# Patient Record
Sex: MALE | Race: WHITE | NOT HISPANIC OR LATINO | Employment: OTHER | URBAN - METROPOLITAN AREA
[De-identification: names, ages, dates, MRNs, and addresses within clinical notes are randomized per-mention and may not be internally consistent; named-entity substitution may affect disease eponyms.]

---

## 2017-06-13 ENCOUNTER — ALLSCRIPTS OFFICE VISIT (OUTPATIENT)
Dept: OTHER | Facility: OTHER | Age: 67
End: 2017-06-13

## 2017-06-13 LAB — HBA1C MFR BLD HPLC: 6.7 %

## 2017-08-29 ENCOUNTER — HOSPITAL ENCOUNTER (EMERGENCY)
Facility: HOSPITAL | Age: 67
Discharge: HOME | End: 2017-08-29
Attending: EMERGENCY MEDICINE | Admitting: EMERGENCY MEDICINE
Payer: COMMERCIAL

## 2017-08-29 ENCOUNTER — OFFICE VISIT (OUTPATIENT)
Dept: URGENT CARE | Facility: MEDICAL CENTER | Age: 67
End: 2017-08-29
Payer: MEDICARE

## 2017-08-29 VITALS
BODY MASS INDEX: 25.9 KG/M2 | SYSTOLIC BLOOD PRESSURE: 184 MMHG | DIASTOLIC BLOOD PRESSURE: 94 MMHG | RESPIRATION RATE: 19 BRPM | OXYGEN SATURATION: 97 % | HEART RATE: 80 BPM | WEIGHT: 191 LBS | TEMPERATURE: 98 F

## 2017-08-29 DIAGNOSIS — S05.01XA CORNEAL ABRASION, RIGHT, INITIAL ENCOUNTER: Primary | ICD-10-CM

## 2017-08-29 PROCEDURE — 90715 TDAP VACCINE 7 YRS/> IM: CPT

## 2017-08-29 PROCEDURE — G0463 HOSPITAL OUTPT CLINIC VISIT: HCPCS

## 2017-08-29 PROCEDURE — 99283 EMERGENCY DEPT VISIT LOW MDM: CPT

## 2017-08-29 PROCEDURE — 99203 OFFICE O/P NEW LOW 30 MIN: CPT

## 2017-08-29 RX ORDER — ERYTHROMYCIN 5 MG/G
0.5 OINTMENT OPHTHALMIC EVERY 6 HOURS
Qty: 1 G | Refills: 0 | Status: SHIPPED | OUTPATIENT
Start: 2017-08-29 | End: 2017-11-02 | Stop reason: ALTCHOICE

## 2017-08-29 RX ORDER — ERYTHROMYCIN 5 MG/G
0.5 OINTMENT OPHTHALMIC ONCE
Status: COMPLETED | OUTPATIENT
Start: 2017-08-29 | End: 2017-08-29

## 2017-08-29 RX ORDER — MORPHINE SULFATE 15 MG/1
15 TABLET ORAL EVERY 4 HOURS PRN
Qty: 15 TABLET | Refills: 0 | Status: ON HOLD | OUTPATIENT
Start: 2017-08-29 | End: 2017-11-03

## 2017-08-29 RX ORDER — PROPARACAINE HYDROCHLORIDE 5 MG/ML
1 SOLUTION/ DROPS OPHTHALMIC ONCE
Status: COMPLETED | OUTPATIENT
Start: 2017-08-29 | End: 2017-08-29

## 2017-08-29 RX ADMIN — ERYTHROMYCIN 0.5 INCH: 5 OINTMENT OPHTHALMIC at 17:36

## 2017-08-29 RX ADMIN — FLUORESCEIN SODIUM 1 STRIP: 1 STRIP OPHTHALMIC at 17:02

## 2017-08-29 RX ADMIN — PROPARACAINE HYDROCHLORIDE 1 DROP: 5 SOLUTION/ DROPS OPHTHALMIC at 17:02

## 2017-09-13 DIAGNOSIS — E78.5 HYPERLIPIDEMIA: ICD-10-CM

## 2017-09-13 DIAGNOSIS — E11.9 TYPE 2 DIABETES MELLITUS WITHOUT COMPLICATIONS (HCC): ICD-10-CM

## 2017-09-13 DIAGNOSIS — F52.21 MALE ERECTILE DISORDER (CODE): ICD-10-CM

## 2017-09-13 DIAGNOSIS — I10 ESSENTIAL (PRIMARY) HYPERTENSION: ICD-10-CM

## 2017-10-05 ENCOUNTER — ANESTHESIA EVENT (OUTPATIENT)
Dept: PERIOP | Facility: AMBULARY SURGERY CENTER | Age: 67
End: 2017-10-05
Payer: COMMERCIAL

## 2017-11-02 RX ORDER — ATORVASTATIN CALCIUM 10 MG/1
10 TABLET, FILM COATED ORAL DAILY
COMMUNITY
End: 2018-03-12 | Stop reason: SDUPTHER

## 2017-11-02 RX ORDER — VALSARTAN 160 MG/1
160 TABLET ORAL DAILY
COMMUNITY
End: 2018-05-07 | Stop reason: SDUPTHER

## 2017-11-02 RX ORDER — COVID-19 ANTIGEN TEST
220 KIT MISCELLANEOUS 2 TIMES DAILY
COMMUNITY
End: 2019-10-15

## 2017-11-03 ENCOUNTER — HOSPITAL ENCOUNTER (OUTPATIENT)
Facility: AMBULARY SURGERY CENTER | Age: 67
Setting detail: OUTPATIENT SURGERY
Discharge: HOME | End: 2017-11-03
Attending: COLON & RECTAL SURGERY | Admitting: COLON & RECTAL SURGERY
Payer: COMMERCIAL

## 2017-11-03 ENCOUNTER — GENERIC CONVERSION - ENCOUNTER (OUTPATIENT)
Dept: OTHER | Facility: OTHER | Age: 67
End: 2017-11-03

## 2017-11-03 ENCOUNTER — ANESTHESIA (OUTPATIENT)
Dept: PERIOP | Facility: AMBULARY SURGERY CENTER | Age: 67
End: 2017-11-03
Payer: COMMERCIAL

## 2017-11-03 VITALS
HEART RATE: 67 BPM | RESPIRATION RATE: 16 BRPM | DIASTOLIC BLOOD PRESSURE: 83 MMHG | TEMPERATURE: 97.4 F | OXYGEN SATURATION: 93 % | BODY MASS INDEX: 26.41 KG/M2 | SYSTOLIC BLOOD PRESSURE: 156 MMHG | HEIGHT: 72 IN | WEIGHT: 195 LBS

## 2017-11-03 RX ORDER — PROPOFOL 10 MG/ML
INJECTION, EMULSION INTRAVENOUS CONTINUOUS PRN
Status: DISCONTINUED | OUTPATIENT
Start: 2017-11-03 | End: 2017-11-03 | Stop reason: SURG

## 2017-11-03 RX ORDER — PROPOFOL 10 MG/ML
INJECTION, EMULSION INTRAVENOUS AS NEEDED
Status: DISCONTINUED | OUTPATIENT
Start: 2017-11-03 | End: 2017-11-03 | Stop reason: SURG

## 2017-11-03 RX ORDER — SODIUM CHLORIDE, SODIUM LACTATE, POTASSIUM CHLORIDE, CALCIUM CHLORIDE 600; 310; 30; 20 MG/100ML; MG/100ML; MG/100ML; MG/100ML
INJECTION, SOLUTION INTRAVENOUS CONTINUOUS PRN
Status: DISCONTINUED | OUTPATIENT
Start: 2017-11-03 | End: 2017-11-03 | Stop reason: SURG

## 2017-11-03 RX ADMIN — SODIUM CHLORIDE, SODIUM LACTATE, POTASSIUM CHLORIDE, AND CALCIUM CHLORIDE: .6; .31; .03; .02 INJECTION, SOLUTION INTRAVENOUS at 08:30

## 2017-11-03 RX ADMIN — PROPOFOL 100 MG: 10 INJECTION, EMULSION INTRAVENOUS at 08:47

## 2017-11-03 RX ADMIN — PROPOFOL 100 MCG/KG/MIN: 10 INJECTION, EMULSION INTRAVENOUS at 08:47

## 2017-11-03 NOTE — H&P
History and Physical   Colon and Rectal Surgery   Aniket Wheeler 74 y o  male MRN: [id number]  Unit/Bed#: [facility] Encounter: [id number]  11/03/17   8:40 AM      CC: History of polyps  History of Present Illness   HPI:  Aniket Wheeler is a 74 y o  male for surveillance colonoscopy  Historical Information   Past Medical History:   Diagnosis Date    Diabetes mellitus ([address] )     Diverticulitis     Hyperlipidemia     Hypertension     Kidney stone      Past Surgical History:   Procedure Laterality Date    ABDOMINAL SURGERY      COLONOSCOPY      HERNIA REPAIR      LAPAROSCOPIC COLON RESECTION      TONSILLECTOMY         Meds/Allergies     Prescriptions Prior to Admission   Medication    atorvastatin (LIPITOR) 10 mg tablet    metFORMIN (GLUCOPHAGE) 1000 MG tablet    Naproxen Sodium 220 MG CAPS    valsartan (DIOVAN) 160 mg tablet       No current facility-administered medications for this encounter  Facility-Administered Medications Ordered in Other Encounters:     lactated ringers infusion, , , Continuous PRN, Stephy Jakmendez, CRNA    No Known Allergies      Social History   History   Alcohol Use    Yes     Comment: socially      History   Drug Use No     History   Smoking Status    Current Some Day Smoker   Smokeless Tobacco    Never Used     Comment: cigar every 2 days         Family History:   Family History   Problem Relation Age of Onset    Colon cancer Father     Colon cancer Paternal Grandfather          Objective     Current Vitals:   Blood Pressure: 157/94 (11/03/17 0808)  Pulse: 72 (sr) (11/03/17 0808)  Temperature: 97 8 °F (36 6 °C) (11/03/17 0808)  Temp Source: Temporal (11/03/17 0808)  Respirations: 18 (11/03/17 0808)  Height: 6' (182 9 cm) (11/03/17 2090)  Weight - Scale: 88 5 kg (195 lb) (11/03/17 0808)  SpO2: 98 % (11/03/17 0808)  No intake or output data in the 24 hours ending 11/03/17 0840    Physical Exam:  General:  Well nourished, no distress    Neuro: Alert and oriented  Eyes:Sclera anicteric, conjunctiva pink  Pulm: Clear to auscultation bilaterally  No respiratory Distress  CV:  Regular rate and rhythm  No murmurs  Abdomen:  Soft, flat, non-tender, without masses or hepatosplenomegaly  Lab Results:       ASSESSMENT:  Karo Cox is a 74 y o  male for surveillance colonoscopy  PLAN:  Colonoscopy  Risks , including, but not limited to, bleeding, perforation, missed lesions, and potential need for surgery, were reviewed  Alternatives to colonoscopy were discussed    Kayla Bolaños MD

## 2017-11-03 NOTE — OP NOTE
COLONOSCOPY    PROCEDURE: Colonoscopy/     INDICATIONS: History of Colon Polyps    POST-OP DIAGNOSIS: See the impression below    SEDATION: Monitored anesthesia care, check anesthesia records    PHYSICAL EXAM:    /94   Pulse 72 Comment: sr  Temp 97 8 °F (36 6 °C) (Temporal)   Resp 18   Ht 6' (1 829 m)   Wt 88 5 kg (195 lb)   SpO2 98%   BMI 26 45 kg/m²   Body mass index is 26 45 kg/m²  General: NAD  Heart: S1 & S2 normal, RRR  Lungs: CTA, No rales or rhonchi  Abdomen: Soft, nontender, nondistended, good bowel sounds    CONSENT:  Informed consent was obtained for the procedure, including sedation after explaining the risks and benefits of the procedure  Risks including but not limited to bleeding, perforation, infection, aspiration were discussed in detail  Also explained about less than 100%$ sensitivity with the exam and other alternatives  PREPARATION:   EKG tracing, pulse oximetry, blood pressure were monitored throughout the procedure  Patient was identified by myself both verbally and by visual inspection of ID band  DESCRIPTION:   Patient was placed in the left lateral decubitus position and was sedated with the above medication  Digital rectal examination was performed  The colonoscope was introduced in to the anal canal and advanced up to cecum, which was identified by the appendiceal orifice and IC valve  A careful inspection was made as the colonoscope was withdrawn, including a retroflexed view of the rectum; findings and interventions are described below  Appropriate photodocumentation was obtained  The quality of the colonic preparation was excellent  FINDINGS:    The rectal exam was normal   There were no prostate nodules  The colon was unremarkable except for a colorectal anastomosis  The anastomosis was widely patent and otherwise unremarkable  IMPRESSIONS:      Normal colorectal anastomosis  Otherwise normal colonoscopy      RECOMMENDATIONS:    Colonoscopy in 5 years  COMPLICATIONS:  None; the patient tolerated the procedure well  DISPOSITION: PACU           CONDITION: Stable    Photos are scanned separately, as there is no endoscopy program available        Janet Noriega MD

## 2017-11-03 NOTE — ANESTHESIA POSTPROCEDURE EVALUATION
Post-Op Assessment Note      CV Status:  Stable    Mental Status:  Alert and awake    Hydration Status:  Euvolemic    PONV Controlled:  None    Airway Patency:  Patent    Post Op Vitals Reviewed: Yes          Staff: CRNA           BP      Temp      Pulse     Resp      SpO2

## 2017-11-03 NOTE — ANESTHESIA PREPROCEDURE EVALUATION
Review of Systems/Medical History  Patient summary reviewed  Chart reviewed  No history of anesthetic complications     Cardiovascular  Exercise tolerance: good,  Hyperlipidemia, Hypertension ,    Pulmonary  Smoker (quit july 2017) more than 10 packs per year , ,        GI/Hepatic  Negative GI/hepatic ROS   Bowel prep       Negative  ROS        Endo/Other  Negative endo/other ROS Diabetes , Arthritis     GYN       Hematology  Negative hematology ROS      Musculoskeletal  Negative musculoskeletal ROS        Neurology  Negative neurology ROS      Psychology   Negative psychology ROS            Physical Exam    Airway    Mallampati score: III  TM Distance: >3 FB  Neck ROM: full     Dental   upper dentures and lower dentures,     Cardiovascular  Cardiovascular exam normal    Pulmonary  Pulmonary exam normal     Other Findings        Anesthesia Plan  ASA Score- 2       Anesthesia Type- IV sedation with anesthesia with ASA Monitors  Additional Monitors:   Airway Plan:     Comment: Discussed with pt the possibility under sedation to have recall or mild discomfort          Induction- intravenous  Informed Consent- Anesthetic plan and risks discussed with patient  I personally reviewed this patient with the CRNA  Discussed and agreed on the Anesthesia Plan with the CRNA  Alisa Garland

## 2018-01-11 NOTE — RESULT NOTES
Verified Results  (1) CBC/PLT/DIFF [id number] 07:47AM [facility]     Test Name Result Flag Reference   WBC 6 5 x10E3/uL  3 4-10 8   RBC 4 75 x10E6/uL  4 14-5 80   Hemoglobin 16 2 g/dL  12 6-17 7   Hematocrit 47 8 %  37 5-51 0    fL H 79-97   MCH 34 1 pg H 26 6-33 0   MCHC 33 9 g/dL  31 5-35 7   RDW 13 3 %  12 3-15 4   Platelets 327 N71R4/AB  150-379   Neutrophils 52 %     Lymphs 37 %     Monocytes 7 %     Eos 3 %     Basos 1 %     Neutrophils (Absolute) 3 3 x10E3/uL  1 4-7 0   Lymphs (Absolute) 2 4 x10E3/uL  0 7-3 1   Monocytes(Absolute) 0 5 x10E3/uL  0 1-0 9   Eos (Absolute) 0 2 x10E3/uL  0 0-0 4   Baso (Absolute) 0 0 x10E3/uL  0 0-0 2   Immature Granulocytes 0 %     Immature Grans (Abs) 0 0 x10E3/uL  0 0-0 1     (1) COMPREHENSIVE METABOLIC PANEL [id number] 06:96RQ [facility]     Test Name Result Flag Reference   Glucose, Serum 140 mg/dL H 65-99   BUN 20 mg/dL  8-27   Creatinine, Serum 0 77 mg/dL  0 76-1 27   eGFR If NonAfricn Am 96 mL/min/1 73  >59   eGFR If Africn Am 111 mL/min/1 73  >59   BUN/Creatinine Ratio 26 H 10-22   Sodium, Serum 141 mmol/L  136-144   Potassium, Serum 4 4 mmol/L  3 5-5 2   Chloride, Serum 99 mmol/L     Carbon Dioxide, Total 24 mmol/L  18-29   Calcium, Serum 9 8 mg/dL  8 6-10 2   Protein, Total, Serum 7 8 g/dL  6 0-8 5   Albumin, Serum 4 6 g/dL  3 6-4 8   Globulin, Total 3 2 g/dL  1 5-4 5   A/G Ratio 1 4  1 1-2 5   Bilirubin, Total 0 3 mg/dL  0 0-1 2   Alkaline Phosphatase, S 49 IU/L     AST (SGOT) 21 IU/L  0-40   ALT (SGPT) 29 IU/L  0-44     (LC) Lipid Panel [id number] 07:47AM [facility]     Test Name Result Flag Reference   Cholesterol, Total 163 mg/dL  100-199   Triglycerides 81 mg/dL  0-149   HDL Cholesterol 49 mg/dL  >39   VLDL Cholesterol Hugo 16 mg/dL  5-40   LDL Cholesterol Calc 98 mg/dL  0-99     (1) LDL,DIRECT [id number] 07:47AM [facility]     Test Name Result Flag Reference   LDL Chol   (Direct) 107 mg/dL H 0-99     (1) HEMOGLOBIN A1C [id number] 07: 47AM [facility]     Test Name Result Flag Reference   Hemoglobin A1c 7 0 % H 4 8-5 6   Pre-diabetes: 5 7 - 6 4           Diabetes: >6 4           Glycemic control for adults with diabetes: <7 0     Darlene Gunter CMP14 Default [id number] 07:47AM [facility]     Test Name Result Flag Reference   Filiberto Bland CMP14 Default Comment     A hand-written panel/profile was received from your office  In  accordance with the [facility] Ambiguous Test Code Policy dated July 9740, we have completed your order by using the closest currently  or formerly recognized AMA panel  We have assigned Comprehensive  Metabolic Panel (14), Test Code #638853 to this request   If this  is not the testing you wished to receive on this specimen, please  contact the [address] [place] [address] Client Inquiry/Technical Services Department  to clarify the test order  We appreciate your business  [facility]) Filiberto Bland LP Default [id number] 07:47AM [facility]     Test Name Result Flag Reference   Zaina Goranv LP Default Comment     A hand-written panel/profile was received from your office  In  accordance with the [facility] Ambiguous Test Code Policy dated July 6991, we have completed your order by using the closest currently  or formerly recognized AMA panel  We have assigned Lipid Panel,  Test Code #773888 to this request  If this is not the testing you  wished to receive on this specimen, please contact the [address] [place] [address]  Client Inquiry/Technical Services Department to clarify the test  order  We appreciate your business         Discussion/Summary   labs are showing slightly increased HA1C otherwise labs are stable continue with current meds and recheck in three months

## 2018-01-12 VITALS
WEIGHT: 191.13 LBS | TEMPERATURE: 97.9 F | SYSTOLIC BLOOD PRESSURE: 138 MMHG | OXYGEN SATURATION: 94 % | HEART RATE: 88 BPM | HEIGHT: 72 IN | BODY MASS INDEX: 25.89 KG/M2 | DIASTOLIC BLOOD PRESSURE: 80 MMHG

## 2018-03-12 DIAGNOSIS — E78.01 FAMILIAL HYPERCHOLESTEROLEMIA: Primary | ICD-10-CM

## 2018-03-12 RX ORDER — ATORVASTATIN CALCIUM 10 MG/1
TABLET, FILM COATED ORAL
Qty: 90 TABLET | Refills: 0 | Status: SHIPPED | OUTPATIENT
Start: 2018-03-12 | End: 2018-05-07 | Stop reason: SDUPTHER

## 2018-05-02 LAB — HBA1C MFR BLD HPLC: 7.5 %

## 2018-05-03 LAB
ALBUMIN SERPL-MCNC: 4.2 G/DL (ref 3.6–4.8)
ALBUMIN/CREAT UR: 22.8 MG/G CREAT (ref 0–30)
ALBUMIN/GLOB SERPL: 1.4 {RATIO} (ref 1.2–2.2)
ALP SERPL-CCNC: 50 IU/L (ref 39–117)
ALT SERPL-CCNC: 31 IU/L (ref 0–44)
AMBIG ABBREV DEFAULT: NORMAL
AMBIG ABBREV DEFAULT: NORMAL
AST SERPL-CCNC: 24 IU/L (ref 0–40)
BASOPHILS # BLD AUTO: 0.1 X10E3/UL (ref 0–0.2)
BASOPHILS NFR BLD AUTO: 1 %
BILIRUB SERPL-MCNC: 0.4 MG/DL (ref 0–1.2)
BUN SERPL-MCNC: 19 MG/DL (ref 8–27)
BUN/CREAT SERPL: 25 (ref 10–24)
CALCIUM SERPL-MCNC: 9.3 MG/DL (ref 8.6–10.2)
CHLORIDE SERPL-SCNC: 102 MMOL/L (ref 96–106)
CHOLEST SERPL-MCNC: 214 MG/DL (ref 100–199)
CO2 SERPL-SCNC: 22 MMOL/L (ref 18–29)
CREAT SERPL-MCNC: 0.76 MG/DL (ref 0.76–1.27)
CREAT UR-MCNC: 94.7 MG/DL
EOSINOPHIL # BLD AUTO: 0.3 X10E3/UL (ref 0–0.4)
EOSINOPHIL NFR BLD AUTO: 6 %
ERYTHROCYTE [DISTWIDTH] IN BLOOD BY AUTOMATED COUNT: 14 % (ref 12.3–15.4)
GLOBULIN SER-MCNC: 3.1 G/DL (ref 1.5–4.5)
GLUCOSE SERPL-MCNC: 144 MG/DL (ref 65–99)
HBA1C MFR BLD: 7.5 % (ref 4.8–5.6)
HCT VFR BLD AUTO: 48 % (ref 37.5–51)
HDLC SERPL-MCNC: 45 MG/DL
HGB BLD-MCNC: 16.1 G/DL (ref 13–17.7)
IMM GRANULOCYTES # BLD: 0 X10E3/UL (ref 0–0.1)
IMM GRANULOCYTES NFR BLD: 0 %
LDLC SERPL CALC-MCNC: 110 MG/DL (ref 0–99)
LYMPHOCYTES # BLD AUTO: 2.1 X10E3/UL (ref 0.7–3.1)
LYMPHOCYTES NFR BLD AUTO: 38 %
MCH RBC QN AUTO: 33.7 PG (ref 26.6–33)
MCHC RBC AUTO-ENTMCNC: 33.5 G/DL (ref 31.5–35.7)
MCV RBC AUTO: 100 FL (ref 79–97)
MICROALBUMIN UR-MCNC: 21.6 UG/ML
MONOCYTES # BLD AUTO: 0.4 X10E3/UL (ref 0.1–0.9)
MONOCYTES NFR BLD AUTO: 6 %
NEUTROPHILS # BLD AUTO: 2.8 X10E3/UL (ref 1.4–7)
NEUTROPHILS NFR BLD AUTO: 49 %
PLATELET # BLD AUTO: 189 X10E3/UL (ref 150–379)
POTASSIUM SERPL-SCNC: 4.4 MMOL/L (ref 3.5–5.2)
PROT SERPL-MCNC: 7.3 G/DL (ref 6–8.5)
RBC # BLD AUTO: 4.78 X10E6/UL (ref 4.14–5.8)
SL AMB EGFR AFRICAN AMERICAN: 111 ML/MIN/1.73
SL AMB EGFR NON AFRICAN AMERICAN: 96 ML/MIN/1.73
SODIUM SERPL-SCNC: 141 MMOL/L (ref 134–144)
TRIGL SERPL-MCNC: 296 MG/DL (ref 0–149)
WBC # BLD AUTO: 5.7 X10E3/UL (ref 3.4–10.8)

## 2018-05-07 ENCOUNTER — OFFICE VISIT (OUTPATIENT)
Dept: FAMILY MEDICINE CLINIC | Facility: CLINIC | Age: 68
End: 2018-05-07
Payer: COMMERCIAL

## 2018-05-07 VITALS
HEIGHT: 72 IN | HEART RATE: 77 BPM | BODY MASS INDEX: 27.39 KG/M2 | OXYGEN SATURATION: 94 % | RESPIRATION RATE: 18 BRPM | TEMPERATURE: 98.4 F | SYSTOLIC BLOOD PRESSURE: 174 MMHG | DIASTOLIC BLOOD PRESSURE: 96 MMHG | WEIGHT: 202.19 LBS

## 2018-05-07 DIAGNOSIS — E11.9 TYPE 2 DIABETES MELLITUS WITHOUT COMPLICATION, WITHOUT LONG-TERM CURRENT USE OF INSULIN (HCC): ICD-10-CM

## 2018-05-07 DIAGNOSIS — K43.9 VENTRAL HERNIA WITHOUT OBSTRUCTION OR GANGRENE: ICD-10-CM

## 2018-05-07 DIAGNOSIS — E78.2 HYPERLIPIDEMIA, MIXED: ICD-10-CM

## 2018-05-07 DIAGNOSIS — E78.01 FAMILIAL HYPERCHOLESTEROLEMIA: ICD-10-CM

## 2018-05-07 DIAGNOSIS — I10 ESSENTIAL HYPERTENSION: Primary | ICD-10-CM

## 2018-05-07 PROCEDURE — 99214 OFFICE O/P EST MOD 30 MIN: CPT | Performed by: NURSE PRACTITIONER

## 2018-05-07 PROCEDURE — 4010F ACE/ARB THERAPY RXD/TAKEN: CPT | Performed by: NURSE PRACTITIONER

## 2018-05-07 RX ORDER — VALSARTAN 160 MG/1
160 TABLET ORAL DAILY
Qty: 30 TABLET | Refills: 0 | Status: SHIPPED | OUTPATIENT
Start: 2018-05-07 | End: 2018-08-22

## 2018-05-07 RX ORDER — SILDENAFIL 50 MG/1
1 TABLET, FILM COATED ORAL AS NEEDED
COMMUNITY
Start: 2013-07-16 | End: 2020-01-14 | Stop reason: HOSPADM

## 2018-05-07 RX ORDER — ATORVASTATIN CALCIUM 10 MG/1
10 TABLET, FILM COATED ORAL DAILY
Qty: 90 TABLET | Refills: 1 | Status: SHIPPED | OUTPATIENT
Start: 2018-05-07 | End: 2018-10-28 | Stop reason: SDUPTHER

## 2018-05-07 RX ORDER — VALSARTAN 160 MG/1
160 TABLET ORAL DAILY
Qty: 90 TABLET | Refills: 1 | Status: SHIPPED | OUTPATIENT
Start: 2018-05-07 | End: 2018-08-22

## 2018-05-07 NOTE — PROGRESS NOTES
Assessment/Plan:    No problem-specific Assessment & Plan notes found for this encounter  Diagnoses and all orders for this visit:    Essential hypertension  -     Comprehensive metabolic panel; Future  -     HEMOGLOBIN A1C W/ EAG ESTIMATION; Future  -     Lipid panel; Future    Hyperlipidemia, mixed  -     Comprehensive metabolic panel; Future  -     HEMOGLOBIN A1C W/ EAG ESTIMATION; Future  -     Lipid panel; Future    Type 2 diabetes mellitus without complication, without long-term current use of insulin (HCC)  -     Comprehensive metabolic panel; Future  -     HEMOGLOBIN A1C W/ EAG ESTIMATION; Future  -     Lipid panel; Future    Ventral hernia without obstruction or gangrene    Other orders  -     sildenafil (VIAGRA) 50 MG tablet; Take 1 tablet by mouth          Subjective:      Patient ID: Bubba Arriaza is a 74 y o  male  Pt BP is extremely high  He ran out of meds 10 days ago  He denies headaches but states " I have been feeling weird in the mornings"   htn- uncontrolled  DM- A1C 7 5, from 6 7  Noncompliant with diet  Not watching calories and trying to watch carbohydrates  Limited fluid intake  HLD- TG > 280 from 81  The following portions of the patient's history were reviewed and updated as appropriate:   He  has a past medical history of Diabetes mellitus ([address] ); Diverticulitis; Hyperlipidemia; Hypertension; and Kidney stone  He   Patient Active Problem List    Diagnosis Date Noted    Essential hypertension 05/07/2018    Hyperlipidemia, mixed 05/07/2018    Type 2 diabetes mellitus without complication, without long-term current use of insulin ([address] ) 05/07/2018    Ventral hernia without obstruction or gangrene 05/07/2018     He  has a past surgical history that includes Abdominal surgery; Laparoscopic colon resection; Tonsillectomy; Colonoscopy; pr colonoscopy flx dx w/collj spec when pfrmd (N/A, 11/3/2017); and Inguinal hernia repair (Left)    His family history includes Colon cancer in his family, father, and paternal grandfather; No Known Problems in his mother  He  reports that he has been smoking  He has never used smokeless tobacco  He reports that he drinks alcohol  He reports that he does not use drugs  Current Outpatient Prescriptions   Medication Sig Dispense Refill    sildenafil (VIAGRA) 50 MG tablet Take 1 tablet by mouth      atorvastatin (LIPITOR) 10 mg tablet TAKE 1 TABLET DAILY 90 tablet 0    metFORMIN (GLUCOPHAGE) 1000 MG tablet Take 1,000 mg by mouth 2 (two) times a day with meals      Naproxen Sodium 220 MG CAPS Take 220 mg by mouth 2 (two) times a day      valsartan (DIOVAN) 160 mg tablet Take 160 mg by mouth daily       No current facility-administered medications for this visit  Current Outpatient Prescriptions on File Prior to Visit   Medication Sig    atorvastatin (LIPITOR) 10 mg tablet TAKE 1 TABLET DAILY    metFORMIN (GLUCOPHAGE) 1000 MG tablet Take 1,000 mg by mouth 2 (two) times a day with meals    Naproxen Sodium 220 MG CAPS Take 220 mg by mouth 2 (two) times a day    valsartan (DIOVAN) 160 mg tablet Take 160 mg by mouth daily     No current facility-administered medications on file prior to visit  He has No Known Allergies       Review of Systems   Constitutional: Negative for fatigue and fever  HENT: Negative for congestion  Eyes: Negative for visual disturbance  Respiratory: Negative for cough and shortness of breath  Cardiovascular: Negative for chest pain, palpitations and leg swelling  Gastrointestinal: Negative for abdominal distention and abdominal pain  Endocrine: Negative for cold intolerance, polydipsia and polyuria  Genitourinary: Negative for difficulty urinating  Musculoskeletal: Negative for back pain and joint swelling  Skin: Negative for color change and rash  Allergic/Immunologic: Negative for immunocompromised state  Neurological: Positive for numbness  Negative for dizziness and headaches  Intermittent numbness in toes   Hematological: Negative for adenopathy  Psychiatric/Behavioral: Negative for behavioral problems and sleep disturbance  All other systems reviewed and are negative  Objective:      BP (!) 174/96 (BP Location: Left arm, Patient Position: Sitting)   Pulse 77   Temp 98 4 °F (36 9 °C)   Resp 18   Ht 6' (1 829 m)   Wt 91 7 kg (202 lb 3 oz)   SpO2 94%   BMI 27 42 kg/m²          Physical Exam   Constitutional: He is oriented to person, place, and time  He appears well-developed and well-nourished  HENT:   Head: Normocephalic and atraumatic  Mouth/Throat: Oropharynx is clear and moist    Eyes: Conjunctivae and EOM are normal  Pupils are equal, round, and reactive to light  Neck: Normal range of motion  Neck supple  No JVD present  Cardiovascular: Normal rate, regular rhythm, normal heart sounds and intact distal pulses  Exam reveals no gallop and no friction rub  No murmur heard  Pulmonary/Chest: Effort normal and breath sounds normal  No respiratory distress  Abdominal: Soft  Bowel sounds are normal  There is no tenderness  Abdominal ventral hernia present  Nontender  Musculoskeletal: Normal range of motion  Lymphadenopathy:     He has no cervical adenopathy  Neurological: He is alert and oriented to person, place, and time  Skin: Skin is warm and dry  Psychiatric: He has a normal mood and affect  His behavior is normal  Judgment and thought content normal    Nursing note and vitals reviewed

## 2018-05-07 NOTE — PATIENT INSTRUCTIONS
htn- limit salt in diet  Refilled BP meds  Hyperlipidemia- strive for 5 servings of fruits and veggies daily  DM- A1C is rising  limit to 40-60 grams of carbs per meal  Pt is willing to meet with our office dietician/diabetes educator

## 2018-08-22 ENCOUNTER — OFFICE VISIT (OUTPATIENT)
Dept: FAMILY MEDICINE CLINIC | Facility: CLINIC | Age: 68
End: 2018-08-22
Payer: COMMERCIAL

## 2018-08-22 VITALS
DIASTOLIC BLOOD PRESSURE: 86 MMHG | SYSTOLIC BLOOD PRESSURE: 144 MMHG | OXYGEN SATURATION: 97 % | BODY MASS INDEX: 26.28 KG/M2 | HEART RATE: 80 BPM | WEIGHT: 194 LBS | HEIGHT: 72 IN | RESPIRATION RATE: 18 BRPM

## 2018-08-22 DIAGNOSIS — I10 ESSENTIAL HYPERTENSION: Primary | ICD-10-CM

## 2018-08-22 DIAGNOSIS — M79.89 SOFT TISSUE MASS: ICD-10-CM

## 2018-08-22 PROCEDURE — 99214 OFFICE O/P EST MOD 30 MIN: CPT | Performed by: NURSE PRACTITIONER

## 2018-08-22 PROCEDURE — 3008F BODY MASS INDEX DOCD: CPT | Performed by: NURSE PRACTITIONER

## 2018-08-22 RX ORDER — LOSARTAN POTASSIUM 100 MG/1
100 TABLET ORAL DAILY
Qty: 90 TABLET | Refills: 3 | Status: SHIPPED | OUTPATIENT
Start: 2018-08-22 | End: 2018-09-27 | Stop reason: SDUPTHER

## 2018-08-22 NOTE — PATIENT INSTRUCTIONS
htn- stop valsartan  Start Losartan  Limit salt in diet  Soft tissue mass of abdomen- poss cystic lesion  Check US   If fluid filled, refer to general surgery for aspiration or removal

## 2018-08-22 NOTE — PROGRESS NOTES
Assessment/Plan:    No problem-specific Assessment & Plan notes found for this encounter  Diagnoses and all orders for this visit:    Essential hypertension  -     losartan (COZAAR) 100 MG tablet; Take 1 tablet (100 mg total) by mouth daily for 90 days    Soft tissue mass  -     US abdomen limited; Future          Subjective:      Patient ID: Kash Grissom is a 79 y o  male     htn- borderline BP  He is taking Valsartan and will need to change the medicine  One month ago, pt felt a lump on the surface skin of his abdomen on the left side  It has gronw in size and is getting red  It is sore when he touches it  No fever  Call pt cell with results: [phone]        The following portions of the patient's history were reviewed and updated as appropriate:   He  has a past medical history of Diabetes mellitus [address] [address] ); Diverticulitis; Hyperlipidemia; Hypertension; and Kidney stone  He   Patient Active Problem List    Diagnosis Date Noted    Soft tissue mass 08/22/2018    Essential hypertension 05/07/2018    Hyperlipidemia, mixed 05/07/2018    Type 2 diabetes mellitus without complication, without long-term current use of insulin ([facility] [address] ) 05/07/2018    Ventral hernia without obstruction or gangrene 05/07/2018     He  has a past surgical history that includes Abdominal surgery; Laparoscopic colon resection; Tonsillectomy; Colonoscopy; pr colonoscopy flx dx w/collj spec when pfrmd (N/A, 11/3/2017); and Inguinal hernia repair (Left)  His family history includes Colon cancer in his family, father, and paternal grandfather; No Known Problems in his mother  He  reports that he has been smoking  He has never used smokeless tobacco  He reports that he drinks alcohol  He reports that he does not use drugs    Current Outpatient Prescriptions   Medication Sig Dispense Refill    atorvastatin (LIPITOR) 10 mg tablet Take 1 tablet (10 mg total) by mouth daily for 90 days 90 tablet 1    glucose blood test strip Use as instructed 100 each 2    losartan (COZAAR) 100 MG tablet Take 1 tablet (100 mg total) by mouth daily for 90 days 90 tablet 3    metFORMIN (GLUCOPHAGE) 1000 MG tablet Take 1 tablet (1,000 mg total) by mouth 2 (two) times a day with meals for 90 days 180 tablet 1    Naproxen Sodium 220 MG CAPS Take 220 mg by mouth 2 (two) times a day      sildenafil (VIAGRA) 50 MG tablet Take 1 tablet by mouth      sitaGLIPtin (JANUVIA) 25 mg tablet Take 1 tablet (25 mg total) by mouth daily for 90 days 90 tablet 1     No current facility-administered medications for this visit  Current Outpatient Prescriptions on File Prior to Visit   Medication Sig    atorvastatin (LIPITOR) 10 mg tablet Take 1 tablet (10 mg total) by mouth daily for 90 days    glucose blood test strip Use as instructed    metFORMIN (GLUCOPHAGE) 1000 MG tablet Take 1 tablet (1,000 mg total) by mouth 2 (two) times a day with meals for 90 days    Naproxen Sodium 220 MG CAPS Take 220 mg by mouth 2 (two) times a day    sildenafil (VIAGRA) 50 MG tablet Take 1 tablet by mouth    sitaGLIPtin (JANUVIA) 25 mg tablet Take 1 tablet (25 mg total) by mouth daily for 90 days    [DISCONTINUED] valsartan (DIOVAN) 160 mg tablet Take 1 tablet (160 mg total) by mouth daily for 90 days    [DISCONTINUED] valsartan (DIOVAN) 160 mg tablet Take 1 tablet (160 mg total) by mouth daily for 30 days     No current facility-administered medications on file prior to visit  He has No Known Allergies       Review of Systems   Constitutional: Negative for fatigue and fever  HENT: Negative for congestion  Eyes: Negative for visual disturbance  Respiratory: Negative for cough and shortness of breath  Cardiovascular: Negative for chest pain, palpitations and leg swelling  Gastrointestinal: Negative for abdominal distention and abdominal pain  Endocrine: Negative for cold intolerance, polydipsia and polyuria  Genitourinary: Negative for difficulty urinating  Musculoskeletal: Negative for back pain and joint swelling  Skin: Negative for color change and rash  Lump under skin, left side of abdomen   Allergic/Immunologic: Negative for immunocompromised state  Neurological: Negative for dizziness and headaches  Hematological: Negative for adenopathy  Psychiatric/Behavioral: Negative for behavioral problems and sleep disturbance  All other systems reviewed and are negative  Objective:      /86 (BP Location: Left arm, Patient Position: Sitting)   Pulse 80   Resp 18   Ht 6' (1 829 m)   Wt 88 kg (194 lb)   SpO2 97%   BMI 26 31 kg/m²          Physical Exam   Constitutional: He is oriented to person, place, and time  He appears well-developed and well-nourished  HENT:   Head: Normocephalic and atraumatic  Mouth/Throat: Oropharynx is clear and moist    Eyes: Conjunctivae and EOM are normal  Pupils are equal, round, and reactive to light  Neck: Normal range of motion  Cardiovascular: Normal rate, regular rhythm and normal heart sounds  Exam reveals no gallop and no friction rub  No murmur heard  Pulmonary/Chest: Effort normal  No respiratory distress  Abdominal: Soft  Bowel sounds are normal  There is no tenderness  Musculoskeletal: Normal range of motion  Lymphadenopathy:     He has no cervical adenopathy  Neurological: He is alert and oriented to person, place, and time  Skin: Skin is warm and dry  Left side of abdomen, superficially mild erythema approx dime size, underlying palpable fluid filled mass   Psychiatric: He has a normal mood and affect  His behavior is normal  Judgment and thought content normal    Nursing note and vitals reviewed

## 2018-08-24 ENCOUNTER — HOSPITAL ENCOUNTER (OUTPATIENT)
Dept: RADIOLOGY | Facility: MEDICAL CENTER | Age: 68
Discharge: HOME | End: 2018-08-24
Payer: COMMERCIAL

## 2018-08-24 DIAGNOSIS — M79.89 SOFT TISSUE MASS: ICD-10-CM

## 2018-08-24 PROCEDURE — 76705 ECHO EXAM OF ABDOMEN: CPT

## 2018-08-28 DIAGNOSIS — R19.04 LEFT LOWER QUADRANT ABDOMINAL MASS: Primary | ICD-10-CM

## 2018-08-30 ENCOUNTER — TELEPHONE (OUTPATIENT)
Dept: FAMILY MEDICINE CLINIC | Facility: CLINIC | Age: 68
End: 2018-08-30

## 2018-08-30 NOTE — TELEPHONE ENCOUNTER
[email or web address]    ----- Message from Brandie Collins, [address] sent at 8/28/2018  9:24 AM EDT -----  Regarding: abnormal US  US report concludes that there is a mass present in abdomen, however, unable to differentiate  Needs MRI to determine if this is a cyst or something more serious  Order was placed  Does pt have any metallic implants of any kind?

## 2018-09-04 ENCOUNTER — DOCUMENTATION (OUTPATIENT)
Dept: FAMILY MEDICINE CLINIC | Facility: CLINIC | Age: 68
End: 2018-09-04

## 2018-09-05 ENCOUNTER — TELEPHONE (OUTPATIENT)
Dept: FAMILY MEDICINE CLINIC | Facility: CLINIC | Age: 68
End: 2018-09-05

## 2018-09-05 DIAGNOSIS — E11.9 TYPE 2 DIABETES MELLITUS WITHOUT COMPLICATION, WITHOUT LONG-TERM CURRENT USE OF INSULIN (HCC): Primary | ICD-10-CM

## 2018-09-05 DIAGNOSIS — I10 ESSENTIAL HYPERTENSION: ICD-10-CM

## 2018-09-05 NOTE — TELEPHONE ENCOUNTER
Pt set up his mri for next Wednesday but the central scheduling said he needed blood work done due to his diabetes and high blood pressure   Can you please order whatever would be due and notifiy pt when ready for  - cell [phone]

## 2018-09-07 LAB — HBA1C MFR BLD HPLC: 6.7 %

## 2018-09-10 LAB
ALBUMIN SERPL-MCNC: 4.7 G/DL (ref 3.6–4.8)
ALBUMIN/GLOB SERPL: 1.7 {RATIO} (ref 1.2–2.2)
ALP SERPL-CCNC: 50 IU/L (ref 39–117)
ALT SERPL-CCNC: 27 IU/L (ref 0–44)
AMBIG ABBREV DEFAULT: NORMAL
AMBIG ABBREV DEFAULT: NORMAL
AST SERPL-CCNC: 22 IU/L (ref 0–40)
BILIRUB SERPL-MCNC: 0.4 MG/DL (ref 0–1.2)
BUN SERPL-MCNC: 20 MG/DL (ref 8–27)
BUN/CREAT SERPL: 26 (ref 10–24)
CALCIUM SERPL-MCNC: 9.7 MG/DL (ref 8.6–10.2)
CHLORIDE SERPL-SCNC: 104 MMOL/L (ref 96–106)
CHOLEST SERPL-MCNC: 181 MG/DL (ref 100–199)
CO2 SERPL-SCNC: 23 MMOL/L (ref 20–29)
CREAT SERPL-MCNC: 0.77 MG/DL (ref 0.76–1.27)
EST. AVERAGE GLUCOSE BLD GHB EST-MCNC: 146 MG/DL
GLOBULIN SER-MCNC: 2.7 G/DL (ref 1.5–4.5)
GLUCOSE SERPL-MCNC: 135 MG/DL (ref 65–99)
HBA1C MFR BLD: 6.7 % (ref 4.8–5.6)
HDLC SERPL-MCNC: 50 MG/DL
LDLC SERPL CALC-MCNC: 102 MG/DL (ref 0–99)
POTASSIUM SERPL-SCNC: 5 MMOL/L (ref 3.5–5.2)
PROT SERPL-MCNC: 7.4 G/DL (ref 6–8.5)
SL AMB EGFR AFRICAN AMERICAN: 109 ML/MIN/1.73
SL AMB EGFR NON AFRICAN AMERICAN: 95 ML/MIN/1.73
SL AMB VLDL CHOLESTEROL CALC: 29 MG/DL (ref 5–40)
SODIUM SERPL-SCNC: 142 MMOL/L (ref 134–144)
TRIGL SERPL-MCNC: 147 MG/DL (ref 0–149)

## 2018-09-12 ENCOUNTER — HOSPITAL ENCOUNTER (OUTPATIENT)
Dept: MRI IMAGING | Facility: HOSPITAL | Age: 68
Discharge: HOME | End: 2018-09-12
Payer: COMMERCIAL

## 2018-09-12 DIAGNOSIS — R19.04 LEFT LOWER QUADRANT ABDOMINAL MASS: ICD-10-CM

## 2018-09-12 PROCEDURE — 74183 MRI ABD W/O CNTR FLWD CNTR: CPT

## 2018-09-12 PROCEDURE — A9585 GADOBUTROL INJECTION: HCPCS | Performed by: RADIOLOGY

## 2018-09-12 RX ADMIN — GADOBUTROL 8 ML: 604.72 INJECTION INTRAVENOUS at 18:06

## 2018-09-13 DIAGNOSIS — R19.07 GENERALIZED SWELLING, MASS, OR LUMP OF ABDOMEN OR PELVIS: Primary | ICD-10-CM

## 2018-09-27 DIAGNOSIS — I10 ESSENTIAL HYPERTENSION: ICD-10-CM

## 2018-09-27 PROCEDURE — 4010F ACE/ARB THERAPY RXD/TAKEN: CPT | Performed by: NURSE PRACTITIONER

## 2018-09-27 RX ORDER — LOSARTAN POTASSIUM 100 MG/1
100 TABLET ORAL DAILY
Qty: 7 TABLET | Refills: 0 | Status: SHIPPED | OUTPATIENT
Start: 2018-09-27 | End: 2019-06-04 | Stop reason: SDUPTHER

## 2018-10-27 DIAGNOSIS — E11.9 TYPE 2 DIABETES MELLITUS WITHOUT COMPLICATION, WITHOUT LONG-TERM CURRENT USE OF INSULIN (HCC): ICD-10-CM

## 2018-10-27 DIAGNOSIS — E78.01 FAMILIAL HYPERCHOLESTEROLEMIA: ICD-10-CM

## 2018-10-27 DIAGNOSIS — I10 ESSENTIAL HYPERTENSION: ICD-10-CM

## 2018-10-27 DIAGNOSIS — E78.2 HYPERLIPIDEMIA, MIXED: ICD-10-CM

## 2018-10-27 DIAGNOSIS — K43.9 VENTRAL HERNIA WITHOUT OBSTRUCTION OR GANGRENE: ICD-10-CM

## 2018-10-28 DIAGNOSIS — E11.9 TYPE 2 DIABETES MELLITUS WITHOUT COMPLICATION, WITHOUT LONG-TERM CURRENT USE OF INSULIN (HCC): ICD-10-CM

## 2018-10-28 DIAGNOSIS — E78.2 HYPERLIPIDEMIA, MIXED: ICD-10-CM

## 2018-10-28 DIAGNOSIS — K43.9 VENTRAL HERNIA WITHOUT OBSTRUCTION OR GANGRENE: ICD-10-CM

## 2018-10-28 DIAGNOSIS — E78.01 FAMILIAL HYPERCHOLESTEROLEMIA: ICD-10-CM

## 2018-10-28 DIAGNOSIS — I10 ESSENTIAL HYPERTENSION: ICD-10-CM

## 2018-10-28 RX ORDER — ATORVASTATIN CALCIUM 10 MG/1
10 TABLET, FILM COATED ORAL DAILY
Qty: 90 TABLET | Refills: 0 | Status: SHIPPED | OUTPATIENT
Start: 2018-10-28 | End: 2018-12-12 | Stop reason: SDUPTHER

## 2018-10-28 RX ORDER — SITAGLIPTIN 25 MG/1
TABLET, FILM COATED ORAL
Qty: 90 TABLET | Status: CANCELLED | OUTPATIENT
Start: 2018-10-28

## 2018-10-28 RX ORDER — ATORVASTATIN CALCIUM 10 MG/1
TABLET, FILM COATED ORAL
Qty: 90 TABLET | Status: CANCELLED | OUTPATIENT
Start: 2018-10-28

## 2018-11-05 ENCOUNTER — TELEPHONE (OUTPATIENT)
Dept: FAMILY MEDICINE CLINIC | Facility: CLINIC | Age: 68
End: 2018-11-05

## 2018-11-05 NOTE — TELEPHONE ENCOUNTER
Patient left a message to schedule an appointment to potentially aspirate the cyst that he has been having tests on  Is this Okay to schedule?

## 2018-11-07 ENCOUNTER — OFFICE VISIT (OUTPATIENT)
Dept: FAMILY MEDICINE CLINIC | Facility: CLINIC | Age: 68
End: 2018-11-07
Payer: COMMERCIAL

## 2018-11-07 VITALS
TEMPERATURE: 98.8 F | OXYGEN SATURATION: 97 % | SYSTOLIC BLOOD PRESSURE: 150 MMHG | WEIGHT: 194 LBS | HEIGHT: 72 IN | DIASTOLIC BLOOD PRESSURE: 88 MMHG | HEART RATE: 80 BPM | BODY MASS INDEX: 26.28 KG/M2 | RESPIRATION RATE: 18 BRPM

## 2018-11-07 DIAGNOSIS — L02.91 ABSCESS: Primary | ICD-10-CM

## 2018-11-07 DIAGNOSIS — K65.4 FAT NECROSIS OF ABDOMINAL WALL (HCC): ICD-10-CM

## 2018-11-07 PROCEDURE — 10140 I&D HMTMA SEROMA/FLUID COLLJ: CPT | Performed by: NURSE PRACTITIONER

## 2018-11-07 PROCEDURE — 10060 I&D ABSCESS SIMPLE/SINGLE: CPT | Performed by: NURSE PRACTITIONER

## 2018-11-07 PROCEDURE — 87070 CULTURE OTHR SPECIMN AEROBIC: CPT | Performed by: NURSE PRACTITIONER

## 2018-11-07 PROCEDURE — 3008F BODY MASS INDEX DOCD: CPT | Performed by: NURSE PRACTITIONER

## 2018-11-07 PROCEDURE — 87205 SMEAR GRAM STAIN: CPT | Performed by: NURSE PRACTITIONER

## 2018-11-07 PROCEDURE — 1160F RVW MEDS BY RX/DR IN RCRD: CPT | Performed by: NURSE PRACTITIONER

## 2018-11-07 PROCEDURE — 99214 OFFICE O/P EST MOD 30 MIN: CPT | Performed by: NURSE PRACTITIONER

## 2018-11-07 RX ORDER — SULFAMETHOXAZOLE AND TRIMETHOPRIM 800; 160 MG/1; MG/1
1 TABLET ORAL EVERY 12 HOURS SCHEDULED
Qty: 20 TABLET | Refills: 0 | Status: SHIPPED | OUTPATIENT
Start: 2018-11-07 | End: 2018-11-17

## 2018-11-07 NOTE — PATIENT INSTRUCTIONS
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

## 2018-11-07 NOTE — PROGRESS NOTES
Assessment/Plan:    No problem-specific Assessment & Plan notes found for this encounter  Diagnoses and all orders for this visit:    Abscess  -     sulfamethoxazole-trimethoprim (BACTRIM DS) 800-160 mg per tablet; Take 1 tablet by mouth every 12 (twelve) hours for 10 days  -     Incision and Drainage  -     Wound culture and Gram stain; Future    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HCC)          Subjective:      Patient ID: Brenna Proctor is a 79 y o  male  Pt has had a soft tissue mass on the left side of his abdomen since August  US was done and this suggested possible sebaceous cyst  Mri was done in September and this shows:     1  Patient's palpable lump corresponds to a left anterior abdominal wall 1 6 cm lesion just beneath the skin  The lesion has internal fat signal and an enhancing soft tissue compartment  There is associated edema and enhancement of the adjacent   subcutaneous fat and overlying skin  This is favored to represent a focal area of fat necrosis with adjacent inflammatory changes  Follow-up evaluation with Limited soft tissue ultrasound is recommended in 3-6 months      2   Mild to moderate diffuse hepatic steatosis      3   Bilateral simple and proteinaceous renal cysts         Pt is here today because he thinks it has "come to ahead" and he would like to have it drained  He states that it has come to the surface and "changed"         The following portions of the patient's history were reviewed and updated as appropriate:   He  has a past medical history of Diabetes mellitus ([address] ); Diverticulitis; Hyperlipidemia; Hypertension; and Kidney stone    He   Patient Active Problem List    Diagnosis Date Noted    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[address] ) 11/07/2018    Soft tissue mass 08/22/2018    Essential hypertension 05/07/2018    Hyperlipidemia, mixed 05/07/2018    Type 2 diabetes mellitus without complication, without long-term current use of insulin ([address] ) 05/07/2018    Ventral hernia without obstruction or gangrene 05/07/2018     He  has a past surgical history that includes Abdominal surgery; Laparoscopic colon resection; Tonsillectomy; Colonoscopy; pr colonoscopy flx dx w/joaquín spec when pfrmd (N/A, 11/3/2017); and Inguinal hernia repair (Left)  His family history includes Colon cancer in his family, father, and paternal grandfather; No Known Problems in his mother  He  reports that he has been smoking  He has never used smokeless tobacco  He reports that he drinks alcohol  He reports that he does not use drugs  Current Outpatient Prescriptions   Medication Sig Dispense Refill    atorvastatin (LIPITOR) 10 mg tablet Take 1 tablet (10 mg total) by mouth daily for 90 days 90 tablet 0    glucose blood test strip Use as instructed 100 each 2    losartan (COZAAR) 100 MG tablet Take 1 tablet (100 mg total) by mouth daily for 90 days 7 tablet 0    metFORMIN (GLUCOPHAGE) 1000 MG tablet Take 1 tablet (1,000 mg total) by mouth 2 (two) times a day with meals for 90 days 180 tablet 0    Naproxen Sodium 220 MG CAPS Take 220 mg by mouth 2 (two) times a day      sildenafil (VIAGRA) 50 MG tablet Take 1 tablet by mouth      sitaGLIPtin (JANUVIA) 25 mg tablet Take 1 tablet (25 mg total) by mouth daily for 90 days 90 tablet 0    sulfamethoxazole-trimethoprim (BACTRIM DS) 800-160 mg per tablet Take 1 tablet by mouth every 12 (twelve) hours for 10 days 20 tablet 0     No current facility-administered medications for this visit        Current Outpatient Prescriptions on File Prior to Visit   Medication Sig    atorvastatin (LIPITOR) 10 mg tablet Take 1 tablet (10 mg total) by mouth daily for 90 days    glucose blood test strip Use as instructed    losartan (COZAAR) 100 MG tablet Take 1 tablet (100 mg total) by mouth daily for 90 days    metFORMIN (GLUCOPHAGE) 1000 MG tablet Take 1 tablet (1,000 mg total) by mouth 2 (two) times a day with meals for 90 days    Naproxen Sodium 220 MG CAPS Take 220 mg by mouth 2 (two) times a day    sildenafil (VIAGRA) 50 MG tablet Take 1 tablet by mouth    sitaGLIPtin (JANUVIA) 25 mg tablet Take 1 tablet (25 mg total) by mouth daily for 90 days     No current facility-administered medications on file prior to visit  He has No Known Allergies       Review of Systems   Constitutional: Negative for fever  HENT: Negative  Eyes: Negative for visual disturbance  Respiratory: Negative  Cardiovascular: Negative  Gastrointestinal: Negative  Endocrine: Negative  Musculoskeletal: Negative  Skin: Positive for wound  Abscess of skin   Allergic/Immunologic: Negative for immunocompromised state  Neurological: Negative  Hematological: Negative for adenopathy  All other systems reviewed and are negative  Objective:      /88 (BP Location: Left arm, Patient Position: Sitting)   Pulse 80   Temp 98 8 °F (37 1 °C)   Resp 18   Ht 6' (1 829 m)   Wt 88 kg (194 lb)   SpO2 97%   BMI 26 31 kg/m²          Physical Exam   Constitutional: He is oriented to person, place, and time  He appears well-developed and well-nourished  No distress  HENT:   Head: Normocephalic and atraumatic  Mouth/Throat: Oropharynx is clear and moist  No oropharyngeal exudate  Eyes: Pupils are equal, round, and reactive to light  Conjunctivae are normal    Neck: Normal range of motion  Neck supple  Cardiovascular: Normal rate and regular rhythm  Exam reveals no gallop and no friction rub  No murmur heard  Pulmonary/Chest: Effort normal  No respiratory distress  He has no wheezes  Abdominal: Soft  Musculoskeletal: Normal range of motion  He exhibits no edema  Lymphadenopathy:     He has no cervical adenopathy  Neurological: He is alert and oriented to person, place, and time  Skin: Skin is warm and dry  No rash noted  He is not diaphoretic  No erythema  Left lower abdomen, 5cm 3cm area of erythema and induration   Tender to touch  + fluid fluctuation on the surface  I and D performed and moderate amount of thick yellow fluid was drained  Psychiatric: He has a normal mood and affect  His behavior is normal  Judgment and thought content normal    Nursing note and vitals reviewed  Incision and Drainage  Date/Time: 11/7/2018 1:11 PM  Performed by: Alex Pierce by: Lupe Alston     Patient location:  Bedside  Consent:     Consent obtained:  Verbal    Consent given by:  Patient    Risks discussed:  Bleeding, incomplete drainage, infection and pain    Alternatives discussed:  No treatment  Universal protocol:     Procedure explained and questions answered to patient or proxy's satisfaction: yes      Patient identity confirmed:  Verbally with patient  Location:     Type:  Abscess and fluid collection    Size:  5cm x 3cm on left side abdomen  Pre-procedure details:     Skin preparation:  Betadine  Anesthesia (see MAR for exact dosages): Anesthesia method:  None  Procedure details:     Complexity:  Simple    Needle aspiration: no      Incision types:  Stab incision    Scalpel blade:  11    Approach:  Open    Incision depth:  Skin    Wound management:  Probed and deloculated    Drainage:  Purulent    Drainage amount: Moderate    Wound treatment:  Wound left open    Packing materials:  None  Post-procedure details:     Patient tolerance of procedure:   Tolerated well, no immediate complications

## 2018-11-08 NOTE — PROGRESS NOTES
The culture did not show any bacteria in the wound  I recommend he follow up with US in March as planned  This was not a bacterial abscess   Have him report to me if this returns with fluid accumulation

## 2018-11-10 LAB
BACTERIA WND AEROBE CULT: NO GROWTH
GRAM STN SPEC: NORMAL

## 2018-12-12 DIAGNOSIS — E11.9 TYPE 2 DIABETES MELLITUS WITHOUT COMPLICATION, WITHOUT LONG-TERM CURRENT USE OF INSULIN (HCC): ICD-10-CM

## 2018-12-12 DIAGNOSIS — K43.9 VENTRAL HERNIA WITHOUT OBSTRUCTION OR GANGRENE: ICD-10-CM

## 2018-12-12 DIAGNOSIS — E78.01 FAMILIAL HYPERCHOLESTEROLEMIA: ICD-10-CM

## 2018-12-12 DIAGNOSIS — I10 ESSENTIAL HYPERTENSION: ICD-10-CM

## 2018-12-12 DIAGNOSIS — E78.2 HYPERLIPIDEMIA, MIXED: ICD-10-CM

## 2018-12-12 RX ORDER — ATORVASTATIN CALCIUM 10 MG/1
10 TABLET, FILM COATED ORAL DAILY
Qty: 90 TABLET | Refills: 0 | Status: SHIPPED | OUTPATIENT
Start: 2018-12-12 | End: 2019-02-23 | Stop reason: SDUPTHER

## 2018-12-14 DIAGNOSIS — E11.9 TYPE 2 DIABETES MELLITUS WITHOUT COMPLICATION, WITHOUT LONG-TERM CURRENT USE OF INSULIN (HCC): ICD-10-CM

## 2018-12-17 DIAGNOSIS — E11.9 TYPE 2 DIABETES MELLITUS WITHOUT COMPLICATION, WITHOUT LONG-TERM CURRENT USE OF INSULIN (HCC): ICD-10-CM

## 2019-01-30 ENCOUNTER — OFFICE VISIT (OUTPATIENT)
Dept: FAMILY MEDICINE CLINIC | Facility: CLINIC | Age: 69
End: 2019-01-30
Payer: COMMERCIAL

## 2019-01-30 VITALS
TEMPERATURE: 97.7 F | WEIGHT: 196 LBS | SYSTOLIC BLOOD PRESSURE: 138 MMHG | DIASTOLIC BLOOD PRESSURE: 88 MMHG | HEART RATE: 78 BPM | BODY MASS INDEX: 26.55 KG/M2 | OXYGEN SATURATION: 95 % | HEIGHT: 72 IN | RESPIRATION RATE: 18 BRPM

## 2019-01-30 DIAGNOSIS — H10.32 ACUTE BACTERIAL CONJUNCTIVITIS OF LEFT EYE: Primary | ICD-10-CM

## 2019-01-30 DIAGNOSIS — J01.00 ACUTE NON-RECURRENT MAXILLARY SINUSITIS: ICD-10-CM

## 2019-01-30 PROCEDURE — 1160F RVW MEDS BY RX/DR IN RCRD: CPT | Performed by: NURSE PRACTITIONER

## 2019-01-30 PROCEDURE — 3008F BODY MASS INDEX DOCD: CPT | Performed by: NURSE PRACTITIONER

## 2019-01-30 PROCEDURE — 99214 OFFICE O/P EST MOD 30 MIN: CPT | Performed by: NURSE PRACTITIONER

## 2019-01-30 RX ORDER — AMOXICILLIN 875 MG/1
875 TABLET, COATED ORAL 2 TIMES DAILY
Qty: 14 TABLET | Refills: 0 | Status: SHIPPED | OUTPATIENT
Start: 2019-01-30 | End: 2019-02-06

## 2019-01-30 RX ORDER — TOBRAMYCIN AND DEXAMETHASONE 3; 1 MG/ML; MG/ML
1 SUSPENSION/ DROPS OPHTHALMIC
Qty: 5 ML | Refills: 0 | Status: SHIPPED | OUTPATIENT
Start: 2019-01-30 | End: 2019-06-04

## 2019-01-30 NOTE — PROGRESS NOTES
Assessment/Plan:    No problem-specific Assessment & Plan notes found for this encounter  Diagnoses and all orders for this visit:    Acute bacterial conjunctivitis of left eye  -     tobramycin-dexamethasone (TOBRADEX) ophthalmic suspension; Administer 1 drop to both eyes every 4 (four) hours while awake for 7 days    Acute non-recurrent maxillary sinusitis  -     amoxicillin (AMOXIL) 875 mg tablet; Take 1 tablet (875 mg total) by mouth 2 (two) times a day for 7 days          Subjective:      Patient ID: Tyson Loyd is a 79 y o  male  Patient states he has been battling congestion and cough for about 2 5-3 weeks  He states that his left eye is sore with drainage and his eyelid was closed shut this morning  Eye symptoms have been going on for about 4 to 5 days  Denies fevers  Denies nausea/vomiting/diarrhea  Patient has been taking some OTC decongestants  Patient is denying any vision changes  The following portions of the patient's history were reviewed and updated as appropriate:   He  has a past medical history of Diabetes mellitus ([address] ); Diverticulitis; Hyperlipidemia; Hypertension; and Kidney stone  He   Patient Active Problem List    Diagnosis Date Noted    Acute bacterial conjunctivitis of left eye 01/30/2019    Acute non-recurrent maxillary sinusitis 01/30/2019    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[address] ) 11/07/2018    Soft tissue mass 08/22/2018    Essential hypertension 05/07/2018    Hyperlipidemia, mixed 05/07/2018    Type 2 diabetes mellitus without complication, without long-term current use of insulin ([address] ) 05/07/2018    Ventral hernia without obstruction or gangrene 05/07/2018     He  has a past surgical history that includes Abdominal surgery; Laparoscopic colon resection; Tonsillectomy; Colonoscopy; pr colonoscopy flx dx w/collj spec when pfrmd (N/A, 11/3/2017); and Inguinal hernia repair (Left)    His family history includes Colon cancer in his family, father, and paternal grandfather; No Known Problems in his mother  He  reports that he has been smoking  He has never used smokeless tobacco  He reports that he drinks alcohol  He reports that he does not use drugs  Current Outpatient Prescriptions   Medication Sig Dispense Refill    amoxicillin (AMOXIL) 875 mg tablet Take 1 tablet (875 mg total) by mouth 2 (two) times a day for 7 days 14 tablet 0    atorvastatin (LIPITOR) 10 mg tablet Take 1 tablet (10 mg total) by mouth daily for 90 days 90 tablet 0    glucose blood test strip Use as instructed 100 each 2    losartan (COZAAR) 100 MG tablet Take 1 tablet (100 mg total) by mouth daily for 90 days 7 tablet 0    metFORMIN (GLUCOPHAGE) 1000 MG tablet Take 1 tablet (1,000 mg total) by mouth 2 (two) times a day with meals for 90 days 180 tablet 3    Naproxen Sodium 220 MG CAPS Take 220 mg by mouth 2 (two) times a day      sildenafil (VIAGRA) 50 MG tablet Take 1 tablet by mouth      sitaGLIPtin (JANUVIA) 25 mg tablet Take 1 tablet (25 mg total) by mouth daily for 90 days 90 tablet 0    tobramycin-dexamethasone (TOBRADEX) ophthalmic suspension Administer 1 drop to both eyes every 4 (four) hours while awake for 7 days 5 mL 0     No current facility-administered medications for this visit        Current Outpatient Prescriptions on File Prior to Visit   Medication Sig    atorvastatin (LIPITOR) 10 mg tablet Take 1 tablet (10 mg total) by mouth daily for 90 days    glucose blood test strip Use as instructed    losartan (COZAAR) 100 MG tablet Take 1 tablet (100 mg total) by mouth daily for 90 days    metFORMIN (GLUCOPHAGE) 1000 MG tablet Take 1 tablet (1,000 mg total) by mouth 2 (two) times a day with meals for 90 days    Naproxen Sodium 220 MG CAPS Take 220 mg by mouth 2 (two) times a day    sildenafil (VIAGRA) 50 MG tablet Take 1 tablet by mouth    sitaGLIPtin (JANUVIA) 25 mg tablet Take 1 tablet (25 mg total) by mouth daily for 90 days     No current facility-administered medications on file prior to visit  He has No Known Allergies       Review of Systems   Constitutional: Positive for fatigue  Negative for chills and fever  HENT: Positive for congestion  Negative for postnasal drip, rhinorrhea, sinus pain, sinus pressure and sore throat  Eyes: Positive for discharge and redness  Negative for itching  Respiratory: Positive for cough and wheezing  Negative for shortness of breath  Cardiovascular: Negative for chest pain, palpitations and leg swelling  Gastrointestinal: Negative for abdominal pain, diarrhea, nausea and vomiting  Endocrine: Negative  Genitourinary: Negative  Musculoskeletal: Positive for myalgias  Skin: Negative for color change and rash  Allergic/Immunologic: Negative for immunocompromised state  Neurological: Positive for headaches  Negative for dizziness and light-headedness  Hematological: Negative for adenopathy  Psychiatric/Behavioral: Negative  Objective:      /88 (BP Location: Left arm, Patient Position: Sitting)   Pulse 78   Temp 97 7 °F (36 5 °C)   Resp 18   Ht 6' (1 829 m)   Wt 88 9 kg (196 lb)   SpO2 95%   BMI 26 58 kg/m²          Physical Exam   Constitutional: He is oriented to person, place, and time  He appears well-developed and well-nourished  No distress  HENT:   Head: Normocephalic and atraumatic  Right Ear: External ear normal    Left Ear: External ear normal    Nose: Nose normal    Mouth/Throat: Oropharynx is clear and moist  No oropharyngeal exudate  Eyes: Pupils are equal, round, and reactive to light  EOM are normal  Right eye exhibits no discharge  Left eye exhibits discharge  Left eye + redness sclera and conjunctival injection   Neck: Normal range of motion  Neck supple  Cardiovascular: Normal rate, regular rhythm and normal heart sounds  Exam reveals no gallop and no friction rub  No murmur heard    Pulmonary/Chest: Effort normal and breath sounds normal  No respiratory distress  He has no wheezes  Abdominal: Soft  Musculoskeletal: Normal range of motion  He exhibits no edema  Lymphadenopathy:     He has cervical adenopathy  Neurological: He is alert and oriented to person, place, and time  Skin: Skin is warm and dry  No rash noted  He is not diaphoretic  No erythema  Psychiatric: He has a normal mood and affect  His behavior is normal  Judgment and thought content normal    Nursing note and vitals reviewed

## 2019-02-23 DIAGNOSIS — E11.9 TYPE 2 DIABETES MELLITUS WITHOUT COMPLICATION, WITHOUT LONG-TERM CURRENT USE OF INSULIN (HCC): ICD-10-CM

## 2019-02-23 DIAGNOSIS — E78.01 FAMILIAL HYPERCHOLESTEROLEMIA: ICD-10-CM

## 2019-02-23 DIAGNOSIS — E78.2 HYPERLIPIDEMIA, MIXED: ICD-10-CM

## 2019-02-23 DIAGNOSIS — I10 ESSENTIAL HYPERTENSION: ICD-10-CM

## 2019-02-23 DIAGNOSIS — K43.9 VENTRAL HERNIA WITHOUT OBSTRUCTION OR GANGRENE: ICD-10-CM

## 2019-02-23 RX ORDER — SITAGLIPTIN 25 MG/1
TABLET, FILM COATED ORAL
Qty: 90 TABLET | Refills: 0 | Status: SHIPPED | OUTPATIENT
Start: 2019-02-23 | End: 2019-05-05 | Stop reason: SDUPTHER

## 2019-02-23 RX ORDER — ATORVASTATIN CALCIUM 10 MG/1
TABLET, FILM COATED ORAL
Qty: 90 TABLET | Refills: 0 | Status: SHIPPED | OUTPATIENT
Start: 2019-02-23 | End: 2019-05-05 | Stop reason: SDUPTHER

## 2019-05-05 DIAGNOSIS — E78.2 HYPERLIPIDEMIA, MIXED: ICD-10-CM

## 2019-05-05 DIAGNOSIS — E11.9 TYPE 2 DIABETES MELLITUS WITHOUT COMPLICATION, WITHOUT LONG-TERM CURRENT USE OF INSULIN (HCC): ICD-10-CM

## 2019-05-05 DIAGNOSIS — K43.9 VENTRAL HERNIA WITHOUT OBSTRUCTION OR GANGRENE: ICD-10-CM

## 2019-05-05 DIAGNOSIS — E78.01 FAMILIAL HYPERCHOLESTEROLEMIA: ICD-10-CM

## 2019-05-05 DIAGNOSIS — I10 ESSENTIAL HYPERTENSION: ICD-10-CM

## 2019-05-06 RX ORDER — SITAGLIPTIN 25 MG/1
TABLET, FILM COATED ORAL
Qty: 90 TABLET | Refills: 0 | Status: SHIPPED | OUTPATIENT
Start: 2019-05-06 | End: 2019-07-16 | Stop reason: SDUPTHER

## 2019-05-06 RX ORDER — ATORVASTATIN CALCIUM 10 MG/1
TABLET, FILM COATED ORAL
Qty: 90 TABLET | Refills: 0 | Status: SHIPPED | OUTPATIENT
Start: 2019-05-06 | End: 2019-07-16 | Stop reason: SDUPTHER

## 2019-06-04 ENCOUNTER — OFFICE VISIT (OUTPATIENT)
Dept: FAMILY MEDICINE CLINIC | Facility: CLINIC | Age: 69
End: 2019-06-04
Payer: COMMERCIAL

## 2019-06-04 VITALS
BODY MASS INDEX: 26.41 KG/M2 | HEIGHT: 72 IN | DIASTOLIC BLOOD PRESSURE: 82 MMHG | OXYGEN SATURATION: 97 % | WEIGHT: 195 LBS | SYSTOLIC BLOOD PRESSURE: 136 MMHG | HEART RATE: 87 BPM | RESPIRATION RATE: 18 BRPM | TEMPERATURE: 98.3 F

## 2019-06-04 DIAGNOSIS — R59.1 LYMPHADENOPATHY: Primary | ICD-10-CM

## 2019-06-04 DIAGNOSIS — Z11.59 NEED FOR HEPATITIS C SCREENING TEST: ICD-10-CM

## 2019-06-04 DIAGNOSIS — E11.9 TYPE 2 DIABETES MELLITUS WITHOUT COMPLICATION, WITHOUT LONG-TERM CURRENT USE OF INSULIN (HCC): ICD-10-CM

## 2019-06-04 DIAGNOSIS — I10 ESSENTIAL HYPERTENSION: ICD-10-CM

## 2019-06-04 DIAGNOSIS — F17.210 CIGARETTE NICOTINE DEPENDENCE WITHOUT COMPLICATION: ICD-10-CM

## 2019-06-04 PROCEDURE — 4010F ACE/ARB THERAPY RXD/TAKEN: CPT | Performed by: NURSE PRACTITIONER

## 2019-06-04 PROCEDURE — 3725F SCREEN DEPRESSION PERFORMED: CPT | Performed by: NURSE PRACTITIONER

## 2019-06-04 PROCEDURE — 99214 OFFICE O/P EST MOD 30 MIN: CPT | Performed by: NURSE PRACTITIONER

## 2019-06-04 PROCEDURE — 3079F DIAST BP 80-89 MM HG: CPT | Performed by: NURSE PRACTITIONER

## 2019-06-04 PROCEDURE — 1101F PT FALLS ASSESS-DOCD LE1/YR: CPT | Performed by: NURSE PRACTITIONER

## 2019-06-04 PROCEDURE — 3075F SYST BP GE 130 - 139MM HG: CPT | Performed by: NURSE PRACTITIONER

## 2019-06-04 RX ORDER — LOSARTAN POTASSIUM 100 MG/1
100 TABLET ORAL DAILY
Qty: 90 TABLET | Refills: 3 | Status: SHIPPED | OUTPATIENT
Start: 2019-06-04 | End: 2019-10-15 | Stop reason: SDUPTHER

## 2019-06-07 LAB
ALBUMIN SERPL-MCNC: 4.5 G/DL (ref 3.6–4.8)
ALBUMIN/GLOB SERPL: 1.6 {RATIO} (ref 1.2–2.2)
ALP SERPL-CCNC: 56 IU/L (ref 39–117)
ALT SERPL-CCNC: 20 IU/L (ref 0–44)
AST SERPL-CCNC: 14 IU/L (ref 0–40)
BASOPHILS # BLD AUTO: 0 X10E3/UL (ref 0–0.2)
BASOPHILS NFR BLD AUTO: 0 %
BILIRUB SERPL-MCNC: 0.4 MG/DL (ref 0–1.2)
BUN SERPL-MCNC: 14 MG/DL (ref 8–27)
BUN/CREAT SERPL: 16 (ref 10–24)
CALCIUM SERPL-MCNC: 9.6 MG/DL (ref 8.6–10.2)
CHLORIDE SERPL-SCNC: 102 MMOL/L (ref 96–106)
CHOLEST SERPL-MCNC: 146 MG/DL (ref 100–199)
CO2 SERPL-SCNC: 22 MMOL/L (ref 20–29)
CREAT SERPL-MCNC: 0.86 MG/DL (ref 0.76–1.27)
EOSINOPHIL # BLD AUTO: 0.2 X10E3/UL (ref 0–0.4)
EOSINOPHIL NFR BLD AUTO: 3 %
ERYTHROCYTE [DISTWIDTH] IN BLOOD BY AUTOMATED COUNT: 13.5 % (ref 12.3–15.4)
GLOBULIN SER-MCNC: 2.9 G/DL (ref 1.5–4.5)
GLUCOSE SERPL-MCNC: 137 MG/DL (ref 65–99)
HBA1C MFR BLD: 6.9 % (ref 4.8–5.6)
HCT VFR BLD AUTO: 47.8 % (ref 37.5–51)
HCV AB S/CO SERPL IA: 0.1 S/CO RATIO (ref 0–0.9)
HDLC SERPL-MCNC: 46 MG/DL
HGB BLD-MCNC: 16.1 G/DL (ref 13–17.7)
IMM GRANULOCYTES # BLD: 0 X10E3/UL (ref 0–0.1)
IMM GRANULOCYTES NFR BLD: 0 %
LABCORP COMMENT: NORMAL
LDLC SERPL CALC-MCNC: 81 MG/DL (ref 0–99)
LYMPHOCYTES # BLD AUTO: 1.7 X10E3/UL (ref 0.7–3.1)
LYMPHOCYTES NFR BLD AUTO: 26 %
MCH RBC QN AUTO: 32.9 PG (ref 26.6–33)
MCHC RBC AUTO-ENTMCNC: 33.7 G/DL (ref 31.5–35.7)
MCV RBC AUTO: 98 FL (ref 79–97)
MONOCYTES # BLD AUTO: 0.6 X10E3/UL (ref 0.1–0.9)
MONOCYTES NFR BLD AUTO: 9 %
NEUTROPHILS # BLD AUTO: 4.2 X10E3/UL (ref 1.4–7)
NEUTROPHILS NFR BLD AUTO: 62 %
PLATELET # BLD AUTO: 210 X10E3/UL (ref 150–450)
POTASSIUM SERPL-SCNC: 4.4 MMOL/L (ref 3.5–5.2)
PROT SERPL-MCNC: 7.4 G/DL (ref 6–8.5)
RBC # BLD AUTO: 4.89 X10E6/UL (ref 4.14–5.8)
SL AMB EGFR AFRICAN AMERICAN: 104 ML/MIN/1.73
SL AMB EGFR NON AFRICAN AMERICAN: 90 ML/MIN/1.73
SL AMB VLDL CHOLESTEROL CALC: 19 MG/DL (ref 5–40)
SODIUM SERPL-SCNC: 139 MMOL/L (ref 134–144)
TRIGL SERPL-MCNC: 94 MG/DL (ref 0–149)
WBC # BLD AUTO: 6.7 X10E3/UL (ref 3.4–10.8)

## 2019-06-11 ENCOUNTER — OFFICE VISIT (OUTPATIENT)
Dept: FAMILY MEDICINE CLINIC | Facility: CLINIC | Age: 69
End: 2019-06-11
Payer: COMMERCIAL

## 2019-06-11 VITALS
HEIGHT: 72 IN | HEART RATE: 77 BPM | DIASTOLIC BLOOD PRESSURE: 88 MMHG | RESPIRATION RATE: 18 BRPM | BODY MASS INDEX: 26.41 KG/M2 | SYSTOLIC BLOOD PRESSURE: 158 MMHG | OXYGEN SATURATION: 98 % | WEIGHT: 195 LBS

## 2019-06-11 DIAGNOSIS — R59.0 LYMPHADENOPATHY, ANTERIOR CERVICAL: ICD-10-CM

## 2019-06-11 DIAGNOSIS — Z13.6 ENCOUNTER FOR ABDOMINAL AORTIC ANEURYSM (AAA) SCREENING: ICD-10-CM

## 2019-06-11 DIAGNOSIS — F17.210 CIGARETTE NICOTINE DEPENDENCE WITHOUT COMPLICATION: ICD-10-CM

## 2019-06-11 DIAGNOSIS — Z00.00 MEDICARE ANNUAL WELLNESS VISIT, INITIAL: Primary | ICD-10-CM

## 2019-06-11 PROBLEM — H10.32 ACUTE BACTERIAL CONJUNCTIVITIS OF LEFT EYE: Status: RESOLVED | Noted: 2019-01-30 | Resolved: 2019-06-11

## 2019-06-11 PROCEDURE — 1160F RVW MEDS BY RX/DR IN RCRD: CPT | Performed by: NURSE PRACTITIONER

## 2019-06-11 PROCEDURE — 1125F AMNT PAIN NOTED PAIN PRSNT: CPT | Performed by: NURSE PRACTITIONER

## 2019-06-11 PROCEDURE — G0438 PPPS, INITIAL VISIT: HCPCS | Performed by: NURSE PRACTITIONER

## 2019-06-11 PROCEDURE — 3008F BODY MASS INDEX DOCD: CPT | Performed by: NURSE PRACTITIONER

## 2019-06-11 PROCEDURE — 1170F FXNL STATUS ASSESSED: CPT | Performed by: NURSE PRACTITIONER

## 2019-06-11 RX ORDER — AZITHROMYCIN 250 MG/1
500 TABLET, FILM COATED ORAL EVERY 24 HOURS
Qty: 10 TABLET | Refills: 0 | Status: SHIPPED | OUTPATIENT
Start: 2019-06-11 | End: 2019-06-16

## 2019-07-09 ENCOUNTER — HOSPITAL ENCOUNTER (OUTPATIENT)
Dept: NON INVASIVE DIAGNOSTICS | Facility: CLINIC | Age: 69
Discharge: HOME | End: 2019-07-09
Payer: COMMERCIAL

## 2019-07-09 DIAGNOSIS — Z13.6 ENCOUNTER FOR ABDOMINAL AORTIC ANEURYSM (AAA) SCREENING: ICD-10-CM

## 2019-07-09 DIAGNOSIS — Z00.00 MEDICARE ANNUAL WELLNESS VISIT, INITIAL: ICD-10-CM

## 2019-07-09 LAB
ATRIAL RATE: 73 BPM
P AXIS: 35 DEGREES
PR INTERVAL: 160 MS
QRS AXIS: 0 DEGREES
QRSD INTERVAL: 104 MS
QT INTERVAL: 424 MS
QTC INTERVAL: 467 MS
T WAVE AXIS: -47 DEGREES
VENTRICULAR RATE: 73 BPM

## 2019-07-09 PROCEDURE — 93010 ELECTROCARDIOGRAM REPORT: CPT | Performed by: INTERNAL MEDICINE

## 2019-07-09 PROCEDURE — 93005 ELECTROCARDIOGRAM TRACING: CPT

## 2019-07-09 PROCEDURE — 93306 TTE W/DOPPLER COMPLETE: CPT | Performed by: INTERNAL MEDICINE

## 2019-07-09 PROCEDURE — C8929 TTE W OR WO FOL WCON,DOPPLER: HCPCS

## 2019-07-09 RX ADMIN — PERFLUTREN 0.8 ML/MIN: 6.52 INJECTION, SUSPENSION INTRAVENOUS at 09:44

## 2019-07-10 DIAGNOSIS — I51.89 DIASTOLIC DYSFUNCTION: Primary | ICD-10-CM

## 2019-07-11 ENCOUNTER — OFFICE VISIT (OUTPATIENT)
Dept: FAMILY MEDICINE CLINIC | Facility: CLINIC | Age: 69
End: 2019-07-11
Payer: COMMERCIAL

## 2019-07-11 VITALS
OXYGEN SATURATION: 97 % | RESPIRATION RATE: 18 BRPM | WEIGHT: 196 LBS | TEMPERATURE: 98.2 F | DIASTOLIC BLOOD PRESSURE: 84 MMHG | HEIGHT: 72 IN | BODY MASS INDEX: 26.55 KG/M2 | SYSTOLIC BLOOD PRESSURE: 148 MMHG | HEART RATE: 71 BPM

## 2019-07-11 DIAGNOSIS — I50.21 CONGESTIVE HEART FAILURE, NYHA CLASS 1, ACUTE, SYSTOLIC (HCC): ICD-10-CM

## 2019-07-11 DIAGNOSIS — R59.0 LYMPHADENOPATHY, ANTERIOR CERVICAL: Primary | ICD-10-CM

## 2019-07-11 DIAGNOSIS — F17.200 CURRENT EVERY DAY SMOKER: ICD-10-CM

## 2019-07-11 PROCEDURE — 1160F RVW MEDS BY RX/DR IN RCRD: CPT | Performed by: FAMILY MEDICINE

## 2019-07-11 PROCEDURE — 99214 OFFICE O/P EST MOD 30 MIN: CPT | Performed by: FAMILY MEDICINE

## 2019-07-11 PROCEDURE — 3008F BODY MASS INDEX DOCD: CPT | Performed by: FAMILY MEDICINE

## 2019-07-11 RX ORDER — CARVEDILOL 3.12 MG/1
3.12 TABLET ORAL 2 TIMES DAILY WITH MEALS
Qty: 180 TABLET | Refills: 3 | Status: ON HOLD | OUTPATIENT
Start: 2019-07-11 | End: 2020-01-14 | Stop reason: SDUPTHER

## 2019-07-11 NOTE — PROGRESS NOTES
Assessment/Plan:    No problem-specific Assessment & Plan notes found for this encounter  Diagnoses and all orders for this visit:    Lymphadenopathy, anterior cervical  -     CT soft tissue neck wo contrast; Future    Current every day smoker  -     CT lung screening program; Future    Congestive heart failure, NYHA class 1, acute, systolic (HCC)  After discussing risks and benefits of medication along with side effects will start beta-blocker at this time and also referred to cardiologist   He is already on losartan 100 mg p o  Once daily  -     Ambulatory referral to Cardiology; Future  -     carvedilol (COREG) 3 125 mg tablet; Take 1 tablet (3 125 mg total) by mouth 2 (two) times a day with meals      follow-up in 1 month or as needed  Subjective:      Patient ID: Bettie Smith is a 81 y o  male  Patient is here to follow-up for lymphadenopathy cervical lymph nodes and submandibular  He said that the lymph nodes have been present for least a month he was seen last month by Renay taylor nurse-practitioner who ordered a CBC and antibiotics he finished the antibiotic course and his CBC was normal he still having the lymph nodes at this time he denies any pain or tenderness associated with them however  He said that they are not growing that he can tell  He also had a echocardiogram done which showed evidence of systolic dysfunction decreased mildly decreased ejection fraction also with grade 1 diastolic dysfunction  He is a current every day smoker has been smoking 1 pack for over 30 years he is interested in quitting at this time  The following portions of the patient's history were reviewed and updated as appropriate:   He  has a past medical history of Diabetes mellitus ([address] ), Diverticulitis, Hyperlipidemia, Hypertension, and Kidney stone    He   Patient Active Problem List    Diagnosis Date Noted    Current every day smoker 07/11/2019    Congestive heart failure, NYHA class 1, acute, systolic ([place] [address] ) 51/62/8048    Encounter for abdominal aortic aneurysm (AAA) screening 06/11/2019    Lymphadenopathy, anterior cervical 06/11/2019    Lymphadenopathy 06/04/2019    Cigarette nicotine dependence without complication 31/58/4193    Need for hepatitis C screening test 06/04/2019    Acute non-recurrent maxillary sinusitis 01/30/2019    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[facility] ) 11/07/2018    Soft tissue mass 08/22/2018    Essential hypertension 05/07/2018    Hyperlipidemia, mixed 05/07/2018    Type 2 diabetes mellitus without complication, without long-term current use of insulin ([place] [address] ) 05/07/2018    Ventral hernia without obstruction or gangrene 05/07/2018     He  has a past surgical history that includes Abdominal surgery; Laparoscopic colon resection; Tonsillectomy; Colonoscopy; pr colonoscopy flx dx w/collj spec when pfrmd (N/A, 11/3/2017); and Inguinal hernia repair (Left)  His family history includes Colon cancer in his family, father, and paternal grandfather; No Known Problems in his mother  He  reports that he has been smoking  He has never used smokeless tobacco  He reports that he drinks alcohol  He reports that he does not use drugs    Current Outpatient Medications   Medication Sig Dispense Refill    atorvastatin (LIPITOR) 10 mg tablet TAKE 1 TABLET BY MOUTH  DAILY 90 tablet 0    carvedilol (COREG) 3 125 mg tablet Take 1 tablet (3 125 mg total) by mouth 2 (two) times a day with meals 180 tablet 3    glucose blood test strip Use as instructed 100 each 2    JANUVIA 25 MG tablet TAKE 1 TABLET BY MOUTH  DAILY 90 tablet 0    losartan (COZAAR) 100 MG tablet Take 1 tablet (100 mg total) by mouth daily for 90 days 90 tablet 3    metFORMIN (GLUCOPHAGE) 1000 MG tablet Take 1 tablet (1,000 mg total) by mouth 2 (two) times a day with meals for 90 days 180 tablet 3    Naproxen Sodium 220 MG CAPS Take 220 mg by mouth 2 (two) times a day      sildenafil (VIAGRA) 50 MG tablet Take 1 tablet by mouth       No current facility-administered medications for this visit  Current Outpatient Medications on File Prior to Visit   Medication Sig    atorvastatin (LIPITOR) 10 mg tablet TAKE 1 TABLET BY MOUTH  DAILY    glucose blood test strip Use as instructed    JANUVIA 25 MG tablet TAKE 1 TABLET BY MOUTH  DAILY    losartan (COZAAR) 100 MG tablet Take 1 tablet (100 mg total) by mouth daily for 90 days    metFORMIN (GLUCOPHAGE) 1000 MG tablet Take 1 tablet (1,000 mg total) by mouth 2 (two) times a day with meals for 90 days    Naproxen Sodium 220 MG CAPS Take 220 mg by mouth 2 (two) times a day    sildenafil (VIAGRA) 50 MG tablet Take 1 tablet by mouth     No current facility-administered medications on file prior to visit  He has No Known Allergies       Review of Systems   Constitutional: Negative for activity change, appetite change, fatigue and fever  HENT: Negative for congestion and ear discharge  Respiratory: Positive for shortness of breath  Negative for cough  Cardiovascular: Negative for chest pain and palpitations  Gastrointestinal: Negative for diarrhea and nausea  Musculoskeletal: Negative for arthralgias and back pain  Skin: Negative for color change and rash  Neurological: Negative for dizziness and headaches  Hematological: Positive for adenopathy  Psychiatric/Behavioral: Negative for agitation and behavioral problems  Objective:      /84 (BP Location: Left arm, Patient Position: Sitting)   Pulse 71   Temp 98 2 °F (36 8 °C)   Resp 18   Ht 6' (1 829 m)   Wt 88 9 kg (196 lb)   SpO2 97%   BMI 26 58 kg/m²          Physical Exam   Constitutional: He is oriented to person, place, and time  He appears well-developed and well-nourished  No distress  Eyes: Pupils are equal, round, and reactive to light  No scleral icterus  Cardiovascular: Normal rate, regular rhythm and normal heart sounds  No murmur heard    Pulmonary/Chest: Effort normal and breath sounds normal  No respiratory distress  He has no wheezes  Abdominal: Soft  Bowel sounds are normal  He exhibits no distension  There is no tenderness  Neurological: He is alert and oriented to person, place, and time  Skin: Skin is warm and dry  No rash noted  He is not diaphoretic  Psychiatric: He has a normal mood and affect

## 2019-07-15 DIAGNOSIS — R59.0 LYMPHADENOPATHY, ANTERIOR CERVICAL: Primary | ICD-10-CM

## 2019-07-16 DIAGNOSIS — E78.2 HYPERLIPIDEMIA, MIXED: ICD-10-CM

## 2019-07-16 DIAGNOSIS — E78.01 FAMILIAL HYPERCHOLESTEROLEMIA: ICD-10-CM

## 2019-07-16 DIAGNOSIS — K43.9 VENTRAL HERNIA WITHOUT OBSTRUCTION OR GANGRENE: ICD-10-CM

## 2019-07-16 DIAGNOSIS — I10 ESSENTIAL HYPERTENSION: ICD-10-CM

## 2019-07-16 DIAGNOSIS — E11.9 TYPE 2 DIABETES MELLITUS WITHOUT COMPLICATION, WITHOUT LONG-TERM CURRENT USE OF INSULIN (HCC): ICD-10-CM

## 2019-07-17 RX ORDER — SITAGLIPTIN 25 MG/1
TABLET, FILM COATED ORAL
Qty: 90 TABLET | Refills: 0 | Status: SHIPPED | OUTPATIENT
Start: 2019-07-17 | End: 2019-09-10 | Stop reason: SDUPTHER

## 2019-07-17 RX ORDER — ATORVASTATIN CALCIUM 10 MG/1
TABLET, FILM COATED ORAL
Qty: 90 TABLET | Refills: 0 | Status: SHIPPED | OUTPATIENT
Start: 2019-07-17 | End: 2019-08-20 | Stop reason: SDUPTHER

## 2019-07-18 ENCOUNTER — TELEPHONE (OUTPATIENT)
Dept: FAMILY MEDICINE CLINIC | Facility: CLINIC | Age: 69
End: 2019-07-18

## 2019-07-18 ENCOUNTER — HOSPITAL ENCOUNTER (OUTPATIENT)
Dept: RADIOLOGY | Facility: MEDICAL CENTER | Age: 69
Discharge: HOME | End: 2019-07-18
Payer: COMMERCIAL

## 2019-07-18 DIAGNOSIS — F17.200 CURRENT EVERY DAY SMOKER: ICD-10-CM

## 2019-07-18 NOTE — TELEPHONE ENCOUNTER
A message was left @1:44pm in regards to patient        The request is at pending denial    They can be followed up for further information [phone]  reference case # [id number]

## 2019-07-19 ENCOUNTER — TELEPHONE (OUTPATIENT)
Dept: FAMILY MEDICINE CLINIC | Facility: CLINIC | Age: 69
End: 2019-07-19

## 2019-07-19 DIAGNOSIS — R59.0 LYMPHADENOPATHY, ANTERIOR CERVICAL: Primary | ICD-10-CM

## 2019-07-19 NOTE — TELEPHONE ENCOUNTER
Please contact in reference to  pre auth CT [facility]  Reference # [id number]

## 2019-07-22 ENCOUNTER — HOSPITAL ENCOUNTER (OUTPATIENT)
Dept: RADIOLOGY | Facility: MEDICAL CENTER | Age: 69
Discharge: HOME | End: 2019-07-22
Payer: COMMERCIAL

## 2019-07-22 DIAGNOSIS — R59.0 LYMPHADENOPATHY, ANTERIOR CERVICAL: ICD-10-CM

## 2019-07-22 PROCEDURE — 76536 US EXAM OF HEAD AND NECK: CPT

## 2019-07-23 ENCOUNTER — TELEPHONE (OUTPATIENT)
Dept: FAMILY MEDICINE CLINIC | Facility: CLINIC | Age: 69
End: 2019-07-23

## 2019-07-24 ENCOUNTER — TELEPHONE (OUTPATIENT)
Dept: FAMILY MEDICINE CLINIC | Facility: CLINIC | Age: 69
End: 2019-07-24

## 2019-07-24 DIAGNOSIS — N28.9 RENAL LESION: Primary | ICD-10-CM

## 2019-07-24 NOTE — TELEPHONE ENCOUNTER
Please advise him that the CT scan of his chest showed a partially visualized kidney lesion recommend a follow up renal Ultrasound to follow up and better evaluate the kidneys    His neck ultrasound is pending can we call radiology about results thanks

## 2019-08-10 ENCOUNTER — HOSPITAL ENCOUNTER (OUTPATIENT)
Dept: RADIOLOGY | Facility: MEDICAL CENTER | Age: 69
Discharge: HOME | End: 2019-08-10
Payer: COMMERCIAL

## 2019-08-10 DIAGNOSIS — N28.9 RENAL LESION: ICD-10-CM

## 2019-08-10 PROCEDURE — 76770 US EXAM ABDO BACK WALL COMP: CPT

## 2019-08-20 ENCOUNTER — CONSULT (OUTPATIENT)
Dept: CARDIOLOGY CLINIC | Facility: CLINIC | Age: 69
End: 2019-08-20
Payer: COMMERCIAL

## 2019-08-20 VITALS
BODY MASS INDEX: 25.06 KG/M2 | HEART RATE: 78 BPM | OXYGEN SATURATION: 94 % | DIASTOLIC BLOOD PRESSURE: 72 MMHG | RESPIRATION RATE: 18 BRPM | SYSTOLIC BLOOD PRESSURE: 128 MMHG | WEIGHT: 185 LBS | HEIGHT: 72 IN

## 2019-08-20 DIAGNOSIS — I50.21 CONGESTIVE HEART FAILURE, NYHA CLASS 1, ACUTE, SYSTOLIC (HCC): ICD-10-CM

## 2019-08-20 DIAGNOSIS — I25.10 CAD (CORONARY ARTERY DISEASE): ICD-10-CM

## 2019-08-20 DIAGNOSIS — I50.9 CONGESTIVE HEART FAILURE, UNSPECIFIED HF CHRONICITY, UNSPECIFIED HEART FAILURE TYPE (HCC): Primary | ICD-10-CM

## 2019-08-20 DIAGNOSIS — Z76.89 ENCOUNTER TO ESTABLISH CARE: Primary | ICD-10-CM

## 2019-08-20 DIAGNOSIS — I10 ESSENTIAL HYPERTENSION: ICD-10-CM

## 2019-08-20 DIAGNOSIS — E78.2 MIXED HYPERLIPIDEMIA: ICD-10-CM

## 2019-08-20 DIAGNOSIS — I50.21 CHF NYHA CLASS I, ACUTE, SYSTOLIC (HCC): ICD-10-CM

## 2019-08-20 PROCEDURE — 93000 ELECTROCARDIOGRAM COMPLETE: CPT | Performed by: INTERNAL MEDICINE

## 2019-08-20 PROCEDURE — 99205 OFFICE O/P NEW HI 60 MIN: CPT | Performed by: INTERNAL MEDICINE

## 2019-08-20 RX ORDER — ATORVASTATIN CALCIUM 20 MG/1
10 TABLET, FILM COATED ORAL DAILY
Qty: 60 TABLET | Refills: 3 | Status: SHIPPED | OUTPATIENT
Start: 2019-08-20 | End: 2020-06-23 | Stop reason: SDUPTHER

## 2019-08-20 RX ORDER — ASPIRIN 81 MG/1
81 TABLET, CHEWABLE ORAL DAILY
Qty: 60 TABLET | Refills: 3 | Status: SHIPPED | OUTPATIENT
Start: 2019-08-20 | End: 2020-04-04 | Stop reason: SDUPTHER

## 2019-08-20 NOTE — PROGRESS NOTES
Cardiology Outpatient Follow up Note    Bola Mckeon 81 y o  male MRN: [id number]    08/20/19          Assessment/Plan:  1  Cardiomyopathy with EF: 45%- undifferentiated  · He has significant atherosclerotic risk factors as noted below  CT with noted atherosclerotic changes in the coronary arteries  Additionally he notes dyspnea on exertion  ECG with inferolateral TWI  Will evaluate further with a cardiac catheterization  · Cont carvedilol and losartan  Start aspirin     2  DM2- on metformin and januvia    3  Tobacco abuse- cessation strongly advised  4  Hypertension- continue losartan and carvedilol     5  HLD- increase atorvastatin to 20mg/day        1  Encounter to establish care  POCT ECG   2  Congestive heart failure, NYHA class 1, acute, systolic ([facility] [address] )  Ambulatory referral to Cardiology    Cardiac catheterization   3  Mixed hyperlipidemia  atorvastatin (LIPITOR) 20 mg tablet   4  CAD (coronary artery disease)  aspirin 81 mg chewable tablet   5  Essential hypertension         HPI: Bola Mckeon is a 81y o  year old male with history of DM2 and extensive tobacco abuse was referred by his PCP Dr Kenyon Whittington for evaluation of new cardiomyopathy  TTE revealed mildly reduced LV systolic function with EF: 45%, G1DD, and mild MR, TR  He notes progressive dyspnea on exertion especially ambulating up a flight of stairs  He denies associated chest pain, orthopnea, PND or any other concerns  He has a family history of CAD on his maternal side with his maternal grandfather and uncle having MIs  He also notes 1ppd tobacco abuse over the past 50 years  He denies any other concerns at this time        Patient Active Problem List   Diagnosis    Essential hypertension    Hyperlipidemia, mixed    Type 2 diabetes mellitus without complication, without long-term current use of insulin (HCC)    Ventral hernia without obstruction or gangrene    Soft tissue mass    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[address] )    Acute non-recurrent maxillary sinusitis    Lymphadenopathy    Cigarette nicotine dependence without complication    Need for hepatitis C screening test    Encounter for abdominal aortic aneurysm (AAA) screening    Lymphadenopathy, anterior cervical    Current every day smoker    Congestive heart failure, NYHA class 1, acute, systolic (HCC)       No Known Allergies      Current Outpatient Medications:     atorvastatin (LIPITOR) 20 mg tablet, Take 0 5 tablets (10 mg total) by mouth daily, Disp: 60 tablet, Rfl: 3    carvedilol (COREG) 3 125 mg tablet, Take 1 tablet (3 125 mg total) by mouth 2 (two) times a day with meals, Disp: 180 tablet, Rfl: 3    glucose blood test strip, Use as instructed, Disp: 100 each, Rfl: 2    JANUVIA 25 MG tablet, TAKE 1 TABLET BY MOUTH  DAILY, Disp: 90 tablet, Rfl: 0    losartan (COZAAR) 100 MG tablet, Take 1 tablet (100 mg total) by mouth daily for 90 days, Disp: 90 tablet, Rfl: 3    metFORMIN (GLUCOPHAGE) 1000 MG tablet, Take 1 tablet (1,000 mg total) by mouth 2 (two) times a day with meals for 90 days, Disp: 180 tablet, Rfl: 3    sildenafil (VIAGRA) 50 MG tablet, Take 1 tablet by mouth, Disp: , Rfl:     aspirin 81 mg chewable tablet, Chew 1 tablet (81 mg total) daily, Disp: 60 tablet, Rfl: 3    Naproxen Sodium 220 MG CAPS, Take 220 mg by mouth 2 (two) times a day, Disp: , Rfl:     Past Medical History:   Diagnosis Date    Diabetes mellitus ([facility] [address] )     Diverticulitis     Hyperlipidemia     Hypertension     Kidney stone        Family History   Problem Relation Age of Onset    Colon cancer Father     Colon cancer Paternal Grandfather     No Known Problems Mother     Colon cancer Family        Past Surgical History:   Procedure Laterality Date    ABDOMINAL SURGERY      COLONOSCOPY      INGUINAL HERNIA REPAIR Left     LAPAROSCOPIC COLON RESECTION      CT COLONOSCOPY FLX DX W/COLLJ SPEC WHEN PFRMD N/A 11/3/2017    Procedure: COLONOSCOPY;  Surgeon: Den Davey MD; [facility]; Service: Colorectal    TONSILLECTOMY         Social History     Socioeconomic History    Marital status: /Civil Union     Spouse name: Not on file    Number of children: Not on file    Years of education: Not on file    Highest education level: Not on file   Occupational History    Not on file   Social Needs    Financial resource strain: Not on file    Food insecurity:     Worry: Not on file     Inability: Not on file    Transportation needs:     Medical: Not on file     Non-medical: Not on file   Tobacco Use    Smoking status: Current Some Day Smoker    Smokeless tobacco: Never Used    Tobacco comment: cigar every 2 days  Per [employer]: Current every day smoker   Substance and Sexual Activity    Alcohol use: Yes     Comment: socially     Drug use: No    Sexual activity: Not on file   Lifestyle    Physical activity:     Days per week: Not on file     Minutes per session: Not on file    Stress: Not on file   Relationships    Social connections:     Talks on phone: Not on file     Gets together: Not on file     Attends Mosque service: Not on file     Active member of club or organization: Not on file     Attends meetings of clubs or organizations: Not on file     Relationship status: Not on file    Intimate partner violence:     Fear of current or ex partner: Not on file     Emotionally abused: Not on file     Physically abused: Not on file     Forced sexual activity: Not on file   Other Topics Concern    Not on file   Social History Narrative    Caffeine use    Uses safety equipment: seatbelts       Review of Systems   Constitution: Negative for diaphoresis, weight gain and weight loss  HENT: Negative for congestion  Cardiovascular: Positive for dyspnea on exertion  Negative for chest pain, irregular heartbeat, leg swelling, near-syncope, orthopnea, palpitations, paroxysmal nocturnal dyspnea and syncope     Respiratory: Negative for shortness of breath, sleep disturbances due to breathing and snoring  Hematologic/Lymphatic: Does not bruise/bleed easily  Skin: Negative for rash  Musculoskeletal: Negative for myalgias  Gastrointestinal: Negative for nausea and vomiting  Neurological: Negative for excessive daytime sleepiness and light-headedness  Psychiatric/Behavioral: The patient is not nervous/anxious  Vitals: /72   Pulse 78   Resp 18   Ht 6' (1 829 m)   Wt 83 9 kg (185 lb)   SpO2 94%   BMI 25 09 kg/m²       Physical Exam:     GEN: Alert and oriented x 3, in no acute distress  Well appearing and well nourished  HEENT: Sclera anicteric, conjunctivae pink, mucous membranes moist  Oropharynx clear  NECK: Supple, no carotid bruits, no significant JVD  Trachea midline, no thyromegaly  HEART: Regular rhythm, normal S1 and S2, no murmurs, clicks, gallops or rubs  PMI nondisplaced, no thrills  LUNGS: Clear to auscultation bilaterally; no wheezes, rales, or rhonchi  No increased work of breathing or signs of respiratory distress  ABDOMEN: Soft, nontender, nondistended, normoactive bowel sounds  EXTREMITIES: Skin warm and well perfused, no clubbing, cyanosis, or edema  NEURO: No focal findings  Normal speech  Mood and affect normal    SKIN: Normal without suspicious lesions on exposed skin        Lab Results:       Lab Results   Component Value Date    HGBA1C 6 9 (H) 06/06/2019    HGBA1C 6 7 (H) 09/07/2018    HGBA1C 6 7 09/07/2018     Lab Results   Component Value Date    CHOL 163 11/22/2016    CHOL 172 09/18/2015     Lab Results   Component Value Date    HDL 46 06/06/2019    HDL 50 09/07/2018    HDL 45 05/01/2018     Lab Results   Component Value Date    LDLCALC 98 11/22/2016    LDLCALC 94 09/18/2015     Lab Results   Component Value Date    TRIG 94 06/06/2019    TRIG 147 09/07/2018    TRIG 296 (H) 05/01/2018     No results found for: CHOLHDL

## 2019-08-20 NOTE — H&P (VIEW-ONLY)
Cardiology Outpatient Follow up Note    Jus De Paz 81 y o  male MRN: [id number]    08/20/19          Assessment/Plan:  1  Cardiomyopathy with EF: 45%- undifferentiated  · He has significant atherosclerotic risk factors as noted below  CT with noted atherosclerotic changes in the coronary arteries  Additionally he notes dyspnea on exertion  ECG with inferolateral TWI  Will evaluate further with a cardiac catheterization  · Cont carvedilol and losartan  Start aspirin     2  DM2- on metformin and januvia    3  Tobacco abuse- cessation strongly advised  4  Hypertension- continue losartan and carvedilol     5  HLD- increase atorvastatin to 20mg/day        1  Encounter to establish care  POCT ECG   2  Congestive heart failure, NYHA class 1, acute, systolic ([facility] [address] )  Ambulatory referral to Cardiology    Cardiac catheterization   3  Mixed hyperlipidemia  atorvastatin (LIPITOR) 20 mg tablet   4  CAD (coronary artery disease)  aspirin 81 mg chewable tablet   5  Essential hypertension         HPI: Jus De Paz is a 81y o  year old male with history of DM2 and extensive tobacco abuse was referred by his PCP Dr Alvarez Kelly for evaluation of new cardiomyopathy  TTE revealed mildly reduced LV systolic function with EF: 45%, G1DD, and mild MR, TR  He notes progressive dyspnea on exertion especially ambulating up a flight of stairs  He denies associated chest pain, orthopnea, PND or any other concerns  He has a family history of CAD on his maternal side with his maternal grandfather and uncle having MIs  He also notes 1ppd tobacco abuse over the past 50 years  He denies any other concerns at this time        Patient Active Problem List   Diagnosis    Essential hypertension    Hyperlipidemia, mixed    Type 2 diabetes mellitus without complication, without long-term current use of insulin (HCC)    Ventral hernia without obstruction or gangrene    Soft tissue mass    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[address] )    Acute non-recurrent maxillary sinusitis    Lymphadenopathy    Cigarette nicotine dependence without complication    Need for hepatitis C screening test    Encounter for abdominal aortic aneurysm (AAA) screening    Lymphadenopathy, anterior cervical    Current every day smoker    Congestive heart failure, NYHA class 1, acute, systolic (HCC)       No Known Allergies      Current Outpatient Medications:     atorvastatin (LIPITOR) 20 mg tablet, Take 0 5 tablets (10 mg total) by mouth daily, Disp: 60 tablet, Rfl: 3    carvedilol (COREG) 3 125 mg tablet, Take 1 tablet (3 125 mg total) by mouth 2 (two) times a day with meals, Disp: 180 tablet, Rfl: 3    glucose blood test strip, Use as instructed, Disp: 100 each, Rfl: 2    JANUVIA 25 MG tablet, TAKE 1 TABLET BY MOUTH  DAILY, Disp: 90 tablet, Rfl: 0    losartan (COZAAR) 100 MG tablet, Take 1 tablet (100 mg total) by mouth daily for 90 days, Disp: 90 tablet, Rfl: 3    metFORMIN (GLUCOPHAGE) 1000 MG tablet, Take 1 tablet (1,000 mg total) by mouth 2 (two) times a day with meals for 90 days, Disp: 180 tablet, Rfl: 3    sildenafil (VIAGRA) 50 MG tablet, Take 1 tablet by mouth, Disp: , Rfl:     aspirin 81 mg chewable tablet, Chew 1 tablet (81 mg total) daily, Disp: 60 tablet, Rfl: 3    Naproxen Sodium 220 MG CAPS, Take 220 mg by mouth 2 (two) times a day, Disp: , Rfl:     Past Medical History:   Diagnosis Date    Diabetes mellitus ([facility] [address] )     Diverticulitis     Hyperlipidemia     Hypertension     Kidney stone        Family History   Problem Relation Age of Onset    Colon cancer Father     Colon cancer Paternal Grandfather     No Known Problems Mother     Colon cancer Family        Past Surgical History:   Procedure Laterality Date    ABDOMINAL SURGERY      COLONOSCOPY      INGUINAL HERNIA REPAIR Left     LAPAROSCOPIC COLON RESECTION      DC COLONOSCOPY FLX DX W/COLLJ SPEC WHEN PFRMD N/A 11/3/2017    Procedure: COLONOSCOPY;  Surgeon: Kayla Bolaños MD; [facility]; Service: Colorectal    TONSILLECTOMY         Social History     Socioeconomic History    Marital status: /Civil Union     Spouse name: Not on file    Number of children: Not on file    Years of education: Not on file    Highest education level: Not on file   Occupational History    Not on file   Social Needs    Financial resource strain: Not on file    Food insecurity:     Worry: Not on file     Inability: Not on file    Transportation needs:     Medical: Not on file     Non-medical: Not on file   Tobacco Use    Smoking status: Current Some Day Smoker    Smokeless tobacco: Never Used    Tobacco comment: cigar every 2 days  Per [employer]: Current every day smoker   Substance and Sexual Activity    Alcohol use: Yes     Comment: socially     Drug use: No    Sexual activity: Not on file   Lifestyle    Physical activity:     Days per week: Not on file     Minutes per session: Not on file    Stress: Not on file   Relationships    Social connections:     Talks on phone: Not on file     Gets together: Not on file     Attends Sabianism service: Not on file     Active member of club or organization: Not on file     Attends meetings of clubs or organizations: Not on file     Relationship status: Not on file    Intimate partner violence:     Fear of current or ex partner: Not on file     Emotionally abused: Not on file     Physically abused: Not on file     Forced sexual activity: Not on file   Other Topics Concern    Not on file   Social History Narrative    Caffeine use    Uses safety equipment: seatbelts       Review of Systems   Constitution: Negative for diaphoresis, weight gain and weight loss  HENT: Negative for congestion  Cardiovascular: Positive for dyspnea on exertion  Negative for chest pain, irregular heartbeat, leg swelling, near-syncope, orthopnea, palpitations, paroxysmal nocturnal dyspnea and syncope     Respiratory: Negative for shortness of breath, sleep disturbances due to breathing and snoring  Hematologic/Lymphatic: Does not bruise/bleed easily  Skin: Negative for rash  Musculoskeletal: Negative for myalgias  Gastrointestinal: Negative for nausea and vomiting  Neurological: Negative for excessive daytime sleepiness and light-headedness  Psychiatric/Behavioral: The patient is not nervous/anxious  Vitals: /72   Pulse 78   Resp 18   Ht 6' (1 829 m)   Wt 83 9 kg (185 lb)   SpO2 94%   BMI 25 09 kg/m²       Physical Exam:     GEN: Alert and oriented x 3, in no acute distress  Well appearing and well nourished  HEENT: Sclera anicteric, conjunctivae pink, mucous membranes moist  Oropharynx clear  NECK: Supple, no carotid bruits, no significant JVD  Trachea midline, no thyromegaly  HEART: Regular rhythm, normal S1 and S2, no murmurs, clicks, gallops or rubs  PMI nondisplaced, no thrills  LUNGS: Clear to auscultation bilaterally; no wheezes, rales, or rhonchi  No increased work of breathing or signs of respiratory distress  ABDOMEN: Soft, nontender, nondistended, normoactive bowel sounds  EXTREMITIES: Skin warm and well perfused, no clubbing, cyanosis, or edema  NEURO: No focal findings  Normal speech  Mood and affect normal    SKIN: Normal without suspicious lesions on exposed skin        Lab Results:       Lab Results   Component Value Date    HGBA1C 6 9 (H) 06/06/2019    HGBA1C 6 7 (H) 09/07/2018    HGBA1C 6 7 09/07/2018     Lab Results   Component Value Date    CHOL 163 11/22/2016    CHOL 172 09/18/2015     Lab Results   Component Value Date    HDL 46 06/06/2019    HDL 50 09/07/2018    HDL 45 05/01/2018     Lab Results   Component Value Date    LDLCALC 98 11/22/2016    LDLCALC 94 09/18/2015     Lab Results   Component Value Date    TRIG 94 06/06/2019    TRIG 147 09/07/2018    TRIG 296 (H) 05/01/2018     No results found for: CHOLHDL

## 2019-08-21 ENCOUNTER — TELEPHONE (OUTPATIENT)
Dept: CARDIOLOGY CLINIC | Facility: CLINIC | Age: 69
End: 2019-08-21

## 2019-08-21 NOTE — TELEPHONE ENCOUNTER
S/w pt, he is getting his lab work drawn tomorrow at [facility] in [place] [address]   His paperwork will then be sent to the hospital

## 2019-08-25 LAB
BASOPHILS # BLD AUTO: 0.1 X10E3/UL (ref 0–0.2)
BASOPHILS NFR BLD AUTO: 2 %
BUN SERPL-MCNC: 20 MG/DL (ref 8–27)
BUN/CREAT SERPL: 28 (ref 10–24)
CHLORIDE SERPL-SCNC: 99 MMOL/L (ref 96–106)
CO2 SERPL-SCNC: 19 MMOL/L (ref 20–29)
CREAT SERPL-MCNC: 0.71 MG/DL (ref 0.76–1.27)
EOSINOPHIL # BLD AUTO: 0.3 X10E3/UL (ref 0–0.4)
EOSINOPHIL NFR BLD AUTO: 5 %
ERYTHROCYTE [DISTWIDTH] IN BLOOD BY AUTOMATED COUNT: 13.5 % (ref 12.3–15.4)
GLUCOSE SERPL-MCNC: 150 MG/DL (ref 65–99)
HCT VFR BLD AUTO: 47.9 % (ref 37.5–51)
HGB BLD-MCNC: 16.2 G/DL (ref 13–17.7)
IMM GRANULOCYTES # BLD: 0 X10E3/UL (ref 0–0.1)
IMM GRANULOCYTES NFR BLD: 0 %
INR PPP: 1 (ref 0.8–1.2)
LYMPHOCYTES # BLD AUTO: 2.3 X10E3/UL (ref 0.7–3.1)
LYMPHOCYTES NFR BLD AUTO: 38 %
MCH RBC QN AUTO: 34.1 PG (ref 26.6–33)
MCHC RBC AUTO-ENTMCNC: 33.8 G/DL (ref 31.5–35.7)
MCV RBC AUTO: 101 FL (ref 79–97)
MONOCYTES # BLD AUTO: 0.5 X10E3/UL (ref 0.1–0.9)
MONOCYTES NFR BLD AUTO: 8 %
NEUTROPHILS # BLD AUTO: 2.9 X10E3/UL (ref 1.4–7)
NEUTROPHILS NFR BLD AUTO: 47 %
PLATELET # BLD AUTO: 179 X10E3/UL (ref 150–450)
POTASSIUM SERPL-SCNC: 4.3 MMOL/L (ref 3.5–5.2)
PROTHROMBIN TIME: 10.5 SEC (ref 9.1–12)
RBC # BLD AUTO: 4.75 X10E6/UL (ref 4.14–5.8)
SL AMB EGFR AFRICAN AMERICAN: 112 ML/MIN/1.73
SL AMB EGFR NON AFRICAN AMERICAN: 97 ML/MIN/1.73
SODIUM SERPL-SCNC: 138 MMOL/L (ref 134–144)
WBC # BLD AUTO: 6.1 X10E3/UL (ref 3.4–10.8)

## 2019-08-27 NOTE — TELEPHONE ENCOUNTER
Pt is scheduled for [facility] on 9/4/19 @ [place]  Can we check to see if he needs auth please?  Thank you

## 2019-08-28 ENCOUNTER — TELEPHONE (OUTPATIENT)
Dept: NON INVASIVE DIAGNOSTICS | Facility: HOSPITAL | Age: 69
End: 2019-08-28

## 2019-08-28 RX ORDER — SODIUM CHLORIDE 9 MG/ML
50 INJECTION, SOLUTION INTRAVENOUS CONTINUOUS
Status: CANCELLED | OUTPATIENT
Start: 2019-09-04

## 2019-08-30 ENCOUNTER — TELEPHONE (OUTPATIENT)
Dept: FAMILY MEDICINE CLINIC | Facility: CLINIC | Age: 69
End: 2019-08-30

## 2019-09-02 NOTE — TELEPHONE ENCOUNTER
Gabriela Rios does not have acute heart failure  He was also seen by Cardiology on 8-20 who noted all of his medications and did not recommend stopping metformin    Please continue

## 2019-09-03 ENCOUNTER — TELEPHONE (OUTPATIENT)
Dept: SURGERY | Facility: HOSPITAL | Age: 69
End: 2019-09-03

## 2019-09-04 ENCOUNTER — HOSPITAL ENCOUNTER (OUTPATIENT)
Dept: INTERVENTIONAL RADIOLOGY/VASCULAR | Facility: HOSPITAL | Age: 69
Discharge: HOME | End: 2019-09-04
Attending: INTERNAL MEDICINE | Admitting: INTERNAL MEDICINE
Payer: COMMERCIAL

## 2019-09-04 VITALS
DIASTOLIC BLOOD PRESSURE: 76 MMHG | SYSTOLIC BLOOD PRESSURE: 150 MMHG | WEIGHT: 186.95 LBS | RESPIRATION RATE: 18 BRPM | HEART RATE: 65 BPM | TEMPERATURE: 98 F | BODY MASS INDEX: 25.32 KG/M2 | HEIGHT: 72 IN | OXYGEN SATURATION: 100 %

## 2019-09-04 DIAGNOSIS — I50.21 CONGESTIVE HEART FAILURE, NYHA CLASS 1, ACUTE, SYSTOLIC (HCC): ICD-10-CM

## 2019-09-04 LAB
GLUCOSE SERPL-MCNC: 151 MG/DL (ref 65–140)
KCT BLD-ACNC: 260 SEC (ref 89–137)
SPECIMEN SOURCE: ABNORMAL

## 2019-09-04 PROCEDURE — C1769 GUIDE WIRE: HCPCS | Performed by: INTERNAL MEDICINE

## 2019-09-04 PROCEDURE — 93458 L HRT ARTERY/VENTRICLE ANGIO: CPT | Performed by: INTERNAL MEDICINE

## 2019-09-04 PROCEDURE — 85347 COAGULATION TIME ACTIVATED: CPT

## 2019-09-04 PROCEDURE — 99152 MOD SED SAME PHYS/QHP 5/>YRS: CPT | Performed by: INTERNAL MEDICINE

## 2019-09-04 PROCEDURE — C1887 CATHETER, GUIDING: HCPCS | Performed by: INTERNAL MEDICINE

## 2019-09-04 PROCEDURE — 99153 MOD SED SAME PHYS/QHP EA: CPT | Performed by: INTERNAL MEDICINE

## 2019-09-04 PROCEDURE — 93572 IV DOP VEL&/PRESS C FLO EA: CPT | Performed by: INTERNAL MEDICINE

## 2019-09-04 PROCEDURE — 93571 IV DOP VEL&/PRESS C FLO 1ST: CPT | Performed by: INTERNAL MEDICINE

## 2019-09-04 PROCEDURE — C1894 INTRO/SHEATH, NON-LASER: HCPCS | Performed by: INTERNAL MEDICINE

## 2019-09-04 PROCEDURE — 82948 REAGENT STRIP/BLOOD GLUCOSE: CPT

## 2019-09-04 RX ORDER — MIDAZOLAM HYDROCHLORIDE 1 MG/ML
INJECTION INTRAMUSCULAR; INTRAVENOUS CODE/TRAUMA/SEDATION MEDICATION
Status: COMPLETED | OUTPATIENT
Start: 2019-09-04 | End: 2019-09-04

## 2019-09-04 RX ORDER — HEPARIN SODIUM 1000 [USP'U]/ML
INJECTION, SOLUTION INTRAVENOUS; SUBCUTANEOUS CODE/TRAUMA/SEDATION MEDICATION
Status: COMPLETED | OUTPATIENT
Start: 2019-09-04 | End: 2019-09-04

## 2019-09-04 RX ORDER — LIDOCAINE WITH 8.4% SOD BICARB 0.9%(10ML)
SYRINGE (ML) INJECTION CODE/TRAUMA/SEDATION MEDICATION
Status: COMPLETED | OUTPATIENT
Start: 2019-09-04 | End: 2019-09-04

## 2019-09-04 RX ORDER — FENTANYL CITRATE 50 UG/ML
INJECTION, SOLUTION INTRAMUSCULAR; INTRAVENOUS CODE/TRAUMA/SEDATION MEDICATION
Status: COMPLETED | OUTPATIENT
Start: 2019-09-04 | End: 2019-09-04

## 2019-09-04 RX ORDER — SODIUM CHLORIDE 9 MG/ML
125 INJECTION, SOLUTION INTRAVENOUS CONTINUOUS
Status: DISPENSED | OUTPATIENT
Start: 2019-09-04 | End: 2019-09-04

## 2019-09-04 RX ORDER — NITROGLYCERIN 20 MG/100ML
INJECTION INTRAVENOUS CODE/TRAUMA/SEDATION MEDICATION
Status: COMPLETED | OUTPATIENT
Start: 2019-09-04 | End: 2019-09-04

## 2019-09-04 RX ORDER — SODIUM CHLORIDE 9 MG/ML
50 INJECTION, SOLUTION INTRAVENOUS CONTINUOUS
Status: DISCONTINUED | OUTPATIENT
Start: 2019-09-04 | End: 2019-09-08 | Stop reason: HOSPADM

## 2019-09-04 RX ORDER — VERAPAMIL HCL 2.5 MG/ML
AMPUL (ML) INTRAVENOUS CODE/TRAUMA/SEDATION MEDICATION
Status: COMPLETED | OUTPATIENT
Start: 2019-09-04 | End: 2019-09-04

## 2019-09-04 RX ADMIN — MIDAZOLAM HYDROCHLORIDE 1 MG: 1 INJECTION, SOLUTION INTRAMUSCULAR; INTRAVENOUS at 09:08

## 2019-09-04 RX ADMIN — MIDAZOLAM HYDROCHLORIDE 1 MG: 1 INJECTION, SOLUTION INTRAMUSCULAR; INTRAVENOUS at 09:10

## 2019-09-04 RX ADMIN — IOHEXOL 65 ML: 350 INJECTION, SOLUTION INTRAVENOUS at 09:59

## 2019-09-04 RX ADMIN — SODIUM CHLORIDE 50 ML/HR: 0.9 INJECTION, SOLUTION INTRAVENOUS at 08:30

## 2019-09-04 RX ADMIN — VERAPAMIL HYDROCHLORIDE 2.5 MG: 2.5 INJECTION, SOLUTION INTRAVENOUS at 09:18

## 2019-09-04 RX ADMIN — FENTANYL CITRATE 50 MCG: 50 INJECTION, SOLUTION INTRAMUSCULAR; INTRAVENOUS at 09:08

## 2019-09-04 RX ADMIN — NITROGLYCERIN 200 MCG: 20 INJECTION INTRAVENOUS at 09:18

## 2019-09-04 RX ADMIN — Medication 3 ML: at 09:17

## 2019-09-04 RX ADMIN — HEPARIN SODIUM 5000 UNITS: 1000 INJECTION INTRAVENOUS; SUBCUTANEOUS at 09:21

## 2019-09-04 RX ADMIN — HEPARIN SODIUM 2000 UNITS: 1000 INJECTION INTRAVENOUS; SUBCUTANEOUS at 09:48

## 2019-09-04 RX ADMIN — HEPARIN SODIUM 5000 UNITS: 1000 INJECTION INTRAVENOUS; SUBCUTANEOUS at 09:30

## 2019-09-04 NOTE — INTERVAL H&P NOTE
Update: (This section must be completed if the H&P was completed greater than 24 hrs to procedure or admission)    H&P reviewed  After examining the patient, I find no changed to the H&P since it had been written  Patient re-evaluated   Accept as history and physical     Fawad Caraballo, DO/September 4, 2019/9:08 AM

## 2019-09-04 NOTE — PROGRESS NOTES
Patient transported to [place] [address] in stretcher on monitor, report given to [employer], care assumed  Assessed R radial puncture site with RN, site is clean, dry and intact with no signs or symptoms of bleeding or hematoma  Cap refill is brisk  Patient denies any chest pain at this time  VSS

## 2019-09-04 NOTE — DISCHARGE INSTRUCTIONS
After Radial Heart Catheterization   WHAT YOU NEED TO KNOW:   What will happen after a radial heart catheterization? · You will be attached to a heart monitor until you are fully awake  A heart monitor is an EKG that stays on continuously to record your heart's electrical activity  Healthcare providers will monitor your vital signs and pulses in your arm  They will frequently check your pressure bandage for bleeding or swelling  · You may have a band wrapped tightly around your wrist  The band puts pressure on your wound and helps prevent bleeding  A healthcare provider can put air into the band or remove air from the band  A healthcare provider will gradually remove air from the band and decrease pressure on your wrist  The band may be removed in 2 hours or when your wound stops bleeding  · You will need to keep your wrist straight for 2 to 4 hours  Do not  push or pull with your arm  Arm movements can cause serious bleeding  After you are monitored for several hours, you may go home or may need to stay in the hospital overnight  What do I need to know before I go home? · Care for your wound as directed  Remove the bandage in 24 hours or as directed  Mild bruising is normal and expected  The provided Waterblocker Bandaid can be placed on your wound after you remove the bandage  Do not put powders, lotions, or creams on your wound  They may cause your wound to get infected  Monitor your wound every day for signs of infection, such as redness, swelling, or pus  · Shower the day after your procedure  Remove your bandage before you shower  Do not take baths or go in hot tubs or pools  Carefully wash the wound with soap and water  Pat the area dry  The provided Waterblocker Bandaid can be placed on your wound after you shower for the next 4 days  · Apply firm, steady pressure to your wound if it bleeds  Apply pressure with a clean gauze or towel for 5 to 10 minutes   Call 911 if bleeding becomes heavy or does not stop  · Drink liquids as directed  Liquids will help flush the contrast liquid from your body  Ask how much liquid to drink each day and which liquids are best for you  · Do not lift anything heavier than 5 pounds until directed by your healthcare provider  Heavy lifting can put stress on your wound and cause bleeding  Do not push or pull with the arm that was used for the procedure  Do not do vigorous activity for at least 48 hours  Vigorous activity may cause bleeding from your wound  Rest and do quiet activities  Take short walks around the house to prevent a blood clot  Ask your healthcare provider when you can return to your normal activities  · Do not drive or return to work until your healthcare provider says it is okay  Your healthcare provider may tell you to wait 48 hours before you drive to decrease your risk for bleeding  You may not be able to return to work for at least 2 days after your procedure if your job involves heavy lifting  What medicines may I need? You may need any of the following:    · Acetaminophen  helps decrease pain and fever  This medicine is available without a doctor's order  Ask how much medicine is safe to take, and how often to take it  Acetaminophen can cause liver damage if not taken correctly  · Take your medicine as directed  Contact your healthcare provider if you think your medicine is not helping or if you have side effects  Tell him or her if you are allergic to any medicine  Keep a list of the medicines, vitamins, and herbs you take  Include the amounts, and when and why you take them  Bring the list or the pill bottles to follow-up visits  Carry your medicine list with you in case of an emergency    Call 911 for any of the following:   · You have any of the following signs of a heart attack:      ¨ Squeezing, pressure, or pain in your chest that lasts longer than 5 minutes or returns    ¨ Discomfort or pain in your back, neck, jaw, stomach, or arm     ¨ Trouble breathing    ¨ Nausea or vomiting    ¨ Lightheadedness or a sudden cold sweat, especially with chest pain or trouble breathing    · You have any of the following signs of a stroke:      ¨ Numbness or drooping on one side of your face     ¨ Weakness in an arm or leg    ¨ Confusion or difficulty speaking    ¨ Dizziness, a severe headache, or vision loss    · You feel lightheaded, short of breath, and have chest pain  · You cough up blood  · You have trouble breathing  · You cannot stop the bleeding from your wound even after you hold firm pressure for 10 minutes  When should I seek immediate care? · Blood soaks through your bandage  · Your stitches come apart  · Your hand or arm feels numb, cool, or looks pale  · Your wound gets swollen quickly  When should I contact my healthcare provider? · You have a fever or chills  · Your wound is red, swollen, or draining pus  · Your wound looks more bruised or you have new bruising on the side of your wrist      · You have nausea or are vomiting  · Your skin is itchy, swollen, or you have a rash  · You have questions or concerns about your condition or care  CARE AGREEMENT:   You have the right to help plan your care  Learn about your health condition and how it may be treated  Discuss treatment options with your caregivers to decide what care you want to receive  You always have the right to refuse treatment  The above information is an  only  It is not intended as medical advice for individual conditions or treatments  Talk to your doctor, nurse or pharmacist before following any medical regimen to see if it is safe and effective for you  © 2017 [address] [employer] [employer] Information is for End User's use only and may not be sold, redistributed or otherwise used for commercial purposes   All illustrations and images included in CareNotes® are the copyrighted property of Antelmo MALONEY  or [employer]co Villafuerte

## 2019-09-10 DIAGNOSIS — I10 ESSENTIAL HYPERTENSION: ICD-10-CM

## 2019-09-10 DIAGNOSIS — E11.9 TYPE 2 DIABETES MELLITUS WITHOUT COMPLICATION, WITHOUT LONG-TERM CURRENT USE OF INSULIN (HCC): ICD-10-CM

## 2019-09-10 DIAGNOSIS — E78.2 HYPERLIPIDEMIA, MIXED: ICD-10-CM

## 2019-09-10 DIAGNOSIS — E78.01 FAMILIAL HYPERCHOLESTEROLEMIA: ICD-10-CM

## 2019-09-10 DIAGNOSIS — K43.9 VENTRAL HERNIA WITHOUT OBSTRUCTION OR GANGRENE: ICD-10-CM

## 2019-09-11 RX ORDER — SITAGLIPTIN 25 MG/1
TABLET, FILM COATED ORAL
Qty: 90 TABLET | Refills: 0 | Status: SHIPPED | OUTPATIENT
Start: 2019-09-11 | End: 2019-11-01 | Stop reason: SDUPTHER

## 2019-10-01 ENCOUNTER — TELEPHONE (OUTPATIENT)
Dept: CARDIOLOGY CLINIC | Facility: CLINIC | Age: 69
End: 2019-10-01

## 2019-10-01 NOTE — TELEPHONE ENCOUNTER
I need to call and reschedule this patient  He is a Dr Kedar Betancourt pt who is on Dr Ayala Finley schedule  Alexandra Hernandez had a cath on 9/4/19  I am not sure if this can wait til Dr Zev Lei next available (sometime in November) or if he should be added on to another providers schedule like he already is/was   I'm assuming that cath results are given in hospital after procedure? Im not sure if there were complications and that's why he is on Dr Ayala Finley schedule   Please advise me   I must call pt by end of day    Thank You Kindly

## 2019-10-07 LAB
LEFT EYE DIABETIC RETINOPATHY: NORMAL
RIGHT EYE DIABETIC RETINOPATHY: NORMAL

## 2019-10-15 ENCOUNTER — OFFICE VISIT (OUTPATIENT)
Dept: FAMILY MEDICINE CLINIC | Facility: CLINIC | Age: 69
End: 2019-10-15
Payer: COMMERCIAL

## 2019-10-15 VITALS
SYSTOLIC BLOOD PRESSURE: 138 MMHG | DIASTOLIC BLOOD PRESSURE: 88 MMHG | RESPIRATION RATE: 18 BRPM | WEIGHT: 195 LBS | OXYGEN SATURATION: 97 % | HEIGHT: 72 IN | HEART RATE: 73 BPM | BODY MASS INDEX: 26.41 KG/M2

## 2019-10-15 DIAGNOSIS — I25.10 CORONARY ARTERY DISEASE DUE TO LIPID RICH PLAQUE: ICD-10-CM

## 2019-10-15 DIAGNOSIS — I50.21 CONGESTIVE HEART FAILURE, NYHA CLASS 1, ACUTE, SYSTOLIC (HCC): ICD-10-CM

## 2019-10-15 DIAGNOSIS — F17.210 CIGARETTE NICOTINE DEPENDENCE WITHOUT COMPLICATION: ICD-10-CM

## 2019-10-15 DIAGNOSIS — K43.9 VENTRAL HERNIA WITHOUT OBSTRUCTION OR GANGRENE: ICD-10-CM

## 2019-10-15 DIAGNOSIS — E11.9 TYPE 2 DIABETES MELLITUS WITHOUT COMPLICATION, WITHOUT LONG-TERM CURRENT USE OF INSULIN (HCC): Primary | ICD-10-CM

## 2019-10-15 DIAGNOSIS — E78.2 HYPERLIPIDEMIA, MIXED: ICD-10-CM

## 2019-10-15 DIAGNOSIS — I10 ESSENTIAL HYPERTENSION: ICD-10-CM

## 2019-10-15 DIAGNOSIS — I25.83 CORONARY ARTERY DISEASE DUE TO LIPID RICH PLAQUE: ICD-10-CM

## 2019-10-15 LAB — SL AMB POCT HEMOGLOBIN AIC: 6.7 (ref ?–6.5)

## 2019-10-15 PROCEDURE — 83036 HEMOGLOBIN GLYCOSYLATED A1C: CPT | Performed by: FAMILY MEDICINE

## 2019-10-15 PROCEDURE — 3075F SYST BP GE 130 - 139MM HG: CPT | Performed by: FAMILY MEDICINE

## 2019-10-15 PROCEDURE — 3079F DIAST BP 80-89 MM HG: CPT | Performed by: FAMILY MEDICINE

## 2019-10-15 PROCEDURE — 3008F BODY MASS INDEX DOCD: CPT | Performed by: FAMILY MEDICINE

## 2019-10-15 PROCEDURE — 99214 OFFICE O/P EST MOD 30 MIN: CPT | Performed by: FAMILY MEDICINE

## 2019-10-15 RX ORDER — LOSARTAN POTASSIUM 100 MG/1
100 TABLET ORAL DAILY
Qty: 90 TABLET | Refills: 3 | Status: ON HOLD | OUTPATIENT
Start: 2019-10-15 | End: 2020-01-14

## 2019-10-15 NOTE — PATIENT INSTRUCTIONS
Hypertension- stable  HLD- poor diet  DM- home glucose running   Morning is highest  A1C is 6 7  Strive for less than 6 5  NO change in meds at this time  Patient will continue to work on his diet   CHF, systolic- denies shortness of breath or leg swelling  Nicotine addiction- quite smoking last month  Abdomen MR showed 9 mm left side renal calculi   Obtain carotid duplex  Recent cardiac catheterization: Angiographic findings  Native coronary lesions:  ï¾·Mid LAD: Lesion 1: discrete, 60 % stenosis, moderate distal territory  ï¾·D1: Lesion 1: discrete, 60 % stenosis  ï¾·Proximal circumflex: Lesion 1: discrete, 50 % stenosis  ï¾·OM1: Lesion 1: discrete, 40 % stenosis  ï¾·RPDA: Lesion 1: discrete, 30 % stenosis  Intervention results  Native coronary lesions:  ï¾·Successful of the 60 % stenosis in mid LAD  ï¾·of the 50 % stenosis in proximal circumflex  Follow up in 4 month   Obtain labs prior to visit  Our office will call with your ultrasound results

## 2019-10-15 NOTE — PROGRESS NOTES
Assessment/Plan:       Diagnoses and all orders for this visit:    Type 2 diabetes mellitus without complication, without long-term current use of insulin (HCC)  -     Microalbumin / creatinine urine ratio  -     POCT hemoglobin A1c  -     Comprehensive metabolic panel; Future  -     Lipid panel; Future  -     metFORMIN (GLUCOPHAGE) 1000 MG tablet; Take 1 tablet (1,000 mg total) by mouth 2 (two) times a day with meals  -     VAS carotid complete study; Future    Essential hypertension  -     Microalbumin / creatinine urine ratio  -     POCT hemoglobin A1c  -     Comprehensive metabolic panel; Future  -     Lipid panel; Future  -     losartan (COZAAR) 100 MG tablet; Take 1 tablet (100 mg total) by mouth daily    Congestive heart failure, NYHA class 1, acute, systolic (HCC)  -     Microalbumin / creatinine urine ratio  -     POCT hemoglobin A1c  -     Comprehensive metabolic panel; Future  -     Lipid panel; Future    Hyperlipidemia, mixed  -     Microalbumin / creatinine urine ratio  -     POCT hemoglobin A1c  -     Comprehensive metabolic panel; Future  -     Lipid panel; Future    Ventral hernia without obstruction or gangrene  -     Microalbumin / creatinine urine ratio  -     POCT hemoglobin A1c  -     Comprehensive metabolic panel; Future  -     Lipid panel; Future    Cigarette nicotine dependence without complication  -     Microalbumin / creatinine urine ratio  -     POCT hemoglobin A1c  -     Comprehensive metabolic panel; Future  -     Lipid panel; Future  -     VAS carotid complete study; Future    Coronary artery disease due to lipid rich plaque  -     VAS carotid complete study; Future        No problem-specific Assessment & Plan notes found for this encounter  Subjective:      Patient ID: Stiven Rosas is a 81 y o  male  Patient is here for follow up and review of labs  Hypertension- stable  HLD- poor diet  DM- home glucose running    Morning is highest   CHF, systolic- denies shortness of breath or leg swelling  Started on coreg by Cardiology  Nicotine addiction- quite smoking last month  Abdomen MR showed 9 mm left side renal calculi     Recent cardiac catheterization: Angiographic findings  Native coronary lesions:  ï¾·Mid LAD: Lesion 1: discrete, 60 % stenosis, moderate distal territory  ï¾·D1: Lesion 1: discrete, 60 % stenosis  ï¾·Proximal circumflex: Lesion 1: discrete, 50 % stenosis  ï¾·OM1: Lesion 1: discrete, 40 % stenosis  ï¾·RPDA: Lesion 1: discrete, 30 % stenosis  Intervention results  Native coronary lesions:  ï¾·Successful of the 60 % stenosis in mid LAD  ï¾·of the 50 % stenosis in proximal circumflex  Results for Carol Guzman (MRN [id number]) as of 10/15/2019 07:47    8/23/2019 06:50  Sodium: 138  Potassium: 4 3  Chloride: 99  CO2: 19 (L)  BUN: 20  Creatinine: 0 71 (L)  SL AMB BUN/CREATININE RATIO: 28 (H)  Glucose, Random: 150 (H)  eGFR : 112  eGFR Non : 97        The following portions of the patient's history were reviewed and updated as appropriate:   He has a past medical history of Diabetes mellitus ([facility] [address] ), Diverticulitis, Hyperlipidemia, Hypertension, and Kidney stone  ,  does not have any pertinent problems on file  ,   has a past surgical history that includes Abdominal surgery; Laparoscopic colon resection; Tonsillectomy; Colonoscopy; pr colonoscopy flx dx w/collj spec when pfrmd (N/A, 11/3/2017); and Inguinal hernia repair (Left)  ,  family history includes Colon cancer in his family, father, and paternal grandfather; No Known Problems in his mother  ,   reports that he has been smoking  He has never used smokeless tobacco  He reports that he drinks alcohol  He reports that he does not use drugs  ,  has No Known Allergies     Current Outpatient Medications   Medication Sig Dispense Refill    aspirin 81 mg chewable tablet Chew 1 tablet (81 mg total) daily 60 tablet 3    atorvastatin (LIPITOR) 20 mg tablet Take 0 5 tablets (10 mg total) by mouth daily 60 tablet 3    carvedilol (COREG) 3 125 mg tablet Take 1 tablet (3 125 mg total) by mouth 2 (two) times a day with meals 180 tablet 3    glucose blood test strip Use as instructed 100 each 2    JANUVIA 25 MG tablet TAKE 1 TABLET BY MOUTH  DAILY 90 tablet 0    losartan (COZAAR) 100 MG tablet Take 1 tablet (100 mg total) by mouth daily 90 tablet 3    metFORMIN (GLUCOPHAGE) 1000 MG tablet Take 1 tablet (1,000 mg total) by mouth 2 (two) times a day with meals 180 tablet 3    sildenafil (VIAGRA) 50 MG tablet Take 1 tablet by mouth       No current facility-administered medications for this visit  Review of Systems   Constitutional: Negative for fatigue and fever  HENT: Negative for congestion  Eyes: Negative for visual disturbance  Respiratory: Negative for cough and shortness of breath  Cardiovascular: Negative for chest pain, palpitations and leg swelling  Gastrointestinal: Negative for abdominal distention and abdominal pain  Endocrine: Negative for cold intolerance, polydipsia and polyuria  Genitourinary: Negative for difficulty urinating  Musculoskeletal: Negative for back pain and joint swelling  Right shoulder pain and restricted mvmnt   Skin: Negative for color change and rash  Allergic/Immunologic: Negative for immunocompromised state  Neurological: Positive for numbness  Negative for dizziness and headaches  Intermittent toe   Hematological: Negative for adenopathy  Psychiatric/Behavioral: Negative for behavioral problems and sleep disturbance  All other systems reviewed and are negative  Objective:  Vitals:    10/15/19 0826   BP: 138/88   BP Location: Left arm   Patient Position: Sitting   Pulse: 73   Resp: 18   SpO2: 97%   Weight: 88 5 kg (195 lb)   Height: 6' (1 829 m)     Body mass index is 26 45 kg/m²  Physical Exam   Constitutional: He is oriented to person, place, and time   He appears well-developed and well-nourished  No distress  HENT:   Head: Normocephalic and atraumatic  Right Ear: External ear normal    Left Ear: External ear normal    Nose: Nose normal    Mouth/Throat: Oropharynx is clear and moist  No oropharyngeal exudate  Eyes: Pupils are equal, round, and reactive to light  Conjunctivae and EOM are normal  No scleral icterus  Neck: Normal range of motion  Neck supple  No thyromegaly present  Cardiovascular: Normal rate, regular rhythm, normal heart sounds and intact distal pulses  Exam reveals no gallop and no friction rub  Pulses are no weak pulses  No murmur heard  Pulses:       Dorsalis pedis pulses are 2+ on the right side, and 2+ on the left side  Posterior tibial pulses are 2+ on the right side, and 2+ on the left side  Pulmonary/Chest: Effort normal and breath sounds normal  No respiratory distress  He exhibits no tenderness  Abdominal: Soft  Bowel sounds are normal  He exhibits no distension  There is no tenderness  Musculoskeletal: Normal range of motion  Feet:   Right Foot:   Skin Integrity: Negative for ulcer, skin breakdown, erythema, warmth, callus or dry skin  Left Foot:   Skin Integrity: Negative for ulcer, skin breakdown, erythema, warmth, callus or dry skin  Lymphadenopathy:     He has no cervical adenopathy  Neurological: He is alert and oriented to person, place, and time  No sensory deficit  Skin: Skin is warm and dry  Capillary refill takes less than 2 seconds  He is not diaphoretic  Psychiatric: He has a normal mood and affect  His behavior is normal  Judgment and thought content normal    Nursing note and vitals reviewed  Diabetic Foot Exam    Patient's shoes and socks removed  Right Foot/Ankle   Right Foot Inspection  Skin Exam: skin normal and skin intact no dry skin, no warmth, no callus, no erythema, no maceration, no abnormal color, no pre-ulcer, no ulcer and no callus                          Toe Exam: ROM and strength within normal limitsno swelling, no tenderness, erythema and  no right toe deformity  Sensory   Vibration: intact  Proprioception: intact   Monofilament testing: intact  Vascular  Capillary refills: < 3 seconds  The right DP pulse is 2+  The right PT pulse is 2+  Right Toe  - Comprehensive Exam  Ecchymosis: none  Arch: normal  Hammertoes: absent  Claw Toes: absent  Swelling: none   Tenderness: none         Left Foot/Ankle  Left Foot Inspection  Skin Exam: skin normal and skin intactno dry skin, no warmth, no erythema, no maceration, normal color, no pre-ulcer, no ulcer and no callus                         Toe Exam: ROM and strength within normal limitsno swelling, no tenderness, no erythema and no left toe deformity                   Sensory   Vibration: intact  Proprioception: intact  Monofilament: intact  Vascular  Capillary refills: < 3 seconds  The left DP pulse is 2+  The left PT pulse is 2+  Left Toe  - Comprehensive Exam  Ecchymosis: none  Arch: normal  Hammertoes: absent  Claw toes: absent  Swelling: none   Tenderness: none       Assign Risk Category:  No deformity present; No loss of protective sensation;  No weak pulses       Risk: 0

## 2019-10-21 ENCOUNTER — HOSPITAL ENCOUNTER (OUTPATIENT)
Dept: ULTRASOUND IMAGING | Facility: HOSPITAL | Age: 69
Discharge: HOME | End: 2019-10-21
Payer: COMMERCIAL

## 2019-10-21 DIAGNOSIS — I25.10 CORONARY ARTERY DISEASE DUE TO LIPID RICH PLAQUE: ICD-10-CM

## 2019-10-21 DIAGNOSIS — E11.9 TYPE 2 DIABETES MELLITUS WITHOUT COMPLICATION, WITHOUT LONG-TERM CURRENT USE OF INSULIN (HCC): ICD-10-CM

## 2019-10-21 DIAGNOSIS — F17.210 CIGARETTE NICOTINE DEPENDENCE WITHOUT COMPLICATION: ICD-10-CM

## 2019-10-21 DIAGNOSIS — I25.83 CORONARY ARTERY DISEASE DUE TO LIPID RICH PLAQUE: ICD-10-CM

## 2019-10-21 PROCEDURE — 93880 EXTRACRANIAL BILAT STUDY: CPT | Performed by: SURGERY

## 2019-10-21 PROCEDURE — 93880 EXTRACRANIAL BILAT STUDY: CPT

## 2019-10-22 DIAGNOSIS — I65.23 INTERNAL CAROTID ARTERY STENOSIS, BILATERAL: Primary | ICD-10-CM

## 2019-10-29 ENCOUNTER — TELEPHONE (OUTPATIENT)
Dept: ADMINISTRATIVE | Facility: HOSPITAL | Age: 69
End: 2019-10-29

## 2019-11-01 DIAGNOSIS — I10 ESSENTIAL HYPERTENSION: ICD-10-CM

## 2019-11-01 DIAGNOSIS — E78.2 HYPERLIPIDEMIA, MIXED: ICD-10-CM

## 2019-11-01 DIAGNOSIS — K43.9 VENTRAL HERNIA WITHOUT OBSTRUCTION OR GANGRENE: ICD-10-CM

## 2019-11-01 DIAGNOSIS — E78.01 FAMILIAL HYPERCHOLESTEROLEMIA: ICD-10-CM

## 2019-11-01 DIAGNOSIS — E11.9 TYPE 2 DIABETES MELLITUS WITHOUT COMPLICATION, WITHOUT LONG-TERM CURRENT USE OF INSULIN (HCC): ICD-10-CM

## 2019-11-02 RX ORDER — SITAGLIPTIN 25 MG/1
TABLET, FILM COATED ORAL
Qty: 90 TABLET | Refills: 0 | Status: SHIPPED | OUTPATIENT
Start: 2019-11-02 | End: 2020-03-25

## 2019-11-05 ENCOUNTER — CONSULT (OUTPATIENT)
Dept: VASCULAR SURGERY | Facility: CLINIC | Age: 69
End: 2019-11-05
Payer: COMMERCIAL

## 2019-11-05 VITALS
BODY MASS INDEX: 26.41 KG/M2 | HEIGHT: 72 IN | SYSTOLIC BLOOD PRESSURE: 172 MMHG | HEART RATE: 79 BPM | WEIGHT: 195 LBS | RESPIRATION RATE: 16 BRPM | DIASTOLIC BLOOD PRESSURE: 98 MMHG

## 2019-11-05 DIAGNOSIS — I25.10 CORONARY ARTERY DISEASE DUE TO LIPID RICH PLAQUE: ICD-10-CM

## 2019-11-05 DIAGNOSIS — I65.23 CAROTID ARTERY STENOSIS WITHOUT CEREBRAL INFARCTION, BILATERAL: ICD-10-CM

## 2019-11-05 DIAGNOSIS — E11.9 TYPE 2 DIABETES MELLITUS WITHOUT COMPLICATION, WITHOUT LONG-TERM CURRENT USE OF INSULIN (HCC): Primary | ICD-10-CM

## 2019-11-05 DIAGNOSIS — F17.210 CIGARETTE NICOTINE DEPENDENCE WITHOUT COMPLICATION: ICD-10-CM

## 2019-11-05 DIAGNOSIS — I10 ESSENTIAL HYPERTENSION: ICD-10-CM

## 2019-11-05 DIAGNOSIS — Z13.6 ENCOUNTER FOR ABDOMINAL AORTIC ANEURYSM (AAA) SCREENING: ICD-10-CM

## 2019-11-05 DIAGNOSIS — E78.2 HYPERLIPIDEMIA, MIXED: ICD-10-CM

## 2019-11-05 DIAGNOSIS — I65.23 INTERNAL CAROTID ARTERY STENOSIS, BILATERAL: ICD-10-CM

## 2019-11-05 DIAGNOSIS — I25.83 CORONARY ARTERY DISEASE DUE TO LIPID RICH PLAQUE: ICD-10-CM

## 2019-11-05 DIAGNOSIS — I50.21 CONGESTIVE HEART FAILURE, NYHA CLASS 1, ACUTE, SYSTOLIC (HCC): ICD-10-CM

## 2019-11-05 PROCEDURE — 99204 OFFICE O/P NEW MOD 45 MIN: CPT | Performed by: PHYSICIAN ASSISTANT

## 2019-11-05 NOTE — PROGRESS NOTES
Assessment/Plan:    Carotid artery stenosis without cerebral infarction, bilateral  Asymptomatic, bilateral carotid artery disease LEFT > RIGHT  CAD/CMP  Hypertension  Hypercholesterolemia  Diabetes II  Tobacco -recent quit 6 weeks ago    -Recently found to have significant RICKY  -Asymptomatic   -Carotid du 10/21/19:   R 50-69%, 157/68;    L 70-99% 339/136 ratio 5 50  -BUN/creatinine 20/ 0 71 (08/23/19)    Plan: We reviewed his hx, labs, imaging studies and discussed plan of care    -check CTA head and neck to evaluate carotid stenosis  -check blood pressures at home and bring them in to primary care for blood pressure medication adjustment  -I congratulated him on quitting smoking; continue with smoking cessation  -continue on medical therapy aspirin and statin  -add Plavix on next visit  -we discussed healthy lifestyle changes  -stroke education provided; patient understands to call 911 for sx of stroke  -follow-up office visit after CTA    Type 2 diabetes mellitus without complication, without long-term current use of insulin ([facility] [address] )  -DM, controlled, follow-up with PCP    Essential hypertension  -BP elevated in the office  -currently on low dose of Coreg, along with losaran  -check at home and bring readings to PCP  -TLC    Hyperlipidemia, mixed  - Tg 94 H 46 L 81 (VLDL 19)  -consider moderate intensity statin therapy for cardiovascular disease  -TLC were discussed    Cigarette nicotine dependence without complication  -smoking cessation discussed           Subjective:      Patient ID: Juan Low is a 81 y o  male  Patient is new to our practice and was referred by Loyde Hodgkin  Pt had a CV on 10/21/19  Pt denies any TIA or stroke symptoms  Pt does not have any facial asymmetry  Pt is on ASA 81 mg and Atorvastatin  Pt stopped smoking 6 weeks ago       HPI   Juan Low 82 y/o M cardiomyopathy, mild CAD, DM (6 years), hypertension, hypercholesterolemia ("many years") tobacco addiction who is referred for carotid artery stenosis  Patient underwent carotid duplex study suggesting significant B RICKY with left carotid possibly > 80%  He has no hx of TIA/stroke  He denies unilateral facial/limb weakness or numbness, no vision changes, or history of expressive difficulties  [facility] he was found to have moderate CAD  He has multiple RF which are all fairly well controlled  He recently quit smoking  He is taking Nicotine lozengers      -Hemoglobin A1c 6 7  -BUN/creatinine 20/ 0 71 (08/23/19)  -Lipid panel profile:  Tg 94 H 46 L 81 (VLDL 19)    The following portions of the patient's history were reviewed and updated as appropriate: allergies, current medications, past family history, past medical history, past social history, past surgical history and problem list     Review of Systems   Constitutional: Positive for fatigue  HENT: Negative  Eyes: Negative  Respiratory: Negative  Cardiovascular: Negative  Gastrointestinal: Negative  Endocrine: Negative  Genitourinary: Negative  Musculoskeletal: Positive for arthralgias  Skin: Negative  Allergic/Immunologic: Negative  Neurological: Negative for dizziness, facial asymmetry, speech difficulty, weakness, light-headedness and numbness  Hematological: Positive for adenopathy  Objective:    BP (!) 172/98 (BP Location: Right arm, Patient Position: Sitting)   Pulse 79   Resp 16   Ht 6' (1 829 m)   Wt 88 5 kg (195 lb)   BMI 26 45 kg/m²        Physical Exam   Constitutional: He is oriented to person, place, and time  He appears well-developed and well-nourished  He is cooperative  HENT:   Head: Normocephalic and atraumatic  Eyes: Pupils are equal, round, and reactive to light  EOM are normal    Neck: Trachea normal  Neck supple  No JVD present  No thyromegaly present  Cardiovascular: Normal rate, regular rhythm, S1 normal, S2 normal and normal heart sounds   Exam reveals no gallop and no friction rub  No murmur heard  Pulses:       Carotid pulses are 2+ on the right side, and 2+ on the left side  Radial pulses are 2+ on the right side, and 2+ on the left side  Dorsalis pedis pulses are 2+ on the right side, and 2+ on the left side  Pulmonary/Chest: Effort normal and breath sounds normal  No accessory muscle usage  No respiratory distress  He has no wheezes  He has no rales  Abdominal: Soft  Bowel sounds are normal  He exhibits no distension  There is no hepatosplenomegaly  There is no tenderness  Musculoskeletal: Normal range of motion  He exhibits no edema or deformity  Neurological: He is alert and oriented to person, place, and time  Grossly normal    Skin: Skin is warm and dry  No lesion and no rash noted  No cyanosis  Nails show no clubbing  Psychiatric: He has a normal mood and affect  Nursing note and vitals reviewed  VAS carotid du 10/21/19  FINDINGS:     Right        Impression  PSV  EDV (cm/s)  Direction of Flow  Ratio    Dist  ICA                 74          38                      0 81    Mid  ICA                 128          66                      1 41    Prox  ICA    50 - 69%    157          68                      1 73    Dist CCA                  81          27                              Mid CCA                   91          29                      1 43    Prox CCA                  64          21                              Ext Carotid              195          30                      2 14    Prox Vert                 47          19  Antegrade                   Subclavian               110           0                                 Left         Impression  PSV  EDV (cm/s)  Direction of Flow  Ratio    Dist  ICA                 91          15                      1 47    Mid  ICA                  58          25                      0 95    Prox   ICA    70 - 99%    339         136                      5 50    Dist CCA                  59          15                              Mid CCA                   62          12                      0 69    Prox CCA                  89          20                              Ext Carotid              116          24                      1 88    Prox Vert                 64          19  Antegrade                   Subclavian               190           0                                       CONCLUSION:  Impression  RIGHT:  There is 50-69% stenosis noted in the internal carotid artery  Plaque is  heterogenous and irregular  Vertebral artery flow is antegrade  There is no significant subclavian artery  disease  LEFT:  There is 70-99% stenosis noted in the internal carotid artery  Plaque is  heterogenous and irregular  Vertebral artery flow is antegrade  There is no significant subclavian artery  disease  I have reviewed and made appropriate changes to the review of systems input by the medical assistant      Vitals:    11/05/19 1122 11/05/19 1123   BP: 168/90 (!) 172/98   BP Location: Left arm Right arm   Patient Position: Sitting Sitting   Pulse: 79    Resp: 16    Weight: 88 5 kg (195 lb)    Height: 6' (1 829 m)        Patient Active Problem List   Diagnosis    Essential hypertension    Hyperlipidemia, mixed    Type 2 diabetes mellitus without complication, without long-term current use of insulin (HCC)    Ventral hernia without obstruction or gangrene    Soft tissue mass    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HCC)    Acute non-recurrent maxillary sinusitis    Lymphadenopathy    Cigarette nicotine dependence without complication    Need for hepatitis C screening test    Encounter for abdominal aortic aneurysm (AAA) screening    Lymphadenopathy, anterior cervical    Current every day smoker    Congestive heart failure, NYHA class 1, acute, systolic (HCC)    Coronary artery disease due to lipid rich plaque    Carotid artery stenosis without cerebral infarction, bilateral       Past Surgical History:   Procedure Laterality Date    ABDOMINAL SURGERY      COLONOSCOPY      INGUINAL HERNIA REPAIR Left     LAPAROSCOPIC COLON RESECTION      RI COLONOSCOPY FLX DX W/COLLJ SPEC WHEN PFRMD N/A 11/3/2017    Procedure: COLONOSCOPY;  Surgeon: Paola Cho MD;  [facility]: [facility]; Service: Colorectal    TONSILLECTOMY         Family History   Problem Relation Age of Onset    Colon cancer Father     Colon cancer Paternal Grandfather     No Known Problems Mother     Colon cancer Family        Social History     Socioeconomic History    Marital status: /Civil Union     Spouse name: Not on file    Number of children: Not on file    Years of education: Not on file    Highest education level: Not on file   Occupational History    Not on file   Social Needs    Financial resource strain: Not on file    Food insecurity:     Worry: Not on file     Inability: Not on file    Transportation needs:     Medical: Not on file     Non-medical: Not on file   Tobacco Use    Smoking status: Current Some Day Smoker    Smokeless tobacco: Never Used    Tobacco comment: cigar every 2 days   Per [employer]: Current every day smoker   Substance and Sexual Activity    Alcohol use: Yes     Comment: socially     Drug use: No    Sexual activity: Not on file   Lifestyle    Physical activity:     Days per week: Not on file     Minutes per session: Not on file    Stress: Not on file   Relationships    Social connections:     Talks on phone: Not on file     Gets together: Not on file     Attends Orthodoxy service: Not on file     Active member of club or organization: Not on file     Attends meetings of clubs or organizations: Not on file     Relationship status: Not on file    Intimate partner violence:     Fear of current or ex partner: Not on file     Emotionally abused: Not on file     Physically abused: Not on file     Forced sexual activity: Not on file   Other Topics Concern    Not on file   Social History Narrative    Caffeine use    Uses safety equipment: seatbelts       No Known Allergies      Current Outpatient Medications:     aspirin 81 mg chewable tablet, Chew 1 tablet (81 mg total) daily, Disp: 60 tablet, Rfl: 3    atorvastatin (LIPITOR) 20 mg tablet, Take 0 5 tablets (10 mg total) by mouth daily, Disp: 60 tablet, Rfl: 3    carvedilol (COREG) 3 125 mg tablet, Take 1 tablet (3 125 mg total) by mouth 2 (two) times a day with meals, Disp: 180 tablet, Rfl: 3    glucose blood test strip, Use as instructed, Disp: 100 each, Rfl: 2    JANUVIA 25 MG tablet, TAKE 1 TABLET BY MOUTH  DAILY, Disp: 90 tablet, Rfl: 0    losartan (COZAAR) 100 MG tablet, Take 1 tablet (100 mg total) by mouth daily, Disp: 90 tablet, Rfl: 3    metFORMIN (GLUCOPHAGE) 1000 MG tablet, Take 1 tablet (1,000 mg total) by mouth 2 (two) times a day with meals, Disp: 180 tablet, Rfl: 3    sildenafil (VIAGRA) 50 MG tablet, Take 1 tablet by mouth, Disp: , Rfl:

## 2019-11-05 NOTE — LETTER
November 7, 2019     Chrystal Patino, [address] [address] [place]    Patient: Sherron Tran   YOB: 1950   Date of Visit: 11/5/2019     Dear Dr Stiven Oilvares      Thank you for referring Sherron Tran to me for evaluation  Below are the relevant portions of my assessment and plan of care  If you have questions, please do not hesitate to call me  I look forward to following Seng Carter along with you  Sincerely,        Mary Carmen Bullard PA-C        CC: No Recipients    Progress Notes:      Carotid artery stenosis without cerebral infarction, bilateral  Asymptomatic, bilateral carotid artery disease LEFT > RIGHT  CAD/CMP  Hypertension  Hypercholesterolemia  Diabetes II  Tobacco -recent quit 6 weeks ago    -Recently found to have significant RICKY  -Asymptomatic   -Carotid du 10/21/19:   R 50-69%, 157/68;    L 70-99% 339/136 ratio 5 50  -BUN/creatinine 20/ 0 71 (08/23/19)    Plan:   We reviewed his hx, labs, imaging studies and discussed plan of care    -check CTA head and neck to evaluate carotid stenosis  -check blood pressures at home and bring them in to primary care for blood pressure medication adjustment  -I congratulated him on quitting smoking; continue with smoking cessation  -continue on medical therapy aspirin and statin  -add Plavix on next visit  -we discussed healthy lifestyle changes  -stroke education provided; patient understands to call 911 for sx of stroke  -follow-up office visit after CTA    Type 2 diabetes mellitus without complication, without long-term current use of insulin ([facility] [address] )  -DM, controlled, follow-up with PCP    Essential hypertension  -BP elevated in the office  -currently on low dose of Coreg, along with losaran  -check at home and bring readings to PCP  -TLC    Hyperlipidemia, mixed  - Tg 94 H 46 L 81 (VLDL 19)  -consider moderate intensity statin therapy for cardiovascular disease  -TLC were discussed    Cigarette nicotine dependence without complication  -smoking cessation discussed

## 2019-11-05 NOTE — PATIENT INSTRUCTIONS
Carotid artery disease  Tobacco - congratulations on not smoking    Symptoms of stroke:  - Unable to speak or understand speech  - Unable to move one side of the body (arm or leg)  - Visual changes  - Call 911 for any symptoms of stroke    Healthy Lifestyle changes  - Stay active with regular activity  - Maintain a healthy weight  - Eat low fat, low cholesterol foods, diabetic diet    - Maintain good blood pressure : check blood pressures at home and discuss with PCP  - continue on medical therapy aspirin and statin    -BUN/creatinine 20/ 0 71 (08/23/19)      Plan:  -check CTA head and neck to evaluate carotid stenosis  -check blood pressures at home and bring them in to primary care for blood pressure medication adjustment  -continue with smoking cessation  -follow-up office visit after CTA

## 2019-11-07 NOTE — ASSESSMENT & PLAN NOTE
- Tg 94 H 46 L 81 (VLDL 19)  -consider moderate intensity statin therapy for cardiovascular disease  -TLC were discussed

## 2019-11-07 NOTE — ASSESSMENT & PLAN NOTE
-BP elevated in the office  -currently on low dose of Coreg, along with losaran  -check at home and bring readings to PCP  -TLC

## 2019-11-07 NOTE — ASSESSMENT & PLAN NOTE
Asymptomatic, bilateral carotid artery disease LEFT > RIGHT  CAD/CMP  Hypertension  Hypercholesterolemia  Diabetes II  Tobacco -recent quit 6 weeks ago    -Recently found to have significant RICKY  -Asymptomatic   -Carotid du 10/21/19:   R 50-69%, 157/68;    L 70-99% 339/136 ratio 5 50  -BUN/creatinine 20/ 0 71 (08/23/19)    Plan:   We reviewed his hx, labs, imaging studies and discussed plan of care    -check CTA head and neck to evaluate carotid stenosis  -check blood pressures at home and bring them in to primary care for blood pressure medication adjustment  -I congratulated him on quitting smoking; continue with smoking cessation  -continue on medical therapy aspirin and statin  -add Plavix on next visit  -we discussed healthy lifestyle changes  -stroke education provided; patient understands to call [phone] for sx of stroke  -follow-up office visit after CTA

## 2019-11-11 ENCOUNTER — OFFICE VISIT (OUTPATIENT)
Dept: CARDIOLOGY CLINIC | Facility: CLINIC | Age: 69
End: 2019-11-11
Payer: COMMERCIAL

## 2019-11-11 VITALS
HEIGHT: 72 IN | DIASTOLIC BLOOD PRESSURE: 80 MMHG | RESPIRATION RATE: 18 BRPM | BODY MASS INDEX: 26.52 KG/M2 | SYSTOLIC BLOOD PRESSURE: 130 MMHG | OXYGEN SATURATION: 97 % | WEIGHT: 195.8 LBS | HEART RATE: 73 BPM

## 2019-11-11 DIAGNOSIS — I42.9 CARDIOMYOPATHY (HCC): Primary | ICD-10-CM

## 2019-11-11 DIAGNOSIS — E78.2 MIXED HYPERLIPIDEMIA: ICD-10-CM

## 2019-11-11 DIAGNOSIS — I10 ESSENTIAL HYPERTENSION: ICD-10-CM

## 2019-11-11 DIAGNOSIS — F17.200 CURRENT EVERY DAY SMOKER: ICD-10-CM

## 2019-11-11 DIAGNOSIS — I25.10 CORONARY ARTERY DISEASE INVOLVING NATIVE CORONARY ARTERY OF NATIVE HEART WITHOUT ANGINA PECTORIS: ICD-10-CM

## 2019-11-11 DIAGNOSIS — E11.9 TYPE 2 DIABETES MELLITUS WITHOUT COMPLICATION, WITHOUT LONG-TERM CURRENT USE OF INSULIN (HCC): ICD-10-CM

## 2019-11-11 PROCEDURE — 99215 OFFICE O/P EST HI 40 MIN: CPT | Performed by: INTERNAL MEDICINE

## 2019-11-11 NOTE — PROGRESS NOTES
Cardiology Outpatient Follow up Note    Samuel Magaña 81 y o  male MRN: [id number]    11/11/19          Assessment/Plan:      ADDENDUM 11/22/19:   Pre-operative evaluation  · He is being evaluated for a left carotid endarterectomy  · He is high risk for the scheduled procedure with a RCRI: >11%  · He was recently evaluated with a cardiac catheterization that revealed moderate CAD however the lesions were not flow limiting  IFR evaluation was negative  No further cardiac testing is indicated prior to the anticipated procedure  1  Nonobstructive CAD  · Cardiac catheterization 9/2019- 60% LAD and 50% LCx stenosis  · Cont aspirin, atrovastatin, and carvedilol     2  Cardiomyopathy with EF: 45%  · Nonobstructive CAD noted on cardiac catheterization  · Cont carvedilol and losartan  · Repeat limited TTE with contrast to re-evaluate LV function    3  Asymptomatic bilateral carotid stenosis  · 50-69% stenosis of the Right ICA; 70-99% stenosis of the Left ICA  · Cont aspirin and atorvastatin  He follows with vascular surgery     4  DM2- on metformin and januvia    5  Tobacco abuse- quit smoking about 7 weeks ago    6  Hypertension- continue losartan and carvedilol     7  HLD- well controlled on atorvastatin 20mg/day        1  Cardiomyopathy ([address] )  Echo follow up/limited   2  Mixed hyperlipidemia     3  Essential hypertension     4  Type 2 diabetes mellitus without complication, without long-term current use of insulin ([address] )     5  Current every day smoker     6  Coronary artery disease involving native coronary artery of native heart without angina pectoris         HPI: Samuel Magaña is a 81y o  year old male with history of mild cardiomyopathy with EF 45%, nonobstructive CAD and by asymptomatic bilateral carotid stenosis who presents for routine follow-up        Past medical history:   · TTE 7/2019:mildly reduced LV systolic function with EF: 45%, G1DD, and mild MR, TR   · Cardiac catheterization 9/2019: 60% LAD and 50% LCx stenosis; no intervention performed  · Carotid duplex 10/2019: 50-69% stenosis of the Right ICA; 70-99% stenosis of the Left ICA    He is doing reasonably well from cardiac standpoint today  He was recently diagnosed with asymptomatic bilateral carotid stenosis and has established care with vascular surgery  He has been complaint with his medications and tolerating them without side effect  He denies chest pain, palpitations, dyspnea on exertion, lower extremity edema or any other concerns at this time  Family history of CAD on his maternal side with his maternal grandfather and uncle having MIs  Social history: Smoked 1ppd for 50 years; quit 7 weeks ago  time        Patient Active Problem List   Diagnosis    Essential hypertension    Hyperlipidemia, mixed    Type 2 diabetes mellitus without complication, without long-term current use of insulin (HCC)    Ventral hernia without obstruction or gangrene    Soft tissue mass    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HCC)    Acute non-recurrent maxillary sinusitis    Lymphadenopathy    Cigarette nicotine dependence without complication    Need for hepatitis C screening test    Encounter for abdominal aortic aneurysm (AAA) screening    Lymphadenopathy, anterior cervical    Current every day smoker    Congestive heart failure, NYHA class 1, acute, systolic (HCC)    Coronary artery disease due to lipid rich plaque    Carotid artery stenosis without cerebral infarction, bilateral       No Known Allergies      Current Outpatient Medications:     aspirin 81 mg chewable tablet, Chew 1 tablet (81 mg total) daily, Disp: 60 tablet, Rfl: 3    atorvastatin (LIPITOR) 20 mg tablet, Take 0 5 tablets (10 mg total) by mouth daily, Disp: 60 tablet, Rfl: 3    carvedilol (COREG) 3 125 mg tablet, Take 1 tablet (3 125 mg total) by mouth 2 (two) times a day with meals, Disp: 180 tablet, Rfl: 3    glucose blood test strip, Use as instructed, Disp: 100 each, Rfl: 2    JANUVIA 25 MG tablet, TAKE 1 TABLET BY MOUTH  DAILY, Disp: 90 tablet, Rfl: 0    losartan (COZAAR) 100 MG tablet, Take 1 tablet (100 mg total) by mouth daily, Disp: 90 tablet, Rfl: 3    metFORMIN (GLUCOPHAGE) 1000 MG tablet, Take 1 tablet (1,000 mg total) by mouth 2 (two) times a day with meals, Disp: 180 tablet, Rfl: 3    sildenafil (VIAGRA) 50 MG tablet, Take 1 tablet by mouth, Disp: , Rfl:     Past Medical History:   Diagnosis Date    Diabetes mellitus ([address] )     Diverticulitis     Hyperlipidemia     Hypertension     Kidney stone        Family History   Problem Relation Age of Onset    Colon cancer Father     Colon cancer Paternal Grandfather     No Known Problems Mother     Colon cancer Family        Past Surgical History:   Procedure Laterality Date    ABDOMINAL SURGERY      COLONOSCOPY      INGUINAL HERNIA REPAIR Left     LAPAROSCOPIC COLON RESECTION      ID COLONOSCOPY FLX DX W/COLLJ SPEC WHEN PFRMD N/A 11/3/2017    Procedure: COLONOSCOPY;  Surgeon: Pelon Palomo MD;  [facility]; Service: Colorectal    TONSILLECTOMY         Social History     Socioeconomic History    Marital status: /Civil Union     Spouse name: Not on file    Number of children: Not on file    Years of education: Not on file    Highest education level: Not on file   Occupational History    Not on file   Social Needs    Financial resource strain: Not on file    Food insecurity:     Worry: Not on file     Inability: Not on file    Transportation needs:     Medical: Not on file     Non-medical: Not on file   Tobacco Use    Smoking status: Current Some Day Smoker    Smokeless tobacco: Never Used    Tobacco comment: cigar every 2 days   Per [employer]: Current every day smoker   Substance and Sexual Activity    Alcohol use: Yes     Comment: socially     Drug use: No    Sexual activity: Not on file   Lifestyle    Physical activity:     Days per week: Not on file     Minutes per session: Not on file    Stress: Not on file   Relationships    Social connections:     Talks on phone: Not on file     Gets together: Not on file     Attends Rastafari service: Not on file     Active member of club or organization: Not on file     Attends meetings of clubs or organizations: Not on file     Relationship status: Not on file    Intimate partner violence:     Fear of current or ex partner: Not on file     Emotionally abused: Not on file     Physically abused: Not on file     Forced sexual activity: Not on file   Other Topics Concern    Not on file   Social History Narrative    Caffeine use    Uses safety equipment: seatbelts       Review of Systems   Constitution: Negative for diaphoresis, weight gain and weight loss  HENT: Negative for congestion  Cardiovascular: Negative for chest pain, dyspnea on exertion, irregular heartbeat, leg swelling, near-syncope, orthopnea, palpitations, paroxysmal nocturnal dyspnea and syncope  Respiratory: Negative for shortness of breath, sleep disturbances due to breathing and snoring  Hematologic/Lymphatic: Does not bruise/bleed easily  Skin: Negative for rash  Musculoskeletal: Negative for myalgias  Gastrointestinal: Negative for nausea and vomiting  Neurological: Negative for excessive daytime sleepiness and light-headedness  Psychiatric/Behavioral: The patient is not nervous/anxious  Vitals: /80   Pulse 73   Resp 18   Ht 6' (1 829 m)   Wt 88 8 kg (195 lb 12 8 oz)   SpO2 97%   BMI 26 56 kg/m²       Physical Exam:     GEN: Alert and oriented x 3, in no acute distress  Well appearing and well nourished  HEENT: Sclera anicteric, conjunctivae pink, mucous membranes moist  Oropharynx clear  NECK: Supple, left carotid bruits, no significant JVD  Trachea midline, no thyromegaly  HEART: Regular rhythm, normal S1 and S2, no murmurs, clicks, gallops or rubs  PMI nondisplaced, no thrills     LUNGS: Clear to auscultation bilaterally; no wheezes, rales, or rhonchi  No increased work of breathing or signs of respiratory distress  ABDOMEN: Soft, nontender, nondistended, normoactive bowel sounds  EXTREMITIES: Skin warm and well perfused, no clubbing, cyanosis, or edema  NEURO: No focal findings  Normal speech  Mood and affect normal    SKIN: Normal without suspicious lesions on exposed skin            Lab Results:       Lab Results   Component Value Date    HGBA1C 6 7 (A) 10/15/2019    HGBA1C 6 9 (H) 06/06/2019    HGBA1C 6 7 (H) 09/07/2018     Lab Results   Component Value Date    CHOL 163 11/22/2016    CHOL 172 09/18/2015     Lab Results   Component Value Date    HDL 46 06/06/2019    HDL 50 09/07/2018    HDL 45 05/01/2018     Lab Results   Component Value Date    LDLCALC 98 11/22/2016    LDLCALC 94 09/18/2015     Lab Results   Component Value Date    TRIG 94 06/06/2019    TRIG 147 09/07/2018    TRIG 296 (H) 05/01/2018     No results found for: CHOLHDL

## 2019-11-16 ENCOUNTER — APPOINTMENT (OUTPATIENT)
Dept: LAB | Facility: CLINIC | Age: 69
End: 2019-11-16
Payer: COMMERCIAL

## 2019-11-16 DIAGNOSIS — E78.2 HYPERLIPIDEMIA, MIXED: ICD-10-CM

## 2019-11-16 DIAGNOSIS — E11.9 TYPE 2 DIABETES MELLITUS WITHOUT COMPLICATION, WITHOUT LONG-TERM CURRENT USE OF INSULIN (HCC): ICD-10-CM

## 2019-11-16 DIAGNOSIS — K43.9 VENTRAL HERNIA WITHOUT OBSTRUCTION OR GANGRENE: ICD-10-CM

## 2019-11-16 DIAGNOSIS — I50.21 CONGESTIVE HEART FAILURE, NYHA CLASS 1, ACUTE, SYSTOLIC (HCC): ICD-10-CM

## 2019-11-16 DIAGNOSIS — F17.210 CIGARETTE NICOTINE DEPENDENCE WITHOUT COMPLICATION: ICD-10-CM

## 2019-11-16 DIAGNOSIS — I10 ESSENTIAL HYPERTENSION: ICD-10-CM

## 2019-11-16 LAB
ALBUMIN SERPL BCP-MCNC: 4.4 G/DL (ref 3.5–5)
ALP SERPL-CCNC: 43 U/L (ref 46–116)
ALT SERPL W P-5'-P-CCNC: 38 U/L (ref 12–78)
ANION GAP SERPL CALCULATED.3IONS-SCNC: 8 MMOL/L (ref 4–13)
AST SERPL W P-5'-P-CCNC: 18 U/L (ref 5–45)
BILIRUB SERPL-MCNC: 0.76 MG/DL (ref 0.2–1)
BUN SERPL-MCNC: 23 MG/DL (ref 5–25)
CALCIUM SERPL-MCNC: 9.4 MG/DL (ref 8.3–10.1)
CHLORIDE SERPL-SCNC: 107 MMOL/L (ref 100–108)
CHOLEST SERPL-MCNC: 167 MG/DL (ref 50–200)
CO2 SERPL-SCNC: 27 MMOL/L (ref 21–32)
CREAT SERPL-MCNC: 0.9 MG/DL (ref 0.6–1.3)
GFR SERPL CREATININE-BSD FRML MDRD: 88 ML/MIN/1.73SQ M
GLUCOSE P FAST SERPL-MCNC: 136 MG/DL (ref 65–99)
HDLC SERPL-MCNC: 45 MG/DL
LDLC SERPL CALC-MCNC: 97 MG/DL (ref 0–100)
NONHDLC SERPL-MCNC: 122 MG/DL
POTASSIUM SERPL-SCNC: 5 MMOL/L (ref 3.5–5.3)
PROT SERPL-MCNC: 8.1 G/DL (ref 6.4–8.2)
SODIUM SERPL-SCNC: 142 MMOL/L (ref 136–145)
TRIGL SERPL-MCNC: 126 MG/DL

## 2019-11-16 PROCEDURE — 36415 COLL VENOUS BLD VENIPUNCTURE: CPT

## 2019-11-16 PROCEDURE — 80053 COMPREHEN METABOLIC PANEL: CPT

## 2019-11-16 PROCEDURE — 80061 LIPID PANEL: CPT

## 2019-11-20 ENCOUNTER — HOSPITAL ENCOUNTER (OUTPATIENT)
Dept: CT IMAGING | Facility: HOSPITAL | Age: 69
Discharge: HOME | End: 2019-11-20
Payer: COMMERCIAL

## 2019-11-20 DIAGNOSIS — I65.23 CAROTID ARTERY STENOSIS WITHOUT CEREBRAL INFARCTION, BILATERAL: ICD-10-CM

## 2019-11-20 DIAGNOSIS — I25.83 CORONARY ARTERY DISEASE DUE TO LIPID RICH PLAQUE: ICD-10-CM

## 2019-11-20 DIAGNOSIS — I25.10 CORONARY ARTERY DISEASE DUE TO LIPID RICH PLAQUE: ICD-10-CM

## 2019-11-20 DIAGNOSIS — E11.9 TYPE 2 DIABETES MELLITUS WITHOUT COMPLICATION, WITHOUT LONG-TERM CURRENT USE OF INSULIN (HCC): ICD-10-CM

## 2019-11-20 DIAGNOSIS — I50.21 CONGESTIVE HEART FAILURE, NYHA CLASS 1, ACUTE, SYSTOLIC (HCC): ICD-10-CM

## 2019-11-20 DIAGNOSIS — I65.23 INTERNAL CAROTID ARTERY STENOSIS, BILATERAL: ICD-10-CM

## 2019-11-20 DIAGNOSIS — I10 ESSENTIAL HYPERTENSION: ICD-10-CM

## 2019-11-20 DIAGNOSIS — F17.210 CIGARETTE NICOTINE DEPENDENCE WITHOUT COMPLICATION: ICD-10-CM

## 2019-11-20 DIAGNOSIS — Z13.6 ENCOUNTER FOR ABDOMINAL AORTIC ANEURYSM (AAA) SCREENING: ICD-10-CM

## 2019-11-20 PROCEDURE — 70496 CT ANGIOGRAPHY HEAD: CPT

## 2019-11-20 PROCEDURE — 70498 CT ANGIOGRAPHY NECK: CPT

## 2019-11-20 RX ADMIN — IOHEXOL 85 ML: 350 INJECTION, SOLUTION INTRAVENOUS at 07:35

## 2019-11-21 ENCOUNTER — TELEPHONE (OUTPATIENT)
Dept: VASCULAR SURGERY | Facility: CLINIC | Age: 69
End: 2019-11-21

## 2019-11-21 NOTE — TELEPHONE ENCOUNTER
Contacted again by Dr Ju Gonzalez, radiologist   +incidental finding of 5 mm R MCA cerebral aneurysm  Will address neurosurgical f/u at scheduled office appointment

## 2019-11-21 NOTE — TELEPHONE ENCOUNTER
Contacted by Radiologist regarding results of CTA head/neck performed today to evaluate bilateral asymptomatic carotid stenosis  Carotid duplex w/50-69% R ICA disease which is equivocal on CTA  Duplex w/70-99% L ICA stenosis, velocity 339/136, ratio 5 50 with at least 90% stenosis on CTA  Carotid patent without string sign or trickle flow per radiologist   Patient scheduled for f/u with Deja Russo PA-C on 12/17/19  Please move appointment up and with surgeon ASAP  Thanks

## 2019-11-22 ENCOUNTER — OFFICE VISIT (OUTPATIENT)
Dept: VASCULAR SURGERY | Facility: CLINIC | Age: 69
End: 2019-11-22
Payer: COMMERCIAL

## 2019-11-22 VITALS
DIASTOLIC BLOOD PRESSURE: 94 MMHG | SYSTOLIC BLOOD PRESSURE: 160 MMHG | HEART RATE: 72 BPM | BODY MASS INDEX: 26.28 KG/M2 | WEIGHT: 194 LBS | HEIGHT: 72 IN

## 2019-11-22 DIAGNOSIS — I67.1 CEREBRAL ANEURYSM: ICD-10-CM

## 2019-11-22 DIAGNOSIS — I65.23 CAROTID ARTERY STENOSIS WITHOUT CEREBRAL INFARCTION, BILATERAL: Primary | ICD-10-CM

## 2019-11-22 PROCEDURE — 99214 OFFICE O/P EST MOD 30 MIN: CPT | Performed by: SURGERY

## 2019-11-22 RX ORDER — CLOPIDOGREL BISULFATE 75 MG/1
75 TABLET ORAL DAILY
Qty: 90 TABLET | Refills: 3 | Status: SHIPPED | OUTPATIENT
Start: 2019-11-22 | End: 2020-09-11

## 2019-11-22 NOTE — PROGRESS NOTES
Assessment/Plan:    Cerebral aneurysm  Right MCA aneurysm noted on CT-scan, will refer to neurosurgery  Carotid artery stenosis without cerebral infarction, bilateral  72yo M presenting to discuss asymptomatic bilateral carotid artery stenosis  He recently underwent CT_angio head/neck and presents to discuss findings  Patient denies any history of stroke or TIA, including unilateral numbness, tingling, weakness, paralysis, dysarthria, or amaurosis fugax  His CT-scan demonstrates ~50% stenosis of the right ICA, on the left however he has a critical >95% stenosis of the ICA, his ICA remains diminutive in size all the way to the left MCA  There appears to be a tandem stenosis in the petrous portion of the left ICA also  Will initiate plavix 75mg daily in addition to asa 81mg and statin medication   Will refer to [address] [place] [address] given tandem ICA stenosis and finding of right MCA cerebral aneurysm  Likely he will require Carotid revascularization (CEA) on the left, will discuss this further after eval by [facility]  Will also reach out to his cardiologist for risk stratification  He recently underwent extensive cardiac work-up including diagnostic left heart cath and echo  F/u in upcoming weeks  Problem List Items Addressed This Visit        Cardiovascular and Mediastinum    Carotid artery stenosis without cerebral infarction, bilateral - Primary     72yo M presenting to discuss asymptomatic bilateral carotid artery stenosis  He recently underwent CT_angio head/neck and presents to discuss findings  Patient denies any history of stroke or TIA, including unilateral numbness, tingling, weakness, paralysis, dysarthria, or amaurosis fugax  His CT-scan demonstrates ~50% stenosis of the right ICA, on the left however he has a critical >95% stenosis of the ICA, his ICA remains diminutive in size all the way to the left MCA  There appears to be a tandem stenosis in the petrous portion of the left ICA also  Will initiate plavix 75mg daily in addition to asa 81mg and statin medication   Will refer to Emery Lindsay given tandem ICA stenosis and finding of right MCA cerebral aneurysm  Likely he will require Carotid revascularization (CEA) on the left, will discuss this further after eval by [facility]Y  Will also reach out to his cardiologist for risk stratification  He recently underwent extensive cardiac work-up including diagnostic left heart cath and echo  F/u in upcoming weeks  Relevant Medications    clopidogrel (PLAVIX) 75 mg tablet    Cerebral aneurysm     Right MCA aneurysm noted on CT-scan, will refer to neurosurgery  Relevant Orders    Ambulatory referral to Neurosurgery            Subjective:      Patient ID: Stiven Rosas is a 81 y o  male  Patient presents today for CTA head and neck w/ finding of critical ICA stenosis at the L  Patient denies any associated symptoms and has no h/o CVA  Patient recently quit smoking x 2 months  The following portions of the patient's history were reviewed and updated as appropriate: allergies, current medications, past family history, past medical history, past social history, past surgical history and problem list     Review of Systems   Constitutional: Negative  HENT: Negative  Eyes: Negative  Respiratory: Negative  Cardiovascular: Negative  Gastrointestinal: Negative  Endocrine: Negative  Genitourinary: Negative  Musculoskeletal: Negative  Skin: Negative  Allergic/Immunologic: Negative  Neurological: Negative  Hematological: Negative  Psychiatric/Behavioral: Negative  I have reviewed and updated the ROS as appropriate  Objective:      /94 (BP Location: Right arm, Patient Position: Sitting)   Pulse 72   Ht 6' (1 829 m)   Wt 88 kg (194 lb)   BMI 26 31 kg/m²          Physical Exam   Constitutional: He is oriented to person, place, and time  He appears well-developed and well-nourished     HENT: Head: Normocephalic and atraumatic  Eyes: Pupils are equal, round, and reactive to light  EOM are normal    Neck: Normal range of motion  Neck supple    -carotid bruit   Cardiovascular: Normal rate, regular rhythm, normal heart sounds and intact distal pulses  Pulmonary/Chest: Effort normal and breath sounds normal    Abdominal: Soft  Bowel sounds are normal    Musculoskeletal: Normal range of motion  He exhibits no edema  Neurological: He is alert and oriented to person, place, and time  He displays normal reflexes  No cranial nerve deficit or sensory deficit  Coordination normal    Skin: Skin is warm and dry  Capillary refill takes less than 2 seconds  Psychiatric: He has a normal mood and affect   His behavior is normal  Judgment and thought content normal

## 2019-11-22 NOTE — ASSESSMENT & PLAN NOTE
70yo M presenting to discuss asymptomatic bilateral carotid artery stenosis  He recently underwent CT_angio head/neck and presents to discuss findings  Patient denies any history of stroke or TIA, including unilateral numbness, tingling, weakness, paralysis, dysarthria, or amaurosis fugax  His CT-scan demonstrates ~50% stenosis of the right ICA, on the left however he has a critical >95% stenosis of the ICA, his ICA remains diminutive in size all the way to the left MCA  There appears to be a tandem stenosis in the petrous portion of the left ICA also  Will initiate plavix 75mg daily in addition to asa 81mg and statin medication   Will refer to [address] [place] [address] given tandem ICA stenosis and finding of right MCA cerebral aneurysm  Likely he will require Carotid revascularization (CEA) on the left, will discuss this further after eval by GER[facility]  Will also reach out to his cardiologist for risk stratification  He recently underwent extensive cardiac work-up including diagnostic left heart cath and echo  F/u in upcoming weeks

## 2019-12-02 ENCOUNTER — CONSULT (OUTPATIENT)
Dept: NEUROSURGERY | Facility: CLINIC | Age: 69
End: 2019-12-02
Payer: COMMERCIAL

## 2019-12-02 ENCOUNTER — TRANSCRIBE ORDERS (OUTPATIENT)
Dept: NEUROSURGERY | Facility: CLINIC | Age: 69
End: 2019-12-02

## 2019-12-02 VITALS
TEMPERATURE: 97 F | HEIGHT: 72 IN | WEIGHT: 194 LBS | RESPIRATION RATE: 18 BRPM | DIASTOLIC BLOOD PRESSURE: 102 MMHG | HEART RATE: 82 BPM | SYSTOLIC BLOOD PRESSURE: 146 MMHG | BODY MASS INDEX: 26.28 KG/M2

## 2019-12-02 DIAGNOSIS — Z01.818 PRE-PROCEDURAL EXAMINATION: Primary | ICD-10-CM

## 2019-12-02 DIAGNOSIS — I67.1 CEREBRAL ANEURYSM: Primary | ICD-10-CM

## 2019-12-02 PROCEDURE — 99204 OFFICE O/P NEW MOD 45 MIN: CPT | Performed by: NEUROLOGICAL SURGERY

## 2019-12-02 NOTE — PROGRESS NOTES
Patient Id: Juan Low is a 81 y o  male        Handedness: Right      Assessment/Plan:    Diagnoses and all orders for this visit:    Cerebral aneurysm  -     Ambulatory referral to Neurosurgery  -     CTA head and neck w wo contrast; Future        Discussion Summary:   1  Unruptured/incidental right MCA aneurysm approximately 5 x 3 mm  We discussed the natural history of intracranial aneurysms and subarachnoid hemorrhage  We specifically discussed the risks associated with aneurysms based on the size and location  In regards to ISIUA this is approximately 0% over 5 years and according to [employer] approximately 0 3% per year  We discussed the inherent bias of both the studies  In addition we discussed the risks associated with treatment of this aneurysm both through open vascular endovascular methods  Given his lifetime/annual risk of hemorrhage he has elected to proceed with conservative management  As such we will repeat a CTA in 1 year  We discussed the warning signs of subarachnoid hemorrhage in reasons to go to the emergency room  In addition we discussed modifiable risk factors for aneurysmal growth and subarachnoid hemorrhage  2  Left asymptomatic carotid stenosis, greater than 90%  Patient already follows with vascular surgery  Currently on dual anti-platelet therapy  I do not see any contraindication for carotid endarterectomy given his right aneurysm  Would recommend strict blood pressure control in the perioperative period prevent hypertension    Chief Complaint: Consult (aneurysm)      HPI:   This is a very pleasant 82-year-old gentleman with a significant past medical history of diabetes, hypertension, hyperlipidemia, stage I heart failure who was found to have incidental left carotid artery stenosis as well as an incidental right MCA aneurysm  He presents for evaluation of this  He denies any other neurologic symptoms    Plan for carotid endarterectomy after clearance per vascular surgery  His past medical history significant for diabetes, hypertension, hyperlipidemia, heart failure  His past surgical history significant colon resection and hernia  He has no medical allergies  He is   He has 2 children ages 36 and 40  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  He quit smoking 2 months ago  Denies any alcohol or illicit drug use  No family history of subarachnoid hemorrhage, aneurysm, or unexpected sudden death  Review of systems obtained by the MA reviewed and updated below  Review of Systems   Constitutional: Negative  HENT: Negative  Eyes: Negative  Respiratory: Negative  Cardiovascular: Negative  Gastrointestinal: Negative  Endocrine: Negative  Genitourinary: Negative  Musculoskeletal: Negative  Skin: Negative  Allergic/Immunologic: Negative  Neurological: Negative  Hematological: Bruises/bleeds easily (medication )  Psychiatric/Behavioral: Negative  Physical Exam  Vitals:    12/02/19 1253   BP: (!) 146/102   Pulse: 82   Resp: 18   Temp: (!) 97 °F (36 1 °C)   He is well appearing  Affect is appropriate  His BMI is Body mass index is 26 31 kg/m²  April Aspen He is awake alert and oriented  Hearing and vision are grossly intact  His pupils are equal round reactive to light  His extraocular movements are intact  His face is symmetric  Tongue is midline  Facial sensation is intact and symmetric throughout  Shoulder shrug is 5/5  There is no drift or dysmetria  He has full strength in his bilateral upper and lower extremities  He has normal muscle tone muscle bulk  His biceps reflexes and patellar reflexes are 2+ and symmetric  Yazmin sign negative bilaterally  Sensation intact to light touch and pinprick throughout  His gait is normal     His heart rate is regular  His breath sounds are clear  2+ radial pulses  Left carotid bruit       The following portions of the patient's history were reviewed and updated as appropriate: allergies, current medications, past family history, past medical history, past social history, past surgical history and problem list     Active Ambulatory Problems     Diagnosis Date Noted    Essential hypertension 05/07/2018    Hyperlipidemia, mixed 05/07/2018    Type 2 diabetes mellitus without complication, without long-term current use of insulin ([facility] ) 05/07/2018    Ventral hernia without obstruction or gangrene 05/07/2018    Soft tissue mass 08/22/2018    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[facility] ) 11/07/2018    Acute non-recurrent maxillary sinusitis 01/30/2019    Lymphadenopathy 06/04/2019    Cigarette nicotine dependence without complication 14/28/0220    Need for hepatitis C screening test 06/04/2019    Encounter for abdominal aortic aneurysm (AAA) screening 06/11/2019    Lymphadenopathy, anterior cervical 06/11/2019    Current every day smoker 07/11/2019    Congestive heart failure, NYHA class 1, acute, systolic ([facility] ) 27/62/7278    Coronary artery disease due to lipid rich plaque 10/15/2019    Carotid artery stenosis without cerebral infarction, bilateral 11/05/2019    Cerebral aneurysm 11/22/2019     Resolved Ambulatory Problems     Diagnosis Date Noted    Acute bacterial conjunctivitis of left eye 01/30/2019     Past Medical History:   Diagnosis Date    Diabetes mellitus ([facility] )     Diverticulitis     Hyperlipidemia     Hypertension     Kidney stone        Past Surgical History:   Procedure Laterality Date    ABDOMINAL SURGERY      COLONOSCOPY      INGUINAL HERNIA REPAIR Left     LAPAROSCOPIC COLON RESECTION      AK COLONOSCOPY FLX DX W/COLLJ SPEC WHEN PFRMD N/A 11/3/2017    Procedure: COLONOSCOPY;  Surgeon: Aruna Goldberg MD;  [facility];   Service: Colorectal    TONSILLECTOMY           Current Outpatient Medications:     aspirin 81 mg chewable tablet, Chew 1 tablet (81 mg total) daily, Disp: 60 tablet, Rfl: 3    atorvastatin (LIPITOR) 20 mg tablet, Take 0 5 tablets (10 mg total) by mouth daily, Disp: 60 tablet, Rfl: 3    carvedilol (COREG) 3 125 mg tablet, Take 1 tablet (3 125 mg total) by mouth 2 (two) times a day with meals, Disp: 180 tablet, Rfl: 3    clopidogrel (PLAVIX) 75 mg tablet, Take 1 tablet (75 mg total) by mouth daily, Disp: 90 tablet, Rfl: 3    glucose blood test strip, Use as instructed, Disp: 100 each, Rfl: 2    JANUVIA 25 MG tablet, TAKE 1 TABLET BY MOUTH  DAILY, Disp: 90 tablet, Rfl: 0    losartan (COZAAR) 100 MG tablet, Take 1 tablet (100 mg total) by mouth daily, Disp: 90 tablet, Rfl: 3    metFORMIN (GLUCOPHAGE) 1000 MG tablet, Take 1 tablet (1,000 mg total) by mouth 2 (two) times a day with meals, Disp: 180 tablet, Rfl: 3    sildenafil (VIAGRA) 50 MG tablet, Take 1 tablet by mouth, Disp: , Rfl:     Results/Data: We reviewed his CTA in detail both in regards to his left carotid stenosis as well as his 5 x 3 mm MCA aneurysm

## 2019-12-16 ENCOUNTER — OFFICE VISIT (OUTPATIENT)
Dept: VASCULAR SURGERY | Facility: CLINIC | Age: 69
End: 2019-12-16
Payer: COMMERCIAL

## 2019-12-16 VITALS
WEIGHT: 194 LBS | DIASTOLIC BLOOD PRESSURE: 74 MMHG | SYSTOLIC BLOOD PRESSURE: 158 MMHG | HEIGHT: 72 IN | HEART RATE: 82 BPM | BODY MASS INDEX: 26.28 KG/M2

## 2019-12-16 DIAGNOSIS — I65.23 CAROTID ARTERY STENOSIS WITHOUT CEREBRAL INFARCTION, BILATERAL: Primary | ICD-10-CM

## 2019-12-16 PROCEDURE — 99215 OFFICE O/P EST HI 40 MIN: CPT | Performed by: SURGERY

## 2019-12-16 RX ORDER — CHLORHEXIDINE GLUCONATE 0.12 MG/ML
15 RINSE ORAL EVERY 12 HOURS SCHEDULED
Status: CANCELLED | OUTPATIENT
Start: 2019-12-16

## 2019-12-16 RX ORDER — CHLORHEXIDINE GLUCONATE 0.12 MG/ML
15 RINSE ORAL ONCE
Status: CANCELLED | OUTPATIENT
Start: 2019-12-16 | End: 2019-12-16

## 2019-12-16 NOTE — ASSESSMENT & PLAN NOTE
70yo M presenting to discuss asymptomatic bilateral carotid artery stenosis  He presents for follow-up after undergoing NSY eval and to discuss surgical intervention       Patient denies any history of stroke or TIA, including unilateral numbness, tingling, weakness, paralysis, dysarthria, or amaurosis fugax       His CT-scan demonstrates ~50% stenosis of the right ICA, on the left however he has a critical >95% stenosis of the ICA, his ICA remains diminutive in size all the way to the left MCA  There appears to be a tandem stenosis in the petrous portion of the left ICA also       Will initiate plavix 75mg daily in addition to asa 81mg and statin medication       Had an extensive risks/benefits alternatives discussion  Including risk of stroke, cranial nerve injury, infection and the patient consented to proceed with left carotid endarterectomy     Plan for surgery in upcoming weeks, continue DAPT

## 2019-12-16 NOTE — PROGRESS NOTES
Assessment/Plan:    Carotid artery stenosis without cerebral infarction, bilateral  72yo M presenting to discuss asymptomatic bilateral carotid artery stenosis  He presents for follow-up after undergoing NSY eval and to discuss surgical intervention       Patient denies any history of stroke or TIA, including unilateral numbness, tingling, weakness, paralysis, dysarthria, or amaurosis fugax       His CT-scan demonstrates ~50% stenosis of the right ICA, on the left however he has a critical >95% stenosis of the ICA, his ICA remains diminutive in size all the way to the left MCA  There appears to be a tandem stenosis in the petrous portion of the left ICA also       Will initiate plavix 75mg daily in addition to asa 81mg and statin medication  Had an extensive risks/benefits alternatives discussion  Including risk of stroke, cranial nerve injury, infection and the patient consented to proceed with left carotid endarterectomy     Plan for surgery in upcoming weeks, continue DAPT           Problem List Items Addressed This Visit        Cardiovascular and Mediastinum    Carotid artery stenosis without cerebral infarction, bilateral - Primary     72yo M presenting to discuss asymptomatic bilateral carotid artery stenosis  He presents for follow-up after undergoing NSY eval and to discuss surgical intervention       Patient denies any history of stroke or TIA, including unilateral numbness, tingling, weakness, paralysis, dysarthria, or amaurosis fugax       His CT-scan demonstrates ~50% stenosis of the right ICA, on the left however he has a critical >95% stenosis of the ICA, his ICA remains diminutive in size all the way to the left MCA  There appears to be a tandem stenosis in the petrous portion of the left ICA also       Will initiate plavix 75mg daily in addition to asa 81mg and statin medication       Had an extensive risks/benefits alternatives discussion  Including risk of stroke, cranial nerve injury, infection and the patient consented to proceed with left carotid endarterectomy     Plan for surgery in upcoming weeks, continue DAPT           Relevant Orders    Case request operating room: ENDARTERECTOMY ARTERY CAROTID with Neuromonitoring (Completed)    Type and screen    Basic metabolic panel    CBC and differential    EKG 12 lead            Subjective:      Patient ID: Dina Zepeda is a 81 y o  male  Patient reruns today for f/u visit to discuss possible CEA  He saw neurosurgery 12/12  Patient is asymptomatic at this time  The following portions of the patient's history were reviewed and updated as appropriate: allergies, current medications, past family history, past medical history, past social history, past surgical history and problem list     Review of Systems   Constitutional: Negative  HENT: Negative  Eyes: Negative  Respiratory: Negative  Cardiovascular: Negative  Gastrointestinal: Negative  Endocrine: Negative  Genitourinary: Negative  Musculoskeletal: Negative  Skin: Negative  Allergic/Immunologic: Negative  Neurological: Negative  Hematological: Negative  Psychiatric/Behavioral: Negative  Objective:      /74 (BP Location: Left arm, Patient Position: Sitting)   Pulse 82   Ht 6' (1 829 m)   Wt 88 kg (194 lb)   BMI 26 31 kg/m²          Physical Exam   Constitutional: He is oriented to person, place, and time  He appears well-developed and well-nourished  HENT:   Head: Normocephalic and atraumatic  Eyes: Pupils are equal, round, and reactive to light  EOM are normal    Neck: Normal range of motion  Neck supple    + bruit left   Cardiovascular: Normal rate, regular rhythm, normal heart sounds and intact distal pulses  Pulmonary/Chest: Effort normal and breath sounds normal    Abdominal: Soft  Bowel sounds are normal    Musculoskeletal: Normal range of motion  He exhibits no edema     Neurological: He is alert and oriented to person, place, and time    Strength and sensation intact and symmetric   Skin: Skin is warm and dry  Capillary refill takes less than 2 seconds  Psychiatric: He has a normal mood and affect   His behavior is normal  Judgment and thought content normal          Operative Scheduling Information:    Hospital:  [facility]    Physician:  Me    Surgery: Left Carotid Endarterectomy with Neuromonitoring     Urgency:  Standard    Case Length:  Normal    Post-op Bed:  ICU    OR Table:  Standard    Equipment Needs:  Rep: Nuvasive    Medication Instructions:  Aspirin:   Continue (do not hold)  Plavix:  Continue (do not hold)    Hydration:  No

## 2019-12-16 NOTE — H&P (VIEW-ONLY)
Assessment/Plan:    Carotid artery stenosis without cerebral infarction, bilateral  70yo M presenting to discuss asymptomatic bilateral carotid artery stenosis  He presents for follow-up after undergoing NSY eval and to discuss surgical intervention       Patient denies any history of stroke or TIA, including unilateral numbness, tingling, weakness, paralysis, dysarthria, or amaurosis fugax       His CT-scan demonstrates ~50% stenosis of the right ICA, on the left however he has a critical >95% stenosis of the ICA, his ICA remains diminutive in size all the way to the left MCA  There appears to be a tandem stenosis in the petrous portion of the left ICA also       Will initiate plavix 75mg daily in addition to asa 81mg and statin medication  Had an extensive risks/benefits alternatives discussion  Including risk of stroke, cranial nerve injury, infection and the patient consented to proceed with left carotid endarterectomy     Plan for surgery in upcoming weeks, continue DAPT           Problem List Items Addressed This Visit        Cardiovascular and Mediastinum    Carotid artery stenosis without cerebral infarction, bilateral - Primary     70yo M presenting to discuss asymptomatic bilateral carotid artery stenosis  He presents for follow-up after undergoing NSY eval and to discuss surgical intervention       Patient denies any history of stroke or TIA, including unilateral numbness, tingling, weakness, paralysis, dysarthria, or amaurosis fugax       His CT-scan demonstrates ~50% stenosis of the right ICA, on the left however he has a critical >95% stenosis of the ICA, his ICA remains diminutive in size all the way to the left MCA  There appears to be a tandem stenosis in the petrous portion of the left ICA also       Will initiate plavix 75mg daily in addition to asa 81mg and statin medication       Had an extensive risks/benefits alternatives discussion  Including risk of stroke, cranial nerve injury, infection and the patient consented to proceed with left carotid endarterectomy     Plan for surgery in upcoming weeks, continue DAPT           Relevant Orders    Case request operating room: ENDARTERECTOMY ARTERY CAROTID with Neuromonitoring (Completed)    Type and screen    Basic metabolic panel    CBC and differential    EKG 12 lead            Subjective:      Patient ID: Chaim Olmos is a 81 y o  male  Patient reruns today for f/u visit to discuss possible CEA  He saw neurosurgery 12/12  Patient is asymptomatic at this time  The following portions of the patient's history were reviewed and updated as appropriate: allergies, current medications, past family history, past medical history, past social history, past surgical history and problem list     Review of Systems   Constitutional: Negative  HENT: Negative  Eyes: Negative  Respiratory: Negative  Cardiovascular: Negative  Gastrointestinal: Negative  Endocrine: Negative  Genitourinary: Negative  Musculoskeletal: Negative  Skin: Negative  Allergic/Immunologic: Negative  Neurological: Negative  Hematological: Negative  Psychiatric/Behavioral: Negative  Objective:      /74 (BP Location: Left arm, Patient Position: Sitting)   Pulse 82   Ht 6' (1 829 m)   Wt 88 kg (194 lb)   BMI 26 31 kg/m²          Physical Exam   Constitutional: He is oriented to person, place, and time  He appears well-developed and well-nourished  HENT:   Head: Normocephalic and atraumatic  Eyes: Pupils are equal, round, and reactive to light  EOM are normal    Neck: Normal range of motion  Neck supple    + bruit left   Cardiovascular: Normal rate, regular rhythm, normal heart sounds and intact distal pulses  Pulmonary/Chest: Effort normal and breath sounds normal    Abdominal: Soft  Bowel sounds are normal    Musculoskeletal: Normal range of motion  He exhibits no edema     Neurological: He is alert and oriented to person, place, and time    Strength and sensation intact and symmetric   Skin: Skin is warm and dry  Capillary refill takes less than 2 seconds  Psychiatric: He has a normal mood and affect   His behavior is normal  Judgment and thought content normal          Operative Scheduling Information:    Hospital:  [facility]    Physician:  Me    Surgery: Left Carotid Endarterectomy with Neuromonitoring     Urgency:  Standard    Case Length:  Normal    Post-op Bed:  ICU    OR Table:  Standard    Equipment Needs:  Rep: Nuvasive    Medication Instructions:  Aspirin:   Continue (do not hold)  Plavix:  Continue (do not hold)    Hydration:  No

## 2019-12-17 ENCOUNTER — PREP FOR PROCEDURE (OUTPATIENT)
Dept: VASCULAR SURGERY | Facility: CLINIC | Age: 69
End: 2019-12-17

## 2019-12-17 ENCOUNTER — TELEPHONE (OUTPATIENT)
Dept: VASCULAR SURGERY | Facility: CLINIC | Age: 69
End: 2019-12-17

## 2019-12-17 DIAGNOSIS — I65.23 CAROTID ARTERY STENOSIS WITHOUT CEREBRAL INFARCTION, BILATERAL: Primary | ICD-10-CM

## 2019-12-17 NOTE — TELEPHONE ENCOUNTER
S/w pt and scheduled his surgery for 12-31-19 at [facility] with Dr Brown Leisure  He is aware nothing to eat or drink after midnight on 12-30-19  He will go [place] [address] facility for his blood work and ekg  No hold on any medications  Surgery scheduling form given to prior auth department to determine if prior auth is needed

## 2019-12-18 DIAGNOSIS — Z91.89 RISK FACTORS FOR OBSTRUCTIVE SLEEP APNEA: Primary | ICD-10-CM

## 2019-12-26 ENCOUNTER — APPOINTMENT (OUTPATIENT)
Dept: LAB | Facility: CLINIC | Age: 69
End: 2019-12-26
Payer: COMMERCIAL

## 2019-12-26 DIAGNOSIS — I65.23 CAROTID ARTERY STENOSIS WITHOUT CEREBRAL INFARCTION, BILATERAL: Primary | ICD-10-CM

## 2019-12-26 LAB
ANION GAP SERPL CALCULATED.3IONS-SCNC: 4 MMOL/L (ref 4–13)
BASOPHILS # BLD AUTO: 0.1 THOUSANDS/ΜL (ref 0–0.1)
BASOPHILS NFR BLD AUTO: 2 % (ref 0–1)
BUN SERPL-MCNC: 12 MG/DL (ref 5–25)
CALCIUM SERPL-MCNC: 9.9 MG/DL (ref 8.3–10.1)
CHLORIDE SERPL-SCNC: 105 MMOL/L (ref 100–108)
CO2 SERPL-SCNC: 29 MMOL/L (ref 21–32)
CREAT SERPL-MCNC: 0.8 MG/DL (ref 0.6–1.3)
EOSINOPHIL # BLD AUTO: 0.31 THOUSAND/ΜL (ref 0–0.61)
EOSINOPHIL NFR BLD AUTO: 6 % (ref 0–6)
ERYTHROCYTE [DISTWIDTH] IN BLOOD BY AUTOMATED COUNT: 12 % (ref 11.6–15.1)
EST. AVERAGE GLUCOSE BLD GHB EST-MCNC: 148 MG/DL
GFR SERPL CREATININE-BSD FRML MDRD: 92 ML/MIN/1.73SQ M
GLUCOSE P FAST SERPL-MCNC: 133 MG/DL (ref 65–99)
HBA1C MFR BLD: 6.8 % (ref 4.2–6.3)
HCT VFR BLD AUTO: 48.5 % (ref 36.5–49.3)
HGB BLD-MCNC: 15.7 G/DL (ref 12–17)
IMM GRANULOCYTES # BLD AUTO: 0.01 THOUSAND/UL (ref 0–0.2)
IMM GRANULOCYTES NFR BLD AUTO: 0 % (ref 0–2)
INR PPP: 1.03 (ref 0.84–1.19)
LYMPHOCYTES # BLD AUTO: 1.8 THOUSANDS/ΜL (ref 0.6–4.47)
LYMPHOCYTES NFR BLD AUTO: 34 % (ref 14–44)
MCH RBC QN AUTO: 33.1 PG (ref 26.8–34.3)
MCHC RBC AUTO-ENTMCNC: 32.4 G/DL (ref 31.4–37.4)
MCV RBC AUTO: 102 FL (ref 82–98)
MONOCYTES # BLD AUTO: 0.51 THOUSAND/ΜL (ref 0.17–1.22)
MONOCYTES NFR BLD AUTO: 10 % (ref 4–12)
NEUTROPHILS # BLD AUTO: 2.65 THOUSANDS/ΜL (ref 1.85–7.62)
NEUTS SEG NFR BLD AUTO: 48 % (ref 43–75)
NRBC BLD AUTO-RTO: 0 /100 WBCS
PLATELET # BLD AUTO: 183 THOUSANDS/UL (ref 149–390)
PMV BLD AUTO: 10.8 FL (ref 8.9–12.7)
POTASSIUM SERPL-SCNC: 4 MMOL/L (ref 3.5–5.3)
PROTHROMBIN TIME: 13.1 SECONDS (ref 11.6–14.5)
RBC # BLD AUTO: 4.75 MILLION/UL (ref 3.88–5.62)
SODIUM SERPL-SCNC: 138 MMOL/L (ref 136–145)
WBC # BLD AUTO: 5.38 THOUSAND/UL (ref 4.31–10.16)

## 2019-12-26 PROCEDURE — 80048 BASIC METABOLIC PNL TOTAL CA: CPT

## 2019-12-26 PROCEDURE — 86901 BLOOD TYPING SEROLOGIC RH(D): CPT

## 2019-12-26 PROCEDURE — 83036 HEMOGLOBIN GLYCOSYLATED A1C: CPT

## 2019-12-26 PROCEDURE — 85610 PROTHROMBIN TIME: CPT

## 2019-12-26 PROCEDURE — 86850 RBC ANTIBODY SCREEN: CPT

## 2019-12-26 PROCEDURE — 36415 COLL VENOUS BLD VENIPUNCTURE: CPT

## 2019-12-26 PROCEDURE — 85025 COMPLETE CBC W/AUTO DIFF WBC: CPT

## 2019-12-26 PROCEDURE — 86900 BLOOD TYPING SEROLOGIC ABO: CPT

## 2019-12-27 LAB
ABO GROUP BLD: NORMAL
BLD GP AB SCN SERPL QL: NEGATIVE
RH BLD: POSITIVE
SPECIMEN EXPIRATION DATE: NORMAL

## 2019-12-30 ENCOUNTER — TELEPHONE (OUTPATIENT)
Dept: VASCULAR SURGERY | Facility: CLINIC | Age: 69
End: 2019-12-30

## 2019-12-30 NOTE — TELEPHONE ENCOUNTER
Received call from pt to report that he had a chest cold for the past couple days  Pt currently has a cough and congestion and had fever of 100 8 over the weekend  Pt is scheduled for a left carotid endarterectomy w/ Dr Erna Gonzalez tomorrow  Discussed w/ [address] who advised that surgery should be postponed and pt should see PCP  Pt verbalized understanding and will make appointment w/ PCP  Surgery scheduling notified

## 2019-12-30 NOTE — TELEPHONE ENCOUNTER
9:34am 12/30/19 spoke w/ Briana Patel and canceled the case with her  Canceled Vascular tech via email  Canceled post op appt  Need to update VQI w/new date

## 2019-12-30 NOTE — TELEPHONE ENCOUNTER
Rec call from [facility]  Patient called and advised us he has a chest cold  Per Leona Guerra, cancel case and reschedule when patient is better

## 2020-01-07 NOTE — TELEPHONE ENCOUNTER
Rec call back from patient and he wishes to schedule for Monday 1/13/20 however he still has a cough and is not sure what the PCP will say  I advised him I will hold the spot until after his appointment tomorrow morning

## 2020-01-07 NOTE — PROGRESS NOTES
Subjective:     80 yo  male who presents to the office today for a preoperative consultation at the request of surgeon Dr Reuben Bruno who plans on performing left CEA on January 13  This consultation is requested for the specific conditions prompting preoperative evaluation (i e  because of potential affect on operative risk): CAD, CHF, HTN, DM  Planned anesthesia: general  The patient has the following known anesthesia issues: past endotracheal tube without complications  Patients bleeding risk: no recent abnormal bleeding, no remote history of abnormal bleeding and no use of Ca-channel blockers  Patient does not have objections to receiving blood products if needed  The following portions of the patient's history were reviewed and updated as appropriate:   He  has a past medical history of CHF (congestive heart failure) ([address] ), Diabetes mellitus ([address] ), Diverticulitis, Hyperlipidemia, Hypertension, and Kidney stone    He   Patient Active Problem List    Diagnosis Date Noted    Pre-op examination 01/08/2020    Recent URI 01/08/2020    Wheeze 01/08/2020    Cerebral aneurysm 11/22/2019    Carotid artery stenosis without cerebral infarction, bilateral 11/05/2019    Coronary artery disease due to lipid rich plaque 10/15/2019    Current every day smoker 07/11/2019    Congestive heart failure, NYHA class 1, acute, systolic ([address] ) 18/55/8639    Encounter for abdominal aortic aneurysm (AAA) screening 06/11/2019    Lymphadenopathy, anterior cervical 06/11/2019    Lymphadenopathy 06/04/2019    Cigarette nicotine dependence without complication 93/44/2403    Need for hepatitis C screening test 06/04/2019    Acute non-recurrent maxillary sinusitis 01/30/2019    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[address] ) 11/07/2018    Soft tissue mass 08/22/2018    Essential hypertension 05/07/2018    Hyperlipidemia, mixed 05/07/2018    Type 2 diabetes mellitus without complication, without long-term current use of insulin ([address] ) 05/07/2018    Ventral hernia without obstruction or gangrene 05/07/2018     He  has a past surgical history that includes Abdominal surgery; Laparoscopic colon resection; Colonoscopy; pr colonoscopy flx dx w/joaquín spec when pfrmd (N/A, 11/3/2017); and Inguinal hernia repair (Left)  His family history includes Colon cancer in his family, father, and paternal grandfather; No Known Problems in his mother  He  reports that he quit smoking about 3 months ago  He has never used smokeless tobacco  He reports that he drinks alcohol  He reports that he does not use drugs  Current Outpatient Medications   Medication Sig Dispense Refill    aspirin 81 mg chewable tablet Chew 1 tablet (81 mg total) daily 60 tablet 3    atorvastatin (LIPITOR) 20 mg tablet Take 0 5 tablets (10 mg total) by mouth daily 60 tablet 3    carvedilol (COREG) 3 125 mg tablet Take 1 tablet (3 125 mg total) by mouth 2 (two) times a day with meals 180 tablet 3    clopidogrel (PLAVIX) 75 mg tablet Take 1 tablet (75 mg total) by mouth daily 90 tablet 3    glucose blood test strip Use as instructed 100 each 2    JANUVIA 25 MG tablet TAKE 1 TABLET BY MOUTH  DAILY (Patient taking differently: Take 25 mg by mouth daily after breakfast ) 90 tablet 0    losartan (COZAAR) 100 MG tablet Take 1 tablet (100 mg total) by mouth daily (Patient taking differently: Take 100 mg by mouth daily in the early morning ) 90 tablet 3    metFORMIN (GLUCOPHAGE) 1000 MG tablet Take 1 tablet (1,000 mg total) by mouth 2 (two) times a day with meals 180 tablet 3    sildenafil (VIAGRA) 50 MG tablet Take 1 tablet by mouth       No current facility-administered medications for this visit        Current Outpatient Medications on File Prior to Visit   Medication Sig    aspirin 81 mg chewable tablet Chew 1 tablet (81 mg total) daily    atorvastatin (LIPITOR) 20 mg tablet Take 0 5 tablets (10 mg total) by mouth daily    carvedilol (COREG) 3 125 mg tablet Take 1 tablet (3 125 mg total) by mouth 2 (two) times a day with meals    clopidogrel (PLAVIX) 75 mg tablet Take 1 tablet (75 mg total) by mouth daily    glucose blood test strip Use as instructed    JANUVIA 25 MG tablet TAKE 1 TABLET BY MOUTH  DAILY (Patient taking differently: Take 25 mg by mouth daily after breakfast )    losartan (COZAAR) 100 MG tablet Take 1 tablet (100 mg total) by mouth daily (Patient taking differently: Take 100 mg by mouth daily in the early morning )    metFORMIN (GLUCOPHAGE) 1000 MG tablet Take 1 tablet (1,000 mg total) by mouth 2 (two) times a day with meals    sildenafil (VIAGRA) 50 MG tablet Take 1 tablet by mouth     No current facility-administered medications on file prior to visit  He has No Known Allergies       Review of Systems  Pertinent items are noted in HPI       Objective:     /76 (BP Location: Left arm, Patient Position: Sitting)   Pulse 82   Temp 98 6 °F (37 °C)   Resp 18   Ht 6' (1 829 m)   Wt 86 2 kg (190 lb)   SpO2 97%   BMI 25 77 kg/m²     General Appearance:    Alert, cooperative, no distress, appears stated age   Head:    Normocephalic, without obvious abnormality, atraumatic   Eyes:    PERRL, conjunctiva/corneas clear, EOM's intact, fundi     benign, both eyes        Ears:    Normal TM's and external ear canals, both ears   Nose:   Nares normal, septum midline, mucosa normal, no drainage    or sinus tenderness   Throat:   Lips, mucosa, and tongue normal; teeth and gums normal   Neck:   Supple, symmetrical, trachea midline, no adenopathy;        thyroid:  No enlargement/tenderness/nodules; no carotid    bruit or JVD   Back:     Symmetric, no curvature, ROM normal, no CVA tenderness   Lungs:     Fine expiratory wheeze right lower lobe, otherwise clear, respirations unlabored   Chest wall:    No tenderness or deformity   Heart:    Regular rate and rhythm, S1 and S2 normal, no murmur, rub   or gallop   Abdomen:     Soft, non-tender, bowel sounds active all four quadrants,     no masses, no organomegaly   Genitalia:     Rectal:     Extremities:   Extremities normal, atraumatic, no cyanosis or edema   Pulses:   2+ and symmetric all extremities   Skin:   Skin color, texture, turgor normal, no rashes or lesions   Lymph nodes:   Cervical, supraclavicular, and axillary nodes normal   Neurologic:   CNII-XII intact   Normal strength, sensation and reflexes       throughout         Cardiographics  ECG: normal sinus rhythm, no blocks or conduction defects, no ischemic changes    Imaging  Chest x-ray: none ordered     Lab Review   Appointment on 12/26/2019   Component Date Value    Sodium 12/26/2019 138     Potassium 12/26/2019 4 0     Chloride 12/26/2019 105     CO2 12/26/2019 29     ANION GAP 12/26/2019 4     BUN 12/26/2019 12     Creatinine 12/26/2019 0 80     Glucose, Fasting 12/26/2019 133*    Calcium 12/26/2019 9 9     eGFR 12/26/2019 92     WBC 12/26/2019 5 38     RBC 12/26/2019 4 75     Hemoglobin 12/26/2019 15 7     Hematocrit 12/26/2019 48 5     MCV 12/26/2019 102*    MCH 12/26/2019 33 1     MCHC 12/26/2019 32 4     RDW 12/26/2019 12 0     MPV 12/26/2019 10 8     Platelets 94/33/4626 183     nRBC 12/26/2019 0     Neutrophils Relative 12/26/2019 48     Immat GRANS % 12/26/2019 0     Lymphocytes Relative 12/26/2019 34     Monocytes Relative 12/26/2019 10     Eosinophils Relative 12/26/2019 6     Basophils Relative 12/26/2019 2*    Neutrophils Absolute 12/26/2019 2 65     Immature Grans Absolute 12/26/2019 0 01     Lymphocytes Absolute 12/26/2019 1 80     Monocytes Absolute 12/26/2019 0 51     Eosinophils Absolute 12/26/2019 0 31     Basophils Absolute 12/26/2019 0 10     Protime 12/26/2019 13 1     INR 12/26/2019 1 03     Hemoglobin A1C 12/26/2019 6 8*    EAG 12/26/2019 148     ABO Grouping 12/26/2019 A     Rh Factor 12/26/2019 Positive     Antibody Screen 12/26/2019 Negative     Specimen Expiration Date 12/26/2019 [id number] Appointment on 11/16/2019   Component Date Value    Sodium 11/16/2019 142     Potassium 11/16/2019 5 0     Chloride 11/16/2019 107     CO2 11/16/2019 27     ANION GAP 11/16/2019 8     BUN 11/16/2019 23     Creatinine 11/16/2019 0 90     Glucose, Fasting 11/16/2019 136*    Calcium 11/16/2019 9 4     AST 11/16/2019 18     ALT 11/16/2019 38     Alkaline Phosphatase 11/16/2019 43*    Total Protein 11/16/2019 8 1     Albumin 11/16/2019 4 4     Total Bilirubin 11/16/2019 0 76     eGFR 11/16/2019 88     Cholesterol 11/16/2019 167     Triglycerides 11/16/2019 126     HDL, Direct 11/16/2019 45     LDL Calculated 11/16/2019 97     Non-HDL-Chol (CHOL-HDL) 11/16/2019 122         Assessment:     60 y o  female with planned surgery as above  Known risk factors for perioperative complications: Coronary disease  Congestive heart failure  Diabetes mellitus    Difficulty with intubation is not anticipated  Cardiac Risk Estimation: Moderate    Current medications which may produce withdrawal symptoms if withheld perioperatively: none    Plan:     1  Preoperative workup as follows chest x-ray, ECG, hemoglobin, hematocrit, electrolytes, creatinine, glucose, liver function studies  2  Change in medication regimen before surgery: hold metformin

## 2020-01-07 NOTE — TELEPHONE ENCOUNTER
LMOM for patient to call back and r/s procedure  He has a PCP appt tomorrow 1/8/20  Dr Brian Julien has an opening at [facility] Monday 1/13/20  I want to offer him that cancellation spot

## 2020-01-08 ENCOUNTER — APPOINTMENT (OUTPATIENT)
Dept: RADIOLOGY | Facility: CLINIC | Age: 70
End: 2020-01-08
Payer: COMMERCIAL

## 2020-01-08 ENCOUNTER — OFFICE VISIT (OUTPATIENT)
Dept: FAMILY MEDICINE CLINIC | Facility: CLINIC | Age: 70
End: 2020-01-08
Payer: COMMERCIAL

## 2020-01-08 ENCOUNTER — PREP FOR PROCEDURE (OUTPATIENT)
Dept: VASCULAR SURGERY | Facility: CLINIC | Age: 70
End: 2020-01-08

## 2020-01-08 VITALS
SYSTOLIC BLOOD PRESSURE: 122 MMHG | WEIGHT: 190 LBS | TEMPERATURE: 98.6 F | HEART RATE: 82 BPM | BODY MASS INDEX: 25.73 KG/M2 | DIASTOLIC BLOOD PRESSURE: 76 MMHG | RESPIRATION RATE: 18 BRPM | OXYGEN SATURATION: 97 % | HEIGHT: 72 IN

## 2020-01-08 DIAGNOSIS — Z01.818 PRE-OP EXAMINATION: ICD-10-CM

## 2020-01-08 DIAGNOSIS — I25.10 CORONARY ARTERY DISEASE DUE TO LIPID RICH PLAQUE: ICD-10-CM

## 2020-01-08 DIAGNOSIS — I65.23 CAROTID ARTERY STENOSIS WITHOUT CEREBRAL INFARCTION, BILATERAL: ICD-10-CM

## 2020-01-08 DIAGNOSIS — E11.9 TYPE 2 DIABETES MELLITUS WITHOUT COMPLICATION, WITHOUT LONG-TERM CURRENT USE OF INSULIN (HCC): ICD-10-CM

## 2020-01-08 DIAGNOSIS — J06.9 RECENT URI: ICD-10-CM

## 2020-01-08 DIAGNOSIS — F17.210 CIGARETTE NICOTINE DEPENDENCE WITHOUT COMPLICATION: ICD-10-CM

## 2020-01-08 DIAGNOSIS — I25.83 CORONARY ARTERY DISEASE DUE TO LIPID RICH PLAQUE: ICD-10-CM

## 2020-01-08 DIAGNOSIS — R06.2 WHEEZE: ICD-10-CM

## 2020-01-08 DIAGNOSIS — Z01.818 PRE-OP EXAMINATION: Primary | ICD-10-CM

## 2020-01-08 DIAGNOSIS — I10 ESSENTIAL HYPERTENSION: ICD-10-CM

## 2020-01-08 DIAGNOSIS — I67.1 CEREBRAL ANEURYSM: ICD-10-CM

## 2020-01-08 DIAGNOSIS — I50.21 CONGESTIVE HEART FAILURE, NYHA CLASS 1, ACUTE, SYSTOLIC (HCC): ICD-10-CM

## 2020-01-08 DIAGNOSIS — E78.2 HYPERLIPIDEMIA, MIXED: ICD-10-CM

## 2020-01-08 DIAGNOSIS — K43.9 VENTRAL HERNIA WITHOUT OBSTRUCTION OR GANGRENE: ICD-10-CM

## 2020-01-08 PROCEDURE — 99214 OFFICE O/P EST MOD 30 MIN: CPT | Performed by: NURSE PRACTITIONER

## 2020-01-08 PROCEDURE — 93000 ELECTROCARDIOGRAM COMPLETE: CPT | Performed by: NURSE PRACTITIONER

## 2020-01-08 PROCEDURE — 3078F DIAST BP <80 MM HG: CPT | Performed by: NURSE PRACTITIONER

## 2020-01-08 PROCEDURE — 3074F SYST BP LT 130 MM HG: CPT | Performed by: NURSE PRACTITIONER

## 2020-01-08 PROCEDURE — 71046 X-RAY EXAM CHEST 2 VIEWS: CPT

## 2020-01-08 NOTE — PATIENT INSTRUCTIONS
Preopeative exam    For left CEA on Monday  Hypertension- controlled  CHF- euvolemic  CAD- stable  F/B Cardiology  DM- stable  A1C is 6 8  Cerebral aneurysm  Maintain BP control  Cleared for surgery next week  CXR will be done today  Labs are in note  EKG done in office today

## 2020-01-10 NOTE — PRE-PROCEDURE INSTRUCTIONS
Pre-Surgery Instructions:   Medication Instructions    aspirin 81 mg chewable tablet Patient was instructed by Physician and understands   atorvastatin (LIPITOR) 20 mg tablet Patient was instructed by Physician and understands   carvedilol (COREG) 3 125 mg tablet Instructed patient per Anesthesia Guidelines   clopidogrel (PLAVIX) 75 mg tablet Patient was instructed by Physician and understands   JANUVIA 25 MG tablet Instructed patient per Anesthesia Guidelines   losartan (COZAAR) 100 MG tablet Instructed patient per Anesthesia Guidelines   metFORMIN (GLUCOPHAGE) 1000 MG tablet Instructed patient per Anesthesia Guidelines   sildenafil (VIAGRA) 50 MG tablet Instructed patient per Anesthesia Guidelines  Review with patient via phone medications and showering instructions  Advice don't take Losartan,metformin,Januvia morning day of surgery  Advice ASC call  Verbalized understanding  ACE/ARB Med Class   Continue this medication up to the evening before surgery/procedure, but do not take the morning of the day of surgery  ASA Med Class: Aspirin   Should be discontinued at least one week prior to planned operation, unless specifically stated otherwise by surgical service  Your Surgeon may have patient stop taking aspirin up to a week before surgery if having intracranial, middle ear, posterior eye, spine surgery or prostate surgery  [Patients taking aspirin for coronary stents should be reviewed by an anesthesiologist in the optimization clinic  Please do not discontinue aspirin in patients with coronary stents unless given specific permission to do so by the cardiologist who prescribed medication ]   If your surgeon approves please continue to take this medication on your normal schedule  You may take this medication on the morning of your surgery with a sip of water  Beta blocker Med Class   Continue to take this heart medication on your normal schedule    If this is an oral medication and you take it in the morning, then you may take this medicine with a sip of water  Insulin Med Class     Pre-Surgery/Procedure Instructions for Adult Patients who Take Medicine for Diabetes or to Control their Blood Sugar     Day Before Surgery/Procedure  Use the directions based on the type of medicine you take for your diabetes  1  If you are having a procedure that does not require a bowel prep:  ? Pre-Mixed Insulin (Intermediate Acting: Humalog 75/25, Humulin 70/30  Novolog 70/30, Regular Insulin)  § Take ½ your regular dose the evening before your procedure  ? Rapid/Fast Acting Insulin/Long Acting Insulin (Humalog U200, NovoLog, Apidra, Lantus, Levemir, Wilbert Living, Jay)  § Take your FULL regular dose the day before procedure  ? Oral Diabetic Medicines including Glipizide/Glimepiride/Glucotrol (sulfonylurea)  § Take your regular dose with dinner the evening before your procedure  2  If you are having a procedure (e g  Colonoscopy) that requires a bowel prep and you are allowed to have at least a clear liquid diet:  ? Pre-Mixed Insulin (Intermediate Acting: Humalog 75/25, Humulin 70/30, Novolog 70/30, Regular Insulin)  § Take ½ your regular dose the evening before your procedure  ? Rapid/Fast Acting Insulin (Humalog U200, NovoLog, Apidra, Fiasp)  § Take ½ your regular dose the evening before your procedure  ? Long Acting Insulin (Lantus, Levemir, Wilbert Living)  § Take your FULL regular dose the day before procedure  ? Oral Glipizide/Glimepiride/Glucotrol (sulfonylurea)  § Take ½ your regular dose the evening before your procedure  ? Oral Diabetic Medicines that are NOT Glipizide/Glimepiride/Glucotrol  § Take your regular dose with dinner in the evening before your procedure      Day of Surgery/Procedure  · Long Acting Insulin (Lantus, Levemir, Wilbert Living)  ? If you usually take your Long-Acting Insulin in the morning, take the full dose as scheduled    · With the exception of the morning Long-Acting Insulin noted above, DO NOT take ANY diabetic medicine on the day of your procedure unless you were instructed by the doctor who manages your diabetic medicines  · Continue to check your blood sugars  · If you have an insulin pump then consult with your Endocrinologist for instructions  · If you cannot see your Endocrinologist, on the day of the procedure set your insulin pump to your basal rate only  Please bring your insulin pump supplies to the hospital      This Educational material has been approved by the Patient Education Advisory Committee  Date prepared: 1/17/2018          Expiration date: 1/17/2019        Approval Number:                   Platelet Aggregation Inhibitor Clopidogrel (Plavix) Med Class   Should be discontinued at least one week prior to planned operation, unless specifically stated otherwise by surgical service  [Patients taking Plavix for coronary stents should be reviewed by an anesthesiologist in the optimization clinic  Please do not discontinue Plavix in patients with coronary stents unless given specific permission to do so by the cardiologist who prescribed medication ] If your surgeon approves please continue to take this medication on your normal schedule  You may take this medication on the morning of your surgery with a sip of water  Statin Med Class   Continue to take this medication on your normal schedule    If this is an oral medication and you take it in the morning, then you may take this medicine with a sip of water

## 2020-01-12 ENCOUNTER — ANESTHESIA EVENT (OUTPATIENT)
Dept: PERIOP | Facility: HOSPITAL | Age: 70
DRG: 039 | End: 2020-01-12
Payer: COMMERCIAL

## 2020-01-13 ENCOUNTER — HOSPITAL ENCOUNTER (INPATIENT)
Facility: HOSPITAL | Age: 70
LOS: 1 days | Discharge: HOME | DRG: 039 | End: 2020-01-14
Attending: SURGERY | Admitting: SURGERY
Payer: COMMERCIAL

## 2020-01-13 ENCOUNTER — ANESTHESIA (OUTPATIENT)
Dept: PERIOP | Facility: HOSPITAL | Age: 70
DRG: 039 | End: 2020-01-13
Payer: COMMERCIAL

## 2020-01-13 ENCOUNTER — APPOINTMENT (OUTPATIENT)
Dept: NON INVASIVE DIAGNOSTICS | Facility: HOSPITAL | Age: 70
DRG: 039 | End: 2020-01-13
Payer: COMMERCIAL

## 2020-01-13 DIAGNOSIS — I65.22 STENOSIS OF LEFT INTERNAL CAROTID ARTERY: ICD-10-CM

## 2020-01-13 DIAGNOSIS — I65.23 CAROTID ARTERY STENOSIS WITHOUT CEREBRAL INFARCTION, BILATERAL: Primary | ICD-10-CM

## 2020-01-13 DIAGNOSIS — I10 ESSENTIAL HYPERTENSION: ICD-10-CM

## 2020-01-13 DIAGNOSIS — I50.21 CONGESTIVE HEART FAILURE, NYHA CLASS 1, ACUTE, SYSTOLIC (HCC): ICD-10-CM

## 2020-01-13 LAB
ABO GROUP BLD: NORMAL
BASE EXCESS BLDA CALC-SCNC: -4 MMOL/L (ref -2–3)
BLD GP AB SCN SERPL QL: NEGATIVE
CA-I BLD-SCNC: 1.07 MMOL/L (ref 1.12–1.32)
GLUCOSE SERPL-MCNC: 111 MG/DL (ref 65–140)
GLUCOSE SERPL-MCNC: 112 MG/DL (ref 65–140)
GLUCOSE SERPL-MCNC: 118 MG/DL (ref 65–140)
GLUCOSE SERPL-MCNC: 137 MG/DL (ref 65–140)
GLUCOSE SERPL-MCNC: 138 MG/DL (ref 65–140)
HCO3 BLDA-SCNC: 21.4 MMOL/L (ref 24–30)
HCT VFR BLD CALC: 34 % (ref 36.5–49.3)
HGB BLDA-MCNC: 11.6 G/DL (ref 12–17)
KCT BLD-ACNC: 132 SEC (ref 89–137)
KCT BLD-ACNC: 212 SEC (ref 89–137)
KCT BLD-ACNC: 212 SEC (ref 89–137)
KCT BLD-ACNC: 224 SEC (ref 89–137)
KCT BLD-ACNC: 224 SEC (ref 89–137)
KCT BLD-ACNC: 230 SEC (ref 89–137)
KCT BLD-ACNC: 230 SEC (ref 89–137)
KCT BLD-ACNC: 236 SEC (ref 89–137)
KCT BLD-ACNC: 236 SEC (ref 89–137)
PCO2 BLD: 23 MMOL/L (ref 21–32)
PCO2 BLD: 40.3 MM HG (ref 42–50)
PH BLD: 7.33 [PH] (ref 7.3–7.4)
PLATELET # BLD AUTO: 228 THOUSANDS/UL (ref 149–390)
PMV BLD AUTO: 10 FL (ref 8.9–12.7)
PO2 BLD: 112 MM HG (ref 35–45)
POTASSIUM BLD-SCNC: 3.3 MMOL/L (ref 3.5–5.3)
RH BLD: POSITIVE
SAO2 % BLD FROM PO2: 98 % (ref 60–85)
SODIUM BLD-SCNC: 145 MMOL/L (ref 136–145)
SPECIMEN EXPIRATION DATE: NORMAL
SPECIMEN SOURCE: ABNORMAL
SPECIMEN SOURCE: NORMAL

## 2020-01-13 PROCEDURE — 35301 RECHANNELING OF ARTERY: CPT | Performed by: SURGERY

## 2020-01-13 PROCEDURE — 82330 ASSAY OF CALCIUM: CPT

## 2020-01-13 PROCEDURE — C1781 MESH (IMPLANTABLE): HCPCS | Performed by: SURGERY

## 2020-01-13 PROCEDURE — 93882 EXTRACRANIAL UNI/LTD STUDY: CPT

## 2020-01-13 PROCEDURE — NC001 PR NO CHARGE: Performed by: SURGERY

## 2020-01-13 PROCEDURE — 03CL0ZZ EXTIRPATION OF MATTER FROM LEFT INTERNAL CAROTID ARTERY, OPEN APPROACH: ICD-10-PCS | Performed by: SURGERY

## 2020-01-13 PROCEDURE — 85049 AUTOMATED PLATELET COUNT: CPT | Performed by: STUDENT IN AN ORGANIZED HEALTH CARE EDUCATION/TRAINING PROGRAM

## 2020-01-13 PROCEDURE — 86901 BLOOD TYPING SEROLOGIC RH(D): CPT | Performed by: SURGERY

## 2020-01-13 PROCEDURE — 86900 BLOOD TYPING SEROLOGIC ABO: CPT | Performed by: SURGERY

## 2020-01-13 PROCEDURE — 85347 COAGULATION TIME ACTIVATED: CPT

## 2020-01-13 PROCEDURE — 03CN0ZZ EXTIRPATION OF MATTER FROM LEFT EXTERNAL CAROTID ARTERY, OPEN APPROACH: ICD-10-PCS | Performed by: SURGERY

## 2020-01-13 PROCEDURE — 84295 ASSAY OF SERUM SODIUM: CPT

## 2020-01-13 PROCEDURE — 82947 ASSAY GLUCOSE BLOOD QUANT: CPT

## 2020-01-13 PROCEDURE — 03UL0KZ SUPPLEMENT LEFT INTERNAL CAROTID ARTERY WITH NONAUTOLOGOUS TISSUE SUBSTITUTE, OPEN APPROACH: ICD-10-PCS | Performed by: SURGERY

## 2020-01-13 PROCEDURE — 84132 ASSAY OF SERUM POTASSIUM: CPT

## 2020-01-13 PROCEDURE — 82948 REAGENT STRIP/BLOOD GLUCOSE: CPT

## 2020-01-13 PROCEDURE — 03UJ0KZ SUPPLEMENT LEFT COMMON CAROTID ARTERY WITH NONAUTOLOGOUS TISSUE SUBSTITUTE, OPEN APPROACH: ICD-10-PCS | Performed by: SURGERY

## 2020-01-13 PROCEDURE — 85014 HEMATOCRIT: CPT

## 2020-01-13 PROCEDURE — 82803 BLOOD GASES ANY COMBINATION: CPT

## 2020-01-13 PROCEDURE — 86850 RBC ANTIBODY SCREEN: CPT | Performed by: SURGERY

## 2020-01-13 DEVICE — XENOSURE BIOLOGIC PATCH, 0.8CM X 8CM, EIFU
Type: IMPLANTABLE DEVICE | Site: CAROTID | Status: FUNCTIONAL
Brand: XENOSURE BIOLOGIC PATCH

## 2020-01-13 RX ORDER — CHLORHEXIDINE GLUCONATE 0.12 MG/ML
15 RINSE ORAL ONCE
Status: COMPLETED | OUTPATIENT
Start: 2020-01-13 | End: 2020-01-13

## 2020-01-13 RX ORDER — HYDROMORPHONE HCL/PF 1 MG/ML
0.5 SYRINGE (ML) INJECTION
Status: DISCONTINUED | OUTPATIENT
Start: 2020-01-13 | End: 2020-01-14 | Stop reason: HOSPADM

## 2020-01-13 RX ORDER — SUCCINYLCHOLINE/SOD CL,ISO/PF 100 MG/5ML
SYRINGE (ML) INTRAVENOUS AS NEEDED
Status: DISCONTINUED | OUTPATIENT
Start: 2020-01-13 | End: 2020-01-13 | Stop reason: SURG

## 2020-01-13 RX ORDER — HYDROMORPHONE HCL/PF 1 MG/ML
0.2 SYRINGE (ML) INJECTION
Status: DISCONTINUED | OUTPATIENT
Start: 2020-01-13 | End: 2020-01-13 | Stop reason: HOSPADM

## 2020-01-13 RX ORDER — EPHEDRINE SULFATE 50 MG/ML
INJECTION INTRAVENOUS AS NEEDED
Status: DISCONTINUED | OUTPATIENT
Start: 2020-01-13 | End: 2020-01-13 | Stop reason: SURG

## 2020-01-13 RX ORDER — ALBUMIN, HUMAN INJ 5% 5 %
12.5 SOLUTION INTRAVENOUS ONCE
Status: COMPLETED | OUTPATIENT
Start: 2020-01-13 | End: 2020-01-13

## 2020-01-13 RX ORDER — GLYCOPYRROLATE 0.2 MG/ML
INJECTION INTRAMUSCULAR; INTRAVENOUS AS NEEDED
Status: DISCONTINUED | OUTPATIENT
Start: 2020-01-13 | End: 2020-01-13 | Stop reason: SURG

## 2020-01-13 RX ORDER — ASPIRIN 81 MG/1
81 TABLET, CHEWABLE ORAL DAILY
Status: DISCONTINUED | OUTPATIENT
Start: 2020-01-14 | End: 2020-01-14 | Stop reason: HOSPADM

## 2020-01-13 RX ORDER — MAGNESIUM HYDROXIDE 1200 MG/15ML
LIQUID ORAL AS NEEDED
Status: DISCONTINUED | OUTPATIENT
Start: 2020-01-13 | End: 2020-01-13 | Stop reason: HOSPADM

## 2020-01-13 RX ORDER — FENTANYL CITRATE 50 UG/ML
INJECTION, SOLUTION INTRAMUSCULAR; INTRAVENOUS AS NEEDED
Status: DISCONTINUED | OUTPATIENT
Start: 2020-01-13 | End: 2020-01-13 | Stop reason: SURG

## 2020-01-13 RX ORDER — CLOPIDOGREL BISULFATE 75 MG/1
75 TABLET ORAL DAILY
Status: DISCONTINUED | OUTPATIENT
Start: 2020-01-14 | End: 2020-01-14 | Stop reason: HOSPADM

## 2020-01-13 RX ORDER — LABETALOL 20 MG/4 ML (5 MG/ML) INTRAVENOUS SYRINGE
10
Status: DISCONTINUED | OUTPATIENT
Start: 2020-01-13 | End: 2020-01-13 | Stop reason: HOSPADM

## 2020-01-13 RX ORDER — ALBUMIN, HUMAN INJ 5% 5 %
SOLUTION INTRAVENOUS CONTINUOUS PRN
Status: DISCONTINUED | OUTPATIENT
Start: 2020-01-13 | End: 2020-01-13 | Stop reason: SURG

## 2020-01-13 RX ORDER — HEPARIN SODIUM 5000 [USP'U]/ML
5000 INJECTION, SOLUTION INTRAVENOUS; SUBCUTANEOUS EVERY 8 HOURS SCHEDULED
Status: DISCONTINUED | OUTPATIENT
Start: 2020-01-13 | End: 2020-01-14 | Stop reason: HOSPADM

## 2020-01-13 RX ORDER — SODIUM CHLORIDE, SODIUM LACTATE, POTASSIUM CHLORIDE, CALCIUM CHLORIDE 600; 310; 30; 20 MG/100ML; MG/100ML; MG/100ML; MG/100ML
INJECTION, SOLUTION INTRAVENOUS CONTINUOUS PRN
Status: DISCONTINUED | OUTPATIENT
Start: 2020-01-13 | End: 2020-01-13 | Stop reason: SURG

## 2020-01-13 RX ORDER — ATORVASTATIN CALCIUM 10 MG/1
10 TABLET, FILM COATED ORAL DAILY
Status: DISCONTINUED | OUTPATIENT
Start: 2020-01-13 | End: 2020-01-14 | Stop reason: HOSPADM

## 2020-01-13 RX ORDER — BUPIVACAINE HYDROCHLORIDE AND EPINEPHRINE 2.5; 5 MG/ML; UG/ML
INJECTION, SOLUTION INFILTRATION; PERINEURAL AS NEEDED
Status: DISCONTINUED | OUTPATIENT
Start: 2020-01-13 | End: 2020-01-13 | Stop reason: HOSPADM

## 2020-01-13 RX ORDER — OXYCODONE HYDROCHLORIDE 5 MG/1
5 TABLET ORAL EVERY 4 HOURS PRN
Status: DISCONTINUED | OUTPATIENT
Start: 2020-01-13 | End: 2020-01-14 | Stop reason: HOSPADM

## 2020-01-13 RX ORDER — PROPOFOL 10 MG/ML
INJECTION, EMULSION INTRAVENOUS AS NEEDED
Status: DISCONTINUED | OUTPATIENT
Start: 2020-01-13 | End: 2020-01-13 | Stop reason: SURG

## 2020-01-13 RX ORDER — ONDANSETRON 2 MG/ML
4 INJECTION INTRAMUSCULAR; INTRAVENOUS ONCE AS NEEDED
Status: DISCONTINUED | OUTPATIENT
Start: 2020-01-13 | End: 2020-01-13 | Stop reason: HOSPADM

## 2020-01-13 RX ORDER — ALBUTEROL SULFATE 2.5 MG/3ML
2.5 SOLUTION RESPIRATORY (INHALATION) ONCE AS NEEDED
Status: DISCONTINUED | OUTPATIENT
Start: 2020-01-13 | End: 2020-01-13 | Stop reason: HOSPADM

## 2020-01-13 RX ORDER — CARVEDILOL 3.12 MG/1
3.12 TABLET ORAL ONCE
Status: COMPLETED | OUTPATIENT
Start: 2020-01-13 | End: 2020-01-13

## 2020-01-13 RX ORDER — ONDANSETRON 2 MG/ML
4 INJECTION INTRAMUSCULAR; INTRAVENOUS EVERY 6 HOURS PRN
Status: DISCONTINUED | OUTPATIENT
Start: 2020-01-13 | End: 2020-01-14 | Stop reason: HOSPADM

## 2020-01-13 RX ORDER — CHLORHEXIDINE GLUCONATE 0.12 MG/ML
15 RINSE ORAL EVERY 12 HOURS SCHEDULED
Status: DISCONTINUED | OUTPATIENT
Start: 2020-01-13 | End: 2020-01-13 | Stop reason: HOSPADM

## 2020-01-13 RX ORDER — ACETAMINOPHEN 325 MG/1
650 TABLET ORAL EVERY 6 HOURS PRN
Status: DISCONTINUED | OUTPATIENT
Start: 2020-01-13 | End: 2020-01-14 | Stop reason: HOSPADM

## 2020-01-13 RX ORDER — FENTANYL CITRATE/PF 50 MCG/ML
25 SYRINGE (ML) INJECTION
Status: DISCONTINUED | OUTPATIENT
Start: 2020-01-13 | End: 2020-01-13 | Stop reason: HOSPADM

## 2020-01-13 RX ORDER — OXYCODONE HYDROCHLORIDE 5 MG/1
2.5 TABLET ORAL EVERY 4 HOURS PRN
Status: DISCONTINUED | OUTPATIENT
Start: 2020-01-13 | End: 2020-01-14 | Stop reason: HOSPADM

## 2020-01-13 RX ORDER — HYDROMORPHONE HCL/PF 1 MG/ML
0.5 SYRINGE (ML) INJECTION
Status: DISCONTINUED | OUTPATIENT
Start: 2020-01-13 | End: 2020-01-13 | Stop reason: HOSPADM

## 2020-01-13 RX ORDER — CEFAZOLIN SODIUM 2 G/50ML
2000 SOLUTION INTRAVENOUS ONCE
Status: COMPLETED | OUTPATIENT
Start: 2020-01-13 | End: 2020-01-13

## 2020-01-13 RX ORDER — LIDOCAINE HYDROCHLORIDE 10 MG/ML
0.5 INJECTION, SOLUTION EPIDURAL; INFILTRATION; INTRACAUDAL; PERINEURAL ONCE AS NEEDED
Status: DISCONTINUED | OUTPATIENT
Start: 2020-01-13 | End: 2020-01-13 | Stop reason: HOSPADM

## 2020-01-13 RX ORDER — ROCURONIUM BROMIDE 10 MG/ML
INJECTION, SOLUTION INTRAVENOUS AS NEEDED
Status: DISCONTINUED | OUTPATIENT
Start: 2020-01-13 | End: 2020-01-13 | Stop reason: SURG

## 2020-01-13 RX ORDER — SODIUM CHLORIDE 9 MG/ML
INJECTION, SOLUTION INTRAVENOUS CONTINUOUS PRN
Status: DISCONTINUED | OUTPATIENT
Start: 2020-01-13 | End: 2020-01-13 | Stop reason: SURG

## 2020-01-13 RX ORDER — PROTAMINE SULFATE 10 MG/ML
INJECTION, SOLUTION INTRAVENOUS AS NEEDED
Status: DISCONTINUED | OUTPATIENT
Start: 2020-01-13 | End: 2020-01-13 | Stop reason: SURG

## 2020-01-13 RX ORDER — MIDAZOLAM HYDROCHLORIDE 2 MG/2ML
INJECTION, SOLUTION INTRAMUSCULAR; INTRAVENOUS AS NEEDED
Status: DISCONTINUED | OUTPATIENT
Start: 2020-01-13 | End: 2020-01-13 | Stop reason: SURG

## 2020-01-13 RX ORDER — NEOSTIGMINE METHYLSULFATE 1 MG/ML
INJECTION INTRAVENOUS AS NEEDED
Status: DISCONTINUED | OUTPATIENT
Start: 2020-01-13 | End: 2020-01-13 | Stop reason: SURG

## 2020-01-13 RX ORDER — HYDRALAZINE HYDROCHLORIDE 20 MG/ML
15 INJECTION INTRAMUSCULAR; INTRAVENOUS
Status: DISCONTINUED | OUTPATIENT
Start: 2020-01-13 | End: 2020-01-14 | Stop reason: HOSPADM

## 2020-01-13 RX ORDER — METOCLOPRAMIDE HYDROCHLORIDE 5 MG/ML
10 INJECTION INTRAMUSCULAR; INTRAVENOUS ONCE AS NEEDED
Status: DISCONTINUED | OUTPATIENT
Start: 2020-01-13 | End: 2020-01-13 | Stop reason: HOSPADM

## 2020-01-13 RX ORDER — ONDANSETRON 2 MG/ML
INJECTION INTRAMUSCULAR; INTRAVENOUS AS NEEDED
Status: DISCONTINUED | OUTPATIENT
Start: 2020-01-13 | End: 2020-01-13 | Stop reason: SURG

## 2020-01-13 RX ORDER — SODIUM CHLORIDE, SODIUM LACTATE, POTASSIUM CHLORIDE, CALCIUM CHLORIDE 600; 310; 30; 20 MG/100ML; MG/100ML; MG/100ML; MG/100ML
100 INJECTION, SOLUTION INTRAVENOUS CONTINUOUS
Status: DISCONTINUED | OUTPATIENT
Start: 2020-01-13 | End: 2020-01-14

## 2020-01-13 RX ORDER — HEPARIN SODIUM 1000 [USP'U]/ML
INJECTION, SOLUTION INTRAVENOUS; SUBCUTANEOUS AS NEEDED
Status: DISCONTINUED | OUTPATIENT
Start: 2020-01-13 | End: 2020-01-13 | Stop reason: SURG

## 2020-01-13 RX ORDER — PROPOFOL 10 MG/ML
INJECTION, EMULSION INTRAVENOUS CONTINUOUS PRN
Status: DISCONTINUED | OUTPATIENT
Start: 2020-01-13 | End: 2020-01-13 | Stop reason: SURG

## 2020-01-13 RX ORDER — HYDRALAZINE HYDROCHLORIDE 20 MG/ML
5 INJECTION INTRAMUSCULAR; INTRAVENOUS
Status: DISCONTINUED | OUTPATIENT
Start: 2020-01-13 | End: 2020-01-13 | Stop reason: HOSPADM

## 2020-01-13 RX ORDER — PROMETHAZINE HYDROCHLORIDE 25 MG/ML
12.5 INJECTION, SOLUTION INTRAMUSCULAR; INTRAVENOUS ONCE AS NEEDED
Status: DISCONTINUED | OUTPATIENT
Start: 2020-01-13 | End: 2020-01-13 | Stop reason: HOSPADM

## 2020-01-13 RX ORDER — LABETALOL 20 MG/4 ML (5 MG/ML) INTRAVENOUS SYRINGE
5
Status: DISCONTINUED | OUTPATIENT
Start: 2020-01-13 | End: 2020-01-14 | Stop reason: HOSPADM

## 2020-01-13 RX ORDER — CARVEDILOL 3.12 MG/1
3.12 TABLET ORAL 2 TIMES DAILY WITH MEALS
Status: DISCONTINUED | OUTPATIENT
Start: 2020-01-14 | End: 2020-01-14 | Stop reason: HOSPADM

## 2020-01-13 RX ADMIN — SODIUM CHLORIDE, SODIUM LACTATE, POTASSIUM CHLORIDE, AND CALCIUM CHLORIDE: .6; .31; .03; .02 INJECTION, SOLUTION INTRAVENOUS at 13:24

## 2020-01-13 RX ADMIN — ROCURONIUM BROMIDE 10 MG: 50 INJECTION, SOLUTION INTRAVENOUS at 09:24

## 2020-01-13 RX ADMIN — NEOSTIGMINE METHYLSULFATE 3 MG: 1 INJECTION INTRAVENOUS at 13:20

## 2020-01-13 RX ADMIN — PHENYLEPHRINE HYDROCHLORIDE 100 MCG: 10 INJECTION INTRAVENOUS at 13:43

## 2020-01-13 RX ADMIN — CARVEDILOL 3.12 MG: 3.12 TABLET, FILM COATED ORAL at 07:16

## 2020-01-13 RX ADMIN — ROCURONIUM BROMIDE 10 MG: 50 INJECTION, SOLUTION INTRAVENOUS at 08:35

## 2020-01-13 RX ADMIN — ONDANSETRON 4 MG: 2 INJECTION INTRAMUSCULAR; INTRAVENOUS at 12:48

## 2020-01-13 RX ADMIN — ROCURONIUM BROMIDE 20 MG: 50 INJECTION, SOLUTION INTRAVENOUS at 10:28

## 2020-01-13 RX ADMIN — ROCURONIUM BROMIDE 20 MG: 50 INJECTION, SOLUTION INTRAVENOUS at 08:10

## 2020-01-13 RX ADMIN — ALBUMIN (HUMAN) 12.5 G: 12.5 INJECTION, SOLUTION INTRAVENOUS at 13:46

## 2020-01-13 RX ADMIN — HEPARIN SODIUM 5000 UNITS: 5000 INJECTION INTRAVENOUS; SUBCUTANEOUS at 15:56

## 2020-01-13 RX ADMIN — SODIUM CHLORIDE: 0.9 INJECTION, SOLUTION INTRAVENOUS at 12:54

## 2020-01-13 RX ADMIN — CHLORHEXIDINE GLUCONATE 0.12% ORAL RINSE 15 ML: 1.2 LIQUID ORAL at 06:45

## 2020-01-13 RX ADMIN — FENTANYL CITRATE 50 MCG: 50 INJECTION, SOLUTION INTRAMUSCULAR; INTRAVENOUS at 09:32

## 2020-01-13 RX ADMIN — HEPARIN SODIUM 2000 UNITS: 1000 INJECTION INTRAVENOUS; SUBCUTANEOUS at 09:04

## 2020-01-13 RX ADMIN — PROPOFOL 120 MCG/KG/MIN: 10 INJECTION, EMULSION INTRAVENOUS at 08:10

## 2020-01-13 RX ADMIN — PHENYLEPHRINE HYDROCHLORIDE 100 MCG: 10 INJECTION INTRAVENOUS at 12:42

## 2020-01-13 RX ADMIN — PROTAMINE SULFATE 50 MG: 10 INJECTION, SOLUTION INTRAVENOUS at 12:45

## 2020-01-13 RX ADMIN — HEPARIN SODIUM 4000 UNITS: 1000 INJECTION INTRAVENOUS; SUBCUTANEOUS at 10:25

## 2020-01-13 RX ADMIN — HEPARIN SODIUM 2000 UNITS: 1000 INJECTION INTRAVENOUS; SUBCUTANEOUS at 08:54

## 2020-01-13 RX ADMIN — EPHEDRINE SULFATE 5 MG: 50 INJECTION, SOLUTION INTRAVENOUS at 13:28

## 2020-01-13 RX ADMIN — FENTANYL CITRATE 25 MCG: 50 INJECTION, SOLUTION INTRAMUSCULAR; INTRAVENOUS at 14:34

## 2020-01-13 RX ADMIN — GLYCOPYRROLATE 0.4 MG: 0.2 INJECTION, SOLUTION INTRAMUSCULAR; INTRAVENOUS at 13:20

## 2020-01-13 RX ADMIN — PHENYLEPHRINE HYDROCHLORIDE 30 MCG/MIN: 10 INJECTION INTRAVENOUS at 08:41

## 2020-01-13 RX ADMIN — HEPARIN SODIUM 5000 UNITS: 5000 INJECTION INTRAVENOUS; SUBCUTANEOUS at 21:42

## 2020-01-13 RX ADMIN — OXYCODONE HYDROCHLORIDE 2.5 MG: 5 TABLET ORAL at 21:43

## 2020-01-13 RX ADMIN — EPHEDRINE SULFATE 5 MG: 50 INJECTION, SOLUTION INTRAVENOUS at 13:31

## 2020-01-13 RX ADMIN — ROCURONIUM BROMIDE 10 MG: 50 INJECTION, SOLUTION INTRAVENOUS at 08:59

## 2020-01-13 RX ADMIN — EPHEDRINE SULFATE 5 MG: 50 INJECTION, SOLUTION INTRAVENOUS at 13:43

## 2020-01-13 RX ADMIN — SODIUM CHLORIDE 500 ML: 0.9 INJECTION, SOLUTION INTRAVENOUS at 14:27

## 2020-01-13 RX ADMIN — HEPARIN SODIUM 2000 UNITS: 1000 INJECTION INTRAVENOUS; SUBCUTANEOUS at 11:43

## 2020-01-13 RX ADMIN — MIDAZOLAM 2 MG: 1 INJECTION INTRAMUSCULAR; INTRAVENOUS at 07:56

## 2020-01-13 RX ADMIN — PROPOFOL 100 MG: 10 INJECTION, EMULSION INTRAVENOUS at 08:01

## 2020-01-13 RX ADMIN — ALBUMIN (HUMAN): 12.5 SOLUTION INTRAVENOUS at 11:40

## 2020-01-13 RX ADMIN — SODIUM CHLORIDE, SODIUM LACTATE, POTASSIUM CHLORIDE, AND CALCIUM CHLORIDE: .6; .31; .03; .02 INJECTION, SOLUTION INTRAVENOUS at 07:30

## 2020-01-13 RX ADMIN — HEPARIN SODIUM: 1000 INJECTION INTRAVENOUS; SUBCUTANEOUS at 09:00

## 2020-01-13 RX ADMIN — EPHEDRINE SULFATE 5 MG: 50 INJECTION, SOLUTION INTRAVENOUS at 13:48

## 2020-01-13 RX ADMIN — PHENYLEPHRINE HYDROCHLORIDE 20 MCG/MIN: 10 INJECTION INTRAVENOUS at 13:55

## 2020-01-13 RX ADMIN — LIDOCAINE HYDROCHLORIDE 100 MG: 20 INJECTION, SOLUTION INTRAVENOUS at 08:01

## 2020-01-13 RX ADMIN — ATORVASTATIN CALCIUM 10 MG: 10 TABLET, FILM COATED ORAL at 15:56

## 2020-01-13 RX ADMIN — CEFAZOLIN SODIUM 2000 MG: 2 SOLUTION INTRAVENOUS at 07:58

## 2020-01-13 RX ADMIN — FENTANYL CITRATE 25 MCG: 50 INJECTION, SOLUTION INTRAMUSCULAR; INTRAVENOUS at 14:39

## 2020-01-13 RX ADMIN — PHENYLEPHRINE HYDROCHLORIDE 100 MCG: 10 INJECTION INTRAVENOUS at 13:31

## 2020-01-13 RX ADMIN — HEPARIN SODIUM 7000 UNITS: 1000 INJECTION INTRAVENOUS; SUBCUTANEOUS at 08:43

## 2020-01-13 RX ADMIN — EPHEDRINE SULFATE 5 MG: 50 INJECTION, SOLUTION INTRAVENOUS at 12:42

## 2020-01-13 RX ADMIN — FENTANYL CITRATE 100 MCG: 50 INJECTION, SOLUTION INTRAMUSCULAR; INTRAVENOUS at 08:01

## 2020-01-13 RX ADMIN — HEPARIN SODIUM 3000 UNITS: 1000 INJECTION INTRAVENOUS; SUBCUTANEOUS at 09:30

## 2020-01-13 RX ADMIN — HEPARIN SODIUM 2000 UNITS: 1000 INJECTION INTRAVENOUS; SUBCUTANEOUS at 12:01

## 2020-01-13 RX ADMIN — FENTANYL CITRATE 50 MCG: 50 INJECTION, SOLUTION INTRAMUSCULAR; INTRAVENOUS at 08:34

## 2020-01-13 RX ADMIN — CHLORHEXIDINE GLUCONATE 15 ML: 1.2 RINSE ORAL at 09:00

## 2020-01-13 RX ADMIN — ROCURONIUM BROMIDE 10 MG: 50 INJECTION, SOLUTION INTRAVENOUS at 10:58

## 2020-01-13 RX ADMIN — SODIUM CHLORIDE: 0.9 INJECTION, SOLUTION INTRAVENOUS at 08:05

## 2020-01-13 RX ADMIN — EPHEDRINE SULFATE 5 MG: 50 INJECTION, SOLUTION INTRAVENOUS at 09:20

## 2020-01-13 RX ADMIN — EPHEDRINE SULFATE 5 MG: 50 INJECTION, SOLUTION INTRAVENOUS at 08:26

## 2020-01-13 RX ADMIN — Medication 100 MG: at 08:01

## 2020-01-13 RX ADMIN — EPHEDRINE SULFATE 5 MG: 50 INJECTION, SOLUTION INTRAVENOUS at 12:56

## 2020-01-13 RX ADMIN — CEFAZOLIN: 1 INJECTION, POWDER, FOR SOLUTION INTRAMUSCULAR; INTRAVENOUS at 09:00

## 2020-01-13 RX ADMIN — CEFAZOLIN SODIUM 2000 MG: 2 SOLUTION INTRAVENOUS at 11:58

## 2020-01-13 RX ADMIN — SODIUM CHLORIDE, SODIUM LACTATE, POTASSIUM CHLORIDE, AND CALCIUM CHLORIDE 100 ML/HR: .6; .31; .03; .02 INJECTION, SOLUTION INTRAVENOUS at 23:33

## 2020-01-13 NOTE — OP NOTE
OPERATIVE REPORT  PATIENT NAME: Stacie Mustafa    :  1950  MRN: [id number]  Pt Location: [facility] 02    SURGERY DATE: 2020    Surgeon(s) and Role:     * Cecile Martinez MD - Primary     * Odalis Carrington MD - Assisting    Preop Diagnosis:  Stenosis of left carotid artery [I65 22]    Post-Op Diagnosis Codes:     * Stenosis of left carotid artery [I65 22]    Procedure(s) (LRB):  ENDARTERECTOMY ARTERY CAROTID (Left)    Specimen(s):  * No specimens in log *    Estimated Blood Loss:   450 mL    Drains:  * No LDAs found *    Anesthesia Type:   General    Operative Indications:  Stenosis of left carotid artery [I65 22]  70yo M with critical left ICA stenosis with string sign on CT-angio, with a very atretic appearing distal ICA, possibly underfille secondary to the critical stenosis  Lesion is asymptomatic  Operative Findings:  Very small ICA measuring ~2mm is outside diameter distal to the lesion  Complications:   Severe stenosis of the distal endpoint initially noted on ultrasound requiring reopening patch and extending to a more distal endpoint with patch angioplasty  Procedure and Technique:  After informed consent was obtained, the patient was brought to the operating room and placed in the supine position  Perioperative IV antibiotics were given  He was given anesthesia and endotracheally intubated  Ultrasound was used to examine the carotid artery  The patient was placed with the head turned laterally and slightly extended, with a shoulder roll  He was then prepped and draped in the usual sterile fashion exposing the neck  A timeout was performed  A marking pen was used to kyara the incision overlying the carotid artery  A 15-blade was used to make the incision  Cautery was used to dissect through the soft tissue and platysma  The sternocleidomastoid muscle was identified, dissected free along its medial border, and retracted laterally    Next, the internal jugular vein was identified and freed along its medial border  The facial vein was identified, ligated with 2-0 silk sutures, and transected  The internal jugular vein was then retracted laterally, exposing the carotid artery  The dissection was continued at the common carotid artery  It was freed circumferentially and encircled with a umbilical tape and secured with a Rumel tourniquet with a cut red rubber tubing  Next, the external carotid artery and superior thyroid artery were identified, freed circumferentially, and encircled with vessel loops  Lastly, the internal carotid artery was dissected free and encircled distally with a vessel loop  The internal carotid artery was quite posterior and noted to be very small in size throughout and smaller in the more distal ICA  7000units of IV heparin was given and ACTs were maintained greater than 250 throughout the critical portions of the case     After this was given time to circulate, the internal carotid artery was clamped followed by the common carotid followed by the external carotid artery  We waited two minutes, no changes noted on EEG/SMS, therefore an 11-blade was used to make a longitudinal arteriotomy in the common carotid artery and this was extended distally along the internal carotid artery with Valdes scissors  The plaque extended fairly distal with a extremely small luminal diameter noted of the distal ICA  Next, an endarterectomy spatula was used to create the endarterectomy plane  This was continued towards the internal carotid artery, a satisfactory end point was achieved and 4 7-0 tacking sutures were placed  Eversion technique was used to perform endarterectomy of the external carotid artery  The plaque at the common carotid was transected with Valdes scissors  The endarterectomy bed was flushed with heparinized saline and all loose debris was pulled free    All endpoints were checked and noted to be adherent without any flaps or debris, however again the distal lumen was difficult to visualize secondary to the small caliber of the ICA  A Xenosure bovine pericardial patch was selected and was sewn in using 6-0 prolene suture distally and 6 0 prolene proximally  Prior to completion, the arteries were bled and back-bled, and the endarterectomy bed was flushed copiously with heparinized saline  The patch was completed and the vessel loop on the external carotid artery was loosened, followed by the common carotid artery  After a few seconds, the internal carotid artery was released and cerebral flow was restored  No changes were noted on EEG/SMS monitoring  Next, intraoperative duplex ultrasound was performed, which showed widely patent common and external carotid arteries, however the endpoint was resulting in significant stenosis with very limited flow visualized within the distal ICA  As such clamps were reapplied, further distal dissection of the ICA was performed and the arteriotomy was extended to a more suitable endpoint  The patch was extended and secured with 6 0 prolene  Prior to the completion of our anastamosis all arteries were backbled and flushed as appropriate  The wound was then copiously irrigated with antibiotic saline  It was then checked for hemostasis  Duplex ultrasound was again performed which dmeonstrated widely patent carotid arteries with appropriate waveforms  Flowseal and fibrillar was applied  The patient was reversed with 50mg protamine  The platysma was then closed with 3-0 monocryl running suture and the skin was closed with 4-0 monocryl running subcuticular layer  The incision was dressed with dermabond  The patient was allowed to awaken and was extubated  He was moving all four extremities and following commands  He was then transferred to the PACU for postoperative care       I was present for the entire procedure    Patient Disposition:  PACU     SIGNATURE: Federica So MD  DATE: January 13, 2020  TIME: 1:05 PM no

## 2020-01-13 NOTE — PLAN OF CARE
Problem: PAIN - ADULT  Goal: Verbalizes/displays adequate comfort level or baseline comfort level  Description  Interventions:  - Encourage patient to monitor pain and request assistance  - Assess pain using appropriate pain scale  - Administer analgesics based on type and severity of pain and evaluate response  - Implement non-pharmacological measures as appropriate and evaluate response  - Consider cultural and social influences on pain and pain management  - Notify physician/advanced practitioner if interventions unsuccessful or patient reports new pain  Outcome: Progressing     Problem: INFECTION - ADULT  Goal: Absence or prevention of progression during hospitalization  Description  INTERVENTIONS:  - Assess and monitor for signs and symptoms of infection  - Monitor lab/diagnostic results  - Monitor all insertion sites, i e  indwelling lines, tubes, and drains  - Monitor endotracheal if appropriate and nasal secretions for changes in amount and color  - [place] appropriate cooling/warming therapies per order  - Administer medications as ordered  - Instruct and encourage patient and family to use good hand hygiene technique  - Identify and instruct in appropriate isolation precautions for identified infection/condition  Outcome: Progressing  Goal: Absence of fever/infection during neutropenic period  Description  INTERVENTIONS:  - Monitor WBC    Outcome: Progressing     Problem: SAFETY ADULT  Goal: Patient will remain free of falls  Description  INTERVENTIONS:  - Assess patient frequently for physical needs  -  Identify cognitive and physical deficits and behaviors that affect risk of falls    -  [place] fall precautions as indicated by assessment   - Educate patient/family on patient safety including physical limitations  - Instruct patient to call for assistance with activity based on assessment  - Modify environment to reduce risk of injury  - Consider OT/PT consult to assist with strengthening/mobility  Outcome: Progressing  Goal: Maintain or return to baseline ADL function  Description  INTERVENTIONS:  -  Assess patient's ability to carry out ADLs; assess patient's baseline for ADL function and identify physical deficits which impact ability to perform ADLs (bathing, care of mouth/teeth, toileting, grooming, dressing, etc )  - Assess/evaluate cause of self-care deficits   - Assess range of motion  - Assess patient's mobility; develop plan if impaired  - Assess patient's need for assistive devices and provide as appropriate  - Encourage maximum independence but intervene and supervise when necessary  - Involve family in performance of ADLs  - Assess for home care needs following discharge   - Consider OT consult to assist with ADL evaluation and planning for discharge  - Provide patient education as appropriate  Outcome: Progressing  Goal: Maintain or return mobility status to optimal level  Description  INTERVENTIONS:  - Assess patient's baseline mobility status (ambulation, transfers, stairs, etc )    - Identify cognitive and physical deficits and behaviors that affect mobility  - Identify mobility aids required to assist with transfers and/or ambulation (gait belt, sit-to-stand, lift, walker, cane, etc )  - [place] fall precautions as indicated by assessment  - Record patient progress and toleration of activity level on Mobility SBAR; progress patient to next Phase/Stage  - Instruct patient to call for assistance with activity based on assessment  - Consider rehabilitation consult to assist with strengthening/weightbearing, etc   Outcome: Progressing

## 2020-01-13 NOTE — PHYSICAL THERAPY NOTE
Physical Therapy Cancellation Note    PT orders received and chart reviewed  Pt w/ orders for bed rest for 6 hours post-op  Will hold therapy at this time and initiate PT evaluation as able and medically appropriate      Ilan Strauss, PT, DPT

## 2020-01-13 NOTE — ANESTHESIA POSTPROCEDURE EVALUATION
Post-Op Assessment Note    CV Status:  Stable  Pain Score: 0    Pain management: adequate     Mental Status:  Alert and awake   Hydration Status:  Euvolemic   PONV Controlled:  Controlled   Airway Patency:  Patent   Post Op Vitals Reviewed: Yes      Staff: CRNA           /58 (01/13/20 1344)    Temp 98 4 °F (36 9 °C) (01/13/20 1344)    Pulse (!) 54 (01/13/20 1344)   Resp 14 (01/13/20 1344)    SpO2 97 % (01/13/20 1344)

## 2020-01-13 NOTE — INTERVAL H&P NOTE
H&P reviewed  After examining the patient I find no changes in the patients condition since the H&P had been written      Vitals:    01/13/20 0634   BP: 134/83   Pulse: 65   Resp: 18   Temp: 97 5 °F (36 4 °C)   SpO2: 95%     Plan: Left CEA

## 2020-01-13 NOTE — ANESTHESIA PREPROCEDURE EVALUATION
Review of Systems/Medical History  Patient summary reviewed  Chart reviewed  No history of anesthetic complications     Cardiovascular  EKG reviewed, Exercise tolerance (METS): >4,  Hyperlipidemia, Hypertension on > 1 medication, CAD , CAD status: 2VD and non-obstructive, CHF compensated CHF, No orthopnea, No PND, No JAY, PVD,   Comment: 9/4/2019  SUMMARY     CORONARY CIRCULATION:  There was borderline significant 2-vessel coronary artery disease ( 60 % LAD and 50 % circumflex)  The coronary circulation is right dominant  Left main: The vessel was normal sized  Angiography showed no evidence of disease  LAD: The vessel was medium to large sized  Angiography showed moderate atherosclerosis  There were two major diagonal branches  Mid LAD: There was a discrete 60 % stenosis just after D1  There was CHRISTIANO grade 3 flow through the vessel (brisk flow) and a moderate-sized vascular territory distal to the lesion  It appears amenable to percutaneous intervention  1st diagonal: The vessel was small sized  Angiography showed moderate atherosclerosis  There was a discrete 60 % stenosis at the ostium of the vessel segment  2nd diagonal: The vessel was medium sized  Angiography showed minor luminal irregularities  Circumflex: The vessel was normal sized  Angiography showed moderate atherosclerosis  There was one major obtuse marginal   Proximal circumflex: There was a discrete 50 % stenosis  There was CHRISTIANO grade 3 flow through the vessel (brisk flow)  1st obtuse marginal: The vessel was medium sized  Angiography showed minor luminal irregularities  There was a discrete 40 % stenosis  RCA: The vessel was large sized (dominant)  Angiography showed minor luminal irregularities  Right PDA: There was a discrete 30 % stenosis at the ostium of the vessel segment  7/2019  LEFT VENTRICLE:  Systolic function was mildly reduced  Ejection fraction was estimated to be 45 %  There was mild diffuse hypokinesis    Doppler parameters were consistent with abnormal left ventricular relaxation (grade 1 diastolic dysfunction)      MITRAL VALVE:  There was mild annular calcification  There was mild regurgitation      TRICUSPID VALVE:  There was mild regurgitation  ,  Pulmonary  Smoker ex-smoker  ,        GI/Hepatic  Negative GI/hepatic ROS          Kidney stones,        Endo/Other  Diabetes well controlled type 2 Oral agent,      GYN       Hematology  Negative hematology ROS      Musculoskeletal  Negative musculoskeletal ROS        Neurology  Negative neurology ROS      Psychology   Negative psychology ROS              Physical Exam    Airway    Mallampati score: II  TM Distance: >3 FB  Neck ROM: full     Dental   upper dentures and lower dentures,     Cardiovascular      Pulmonary      Other Findings        Anesthesia Plan  ASA Score- 3     Anesthesia Type- general with ASA Monitors  Additional Monitors: arterial line  Airway Plan: ETT  Plan Factors-    Induction- intravenous  Postoperative Plan- Plan for postoperative opioid use  Planned trial extubation    Informed Consent- Anesthetic plan and risks discussed with patient  I personally reviewed this patient with the CRNA  Discussed and agreed on the Anesthesia Plan with the CRNA  Rabia Gee

## 2020-01-14 VITALS
HEART RATE: 84 BPM | TEMPERATURE: 98.8 F | BODY MASS INDEX: 25.73 KG/M2 | OXYGEN SATURATION: 94 % | DIASTOLIC BLOOD PRESSURE: 60 MMHG | RESPIRATION RATE: 16 BRPM | SYSTOLIC BLOOD PRESSURE: 130 MMHG | HEIGHT: 72 IN | WEIGHT: 190 LBS

## 2020-01-14 LAB
ANION GAP SERPL CALCULATED.3IONS-SCNC: 4 MMOL/L (ref 4–13)
APTT PPP: 34 SECONDS (ref 23–37)
BUN SERPL-MCNC: 9 MG/DL (ref 5–25)
CALCIUM SERPL-MCNC: 8.7 MG/DL (ref 8.3–10.1)
CHLORIDE SERPL-SCNC: 110 MMOL/L (ref 100–108)
CO2 SERPL-SCNC: 25 MMOL/L (ref 21–32)
CREAT SERPL-MCNC: 0.88 MG/DL (ref 0.6–1.3)
ERYTHROCYTE [DISTWIDTH] IN BLOOD BY AUTOMATED COUNT: 12.5 % (ref 11.6–15.1)
GFR SERPL CREATININE-BSD FRML MDRD: 89 ML/MIN/1.73SQ M
GLUCOSE SERPL-MCNC: 158 MG/DL (ref 65–140)
GLUCOSE SERPL-MCNC: 162 MG/DL (ref 65–140)
GLUCOSE SERPL-MCNC: 177 MG/DL (ref 65–140)
HCT VFR BLD AUTO: 35.5 % (ref 36.5–49.3)
HGB BLD-MCNC: 11.1 G/DL (ref 12–17)
INR PPP: 1.3 (ref 0.84–1.19)
MCH RBC QN AUTO: 32.6 PG (ref 26.8–34.3)
MCHC RBC AUTO-ENTMCNC: 31.3 G/DL (ref 31.4–37.4)
MCV RBC AUTO: 104 FL (ref 82–98)
PLATELET # BLD AUTO: 198 THOUSANDS/UL (ref 149–390)
PMV BLD AUTO: 10 FL (ref 8.9–12.7)
POTASSIUM SERPL-SCNC: 3.9 MMOL/L (ref 3.5–5.3)
PROTHROMBIN TIME: 15.8 SECONDS (ref 11.6–14.5)
RBC # BLD AUTO: 3.41 MILLION/UL (ref 3.88–5.62)
SODIUM SERPL-SCNC: 139 MMOL/L (ref 136–145)
WBC # BLD AUTO: 10.82 THOUSAND/UL (ref 4.31–10.16)

## 2020-01-14 PROCEDURE — 99024 POSTOP FOLLOW-UP VISIT: CPT | Performed by: SURGERY

## 2020-01-14 PROCEDURE — 80048 BASIC METABOLIC PNL TOTAL CA: CPT | Performed by: STUDENT IN AN ORGANIZED HEALTH CARE EDUCATION/TRAINING PROGRAM

## 2020-01-14 PROCEDURE — 97166 OT EVAL MOD COMPLEX 45 MIN: CPT

## 2020-01-14 PROCEDURE — 85027 COMPLETE CBC AUTOMATED: CPT | Performed by: STUDENT IN AN ORGANIZED HEALTH CARE EDUCATION/TRAINING PROGRAM

## 2020-01-14 PROCEDURE — 85730 THROMBOPLASTIN TIME PARTIAL: CPT | Performed by: STUDENT IN AN ORGANIZED HEALTH CARE EDUCATION/TRAINING PROGRAM

## 2020-01-14 PROCEDURE — 85610 PROTHROMBIN TIME: CPT | Performed by: STUDENT IN AN ORGANIZED HEALTH CARE EDUCATION/TRAINING PROGRAM

## 2020-01-14 PROCEDURE — 82948 REAGENT STRIP/BLOOD GLUCOSE: CPT

## 2020-01-14 RX ORDER — LOSARTAN POTASSIUM 100 MG/1
100 TABLET ORAL DAILY
Qty: 90 TABLET | Refills: 0 | COMMUNITY
Start: 2020-01-16 | End: 2020-06-23 | Stop reason: SDUPTHER

## 2020-01-14 RX ORDER — ACETAMINOPHEN 325 MG/1
650 TABLET ORAL EVERY 6 HOURS PRN
Qty: 30 TABLET | Refills: 0 | Status: SHIPPED | OUTPATIENT
Start: 2020-01-14 | End: 2020-07-01

## 2020-01-14 RX ORDER — CARVEDILOL 3.12 MG/1
3.12 TABLET ORAL 2 TIMES DAILY WITH MEALS
Qty: 180 TABLET | Refills: 0 | COMMUNITY
Start: 2020-01-15 | End: 2020-05-18

## 2020-01-14 RX ORDER — OXYCODONE HYDROCHLORIDE 5 MG/1
2.5 TABLET ORAL EVERY 4 HOURS PRN
Qty: 10 TABLET | Refills: 0 | Status: SHIPPED | OUTPATIENT
Start: 2020-01-14 | End: 2020-01-24

## 2020-01-14 RX ADMIN — OXYCODONE HYDROCHLORIDE 5 MG: 5 TABLET ORAL at 07:31

## 2020-01-14 RX ADMIN — ACETAMINOPHEN 650 MG: 325 TABLET ORAL at 05:18

## 2020-01-14 RX ADMIN — ASPIRIN 81 MG 81 MG: 81 TABLET ORAL at 08:22

## 2020-01-14 RX ADMIN — HEPARIN SODIUM 5000 UNITS: 5000 INJECTION INTRAVENOUS; SUBCUTANEOUS at 05:18

## 2020-01-14 RX ADMIN — ATORVASTATIN CALCIUM 10 MG: 10 TABLET, FILM COATED ORAL at 08:22

## 2020-01-14 RX ADMIN — ACETAMINOPHEN 650 MG: 325 TABLET ORAL at 11:30

## 2020-01-14 RX ADMIN — CLOPIDOGREL BISULFATE 75 MG: 75 TABLET ORAL at 08:22

## 2020-01-14 RX ADMIN — INSULIN LISPRO 1 UNITS: 100 INJECTION, SOLUTION INTRAVENOUS; SUBCUTANEOUS at 06:38

## 2020-01-14 RX ADMIN — INSULIN LISPRO 1 UNITS: 100 INJECTION, SOLUTION INTRAVENOUS; SUBCUTANEOUS at 11:28

## 2020-01-14 NOTE — DISCHARGE SUMMARY
Vascular Surgery    Discharge- Christi Alfonso 1950, 81 y o  male MRN: [id number]    Unit/Bed#: [facility] 506-01 Encounter: [id number]    Primary Care Provider: MAURO Vila   Date and time admitted to hospital: 1/13/2020  5:45 AM    * Carotid artery stenosis without cerebral infarction, bilateral  Assessment & Plan  Asymptomatic left carotid stenosis    S/P left carotid endarterectomy with redo distal anastomosis 1/13/20    Plan:  Continue aspirin, Plavix, Lipitor  Outpatient follow-up in the Vascular Center      Secondary Diagnosis:  Past Medical History:   Diagnosis Date    [facility]est[facility] failure[facility] [address] [facility]     Diabetes mellitus [facility] [address] [facility]     Diverticulitis     Hyperlipidemia     Hypertension     Kidney stone      Past Surgical History:   Procedure Laterality Date    ABDOMINAL SURGERY      COLONOSCOPY      INGUINAL HERNIA REPAIR Left     LAPAROSCOPIC COLON RESECTION      [facility] COLONOSCOPY FLX DX W/COLLJ SPEC WHEN PFRMD N/A 11/3/2017    Procedure: COLONOSCOPY;  Surgeon: Bienvenido Hutchinson MD;  Location: [facility]; Service: Colorectal        Admitting Diagnosis: Stenosis of left carotid artery [I65 22]    Attending: Dr Elham Magaña    Procedures Performed: Left Carotid Endarterectomy 1/13- Dr Elham Magaña     Discharge Medications:  See after visit summary for reconciled discharge medications provided to patient and family  Hospital Course:  72-year-old gentleman admitted electively to [facility] on 1/13/20 for treatment of left carotid stenosis asymptomatic  On the day of admission he underwent left carotid endarterectomy by Dr Elham Magaña as scheduled  He tolerated surgery well  Following surgery transferred to postanesthesia care unit where he recovered briefly and subsequently to step-down unit for monitoring overnight  Immediately during his postoperative course he did require smitha gtt for blood pressure support however this was weaned off within several hours  On postoperative day 1   He was hemodynamically stable off pressors  He was able to tolerate a diet, ambulate and void without difficulty  His neurological exam was intact  He was deemed appropriate for discharge home in the care of his family  He was given instructions for his discharge medications, instructions for wound care and activity limitations  Outpatient follow-up in the vascular Center was arranged prior to his discharge  He was instructed to call with any questions concerns changes in his condition  Condition at Discharge: stable     Provisions for Follow-Up Care:  See after visit summary for information related to follow-up care and any pertinent home health orders  Disposition: Home    Discharge instructions/Information to patient and family:   See after visit summary for information provided to patient and family  Planned Readmission: No    Discharge Statement   I spent 30 minutes discharging the patient  This time was spent on the day of discharge  I had direct contact with the patient on the day of discharge  Additional documentation is required if more than 30 minutes were spent on discharge

## 2020-01-14 NOTE — PROGRESS NOTES
Vascular Surgery    Progress Note - Aleksandr Benedict 1950, 81 y o  male MRN: [id number]    Unit/Bed#: [facility] 506-01 Encounter: [id number]    Primary Care Provider: MAURO Dalal   Date and time admitted to hospital: 1/13/2020  5:45 AM    * Carotid artery stenosis without cerebral infarction, bilateral  Assessment & Plan  Asymptomatic left carotid stenosis    S/P left carotid endarterectomy with redo distal anastomosis 1/13/20    Plan:  Continue aspirin, Plavix, Lipitor  DC A-line  Saline lock  Ambulate  Diet  Tentative discharge home later today      Subjective:  Patient doing well, no events overnight    Vitals:  /69 (BP Location: Right arm)   Pulse 84   Temp 98 8 °F (37 1 °C) (Oral)   Resp 16   Ht 6' (1 829 m)   Wt 86 2 kg (190 lb)   SpO2 93%   BMI 25 77 kg/m²     I/Os:  I/O last 3 completed shifts: In: 2750 [I V :2000; IV Piggyback:750]  Out: 450 [Blood:450]  I/O this shift:  In: 2260 4 [P O :670;  I V :1590 4]  Out: 1975 [DGXMV:8330]    Lab Results and Cultures:   Lab Results   Component Value Date    WBC 10 82 (H) 01/14/2020    HGB 11 1 (L) 01/14/2020    HCT 35 5 (L) 01/14/2020     (H) 01/14/2020     01/14/2020     Lab Results   Component Value Date    GLUCOSE 112 01/13/2020    CALCIUM 8 7 01/14/2020     11/22/2016    K 3 9 01/14/2020    CO2 25 01/14/2020     (H) 01/14/2020    BUN 9 01/14/2020    CREATININE 0 88 01/14/2020     Lab Results   Component Value Date    INR 1 30 (H) 01/14/2020    INR 1 03 12/26/2019    INR 1 0 08/23/2019    PROTIME 15 8 (H) 01/14/2020    PROTIME 13 1 12/26/2019    PROTIME 10 5 08/23/2019        Blood Culture: No results found for: BLOODCX,   Urinalysis: No results found for: Verta Cohn, SPECGRAV, PHUR, LEUKOCYTESUR, NITRITE, PROTEINUA, GLUCOSEU, KETONESU, BILIRUBINUR, BLOODU,   Urine Culture: No results found for: URINECX,   Wound Culure:   Lab Results   Component Value Date    WOUNDCULT No growth 11/07/2018       Medications:  Current Facility-Administered Medications   Medication Dose Route Frequency    acetaminophen (TYLENOL) tablet 650 mg  650 mg Oral Q6H PRN    aspirin chewable tablet 81 mg  81 mg Oral Daily    atorvastatin (LIPITOR) tablet 10 mg  10 mg Oral Daily    carvedilol (COREG) tablet 3 125 mg  3 125 mg Oral BID With Meals    clopidogrel (PLAVIX) tablet 75 mg  75 mg Oral Daily    heparin (porcine) subcutaneous injection 5,000 Units  5,000 Units Subcutaneous Q8H [facility]    Labetalol HCl (NORMODYNE) injection 5 mg  5 mg Intravenous Q15 Min PRN    And    hydrALAZINE (APRESOLINE) injection 15 mg  15 mg Intravenous Q15 Min PRN    And    niCARdipine (CARDENE) 25 mg (STANDARD CONCENTRATION) in sodium chloride 0 9% 250 mL  1-15 mg/hr Intravenous Continuous PRN    HYDROmorphone (DILAUDID) injection 0 5 mg  0 5 mg Intravenous Q3H PRN    insulin lispro (HumaLOG) 100 units/mL subcutaneous injection 1-6 Units  1-6 Units Subcutaneous 4x Daily (AC & HS)    ondansetron (ZOFRAN) injection 4 mg  4 mg Intravenous Q6H PRN    oxyCODONE (ROXICODONE) IR tablet 2 5 mg  2 5 mg Oral Q4H PRN    oxyCODONE (ROXICODONE) IR tablet 5 mg  5 mg Oral Q4H PRN    phenylephrine (TEOFILO-SYNEPHRINE) 50 mg (STANDARD CONCENTRATION) in sodium chloride 0 9% 250 mL   mcg/min Intravenous Titrated       Physical Exam:    General appearance: alert and oriented, in no acute distress   Neuro:  Cranial nerves 2-12 grossly intact, motor and sensation intact  Lungs: clear to auscultation bilaterally  Heart: regular rate and rhythm, S1, S2 normal, no murmur, click, rub or gallop  Abdomen: soft, non-tender; bowel sounds normal; no masses,  no organomegaly  Extremities: extremities normal, warm and well-perfused; no cyanosis, clubbing, or edema    Wound/Incision:  Left neck incision clean, dry, and intact, no hematoma        Oneta SONYA Denny  1/14/2020

## 2020-01-14 NOTE — SOCIAL WORK
Cm met with pt to review the role of Cm  Pt presented as alert during conversation  Pt lives with wife Nakul Ayoub [phone] in a 2 story house, no step leading to the house, a flight to bedroom and bathroom  Pt reported being independent with ADLS PTA  Pt has hx of inpatient PT/OT with [facility] in rehab in [address] [place]  Pt denies any inpatient MH/Substance abuse or [facility] services  Pt PCP is affiliated with [facility] and preferred is [facility] mail in order and [facility] in [place]  Pt retired and drives independently  Pt has DME cane and walker  Pt has no LW or POA  Per Physical therapy, pt is okay to dc home with no need  Cm met with pt to discuss the discharged  Per pt, wife Nakul Ayoub will be transporting upon discharged  Cm will follow  CM reviewed d/c planning process including the following: identifying help at home, patient preference for d/c planning needs, Discharge Lounge, [facility] Meds to Bed program, availability of treatment team to discuss questions or concerns patient and/or family may have regarding understanding medications and recognizing signs and symptoms once discharged  CM also encouraged patient to follow up with all recommended appointments after discharge  Patient advised of importance for patient and family to participate in managing patients medical well being

## 2020-01-14 NOTE — PROGRESS NOTES
Progress Note - Prateek Jordan 81 y o  male MRN: [id number]    Unit/Bed#: [facility] 506-01 Encounter: [id number]      Assessment:  Patient is a 42-year-old male with left carotid artery stenosis, status post left carotid endarterectomy earlier today  Doing well  Vital signs are stable  Mild hypotension  Requiring 10 mcg phenylephrine  Left neck incision clean dry and intact  Soft, nontender nondistended  No hematoma or ecchymosis  Cranial nerves 2-12 grossly intact  Strength and sensation upper and lower extremities full bilaterally  Plan:  Continue phenylephrine per protocol  Maintain a line  Diabetic diet  DVT prophylaxis    Subjective:   No complaints  Tolerating diet  Voiding without difficulty  Had a bowel movement  Denied chest pain shortness of breath since surgery  Denied any numbness or tingling of his upper lower extremities  Denied any difficulty swallowing, or changes in vision  Objective:     Vitals: Blood pressure 99/54, pulse 56, temperature 98 °F (36 7 °C), temperature source Oral, resp  rate 18, height 6' (1 829 m), weight 86 2 kg (190 lb), SpO2 99 %  ,Body mass index is 25 77 kg/m²  Intake/Output Summary (Last 24 hours) at 1/13/2020 2052  Last data filed at 1/13/2020 1921  Gross per 24 hour   Intake 2750 ml   Output 675 ml   Net 2075 ml       Physical Exam  General: NAD  HEENT: NC/AT  MMM left neck incision clean dry and intact  Cv: RRR     Lungs: normal effort  Ab: Soft, NT/ND  Ex: no CCE  Neuro: AAOx3    Scheduled Meds:  Current Facility-Administered Medications:  acetaminophen 650 mg Oral Q6H PRN Rajiv Saravia MD    [START ON 1/14/2020] aspirin 81 mg Oral Daily Rajiv Saravia MD    atorvastatin 10 mg Oral Daily Rajiv Saravia MD    [START ON 1/14/2020] carvedilol 3 125 mg Oral BID With Meals Rajiv Saravia MD    [START ON 1/14/2020] clopidogrel 75 mg Oral Daily Rajiv Saravia MD    heparin (porcine) 5,000 Units Subcutaneous [facility] Rajiv Saravia MD    Labetalol HCl 5 mg Intravenous Q15 Min PRN Kacy Oliveros MD    And        hydrALAZINE 15 mg Intravenous Q15 Min PRN Kacy Oliveros MD    And        niCARdipine 1-15 mg/hr Intravenous Continuous PRN Kacy Oliveros MD    HYDROmorphone 0 5 mg Intravenous Q3H PRN Kacy Oliveros MD    insulin lispro 1-6 Units Subcutaneous 4x Daily (AC & HS) Vikram Sheridan MD    lactated ringers 100 mL/hr Intravenous Continuous Kacy Oliveros MD Last Rate: 100 mL/hr (01/13/20 1515)   lactated ringers 100 mL/hr Intravenous Continuous Yang Pat CRNA    ondansetron 4 mg Intravenous Q6H PRN Kacy Oliveros MD    oxyCODONE 2 5 mg Oral Q4H PRN Kacy Oliveros MD    oxyCODONE 5 mg Oral Q4H PRN Kacy Oliveros MD    phenylephine  mcg/min Intravenous Titrated Kacy Oliveros MD Last Rate: 5 mcg/min (01/13/20 2039)     Continuous Infusions:  lactated ringers 100 mL/hr Last Rate: 100 mL/hr (01/13/20 1515)   lactated ringers 100 mL/hr    niCARdipine 1-15 mg/hr    phenylephine  mcg/min Last Rate: 5 mcg/min (01/13/20 2039)     PRN Meds:   acetaminophen    Labetalol HCl **AND** hydrALAZINE **AND** niCARdipine    HYDROmorphone    ondansetron    oxyCODONE    oxyCODONE    Invasive Devices     Peripheral Intravenous Line            Peripheral IV 01/13/20 Left Arm less than 1 day    Peripheral IV 01/13/20 Left Wrist less than 1 day          Arterial Line            Arterial Line 01/13/20 Right Radial less than 1 day                Lab, Imaging and other studies: I have personally reviewed pertinent reports  VTE Pharmacologic Prophylaxis: Sequential compression device (Venodyne) heparin     VTE Mechanical Prophylaxis: sequential compression device

## 2020-01-14 NOTE — OCCUPATIONAL THERAPY NOTE
Occupational Therapy Evaluation      Zaheer Willingham    1/14/2020    Principal Problem:    Carotid artery stenosis without cerebral infarction, bilateral      Past Medical History:   Diagnosis Date    CHF (congestive heart failure) (HCC)     Diabetes mellitus ([address] )     Diverticulitis     Hyperlipidemia     Hypertension     Kidney stone        Past Surgical History:   Procedure Laterality Date    ABDOMINAL SURGERY      COLONOSCOPY      INGUINAL HERNIA REPAIR Left     LAPAROSCOPIC COLON RESECTION      AL COLONOSCOPY FLX DX W/COLLJ SPEC WHEN PFRMD N/A 11/3/2017    Procedure: COLONOSCOPY;  Surgeon: Sachin Fabian MD;  Location: [facility]  GI [facility]; Service: Colorectal        01/14/20 1111   Note Type   Note type Eval only   Restrictions/Precautions   Weight Bearing Precautions Per Order No   Other Precautions Multiple lines   Pain Assessment   Pain Assessment No/denies pain   Pain Score No Pain   Home Living   Type of [address] Two level;Stairs to enter with rails; Performs ADLs on one level;Bed/bath upstairs  (2SH; 2STE w/ HR)   Bathroom Shower/Tub Walk-in shower  (on 1st flr and tub/shower upstairs)   Bathroom Toilet Standard   Bathroom Equipment Other (Comment)  (denies)   [address] Walker;Cane   Additional Comments Pt denies use of DME PTA   Prior Function   Level of [place] Independent with ADLs and functional mobility   Lives With Spouse   Receives Help From Family   ADL Assistance Independent   IADLs Independent   Falls in the last 6 months 0   Vocational Retired   Lifestyle   Autonomy Pt reports being I w/ all ADLS and IADLS; (+) drives PTA   Reciprocal Relationships Pt lives w/ supportive spouse who is retired and able to provide A as needed  Pt reports his spouse is going for a biopsy from her breast, and their son is able to visit from [facility] to provide A is spouse is unable to provide A pending her health     Service 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"   Intrinsic Gratification Pt reports enjoying doing home renovations   Psychosocial   Psychosocial (WDL) WDL   ADL   Where Assessed Edge of bed   Eating Assistance 7  Independent   Grooming Assistance 5  Supervision/Setup   UB [address] 5  Supervision/Setup   LB Bathing Assistance 5  Supervision/Setup   UB Dressing Assistance 5  Supervision/Setup   LB Dressing Assistance [address]  5  Supervision/Setup   Bed Mobility   Supine to Sit 4  Minimal assistance   Additional items Assist x 1; Increased time required;Verbal cues; Bedrails;HOB elevated   Sit to Supine 5  Supervision   Additional items Assist x 1; Increased time required;Verbal cues; Bedrails;HOB elevated   Additional Comments Pt laying supine in bed upon OT arrival  Pt laying supine in bed w/ all needs in reach s/p OT session  Transfers   Sit to Stand 5  Supervision   Additional items Assist x 1; Increased time required;Verbal cues;Armrests   Stand to Sit 5  Supervision   Additional items Assist x 1; Increased time required;Verbal cues;Armrests   Additional Comments Transfers w/o AD  Functional Mobility   Functional Mobility 5  Supervision   Additional Comments Pt demonstrated long distance household mobility in hallway w/o AD at S level  Additional items   (none)   Balance   Static Sitting Good   Dynamic Sitting Fair +   Static Standing Fair   Dynamic Standing Fair -   Ambulatory Fair -   Activity Tolerance   Activity Tolerance Patient tolerated treatment well   Medical Staff Made Aware CM for safe dispo planning   Nurse Made Aware RN cleared Pt for OT eval   RUE Assessment   RUE Assessment WFL   LUE Assessment   LUE Assessment WFL   Hand Function   Gross Motor Coordination Functional   Fine Motor Coordination Functional   Sensation   Light Touch No apparent deficits   Cognition   Overall Cognitive Status WFL   Arousal/Participation Alert; Cooperative   Attention Within functional limits   Orientation Level Oriented X4   Memory Within functional limits   Following Commands Follows one step commands without difficulty   Comments Pt is pleasant and cooperative to participate in therapy this day  Assessment   Limitation Decreased endurance;Decreased high-level ADLs   Prognosis Good   Assessment Pt is a 80 yo male seen for OT eval s/p adm to B dx'd w/ carotid artery stenosis without B/L cerebral infarction  Pt s/p left carotid endarterectomy with redo distal anastomosis 1/13/20  Comorbidities include a h/o HTN, HLD, DM 2, ventral hernia, fat necrosis of abdominal wall, CHF, CAD, cerebral aneurysm, URI  Pt with active OT orders and ambulate  orders  Pt is retired and resides w/ spouse in [facility] w/ 7STE and 1st flr set up if needed  Pt was I w/  ADLS and IADLS, drove, & required no use of DME PTA  Pt is currently demonstrating the following occupational deficits: S all self care, transfers, and mobility w/o AD  These deficits that are impacting pt's baseline areas of occupation are a result of the following impairments: endurance and decreased I w/ ADLS/IADLS  Based on the aforementioned OT evaluation, functional performance deficits, and assessments, pt has been identified as a moderate complexity evaluation  Recommend home with family support upon D/C  Pt appears to be functioning close to baseline  No further acute care OT needs at this time  Will D/C OT  Please re-consult if appropriate     Goals   Patient Goals To return home today   Recommendation   OT Discharge Recommendation Home with family support   OT - OK to Discharge Yes  (when medically cleared)         Heladio Clark MS, OTR/L

## 2020-01-14 NOTE — DISCHARGE INSTRUCTIONS
DISCHARGE INSTRUCTIONS                       CAROTID ENDARTERECTOMY  Following discharge from the hospital, you may have some questions about your operation, your activities or your general condition  These instructions may answer some of your questions and help you adjust during the first few weeks following your operation  ACTIVITY: Resume your normal activities and exercise schedule as tolerated  If you were driving prior to surgery, you may resume driving one week following discharge from the hospital  You may ride in a car upon discharge  DIET: Resume your normal diet  Try to eat low fat and low cholesterol foods  RECURRENT SYMPTOMS: If you develop any new numbness, weakness, blindness or difficulty with speech after discharge call 911 or go to an emergency department immediately  INCISION: Your surgeon may have chosen to use a type of adhesive glue to close your incision  The glue is used to cover the incision, assist in closure, and prevent contamination  This adhesive will darken and peel away on its own within one to two weeks  You may shower the day after your surgery, but do not scrub the incision  If you do not have this adhesive glue, you may include the operated area in a shower on the third day following surgery  It is normal to have swelling or discoloration around the incision  If increasing redness or pain develops, call our office immediately  Numbness in the region of the incision may occur following the surgery  This normally resolves in six to twelve months  FOLLOW UP STUDIES: Doppler ultrasound studies are very important to your post-operative care  Your surgeon will arrange them at your first postoperative visit  The first study is due 3 months after surgery  [phone] [facility] FREE [phone]  [address] [place] [address] , [address] [facility], [address] [place] [address] 20, 500 15Rafat Hanley, [place] [address] [place], [facility], [address] [facility] [address]    [address] Gee [address], 1st  Floor, Phylicia Vera 34  [facility] 19, [address] [facility], [address] [place] [address]   12[address] [facility], [address] [facility], 960 [facility], 532 [facility], [facility], [address], Melyssa Cohen 6

## 2020-01-14 NOTE — UTILIZATION REVIEW
Initial Clinical Review  SCHEDULED INPATIENT SURGICAL PROCEDURE 1/13/20 / Safia Gonzalez [address] Cirilo Chau # [id number]    Age/Sex: 81year old male    Surgery Date: 1/13/2020 Monday     Procedure: ENDARTERECTOMY ARTERY CAROTID (Left)     Anesthesia: General     Operative Findings: Very small ICA measuring ~2mm is outside diameter distal to the lesion       POD #1 Vascular Surgery Progress Note:  Carotid artery stenosis without cerebral infarction, bilateral  Assessment & Plan  Asymptomatic left carotid stenosis     S/P left carotid endarterectomy with redo distal anastomosis 1/13/20     Plan:  Continue aspirin, Plavix, Lipitor  DC A-line  Saline lock  Ambulate  Diet  Tentative discharge home later today      Admission Orders: Date/Time/Statement: Inpatient Admission Orders (From admission, onward)     Ordered        01/13/20 1330  Inpatient Admission  Once             Orders Placed This Encounter   Procedures    Inpatient Admission     Standing Status:   Standing     Number of Occurrences:   1     Order Specific Question:   Admitting Physician     Answer:   [facility]     Order Specific Question:   Level of Care     Answer:   Level 1 Stepdown [13]     Order Specific Question:   Estimated length of stay     Answer:   More than 2 Midnights     Order Specific Question:   Certification     Answer:   I certify that inpatient services are medically necessary for this patient for a duration of greater than two midnights  See H&P and MD Progress Notes for additional information about the patient's course of treatment  Vital Signs: /60 (BP Location: Left arm)   Pulse 84   Temp 98 8 °F (37 1 °C) (Oral)   Resp 16   Ht 6' (1 829 m)   Wt 86 2 kg (190 lb)   SpO2 94%   BMI 25 77 kg/m²     I/O     01/13 0701  01/14 0700 01/14 0701  01/15 0700   P  O  670 256   I V  (mL/kg) 3590 4 (41 7)    IV Piggyback 750    Total Intake(mL/kg) 5010 4 (58 1) 256 (3)   Urine (mL/kg/hr) 1975 (1) 200 (0 7)   Stool 0    Blood 450    Total Output 2425 200   Net +2585 4 +56        Unmeasured Urine Occurrence 1 x    Unmeasured Stool Occurrence 1 x      Diet:  Hugo/CHO Controlled; Consistent Carbohydrate Diet Level 1 (4 carb servings/60 grams CHO/meal)    Mobility: Up with Assistance + Ambulate in [facility] Q Shift    DVT Prophylaxis:  ASA; Plavix; Heparin SQ; SCD    Scheduled Medications:  aspirin 81 mg Oral Daily   atorvastatin 10 mg Oral Daily   carvedilol 3 125 mg Oral BID With Meals   clopidogrel 75 mg Oral Daily   heparin (porcine) 5,000 Units Subcutaneous Q8H [address]   insulin lispro 1-6 Units Subcutaneous 4x Daily (AC & HS)     Continuous IV Infusions:  niCARdipine 1-15 mg/hr Intravenous Continuous PRN   phenylephine  mcg/min Intravenous Titrated     PRN Meds:  acetaminophen 650 mg Oral Q6H PRN  GIVEN X 1   Labetalol HCl 5 mg Intravenous Q15 Min PRN   And      hydrALAZINE 15 mg Intravenous Q15 Min PRN   And      niCARdipine 1-15 mg/hr Intravenous Continuous PRN   HYDROmorphone 0 5 mg Intravenous Q3H PRN   ondansetron 4 mg Intravenous Q6H PRN   oxyCODONE 2 5 mg Oral Q4H PRN GIVEN X 1   oxyCODONE 5 mg Oral Q4H PRN  GIVEN X 1     Network Utilization Review Department  [email or web address]  ATTENTION: Please call with any questions or concerns to [phone] and carefully listen to the prompts so that you are directed to the right person  All voicemails are confidential   Sudie Crigler all requests for admission clinical reviews, approved or denied determinations and any other requests to dedicated fax number below belonging to the campus where the patient is receiving treatment   List of dedicated fax numbers for the Facilities:  FACILITY NAME UR FAX NUMBER   ADMISSION DENIALS (Administrative/Medical Necessity) [phone]   [address] (Maternity/NICU/Pediatrics) [phone] 54[address] [facility] [phone]   Kayla Sheets [address] [facility] [phone]   [place] [facility] 15[address] [facility] [phone]   [place] Friday [address] [phone] 2205 [facility], [facility]  [phone] [address] [phone]

## 2020-01-14 NOTE — ASSESSMENT & PLAN NOTE
Asymptomatic left carotid stenosis    S/P left carotid endarterectomy with redo distal anastomosis 1/13/20    Plan:  Continue aspirin, Plavix, Lipitor  Outpatient follow-up in the [facility]

## 2020-01-14 NOTE — PLAN OF CARE
Problem: PAIN - ADULT  Goal: Verbalizes/displays adequate comfort level or baseline comfort level  Description  Interventions:  - Encourage patient to monitor pain and request assistance  - Assess pain using appropriate pain scale  - Administer analgesics based on type and severity of pain and evaluate response  - Implement non-pharmacological measures as appropriate and evaluate response  - Consider cultural and social influences on pain and pain management  - Notify physician/advanced practitioner if interventions unsuccessful or patient reports new pain  Outcome: Progressing     Problem: INFECTION - ADULT  Goal: Absence or prevention of progression during hospitalization  Description  INTERVENTIONS:  - Assess and monitor for signs and symptoms of infection  - Monitor lab/diagnostic results  - Monitor all insertion sites, i e  indwelling lines, tubes, and drains  - Monitor endotracheal if appropriate and nasal secretions for changes in amount and color  - [place] appropriate cooling/warming therapies per order  - Administer medications as ordered  - Instruct and encourage patient and family to use good hand hygiene technique  - Identify and instruct in appropriate isolation precautions for identified infection/condition  Outcome: Progressing  Goal: Absence of fever/infection during neutropenic period  Description  INTERVENTIONS:  - Monitor WBC    Outcome: Progressing     Problem: SAFETY ADULT  Goal: Patient will remain free of falls  Description  INTERVENTIONS:  - Assess patient frequently for physical needs  -  Identify cognitive and physical deficits and behaviors that affect risk of falls    -  [place] fall precautions as indicated by assessment   - Educate patient/family on patient safety including physical limitations  - Instruct patient to call for assistance with activity based on assessment  - Modify environment to reduce risk of injury  - Consider OT/PT consult to assist with strengthening/mobility  Outcome: Progressing  Goal: Maintain or return to baseline ADL function  Description  INTERVENTIONS:  -  Assess patient's ability to carry out ADLs; assess patient's baseline for ADL function and identify physical deficits which impact ability to perform ADLs (bathing, care of mouth/teeth, toileting, grooming, dressing, etc )  - Assess/evaluate cause of self-care deficits   - Assess range of motion  - Assess patient's mobility; develop plan if impaired  - Assess patient's need for assistive devices and provide as appropriate  - Encourage maximum independence but intervene and supervise when necessary  - Involve family in performance of ADLs  - Assess for home care needs following discharge   - Consider OT consult to assist with ADL evaluation and planning for discharge  - Provide patient education as appropriate  Outcome: Progressing  Goal: Maintain or return mobility status to optimal level  Description  INTERVENTIONS:  - Assess patient's baseline mobility status (ambulation, transfers, stairs, etc )    - Identify cognitive and physical deficits and behaviors that affect mobility  - Identify mobility aids required to assist with transfers and/or ambulation (gait belt, sit-to-stand, lift, walker, cane, etc )  - [place] fall precautions as indicated by assessment  - Record patient progress and toleration of activity level on Mobility SBAR; progress patient to next Phase/Stage  - Instruct patient to call for assistance with activity based on assessment  - Consider rehabilitation consult to assist with strengthening/weightbearing, etc   Outcome: Progressing     Problem: DISCHARGE PLANNING  Goal: Discharge to home or other facility with appropriate resources  Description  INTERVENTIONS:  - Identify barriers to discharge w/patient and caregiver  - Arrange for needed discharge resources and transportation as appropriate  - Identify discharge learning needs (meds, wound care, etc )  - Arrange for interpretive services to assist at discharge as needed  - Refer to Case Management Department for coordinating discharge planning if the patient needs post-hospital services based on physician/advanced practitioner order or complex needs related to functional status, cognitive ability, or social support system  Outcome: Progressing     Problem: Knowledge Deficit  Goal: Patient/family/caregiver demonstrates understanding of disease process, treatment plan, medications, and discharge instructions  Description  Complete learning assessment and assess knowledge base  Interventions:  - Provide teaching at level of understanding  - Provide teaching via preferred learning methods  Outcome: Progressing     Problem: Potential for Falls  Goal: Patient will remain free of falls  Description  INTERVENTIONS:  - Assess patient frequently for physical needs  -  Identify cognitive and physical deficits and behaviors that affect risk of falls    -  [place] fall precautions as indicated by assessment   - Educate patient/family on patient safety including physical limitations  - Instruct patient to call for assistance with activity based on assessment  - Modify environment to reduce risk of injury  - Consider OT/PT consult to assist with strengthening/mobility  Outcome: Progressing

## 2020-01-14 NOTE — ASSESSMENT & PLAN NOTE
Asymptomatic left carotid stenosis    S/P left carotid endarterectomy with redo distal anastomosis 1/13/20    Plan:  Continue aspirin, Plavix, Lipitor  DC A-line  Saline lock  Ambulate  Diet  Tentative discharge home later today

## 2020-01-15 PROCEDURE — NC001 PR NO CHARGE: Performed by: SURGERY

## 2020-01-16 ENCOUNTER — TRANSITIONAL CARE MANAGEMENT (OUTPATIENT)
Dept: FAMILY MEDICINE CLINIC | Facility: CLINIC | Age: 70
End: 2020-01-16

## 2020-01-21 ENCOUNTER — OFFICE VISIT (OUTPATIENT)
Dept: VASCULAR SURGERY | Facility: CLINIC | Age: 70
End: 2020-01-21

## 2020-01-21 VITALS
HEIGHT: 72 IN | TEMPERATURE: 98.8 F | RESPIRATION RATE: 16 BRPM | SYSTOLIC BLOOD PRESSURE: 124 MMHG | BODY MASS INDEX: 26.68 KG/M2 | HEART RATE: 82 BPM | DIASTOLIC BLOOD PRESSURE: 74 MMHG | WEIGHT: 197 LBS

## 2020-01-21 DIAGNOSIS — I65.23 CAROTID ARTERY STENOSIS WITHOUT CEREBRAL INFARCTION, BILATERAL: Primary | ICD-10-CM

## 2020-01-21 DIAGNOSIS — Z98.890 S/P CAROTID ENDARTERECTOMY: ICD-10-CM

## 2020-01-21 PROCEDURE — 1111F DSCHRG MED/CURRENT MED MERGE: CPT | Performed by: SURGERY

## 2020-01-21 PROCEDURE — 99024 POSTOP FOLLOW-UP VISIT: CPT | Performed by: SURGERY

## 2020-01-21 NOTE — ASSESSMENT & PLAN NOTE
70yo Male with critical left ICA stenosis, asymptomatic s/p Left CEA  Doing well overall  Technically challenging case requiring fairly extensive dissection, incision with expected degree of swellings  No signs of infection  Recommend 1 more week before returning to drive and continue to avoid heavy lifting any thing greater than 20lbs for 1 month after surgery       Continue asa/plavix/statin  -1 month follow-up with repeat ultrasound

## 2020-01-21 NOTE — PROGRESS NOTES
Assessment/Plan:    S/P Left carotid endarterectomy 1/13/20  70yo Male with critical left ICA stenosis, asymptomatic s/p Left CEA  Doing well overall  Technically challenging case requiring fairly extensive dissection, incision with expected degree of swellings  No signs of infection  Recommend 1 more week before returning to drive and continue to avoid heavy lifting any thing greater than 20lbs for 1 month after surgery  Continue asa/plavix/statin  -1 month follow-up with repeat ultrasound          Problem List Items Addressed This Visit        Cardiovascular and Mediastinum    Carotid artery stenosis without cerebral infarction, bilateral - Primary    Relevant Orders    VAS carotid complete study       Other    S/P Left carotid endarterectomy 1/13/20     70yo Male with critical left ICA stenosis, asymptomatic s/p Left CEA  Doing well overall  Technically challenging case requiring fairly extensive dissection, incision with expected degree of swellings  No signs of infection  Recommend 1 more week before returning to drive and continue to avoid heavy lifting any thing greater than 20lbs for 1 month after surgery  Continue asa/plavix/statin  -1 month follow-up with repeat ultrasound                  Subjective:      Patient ID: Louisa Pineda is a 81 y o  male  Pt is here S/P left CEA on 1/13/20 by Dr Pilar Phan  Pt c/o a slight headache to the forehead  Pt also had pain in the back of his neck for 3 days after the surgery  Pt denies any CVA or TIA symptoms  Pt is currently taking ASA, Plavix and Lipitor  The following portions of the patient's history were reviewed and updated as appropriate: allergies, current medications, past family history, past medical history, past social history, past surgical history and problem list     Review of Systems   Constitutional: Negative  HENT: Negative  Eyes: Negative  Respiratory: Negative  Cardiovascular: Negative      Gastrointestinal: Negative  Endocrine: Negative  Genitourinary: Negative  Musculoskeletal: Negative  Skin: Negative  Allergic/Immunologic: Negative  Neurological: Positive for headaches  Hematological: Negative  Psychiatric/Behavioral: Negative  I have reviewed and updated the ROS as appropriate  Objective:      /74 (BP Location: Left arm, Patient Position: Sitting, Cuff Size: Adult)   Pulse 82   Temp 98 8 °F (37 1 °C) (Tympanic)   Resp 16   Ht 6' (1 829 m)   Wt 89 4 kg (197 lb)   BMI 26 72 kg/m²          Physical Exam   Constitutional: He is oriented to person, place, and time  He appears well-developed and well-nourished  HENT:   Head: Normocephalic and atraumatic  Eyes: Pupils are equal, round, and reactive to light  EOM are normal    Neck:   Slightly limited neck ROM, as expected after surgery  Some swelling along the left neck incision also as expected  -carotid bruit   Cardiovascular: Normal rate, regular rhythm and normal heart sounds  Pulmonary/Chest: Effort normal and breath sounds normal    Abdominal: Soft  Bowel sounds are normal    Musculoskeletal: Normal range of motion  He exhibits no edema  Neurological: He is alert and oriented to person, place, and time  Skin: Skin is warm and dry  Capillary refill takes less than 2 seconds  Psychiatric: He has a normal mood and affect   His behavior is normal  Judgment and thought content normal

## 2020-01-28 ENCOUNTER — OFFICE VISIT (OUTPATIENT)
Dept: FAMILY MEDICINE CLINIC | Facility: CLINIC | Age: 70
End: 2020-01-28
Payer: COMMERCIAL

## 2020-01-28 VITALS
WEIGHT: 195 LBS | DIASTOLIC BLOOD PRESSURE: 62 MMHG | TEMPERATURE: 99.5 F | OXYGEN SATURATION: 95 % | HEIGHT: 72 IN | HEART RATE: 86 BPM | BODY MASS INDEX: 26.41 KG/M2 | SYSTOLIC BLOOD PRESSURE: 110 MMHG

## 2020-01-28 DIAGNOSIS — I65.23 CAROTID ARTERY STENOSIS WITHOUT CEREBRAL INFARCTION, BILATERAL: ICD-10-CM

## 2020-01-28 DIAGNOSIS — Z87.891: ICD-10-CM

## 2020-01-28 DIAGNOSIS — Z98.890 S/P CAROTID ENDARTERECTOMY: Primary | ICD-10-CM

## 2020-01-28 PROBLEM — J01.00 ACUTE NON-RECURRENT MAXILLARY SINUSITIS: Status: RESOLVED | Noted: 2019-01-30 | Resolved: 2020-01-28

## 2020-01-28 PROBLEM — Z01.818 PRE-OP EXAMINATION: Status: RESOLVED | Noted: 2020-01-08 | Resolved: 2020-01-28

## 2020-01-28 PROBLEM — Z11.59 NEED FOR HEPATITIS C SCREENING TEST: Status: RESOLVED | Noted: 2019-06-04 | Resolved: 2020-01-28

## 2020-01-28 PROBLEM — F17.200 CURRENT EVERY DAY SMOKER: Status: RESOLVED | Noted: 2019-07-11 | Resolved: 2020-01-28

## 2020-01-28 PROBLEM — J06.9 RECENT URI: Status: RESOLVED | Noted: 2020-01-08 | Resolved: 2020-01-28

## 2020-01-28 PROCEDURE — 1111F DSCHRG MED/CURRENT MED MERGE: CPT | Performed by: NURSE PRACTITIONER

## 2020-01-28 PROCEDURE — 1160F RVW MEDS BY RX/DR IN RCRD: CPT | Performed by: NURSE PRACTITIONER

## 2020-01-28 PROCEDURE — 99495 TRANSJ CARE MGMT MOD F2F 14D: CPT | Performed by: NURSE PRACTITIONER

## 2020-01-28 NOTE — PROGRESS NOTES
Assessment/Plan:     No problem-specific Assessment & Plan notes found for this encounter  Diagnoses and all orders for this visit:    S/P Left carotid endarterectomy 1/13/20  Comments:  Patient doing well s/p carotid surgery has f/u carotid US ordered for next month     Carotid artery stenosis without cerebral infarction, bilateral    Stopped smoking between 3 and 6 months ago  Comments:  Patient has stopped smokinf 3-6 months ago          Subjective:     Patient ID: Dina Zepeda is a 81 y o  male  Pt is here S/P left CEA on 1/13/20 by Dr Leartis Brittle  Pt c/o a slight headache to the forehead  Pt also had pain in the back of his neck for 3 days after the surgery  Pt denies any CVA or TIA symptoms  Pt is currently taking ASA, Plavix and Lipitor  72yo Male with critical left ICA stenosis, asymptomatic s/p Left CEA  Doing well overall  Technically challenging case requiring fairly extensive dissection, incision with expected degree of swellings  No signs of infection     Recommend 1 more week before returning to drive and continue to avoid heavy lifting any thing greater than 20lbs for 1 month after surgery     Continue asa/plavix/statin  -1 month follow-up with repeat ultrasound     1/28/2020  Patient here for follow up with his wife and saw vascular on 1/21/2020  Patient is aware of his restrictions  Patient having very low grade headache and is taking prn tylenol  Patient denies any vision changes Patient was having some neck pain and is improving  Patient had low grade fever at home but feeling well  Review of Systems   Constitutional: Negative for activity change, appetite change, chills, diaphoresis, fatigue, fever and unexpected weight change  HENT: Negative for congestion, ear pain, hearing loss, postnasal drip, sinus pressure, sinus pain, sneezing, sore throat and voice change  Eyes: Negative for pain, redness and visual disturbance     Respiratory: Negative for cough and shortness of breath  Cardiovascular: Negative for chest pain and leg swelling  Gastrointestinal: Negative for abdominal pain, diarrhea, nausea and vomiting  Endocrine: Negative  Genitourinary: Negative  Musculoskeletal: Negative for arthralgias  Skin: Positive for wound  Allergic/Immunologic: Negative  Neurological: Negative for dizziness and light-headedness  Hematological: Negative  Psychiatric/Behavioral: Negative for behavioral problems and dysphoric mood  Objective:     Physical Exam   Constitutional: He is oriented to person, place, and time  Vital signs are normal  He appears well-developed and well-nourished  No distress  HENT:   Head: Normocephalic and atraumatic  Eyes: Pupils are equal, round, and reactive to light  Neck: Normal range of motion  No thyromegaly present  Cardiovascular: Normal rate, regular rhythm, normal heart sounds and intact distal pulses  No murmur heard  Pulmonary/Chest: Effort normal and breath sounds normal  No respiratory distress  He has no wheezes  Abdominal: Soft  Bowel sounds are normal    Musculoskeletal: Normal range of motion  Neurological: He is alert and oriented to person, place, and time  Skin: Skin is warm and dry  Surgical incision well approximated no s/s of infection    Psychiatric: He has a normal mood and affect  Nursing note and vitals reviewed  Vitals:    01/28/20 1411   BP: 110/62   Pulse: 86   Temp: 99 5 °F (37 5 °C)   SpO2: 95%   Weight: 88 5 kg (195 lb)   Height: 6' (1 829 m)       Transitional Care Management Review:  Iza Thomas is a 81 y o  male here for TCM follow up       During the TCM phone call patient stated:    TCM Call (since 12/28/2019)     Date and time call was made  1/16/2020  2:52 PM    Hospital care reviewed  Records reviewed    Patient was hospitialized at  [facility]    Date of Admission  01/13/20    Date of discharge  01/14/20    Diagnosis  Carotid artery stenosis without cerebral infarction, bilateral    Disposition  Home    Were the patients medications reviewed and updated  No    Current Symptoms  Weakness    Weakness severity  Mild      TCM Call (since 12/28/2019)     Post hospital issues  Reduced activity    Scheduled for follow up?   Yes    Did you obtain your prescribed medications  Yes    Do you need help managing your prescriptions or medications  No    Is transportation to your appointment needed  No    I have advised the patient to call PCP with any new or worsening symptoms  MAURO Carbajal MA

## 2020-02-05 ENCOUNTER — HOSPITAL ENCOUNTER (OUTPATIENT)
Dept: NON INVASIVE DIAGNOSTICS | Facility: HOSPITAL | Age: 70
Discharge: HOME | End: 2020-02-05
Attending: INTERNAL MEDICINE
Payer: COMMERCIAL

## 2020-02-05 DIAGNOSIS — I42.9 CARDIOMYOPATHY (HCC): ICD-10-CM

## 2020-02-05 PROCEDURE — 93308 TTE F-UP OR LMTD: CPT

## 2020-02-05 PROCEDURE — 93325 DOPPLER ECHO COLOR FLOW MAPG: CPT | Performed by: INTERNAL MEDICINE

## 2020-02-05 PROCEDURE — 93321 DOPPLER ECHO F-UP/LMTD STD: CPT | Performed by: INTERNAL MEDICINE

## 2020-02-05 PROCEDURE — 93308 TTE F-UP OR LMTD: CPT | Performed by: INTERNAL MEDICINE

## 2020-02-05 RX ADMIN — PERFLUTREN 0.6 ML/MIN: 6.52 INJECTION, SUSPENSION INTRAVENOUS at 08:52

## 2020-02-07 ENCOUNTER — TELEPHONE (OUTPATIENT)
Dept: CARDIOLOGY CLINIC | Facility: CLINIC | Age: 70
End: 2020-02-07

## 2020-02-07 NOTE — TELEPHONE ENCOUNTER
----- Message from Marian Torre MD sent at 2/7/2020  2:41 PM EST -----  Please let the patient know that there were no significant abnormalities on their echo  His EF has improved to 50% (low normal) We can discuss further at their next appointment

## 2020-02-09 NOTE — PROGRESS NOTES
Assessment/Plan:       Diagnoses and all orders for this visit:    Essential hypertension  -     Comprehensive metabolic panel; Future  -     Hemoglobin A1C; Future  -     Lipid panel; Future  -     Microalbumin / creatinine urine ratio  -     nicotine polacrilex (COMMIT) 2 MG lozenge; Apply 1 lozenge (2 mg total) to the mouth or throat as needed for smoking cessation    Congestive heart failure, NYHA class 1, acute, systolic (HCC)  -     Comprehensive metabolic panel; Future  -     Hemoglobin A1C; Future  -     Lipid panel; Future  -     Microalbumin / creatinine urine ratio    Coronary artery disease due to lipid rich plaque  -     Comprehensive metabolic panel; Future  -     Hemoglobin A1C; Future  -     Lipid panel; Future  -     Microalbumin / creatinine urine ratio  -     nicotine polacrilex (COMMIT) 2 MG lozenge; Apply 1 lozenge (2 mg total) to the mouth or throat as needed for smoking cessation    Type 2 diabetes mellitus without complication, without long-term current use of insulin (HCC)  -     Comprehensive metabolic panel; Future  -     Hemoglobin A1C; Future  -     Lipid panel; Future  -     Microalbumin / creatinine urine ratio    Carotid artery stenosis without cerebral infarction, bilateral  -     Comprehensive metabolic panel; Future  -     Hemoglobin A1C; Future  -     Lipid panel; Future  -     Microalbumin / creatinine urine ratio  -     nicotine polacrilex (COMMIT) 2 MG lozenge; Apply 1 lozenge (2 mg total) to the mouth or throat as needed for smoking cessation    Hyperlipidemia, mixed  -     Comprehensive metabolic panel; Future  -     Hemoglobin A1C; Future  -     Lipid panel; Future  -     Microalbumin / creatinine urine ratio    Ventral hernia without obstruction or gangrene  -     Comprehensive metabolic panel; Future  -     Hemoglobin A1C; Future  -     Lipid panel;  Future  -     Microalbumin / creatinine urine ratio    Cigarette nicotine dependence without complication  -     nicotine polacrilex (COMMIT) 2 MG lozenge; Apply 1 lozenge (2 mg total) to the mouth or throat as needed for smoking cessation    Prostate cancer screening  -     PSA, Total Screen; Future        No problem-specific Assessment & Plan notes found for this encounter  Subjective:      Patient ID: Samuel Magaña is a 81 y o  male  Patient is here for follow up of chronic medical conditions  He Recently underwent CEA procedure and did very well    HTN-stable  HLD-taking statin and will be for lifelong  CHF, systolic-denies shortness of breath or abdominal distention or leg swelling  Nicotine addiction-quit smoking  Still has urge  Carotid stenosis-recent left CEA  Since surgery, having numbness of his finger on both hands, numbness of area under chin, and some neck pain  DM-weekly glucose is around 110  Cerebral aneurysm- surveillance by Neurosurgery  Obesity- has gained 10 pounds since quitting smoking      The following portions of the patient's history were reviewed and updated as appropriate:   He has a past medical history of CHF (congestive heart failure) ([facility] [address] ), Diabetes mellitus ([facility] [address] ), Diverticulitis, Hyperlipidemia, Hypertension, and Kidney stone  ,  does not have any pertinent problems on file  ,   has a past surgical history that includes Abdominal surgery; Laparoscopic colon resection; Colonoscopy; pr colonoscopy flx dx w/collj spec when pfrmd (N/A, 11/3/2017); Inguinal hernia repair (Left); and pr thromboendartectmy neck,neck incis (Left, 1/13/2020)  ,  family history includes Colon cancer in his family, father, and paternal grandfather; No Known Problems in his mother  ,   reports that he quit smoking about 4 months ago  He has never used smokeless tobacco  He reports that he drinks about 1 0 standard drinks of alcohol per week  He reports that he does not use drugs  ,  is allergic to other     Current Outpatient Medications   Medication Sig Dispense Refill    acetaminophen (TYLENOL) 325 mg tablet Take 2 tablets (650 mg total) by mouth every 6 (six) hours as needed for mild pain, headaches or fever 30 tablet 0    aspirin 81 mg chewable tablet Chew 1 tablet (81 mg total) daily 60 tablet 3    atorvastatin (LIPITOR) 20 mg tablet Take 0 5 tablets (10 mg total) by mouth daily 60 tablet 3    carvedilol (COREG) 3 125 mg tablet Take 1 tablet (3 125 mg total) by mouth 2 (two) times a day with meals 180 tablet 0    clopidogrel (PLAVIX) 75 mg tablet Take 1 tablet (75 mg total) by mouth daily 90 tablet 3    glucose blood test strip Use as instructed 100 each 2    JANUVIA 25 MG tablet TAKE 1 TABLET BY MOUTH  DAILY (Patient taking differently: Take 25 mg by mouth daily after breakfast ) 90 tablet 0    losartan (COZAAR) 100 MG tablet Take 1 tablet (100 mg total) by mouth daily 90 tablet 0    metFORMIN (GLUCOPHAGE) 1000 MG tablet Take 1 tablet (1,000 mg total) by mouth 2 (two) times a day with meals 180 tablet 3    nicotine polacrilex (COMMIT) 2 MG lozenge Apply 1 lozenge (2 mg total) to the mouth or throat as needed for smoking cessation 100 each 0     No current facility-administered medications for this visit  Review of Systems   Constitutional: Negative for fatigue and fever  HENT: Negative for congestion  Eyes: Negative for visual disturbance  Respiratory: Negative for cough and shortness of breath  Cardiovascular: Negative for chest pain, palpitations and leg swelling  Gastrointestinal: Negative for abdominal distention and abdominal pain  Endocrine: Negative for cold intolerance, polydipsia and polyuria  Genitourinary: Negative for difficulty urinating  Musculoskeletal: Negative for back pain and joint swelling  Skin: Negative for color change and rash  Allergic/Immunologic: Negative for immunocompromised state  Neurological: Negative for dizziness and headaches  Hematological: Negative for adenopathy  Psychiatric/Behavioral: Negative for behavioral problems and sleep disturbance  All other systems reviewed and are negative  Objective:  Vitals:    02/11/20 0814   BP: 126/80   BP Location: Left arm   Patient Position: Sitting   Pulse: 73   Resp: 18   SpO2: 97%   Weight: 92 5 kg (204 lb)   Height: 6' (1 829 m)     Body mass index is 27 67 kg/m²  Physical Exam   Constitutional: He is oriented to person, place, and time  He appears well-developed and well-nourished  No distress  HENT:   Head: Normocephalic and atraumatic  Right Ear: External ear normal    Left Ear: External ear normal    Nose: Nose normal    Mouth/Throat: Oropharynx is clear and moist  No oropharyngeal exudate  Eyes: Pupils are equal, round, and reactive to light  Conjunctivae and EOM are normal  Right eye exhibits no discharge  Left eye exhibits no discharge  No scleral icterus  Neck: Normal range of motion  Neck supple  No JVD present  No thyromegaly present  Cardiovascular: Normal rate, regular rhythm, normal heart sounds and intact distal pulses  Exam reveals no gallop and no friction rub  No murmur heard  Pulmonary/Chest: Effort normal and breath sounds normal  No respiratory distress  Abdominal: Soft  Bowel sounds are normal  He exhibits no distension  There is no tenderness  Musculoskeletal: Normal range of motion  He exhibits no edema or tenderness  Lymphadenopathy:     He has no cervical adenopathy  Neurological: He is alert and oriented to person, place, and time  He has normal reflexes  No cranial nerve deficit  Coordination normal    Skin: Skin is warm and dry  He is not diaphoretic  Psychiatric: He has a normal mood and affect  His behavior is normal  Judgment and thought content normal    Nursing note and vitals reviewed

## 2020-02-11 ENCOUNTER — OFFICE VISIT (OUTPATIENT)
Dept: FAMILY MEDICINE CLINIC | Facility: CLINIC | Age: 70
End: 2020-02-11
Payer: COMMERCIAL

## 2020-02-11 ENCOUNTER — TELEPHONE (OUTPATIENT)
Dept: FAMILY MEDICINE CLINIC | Facility: CLINIC | Age: 70
End: 2020-02-11

## 2020-02-11 VITALS
HEIGHT: 72 IN | BODY MASS INDEX: 27.63 KG/M2 | DIASTOLIC BLOOD PRESSURE: 80 MMHG | WEIGHT: 204 LBS | RESPIRATION RATE: 18 BRPM | HEART RATE: 73 BPM | OXYGEN SATURATION: 97 % | SYSTOLIC BLOOD PRESSURE: 126 MMHG

## 2020-02-11 DIAGNOSIS — I65.23 CAROTID ARTERY STENOSIS WITHOUT CEREBRAL INFARCTION, BILATERAL: ICD-10-CM

## 2020-02-11 DIAGNOSIS — I10 ESSENTIAL HYPERTENSION: Primary | ICD-10-CM

## 2020-02-11 DIAGNOSIS — I25.10 CORONARY ARTERY DISEASE DUE TO LIPID RICH PLAQUE: ICD-10-CM

## 2020-02-11 DIAGNOSIS — F17.210 CIGARETTE NICOTINE DEPENDENCE WITHOUT COMPLICATION: ICD-10-CM

## 2020-02-11 DIAGNOSIS — I25.83 CORONARY ARTERY DISEASE DUE TO LIPID RICH PLAQUE: ICD-10-CM

## 2020-02-11 DIAGNOSIS — E11.9 TYPE 2 DIABETES MELLITUS WITHOUT COMPLICATION, WITHOUT LONG-TERM CURRENT USE OF INSULIN (HCC): ICD-10-CM

## 2020-02-11 DIAGNOSIS — K43.9 VENTRAL HERNIA WITHOUT OBSTRUCTION OR GANGRENE: ICD-10-CM

## 2020-02-11 DIAGNOSIS — I50.21 CONGESTIVE HEART FAILURE, NYHA CLASS 1, ACUTE, SYSTOLIC (HCC): ICD-10-CM

## 2020-02-11 DIAGNOSIS — E78.2 HYPERLIPIDEMIA, MIXED: ICD-10-CM

## 2020-02-11 DIAGNOSIS — Z12.5 PROSTATE CANCER SCREENING: ICD-10-CM

## 2020-02-11 PROCEDURE — 3008F BODY MASS INDEX DOCD: CPT | Performed by: NURSE PRACTITIONER

## 2020-02-11 PROCEDURE — 99214 OFFICE O/P EST MOD 30 MIN: CPT | Performed by: NURSE PRACTITIONER

## 2020-02-11 PROCEDURE — 3079F DIAST BP 80-89 MM HG: CPT | Performed by: NURSE PRACTITIONER

## 2020-02-11 PROCEDURE — 1111F DSCHRG MED/CURRENT MED MERGE: CPT | Performed by: NURSE PRACTITIONER

## 2020-02-11 PROCEDURE — 2022F DILAT RTA XM EVC RTNOPTHY: CPT | Performed by: NURSE PRACTITIONER

## 2020-02-11 PROCEDURE — 1036F TOBACCO NON-USER: CPT | Performed by: NURSE PRACTITIONER

## 2020-02-11 PROCEDURE — 1160F RVW MEDS BY RX/DR IN RCRD: CPT | Performed by: NURSE PRACTITIONER

## 2020-02-11 PROCEDURE — 3074F SYST BP LT 130 MM HG: CPT | Performed by: NURSE PRACTITIONER

## 2020-02-11 RX ORDER — POLYETHYLENE GLYCOL 3350 17 G
2 POWDER IN PACKET (EA) ORAL AS NEEDED
Qty: 100 EACH | Refills: 0 | Status: SHIPPED | OUTPATIENT
Start: 2020-02-11 | End: 2020-02-11 | Stop reason: SDUPTHER

## 2020-02-11 RX ORDER — POLYETHYLENE GLYCOL 3350 17 G
2 POWDER IN PACKET (EA) ORAL AS NEEDED
Qty: 100 EACH | Refills: 0 | Status: SHIPPED | OUTPATIENT
Start: 2020-02-11 | End: 2020-04-04 | Stop reason: SDUPTHER

## 2020-02-11 NOTE — TELEPHONE ENCOUNTER
Pharmacy called    We sent in a script for the nicotine lozenge    They need to know the maxium daily amount he can use a day

## 2020-02-11 NOTE — PATIENT INSTRUCTIONS
Follow up visit    HTN-stable  Advised to avoid salt  HLD-taking statin and will be for lifelong  CHF, systolic-denies shortness of breath or abdominal distention or leg swelling  Nicotine addiction-quit smoking  Still has urge  Carotid stenosis-recent left CEA  Since surgery, having numbness of his finger on both hands, numbness of area under chin, and some neck pain  DM-weekly glucose is around 110  Cerebral aneurysm- surveillance by Neurosurgery  Obesity- has gained 10 pounds since quitting smoking  Advised to make better food choices including more fruits and veggies  Follow up in 4 months  Obtain labs prior to visit  Results for Mariana Griffin (MRN [id number]) as of 2/11/2020 08:27   Ref   Range 1/14/2020 04:58   Sodium Latest Ref Range: 136 - 145 mmol/L 139   Potassium Latest Ref Range: 3 5 - 5 3 mmol/L 3 9   Chloride Latest Ref Range: 100 - 108 mmol/L 110 (H)   CO2 Latest Ref Range: 21 - 32 mmol/L 25   Anion Gap Latest Ref Range: 4 - 13 mmol/L 4   BUN Latest Ref Range: 5 - 25 mg/dL 9   Creatinine Latest Ref Range: 0 60 - 1 30 mg/dL 0 88   Glucose, Random Latest Ref Range: 65 - 140 mg/dL 158 (H)   Calcium Latest Ref Range: 8 3 - 10 1 mg/dL 8 7   eGFR Latest Units: ml/min/1 73sq m 89   WBC Latest Ref Range: 4 31 - 10 16 Thousand/uL 10 82 (H)   Red Blood Cell Count Latest Ref Range: 3 88 - 5 62 Million/uL 3 41 (L)   Hemoglobin Latest Ref Range: 12 0 - 17 0 g/dL 11 1 (L)   HCT Latest Ref Range: 36 5 - 49 3 % 35 5 (L)   MCV Latest Ref Range: 82 - 98 fL 104 (H)   MCH Latest Ref Range: 26 8 - 34 3 pg 32 6   MCHC Latest Ref Range: 31 4 - 37 4 g/dL 31 3 (L)   RDW Latest Ref Range: 11 6 - 15 1 % 12 5   Platelet Count Latest Ref Range: 149 - 390 Thousands/uL 198   MPV Latest Ref Range: 8 9 - 12 7 fL 10 0   Protime Latest Ref Range: 11 6 - 14 5 seconds 15 8 (H)   INR Latest Ref Range: 0 84 - 1 19  1 30 (H)   PTT Latest Ref Range: 23 - 37 seconds 34 How Severe Is This Condition?: mild

## 2020-02-21 ENCOUNTER — HOSPITAL ENCOUNTER (OUTPATIENT)
Dept: VASCULAR ULTRASOUND | Facility: HOSPITAL | Age: 70
Discharge: HOME | End: 2020-02-21
Attending: SURGERY
Payer: COMMERCIAL

## 2020-02-21 DIAGNOSIS — I65.23 CAROTID ARTERY STENOSIS WITHOUT CEREBRAL INFARCTION, BILATERAL: ICD-10-CM

## 2020-02-21 PROCEDURE — 93880 EXTRACRANIAL BILAT STUDY: CPT

## 2020-02-22 PROCEDURE — 93880 EXTRACRANIAL BILAT STUDY: CPT | Performed by: SURGERY

## 2020-03-09 ENCOUNTER — OFFICE VISIT (OUTPATIENT)
Dept: VASCULAR SURGERY | Facility: CLINIC | Age: 70
End: 2020-03-09

## 2020-03-09 VITALS
TEMPERATURE: 98.9 F | WEIGHT: 200 LBS | SYSTOLIC BLOOD PRESSURE: 145 MMHG | HEART RATE: 74 BPM | RESPIRATION RATE: 16 BRPM | HEIGHT: 72 IN | BODY MASS INDEX: 27.09 KG/M2 | DIASTOLIC BLOOD PRESSURE: 86 MMHG

## 2020-03-09 DIAGNOSIS — Z87.891: ICD-10-CM

## 2020-03-09 DIAGNOSIS — I10 ESSENTIAL HYPERTENSION: Primary | ICD-10-CM

## 2020-03-09 DIAGNOSIS — M54.2 NECK PAIN: ICD-10-CM

## 2020-03-09 DIAGNOSIS — E78.2 HYPERLIPIDEMIA, MIXED: ICD-10-CM

## 2020-03-09 DIAGNOSIS — Z98.890 S/P CAROTID ENDARTERECTOMY: ICD-10-CM

## 2020-03-09 PROCEDURE — 3077F SYST BP >= 140 MM HG: CPT | Performed by: PHYSICIAN ASSISTANT

## 2020-03-09 PROCEDURE — 2022F DILAT RTA XM EVC RTNOPTHY: CPT | Performed by: PHYSICIAN ASSISTANT

## 2020-03-09 PROCEDURE — 99024 POSTOP FOLLOW-UP VISIT: CPT | Performed by: PHYSICIAN ASSISTANT

## 2020-03-09 PROCEDURE — 1111F DSCHRG MED/CURRENT MED MERGE: CPT | Performed by: PHYSICIAN ASSISTANT

## 2020-03-09 PROCEDURE — 1160F RVW MEDS BY RX/DR IN RCRD: CPT | Performed by: PHYSICIAN ASSISTANT

## 2020-03-09 PROCEDURE — 3008F BODY MASS INDEX DOCD: CPT | Performed by: PHYSICIAN ASSISTANT

## 2020-03-09 PROCEDURE — 3079F DIAST BP 80-89 MM HG: CPT | Performed by: PHYSICIAN ASSISTANT

## 2020-03-09 NOTE — PROGRESS NOTES
Assessment/Plan:    LEFT carotid endarterectomy  Carotid artery stenosis  Hypertension  Neck pain after L CEA  Tobacco (quit < 1 year; using nicotine lozengers)    -decreased ROM in the neck and some tingling in the fingers of R/L hands    -resuming all activities and he has no significant limitations    -no problems with the incision site   -no symptoms of TIA/stroke  Neck - incision is closed  VAS Carotid duplex 2/21/20  R prox VERA 50-69%; 186/77/2 98  L ICA widely patent CEA site; 49/9/0 59    Plan:  Stable after left carotid endarterectomy  The incision site is closed  Carotid artery duplex shows widely patent left endarterectomy site  We continue to monitor carotid duplex every 6 months  - check carotid duplex in 6 months  - follow up office visit at 9 months postop (due October '20)  - Continue with asa/Plavix/atorvastatin 20  - Blood pressure elevated in the office today; check blood pressure at home and discuss with PCP  - Neck pain - Tylenol, heat/ice and range of motion exercises; referral for orthopedist, if it doesn't get better; hold off on physical therapy  - We discussed reasons to be seen sooner      Subjective:      Patient ID: Dina Zepeda is a 81 y o  male  Pt is here to review his CV of 2/21/2020  Pt had a Left CEA on 1/13/2020 by Dr Leartis Brittle  He was last seen on 1/21/2020 for a S/P Left CEA visit  Pt has DM Type 2, HTN, CAD, HLD, Bilateral Carotid Artery Stenosis  He is currently taking ASA, Plavix and Lipitor  HPI   Dina Zepeda 80 y/o M Htn,  Hyperlipidemia, type 2 diabetes mellitus, coronary artery disease, tobacco (quit <1 yr ), carotid artery disease who is s/p LEFT Carotid endarterectomy 01/13/2020  Patient had a critical left internal carotid artery stenosis with concern for a "string sign" on CTA  He underwent elective carotid endarterectomy which was noted to be a technically challenging case which required extensive dissection   He presents for second post-op visit and to review carotid artery duplex  3/9/20: 2 months post-operative after LEFT carotid endarterectomy  Patient reports that he has decreased ROM in the neck and some tingling in the fingers of R/L hands  He is resuming all activities and he has no significant limitations  He has no problems with the incision site  The patient has no symptoms of TIA/stroke  He denies transient visual loss, burry vision, difficulty speaking, facial numbness/weakness and arm/leg weakness  VAS Carotid duplex 2/21/20  R prox VERA 50-69%; 186/77/2 98  L ICA widely patent CEA site; 49/9/0 59    We reviewed recent labs  Lipid panel profile: total cholesterol 167 triglycerides 126 HDL 45 LDL 97  LDL goal less than 70  Hemoglobin A1c stable at 6 7    The following portions of the patient's history were reviewed and updated as appropriate: allergies, current medications, past family history, past medical history, past social history, past surgical history and problem list     Review of Systems   Constitutional: Negative  Negative for chills, fever and unexpected weight change  HENT: Negative  Eyes: Negative  Negative for visual disturbance  Respiratory: Negative  Negative for cough, chest tightness, shortness of breath and wheezing  Cardiovascular: Negative  Negative for chest pain, palpitations and leg swelling  Gastrointestinal: Negative  Negative for abdominal distention, abdominal pain, blood in stool, constipation, diarrhea and nausea  Endocrine: Negative  Negative for cold intolerance and heat intolerance  Genitourinary: Negative  Negative for difficulty urinating, frequency and hematuria  Musculoskeletal: Negative  Negative for arthralgias and myalgias  Skin: Negative  Negative for color change and wound  Neurological: Negative  Negative for dizziness, tremors, syncope, speech difficulty, weakness, light-headedness, numbness and headaches  Hematological: Negative  Does not bruise/bleed easily  Psychiatric/Behavioral: Negative  Negative for confusion  Objective:      /86 (BP Location: Right arm, Patient Position: Sitting, Cuff Size: Adult)   Pulse 74   Temp 98 9 °F (37 2 °C) (Tympanic)   Resp 16   Ht 6' (1 829 m)   Wt 90 7 kg (200 lb)   BMI 27 12 kg/m²           LEFT neck incision - closed; no redness, drainage  Minimal neck swelling after surgery  There seems to be mild scar tissue  Decreased ROM at the neck when turning head left and right  Physical Exam   Constitutional: He is oriented to person, place, and time  He appears well-developed and well-nourished  He is cooperative  HENT:   Head: Normocephalic and atraumatic  Eyes: Pupils are equal, round, and reactive to light  EOM are normal    Neck: Trachea normal  Neck supple  No JVD present  L neck surgical incision   Cardiovascular: Normal rate, regular rhythm, S1 normal, S2 normal and normal heart sounds  Exam reveals no gallop and no friction rub  No murmur heard  Pulses:       Carotid pulses are 2+ on the right side, and 2+ on the left side  Radial pulses are 2+ on the right side, and 2+ on the left side  Dorsalis pedis pulses are 2+ on the right side, and 2+ on the left side  Pulmonary/Chest: Effort normal and breath sounds normal  No accessory muscle usage  No respiratory distress  He has no wheezes  He has no rales  Abdominal: Soft  Bowel sounds are normal  He exhibits no distension  There is no hepatosplenomegaly  There is no tenderness  Musculoskeletal: Normal range of motion  He exhibits no edema or deformity  Neurological: He is alert and oriented to person, place, and time  Grossly normal    Skin: Skin is warm and dry  No lesion and no rash noted  No cyanosis  Nails show no clubbing  Psychiatric: He has a normal mood and affect  Nursing note and vitals reviewed          I have reviewed and made appropriate changes to the review of systems input by the medical assistant  Vitals:    03/09/20 1131 03/09/20 1133   BP: 152/99 145/86   BP Location: Left arm Right arm   Patient Position: Sitting Sitting   Cuff Size: Adult Adult   Pulse: 74    Resp: 16    Temp: 98 9 °F (37 2 °C)    TempSrc: Tympanic    Weight: 90 7 kg (200 lb)    Height: 6' (1 829 m)        Patient Active Problem List   Diagnosis    Essential hypertension    Hyperlipidemia, mixed    Type 2 diabetes mellitus without complication, without long-term current use of insulin (HCC)    Ventral hernia without obstruction or gangrene    Soft tissue mass    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HCC)    Lymphadenopathy    Cigarette nicotine dependence without complication    Prostate cancer screening    Lymphadenopathy, anterior cervical    Congestive heart failure, NYHA class 1, acute, systolic (HCC)    Coronary artery disease due to lipid rich plaque    Carotid artery stenosis without cerebral infarction, bilateral    Cerebral aneurysm    Wheeze    S/P Left carotid endarterectomy 1/13/20    Stopped smoking between 3 and 6 months ago       Past Surgical History:   Procedure Laterality Date    ABDOMINAL SURGERY      COLONOSCOPY      INGUINAL HERNIA REPAIR Left     LAPAROSCOPIC COLON RESECTION      UT COLONOSCOPY FLX DX W/COLLJ SPEC WHEN PFRMD N/A 11/3/2017    Procedure: COLONOSCOPY;  Surgeon: Catina Garcia MD;  Location: [facility];   Service: Colorectal    UT THROMBOENDARTECTMY NECK,NECK INCIS Left 1/13/2020    Procedure: ENDARTERECTOMY ARTERY CAROTID;  Surgeon: Ryan Suazo MD;  Location: [facility];  Service: Vascular       Family History   Problem Relation Age of Onset    Colon cancer Father     Colon cancer Paternal Grandfather     No Known Problems Mother     Colon cancer Family        Social History     Socioeconomic History    Marital status: /Civil Union     Spouse name: Not on file    Number of children: Not on file    Years of education: Not on file   Shikha Moon Highest education level: Not on file   Occupational History    Not on file   Social Needs    Financial resource strain: Not on file    Food insecurity:     Worry: Not on file     Inability: Not on file    Transportation needs:     Medical: Not on file     Non-medical: Not on file   Tobacco Use    Smoking status: Former Smoker     Last attempt to quit: 2019     Years since quittin 4    Smokeless tobacco: Never Used    Tobacco comment: cigar every 2 days  Per [employer]: Current every day smoker   Substance and Sexual Activity    Alcohol use:  Yes     Alcohol/week: 1 0 standard drinks     Types: 1 Cans of beer per week     Frequency: 4 or more times a week     Drinks per session: 1 or 2     Comment: socially     Drug use: No    Sexual activity: Not Currently   Lifestyle    Physical activity:     Days per week: Not on file     Minutes per session: Not on file    Stress: Not on file   Relationships    Social connections:     Talks on phone: Not on file     Gets together: Not on file     Attends [employer] service: Not on file     Active member of club or organization: Not on file     Attends meetings of clubs or organizations: Not on file     Relationship status: Not on file    Intimate partner violence:     Fear of current or ex partner: Not on file     Emotionally abused: Not on file     Physically abused: Not on file     Forced sexual activity: Not on file   Other Topics Concern    Not on file   Social History Narrative    Caffeine use    Uses safety equipment: seatbelts       Allergies   Allergen Reactions    Other Hives     Soft shell crabs hives         Current Outpatient Medications:     acetaminophen (TYLENOL) 325 mg tablet, Take 2 tablets (650 mg total) by mouth every 6 (six) hours as needed for mild pain, headaches or fever, Disp: 30 tablet, Rfl: 0    aspirin 81 mg chewable tablet, Chew 1 tablet (81 mg total) daily, Disp: 60 tablet, Rfl: 3    atorvastatin (LIPITOR) 20 mg tablet, Take 0 5 tablets (10 mg total) by mouth daily, Disp: 60 tablet, Rfl: 3    carvedilol (COREG) 3 125 mg tablet, Take 1 tablet (3 125 mg total) by mouth 2 (two) times a day with meals, Disp: 180 tablet, Rfl: 0    clopidogrel (PLAVIX) 75 mg tablet, Take 1 tablet (75 mg total) by mouth daily, Disp: 90 tablet, Rfl: 3    glucose blood test strip, Use as instructed, Disp: 100 each, Rfl: 2    JANUVIA 25 MG tablet, TAKE 1 TABLET BY MOUTH  DAILY (Patient taking differently: Take 25 mg by mouth daily after breakfast ), Disp: 90 tablet, Rfl: 0    losartan (COZAAR) 100 MG tablet, Take 1 tablet (100 mg total) by mouth daily, Disp: 90 tablet, Rfl: 0    metFORMIN (GLUCOPHAGE) 1000 MG tablet, Take 1 tablet (1,000 mg total) by mouth 2 (two) times a day with meals, Disp: 180 tablet, Rfl: 3    nicotine polacrilex (COMMIT) 2 MG lozenge, Apply 1 lozenge (2 mg total) to the mouth or throat as needed for smoking cessation, Disp: 100 each, Rfl: 0

## 2020-03-09 NOTE — LETTER
March 9, 2020     Pablo Benitez, [address]  [facility]  [address]    Patient: Wil Wallace   YOB: 1950   Date of Visit: 3/9/2020     Dear Dr Tai Shaw      Thank you for referring Wil Wallace to me for evaluation  Below are the relevant portions of my assessment and plan of care  If you have questions, please do not hesitate to call me  I look forward to following Aziza Mohr along with you  Sincerely,        Hayde Kyle PA-C        CC: No Recipients    Progress Notes:      Assessment/Plan:    LEFT carotid endarterectomy  Carotid artery stenosis  Hypertension  Neck pain after L CEA  Tobacco (quit < 1 year; using nicotine lozengers)    -decreased ROM in the neck and some tingling in the fingers of R/L hands    -resuming all activities and he has no significant limitations    -no problems with the incision site   -no symptoms of TIA/stroke  Neck - incision is closed  VAS Carotid duplex 2/21/20  R prox VERA 50-69%; 186/77/2 98  L ICA widely patent CEA site; 49/9/0 59    Plan:  Stable after left carotid endarterectomy  The incision site is closed  Carotid artery duplex shows widely patent left endarterectomy site  We continue to monitor carotid duplex every 6 months       - check carotid duplex in 6 months  - follow up office visit at 9 months postop (due October '20)  - Continue with asa/Plavix/atorvastatin 20  - Blood pressure elevated in the office today; check blood pressure at home and discuss with PCP  - Neck pain - Tylenol, heat/ice and range of motion exercises; referral for orthopedist, if it doesn't get better; hold off on physical therapy  - We discussed reasons to be seen sooner

## 2020-03-09 NOTE — PATIENT INSTRUCTIONS
LEFT carotid endarterectomy  Carotid artery stenosis  Hypertension  Neck pain    - check carotid duplex in 6 months  - follow up office visit at 9 months postop which is due in October  - we discussed reasons to be seen sooner  - Blood pressure elevated in the office today; check blood pressure at home and discuss with PCP  - Neck pain - Tylenol, heat/ice and range of motion exercises; referral for orthopedist, if it doesn't get better; hold off on physical therapy      Symptoms of stroke:  - Unable to speak or understand speech  - Unable to move one side of the body (arm or leg)  - Visual changes  - Call 911 for any symptoms of stroke    Healthy Lifestyle changes  - If you smoke, you should quit smoking  - Stay active with regular activity  - Maintain a healthy weight  - Eat low fat, low cholesterol, diabetic diet  - Maintain good blood pressure        Carotid Artery Disease   AMBULATORY CARE:   Carotid artery disease  is a condition that causes narrow or blocked carotid arteries  Your carotid arteries are the blood vessels that supply your brain with most of the blood it needs to work  You have 2 carotid arteries, one on each side of your neck  Call 911 for any of the following:   · You have any of the following signs of a stroke:      ¨ Numbness or drooping on one side of your face     ¨ Weakness in an arm or leg    ¨ Confusion or difficulty speaking    ¨ Dizziness, a severe headache, or vision loss    · You have any of the following signs of a heart attack:      ¨ Squeezing, pressure, or pain in your chest that lasts longer than 5 minutes or returns    ¨ Discomfort or pain in your back, neck, jaw, stomach, or arm     ¨ Trouble breathing    ¨ Nausea or vomiting    ¨ Lightheadedness or a sudden cold sweat, especially with chest pain or trouble breathing  Contact your healthcare provider if:   · You have questions or concerns about your condition or care      Signs and symptoms of carotid artery disease: You may have no signs or symptoms  Most commonly, carotid artery disease causes transient ischemic attacks (TIAs), or mini-strokes  You may have numbness, weakness, lack of movement, or vision or speech problems  A TIA goes away quickly and does not cause permanent damage  A TIA may be a warning sign that you are about to have a stroke  If you have any symptoms of a TIA or stroke, seek care immediately  Warning signs of a stroke: The word F A S T  can help you remember and recognize warning signs of a stroke  · F = Face:  One side of the face droops  · A = Arms:  One arm starts to drop when both arms are raised  · S = Speech:  Speech is slurred or sounds different than usual     · T = Time:  A person who is having a stroke needs to be seen immediately  A stroke is a medical emergency that needs immediate treatment  Some medicines and treatments work best if given within a few hours of a stroke  Treatment  for carotid artery disease depends on how narrow your arteries have become, your symptoms, and your general health  The goal of treatment is to lower your risk for a stroke  You may need any of the following:  · Take aspirin if directed  Your healthcare provider may suggest that you take an aspirin a day to prevent blood clots from forming in the carotid arteries  If your healthcare provider wants you to take aspirin daily, do not take acetaminophen or ibuprofen instead  · Control risk factors  High blood pressure, high cholesterol, heart disease, diabetes, and being overweight increase your risk for atherosclerosis  · Procedures can help open blocked arteries  A carotid endarterectomy is used to cut plaque out of the artery  An angioplasty is used to push the plaque against the artery wall with a balloon device  Sometimes a stent is placed during an angioplasty  A stent is a metal mesh tube that is placed in the artery to keep it open    Manage carotid artery disease:   · Eat a variety of healthy foods  Healthy foods include fruit, vegetables, whole-grain breads, low-fat dairy products, lean meat, and fish  Choose fish that are high in omega-3 fatty acids, such as salmon and fresh tuna  Ask your healthcare provider for more information on a heart healthy diet and the DASH eating plan  · Limit sodium (salt)  Sodium may increase your blood pressure  Add less table salt to your foods  Read food labels and choose foods that are low in sodium  Your healthcare provider may suggest you follow a low sodium diet  · Reach or maintain a healthy weight  Extra weight makes your heart work harder  Ask your healthcare provider how much you should weight  He can help you create a safe weight loss plan  Even a weight loss of 10% of your body weight can help your heart function better  · Exercise as directed  Exercise helps improve heart function and can help you manage your weight  Exercise can also help lower your cholesterol and blood sugar levels  Try to get at least 30 minutes of exercise at least 5 times each week  Try to be active every day  Your healthcare provider can help you create an exercise plan that works best for you  · Limit alcohol  Alcohol can increase your blood pressure and triglyceride levels  Men should limit alcohol to 2 drinks per day  Women should limit alcohol to 1 drink per day  A drink of alcohol is 12 ounces of beer, 5 ounces of wine, or 1½ ounces of liquor  · Do not smoke  Nicotine and other chemicals in cigarettes and cigars can cause heart and lung damage  Ask your healthcare provider for information if you currently smoke and need help to quit  E-cigarettes or smokeless tobacco still contain nicotine  Talk to your healthcare provider before you use these products  Follow up with your healthcare provider as directed:  Write down your questions so you remember to ask them during your visits    © 2017 [employer] Information is for End User's use only and may not be sold, redistributed or otherwise used for commercial purposes  All illustrations and images included in CareNotes® are the copyrighted property of A D A M , Inc  or [employer]  The above information is an  only  It is not intended as medical advice for individual conditions or treatments  Talk to your doctor, nurse or pharmacist before following any medical regimen to see if it is safe and effective for you  Neck Pain   AMBULATORY CARE:   Neck pain  may be sudden and increase quickly  You may instead feel pain build slowly over time  Neck pain may go away in a few days or weeks, or it may continue for months  The pain may come and go, or be worse with certain movements  The pain may be only in your neck, or it may move to your arms, back, or shoulders  You may also have pain that starts in another body area and moves to your neck  Some types of neck pain are permanent  Seek care immediately if:   · You have an injury that causes neck pain and shooting pain down your arms or legs  · Your neck pain suddenly becomes severe  · You have neck pain along with numbness, tingling, or weakness in your arms or legs  · You have a stiff neck, a headache, and a fever  Contact your healthcare provider if:   · You have new or worsening symptoms  · Your symptoms continue even after treatment  · You have questions or concerns about your condition or care  Treatment  may include any of the following, depending on what is causing your pain:  · Medicines  may be prescribed or recommended by your healthcare provider for pain  You may need medicine to treat nerve pain or to stop muscle spasms  Medicines may also be given to reduce inflammation  Your healthcare provider may inject medicine into a nerve to block pain  Over-the-counter NSAID medicine or acetaminophen may be recommended to help treat minor pain or inflammation      · Traction  is used to relieve pressure from nerves  Your head is gently pulled up and away from your neck  This stretches muscles and ligaments and makes more room for the spine  Your healthcare provider will tell you the kind of traction that will help your neck pain  Do not use traction devices at home unless directed by your healthcare provider  · Surgery  may be needed if the pain is severe or other treatments do not work  Surgery will not help every kind of neck pain  You may need surgery to stabilize a fractured bone or to remove a tumor  Surgery may also be used to widen a narrowed spinal column or to remove a disc from between neck bones  Manage or prevent neck pain:   · Rest your neck as directed  Do not make sudden movements, such as turning your head quickly  Your healthcare provider may recommend you wear a cervical collar for a short time  The collar will prevent you from moving your head  This will give your neck time to heal if an injury is causing your neck pain  Ask your healthcare provider when you can return to sports or other normal daily activities  · Apply heat as directed  Heat helps relieve pain and swelling  Use a heat wrap, or soak a small towel in warm water  Wring out the extra water  Apply the heat wrap or towel for 20 minutes every hour, or as directed  · Apply ice as directed  Ice helps relieve pain and swelling, and can help prevent tissue damage  Use an ice pack, or put ice in a bag  Cover the ice pack or back with a towel before you apply it to your neck  Apply the ice pack or ice for 15 minutes every hour, or as directed  Your healthcare provider can tell you how often to apply ice  · Do neck exercises as directed  Neck exercises help strengthen the muscles and increase range of motion  Your healthcare provider will tell you which exercises are right for you   He may give you instructions, or he may recommend that you work with a physical therapist  Your healthcare provider or therapist can make sure you are doing the exercises correctly  · Maintain good posture  Try to keep your head and shoulders lifted when you sit  If you work in front of a computer, make sure the monitor is at the right level  You should not need to look up down to see the screen  You should also not have to lean forward to be able to read what is on the screen  Make sure your keyboard, mouse, and other computer items are placed where you do not have to extend your shoulder to reach them  Get up often if you work in front of a computer or sit for long periods of time  Stretch or walk around to keep your neck muscles loose  Follow up with your healthcare provider as directed: Your healthcare provider may refer you to a specialist if your pain does not get better with treatment  Write down your questions so you remember to ask them during your visits  © 2017 [address] [employer] [employer] Information is for End User's use only and may not be sold, redistributed or otherwise used for commercial purposes  All illustrations and images included in CareNotes® are the copyrighted property of A D A M , Inc  or [employer] Vill[employer]  The above information is an  only  It is not intended as medical advice for individual conditions or treatments  Talk to your doctor, nurse or pharmacist before following any medical regimen to see if it is safe and effective for you

## 2020-03-24 DIAGNOSIS — E78.2 HYPERLIPIDEMIA, MIXED: ICD-10-CM

## 2020-03-24 DIAGNOSIS — E11.9 TYPE 2 DIABETES MELLITUS WITHOUT COMPLICATION, WITHOUT LONG-TERM CURRENT USE OF INSULIN (HCC): ICD-10-CM

## 2020-03-24 DIAGNOSIS — E78.01 FAMILIAL HYPERCHOLESTEROLEMIA: ICD-10-CM

## 2020-03-24 DIAGNOSIS — I10 ESSENTIAL HYPERTENSION: ICD-10-CM

## 2020-03-24 DIAGNOSIS — K43.9 VENTRAL HERNIA WITHOUT OBSTRUCTION OR GANGRENE: ICD-10-CM

## 2020-03-25 RX ORDER — SITAGLIPTIN 25 MG/1
TABLET, FILM COATED ORAL
Qty: 90 TABLET | Refills: 0 | Status: SHIPPED | OUTPATIENT
Start: 2020-03-25 | End: 2020-06-19

## 2020-04-04 DIAGNOSIS — I25.10 CAD (CORONARY ARTERY DISEASE): ICD-10-CM

## 2020-04-04 DIAGNOSIS — I10 ESSENTIAL HYPERTENSION: ICD-10-CM

## 2020-04-04 DIAGNOSIS — I25.10 CORONARY ARTERY DISEASE DUE TO LIPID RICH PLAQUE: ICD-10-CM

## 2020-04-04 DIAGNOSIS — I25.83 CORONARY ARTERY DISEASE DUE TO LIPID RICH PLAQUE: ICD-10-CM

## 2020-04-04 DIAGNOSIS — I65.23 CAROTID ARTERY STENOSIS WITHOUT CEREBRAL INFARCTION, BILATERAL: ICD-10-CM

## 2020-04-04 DIAGNOSIS — F17.210 CIGARETTE NICOTINE DEPENDENCE WITHOUT COMPLICATION: ICD-10-CM

## 2020-04-06 RX ORDER — ASPIRIN 81 MG/1
81 TABLET, CHEWABLE ORAL DAILY
Qty: 90 TABLET | Refills: 3 | Status: SHIPPED | OUTPATIENT
Start: 2020-04-06 | End: 2021-06-22 | Stop reason: SDUPTHER

## 2020-04-06 RX ORDER — POLYETHYLENE GLYCOL 3350 17 G
2 POWDER IN PACKET (EA) ORAL AS NEEDED
Qty: 100 EACH | Refills: 0 | Status: SHIPPED | OUTPATIENT
Start: 2020-04-06 | End: 2020-07-01

## 2020-05-16 DIAGNOSIS — I50.21 CONGESTIVE HEART FAILURE, NYHA CLASS 1, ACUTE, SYSTOLIC (HCC): ICD-10-CM

## 2020-05-18 RX ORDER — CARVEDILOL 3.12 MG/1
TABLET ORAL
Qty: 180 TABLET | Refills: 0 | Status: SHIPPED | OUTPATIENT
Start: 2020-05-18 | End: 2020-06-23 | Stop reason: SDUPTHER

## 2020-06-15 ENCOUNTER — APPOINTMENT (OUTPATIENT)
Dept: LAB | Facility: CLINIC | Age: 70
End: 2020-06-15
Payer: COMMERCIAL

## 2020-06-15 DIAGNOSIS — I25.10 CORONARY ARTERY DISEASE DUE TO LIPID RICH PLAQUE: ICD-10-CM

## 2020-06-15 DIAGNOSIS — I50.21 CONGESTIVE HEART FAILURE, NYHA CLASS 1, ACUTE, SYSTOLIC (HCC): ICD-10-CM

## 2020-06-15 DIAGNOSIS — K43.9 VENTRAL HERNIA WITHOUT OBSTRUCTION OR GANGRENE: ICD-10-CM

## 2020-06-15 DIAGNOSIS — E78.2 HYPERLIPIDEMIA, MIXED: ICD-10-CM

## 2020-06-15 DIAGNOSIS — I65.23 CAROTID ARTERY STENOSIS WITHOUT CEREBRAL INFARCTION, BILATERAL: ICD-10-CM

## 2020-06-15 DIAGNOSIS — I10 ESSENTIAL HYPERTENSION: ICD-10-CM

## 2020-06-15 DIAGNOSIS — I25.83 CORONARY ARTERY DISEASE DUE TO LIPID RICH PLAQUE: ICD-10-CM

## 2020-06-15 DIAGNOSIS — Z12.5 PROSTATE CANCER SCREENING: ICD-10-CM

## 2020-06-15 DIAGNOSIS — E11.9 TYPE 2 DIABETES MELLITUS WITHOUT COMPLICATION, WITHOUT LONG-TERM CURRENT USE OF INSULIN (HCC): ICD-10-CM

## 2020-06-15 LAB
ALBUMIN SERPL BCP-MCNC: 3.7 G/DL (ref 3.5–5)
ALP SERPL-CCNC: 47 U/L (ref 46–116)
ALT SERPL W P-5'-P-CCNC: 41 U/L (ref 12–78)
ANION GAP SERPL CALCULATED.3IONS-SCNC: 9 MMOL/L (ref 4–13)
AST SERPL W P-5'-P-CCNC: 21 U/L (ref 5–45)
BILIRUB SERPL-MCNC: 0.48 MG/DL (ref 0.2–1)
BUN SERPL-MCNC: 16 MG/DL (ref 5–25)
CALCIUM SERPL-MCNC: 8.9 MG/DL (ref 8.3–10.1)
CHLORIDE SERPL-SCNC: 107 MMOL/L (ref 100–108)
CHOLEST SERPL-MCNC: 173 MG/DL (ref 50–200)
CO2 SERPL-SCNC: 23 MMOL/L (ref 21–32)
CREAT SERPL-MCNC: 0.86 MG/DL (ref 0.6–1.3)
CREAT UR-MCNC: 142 MG/DL
EST. AVERAGE GLUCOSE BLD GHB EST-MCNC: 160 MG/DL
GFR SERPL CREATININE-BSD FRML MDRD: 90 ML/MIN/1.73SQ M
GLUCOSE P FAST SERPL-MCNC: 136 MG/DL (ref 65–99)
HBA1C MFR BLD: 7.2 %
HDLC SERPL-MCNC: 45 MG/DL
LDLC SERPL CALC-MCNC: 59 MG/DL (ref 0–100)
MICROALBUMIN UR-MCNC: 40.8 MG/L (ref 0–20)
MICROALBUMIN/CREAT 24H UR: 29 MG/G CREATININE (ref 0–30)
NONHDLC SERPL-MCNC: 128 MG/DL
POTASSIUM SERPL-SCNC: 4.3 MMOL/L (ref 3.5–5.3)
PROT SERPL-MCNC: 7.7 G/DL (ref 6.4–8.2)
PSA SERPL-MCNC: 5.1 NG/ML (ref 0–4)
SODIUM SERPL-SCNC: 139 MMOL/L (ref 136–145)
TRIGL SERPL-MCNC: 344 MG/DL

## 2020-06-15 PROCEDURE — 36415 COLL VENOUS BLD VENIPUNCTURE: CPT

## 2020-06-15 PROCEDURE — 80053 COMPREHEN METABOLIC PANEL: CPT

## 2020-06-15 PROCEDURE — 83036 HEMOGLOBIN GLYCOSYLATED A1C: CPT

## 2020-06-15 PROCEDURE — 3060F POS MICROALBUMINURIA REV: CPT | Performed by: UROLOGY

## 2020-06-15 PROCEDURE — 82043 UR ALBUMIN QUANTITATIVE: CPT | Performed by: NURSE PRACTITIONER

## 2020-06-15 PROCEDURE — 82570 ASSAY OF URINE CREATININE: CPT | Performed by: NURSE PRACTITIONER

## 2020-06-15 PROCEDURE — 3051F HG A1C>EQUAL 7.0%<8.0%: CPT | Performed by: UROLOGY

## 2020-06-15 PROCEDURE — 80061 LIPID PANEL: CPT

## 2020-06-15 PROCEDURE — G0103 PSA SCREENING: HCPCS

## 2020-06-18 DIAGNOSIS — K43.9 VENTRAL HERNIA WITHOUT OBSTRUCTION OR GANGRENE: ICD-10-CM

## 2020-06-18 DIAGNOSIS — E78.01 FAMILIAL HYPERCHOLESTEROLEMIA: ICD-10-CM

## 2020-06-18 DIAGNOSIS — E78.2 HYPERLIPIDEMIA, MIXED: ICD-10-CM

## 2020-06-18 DIAGNOSIS — I10 ESSENTIAL HYPERTENSION: ICD-10-CM

## 2020-06-18 DIAGNOSIS — E11.9 TYPE 2 DIABETES MELLITUS WITHOUT COMPLICATION, WITHOUT LONG-TERM CURRENT USE OF INSULIN (HCC): ICD-10-CM

## 2020-06-19 RX ORDER — SITAGLIPTIN 25 MG/1
TABLET, FILM COATED ORAL
Qty: 90 TABLET | Refills: 0 | Status: SHIPPED | OUTPATIENT
Start: 2020-06-19 | End: 2020-06-23 | Stop reason: SDUPTHER

## 2020-06-23 ENCOUNTER — OFFICE VISIT (OUTPATIENT)
Dept: FAMILY MEDICINE CLINIC | Facility: CLINIC | Age: 70
End: 2020-06-23
Payer: COMMERCIAL

## 2020-06-23 VITALS
DIASTOLIC BLOOD PRESSURE: 86 MMHG | WEIGHT: 200 LBS | SYSTOLIC BLOOD PRESSURE: 174 MMHG | OXYGEN SATURATION: 96 % | HEART RATE: 76 BPM | BODY MASS INDEX: 27.09 KG/M2 | HEIGHT: 72 IN | RESPIRATION RATE: 18 BRPM | TEMPERATURE: 98.1 F

## 2020-06-23 DIAGNOSIS — G89.29 CHRONIC RIGHT SHOULDER PAIN: ICD-10-CM

## 2020-06-23 DIAGNOSIS — I25.83 CORONARY ARTERY DISEASE DUE TO LIPID RICH PLAQUE: ICD-10-CM

## 2020-06-23 DIAGNOSIS — E11.9 TYPE 2 DIABETES MELLITUS WITHOUT COMPLICATION, WITHOUT LONG-TERM CURRENT USE OF INSULIN (HCC): Primary | ICD-10-CM

## 2020-06-23 DIAGNOSIS — K43.9 VENTRAL HERNIA WITHOUT OBSTRUCTION OR GANGRENE: ICD-10-CM

## 2020-06-23 DIAGNOSIS — R97.20 ELEVATED PSA: ICD-10-CM

## 2020-06-23 DIAGNOSIS — F17.210 CIGARETTE NICOTINE DEPENDENCE WITHOUT COMPLICATION: ICD-10-CM

## 2020-06-23 DIAGNOSIS — Z00.00 MEDICARE ANNUAL WELLNESS VISIT, SUBSEQUENT: ICD-10-CM

## 2020-06-23 DIAGNOSIS — E78.2 MIXED HYPERLIPIDEMIA: ICD-10-CM

## 2020-06-23 DIAGNOSIS — I25.10 CORONARY ARTERY DISEASE DUE TO LIPID RICH PLAQUE: ICD-10-CM

## 2020-06-23 DIAGNOSIS — E78.2 HYPERLIPIDEMIA, MIXED: ICD-10-CM

## 2020-06-23 DIAGNOSIS — M19.90 ARTHRITIS: ICD-10-CM

## 2020-06-23 DIAGNOSIS — M25.511 CHRONIC RIGHT SHOULDER PAIN: ICD-10-CM

## 2020-06-23 DIAGNOSIS — I10 ESSENTIAL HYPERTENSION: ICD-10-CM

## 2020-06-23 DIAGNOSIS — E78.01 FAMILIAL HYPERCHOLESTEROLEMIA: ICD-10-CM

## 2020-06-23 DIAGNOSIS — N52.8 OTHER MALE ERECTILE DYSFUNCTION: ICD-10-CM

## 2020-06-23 DIAGNOSIS — I50.21 CONGESTIVE HEART FAILURE, NYHA CLASS 1, ACUTE, SYSTOLIC (HCC): ICD-10-CM

## 2020-06-23 PROCEDURE — 3288F FALL RISK ASSESSMENT DOCD: CPT | Performed by: NURSE PRACTITIONER

## 2020-06-23 PROCEDURE — 3008F BODY MASS INDEX DOCD: CPT | Performed by: NURSE PRACTITIONER

## 2020-06-23 PROCEDURE — 4010F ACE/ARB THERAPY RXD/TAKEN: CPT | Performed by: NURSE PRACTITIONER

## 2020-06-23 PROCEDURE — 3051F HG A1C>EQUAL 7.0%<8.0%: CPT | Performed by: NURSE PRACTITIONER

## 2020-06-23 PROCEDURE — 3077F SYST BP >= 140 MM HG: CPT | Performed by: NURSE PRACTITIONER

## 2020-06-23 PROCEDURE — 99214 OFFICE O/P EST MOD 30 MIN: CPT | Performed by: NURSE PRACTITIONER

## 2020-06-23 PROCEDURE — 3060F POS MICROALBUMINURIA REV: CPT | Performed by: NURSE PRACTITIONER

## 2020-06-23 PROCEDURE — 1170F FXNL STATUS ASSESSED: CPT | Performed by: NURSE PRACTITIONER

## 2020-06-23 PROCEDURE — 3079F DIAST BP 80-89 MM HG: CPT | Performed by: NURSE PRACTITIONER

## 2020-06-23 PROCEDURE — 1125F AMNT PAIN NOTED PAIN PRSNT: CPT | Performed by: NURSE PRACTITIONER

## 2020-06-23 PROCEDURE — 4010F ACE/ARB THERAPY RXD/TAKEN: CPT | Performed by: UROLOGY

## 2020-06-23 PROCEDURE — 2022F DILAT RTA XM EVC RTNOPTHY: CPT | Performed by: NURSE PRACTITIONER

## 2020-06-23 PROCEDURE — 1160F RVW MEDS BY RX/DR IN RCRD: CPT | Performed by: NURSE PRACTITIONER

## 2020-06-23 PROCEDURE — 1101F PT FALLS ASSESS-DOCD LE1/YR: CPT | Performed by: NURSE PRACTITIONER

## 2020-06-23 PROCEDURE — 1036F TOBACCO NON-USER: CPT | Performed by: NURSE PRACTITIONER

## 2020-06-23 RX ORDER — LOSARTAN POTASSIUM 100 MG/1
100 TABLET ORAL DAILY
Qty: 90 TABLET | Refills: 0 | Status: SHIPPED | OUTPATIENT
Start: 2020-06-23 | End: 2020-10-30

## 2020-06-23 RX ORDER — AMLODIPINE BESYLATE 5 MG/1
5 TABLET ORAL DAILY
Qty: 90 TABLET | Refills: 2 | Status: SHIPPED | OUTPATIENT
Start: 2020-06-23 | End: 2020-10-26 | Stop reason: SDUPTHER

## 2020-06-23 RX ORDER — LOSARTAN POTASSIUM 100 MG/1
100 TABLET ORAL DAILY
Qty: 90 TABLET | Refills: 0 | Status: SHIPPED | OUTPATIENT
Start: 2020-06-23 | End: 2020-06-23 | Stop reason: SDUPTHER

## 2020-06-23 RX ORDER — CARVEDILOL 3.12 MG/1
3.12 TABLET ORAL 2 TIMES DAILY WITH MEALS
Qty: 180 TABLET | Refills: 3 | Status: SHIPPED | OUTPATIENT
Start: 2020-06-23 | End: 2020-10-26 | Stop reason: SDUPTHER

## 2020-06-23 RX ORDER — ATORVASTATIN CALCIUM 20 MG/1
10 TABLET, FILM COATED ORAL DAILY
Qty: 60 TABLET | Refills: 3 | Status: SHIPPED | OUTPATIENT
Start: 2020-06-23 | End: 2020-11-05 | Stop reason: SDUPTHER

## 2020-06-23 RX ORDER — SILDENAFIL 25 MG/1
25 TABLET, FILM COATED ORAL DAILY PRN
Qty: 10 TABLET | Refills: 0 | Status: SHIPPED | OUTPATIENT
Start: 2020-06-23 | End: 2022-01-04

## 2020-06-26 ENCOUNTER — TELEPHONE (OUTPATIENT)
Dept: CARDIOLOGY CLINIC | Facility: CLINIC | Age: 70
End: 2020-06-26

## 2020-07-01 ENCOUNTER — CONSULT (OUTPATIENT)
Dept: OBGYN CLINIC | Facility: MEDICAL CENTER | Age: 70
End: 2020-07-01
Payer: COMMERCIAL

## 2020-07-01 VITALS
HEART RATE: 80 BPM | DIASTOLIC BLOOD PRESSURE: 104 MMHG | HEIGHT: 72 IN | WEIGHT: 196.2 LBS | BODY MASS INDEX: 26.57 KG/M2 | SYSTOLIC BLOOD PRESSURE: 162 MMHG

## 2020-07-01 DIAGNOSIS — M19.011 OSTEOARTHRITIS OF GLENOHUMERAL JOINT, RIGHT: ICD-10-CM

## 2020-07-01 DIAGNOSIS — M75.111 NONTRAUMATIC INCOMPLETE TEAR OF RIGHT ROTATOR CUFF: Primary | ICD-10-CM

## 2020-07-01 DIAGNOSIS — M25.511 CHRONIC RIGHT SHOULDER PAIN: ICD-10-CM

## 2020-07-01 DIAGNOSIS — G89.29 CHRONIC RIGHT SHOULDER PAIN: ICD-10-CM

## 2020-07-01 PROCEDURE — 3060F POS MICROALBUMINURIA REV: CPT | Performed by: EMERGENCY MEDICINE

## 2020-07-01 PROCEDURE — 1160F RVW MEDS BY RX/DR IN RCRD: CPT | Performed by: EMERGENCY MEDICINE

## 2020-07-01 PROCEDURE — 1170F FXNL STATUS ASSESSED: CPT | Performed by: EMERGENCY MEDICINE

## 2020-07-01 PROCEDURE — 2022F DILAT RTA XM EVC RTNOPTHY: CPT | Performed by: EMERGENCY MEDICINE

## 2020-07-01 PROCEDURE — 3051F HG A1C>EQUAL 7.0%<8.0%: CPT | Performed by: EMERGENCY MEDICINE

## 2020-07-01 PROCEDURE — 1036F TOBACCO NON-USER: CPT | Performed by: EMERGENCY MEDICINE

## 2020-07-01 PROCEDURE — 99203 OFFICE O/P NEW LOW 30 MIN: CPT | Performed by: EMERGENCY MEDICINE

## 2020-07-01 PROCEDURE — 3080F DIAST BP >= 90 MM HG: CPT | Performed by: EMERGENCY MEDICINE

## 2020-07-01 PROCEDURE — 3077F SYST BP >= 140 MM HG: CPT | Performed by: EMERGENCY MEDICINE

## 2020-07-01 PROCEDURE — 3008F BODY MASS INDEX DOCD: CPT | Performed by: EMERGENCY MEDICINE

## 2020-07-01 RX ORDER — POLYETHYLENE GLYCOL 3350 17 G
2 POWDER IN PACKET (EA) ORAL AS NEEDED
COMMUNITY
End: 2020-10-26 | Stop reason: SDUPTHER

## 2020-07-01 NOTE — PATIENT INSTRUCTIONS
Follow up with your family physician for your elevated blood pressure reading in the office today  If you need help finding a Primary Care Provider, you can call [facility] at [phone]

## 2020-07-01 NOTE — PROGRESS NOTES
Assessment/Plan:    Diagnoses and all orders for this visit:    Nontraumatic incomplete tear of right rotator cuff  -     Ambulatory referral to Orthopedic Surgery; Future    Chronic right shoulder pain  -     Ambulatory referral to Sports Medicine  -     Ambulatory referral to Orthopedic Surgery; Future    Osteoarthritis of glenohumeral joint, right  -     Ambulatory referral to Sports Medicine  -     Ambulatory referral to Orthopedic Surgery; Future    Other orders  -     nicotine polacrilex (COMMIT) 2 MG lozenge; Apply 2 mg to the mouth or throat as needed for smoking cessation        Return if symptoms worsen or fail to improve  Chief Complaint:     Chief Complaint   Patient presents with    Right Shoulder - Pain       Subjective:   Patient ID: Ki Adams is a 81 y o  male  Patient presents for chronic right shoulder pain, OA and RTC tear  He has treated conservatively but is ready for surgical options including replacement  Review of Systems    The following portions of the patient's chart were reviewed and updated as appropriate: Allergie  Allergies   Allergen Reactions    Other Hives     Soft shell crabs hives   s   Allergen Reactions    Other Hives     Soft shell crabs hives      Diagnosis Date    CHF (congestive heart failure) (HCC)     Diabetes mellitus ([address] )     Diverticulitis     Heart disease     Hyperlipidemia     Hypertension     Kidney stone     Osteoarthritis     Vascular disorder        Past Surgical History:   Procedure Laterality Date    ABDOMINAL SURGERY      COLONOSCOPY      INGUINAL HERNIA REPAIR Left     LAPAROSCOPIC COLON RESECTION      HI COLONOSCOPY FLX DX W/COLLJ SPEC WHEN PFRMD N/A 11/3/2017    Procedure: COLONOSCOPY;  Surgeon: Josh South MD;  [facility];   Service: Colorectal    HI THROMBOENDARTECTMY Rosa Hernandez INCIS Left 1/13/2020    Procedure: ENDARTERECTOMY ARTERY CAROTID;  Surgeon: Jessica Sloan MD;  Location: [facility] OR;  Service: Vascular       Social History     Socioeconomic History    Marital status: /Civil Union     Spouse name: Not on file    Number of children: Not on file    Years of education: Not on file    Highest education level: Not on file   Occupational History    Not on file   Social Needs    Financial resource strain: Not on file    Food insecurity:     Worry: Not on file     Inability: Not on file    Transportation needs:     Medical: Not on file     Non-medical: Not on file   Tobacco Use    Smoking status: Former Smoker     Last attempt to quit: 2019     Years since quittin 7    Smokeless tobacco: Never Used    Tobacco comment: cigar every 2 days  Per [employer]: Current every day smoker   Substance and Sexual Activity    Alcohol use:  Yes     Alcohol/week: 1 0 standard drinks     Types: 1 Cans of beer per week     Frequency: 4 or more times a week     Drinks per session: 1 or 2     Comment: socially     Drug use: No    Sexual activity: Not Currently   Lifestyle    Physical activity:     Days per week: Not on file     Minutes per session: Not on file    Stress: Not on file   Relationships    Social connections:     Talks on phone: Not on file     Gets together: Not on file     Attends Congregation service: Not on file     Active member of club or organization: Not on file     Attends meetings of clubs or organizations: Not on file     Relationship status: Not on file    Intimate partner violence:     Fear of current or ex partner: Not on file     Emotionally abused: Not on file     Physically abused: Not on file     Forced sexual activity: Not on file   Other Topics Concern    Not on file   Social History Narrative    Caffeine use    Uses safety equipment: seatbelts       Family History   Problem Relation Age of Onset    Colon cancer Father     Colon cancer Paternal Grandfather     No Known Problems Mother     Colon cancer Family        Medications:    Current Outpatient Medications:     amLODIPine (NORVASC) 5 mg tablet, Take 1 tablet (5 mg total) by mouth daily, Disp: 90 tablet, Rfl: 2    aspirin 81 mg chewable tablet, Chew 1 tablet (81 mg total) daily, Disp: 90 tablet, Rfl: 3    atorvastatin (LIPITOR) 20 mg tablet, Take 0 5 tablets (10 mg total) by mouth daily, Disp: 60 tablet, Rfl: 3    carvedilol (COREG) 3 125 mg tablet, Take 1 tablet (3 125 mg total) by mouth 2 (two) times a day with meals, Disp: 180 tablet, Rfl: 3    clopidogrel (PLAVIX) 75 mg tablet, Take 1 tablet (75 mg total) by mouth daily, Disp: 90 tablet, Rfl: 3    glucose blood test strip, Use as instructed, Disp: 100 each, Rfl: 2    losartan (COZAAR) 100 MG tablet, Take 1 tablet (100 mg total) by mouth daily, Disp: 90 tablet, Rfl: 0    metFORMIN (GLUCOPHAGE) 1000 MG tablet, Take 1 tablet (1,000 mg total) by mouth 2 (two) times a day with meals, Disp: 180 tablet, Rfl: 3    nicotine polacrilex (COMMIT) 2 MG lozenge, Apply 2 mg to the mouth or throat as needed for smoking cessation, Disp: , Rfl:     sildenafil (VIAGRA) 25 MG tablet, Take 1 tablet (25 mg total) by mouth daily as needed for erectile dysfunction, Disp: 10 tablet, Rfl: 0    sitaGLIPtin (Januvia) 25 mg tablet, Take 1 tablet (25 mg total) by mouth daily, Disp: 90 tablet, Rfl: 2    Patient Active Problem List   Diagnosis    Essential hypertension    Mixed hyperlipidemia    Type 2 diabetes mellitus without complication, without long-term current use of insulin (HCC)    Ventral hernia without obstruction or gangrene    Soft tissue mass    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HCC)    Lymphadenopathy    Cigarette nicotine dependence without complication    Medicare annual wellness visit, subsequent    Lymphadenopathy, anterior cervical    Congestive heart failure, NYHA class 1, acute, systolic (HCC)    Coronary artery disease due to lipid rich plaque    Carotid artery stenosis without cerebral infarction, bilateral    Cerebral aneurysm    Wheeze  S/P Left carotid endarterectomy 1/13/20    Stopped smoking between 3 and 6 months ago    Neck pain    Arthritis    Familial hypercholesterolemia    BMI 27 0-27 9,adult    Chronic right shoulder pain       Objective:  BP (!) 162/104 Comment: DR IRBY  Pulse 80   Ht 6' (1 829 m)   Wt 89 kg (196 lb 3 2 oz)   BMI 26 61 kg/m²      Right Shoulder Exam     Comments:  Severely limited ROM             Physical Exam   Constitutional: He is oriented to person, place, and time  He appears well-developed and well-nourished  HENT:   Head: Normocephalic and atraumatic  Eyes: Conjunctivae are normal    Neck: Neck supple  Pulmonary/Chest: Effort normal    Neurological: He is alert and oriented to person, place, and time  Skin: Skin is warm and dry  Psychiatric: He has a normal mood and affect  His behavior is normal    Vitals reviewed  Neurologic Exam     Mental Status   Oriented to person, place, and time  Procedures    I have personally reviewed pertinent films in PACS  and I have personally reviewed the written report of the pertinent studies

## 2020-07-16 ENCOUNTER — OFFICE VISIT (OUTPATIENT)
Dept: OBGYN CLINIC | Facility: OTHER | Age: 70
End: 2020-07-16
Payer: COMMERCIAL

## 2020-07-16 ENCOUNTER — APPOINTMENT (OUTPATIENT)
Dept: RADIOLOGY | Facility: OTHER | Age: 70
End: 2020-07-16
Payer: COMMERCIAL

## 2020-07-16 VITALS
WEIGHT: 196.2 LBS | BODY MASS INDEX: 26.57 KG/M2 | DIASTOLIC BLOOD PRESSURE: 88 MMHG | SYSTOLIC BLOOD PRESSURE: 132 MMHG | HEIGHT: 72 IN

## 2020-07-16 DIAGNOSIS — M19.011 GLENOHUMERAL ARTHRITIS, RIGHT: Primary | ICD-10-CM

## 2020-07-16 DIAGNOSIS — M75.111 NONTRAUMATIC INCOMPLETE TEAR OF RIGHT ROTATOR CUFF: ICD-10-CM

## 2020-07-16 DIAGNOSIS — M25.511 CHRONIC RIGHT SHOULDER PAIN: ICD-10-CM

## 2020-07-16 DIAGNOSIS — M19.011 OSTEOARTHRITIS OF GLENOHUMERAL JOINT, RIGHT: ICD-10-CM

## 2020-07-16 DIAGNOSIS — G89.29 CHRONIC RIGHT SHOULDER PAIN: ICD-10-CM

## 2020-07-16 PROCEDURE — 1160F RVW MEDS BY RX/DR IN RCRD: CPT | Performed by: ORTHOPAEDIC SURGERY

## 2020-07-16 PROCEDURE — 2022F DILAT RTA XM EVC RTNOPTHY: CPT | Performed by: ORTHOPAEDIC SURGERY

## 2020-07-16 PROCEDURE — 99213 OFFICE O/P EST LOW 20 MIN: CPT | Performed by: ORTHOPAEDIC SURGERY

## 2020-07-16 PROCEDURE — 3051F HG A1C>EQUAL 7.0%<8.0%: CPT | Performed by: ORTHOPAEDIC SURGERY

## 2020-07-16 PROCEDURE — 3008F BODY MASS INDEX DOCD: CPT | Performed by: ORTHOPAEDIC SURGERY

## 2020-07-16 PROCEDURE — 3075F SYST BP GE 130 - 139MM HG: CPT | Performed by: ORTHOPAEDIC SURGERY

## 2020-07-16 PROCEDURE — 73030 X-RAY EXAM OF SHOULDER: CPT

## 2020-07-16 PROCEDURE — 3079F DIAST BP 80-89 MM HG: CPT | Performed by: ORTHOPAEDIC SURGERY

## 2020-07-16 PROCEDURE — 3060F POS MICROALBUMINURIA REV: CPT | Performed by: ORTHOPAEDIC SURGERY

## 2020-07-16 PROCEDURE — 1036F TOBACCO NON-USER: CPT | Performed by: ORTHOPAEDIC SURGERY

## 2020-07-16 PROCEDURE — 1170F FXNL STATUS ASSESSED: CPT | Performed by: ORTHOPAEDIC SURGERY

## 2020-07-16 RX ORDER — CHLORHEXIDINE GLUCONATE 0.12 MG/ML
15 RINSE ORAL ONCE
Status: CANCELLED | OUTPATIENT
Start: 2020-07-16 | End: 2020-07-16

## 2020-07-16 NOTE — PROGRESS NOTES
I personally examined the patient and reviewed the history provided  I agree with the note and the assessment and plan by Dr Martinez Viera MD      Briefly the patient is a 78 y o  male with a chief complaint of persistent right shoulder pain and lack of function who presents to the office for evaluation and treatment  Please refer to the documented HPI in the body of the note for details  Patient has been treated extensively in the past for osteoarthritis of the glenohumeral joint and was recommended for total shoulder arthroplasty in the past    Physical Exam: Blood pressure 132/88, height 6' (1 829 m), weight 89 kg (196 lb 3 2 oz)  Right shoulder globally limited range of motion with crepitation  Pain and weakness with abduction external rotation strength testing 3/5  Crepitation with strength testing    Radiology: I have personally reviewed the following images and my read follows  Right shoulder three views show severe glenohumeral osteoarthritis with central wear pattern and medialization of the glenoid to the base of the coracoid    Assessment:    Right glenohumeral osteoarthritis with severe medialization    Plan:    I reviewed with the patient that given his persistent symptoms despite appropriate nonoperative care he is a candidate for total shoulder arthroplasty and given the severe medialization to the base of the coracoid the only way I can reconstruct his lateralization is to perform reverse total shoulder arthroplasty and he is interested in proceeding forward with scheduling for reverse total shoulder arthroplasty in the fall  A thorough discussion was performed with the patient reviewing all operative and nonoperative options as well as the risks of the procedure    Risks discussed include but not limited to persistent pain, dislocation, loosening of the prosthesis, infection, need for further surgery including revision, any remaining rotator cuff rupture , neurovascular injury, as well as the risk of anesthesia  After this discussion all questions were answered and informed consent was obtained for Reverse Total Shoulder Arthroplasty  Patient will be seen in October to review and sign consents for right reverse total shoulder arthroplasty in the month of November  Assessment  Diagnoses and all orders for this visit:    Glenohumeral arthritis, right  -     CT upper extremity wo contrast right; Future      Osteoarthritis of glenohumeral joint, right  -     Ambulatory referral to Orthopedic Surgery  -     CT upper extremity wo contrast right; Future        Discussion and Plan:    -Long discussion was held with patient regarding his diagnosis and imaging; after risks and benefits were discussed decision made about pursuing surgical intervention in the near future in the form of shoulder arthroplasty anatomic vs reverse  -CT scan wo contrast ordered to better define anatomy and defects for surgical planning purposes  -Sling fitted today  -Follow up in 2 months with CT results and final discussion for potential date in November    Subjective:   Patient ID: Trey Rivera is a 78 y o  male      The patient presents with a chief complaint of right shoulder pain  The pain began several year(s) ago and is not associated with an acute injury  The patient describes the pain as aching and dull in intensity,  constant, awakening patient from sleep in timing, and localizes the pain to the  right globally  The pain is worse with movement, overhead work and raising arm over head and relieved by medication: NSAID used but not effective, a previous steriod injection several years ago  The pain is not associated with numbness and tingling  The pain is not associated with constitutional symptoms  The patient is awoken at night by the pain  The patient has had PT treatment with no benefit from it               The following portions of the patient's history were reviewed and updated as appropriate: allergies, current medications, past family history, past medical history, past social history, past surgical history and problem list     Review of Systems   Constitutional: Negative for fever and unexpected weight change  HENT: Negative for sore throat  Eyes: Negative for visual disturbance  Respiratory: Negative for cough  Cardiovascular: Negative for chest pain  Gastrointestinal: Negative for abdominal pain, nausea and vomiting  Endocrine: Negative for cold intolerance  Musculoskeletal: Positive for arthralgias and myalgias  Skin: Negative for rash and wound  Allergic/Immunologic: Negative for immunocompromised state  Neurological: Negative for weakness  Objective:  /88   Ht 6' (1 829 m)   Wt 89 kg (196 lb 3 2 oz)   BMI 26 61 kg/m²       Right Shoulder Exam     Tenderness   Right shoulder tenderness location: global     Range of Motion   Active abduction: 70   Passive abduction: 80   External rotation: 30   Forward flexion: 70   Internal rotation 0 degrees: Sacrum     Other   Erythema: absent  Sensation: normal  Pulse: present    Comments:  +crepitus  +mechanical block            Physical Exam   Constitutional: He is oriented to person, place, and time  He appears well-developed and well-nourished  HENT:   Head: Normocephalic and atraumatic  Eyes: Conjunctivae and EOM are normal    Neck: Normal range of motion  Neck supple  Cardiovascular: Normal rate and regular rhythm  Pulmonary/Chest: Breath sounds normal    Abdominal: Soft  Bowel sounds are normal    Musculoskeletal:   See ortho exam   Neurological: He is alert and oriented to person, place, and time  Skin: Skin is warm  Capillary refill takes less than 2 seconds  Nursing note and vitals reviewed  I have personally reviewed pertinent films in PACS and my interpretation is as follows      Xrays of right shoulder show severe degenerative changes of the right [facility] join with joint space narrowing and medialization

## 2020-07-16 NOTE — PATIENT INSTRUCTIONS
What to Expect Before and After Shoulder Replacement Surgery  You are being scheduled for a shoulder replacement by Dr Lexii Coulter to treat your shoulder condition  Here is some information which may help to answer questions that you may have  Please do not hesitate to reach out to our team to answer questions not addressed here  Before Surgery  You will be contacted the evening prior to your surgery to confirm the scheduled time of the procedure and when to arrive at the hospital    Do not eat or drink anything after midnight the night before your surgery so that the anesthesia can be performed safely  If you have been fitted for a sling in the office prior to the surgery please remember to bring it to the hospital   You will meet the anesthesiologist the morning of the surgery  The surgery is performed under a general anesthetic but they will also offer you a regional block (shot to numb the arm) to help control your post-operative pain as well as with a catheter that is left in place after the block  The catheter is connected to a small pump which will continue to provide numbing medicine and help prolong the pain control from the block  Unfortunately this catheter is not as effective as the initial block, but can still be very helpful in managing the pain  After Surgery and in the Hospital  The shoulder replacement surgery typically takes 60-90 minutes  When surgery is completed, your surgeon will update your family and friends on your condition and progress  You will remain in the recovery room for at least an hour or until the anesthesia has worn off and your blood pressure and pulse are stable  If you have pain, the nurses will give you medication  Once out of surgery, your surgeon will decide on how long you will be using a sling in order to protect and position your shoulder  However, this won't keep you from starting physical therapy    Exercises typically begin on the day after surgery with emphasis on moving the shoulder, wrist, and hand  The physical therapist will be provided with a detailed protocol but typically the first 6 weeks are used to regain range of motion and then strengthening is initiated  Starting strengthening exercises too early may lead to complications  When You Are Discharged from the [address] [place] [address] can expect to be released from the hospital the day after surgery (this may change if you have special needs or medical conditions)  Before you are released, the treatment team (Orthopaedic surgery residents, physician assistants and physical therapists) will talk with you about the importance of limiting any sudden or stressful movements to the arm for several weeks or longer  Activities that involve pushing, pulling, and lifting should not be done until you are given permission from your surgeon  Your First Day at [address] [place] [address] may need help with your daily activities, so it is a good idea to have family and friends prepared to help you  It is okay to remove the sling and let the arm hang at the side so that you can get cleaned and change your clothes  To put on a shirt, place the bad arm in first and then the good arm  Reverse to take it off  Don't forget to wear the sling every night for at least the first month after surgery, and never use your arm to push yourself up in bed or from a chair  The added weight on your shoulder may cause you to re-injure the joint  How to [place] in the First Week  You are encouraged to return to your normal eating and sleeping patterns as soon as possible  It is important for you to be active in order to control your weight and muscle tone  It is ok to increase your activity level and even perform light aerobic exercise (like walking or riding a stationary bike) within the first week or so if you are feeling up to it    If there is concern about these activates best to wait until the first post-operative visit and discuss this with the johnnie Veronica might be able to return to work within several days if you can perform your job while wearing a sling  Consult with your doctor, as this differs from patient to patient  However, if your job requires heavy lifting or climbing, there may be a delay for several months  Until you are seen for your first follow-up visit, please try and keep wound dry  It is okay to shower but try your best to keep the incision out of direct contact with the water (or consider using a waterproof bandage)  If it does gets wet please dry as best as possible afterwards  If you notice any drainage or a foul odor from your incision or your temperature goes above 101 5 degrees, please contact the office  What You Can Expect in the First Month  Your first post-operative appointment will be with Dr Lalo Miller physician assistant (PA) around 2 weeks after the surgery  You skin staples will be removed and X-rays will be obtained at that visit  Please understand that it is quite common to still experience pain at that time but the pain should be steadily improving  Hopefully physical therapy has already begun but if not it will be initiated at this visit and will continue for the 8-12 weeks  At about 12 weeks after surgery you will start a progressive strengthening program  Physical therapy is a deliberate process of not only strengthening your shoulder but also altering how you use your arm  It may be many months before your desired results are achieved, so do not get discouraged  Your shoulder will generally continue to improve steadily up to 6-8 months after surgery  After that point further improvement is very slow; although it has been shown that even after a year or more, activity can increase as muscle strength continues to improve  After the First Month at Home   Because each person heals differently, there are different recovery timelines  An average recovery period typically lasts about between 3-6 months  Talk with your surgeon about which activities will be appropriate for you once you have recovered

## 2020-08-12 PROBLEM — R97.20 ELEVATED PSA: Status: ACTIVE | Noted: 2020-08-12

## 2020-08-12 NOTE — PATIENT INSTRUCTIONS
PROSTATE CANCER SCREENING OVERVIEW    Prostate cancer screening involves testing for prostate cancer in men who have no symptoms of the disease  This testing can find cancer at an early stage  However, medical experts agree that prostate cancer screening should not be routinely ordered for all men and that screening can lead to both benefits and harms  This article is designed to review the advantages and disadvantages of prostate cancer screening  You should talk with your health care provider to decide what is best in your individual situation  WHAT IS PROSTATE CANCER? Prostate cancer is a cancer of the prostate, a small gland in men that is located below the bladder and in front of the rectum (figure 1)  The prostate produces fluid that helps carry sperm during ejaculation  Although many men are diagnosed with prostate cancer, most of them do not die from their cancer  Prostate cancer often grows so slowly that many men die of other causes before they even develop symptoms of prostate cancer  PROSTATE CANCER RISK FACTORS  Age -- All men are at risk for prostate cancer, but the risk greatly increases with older age  Prostate cancer is rarely found in men younger than 48years old  Ethnic background --  men develop prostate cancer more often than white and  men   men also are more likely to die of prostate cancer than white or  men  Family medical history -- Men who have a first-degree relative (a father or brother) with prostate cancer are more likely to develop the disease  Men with female relatives with breast cancer related to the breast cancer gene (BRCA) may also be more likely to develop prostate cancer  Diet -- A diet high in animal fat or low in vegetables may increase a man's risk of prostate cancer      PROSTATE CANCER SCREENING TESTS  Prostate cancer screening involves blood test that measures prostate-specific antigen (PSA)  Prostate-specific antigen (PSA) -- PSA is a protein produced by the prostate  The PSA test measures the amount of PSA in a sample of blood  Although many men with prostate cancer have an elevated PSA concentration, a high level does not necessarily mean there is a cancer  The most common cause for an elevated PSA is benign prostatic hyperplasia (BPH), a noncancerous enlargement of the prostate  Other causes include prostate infection (prostatitis), trauma (bicycle riding), and sexual activity  You should avoid ejaculating or riding a bike for at least 48 hours before having a PSA test  (See "Patient education: Benign prostatic hyperplasia (BPH) (Beyond the Basics)"  )    Rectal examination -- A rectal examination is sometimes recommended, along with measurement of the PSA, to screen for prostate cancer  However, studies have not shown that rectal examination is an effective screening test for prostate cancer when used alone  If the PSA test is positive -- A positive PSA test is not a reason to panic; noncancerous conditions are the most common causes for an abnormal test, particularly for PSA tests  On the other hand, a positive test should not be ignored  The first step in evaluating an elevated PSA is usually to repeat the test   You should avoid ejaculating and riding a bike for at least 48 hours before repeating the test  If the PSA remains elevated, a prostate biopsy or other testing is usually recommended  Prostate biopsy -- A prostate biopsy involves having a rectal ultrasound and use of a needle to obtain tissue samples from the prostate gland  The biopsy is usually performed in the office by a urologist (a doctor who specializes in treatment of urinary, bladder, and prostate issues)  After the procedure, most men feel sore and you may see some blood in the urine or semen, this can last for up to 4-6 weeks  Biopsies can rarely cause serious infections   Sometimes biopsies are guided by magnetic resonance imaging (MRI)  PROS AND CONS OF PROSTATE CANCER SCREENING    There are a number of arguments for and against prostate cancer screening  Arguments for screening -- Experts in favor of prostate cancer screening cite the following arguments:    ?Results from a large  study of prostate cancer screening found that men who had prostate-specific antigen (PSA) testing had a 20 percent lower chance of dying from prostate cancer after 13 years compared with men who did not have prostate cancer screening [1]  Men who had PSA testing also had a 30 percent lower chance of developing metastatic disease (cancer that has spread to other parts of the body) [2]  In another  study of prostate cancer screening, the mortality benefit was even larger to prostate cancer screening  (the [place] trial)    ? A substantial number of men die from prostate cancer every year and many more suffer from the complications of advanced disease  ? For men with an aggressive prostate cancer, the best chance for curing it is by finding it at an early stage and then treating it with surgery or radiation  Studies have shown that men who have prostate cancer detected by PSA screening tend to have earlier-stage cancer than men who have a cancer detected by other means  (See "Patient education: Treatment for advanced prostate cancer (Beyond the Basics)" and "Patient education: Prostate cancer treatment; stage I to III cancer (Beyond the Basics)" )    ? The five-year survival for men who have prostate cancer confined to the prostate gland (early stage) is nearly 100 percent; this drops to 27 percent for men whose cancer has spread to other areas of the body  However, many early-stage cancers are not aggressive, and the five-year survival for those will be nearly 100 percent even without any treatment [3]  ?The available screening tests are not perfect, but they are easy to perform and have fair accuracy    Arguments against screening -- Other arguments have also been made against screening:    ?Even though the  study found a benefit of prostate cancer screening, PSA testing prevented only about one prostate cancer death for every 1000 screened men after 13 years [1]  Furthermore, 76 percent of men with an abnormal PSA who had a prostate biopsy did not have prostate cancer  However, in the [place] study, the number needed to screen and treat was much lower indicating that prostate cancer screening is ineffective screening measure which decreases the morbidity and mortality of prostate cancer  ?Many prostate cancers detected with screening are unlikely to cause death or disability  Thus, a number of men will be diagnosed with cancer and potentially suffer the side effects of cancer treatment for cancers that never would have been found without prostate cancer screening  In other words, even if screening finds a cancer early, it is not clear in all cases that the cancer must be treated  IS PROSTATE CANCER SCREENING RIGHT FOR ME? The answer to this question is not the same for everyone  The table includes some questions you can ask yourself when weighing the potential risk and benefits of screening (table 1)  Professional organizations -- Major medical associations and societies, including the [employer] Task Force [4], American Cancer Society [5], American Urological Association [6], and many  cancer societies, agree that men should discuss screening with their health care providers  Men should be informed about the benefits and risks of prostate cancer screening and treatment and make decisions that best reflect their personal values and preferences  Age to first consider screening -- Screening discussions should begin at age 48 years for men at average risk for developing prostate cancer   Men with risk factors for prostate cancer (such as black men or men with a father or brother who had prostate cancer) may want to begin screening discussions at age 36 to 39 years  How often to perform screening -- Once screening begins, it should occur every two to four years (if continued testing is desired) and should include a PSA blood test   Age to stop screening -- Guidelines suggest stopping screening after age 71, though some experts would continue offering screening to very healthy men beyond that age  Screening not recommended -- Screening is generally not recommended for men whose life expectancy, or ability to undergo curative treatment, is limited by serious health problems  In these situations, the potential benefits of screening are outweighed by the likely harms  WHERE TO GET MORE INFORMATION  Your healthcare provider is the best source of information for questions and concerns related to your medical problem  This article will be updated as needed on our web site (www [email or web address])  Related topics for patients, as well as selected articles written for healthcare professionals, are also available  Some of the most relevant are listed below  Patient level information -- [employer] offers two types of patient education materials  The Basics -- The Basics patient education pieces answer the four or five key questions a patient might have about a given condition  These articles are best for patients who want a general overview and who prefer short, easy-to-read materials  Patient education: Prostate cancer screening (PSA tests) (The Basics)  Patient education: Prostate cancer (The Basics)  Patient education: Cancer screening (The Basics)  Patient education: Choosing treatment for low-risk localized prostate cancer (The Basics)  Beyond the Basics -- Beyond the Basics patient education pieces are longer, more sophisticated, and more detailed  These articles are best for patients who want in-depth information and are comfortable with some medical jargon    Patient education: Treatment for advanced prostate cancer (Beyond the Basics)  Patient education: Prostate cancer treatment; stage I to III cancer (Beyond the Basics)  Patient education: Benign prostatic hyperplasia (BPH) (Beyond the Basics)  Professional level information -- Professional level articles are designed to keep doctors and other health professionals up-to-date on the latest medical findings  These articles are thorough, long, and complex, and they contain multiple references to the research on which they are based  Professional level articles are best for people who are comfortable with a lot of medical terminology and who want to read the same materials their doctors are reading  Measurement of prostate-specific antigen  Screening for prostate cancer  The following organizations also provide reliable health information  ? TAYLOR David, [place]  3-999-1-CANCER  (www cancer gov/types/prostate)  ? American Society for Clinical Oncology (patient information website)  (www cancer  net)  ?  [facility] (patient and caregiver information website)  (www nccn org/patients)  ? [address]  0-474-SUT-2345  (Jerry Elder  org)  ? [employer]  (www [employer] gov/healthtopics  html)  ? [employer] Prostate Cancer Education and Support  (www ustoo  org/Detection-PSA-And-VIKRAM)

## 2020-08-12 NOTE — PROGRESS NOTES
Problem List Items Addressed This Visit        Endocrine    Type 2 diabetes mellitus without complication, without long-term current use of insulin (HCC)       Cardiovascular and Mediastinum    Congestive heart failure, NYHA class 1, acute, systolic (HCC)    Carotid artery stenosis without cerebral infarction, bilateral       Other    Cigarette nicotine dependence without complication - Primary    Elevated PSA    Relevant Orders    PSA, total and free          Discussion:    I discussed with the patient the discovery of the PSA molecule and its original use in determining the return of prostate cancer after definitive therapy  I described the normal function of the PSA molecule in the reproductive process and also discussed the detection of PSA in the blood  We discussed the controversial history of PSA screening for prostate cancer in the [place] as well as the risk of over detection and over treatment of prostate cancer by way of PSA screening  The patient understands that PSA blood testing is an imperfect way to screen for prostate cancer and that elevated PSA levels in the blood may also be caused by infection, inflammation, prostatic trauma or manipulation, urological procedures, or by benign prostatic enlargement  The role of the digital rectal examination in prostate cancer screening was also discussed and I discussed with him that there is large interobserver variability in the findings of digital rectal examination  We discussed the continued workup of elevated PSA or abnormal digital rectal examination in the form of the performance of a prostate biopsy  The preparation for, and steps of, an office-based transrectal ultrasound of prostate biopsy were described to the patient  Benefits of obtaining tissue for pathologic analysis were discussed with him and the risks of prostate biopsy were also discussed at length    These risks include but are not limited to infection, bleeding, pain, sepsis with need for admission to hospital, risk of change in sexual function, and risk of diagnosis with prostate cancer  Alternatives to prostate biopsy in the form of continued PSA and VIKRAM surveillance were also offered to him  All of his questions and concerns were answered and addressed with regard to that detailed above  Going forward we will plan to see him back in 6 months with a PSA prior and to interpret these values and see what he would like to do in terms of proceeding to biopsy or further PSA surveillance  I think that a good idea in his case would be to have a PSA of 10 as a cutoff for biopsy given his significant comorbidities  He has stopped smoking 9 months ago, this is going well, he is also trying to taper off of his nicotine replacement, I congratulated him for these efforts  Assessment and plan:       Please see problem oriented charting for the assessment plan of today's urological complaints    Jose Mike MD      Chief Complaint     Chief Complaint   Patient presents with    Elevated PSA     PSA 5 1 (06/15/20)         History of Present Illness     Tyson Loyd is a 78 y o  gentleman referred to me in consultation by MAURO Callaway for elevated PSA  A copy of today's consultation has been sent to the referring provider  In terms of a personal or family history of urologic malignancy or malady he denies this  Urinating well  On review of systems today he denies dysuria, incontinence, hesitancy of urination, gross hematuria, urgency, nocturia, he feels empty after voiding and stream quality is good  No aggravating or alleviating factors, no associated symptoms, no previous therapies    PSA is 5 1 as of 15th June 2020  I spoke with the patient about the controversial nature of prostate cancer screening, especially considering his comorbidities of diabetes, heart failure, active tobacco abuse, and coronary artery disease    I did tell him that it is often helpful to have a higher threshold for prostate biopsy in gentleman such as him, with considering biopsy with a PSA over 10 in proceeding with PSA surveillance in the meantime  After this discussion he wishes to proceed to PSA surveillance, his rectal examination shows a roughly 40 gram gland which is smooth and without nodules  He did stop smoking 9 months ago, this is going well  He does drink from time to time, this has not been a problem for him and states that this is social drinking  The following portions of the patient's history were reviewed and updated as appropriate: allergies, current medications, past family history, past medical history, past social history, past surgical history and problem list         Detailed Urologic History     - please refer to HPI    Review of Systems     Review of Systems   Constitutional: Negative  HENT: Negative  Eyes: Negative  Respiratory: Negative  Cardiovascular: Negative  Gastrointestinal: Negative  Endocrine: Negative  Genitourinary: Negative  Musculoskeletal: Positive for arthralgias and joint swelling (right shoulder pain, pending shoulder replacement)  Skin: Negative  Allergic/Immunologic: Negative  Neurological: Negative  Hematological: Negative  Psychiatric/Behavioral: Negative  Allergies     Allergies   Allergen Reactions    Other Hives     Soft shell crabs hives       Physical Exam     Physical Exam  Vitals signs and nursing note reviewed  Constitutional:       General: He is not in acute distress  Appearance: He is well-developed  He is not diaphoretic  HENT:      Head: Normocephalic and atraumatic  Eyes:      General:         Right eye: No discharge  Left eye: No discharge  Pupils: Pupils are equal, round, and reactive to light  Neck:      Musculoskeletal: Normal range of motion and neck supple  Thyroid: No thyromegaly  Trachea: No tracheal deviation  Cardiovascular:      Rate and Rhythm: Normal rate  Pulses: Normal pulses  Pulmonary:      Effort: Pulmonary effort is normal  No respiratory distress  Breath sounds: No stridor  No wheezing  Abdominal:      General: There is no distension  Palpations: Abdomen is soft  There is no mass  Tenderness: There is no abdominal tenderness  There is no guarding or rebound  Hernia: No hernia is present  Genitourinary:     Comments: Prostate 40 grams, smooth, no nodules  Musculoskeletal: Normal range of motion  General: No swelling, tenderness, deformity or signs of injury  Lymphadenopathy:      Cervical: No cervical adenopathy  Skin:     General: Skin is warm and dry  Coloration: Skin is not pale  Findings: No erythema or rash  Neurological:      Mental Status: He is alert and oriented to person, place, and time  Cranial Nerves: No cranial nerve deficit  Sensory: No sensory deficit  Motor: No weakness or abnormal muscle tone  Coordination: Coordination normal    Psychiatric:         Behavior: Behavior normal          Thought Content:  Thought content normal          Judgment: Judgment normal              Vital Signs  Vitals:    08/14/20 0856   BP: 124/62   BP Location: Right arm   Patient Position: Sitting   Cuff Size: Adult   Temp: 97 7 °F (36 5 °C)   Weight: 90 8 kg (200 lb 3 2 oz)   Height: 6' (1 829 m)         Current Medications       Current Outpatient Medications:     amLODIPine (NORVASC) 5 mg tablet, Take 1 tablet (5 mg total) by mouth daily, Disp: 90 tablet, Rfl: 2    aspirin 81 mg chewable tablet, Chew 1 tablet (81 mg total) daily, Disp: 90 tablet, Rfl: 3    atorvastatin (LIPITOR) 20 mg tablet, Take 0 5 tablets (10 mg total) by mouth daily, Disp: 60 tablet, Rfl: 3    carvedilol (COREG) 3 125 mg tablet, Take 1 tablet (3 125 mg total) by mouth 2 (two) times a day with meals, Disp: 180 tablet, Rfl: 3    clopidogrel (PLAVIX) 75 mg tablet, Take 1 tablet (75 mg total) by mouth daily, Disp: 90 tablet, Rfl: 3    glucose blood test strip, Use as instructed, Disp: 100 each, Rfl: 2    losartan (COZAAR) 100 MG tablet, Take 1 tablet (100 mg total) by mouth daily, Disp: 90 tablet, Rfl: 0    metFORMIN (GLUCOPHAGE) 1000 MG tablet, Take 1 tablet (1,000 mg total) by mouth 2 (two) times a day with meals, Disp: 180 tablet, Rfl: 3    nicotine polacrilex (COMMIT) 2 MG lozenge, Apply 2 mg to the mouth or throat as needed for smoking cessation, Disp: , Rfl:     sildenafil (VIAGRA) 25 MG tablet, Take 1 tablet (25 mg total) by mouth daily as needed for erectile dysfunction, Disp: 10 tablet, Rfl: 0    sitaGLIPtin (Januvia) 25 mg tablet, Take 1 tablet (25 mg total) by mouth daily, Disp: 90 tablet, Rfl: 2      Active Problems     Patient Active Problem List   Diagnosis    Essential hypertension    Mixed hyperlipidemia    Type 2 diabetes mellitus without complication, without long-term current use of insulin (HCC)    Ventral hernia without obstruction or gangrene    Soft tissue mass    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HCC)    Lymphadenopathy    Cigarette nicotine dependence without complication    Medicare annual wellness visit, subsequent    Lymphadenopathy, anterior cervical    Congestive heart failure, NYHA class 1, acute, systolic ([facility])    Coronary artery disease due to lipid rich plaque    Carotid artery stenosis without cerebral infarction, bilateral    Cerebral aneurysm    Wheeze    S/P Left carotid endarterectomy 1/13/20    Stopped smoking between 3 and 6 months ago    Neck pain    Arthritis    Familial hypercholesterolemia    BMI 27 0-27 9,adult    Chronic right shoulder pain    Osteoarthritis of glenohumeral joint, right    Elevated PSA         Past Medical History     Past Medical History:   Diagnosis Date    CHF (congestive heart failure) ([facility])     Diabetes mellitus ([facility] [address] )     Diverticulitis     Heart disease     Hyperlipidemia  Hypertension     Kidney stone     Osteoarthritis     Vascular disorder          Surgical History     Past Surgical History:   Procedure Laterality Date    ABDOMINAL SURGERY      COLONOSCOPY      INGUINAL HERNIA REPAIR Left     LAPAROSCOPIC COLON RESECTION      LA COLONOSCOPY FLX DX W/COLLJ SPEC WHEN PFRMD N/A 11/3/2017    Procedure: COLONOSCOPY;  Surgeon: Kendrick Ahumada, MD;  [facility]; Service: Colorectal    LA THROMBOENDARTECTMY [place] Left 2020    Procedure: ENDARTERECTOMY ARTERY CAROTID;  Surgeon: Meri Montalvo MD;  Location[facility];  Service: Vascular         Family History     Family History   Problem Relation Age of Onset    Colon cancer Father     Colon cancer Paternal Grandfather     No Known Problems Mother     Colon cancer Family          Social History     Social History     Social History     Tobacco Use   Smoking Status Former Smoker    Last attempt to quit: 2019    Years since quittin 8   Smokeless Tobacco Never Used   Tobacco Comment    cigar every 2 days   Per [employer]: Current every day smoker         Pertinent Lab Values     Lab Results   Component Value Date    CREATININE 0 86 06/15/2020       Lab Results   Component Value Date    PSA 5 1 (H) 06/15/2020             Pertinent Imaging      There is no pertinent urological imaging for my review

## 2020-08-14 ENCOUNTER — OFFICE VISIT (OUTPATIENT)
Dept: UROLOGY | Facility: CLINIC | Age: 70
End: 2020-08-14
Payer: COMMERCIAL

## 2020-08-14 VITALS
WEIGHT: 200.2 LBS | SYSTOLIC BLOOD PRESSURE: 124 MMHG | TEMPERATURE: 97.7 F | BODY MASS INDEX: 27.12 KG/M2 | DIASTOLIC BLOOD PRESSURE: 62 MMHG | HEIGHT: 72 IN

## 2020-08-14 DIAGNOSIS — I65.23 CAROTID ARTERY STENOSIS WITHOUT CEREBRAL INFARCTION, BILATERAL: ICD-10-CM

## 2020-08-14 DIAGNOSIS — F17.210 CIGARETTE NICOTINE DEPENDENCE WITHOUT COMPLICATION: Primary | ICD-10-CM

## 2020-08-14 DIAGNOSIS — I50.21 CONGESTIVE HEART FAILURE, NYHA CLASS 1, ACUTE, SYSTOLIC (HCC): ICD-10-CM

## 2020-08-14 DIAGNOSIS — R97.20 ELEVATED PSA: ICD-10-CM

## 2020-08-14 DIAGNOSIS — E11.9 TYPE 2 DIABETES MELLITUS WITHOUT COMPLICATION, WITHOUT LONG-TERM CURRENT USE OF INSULIN (HCC): ICD-10-CM

## 2020-08-14 PROCEDURE — 99204 OFFICE O/P NEW MOD 45 MIN: CPT | Performed by: UROLOGY

## 2020-08-14 PROCEDURE — 1160F RVW MEDS BY RX/DR IN RCRD: CPT | Performed by: UROLOGY

## 2020-08-14 PROCEDURE — 3008F BODY MASS INDEX DOCD: CPT | Performed by: UROLOGY

## 2020-08-14 PROCEDURE — 3051F HG A1C>EQUAL 7.0%<8.0%: CPT | Performed by: UROLOGY

## 2020-08-14 PROCEDURE — 1036F TOBACCO NON-USER: CPT | Performed by: UROLOGY

## 2020-08-14 NOTE — LETTER
August 14, 2020     Darren Whittington, [address]  [facility]  [address]    Patient: Lawyer Villarreal   YOB: 1950   Date of Visit: 8/14/2020       Dear Dr Jose Guadalupe Massey:    Thank you for referring Lawyer Villarreal to me for evaluation  Below are my notes for this consultation  If you have questions, please do not hesitate to call me  I look forward to following your patient along with you  Sincerely,        Figueroa Edmondson MD        CC: No Recipients  Figueroa Edmondson MD  8/14/2020  9:42 AM  Sign when Signing Visit       Problem List Items Addressed This Visit        Endocrine    Type 2 diabetes mellitus without complication, without long-term current use of insulin (HCC)       Cardiovascular and Mediastinum    Congestive heart failure, NYHA class 1, acute, systolic (HCC)    Carotid artery stenosis without cerebral infarction, bilateral       Other    Cigarette nicotine dependence without complication - Primary    Elevated PSA    Relevant Orders    PSA, total and free          Discussion:    I discussed with the patient the discovery of the PSA molecule and its original use in determining the return of prostate cancer after definitive therapy  I described the normal function of the PSA molecule in the reproductive process and also discussed the detection of PSA in the blood  We discussed the controversial history of PSA screening for prostate cancer in the [place] as well as the risk of over detection and over treatment of prostate cancer by way of PSA screening  The patient understands that PSA blood testing is an imperfect way to screen for prostate cancer and that elevated PSA levels in the blood may also be caused by infection, inflammation, prostatic trauma or manipulation, urological procedures, or by benign prostatic enlargement      The role of the digital rectal examination in prostate cancer screening was also discussed and I discussed with him that there is large interobserver variability in the findings of digital rectal examination  We discussed the continued workup of elevated PSA or abnormal digital rectal examination in the form of the performance of a prostate biopsy  The preparation for, and steps of, an office-based transrectal ultrasound of prostate biopsy were described to the patient  Benefits of obtaining tissue for pathologic analysis were discussed with him and the risks of prostate biopsy were also discussed at length  These risks include but are not limited to infection, bleeding, pain, sepsis with need for admission to hospital, risk of change in sexual function, and risk of diagnosis with prostate cancer  Alternatives to prostate biopsy in the form of continued PSA and VIKRAM surveillance were also offered to him  All of his questions and concerns were answered and addressed with regard to that detailed above  Going forward we will plan to see him back in 6 months with a PSA prior and to interpret these values and see what he would like to do in terms of proceeding to biopsy or further PSA surveillance  I think that a good idea in his case would be to have a PSA of 10 as a cutoff for biopsy given his significant comorbidities  He has stopped smoking 9 months ago, this is going well, he is also trying to taper off of his nicotine replacement, I congratulated him for these efforts  Assessment and plan:       Please see problem oriented charting for the assessment plan of today's urological complaints    Jordan Damon MD      Chief Complaint     Chief Complaint   Patient presents with    Elevated PSA     PSA 5 1 (06/15/20)         History of Present Illness     Octavio Dunlap is a 78 y o  gentleman referred to me in consultation by MAURO Clancy for elevated PSA  A copy of today's consultation has been sent to the referring provider  In terms of a personal or family history of urologic malignancy or malady he denies this    Urinating well     On review of systems today he denies dysuria, incontinence, hesitancy of urination, gross hematuria, urgency, nocturia, he feels empty after voiding and stream quality is good  No aggravating or alleviating factors, no associated symptoms, no previous therapies    PSA is 5 1 as of 15th June 2020  I spoke with the patient about the controversial nature of prostate cancer screening, especially considering his comorbidities of diabetes, heart failure, active tobacco abuse, and coronary artery disease  I did tell him that it is often helpful to have a higher threshold for prostate biopsy in gentleman such as him, with considering biopsy with a PSA over 10 in proceeding with PSA surveillance in the meantime  After this discussion he wishes to proceed to PSA surveillance, his rectal examination shows a roughly 40 gram gland which is smooth and without nodules  He did stop smoking 9 months ago, this is going well  He does drink from time to time, this has not been a problem for him and states that this is social drinking  The following portions of the patient's history were reviewed and updated as appropriate: allergies, current medications, past family history, past medical history, past social history, past surgical history and problem list         Detailed Urologic History     - please refer to HPI    Review of Systems     Review of Systems   Constitutional: Negative  HENT: Negative  Eyes: Negative  Respiratory: Negative  Cardiovascular: Negative  Gastrointestinal: Negative  Endocrine: Negative  Genitourinary: Negative  Musculoskeletal: Positive for arthralgias and joint swelling (right shoulder pain, pending shoulder replacement)  Skin: Negative  Allergic/Immunologic: Negative  Neurological: Negative  Hematological: Negative  Psychiatric/Behavioral: Negative                Allergies     Allergies   Allergen Reactions    Other Hives     Soft shell crabs hives Physical Exam     Physical Exam  Vitals signs and nursing note reviewed  Constitutional:       General: He is not in acute distress  Appearance: He is well-developed  He is not diaphoretic  HENT:      Head: Normocephalic and atraumatic  Eyes:      General:         Right eye: No discharge  Left eye: No discharge  Pupils: Pupils are equal, round, and reactive to light  Neck:      Musculoskeletal: Normal range of motion and neck supple  Thyroid: No thyromegaly  Trachea: No tracheal deviation  Cardiovascular:      Rate and Rhythm: Normal rate  Pulses: Normal pulses  Pulmonary:      Effort: Pulmonary effort is normal  No respiratory distress  Breath sounds: No stridor  No wheezing  Abdominal:      General: There is no distension  Palpations: Abdomen is soft  There is no mass  Tenderness: There is no abdominal tenderness  There is no guarding or rebound  Hernia: No hernia is present  Genitourinary:     Comments: Prostate 40 grams, smooth, no nodules  Musculoskeletal: Normal range of motion  General: No swelling, tenderness, deformity or signs of injury  Lymphadenopathy:      Cervical: No cervical adenopathy  Skin:     General: Skin is warm and dry  Coloration: Skin is not pale  Findings: No erythema or rash  Neurological:      Mental Status: He is alert and oriented to person, place, and time  Cranial Nerves: No cranial nerve deficit  Sensory: No sensory deficit  Motor: No weakness or abnormal muscle tone  Coordination: Coordination normal    Psychiatric:         Behavior: Behavior normal          Thought Content:  Thought content normal          Judgment: Judgment normal              Vital Signs  Vitals:    08/14/20 0856   BP: 124/62   BP Location: Right arm   Patient Position: Sitting   Cuff Size: Adult   Temp: 97 7 °F (36 5 °C)   Weight: 90 8 kg (200 lb 3 2 oz)   Height: 6' (1 829 m)         Current Medications       Current Outpatient Medications:     amLODIPine (NORVASC) 5 mg tablet, Take 1 tablet (5 mg total) by mouth daily, Disp: 90 tablet, Rfl: 2    aspirin 81 mg chewable tablet, Chew 1 tablet (81 mg total) daily, Disp: 90 tablet, Rfl: 3    atorvastatin (LIPITOR) 20 mg tablet, Take 0 5 tablets (10 mg total) by mouth daily, Disp: 60 tablet, Rfl: 3    carvedilol (COREG) 3 125 mg tablet, Take 1 tablet (3 125 mg total) by mouth 2 (two) times a day with meals, Disp: 180 tablet, Rfl: 3    clopidogrel (PLAVIX) 75 mg tablet, Take 1 tablet (75 mg total) by mouth daily, Disp: 90 tablet, Rfl: 3    glucose blood test strip, Use as instructed, Disp: 100 each, Rfl: 2    losartan (COZAAR) 100 MG tablet, Take 1 tablet (100 mg total) by mouth daily, Disp: 90 tablet, Rfl: 0    metFORMIN (GLUCOPHAGE) 1000 MG tablet, Take 1 tablet (1,000 mg total) by mouth 2 (two) times a day with meals, Disp: 180 tablet, Rfl: 3    nicotine polacrilex (COMMIT) 2 MG lozenge, Apply 2 mg to the mouth or throat as needed for smoking cessation, Disp: , Rfl:     sildenafil (VIAGRA) 25 MG tablet, Take 1 tablet (25 mg total) by mouth daily as needed for erectile dysfunction, Disp: 10 tablet, Rfl: 0    sitaGLIPtin (Januvia) 25 mg tablet, Take 1 tablet (25 mg total) by mouth daily, Disp: 90 tablet, Rfl: 2      Active Problems     Patient Active Problem List   Diagnosis    Essential hypertension    Mixed hyperlipidemia    Type 2 diabetes mellitus without complication, without long-term current use of insulin (HCC)    Ventral hernia without obstruction or gangrene    Soft tissue mass    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HCC)    Lymphadenopathy    Cigarette nicotine dependence without complication    Medicare annual wellness visit, subsequent    Lymphadenopathy, anterior cervical    Congestive heart failure, NYHA class 1, acute, systolic (HCC)    Coronary artery disease due to lipid rich plaque    Carotid artery stenosis without cerebral infarction, bilateral    Cerebral aneurysm    Wheeze    S/P Left carotid endarterectomy 20    Stopped smoking between 3 and 6 months ago    Neck pain    Arthritis    Familial hypercholesterolemia    BMI 27 0-27 9,adult    Chronic right shoulder pain    Osteoarthritis of glenohumeral joint, right    Elevated PSA         Past Medical History     Past Medical History:   Diagnosis Date    CHF (congestive heart failure) (HCC)     Diabetes mellitus ([address] )     Diverticulitis     Heart disease     Hyperlipidemia     Hypertension     Kidney stone     Osteoarthritis     Vascular disorder          Surgical History     Past Surgical History:   Procedure Laterality Date    ABDOMINAL SURGERY      COLONOSCOPY      INGUINAL HERNIA REPAIR Left     LAPAROSCOPIC COLON RESECTION      PA COLONOSCOPY FLX DX W/COLLJ SPEC WHEN PFRMD N/A 11/3/2017    Procedure: COLONOSCOPY;  Surgeon: Noe Noguera MD;  Location: [facility]; Service: Colorectal    PA THROMBOENDARTECTMY Magaly Cardoso Left 2020    Procedure: ENDARTERECTOMY ARTERY CAROTID;  Surgeon: Eduin Bell MD;  Location: [facility];  Service: Vascular         Family History     Family History   Problem Relation Age of Onset    Colon cancer Father     Colon cancer Paternal Grandfather     No Known Problems Mother     Colon cancer Family          Social History     Social History     Social History     Tobacco Use   Smoking Status Former Smoker    Last attempt to quit: 2019    Years since quittin 8   Smokeless Tobacco Never Used   Tobacco Comment    cigar every 2 days   Per [employer]: Current every day smoker         Pertinent Lab Values     Lab Results   Component Value Date    CREATININE 0 86 06/15/2020       Lab Results   Component Value Date    PSA 5 1 (H) 06/15/2020             Pertinent Imaging      There is no pertinent urological imaging for my review

## 2020-09-10 ENCOUNTER — HOSPITAL ENCOUNTER (OUTPATIENT)
Dept: VASCULAR ULTRASOUND | Facility: HOSPITAL | Age: 70
Discharge: HOME | End: 2020-09-10
Payer: COMMERCIAL

## 2020-09-10 DIAGNOSIS — I65.23 CAROTID ARTERY STENOSIS WITHOUT CEREBRAL INFARCTION, BILATERAL: ICD-10-CM

## 2020-09-10 DIAGNOSIS — E78.2 HYPERLIPIDEMIA, MIXED: ICD-10-CM

## 2020-09-10 DIAGNOSIS — I10 ESSENTIAL HYPERTENSION: ICD-10-CM

## 2020-09-10 DIAGNOSIS — M54.2 NECK PAIN: ICD-10-CM

## 2020-09-10 DIAGNOSIS — Z87.891: ICD-10-CM

## 2020-09-10 DIAGNOSIS — Z98.890 S/P CAROTID ENDARTERECTOMY: ICD-10-CM

## 2020-09-10 PROCEDURE — 93880 EXTRACRANIAL BILAT STUDY: CPT

## 2020-09-10 PROCEDURE — 93880 EXTRACRANIAL BILAT STUDY: CPT | Performed by: SURGERY

## 2020-09-11 RX ORDER — CLOPIDOGREL BISULFATE 75 MG/1
TABLET ORAL
Qty: 90 TABLET | Refills: 3 | Status: SHIPPED | OUTPATIENT
Start: 2020-09-11 | End: 2021-07-29

## 2020-09-14 ENCOUNTER — HOSPITAL ENCOUNTER (OUTPATIENT)
Dept: CT IMAGING | Facility: HOSPITAL | Age: 70
Discharge: HOME | End: 2020-09-14
Payer: COMMERCIAL

## 2020-09-14 DIAGNOSIS — M25.511 CHRONIC RIGHT SHOULDER PAIN: ICD-10-CM

## 2020-09-14 DIAGNOSIS — G89.29 CHRONIC RIGHT SHOULDER PAIN: ICD-10-CM

## 2020-09-14 DIAGNOSIS — M75.111 NONTRAUMATIC INCOMPLETE TEAR OF RIGHT ROTATOR CUFF: ICD-10-CM

## 2020-09-14 DIAGNOSIS — M19.011 GLENOHUMERAL ARTHRITIS, RIGHT: ICD-10-CM

## 2020-09-14 DIAGNOSIS — M19.011 OSTEOARTHRITIS OF GLENOHUMERAL JOINT, RIGHT: ICD-10-CM

## 2020-09-14 PROCEDURE — 73200 CT UPPER EXTREMITY W/O DYE: CPT

## 2020-10-05 ENCOUNTER — TELEPHONE (OUTPATIENT)
Dept: PHYSICAL THERAPY | Facility: REHABILITATION | Age: 70
End: 2020-10-05

## 2020-10-21 ENCOUNTER — LAB (OUTPATIENT)
Dept: LAB | Facility: CLINIC | Age: 70
End: 2020-10-21
Payer: COMMERCIAL

## 2020-10-21 DIAGNOSIS — I25.10 CORONARY ARTERY DISEASE DUE TO LIPID RICH PLAQUE: ICD-10-CM

## 2020-10-21 DIAGNOSIS — F17.210 CIGARETTE NICOTINE DEPENDENCE WITHOUT COMPLICATION: ICD-10-CM

## 2020-10-21 DIAGNOSIS — K43.9 VENTRAL HERNIA WITHOUT OBSTRUCTION OR GANGRENE: ICD-10-CM

## 2020-10-21 DIAGNOSIS — E11.9 TYPE 2 DIABETES MELLITUS WITHOUT COMPLICATION, WITHOUT LONG-TERM CURRENT USE OF INSULIN (HCC): ICD-10-CM

## 2020-10-21 DIAGNOSIS — E78.2 HYPERLIPIDEMIA, MIXED: ICD-10-CM

## 2020-10-21 DIAGNOSIS — I50.21 CONGESTIVE HEART FAILURE, NYHA CLASS 1, ACUTE, SYSTOLIC (HCC): ICD-10-CM

## 2020-10-21 DIAGNOSIS — Z00.00 MEDICARE ANNUAL WELLNESS VISIT, SUBSEQUENT: ICD-10-CM

## 2020-10-21 DIAGNOSIS — I25.83 CORONARY ARTERY DISEASE DUE TO LIPID RICH PLAQUE: ICD-10-CM

## 2020-10-21 DIAGNOSIS — I10 ESSENTIAL HYPERTENSION: ICD-10-CM

## 2020-10-21 LAB
ALBUMIN SERPL BCP-MCNC: 4.2 G/DL (ref 3.5–5)
ALP SERPL-CCNC: 45 U/L (ref 46–116)
ALT SERPL W P-5'-P-CCNC: 49 U/L (ref 12–78)
ANION GAP SERPL CALCULATED.3IONS-SCNC: 3 MMOL/L (ref 4–13)
AST SERPL W P-5'-P-CCNC: 21 U/L (ref 5–45)
BILIRUB SERPL-MCNC: 0.73 MG/DL (ref 0.2–1)
BUN SERPL-MCNC: 21 MG/DL (ref 5–25)
CALCIUM SERPL-MCNC: 8.8 MG/DL (ref 8.3–10.1)
CHLORIDE SERPL-SCNC: 105 MMOL/L (ref 100–108)
CHOLEST SERPL-MCNC: 172 MG/DL (ref 50–200)
CO2 SERPL-SCNC: 29 MMOL/L (ref 21–32)
CREAT SERPL-MCNC: 0.88 MG/DL (ref 0.6–1.3)
EST. AVERAGE GLUCOSE BLD GHB EST-MCNC: 151 MG/DL
GFR SERPL CREATININE-BSD FRML MDRD: 88 ML/MIN/1.73SQ M
GLUCOSE P FAST SERPL-MCNC: 159 MG/DL (ref 65–99)
HBA1C MFR BLD: 6.9 %
HDLC SERPL-MCNC: 46 MG/DL
LDLC SERPL CALC-MCNC: 91 MG/DL (ref 0–100)
NONHDLC SERPL-MCNC: 126 MG/DL
POTASSIUM SERPL-SCNC: 4.4 MMOL/L (ref 3.5–5.3)
PROT SERPL-MCNC: 8.3 G/DL (ref 6.4–8.2)
SODIUM SERPL-SCNC: 137 MMOL/L (ref 136–145)
TRIGL SERPL-MCNC: 175 MG/DL

## 2020-10-21 PROCEDURE — 80053 COMPREHEN METABOLIC PANEL: CPT

## 2020-10-21 PROCEDURE — 3044F HG A1C LEVEL LT 7.0%: CPT | Performed by: ORTHOPAEDIC SURGERY

## 2020-10-21 PROCEDURE — 83036 HEMOGLOBIN GLYCOSYLATED A1C: CPT

## 2020-10-21 PROCEDURE — 36415 COLL VENOUS BLD VENIPUNCTURE: CPT

## 2020-10-21 PROCEDURE — 80061 LIPID PANEL: CPT

## 2020-10-22 ENCOUNTER — OFFICE VISIT (OUTPATIENT)
Dept: OBGYN CLINIC | Facility: OTHER | Age: 70
End: 2020-10-22
Payer: COMMERCIAL

## 2020-10-22 VITALS
BODY MASS INDEX: 27.63 KG/M2 | SYSTOLIC BLOOD PRESSURE: 145 MMHG | HEIGHT: 72 IN | HEART RATE: 82 BPM | DIASTOLIC BLOOD PRESSURE: 84 MMHG | WEIGHT: 204 LBS

## 2020-10-22 DIAGNOSIS — M54.2 NECK PAIN: ICD-10-CM

## 2020-10-22 DIAGNOSIS — G89.29 CHRONIC RIGHT SHOULDER PAIN: Primary | ICD-10-CM

## 2020-10-22 DIAGNOSIS — I25.83 CORONARY ARTERY DISEASE DUE TO LIPID RICH PLAQUE: ICD-10-CM

## 2020-10-22 DIAGNOSIS — I25.10 CORONARY ARTERY DISEASE DUE TO LIPID RICH PLAQUE: ICD-10-CM

## 2020-10-22 DIAGNOSIS — M19.011 OSTEOARTHRITIS OF GLENOHUMERAL JOINT, RIGHT: ICD-10-CM

## 2020-10-22 DIAGNOSIS — M25.511 CHRONIC RIGHT SHOULDER PAIN: Primary | ICD-10-CM

## 2020-10-22 PROCEDURE — 3079F DIAST BP 80-89 MM HG: CPT | Performed by: ORTHOPAEDIC SURGERY

## 2020-10-22 PROCEDURE — 1160F RVW MEDS BY RX/DR IN RCRD: CPT | Performed by: ORTHOPAEDIC SURGERY

## 2020-10-22 PROCEDURE — 3077F SYST BP >= 140 MM HG: CPT | Performed by: ORTHOPAEDIC SURGERY

## 2020-10-22 PROCEDURE — 1036F TOBACCO NON-USER: CPT | Performed by: ORTHOPAEDIC SURGERY

## 2020-10-22 PROCEDURE — 99214 OFFICE O/P EST MOD 30 MIN: CPT | Performed by: ORTHOPAEDIC SURGERY

## 2020-10-26 ENCOUNTER — TELEPHONE (OUTPATIENT)
Dept: FAMILY MEDICINE CLINIC | Facility: CLINIC | Age: 70
End: 2020-10-26

## 2020-10-26 ENCOUNTER — OFFICE VISIT (OUTPATIENT)
Dept: FAMILY MEDICINE CLINIC | Facility: CLINIC | Age: 70
End: 2020-10-26
Payer: COMMERCIAL

## 2020-10-26 VITALS
HEART RATE: 83 BPM | BODY MASS INDEX: 27.63 KG/M2 | OXYGEN SATURATION: 96 % | TEMPERATURE: 98.4 F | RESPIRATION RATE: 18 BRPM | DIASTOLIC BLOOD PRESSURE: 84 MMHG | HEIGHT: 72 IN | WEIGHT: 204 LBS | SYSTOLIC BLOOD PRESSURE: 132 MMHG

## 2020-10-26 DIAGNOSIS — Z01.818 PREOP EXAMINATION: ICD-10-CM

## 2020-10-26 DIAGNOSIS — F17.210 CIGARETTE NICOTINE DEPENDENCE WITHOUT COMPLICATION: ICD-10-CM

## 2020-10-26 DIAGNOSIS — I10 ESSENTIAL HYPERTENSION: ICD-10-CM

## 2020-10-26 DIAGNOSIS — I25.10 CORONARY ARTERY DISEASE DUE TO LIPID RICH PLAQUE: ICD-10-CM

## 2020-10-26 DIAGNOSIS — Z01.812 PRE-OPERATIVE LABORATORY EXAMINATION: Primary | ICD-10-CM

## 2020-10-26 DIAGNOSIS — M19.011 ARTHRITIS OF RIGHT SHOULDER REGION: ICD-10-CM

## 2020-10-26 DIAGNOSIS — I50.21 CONGESTIVE HEART FAILURE, NYHA CLASS 1, ACUTE, SYSTOLIC (HCC): ICD-10-CM

## 2020-10-26 DIAGNOSIS — K43.9 VENTRAL HERNIA WITHOUT OBSTRUCTION OR GANGRENE: ICD-10-CM

## 2020-10-26 DIAGNOSIS — M25.511 CHRONIC RIGHT SHOULDER PAIN: ICD-10-CM

## 2020-10-26 DIAGNOSIS — I25.83 CORONARY ARTERY DISEASE DUE TO LIPID RICH PLAQUE: ICD-10-CM

## 2020-10-26 DIAGNOSIS — E78.2 HYPERLIPIDEMIA, MIXED: ICD-10-CM

## 2020-10-26 DIAGNOSIS — E11.9 TYPE 2 DIABETES MELLITUS WITHOUT COMPLICATION, WITHOUT LONG-TERM CURRENT USE OF INSULIN (HCC): Primary | ICD-10-CM

## 2020-10-26 DIAGNOSIS — E78.2 MIXED HYPERLIPIDEMIA: ICD-10-CM

## 2020-10-26 DIAGNOSIS — E78.01 FAMILIAL HYPERCHOLESTEROLEMIA: ICD-10-CM

## 2020-10-26 DIAGNOSIS — G89.29 CHRONIC RIGHT SHOULDER PAIN: ICD-10-CM

## 2020-10-26 DIAGNOSIS — Z00.00 MEDICARE ANNUAL WELLNESS VISIT, SUBSEQUENT: ICD-10-CM

## 2020-10-26 PROCEDURE — 93000 ELECTROCARDIOGRAM COMPLETE: CPT | Performed by: NURSE PRACTITIONER

## 2020-10-26 PROCEDURE — 99213 OFFICE O/P EST LOW 20 MIN: CPT | Performed by: NURSE PRACTITIONER

## 2020-10-26 PROCEDURE — G0439 PPPS, SUBSEQ VISIT: HCPCS | Performed by: NURSE PRACTITIONER

## 2020-10-26 PROCEDURE — 1170F FXNL STATUS ASSESSED: CPT | Performed by: NURSE PRACTITIONER

## 2020-10-26 PROCEDURE — 1125F AMNT PAIN NOTED PAIN PRSNT: CPT | Performed by: NURSE PRACTITIONER

## 2020-10-26 RX ORDER — AMLODIPINE BESYLATE 5 MG/1
5 TABLET ORAL DAILY
Qty: 90 TABLET | Refills: 3 | Status: SHIPPED | OUTPATIENT
Start: 2020-10-26 | End: 2021-06-23 | Stop reason: SDUPTHER

## 2020-10-26 RX ORDER — POLYETHYLENE GLYCOL 3350 17 G
POWDER IN PACKET (EA) ORAL
Qty: 100 EACH | Refills: 8 | Status: SHIPPED | OUTPATIENT
Start: 2020-10-26 | End: 2021-03-01 | Stop reason: SDUPTHER

## 2020-10-26 RX ORDER — POLYETHYLENE GLYCOL 3350 17 G
2 POWDER IN PACKET (EA) ORAL AS NEEDED
Qty: 100 EACH | Refills: 8 | Status: SHIPPED | OUTPATIENT
Start: 2020-10-26 | End: 2020-10-26 | Stop reason: SDUPTHER

## 2020-10-26 RX ORDER — AMLODIPINE BESYLATE 5 MG/1
5 TABLET ORAL DAILY
Qty: 90 TABLET | Refills: 2 | Status: SHIPPED | OUTPATIENT
Start: 2020-10-26 | End: 2020-10-26 | Stop reason: SDUPTHER

## 2020-10-26 RX ORDER — CARVEDILOL 3.12 MG/1
3.12 TABLET ORAL 2 TIMES DAILY WITH MEALS
Qty: 180 TABLET | Refills: 3 | Status: SHIPPED | OUTPATIENT
Start: 2020-10-26 | End: 2020-11-05 | Stop reason: SDUPTHER

## 2020-10-29 DIAGNOSIS — I10 ESSENTIAL HYPERTENSION: ICD-10-CM

## 2020-10-30 PROCEDURE — 4010F ACE/ARB THERAPY RXD/TAKEN: CPT | Performed by: INTERNAL MEDICINE

## 2020-10-30 RX ORDER — LOSARTAN POTASSIUM 100 MG/1
TABLET ORAL
Qty: 90 TABLET | Refills: 3 | Status: SHIPPED | OUTPATIENT
Start: 2020-10-30 | End: 2021-09-23

## 2020-11-02 ENCOUNTER — APPOINTMENT (OUTPATIENT)
Dept: RADIOLOGY | Facility: CLINIC | Age: 70
End: 2020-11-02
Payer: COMMERCIAL

## 2020-11-02 DIAGNOSIS — I50.21 CONGESTIVE HEART FAILURE, NYHA CLASS 1, ACUTE, SYSTOLIC (HCC): ICD-10-CM

## 2020-11-02 DIAGNOSIS — F17.210 CIGARETTE NICOTINE DEPENDENCE WITHOUT COMPLICATION: ICD-10-CM

## 2020-11-02 DIAGNOSIS — Z01.818 PREOP EXAMINATION: ICD-10-CM

## 2020-11-02 DIAGNOSIS — I10 ESSENTIAL HYPERTENSION: ICD-10-CM

## 2020-11-02 PROCEDURE — 71046 X-RAY EXAM CHEST 2 VIEWS: CPT

## 2020-11-03 ENCOUNTER — LAB (OUTPATIENT)
Dept: LAB | Facility: CLINIC | Age: 70
End: 2020-11-03
Payer: COMMERCIAL

## 2020-11-03 ENCOUNTER — LAB REQUISITION (OUTPATIENT)
Dept: LAB | Facility: HOSPITAL | Age: 70
End: 2020-11-03
Payer: COMMERCIAL

## 2020-11-03 DIAGNOSIS — M19.011 PRIMARY OSTEOARTHRITIS, RIGHT SHOULDER: ICD-10-CM

## 2020-11-03 DIAGNOSIS — M19.011 GLENOHUMERAL ARTHRITIS, RIGHT: ICD-10-CM

## 2020-11-03 LAB
ABO GROUP BLD: NORMAL
ALBUMIN SERPL BCP-MCNC: 4.2 G/DL (ref 3.5–5)
ALP SERPL-CCNC: 53 U/L (ref 46–116)
ALT SERPL W P-5'-P-CCNC: 38 U/L (ref 12–78)
ANION GAP SERPL CALCULATED.3IONS-SCNC: 4 MMOL/L (ref 4–13)
APTT PPP: 32 SECONDS (ref 23–37)
AST SERPL W P-5'-P-CCNC: 16 U/L (ref 5–45)
BASOPHILS # BLD AUTO: 0.08 THOUSANDS/ΜL (ref 0–0.1)
BASOPHILS NFR BLD AUTO: 2 % (ref 0–1)
BILIRUB SERPL-MCNC: 0.7 MG/DL (ref 0.2–1)
BLD GP AB SCN SERPL QL: NEGATIVE
BUN SERPL-MCNC: 17 MG/DL (ref 5–25)
CALCIUM SERPL-MCNC: 9.4 MG/DL (ref 8.3–10.1)
CHLORIDE SERPL-SCNC: 106 MMOL/L (ref 100–108)
CO2 SERPL-SCNC: 29 MMOL/L (ref 21–32)
CREAT SERPL-MCNC: 0.91 MG/DL (ref 0.6–1.3)
CRP SERPL QL: <3 MG/L
EOSINOPHIL # BLD AUTO: 0.21 THOUSAND/ΜL (ref 0–0.61)
EOSINOPHIL NFR BLD AUTO: 4 % (ref 0–6)
ERYTHROCYTE [DISTWIDTH] IN BLOOD BY AUTOMATED COUNT: 12.3 % (ref 11.6–15.1)
FERRITIN SERPL-MCNC: 176 NG/ML (ref 8–388)
GFR SERPL CREATININE-BSD FRML MDRD: 86 ML/MIN/1.73SQ M
GLUCOSE P FAST SERPL-MCNC: 171 MG/DL (ref 65–99)
HCT VFR BLD AUTO: 47.3 % (ref 36.5–49.3)
HGB BLD-MCNC: 15.5 G/DL (ref 12–17)
IMM GRANULOCYTES # BLD AUTO: 0.02 THOUSAND/UL (ref 0–0.2)
IMM GRANULOCYTES NFR BLD AUTO: 0 % (ref 0–2)
INR PPP: 0.94 (ref 0.84–1.19)
IRON SATN MFR SERPL: 32 %
IRON SERPL-MCNC: 112 UG/DL (ref 65–175)
LYMPHOCYTES # BLD AUTO: 1.73 THOUSANDS/ΜL (ref 0.6–4.47)
LYMPHOCYTES NFR BLD AUTO: 36 % (ref 14–44)
MCH RBC QN AUTO: 33.9 PG (ref 26.8–34.3)
MCHC RBC AUTO-ENTMCNC: 32.8 G/DL (ref 31.4–37.4)
MCV RBC AUTO: 104 FL (ref 82–98)
MONOCYTES # BLD AUTO: 0.46 THOUSAND/ΜL (ref 0.17–1.22)
MONOCYTES NFR BLD AUTO: 10 % (ref 4–12)
NEUTROPHILS # BLD AUTO: 2.32 THOUSANDS/ΜL (ref 1.85–7.62)
NEUTS SEG NFR BLD AUTO: 48 % (ref 43–75)
NRBC BLD AUTO-RTO: 0 /100 WBCS
PLATELET # BLD AUTO: 221 THOUSANDS/UL (ref 149–390)
PMV BLD AUTO: 10.4 FL (ref 8.9–12.7)
POTASSIUM SERPL-SCNC: 4.2 MMOL/L (ref 3.5–5.3)
PROT SERPL-MCNC: 8.4 G/DL (ref 6.4–8.2)
PROTHROMBIN TIME: 12.6 SECONDS (ref 11.6–14.5)
RBC # BLD AUTO: 4.57 MILLION/UL (ref 3.88–5.62)
RH BLD: POSITIVE
SODIUM SERPL-SCNC: 139 MMOL/L (ref 136–145)
SPECIMEN EXPIRATION DATE: NORMAL
TIBC SERPL-MCNC: 350 UG/DL (ref 250–450)
WBC # BLD AUTO: 4.82 THOUSAND/UL (ref 4.31–10.16)

## 2020-11-03 PROCEDURE — 83540 ASSAY OF IRON: CPT

## 2020-11-03 PROCEDURE — 86140 C-REACTIVE PROTEIN: CPT

## 2020-11-03 PROCEDURE — 86901 BLOOD TYPING SEROLOGIC RH(D): CPT | Performed by: ORTHOPAEDIC SURGERY

## 2020-11-03 PROCEDURE — 82728 ASSAY OF FERRITIN: CPT

## 2020-11-03 PROCEDURE — 85730 THROMBOPLASTIN TIME PARTIAL: CPT

## 2020-11-03 PROCEDURE — 86900 BLOOD TYPING SEROLOGIC ABO: CPT | Performed by: ORTHOPAEDIC SURGERY

## 2020-11-03 PROCEDURE — 85025 COMPLETE CBC W/AUTO DIFF WBC: CPT

## 2020-11-03 PROCEDURE — 86850 RBC ANTIBODY SCREEN: CPT | Performed by: ORTHOPAEDIC SURGERY

## 2020-11-03 PROCEDURE — 36415 COLL VENOUS BLD VENIPUNCTURE: CPT

## 2020-11-03 PROCEDURE — 83550 IRON BINDING TEST: CPT

## 2020-11-03 PROCEDURE — 85610 PROTHROMBIN TIME: CPT

## 2020-11-03 PROCEDURE — 80053 COMPREHEN METABOLIC PANEL: CPT

## 2020-11-03 RX ORDER — ASCORBIC ACID 500 MG
500 TABLET ORAL DAILY
COMMUNITY

## 2020-11-05 ENCOUNTER — CONSULT (OUTPATIENT)
Dept: CARDIOLOGY CLINIC | Facility: CLINIC | Age: 70
End: 2020-11-05
Payer: COMMERCIAL

## 2020-11-05 VITALS
BODY MASS INDEX: 27.5 KG/M2 | WEIGHT: 203 LBS | DIASTOLIC BLOOD PRESSURE: 82 MMHG | SYSTOLIC BLOOD PRESSURE: 140 MMHG | RESPIRATION RATE: 18 BRPM | OXYGEN SATURATION: 94 % | HEART RATE: 83 BPM | HEIGHT: 72 IN

## 2020-11-05 DIAGNOSIS — I50.21 CONGESTIVE HEART FAILURE, NYHA CLASS 1, ACUTE, SYSTOLIC (HCC): ICD-10-CM

## 2020-11-05 DIAGNOSIS — I25.83 CORONARY ARTERY DISEASE DUE TO LIPID RICH PLAQUE: ICD-10-CM

## 2020-11-05 DIAGNOSIS — E78.2 MIXED HYPERLIPIDEMIA: ICD-10-CM

## 2020-11-05 DIAGNOSIS — I25.10 CORONARY ARTERY DISEASE DUE TO LIPID RICH PLAQUE: ICD-10-CM

## 2020-11-05 PROCEDURE — 1036F TOBACCO NON-USER: CPT | Performed by: INTERNAL MEDICINE

## 2020-11-05 PROCEDURE — 1160F RVW MEDS BY RX/DR IN RCRD: CPT | Performed by: INTERNAL MEDICINE

## 2020-11-05 PROCEDURE — 3008F BODY MASS INDEX DOCD: CPT | Performed by: INTERNAL MEDICINE

## 2020-11-05 PROCEDURE — 99214 OFFICE O/P EST MOD 30 MIN: CPT | Performed by: INTERNAL MEDICINE

## 2020-11-05 RX ORDER — CARVEDILOL 6.25 MG/1
6.25 TABLET ORAL 2 TIMES DAILY WITH MEALS
Qty: 180 TABLET | Refills: 3 | Status: SHIPPED | OUTPATIENT
Start: 2020-11-05 | End: 2021-07-13 | Stop reason: SDUPTHER

## 2020-11-05 RX ORDER — ATORVASTATIN CALCIUM 20 MG/1
20 TABLET, FILM COATED ORAL DAILY
Qty: 60 TABLET | Refills: 3 | Status: SHIPPED | OUTPATIENT
Start: 2020-11-05 | End: 2021-04-03

## 2020-11-09 DIAGNOSIS — M19.011 GLENOHUMERAL ARTHRITIS, RIGHT: ICD-10-CM

## 2020-11-09 PROCEDURE — U0003 INFECTIOUS AGENT DETECTION BY NUCLEIC ACID (DNA OR RNA); SEVERE ACUTE RESPIRATORY SYNDROME CORONAVIRUS 2 (SARS-COV-2) (CORONAVIRUS DISEASE [COVID-19]), AMPLIFIED PROBE TECHNIQUE, MAKING USE OF HIGH THROUGHPUT TECHNOLOGIES AS DESCRIBED BY CMS-2020-01-R: HCPCS

## 2020-11-10 LAB — SARS-COV-2 RNA SPEC QL NAA+PROBE: NOT DETECTED

## 2020-11-12 ENCOUNTER — TELEPHONE (OUTPATIENT)
Dept: CARDIOLOGY CLINIC | Facility: CLINIC | Age: 70
End: 2020-11-12

## 2020-11-12 ENCOUNTER — HOSPITAL ENCOUNTER (OUTPATIENT)
Dept: CT IMAGING | Facility: HOSPITAL | Age: 70
Discharge: HOME | End: 2020-11-12
Attending: ORTHOPAEDIC SURGERY
Payer: COMMERCIAL

## 2020-11-12 DIAGNOSIS — G89.29 CHRONIC RIGHT SHOULDER PAIN: ICD-10-CM

## 2020-11-12 DIAGNOSIS — M25.511 CHRONIC RIGHT SHOULDER PAIN: ICD-10-CM

## 2020-11-12 DIAGNOSIS — M19.011 OSTEOARTHRITIS OF GLENOHUMERAL JOINT, RIGHT: ICD-10-CM

## 2020-11-12 PROCEDURE — G1004 CDSM NDSC: HCPCS

## 2020-11-12 PROCEDURE — 73200 CT UPPER EXTREMITY W/O DYE: CPT

## 2020-11-16 ENCOUNTER — ANESTHESIA EVENT (OUTPATIENT)
Dept: PERIOP | Facility: HOSPITAL | Age: 70
DRG: 483 | End: 2020-11-16
Payer: COMMERCIAL

## 2020-11-17 ENCOUNTER — ANESTHESIA (OUTPATIENT)
Dept: PERIOP | Facility: HOSPITAL | Age: 70
DRG: 483 | End: 2020-11-17
Payer: COMMERCIAL

## 2020-11-17 ENCOUNTER — APPOINTMENT (INPATIENT)
Dept: RADIOLOGY | Facility: HOSPITAL | Age: 70
DRG: 483 | End: 2020-11-17
Payer: COMMERCIAL

## 2020-11-17 ENCOUNTER — HOSPITAL ENCOUNTER (INPATIENT)
Facility: HOSPITAL | Age: 70
LOS: 1 days | Discharge: HOME | DRG: 483 | End: 2020-11-18
Attending: ORTHOPAEDIC SURGERY | Admitting: ORTHOPAEDIC SURGERY
Payer: COMMERCIAL

## 2020-11-17 VITALS — HEART RATE: 71 BPM

## 2020-11-17 DIAGNOSIS — I10 ESSENTIAL HYPERTENSION: ICD-10-CM

## 2020-11-17 DIAGNOSIS — E11.9 TYPE 2 DIABETES MELLITUS WITHOUT COMPLICATION, WITHOUT LONG-TERM CURRENT USE OF INSULIN (HCC): ICD-10-CM

## 2020-11-17 DIAGNOSIS — Z96.611 STATUS POST REVERSE TOTAL ARTHROPLASTY OF RIGHT SHOULDER: Primary | ICD-10-CM

## 2020-11-17 DIAGNOSIS — E78.2 MIXED HYPERLIPIDEMIA: ICD-10-CM

## 2020-11-17 DIAGNOSIS — M19.011 OSTEOARTHRITIS OF GLENOHUMERAL JOINT, RIGHT: ICD-10-CM

## 2020-11-17 LAB
ABO GROUP BLD: NORMAL
BLD GP AB SCN SERPL QL: NEGATIVE
GLUCOSE SERPL-MCNC: 187 MG/DL (ref 65–140)
GLUCOSE SERPL-MCNC: 191 MG/DL (ref 65–140)
GLUCOSE SERPL-MCNC: 299 MG/DL (ref 65–140)
GLUCOSE SERPL-MCNC: 329 MG/DL (ref 65–140)
RH BLD: POSITIVE
SPECIMEN EXPIRATION DATE: NORMAL

## 2020-11-17 PROCEDURE — 73020 X-RAY EXAM OF SHOULDER: CPT

## 2020-11-17 PROCEDURE — 86850 RBC ANTIBODY SCREEN: CPT | Performed by: NURSE ANESTHETIST, CERTIFIED REGISTERED

## 2020-11-17 PROCEDURE — C1776 JOINT DEVICE (IMPLANTABLE): HCPCS | Performed by: ORTHOPAEDIC SURGERY

## 2020-11-17 PROCEDURE — 23472 RECONSTRUCT SHOULDER JOINT: CPT | Performed by: ORTHOPAEDIC SURGERY

## 2020-11-17 PROCEDURE — C1713 ANCHOR/SCREW BN/BN,TIS/BN: HCPCS | Performed by: ORTHOPAEDIC SURGERY

## 2020-11-17 PROCEDURE — 0RRJ0JZ REPLACEMENT OF RIGHT SHOULDER JOINT WITH SYNTHETIC SUBSTITUTE, OPEN APPROACH: ICD-10-PCS | Performed by: ORTHOPAEDIC SURGERY

## 2020-11-17 PROCEDURE — 86900 BLOOD TYPING SEROLOGIC ABO: CPT | Performed by: NURSE ANESTHETIST, CERTIFIED REGISTERED

## 2020-11-17 PROCEDURE — 82948 REAGENT STRIP/BLOOD GLUCOSE: CPT

## 2020-11-17 PROCEDURE — 23472 RECONSTRUCT SHOULDER JOINT: CPT | Performed by: PHYSICIAN ASSISTANT

## 2020-11-17 PROCEDURE — NC001 PR NO CHARGE: Performed by: ORTHOPAEDIC SURGERY

## 2020-11-17 PROCEDURE — C9290 INJ, BUPIVACAINE LIPOSOME: HCPCS | Performed by: STUDENT IN AN ORGANIZED HEALTH CARE EDUCATION/TRAINING PROGRAM

## 2020-11-17 PROCEDURE — 86901 BLOOD TYPING SEROLOGIC RH(D): CPT | Performed by: NURSE ANESTHETIST, CERTIFIED REGISTERED

## 2020-11-17 DEVICE — SCREW PERIPHERAL 5 X 34MM: Type: IMPLANTABLE DEVICE | Site: SHOULDER | Status: FUNCTIONAL

## 2020-11-17 DEVICE — AUGMENT BASEPLATE 15DEG 29MM FULL WEDGE: Type: IMPLANTABLE DEVICE | Site: SHOULDER | Status: FUNCTIONAL

## 2020-11-17 DEVICE — IMPLANTABLE DEVICE
Type: IMPLANTABLE DEVICE | Site: SHOULDER | Status: FUNCTIONAL
Brand: FLEX SHOULDER SYSTEM

## 2020-11-17 DEVICE — SCREW PERIPHERAL 5 X 22MM: Type: IMPLANTABLE DEVICE | Site: SHOULDER | Status: FUNCTIONAL

## 2020-11-17 DEVICE — GLENOSPHERE STD 42MM: Type: IMPLANTABLE DEVICE | Site: SHOULDER | Status: FUNCTIONAL

## 2020-11-17 DEVICE — SCREW CENTRAL 6 X 35MM: Type: IMPLANTABLE DEVICE | Site: SHOULDER | Status: FUNCTIONAL

## 2020-11-17 DEVICE — IMPLANTABLE DEVICE
Type: IMPLANTABLE DEVICE | Site: SHOULDER | Status: FUNCTIONAL
Brand: AEQUALIS™ ASCEND™ FLEX

## 2020-11-17 DEVICE — SCREW PERIPHERAL 5 X 30MM: Type: IMPLANTABLE DEVICE | Site: SHOULDER | Status: FUNCTIONAL

## 2020-11-17 RX ORDER — HYDRALAZINE HYDROCHLORIDE 25 MG/1
25 TABLET, FILM COATED ORAL EVERY 8 HOURS PRN
Status: DISCONTINUED | OUTPATIENT
Start: 2020-11-17 | End: 2020-11-18 | Stop reason: HOSPADM

## 2020-11-17 RX ORDER — HYDROMORPHONE HCL/PF 1 MG/ML
0.2 SYRINGE (ML) INJECTION
Status: DISCONTINUED | OUTPATIENT
Start: 2020-11-17 | End: 2020-11-17 | Stop reason: HOSPADM

## 2020-11-17 RX ORDER — FENTANYL CITRATE/PF 50 MCG/ML
25 SYRINGE (ML) INJECTION
Status: DISCONTINUED | OUTPATIENT
Start: 2020-11-17 | End: 2020-11-17 | Stop reason: HOSPADM

## 2020-11-17 RX ORDER — CARVEDILOL 6.25 MG/1
6.25 TABLET ORAL 2 TIMES DAILY WITH MEALS
Status: DISCONTINUED | OUTPATIENT
Start: 2020-11-18 | End: 2020-11-18 | Stop reason: HOSPADM

## 2020-11-17 RX ORDER — GLYCOPYRROLATE 0.2 MG/ML
INJECTION INTRAMUSCULAR; INTRAVENOUS AS NEEDED
Status: DISCONTINUED | OUTPATIENT
Start: 2020-11-17 | End: 2020-11-17

## 2020-11-17 RX ORDER — OXYCODONE HYDROCHLORIDE 5 MG/1
TABLET ORAL
Qty: 13 TABLET | Refills: 0 | Status: SHIPPED | OUTPATIENT
Start: 2020-11-17 | End: 2022-03-14

## 2020-11-17 RX ORDER — EPHEDRINE SULFATE 50 MG/ML
INJECTION INTRAVENOUS AS NEEDED
Status: DISCONTINUED | OUTPATIENT
Start: 2020-11-17 | End: 2020-11-17

## 2020-11-17 RX ORDER — CALCIUM CARBONATE 200(500)MG
1000 TABLET,CHEWABLE ORAL DAILY PRN
Status: DISCONTINUED | OUTPATIENT
Start: 2020-11-17 | End: 2020-11-18 | Stop reason: HOSPADM

## 2020-11-17 RX ORDER — ACETAMINOPHEN 325 MG/1
650 TABLET ORAL EVERY 6 HOURS PRN
Status: DISCONTINUED | OUTPATIENT
Start: 2020-11-17 | End: 2020-11-18 | Stop reason: HOSPADM

## 2020-11-17 RX ORDER — ROCURONIUM BROMIDE 10 MG/ML
INJECTION, SOLUTION INTRAVENOUS AS NEEDED
Status: DISCONTINUED | OUTPATIENT
Start: 2020-11-17 | End: 2020-11-17

## 2020-11-17 RX ORDER — ASCORBIC ACID 500 MG
500 TABLET ORAL DAILY
Status: DISCONTINUED | OUTPATIENT
Start: 2020-11-18 | End: 2020-11-18 | Stop reason: HOSPADM

## 2020-11-17 RX ORDER — CHLORHEXIDINE GLUCONATE 0.12 MG/ML
15 RINSE ORAL ONCE
Status: COMPLETED | OUTPATIENT
Start: 2020-11-17 | End: 2020-11-17

## 2020-11-17 RX ORDER — LOSARTAN POTASSIUM 50 MG/1
100 TABLET ORAL DAILY
Status: DISCONTINUED | OUTPATIENT
Start: 2020-11-18 | End: 2020-11-17

## 2020-11-17 RX ORDER — ONDANSETRON 2 MG/ML
4 INJECTION INTRAMUSCULAR; INTRAVENOUS ONCE AS NEEDED
Status: DISCONTINUED | OUTPATIENT
Start: 2020-11-17 | End: 2020-11-17 | Stop reason: HOSPADM

## 2020-11-17 RX ORDER — CEFAZOLIN SODIUM 2 G/50ML
2000 SOLUTION INTRAVENOUS ONCE
Status: COMPLETED | OUTPATIENT
Start: 2020-11-17 | End: 2020-11-17

## 2020-11-17 RX ORDER — CLOPIDOGREL BISULFATE 75 MG/1
75 TABLET ORAL DAILY
Status: DISCONTINUED | OUTPATIENT
Start: 2020-11-18 | End: 2020-11-18 | Stop reason: HOSPADM

## 2020-11-17 RX ORDER — MORPHINE SULFATE 10 MG/ML
2 INJECTION, SOLUTION INTRAMUSCULAR; INTRAVENOUS EVERY 2 HOUR PRN
Status: DISCONTINUED | OUTPATIENT
Start: 2020-11-17 | End: 2020-11-18 | Stop reason: HOSPADM

## 2020-11-17 RX ORDER — DOCUSATE SODIUM 100 MG/1
100 CAPSULE, LIQUID FILLED ORAL 2 TIMES DAILY
Status: DISCONTINUED | OUTPATIENT
Start: 2020-11-17 | End: 2020-11-18 | Stop reason: HOSPADM

## 2020-11-17 RX ORDER — CEFAZOLIN SODIUM 2 G/50ML
2000 SOLUTION INTRAVENOUS EVERY 8 HOURS
Status: COMPLETED | OUTPATIENT
Start: 2020-11-17 | End: 2020-11-18

## 2020-11-17 RX ORDER — MAGNESIUM HYDROXIDE 1200 MG/15ML
LIQUID ORAL AS NEEDED
Status: DISCONTINUED | OUTPATIENT
Start: 2020-11-17 | End: 2020-11-17 | Stop reason: HOSPADM

## 2020-11-17 RX ORDER — OXYCODONE HYDROCHLORIDE 5 MG/1
5 TABLET ORAL EVERY 4 HOURS PRN
Status: DISCONTINUED | OUTPATIENT
Start: 2020-11-17 | End: 2020-11-18 | Stop reason: HOSPADM

## 2020-11-17 RX ORDER — ONDANSETRON 2 MG/ML
4 INJECTION INTRAMUSCULAR; INTRAVENOUS EVERY 6 HOURS PRN
Status: DISCONTINUED | OUTPATIENT
Start: 2020-11-17 | End: 2020-11-18 | Stop reason: HOSPADM

## 2020-11-17 RX ORDER — BUPIVACAINE HYDROCHLORIDE 5 MG/ML
INJECTION, SOLUTION PERINEURAL
Status: COMPLETED | OUTPATIENT
Start: 2020-11-17 | End: 2020-11-17

## 2020-11-17 RX ORDER — NEOSTIGMINE METHYLSULFATE 1 MG/ML
INJECTION INTRAVENOUS AS NEEDED
Status: DISCONTINUED | OUTPATIENT
Start: 2020-11-17 | End: 2020-11-17

## 2020-11-17 RX ORDER — MIDAZOLAM HYDROCHLORIDE 2 MG/2ML
INJECTION, SOLUTION INTRAMUSCULAR; INTRAVENOUS AS NEEDED
Status: DISCONTINUED | OUTPATIENT
Start: 2020-11-17 | End: 2020-11-17

## 2020-11-17 RX ORDER — PROPOFOL 10 MG/ML
INJECTION, EMULSION INTRAVENOUS AS NEEDED
Status: DISCONTINUED | OUTPATIENT
Start: 2020-11-17 | End: 2020-11-17

## 2020-11-17 RX ORDER — FENTANYL CITRATE 50 UG/ML
INJECTION, SOLUTION INTRAMUSCULAR; INTRAVENOUS AS NEEDED
Status: DISCONTINUED | OUTPATIENT
Start: 2020-11-17 | End: 2020-11-17

## 2020-11-17 RX ORDER — LIDOCAINE HYDROCHLORIDE 10 MG/ML
INJECTION, SOLUTION EPIDURAL; INFILTRATION; INTRACAUDAL; PERINEURAL AS NEEDED
Status: DISCONTINUED | OUTPATIENT
Start: 2020-11-17 | End: 2020-11-17

## 2020-11-17 RX ORDER — LIDOCAINE HYDROCHLORIDE 10 MG/ML
0.5 INJECTION, SOLUTION EPIDURAL; INFILTRATION; INTRACAUDAL; PERINEURAL ONCE AS NEEDED
Status: DISCONTINUED | OUTPATIENT
Start: 2020-11-17 | End: 2020-11-17 | Stop reason: HOSPADM

## 2020-11-17 RX ORDER — DEXAMETHASONE SODIUM PHOSPHATE 10 MG/ML
INJECTION, SOLUTION INTRAMUSCULAR; INTRAVENOUS AS NEEDED
Status: DISCONTINUED | OUTPATIENT
Start: 2020-11-17 | End: 2020-11-17

## 2020-11-17 RX ORDER — ASPIRIN 81 MG/1
81 TABLET, CHEWABLE ORAL DAILY
Status: DISCONTINUED | OUTPATIENT
Start: 2020-11-18 | End: 2020-11-18 | Stop reason: HOSPADM

## 2020-11-17 RX ORDER — OXYCODONE HYDROCHLORIDE 5 MG/1
10 TABLET ORAL EVERY 4 HOURS PRN
Status: DISCONTINUED | OUTPATIENT
Start: 2020-11-17 | End: 2020-11-18 | Stop reason: HOSPADM

## 2020-11-17 RX ORDER — SODIUM CHLORIDE, SODIUM LACTATE, POTASSIUM CHLORIDE, CALCIUM CHLORIDE 600; 310; 30; 20 MG/100ML; MG/100ML; MG/100ML; MG/100ML
100 INJECTION, SOLUTION INTRAVENOUS CONTINUOUS
Status: DISCONTINUED | OUTPATIENT
Start: 2020-11-17 | End: 2020-11-18 | Stop reason: HOSPADM

## 2020-11-17 RX ORDER — SODIUM CHLORIDE, SODIUM LACTATE, POTASSIUM CHLORIDE, CALCIUM CHLORIDE 600; 310; 30; 20 MG/100ML; MG/100ML; MG/100ML; MG/100ML
125 INJECTION, SOLUTION INTRAVENOUS CONTINUOUS
Status: DISCONTINUED | OUTPATIENT
Start: 2020-11-17 | End: 2020-11-18 | Stop reason: HOSPADM

## 2020-11-17 RX ORDER — ONDANSETRON 2 MG/ML
INJECTION INTRAMUSCULAR; INTRAVENOUS AS NEEDED
Status: DISCONTINUED | OUTPATIENT
Start: 2020-11-17 | End: 2020-11-17

## 2020-11-17 RX ORDER — ATORVASTATIN CALCIUM 20 MG/1
20 TABLET, FILM COATED ORAL DAILY
Status: DISCONTINUED | OUTPATIENT
Start: 2020-11-18 | End: 2020-11-18 | Stop reason: HOSPADM

## 2020-11-17 RX ADMIN — GLYCOPYRROLATE 0.2 MG: 0.2 INJECTION, SOLUTION INTRAMUSCULAR; INTRAVENOUS at 10:59

## 2020-11-17 RX ADMIN — INSULIN LISPRO 5 UNITS: 100 INJECTION, SOLUTION INTRAVENOUS; SUBCUTANEOUS at 22:42

## 2020-11-17 RX ADMIN — BUPIVACAINE HYDROCHLORIDE 10 ML: 5 INJECTION, SOLUTION PERINEURAL at 09:07

## 2020-11-17 RX ADMIN — SODIUM CHLORIDE, SODIUM LACTATE, POTASSIUM CHLORIDE, AND CALCIUM CHLORIDE 125 ML/HR: .6; .31; .03; .02 INJECTION, SOLUTION INTRAVENOUS at 21:39

## 2020-11-17 RX ADMIN — MIDAZOLAM 1 MG: 1 INJECTION INTRAMUSCULAR; INTRAVENOUS at 09:29

## 2020-11-17 RX ADMIN — EPHEDRINE SULFATE 10 MG: 50 INJECTION, SOLUTION INTRAVENOUS at 10:15

## 2020-11-17 RX ADMIN — CEFAZOLIN SODIUM 2000 MG: 2 SOLUTION INTRAVENOUS at 18:08

## 2020-11-17 RX ADMIN — LIDOCAINE HYDROCHLORIDE 50 MG: 10 INJECTION, SOLUTION EPIDURAL; INFILTRATION; INTRACAUDAL; PERINEURAL at 09:38

## 2020-11-17 RX ADMIN — ROCURONIUM BROMIDE 40 MG: 50 INJECTION, SOLUTION INTRAVENOUS at 09:38

## 2020-11-17 RX ADMIN — FENTANYL CITRATE 25 MCG: 50 INJECTION INTRAMUSCULAR; INTRAVENOUS at 09:38

## 2020-11-17 RX ADMIN — CEFAZOLIN SODIUM 2000 MG: 2 SOLUTION INTRAVENOUS at 09:32

## 2020-11-17 RX ADMIN — MIDAZOLAM 1 MG: 1 INJECTION INTRAMUSCULAR; INTRAVENOUS at 09:32

## 2020-11-17 RX ADMIN — PHENYLEPHRINE HYDROCHLORIDE 100 MCG: 10 INJECTION INTRAVENOUS at 09:41

## 2020-11-17 RX ADMIN — PHENYLEPHRINE HYDROCHLORIDE 100 MCG: 10 INJECTION INTRAVENOUS at 10:43

## 2020-11-17 RX ADMIN — NEOSTIGMINE METHYLSULFATE 2 MG: 1 INJECTION, SOLUTION INTRAVENOUS at 10:59

## 2020-11-17 RX ADMIN — DEXAMETHASONE SODIUM PHOSPHATE 10 MG: 10 INJECTION, SOLUTION INTRAMUSCULAR; INTRAVENOUS at 09:59

## 2020-11-17 RX ADMIN — ONDANSETRON 4 MG: 2 INJECTION INTRAMUSCULAR; INTRAVENOUS at 10:53

## 2020-11-17 RX ADMIN — FENTANYL CITRATE 25 MCG: 50 INJECTION INTRAMUSCULAR; INTRAVENOUS at 09:37

## 2020-11-17 RX ADMIN — PHENYLEPHRINE HYDROCHLORIDE 200 MCG: 10 INJECTION INTRAVENOUS at 09:44

## 2020-11-17 RX ADMIN — PHENYLEPHRINE HYDROCHLORIDE 200 MCG: 10 INJECTION INTRAVENOUS at 09:56

## 2020-11-17 RX ADMIN — PROPOFOL 180 MG: 10 INJECTION, EMULSION INTRAVENOUS at 09:38

## 2020-11-17 RX ADMIN — BUPIVACAINE 266 MG: 13.3 INJECTION, SUSPENSION, LIPOSOMAL INFILTRATION at 09:07

## 2020-11-17 RX ADMIN — CHLORHEXIDINE GLUCONATE 15 ML: 1.2 RINSE ORAL at 08:05

## 2020-11-17 RX ADMIN — SODIUM CHLORIDE, SODIUM LACTATE, POTASSIUM CHLORIDE, AND CALCIUM CHLORIDE 125 ML/HR: .6; .31; .03; .02 INJECTION, SOLUTION INTRAVENOUS at 08:15

## 2020-11-17 RX ADMIN — LIDOCAINE HYDROCHLORIDE 50 MG: 10 INJECTION, SOLUTION EPIDURAL; INFILTRATION; INTRACAUDAL; PERINEURAL at 09:37

## 2020-11-17 RX ADMIN — PHENYLEPHRINE HYDROCHLORIDE 200 MCG: 10 INJECTION INTRAVENOUS at 10:33

## 2020-11-17 RX ADMIN — FENTANYL CITRATE 25 MCG: 50 INJECTION INTRAMUSCULAR; INTRAVENOUS at 10:00

## 2020-11-17 RX ADMIN — PHENYLEPHRINE HYDROCHLORIDE 200 MCG: 10 INJECTION INTRAVENOUS at 10:07

## 2020-11-17 RX ADMIN — FENTANYL CITRATE 50 MCG: 50 INJECTION INTRAMUSCULAR; INTRAVENOUS at 09:07

## 2020-11-17 RX ADMIN — METFORMIN HYDROCHLORIDE 1000 MG: 500 TABLET ORAL at 22:42

## 2020-11-17 RX ADMIN — MIDAZOLAM 1 MG: 1 INJECTION INTRAMUSCULAR; INTRAVENOUS at 09:07

## 2020-11-17 RX ADMIN — INSULIN LISPRO 5 UNITS: 100 INJECTION, SOLUTION INTRAVENOUS; SUBCUTANEOUS at 18:16

## 2020-11-17 RX ADMIN — PHENYLEPHRINE HYDROCHLORIDE 200 MCG: 10 INJECTION INTRAVENOUS at 10:53

## 2020-11-18 VITALS
HEIGHT: 72 IN | TEMPERATURE: 98 F | DIASTOLIC BLOOD PRESSURE: 74 MMHG | SYSTOLIC BLOOD PRESSURE: 127 MMHG | OXYGEN SATURATION: 95 % | HEART RATE: 77 BPM | WEIGHT: 196 LBS | RESPIRATION RATE: 18 BRPM | BODY MASS INDEX: 26.55 KG/M2

## 2020-11-18 PROBLEM — Z96.611 STATUS POST REVERSE TOTAL ARTHROPLASTY OF RIGHT SHOULDER: Status: ACTIVE | Noted: 2020-11-18

## 2020-11-18 PROBLEM — M19.011 OSTEOARTHRITIS OF GLENOHUMERAL JOINT, RIGHT: Status: RESOLVED | Noted: 2020-07-16 | Resolved: 2020-11-18

## 2020-11-18 LAB
ANION GAP SERPL CALCULATED.3IONS-SCNC: 8 MMOL/L (ref 4–13)
BUN SERPL-MCNC: 19 MG/DL (ref 5–25)
CALCIUM SERPL-MCNC: 8.9 MG/DL (ref 8.3–10.1)
CHLORIDE SERPL-SCNC: 104 MMOL/L (ref 100–108)
CO2 SERPL-SCNC: 24 MMOL/L (ref 21–32)
CREAT SERPL-MCNC: 0.7 MG/DL (ref 0.6–1.3)
ERYTHROCYTE [DISTWIDTH] IN BLOOD BY AUTOMATED COUNT: 12.2 % (ref 11.6–15.1)
GFR SERPL CREATININE-BSD FRML MDRD: 97 ML/MIN/1.73SQ M
GLUCOSE SERPL-MCNC: 196 MG/DL (ref 65–140)
GLUCOSE SERPL-MCNC: 210 MG/DL (ref 65–140)
GLUCOSE SERPL-MCNC: 241 MG/DL (ref 65–140)
HCT VFR BLD AUTO: 35.7 % (ref 36.5–49.3)
HGB BLD-MCNC: 11.9 G/DL (ref 12–17)
MCH RBC QN AUTO: 33.8 PG (ref 26.8–34.3)
MCHC RBC AUTO-ENTMCNC: 33.3 G/DL (ref 31.4–37.4)
MCV RBC AUTO: 101 FL (ref 82–98)
PLATELET # BLD AUTO: 237 THOUSANDS/UL (ref 149–390)
PMV BLD AUTO: 10.2 FL (ref 8.9–12.7)
POTASSIUM SERPL-SCNC: 3.8 MMOL/L (ref 3.5–5.3)
RBC # BLD AUTO: 3.52 MILLION/UL (ref 3.88–5.62)
SODIUM SERPL-SCNC: 136 MMOL/L (ref 136–145)
WBC # BLD AUTO: 8.25 THOUSAND/UL (ref 4.31–10.16)

## 2020-11-18 PROCEDURE — 97163 PT EVAL HIGH COMPLEX 45 MIN: CPT

## 2020-11-18 PROCEDURE — 82948 REAGENT STRIP/BLOOD GLUCOSE: CPT

## 2020-11-18 PROCEDURE — 80048 BASIC METABOLIC PNL TOTAL CA: CPT | Performed by: PHYSICIAN ASSISTANT

## 2020-11-18 PROCEDURE — 97535 SELF CARE MNGMENT TRAINING: CPT

## 2020-11-18 PROCEDURE — 85027 COMPLETE CBC AUTOMATED: CPT | Performed by: PHYSICIAN ASSISTANT

## 2020-11-18 PROCEDURE — 97110 THERAPEUTIC EXERCISES: CPT

## 2020-11-18 PROCEDURE — NC001 PR NO CHARGE: Performed by: ORTHOPAEDIC SURGERY

## 2020-11-18 PROCEDURE — 99024 POSTOP FOLLOW-UP VISIT: CPT | Performed by: PHYSICIAN ASSISTANT

## 2020-11-18 PROCEDURE — 97166 OT EVAL MOD COMPLEX 45 MIN: CPT

## 2020-11-18 RX ORDER — DOCUSATE SODIUM 100 MG/1
100 CAPSULE, LIQUID FILLED ORAL 2 TIMES DAILY
Qty: 10 CAPSULE | Refills: 0 | Status: SHIPPED | OUTPATIENT
Start: 2020-11-18 | End: 2021-07-13 | Stop reason: ALTCHOICE

## 2020-11-18 RX ORDER — SENNOSIDES 8.6 MG
650 CAPSULE ORAL EVERY 8 HOURS PRN
Qty: 30 TABLET | Refills: 0 | Status: SHIPPED | OUTPATIENT
Start: 2020-11-18 | End: 2021-08-18

## 2020-11-18 RX ADMIN — SITAGLIPTIN 25 MG: 25 TABLET, FILM COATED ORAL at 09:12

## 2020-11-18 RX ADMIN — CEFAZOLIN SODIUM 2000 MG: 2 SOLUTION INTRAVENOUS at 00:34

## 2020-11-18 RX ADMIN — INSULIN LISPRO 3 UNITS: 100 INJECTION, SOLUTION INTRAVENOUS; SUBCUTANEOUS at 10:58

## 2020-11-18 RX ADMIN — ASPIRIN 81 MG: 81 TABLET, CHEWABLE ORAL at 09:12

## 2020-11-18 RX ADMIN — OXYCODONE HYDROCHLORIDE AND ACETAMINOPHEN 500 MG: 500 TABLET ORAL at 09:12

## 2020-11-18 RX ADMIN — INSULIN LISPRO 2 UNITS: 100 INJECTION, SOLUTION INTRAVENOUS; SUBCUTANEOUS at 06:48

## 2020-11-18 RX ADMIN — CLOPIDOGREL BISULFATE 75 MG: 75 TABLET ORAL at 09:12

## 2020-11-18 RX ADMIN — DOCUSATE SODIUM 100 MG: 100 CAPSULE ORAL at 09:12

## 2020-11-18 RX ADMIN — METFORMIN HYDROCHLORIDE 1000 MG: 500 TABLET ORAL at 09:12

## 2020-11-18 RX ADMIN — CARVEDILOL 6.25 MG: 6.25 TABLET, FILM COATED ORAL at 09:12

## 2020-11-18 RX ADMIN — ATORVASTATIN CALCIUM 20 MG: 20 TABLET, FILM COATED ORAL at 09:12

## 2020-11-20 ENCOUNTER — TRANSITIONAL CARE MANAGEMENT (OUTPATIENT)
Dept: FAMILY MEDICINE CLINIC | Facility: CLINIC | Age: 70
End: 2020-11-20

## 2020-11-20 ENCOUNTER — EVALUATION (OUTPATIENT)
Dept: PHYSICAL THERAPY | Facility: MEDICAL CENTER | Age: 70
End: 2020-11-20
Payer: COMMERCIAL

## 2020-11-20 DIAGNOSIS — Z96.611 STATUS POST REVERSE ARTHROPLASTY OF RIGHT SHOULDER: ICD-10-CM

## 2020-11-20 DIAGNOSIS — M19.019 SHOULDER ARTHRITIS: Primary | ICD-10-CM

## 2020-11-20 PROCEDURE — 97530 THERAPEUTIC ACTIVITIES: CPT | Performed by: PHYSICAL THERAPIST

## 2020-11-20 PROCEDURE — 97161 PT EVAL LOW COMPLEX 20 MIN: CPT | Performed by: PHYSICAL THERAPIST

## 2020-11-23 ENCOUNTER — OFFICE VISIT (OUTPATIENT)
Dept: PHYSICAL THERAPY | Facility: MEDICAL CENTER | Age: 70
End: 2020-11-23
Payer: COMMERCIAL

## 2020-11-23 DIAGNOSIS — M19.019 SHOULDER ARTHRITIS: Primary | ICD-10-CM

## 2020-11-23 DIAGNOSIS — Z96.611 STATUS POST REVERSE ARTHROPLASTY OF RIGHT SHOULDER: ICD-10-CM

## 2020-11-23 PROCEDURE — 97110 THERAPEUTIC EXERCISES: CPT | Performed by: PHYSICAL THERAPIST

## 2020-11-23 PROCEDURE — 97140 MANUAL THERAPY 1/> REGIONS: CPT | Performed by: PHYSICAL THERAPIST

## 2020-11-27 ENCOUNTER — OFFICE VISIT (OUTPATIENT)
Dept: PHYSICAL THERAPY | Facility: MEDICAL CENTER | Age: 70
End: 2020-11-27
Payer: COMMERCIAL

## 2020-11-27 DIAGNOSIS — Z96.611 STATUS POST REVERSE ARTHROPLASTY OF RIGHT SHOULDER: ICD-10-CM

## 2020-11-27 DIAGNOSIS — M19.019 SHOULDER ARTHRITIS: Primary | ICD-10-CM

## 2020-11-27 PROCEDURE — 97140 MANUAL THERAPY 1/> REGIONS: CPT | Performed by: PHYSICAL THERAPIST

## 2020-11-30 ENCOUNTER — OFFICE VISIT (OUTPATIENT)
Dept: OBGYN CLINIC | Facility: OTHER | Age: 70
End: 2020-11-30

## 2020-11-30 ENCOUNTER — APPOINTMENT (OUTPATIENT)
Dept: RADIOLOGY | Facility: OTHER | Age: 70
End: 2020-11-30
Payer: COMMERCIAL

## 2020-11-30 VITALS
BODY MASS INDEX: 27.67 KG/M2 | HEART RATE: 89 BPM | WEIGHT: 204 LBS | SYSTOLIC BLOOD PRESSURE: 118 MMHG | DIASTOLIC BLOOD PRESSURE: 80 MMHG

## 2020-11-30 DIAGNOSIS — Z96.611 STATUS POST REVERSE TOTAL ARTHROPLASTY OF RIGHT SHOULDER: ICD-10-CM

## 2020-11-30 DIAGNOSIS — Z96.611 STATUS POST REVERSE TOTAL ARTHROPLASTY OF RIGHT SHOULDER: Primary | ICD-10-CM

## 2020-11-30 PROCEDURE — 73030 X-RAY EXAM OF SHOULDER: CPT

## 2020-11-30 PROCEDURE — 99024 POSTOP FOLLOW-UP VISIT: CPT | Performed by: PHYSICIAN ASSISTANT

## 2020-12-01 ENCOUNTER — OFFICE VISIT (OUTPATIENT)
Dept: PHYSICAL THERAPY | Facility: MEDICAL CENTER | Age: 70
End: 2020-12-01
Payer: COMMERCIAL

## 2020-12-01 DIAGNOSIS — M19.019 SHOULDER ARTHRITIS: Primary | ICD-10-CM

## 2020-12-01 DIAGNOSIS — Z96.611 STATUS POST REVERSE ARTHROPLASTY OF RIGHT SHOULDER: ICD-10-CM

## 2020-12-01 PROCEDURE — 97110 THERAPEUTIC EXERCISES: CPT | Performed by: PHYSICAL THERAPIST

## 2020-12-01 PROCEDURE — 97140 MANUAL THERAPY 1/> REGIONS: CPT | Performed by: PHYSICAL THERAPIST

## 2020-12-03 ENCOUNTER — OFFICE VISIT (OUTPATIENT)
Dept: PHYSICAL THERAPY | Facility: MEDICAL CENTER | Age: 70
End: 2020-12-03
Payer: COMMERCIAL

## 2020-12-03 DIAGNOSIS — Z96.611 STATUS POST REVERSE ARTHROPLASTY OF RIGHT SHOULDER: ICD-10-CM

## 2020-12-03 DIAGNOSIS — M19.019 SHOULDER ARTHRITIS: Primary | ICD-10-CM

## 2020-12-03 PROCEDURE — 97140 MANUAL THERAPY 1/> REGIONS: CPT | Performed by: PHYSICAL THERAPIST

## 2020-12-03 PROCEDURE — 97110 THERAPEUTIC EXERCISES: CPT | Performed by: PHYSICAL THERAPIST

## 2020-12-04 ENCOUNTER — HOSPITAL ENCOUNTER (OUTPATIENT)
Dept: CT IMAGING | Facility: HOSPITAL | Age: 70
Discharge: HOME | End: 2020-12-04
Attending: NEUROLOGICAL SURGERY
Payer: COMMERCIAL

## 2020-12-04 DIAGNOSIS — I67.1 CEREBRAL ANEURYSM: ICD-10-CM

## 2020-12-04 PROCEDURE — 70496 CT ANGIOGRAPHY HEAD: CPT

## 2020-12-04 PROCEDURE — 70498 CT ANGIOGRAPHY NECK: CPT

## 2020-12-04 RX ADMIN — IOHEXOL 85 ML: 350 INJECTION, SOLUTION INTRAVENOUS at 07:46

## 2020-12-07 ENCOUNTER — OFFICE VISIT (OUTPATIENT)
Dept: PHYSICAL THERAPY | Facility: MEDICAL CENTER | Age: 70
End: 2020-12-07
Payer: COMMERCIAL

## 2020-12-07 DIAGNOSIS — Z96.611 STATUS POST REVERSE ARTHROPLASTY OF RIGHT SHOULDER: ICD-10-CM

## 2020-12-07 DIAGNOSIS — M19.019 SHOULDER ARTHRITIS: Primary | ICD-10-CM

## 2020-12-07 PROCEDURE — 97110 THERAPEUTIC EXERCISES: CPT

## 2020-12-07 PROCEDURE — 97140 MANUAL THERAPY 1/> REGIONS: CPT

## 2020-12-09 ENCOUNTER — OFFICE VISIT (OUTPATIENT)
Dept: PHYSICAL THERAPY | Facility: MEDICAL CENTER | Age: 70
End: 2020-12-09
Payer: COMMERCIAL

## 2020-12-09 ENCOUNTER — TELEMEDICINE (OUTPATIENT)
Dept: NEUROSURGERY | Facility: CLINIC | Age: 70
End: 2020-12-09
Payer: COMMERCIAL

## 2020-12-09 DIAGNOSIS — I65.23 CAROTID ARTERY STENOSIS WITHOUT CEREBRAL INFARCTION, BILATERAL: ICD-10-CM

## 2020-12-09 DIAGNOSIS — I67.1 CEREBRAL ANEURYSM: Primary | ICD-10-CM

## 2020-12-09 DIAGNOSIS — M19.019 SHOULDER ARTHRITIS: Primary | ICD-10-CM

## 2020-12-09 DIAGNOSIS — Z96.611 STATUS POST REVERSE ARTHROPLASTY OF RIGHT SHOULDER: ICD-10-CM

## 2020-12-09 PROCEDURE — 99214 OFFICE O/P EST MOD 30 MIN: CPT | Performed by: PHYSICIAN ASSISTANT

## 2020-12-09 PROCEDURE — 1160F RVW MEDS BY RX/DR IN RCRD: CPT | Performed by: PHYSICIAN ASSISTANT

## 2020-12-09 PROCEDURE — 97110 THERAPEUTIC EXERCISES: CPT

## 2020-12-09 PROCEDURE — 97140 MANUAL THERAPY 1/> REGIONS: CPT

## 2020-12-09 PROCEDURE — 1111F DSCHRG MED/CURRENT MED MERGE: CPT | Performed by: PHYSICIAN ASSISTANT

## 2020-12-09 PROCEDURE — 1036F TOBACCO NON-USER: CPT | Performed by: PHYSICIAN ASSISTANT

## 2020-12-10 ENCOUNTER — APPOINTMENT (OUTPATIENT)
Dept: PHYSICAL THERAPY | Facility: MEDICAL CENTER | Age: 70
End: 2020-12-10
Payer: COMMERCIAL

## 2020-12-14 ENCOUNTER — APPOINTMENT (OUTPATIENT)
Dept: PHYSICAL THERAPY | Facility: MEDICAL CENTER | Age: 70
End: 2020-12-14
Payer: COMMERCIAL

## 2020-12-16 ENCOUNTER — OFFICE VISIT (OUTPATIENT)
Dept: PHYSICAL THERAPY | Facility: MEDICAL CENTER | Age: 70
End: 2020-12-16
Payer: COMMERCIAL

## 2020-12-16 DIAGNOSIS — M19.019 SHOULDER ARTHRITIS: Primary | ICD-10-CM

## 2020-12-16 DIAGNOSIS — Z96.611 STATUS POST REVERSE ARTHROPLASTY OF RIGHT SHOULDER: ICD-10-CM

## 2020-12-16 PROCEDURE — 97110 THERAPEUTIC EXERCISES: CPT | Performed by: PHYSICAL THERAPIST

## 2020-12-16 PROCEDURE — 97140 MANUAL THERAPY 1/> REGIONS: CPT | Performed by: PHYSICAL THERAPIST

## 2020-12-17 ENCOUNTER — APPOINTMENT (OUTPATIENT)
Dept: PHYSICAL THERAPY | Facility: MEDICAL CENTER | Age: 70
End: 2020-12-17
Payer: COMMERCIAL

## 2020-12-21 ENCOUNTER — OFFICE VISIT (OUTPATIENT)
Dept: PHYSICAL THERAPY | Facility: MEDICAL CENTER | Age: 70
End: 2020-12-21
Payer: COMMERCIAL

## 2020-12-21 DIAGNOSIS — M19.019 SHOULDER ARTHRITIS: Primary | ICD-10-CM

## 2020-12-21 DIAGNOSIS — Z96.611 STATUS POST REVERSE ARTHROPLASTY OF RIGHT SHOULDER: ICD-10-CM

## 2020-12-21 PROCEDURE — 97140 MANUAL THERAPY 1/> REGIONS: CPT | Performed by: PHYSICAL THERAPIST

## 2020-12-21 PROCEDURE — 97110 THERAPEUTIC EXERCISES: CPT | Performed by: PHYSICAL THERAPIST

## 2020-12-24 ENCOUNTER — OFFICE VISIT (OUTPATIENT)
Dept: PHYSICAL THERAPY | Facility: MEDICAL CENTER | Age: 70
End: 2020-12-24
Payer: COMMERCIAL

## 2020-12-24 DIAGNOSIS — Z96.611 STATUS POST REVERSE ARTHROPLASTY OF RIGHT SHOULDER: ICD-10-CM

## 2020-12-24 DIAGNOSIS — M19.019 SHOULDER ARTHRITIS: Primary | ICD-10-CM

## 2020-12-24 PROCEDURE — 97110 THERAPEUTIC EXERCISES: CPT | Performed by: PHYSICAL THERAPIST

## 2020-12-24 PROCEDURE — 97140 MANUAL THERAPY 1/> REGIONS: CPT | Performed by: PHYSICAL THERAPIST

## 2020-12-26 ENCOUNTER — TELEMEDICINE (OUTPATIENT)
Dept: FAMILY MEDICINE CLINIC | Facility: CLINIC | Age: 70
End: 2020-12-26
Payer: COMMERCIAL

## 2020-12-26 ENCOUNTER — TELEPHONE (OUTPATIENT)
Dept: OTHER | Facility: OTHER | Age: 70
End: 2020-12-26

## 2020-12-26 DIAGNOSIS — Z20.822 EXPOSURE TO COVID-19 VIRUS: Primary | ICD-10-CM

## 2020-12-26 PROBLEM — K65.4 FAT NECROSIS OF ABDOMINAL WALL (HCC): Status: RESOLVED | Noted: 2018-11-07 | Resolved: 2020-12-26

## 2020-12-26 PROBLEM — Z00.00 MEDICARE ANNUAL WELLNESS VISIT, SUBSEQUENT: Status: RESOLVED | Noted: 2019-06-11 | Resolved: 2020-12-26

## 2020-12-26 PROBLEM — R59.0 LYMPHADENOPATHY, ANTERIOR CERVICAL: Status: RESOLVED | Noted: 2019-06-11 | Resolved: 2020-12-26

## 2020-12-26 PROBLEM — Z87.891: Status: RESOLVED | Noted: 2020-01-28 | Resolved: 2020-12-26

## 2020-12-26 PROBLEM — R06.2 WHEEZE: Status: RESOLVED | Noted: 2020-01-08 | Resolved: 2020-12-26

## 2020-12-26 PROBLEM — M54.2 NECK PAIN: Status: RESOLVED | Noted: 2020-03-09 | Resolved: 2020-12-26

## 2020-12-26 PROBLEM — R59.1 LYMPHADENOPATHY: Status: RESOLVED | Noted: 2019-06-04 | Resolved: 2020-12-26

## 2020-12-26 PROCEDURE — 99441 PR PHYS/QHP TELEPHONE EVALUATION 5-10 MIN: CPT | Performed by: FAMILY MEDICINE

## 2020-12-28 ENCOUNTER — APPOINTMENT (OUTPATIENT)
Dept: PHYSICAL THERAPY | Facility: MEDICAL CENTER | Age: 70
End: 2020-12-28
Payer: COMMERCIAL

## 2020-12-28 DIAGNOSIS — Z20.822 EXPOSURE TO COVID-19 VIRUS: ICD-10-CM

## 2020-12-28 PROCEDURE — U0003 INFECTIOUS AGENT DETECTION BY NUCLEIC ACID (DNA OR RNA); SEVERE ACUTE RESPIRATORY SYNDROME CORONAVIRUS 2 (SARS-COV-2) (CORONAVIRUS DISEASE [COVID-19]), AMPLIFIED PROBE TECHNIQUE, MAKING USE OF HIGH THROUGHPUT TECHNOLOGIES AS DESCRIBED BY CMS-2020-01-R: HCPCS | Performed by: FAMILY MEDICINE

## 2020-12-29 LAB — SARS-COV-2 RNA SPEC QL NAA+PROBE: NOT DETECTED

## 2020-12-31 ENCOUNTER — OFFICE VISIT (OUTPATIENT)
Dept: PHYSICAL THERAPY | Facility: MEDICAL CENTER | Age: 70
End: 2020-12-31
Payer: COMMERCIAL

## 2020-12-31 DIAGNOSIS — Z96.611 STATUS POST REVERSE ARTHROPLASTY OF RIGHT SHOULDER: ICD-10-CM

## 2020-12-31 DIAGNOSIS — M19.019 SHOULDER ARTHRITIS: Primary | ICD-10-CM

## 2020-12-31 PROCEDURE — 97140 MANUAL THERAPY 1/> REGIONS: CPT | Performed by: PHYSICAL THERAPIST

## 2020-12-31 PROCEDURE — 97110 THERAPEUTIC EXERCISES: CPT | Performed by: PHYSICAL THERAPIST

## 2021-01-06 ENCOUNTER — OFFICE VISIT (OUTPATIENT)
Dept: PHYSICAL THERAPY | Facility: MEDICAL CENTER | Age: 71
End: 2021-01-06
Payer: COMMERCIAL

## 2021-01-06 DIAGNOSIS — Z96.611 STATUS POST REVERSE ARTHROPLASTY OF RIGHT SHOULDER: ICD-10-CM

## 2021-01-06 DIAGNOSIS — M19.019 SHOULDER ARTHRITIS: Primary | ICD-10-CM

## 2021-01-06 PROCEDURE — 97140 MANUAL THERAPY 1/> REGIONS: CPT | Performed by: PHYSICAL THERAPIST

## 2021-01-06 PROCEDURE — 97110 THERAPEUTIC EXERCISES: CPT | Performed by: PHYSICAL THERAPIST

## 2021-01-06 NOTE — PROGRESS NOTES
Daily Note     Today's date: 2021  Patient name: Asia Ibarra  : 1950  MRN: [id number]  Referring provider: Venita Aase, MD  Dx:   Encounter Diagnosis     ICD-10-CM    1  Shoulder arthritis  M19 019    2  Status post reverse arthroplasty of right shoulder  Z96 611                   Subjective: anterior shoulder pain; using lightly with ADLs at home  Pt now 7 weeks post-op     Objective: See treatment diary below      Assessment: improvements in AROM after MT and stretching althought very stiff upon arrival  Passive flexion initially only to ~70*, after MT able to achieve 120*  Pt at full ROM per protocol for PROM  AROM by end of session  Needs strength through flex/abd planes  Patient demonstrated fatigue post treatment, exhibited good technique with therapeutic exercises and would benefit from continued PT      Plan: Continue per plan of care  Progress treatment as tolerated  Precautions: FOLLOW PROTOCOL;  Reverse TSA w/Biceps tenodesis, CHF see above medical history      Manuals    PROM Flex (no more than 120)  ER (no more than 50*)  IR NT  Flex (no more than 120)  ER (no more than 50*)  IR NT  Flex (no more than 120)  ER (no more than 50*)  IR NT  PROM flex, ER  PROM flex, abd per protocol   STM/retromassage Hand, Forearm, elbow, upperarm to incision  Hand, Forearm, elbow, upperarm to incision  Scar mobs, UT, pec, subscap/lat  Scar mobs, flower, pec minor/major, UE Scar mobs, UT, subscap TRP, scar mobs, DFM                         Neuro Re-Ed                                 scap retraction     Green, GH in neutral  2x10   Green x 20, sh ext to neutral x 20   shrugs           Shoulder rolls                                 Ther Ex           Wand flexion Wand 5" x 20  Wand 5" x 20  Wand 5" x 20  10sx10 10sx10 B overhead, 10sx10 R isolated   Wand ER 5" x 20 supine  5" x 20 supine  Wand 5" x 20  10sx10 home   Table slides home    10sx10 flex to t[place]      Wrist flex/ext 3# x 30  3# x 30  3# x 30  4# x 30    Gripper  Black x 30  Black x 30  Black x 30  Black x30               AROM flex   x10 supine to 60*  x10 supine to 90*  10 supine, x 10 sitting Supine, seated x20   AROM ER   Seated 2x10  Seated 2x10  Seated x 20 home   AROM elbow   Flex/ext  Flex/ext x 20  x20  3# x 20   pulleys        5'   Finger ladder        10sx10   Ther Activity           Pt edu Safety, lifing, martha Sahu, snow, prtocol for lifting                   Gait Training                                 Modalities

## 2021-01-08 ENCOUNTER — OFFICE VISIT (OUTPATIENT)
Dept: PHYSICAL THERAPY | Facility: MEDICAL CENTER | Age: 71
End: 2021-01-08
Payer: COMMERCIAL

## 2021-01-08 DIAGNOSIS — Z96.611 STATUS POST REVERSE ARTHROPLASTY OF RIGHT SHOULDER: ICD-10-CM

## 2021-01-08 DIAGNOSIS — M19.019 SHOULDER ARTHRITIS: Primary | ICD-10-CM

## 2021-01-08 PROCEDURE — 97112 NEUROMUSCULAR REEDUCATION: CPT | Performed by: PHYSICAL THERAPIST

## 2021-01-08 PROCEDURE — 97110 THERAPEUTIC EXERCISES: CPT | Performed by: PHYSICAL THERAPIST

## 2021-01-08 PROCEDURE — 97140 MANUAL THERAPY 1/> REGIONS: CPT | Performed by: PHYSICAL THERAPIST

## 2021-01-08 NOTE — PROGRESS NOTES
Daily Note     Today's date: 2021  Patient name: Ana Rodas  : 1950  MRN: [id number]  Referring provider: Laurel Marshall MD  Dx:   Encounter Diagnosis     ICD-10-CM    1  Shoulder arthritis  M19 019    2  Status post reverse arthroplasty of right shoulder  Z96 611                   Subjective: anterior shoulder pain; using lightly with ADLs at home  Pt now 7 weeks post-op     Objective: See treatment diary below      Assessment: improvements in AROM after MT and stretching althought very stiff upon arrival  Passive flexion initially only to ~70*, after MT able to achieve 120*  Pt at full ROM per protocol for PROM  AROM by end of session  Needs strength through flex/abd planes  Patient demonstrated fatigue post treatment, exhibited good technique with therapeutic exercises and would benefit from continued PT      Plan: Continue per plan of care  Progress treatment as tolerated  Precautions: FOLLOW PROTOCOL;  Reverse TSA w/Biceps tenodesis, CHF see above medical history      Manuals    PROM PROm all planes within protcol      PROM flex, ER  PROM flex, abd per protocol   STM/retromassage Pec, subscap, scar      Scar mobs, flower, pec minor/major, UE Scar mobs, UT, subscap TRP, scar mobs, DFM                         Neuro Re-Ed                      delt iso 3-way 5" x 20 ea          scap retraction Prone row to neutral x 20 cues hiking       Green x 20, sh ext to neutral x 20   Ther Ex           Wand flexion 10sx15      10sx10 10sx10 B overhead, 10sx10 R isolated   Wand ER 10sx10      10sx10 home              Wrist flex/ext 4# x 20      4# x 30    Gripper        Black x30               AROM flex Supine, seated x20      10 supine, x 10 sitting Supine, seated x20   AROM ER home      Seated x 20 home   AROM elbow home      x20  3# x 20   pulleys 5'       5'   Finger ladder 10sx10       10sx10   Ther Activity           Pt edu                      Gait Training Modalities

## 2021-01-11 ENCOUNTER — OFFICE VISIT (OUTPATIENT)
Dept: PHYSICAL THERAPY | Facility: MEDICAL CENTER | Age: 71
End: 2021-01-11
Payer: COMMERCIAL

## 2021-01-11 DIAGNOSIS — Z96.611 STATUS POST REVERSE ARTHROPLASTY OF RIGHT SHOULDER: ICD-10-CM

## 2021-01-11 DIAGNOSIS — M19.019 SHOULDER ARTHRITIS: Primary | ICD-10-CM

## 2021-01-11 PROCEDURE — 97140 MANUAL THERAPY 1/> REGIONS: CPT | Performed by: PHYSICAL THERAPIST

## 2021-01-11 PROCEDURE — 97110 THERAPEUTIC EXERCISES: CPT | Performed by: PHYSICAL THERAPIST

## 2021-01-11 NOTE — PROGRESS NOTES
Daily Note     Today's date: 2021  Patient name: Tyler Purcell  : 1950  MRN: [id number]  Referring provider: Nancy Oakes MD  Dx:   Encounter Diagnosis     ICD-10-CM    1  Shoulder arthritis  M19 019    2  Status post reverse arthroplasty of right shoulder  Z96 611                   Subjective: Anterior shoulder pain present, but noticing improvements    Objective: See treatment diary below      Assessment: pt continues to make progress in regards to activity tolerance, continues to have pain and tenderness with palpation through the subscapularis  Pt eager to increase function  Fatigue noted with recumbent flexion, but making progress  Patient demonstrated fatigue post treatment, exhibited good technique with therapeutic exercises and would benefit from continued PT      Plan: Continue per plan of care  Progress treatment as tolerated  Precautions: FOLLOW PROTOCOL;  Reverse TSA w/Biceps tenodesis, CHF see above medical history      Manuals         PROM PROm all planes within protcol  PROm all planes within protcol        STM/retromassage Pec, subscap, scar  Pec, subscap, scar mobs,                               Neuro Re-Ed                      delt iso 3-way 5" x 20 ea  3-way 5" x 20 ea        scap retraction Prone row to neutral x 20 cues hiking  Prone row to neutral x 20 cues hiking        Ther Ex           Wand flexion 10sx15  10sx10        Wand ER 10sx10  home                   Wrist flex/ext 4# x 20          Gripper                       AROM flex Supine, seated x20  Supine 2# x 30; semirecumbant x 20 0#         AROM ER home  S/l x 20        AROM elbow home  3# x 30        pulleys 5'  np        Finger ladder 10sx10  10sx10        Ther Activity           Pt edu                      Gait Training                                 Modalities

## 2021-01-14 ENCOUNTER — OFFICE VISIT (OUTPATIENT)
Dept: PHYSICAL THERAPY | Facility: MEDICAL CENTER | Age: 71
End: 2021-01-14
Payer: COMMERCIAL

## 2021-01-14 DIAGNOSIS — Z96.611 STATUS POST REVERSE ARTHROPLASTY OF RIGHT SHOULDER: ICD-10-CM

## 2021-01-14 DIAGNOSIS — M19.019 SHOULDER ARTHRITIS: Primary | ICD-10-CM

## 2021-01-14 PROCEDURE — 97112 NEUROMUSCULAR REEDUCATION: CPT | Performed by: PHYSICAL THERAPIST

## 2021-01-14 PROCEDURE — 97140 MANUAL THERAPY 1/> REGIONS: CPT | Performed by: PHYSICAL THERAPIST

## 2021-01-14 PROCEDURE — 97110 THERAPEUTIC EXERCISES: CPT | Performed by: PHYSICAL THERAPIST

## 2021-01-14 NOTE — PROGRESS NOTES
PT Re-evaluation     Today's date: 2020  Patient name: Gala Nielsen  : 1950  MRN: [id number]  Referring provider: Lucius Saenz MD  Dx:   Encounter Diagnosis     ICD-10-CM    1  Shoulder arthritis  M19 019 PT plan of care cert/re-cert   2  Status post reverse arthroplasty of right shoulder  Z96 611 PT plan of care cert/re-cert         Assessment  Assessment details: Pt has attended 8 weeks of formal PT following the protocol s/p R TSA  PT has achieved full PROM within protocol limitscontinues to progress towards AROM goal, but is still limited in regards to strength and endurance with overhead reaching and scap stability  Pt has resumed light ADLs and IADLs and is aware of lifting restrictions  Pt has partially met personal and PT goals at this time, but remains restricted  PT to continue to progress as tolerated following protocol  Pt would benefit from skilled physical therapy to address the above impairments and functional limitations to restore prior level of function  Impairments: abnormal coordination, abnormal gait, abnormal muscle firing, abnormal or restricted ROM, abnormal movement, activity intolerance, impaired balance, impaired physical strength, lacks appropriate home exercise program, pain with function, weight-bearing intolerance and poor body mechanics  Functional limitations: all activities of the R UE  FOTO 4  Symptom irritability: low Understanding of Dx/Px/POC: good   Prognosis: good    Goals  STG 2 weeks  1  (I) with HEP - met  2  Decrease pain 25% with functional activities - partially met, some increase with new exs  3  Pt will demonstrate good understanding of (I) symptom management  - met    LTG 6 weeks  1  Increased AROM per protocol limits: partially met  2  Increase strength >4/5 all planes: not met   3  Increased FOTO scores > 65: not met  4  Decrease pain 75% with functional activities: met  5   Return to PLOF including ADL's, recreational activities, work-related activities, engaging in social activities,reaching, lifting and carrying as appropriate within protocol : partially met          Plan  Plan details: Pt would benefit from skilled physical therapy 2x/week weaning to 1x/week over the course of 8-12 weeks to address the above impairments and functional limitations to restore normal movement patterns, abolish pain and return to full, unrestricted activity  Patient would benefit from: skilled physical therapy  Planned modality interventions: cryotherapy, ultrasound, TENS, unattended electrical stimulation and thermotherapy: hydrocollator packs  Planned therapy interventions: IADL retraining, joint mobilization, manual therapy, abdominal trunk stabilization, flexibility, functional ROM exercises, gait training, graded activity, graded exercise, home exercise program, work reintegration, therapeutic training, therapeutic exercise, therapeutic activities, stretching, strengthening, patient education, neuromuscular re-education, Dhillon taping, activity modification, balance, massage, postural training, self care and transfer training  Frequency: 2x week  Duration in weeks: 12  Plan of Care beginning date: 11/20/2020  Plan of Care expiration date: 2/12/2021  Treatment plan discussed with: patient        Subjective Evaluation    History of Present Illness  Date of surgery: 11/17/2020    Current: pt continues to progress per protocol  Some anterior shoulder numbness still  Pain remains ~2/10 with functional activities    Mechanism of injury: surgery  Mechanism of injury: History: Pt arrives s/p R reverse TSA with bicep tenodesis, arrives in R shoulder abduction sling  Pt has been able to sleep through the night -- pt stating this is the first time he has been able to sleep through the night without pain  Pt has been sleeping on the couch with the sling on the left side primarily  With chronic history of R shoulder pain for 'years'  Pt is R handed    Pt has done well at home since hospital DC  Pt stating pain remains minimal and the paresthesias are in all digits  Hands are slightly swollen  Pt returns to ortho 2020    Testing/Imaging:     Pain & symptom behavior:   Sitting in slin/10 -- pt taking Tylenol  With activity: 2/10     Aggravating factors: difficulty donning belt, dressing/bathing     Relieving factors: just taking tylenol currently    PMH: CHF, DM, diverticulitis, heart disease, HTN, OA, vascular disorder s/p carotid thromboendartectmy    Surgical History: R RTSA, thromboendartectmy, colon resection, inguinal hernia repair,     Occupation: Retired - construction;  House/yard maintenance    Functional Deficits: lifting overhead, reaching behind back, carrying heavy items, repeat tasks d/t low endurance    Patient Goals:  Return to reaching overhead for the R UE for ADLs, IADLs, return to yard work and household chores        Objective     General Comments:      Shoulder Comments   Observation  Minimal edema, mild anterior shoulder prominence; pt has sensation of 'swelling'     Neurological Testing  (+) slightly diminished light sensation anterior sholder    Palpation  (+) tenderness subscapularis, mild to pec    AROM  Seated  R shoulder  Flexion: 110*  Seated ER in neutral: 45*/ side lying: 10*  IR: to lateral hip      L shoulder; WNL all planes    PROM  R shoulder:   Flexion: 120*  Abduction: 80*  IR: to lateral hip  ER: 50 in neutral per protocol    L shoulder; WNL all planes     Strength  3-/5 per ROM limits    Precautions: FOLLOW PROTOCOL, CHF see above medical history      Manuals       PROM PROm all planes within protcol  PROm all planes within protcol  PROM flex to 120, ER to 50*, IR to lat hip      STM/retromassage Pec, subscap, scar  Pec, subscap, scar mobs,   R pec, subscap and lat                            Neuro Re-Ed                      delt iso 3-way 5" x 20 ea  3-way 5" x 20 ea  3 way; 5" x 30 ea      scap retraction Prone row to neutral x 20 cues hiking  Prone row to neutral x 20 cues hiking  Prone row to neutral x 20 cues hiking      Ther Ex           Wand flexion 10sx15  10sx10  10sx10      Wand ER 10sx10  home  home                 Wrist flex/ext 4# x 20          Gripper                       AROM flex Supine, seated x20  Supine 2# x 30; semirecumbant x 20 0#   Supine 2# x 10, semirecumband 2# x 10      AROM ER home  S/l x 20  S/l x 30      AROM elbow home  3# x 30  3# x 30      pulleys 5'  np  5'      Finger ladder 10sx10  10sx10  10sx10      Ther Activity           Pt edu                      Gait Training                                 Modalities

## 2021-01-20 ENCOUNTER — APPOINTMENT (OUTPATIENT)
Dept: PHYSICAL THERAPY | Facility: MEDICAL CENTER | Age: 71
End: 2021-01-20
Payer: COMMERCIAL

## 2021-01-22 ENCOUNTER — OFFICE VISIT (OUTPATIENT)
Dept: PHYSICAL THERAPY | Facility: MEDICAL CENTER | Age: 71
End: 2021-01-22
Payer: COMMERCIAL

## 2021-01-22 DIAGNOSIS — Z96.611 STATUS POST REVERSE ARTHROPLASTY OF RIGHT SHOULDER: ICD-10-CM

## 2021-01-22 DIAGNOSIS — M19.019 SHOULDER ARTHRITIS: Primary | ICD-10-CM

## 2021-01-22 PROCEDURE — 97140 MANUAL THERAPY 1/> REGIONS: CPT | Performed by: PHYSICAL THERAPIST

## 2021-01-22 PROCEDURE — 97110 THERAPEUTIC EXERCISES: CPT | Performed by: PHYSICAL THERAPIST

## 2021-01-22 PROCEDURE — 97112 NEUROMUSCULAR REEDUCATION: CPT | Performed by: PHYSICAL THERAPIST

## 2021-01-22 NOTE — PROGRESS NOTES
Daily Note     Today's date: 2021  Patient name: Lila Workman  : 1950  MRN: [id number]  Referring provider: Oliver Mckoy MD  Dx:   Encounter Diagnosis     ICD-10-CM    1  Shoulder arthritis  M19 019    2  Status post reverse arthroplasty of right shoulder  Z96 611                   Subjective: Patient offers no new complaints  Objective: See treatment diary below      Assessment: Tolerated treatment well  Patient demonstrated fatigue post treatment, exhibited good technique with therapeutic exercises and would benefit from continued PT  No complaints at end of session  Will be going back to the surgeon soon  Plan: Continue per plan of care  Progress treatment as tolerated            Precautions: FOLLOW PROTOCOL, CHF see above medical history      Manuals     PROM PROm all planes within protcol  PROm all planes within protcol  PROM flex to 120, ER to 50*, IR to lat hip  PROM flex to 120, ER to 50, IR to lat hip    STM/retromassage Pec, subscap, scar  Pec, subscap, scar mobs,   R pec, subscap and lat  R pec, subscap and lat                          Neuro Re-Ed                      delt iso 3-way 5" x 20 ea  3-way 5" x 20 ea  3 way; 5" x 30 ea  3 way 5" x30 ea    scap retraction Prone row to neutral x 20 cues hiking  Prone row to neutral x 20 cues hiking  Prone row to neutral x 20 cues hiking  Prone row to neutral x20 cues hiking    Ther Ex           Wand flexion 10sx15  10sx10  10sx10  10" 10x    Wand ER 10sx10  home  home                 Wrist flex/ext 4# x 20          Gripper                       AROM flex Supine, seated x20  Supine 2# x 30; semirecumbant x 20 0#   Supine 2# x 10, semirecumband 2# x 10  Semi-recumbant 2# 2x10    AROM ER home  S/l x 20  S/l x 30  S/l x30    AROM elbow home  3# x 30  3# x 30  3# 30x    pulleys 5'  np  5'  5'    Finger ladder 10sx10  10sx10  10sx10  10" 10x    Ther Activity           Pt edu                      Gait Training Modalities

## 2021-01-25 ENCOUNTER — OFFICE VISIT (OUTPATIENT)
Dept: PHYSICAL THERAPY | Facility: MEDICAL CENTER | Age: 71
End: 2021-01-25
Payer: COMMERCIAL

## 2021-01-25 DIAGNOSIS — Z96.611 STATUS POST REVERSE ARTHROPLASTY OF RIGHT SHOULDER: ICD-10-CM

## 2021-01-25 DIAGNOSIS — M19.019 SHOULDER ARTHRITIS: Primary | ICD-10-CM

## 2021-01-25 PROCEDURE — 97112 NEUROMUSCULAR REEDUCATION: CPT | Performed by: PHYSICAL THERAPIST

## 2021-01-25 PROCEDURE — 97140 MANUAL THERAPY 1/> REGIONS: CPT | Performed by: PHYSICAL THERAPIST

## 2021-01-25 PROCEDURE — 97110 THERAPEUTIC EXERCISES: CPT | Performed by: PHYSICAL THERAPIST

## 2021-01-25 NOTE — PROGRESS NOTES
Daily Note     Today's date: 2021  Patient name: Haider Han  : 1950  MRN: [id number]  Referring provider: Wing Chapin MD  Dx:   Encounter Diagnosis     ICD-10-CM    1  Shoulder arthritis  M19 019    2  Status post reverse arthroplasty of right shoulder  Z96 611                   Subjective: Patient offers no new complaints  Does indicate some improvements in ROM from prior to surgery, but still limited in functional activities  Objective: See treatment diary below  PROM: flexion: 120*; abduction 90*, IR: to lateral hip, ER: 50*  AROM: seated flexion: 100*, abduction 45*, seated IR to abdomen, seated ER to 50*  MMT: not formally assessed, although 2+/5 - 3-/5 based on ROM; IR/ER able to accept min-mod resistance  Assessment: Tolerated treatment well  Patient demonstrated fatigue post treatment, exhibited good technique with therapeutic exercises and would benefit from continued PT  Pt limited with AROM which has been most limited activity, pt with increased joint stiffness and soft tissue restrictions through R pec, subscapularis/lat, decreased scap mobility which seems to be worse at beginning of each week  Pt f/u with MD Blackwell, plan to DC Friday pending results from this appt  Pt is (I) with current HEP program        Plan: Potential discharge next visit          Precautions: FOLLOW PROTOCOL, CHF see above medical history      Manuals    PROM PROm all planes within protcol  PROm all planes within protcol  PROM flex to 120, ER to 50*, IR to lat hip  PROM flex to 120, ER to 50, IR to lat hip PROM flex to 120, ER to 50, IR to lat hip   STM/retromassage Pec, subscap, scar  Pec, subscap, scar mobs,   R pec, subscap and lat  R pec, subscap and lat R pec, subscap and lat           MWM, shoul flex with pressure to subscap/lat              Neuro Re-Ed                      delt iso 3-way 5" x 20 ea  3-way 5" x 20 ea  3 way; 5" x 30 ea  3 way 5" x30 ea 3 way 5" x30 ea scap retraction Prone row to neutral x 20 cues hiking  Prone row to neutral x 20 cues hiking  Prone row to neutral x 20 cues hiking  Prone row to neutral x20 cues hiking Prone row to neutral x20 cues hiking 2#   Ther Ex           Wand flexion 10sx15  10sx10  10sx10  10" 10x 10" 10x   Wand ER 10sx10  home  home   np                                               AROM flex Supine, seated x20  Supine 2# x 30; semirecumbant x 20 0#   Supine 2# x 10, semirecumband 2# x 10  Semi-recumbant 2# 2x10 Semi-recumbant 2# 2x12,  seated x 10;    AROM ER home  S/l x 20  S/l x 30  S/l x30 S/l x30 to neutral   AROM elbow home  3# x 30  3# x 30  3# 30x 3# 30x   pulleys 5'  np  5'  5' 5'   Finger ladder 10sx10  10sx10  10sx10  10" 10x 10" 10x   Ther Activity           Pt edu                      Gait Training                                 Modalities

## 2021-01-28 ENCOUNTER — OFFICE VISIT (OUTPATIENT)
Dept: OBGYN CLINIC | Facility: OTHER | Age: 71
End: 2021-01-28

## 2021-01-28 VITALS
SYSTOLIC BLOOD PRESSURE: 130 MMHG | HEIGHT: 72 IN | HEART RATE: 80 BPM | BODY MASS INDEX: 27.9 KG/M2 | WEIGHT: 206 LBS | DIASTOLIC BLOOD PRESSURE: 68 MMHG

## 2021-01-28 DIAGNOSIS — Z96.611 STATUS POST REVERSE TOTAL ARTHROPLASTY OF RIGHT SHOULDER: Primary | ICD-10-CM

## 2021-01-28 PROCEDURE — 3008F BODY MASS INDEX DOCD: CPT | Performed by: PHYSICIAN ASSISTANT

## 2021-01-28 PROCEDURE — 99024 POSTOP FOLLOW-UP VISIT: CPT | Performed by: PHYSICIAN ASSISTANT

## 2021-01-28 NOTE — PROGRESS NOTES
Surgery: 11/17/2020 Right reverse TSA    S: Nani Phoenix is 2 5 months from total shoulder  He is doing well and progressing with therapy  He has been compliant with the HEP  Denies new issues, injuries or complaints  Denies numbness or tingling  Pre-op pain has resolved  He admits to soreness with therapy  He was hoping for better motion but continues to work with therapy    /68   Pulse 80   Ht 6' (1 829 m)   Wt 93 4 kg (206 lb)   BMI 27 94 kg/m²     O: Right shoulder  Incision - healed  FF: 90 (120 passive)  Abd: 75  ER: 30  NVI  SI    A/P: 2 5 months s/p R reverse total shoulder arthroplasty - doing well  Pre-op pain has resolved  Motion is improved from pre-op measurements  With continued stretching, this should continue to improve    1  Transition from formal therapy to HEP  2  HEP as instructed by therapist  3  Slowly increase activities to tolerance    4  25# permanent lifting restriction

## 2021-01-29 ENCOUNTER — OFFICE VISIT (OUTPATIENT)
Dept: PHYSICAL THERAPY | Facility: MEDICAL CENTER | Age: 71
End: 2021-01-29
Payer: COMMERCIAL

## 2021-01-29 DIAGNOSIS — Z96.611 STATUS POST REVERSE ARTHROPLASTY OF RIGHT SHOULDER: ICD-10-CM

## 2021-01-29 DIAGNOSIS — M19.019 SHOULDER ARTHRITIS: Primary | ICD-10-CM

## 2021-01-29 PROCEDURE — 97110 THERAPEUTIC EXERCISES: CPT | Performed by: PHYSICAL THERAPIST

## 2021-01-29 NOTE — PROGRESS NOTES
Daily Note     Today's date: 2021  Patient name: Alli Thompson  : 1950  MRN: [id number]  Referring provider: Volodymyr Alonso MD  Dx:   Encounter Diagnosis     ICD-10-CM    1  Shoulder arthritis  M19 019    2  Status post reverse arthroplasty of right shoulder  Z96 611                   Subjective: last PT visit - transition to DC with HEP  Objective: See treatment diary below  PROM: flexion: 120*; abduction 90*, IR: to lateral hip, ER: 50*  AROM: seated flexion: 100*, abduction 45*, seated IR to abdomen, seated ER to 50*  MMT: not formally assessed, although 2+/5 - 3-/5 based on ROM; IR/ER able to accept min-mod resistance  Assessment: Tolerated treatment well  Patient demonstrated fatigue post treatment, exhibited good technique with therapeutic exercises and would benefit from continued PT  Pt with little pain with function although weakness still  PT providing progressed HEP this session with handout  Pt verbalizing understanding and demonstrates good technique  Pt aware of lifting restrictions and precautions to prevent overuse injuries  Pt DC at this time with HEP        Plan: DC with HEP        Precautions: FOLLOW PROTOCOL, CHF see above medical history      Manuals    PROM Review of HEP, technique  PROm all planes within protcol  PROM flex to 120, ER to 50*, IR to lat hip  PROM flex to 120, ER to 50, IR to lat hip PROM flex to 120, ER to 50, IR to lat hip   STM/retromassage   Pec, subscap, scar mobs,   R pec, subscap and lat  R pec, subscap and lat R pec, subscap and lat           MWM, shoul flex with pressure to subscap/lat              Neuro Re-Ed                      delt iso review  3-way 5" x 20 ea  3 way; 5" x 30 ea  3 way 5" x30 ea 3 way 5" x30 ea   scap retraction reveiew -bands for HEP  Prone row to neutral x 20 cues hiking  Prone row to neutral x 20 cues hiking  Prone row to neutral x20 cues hiking Prone row to neutral x20 cues hiking 2#   Ther Ex Wand flexion Add abd  10sx10  10sx10  10" 10x 10" 10x   Wand ER review  home  home   np                                               AROM flex Supine, on wedge review resistance , pt up to 3#  Supine 2# x 30; semirecumbant x 20 0#   Supine 2# x 10, semirecumband 2# x 10  Semi-recumbant 2# 2x10 Semi-recumbant 2# 2x12,  seated x 10;    AROM ER IR/ER with RTB  S/l x 20  S/l x 30  S/l x30 S/l x30 to neutral   AROM elbow Bicep/tricep with bands  3# x 30  3# x 30  3# 30x 3# 30x   pulleys   np  5'  5' 5'   Finger ladder   10sx10  10sx10  10" 10x 10" 10x   Ther Activity           Pt edu                      Gait Training                                 Modalities

## 2021-02-24 ENCOUNTER — LAB (OUTPATIENT)
Dept: LAB | Facility: CLINIC | Age: 71
End: 2021-02-24
Payer: COMMERCIAL

## 2021-02-24 DIAGNOSIS — I25.83 CORONARY ARTERY DISEASE DUE TO LIPID RICH PLAQUE: ICD-10-CM

## 2021-02-24 DIAGNOSIS — M25.511 CHRONIC RIGHT SHOULDER PAIN: ICD-10-CM

## 2021-02-24 DIAGNOSIS — E78.2 MIXED HYPERLIPIDEMIA: ICD-10-CM

## 2021-02-24 DIAGNOSIS — I25.10 CORONARY ARTERY DISEASE DUE TO LIPID RICH PLAQUE: ICD-10-CM

## 2021-02-24 DIAGNOSIS — I50.21 CONGESTIVE HEART FAILURE, NYHA CLASS 1, ACUTE, SYSTOLIC (HCC): ICD-10-CM

## 2021-02-24 DIAGNOSIS — G89.29 CHRONIC RIGHT SHOULDER PAIN: ICD-10-CM

## 2021-02-24 DIAGNOSIS — E11.9 TYPE 2 DIABETES MELLITUS WITHOUT COMPLICATION, WITHOUT LONG-TERM CURRENT USE OF INSULIN (HCC): ICD-10-CM

## 2021-02-24 DIAGNOSIS — F17.210 CIGARETTE NICOTINE DEPENDENCE WITHOUT COMPLICATION: ICD-10-CM

## 2021-02-24 DIAGNOSIS — I10 ESSENTIAL HYPERTENSION: ICD-10-CM

## 2021-02-24 DIAGNOSIS — Z00.00 MEDICARE ANNUAL WELLNESS VISIT, SUBSEQUENT: ICD-10-CM

## 2021-02-24 LAB
ALBUMIN SERPL BCP-MCNC: 4.2 G/DL (ref 3.5–5)
ALP SERPL-CCNC: 59 U/L (ref 46–116)
ALT SERPL W P-5'-P-CCNC: 36 U/L (ref 12–78)
ANION GAP SERPL CALCULATED.3IONS-SCNC: 4 MMOL/L (ref 4–13)
AST SERPL W P-5'-P-CCNC: 18 U/L (ref 5–45)
BILIRUB SERPL-MCNC: 0.78 MG/DL (ref 0.2–1)
BUN SERPL-MCNC: 17 MG/DL (ref 5–25)
CALCIUM SERPL-MCNC: 9.2 MG/DL (ref 8.3–10.1)
CHLORIDE SERPL-SCNC: 104 MMOL/L (ref 100–108)
CHOLEST SERPL-MCNC: 156 MG/DL (ref 50–200)
CO2 SERPL-SCNC: 30 MMOL/L (ref 21–32)
CREAT SERPL-MCNC: 0.86 MG/DL (ref 0.6–1.3)
EST. AVERAGE GLUCOSE BLD GHB EST-MCNC: 166 MG/DL
GFR SERPL CREATININE-BSD FRML MDRD: 89 ML/MIN/1.73SQ M
GLUCOSE P FAST SERPL-MCNC: 174 MG/DL (ref 65–99)
HBA1C MFR BLD: 7.4 %
HDLC SERPL-MCNC: 43 MG/DL
LDLC SERPL CALC-MCNC: 75 MG/DL (ref 0–100)
NONHDLC SERPL-MCNC: 113 MG/DL
POTASSIUM SERPL-SCNC: 4.2 MMOL/L (ref 3.5–5.3)
PROT SERPL-MCNC: 8 G/DL (ref 6.4–8.2)
SODIUM SERPL-SCNC: 138 MMOL/L (ref 136–145)
TRIGL SERPL-MCNC: 190 MG/DL

## 2021-02-24 PROCEDURE — 36415 COLL VENOUS BLD VENIPUNCTURE: CPT

## 2021-02-24 PROCEDURE — 3051F HG A1C>EQUAL 7.0%<8.0%: CPT | Performed by: INTERNAL MEDICINE

## 2021-02-24 PROCEDURE — 80053 COMPREHEN METABOLIC PANEL: CPT

## 2021-02-24 PROCEDURE — 80061 LIPID PANEL: CPT

## 2021-02-24 PROCEDURE — 83036 HEMOGLOBIN GLYCOSYLATED A1C: CPT

## 2021-03-01 ENCOUNTER — OFFICE VISIT (OUTPATIENT)
Dept: FAMILY MEDICINE CLINIC | Facility: CLINIC | Age: 71
End: 2021-03-01
Payer: COMMERCIAL

## 2021-03-01 VITALS
HEART RATE: 78 BPM | WEIGHT: 207 LBS | DIASTOLIC BLOOD PRESSURE: 82 MMHG | TEMPERATURE: 97.7 F | HEIGHT: 72 IN | SYSTOLIC BLOOD PRESSURE: 124 MMHG | OXYGEN SATURATION: 95 % | BODY MASS INDEX: 28.04 KG/M2

## 2021-03-01 DIAGNOSIS — I50.21 CONGESTIVE HEART FAILURE, NYHA CLASS 1, ACUTE, SYSTOLIC (HCC): ICD-10-CM

## 2021-03-01 DIAGNOSIS — E11.9 TYPE 2 DIABETES MELLITUS WITHOUT COMPLICATION, WITHOUT LONG-TERM CURRENT USE OF INSULIN (HCC): Primary | ICD-10-CM

## 2021-03-01 DIAGNOSIS — F17.210 CIGARETTE NICOTINE DEPENDENCE WITHOUT COMPLICATION: ICD-10-CM

## 2021-03-01 DIAGNOSIS — E78.2 MIXED HYPERLIPIDEMIA: ICD-10-CM

## 2021-03-01 DIAGNOSIS — D64.9 ANEMIA, UNSPECIFIED TYPE: ICD-10-CM

## 2021-03-01 DIAGNOSIS — Z12.2 ENCOUNTER FOR SCREENING FOR LUNG CANCER: ICD-10-CM

## 2021-03-01 DIAGNOSIS — R53.83 FATIGUE, UNSPECIFIED TYPE: ICD-10-CM

## 2021-03-01 DIAGNOSIS — I10 ESSENTIAL HYPERTENSION: ICD-10-CM

## 2021-03-01 PROCEDURE — 3725F SCREEN DEPRESSION PERFORMED: CPT | Performed by: FAMILY MEDICINE

## 2021-03-01 PROCEDURE — 99214 OFFICE O/P EST MOD 30 MIN: CPT | Performed by: FAMILY MEDICINE

## 2021-03-01 RX ORDER — POLYETHYLENE GLYCOL 3350 17 G
POWDER IN PACKET (EA) ORAL
Qty: 100 EACH | Refills: 8 | Status: SHIPPED | OUTPATIENT
Start: 2021-03-01 | End: 2021-09-03 | Stop reason: SDUPTHER

## 2021-03-01 NOTE — PROGRESS NOTES
Assessment/Plan:    No problem-specific Assessment & Plan notes found for this encounter  Diagnoses and all orders for this visit:    Type 2 diabetes mellitus without complication, without long-term current use of insulin ([address] )  Elevated  He prefers to follow a low carb diet and re-check his blood work in 3 months  -     Comprehensive metabolic panel; Future  -     Lipid panel; Future  -     HEMOGLOBIN A1C W/ EAG ESTIMATION; Future    Mixed hyperlipidemia  -     Comprehensive metabolic panel; Future  -     Lipid panel; Future    Essential hypertension  -     Comprehensive metabolic panel; Future  -     Lipid panel; Future    Cigarette nicotine dependence without complication  -     nicotine polacrilex (COMMIT) 2 MG lozenge; May take 1 lozenge every 3-4 hours as needed with no more than 10 lozenges in a day    Congestive heart failure, NYHA class 1, acute, systolic (HCC)  No SOB or leg edeam    Fatigue, unspecified type  -     CBC and differential; Future  -     TSH, 3rd generation with Free T4 reflex; Future  -     Urinalysis with microscopic  -     Occult blood 1-3, stool; Future  -     Iron; Future  -     Vitamin B12; Future  -     Folate; Future    Anemia, unspecified type  -     CBC and differential; Future  -     Urinalysis with microscopic  -     Occult blood 1-3, stool; Future  -     Iron; Future  -     Vitamin B12; Future  -     Folate; Future    Encounter for screening for lung cancer  -     CT lung screening program; Future      Follow up in 3 months or as needed    Subjective:      Patient ID: Samuel Magaña is a 78 y o  male  Diabetes Mellitus  Patient presents for follow up of diabetes  Current symptoms include: none  Symptoms have been well-controlled  Patient denies foot ulcerations, hyperglycemia, increased appetite, nausea, paresthesia of the feet and polydipsia  Evaluation to date has included: fasting blood sugar and hemoglobin A1C  Home sugars: BGs consistently in an acceptable range  Current treatment: Continued metformin which has been effective  Hypertension  Patient is here for follow-up of elevated blood pressure  He is exercising and is adherent to a low-salt diet  Blood pressure is well controlled at home  Cardiac symptoms: none  Patient denies chest pain, chest pressure/discomfort and claudication  Cardiovascular risk factors: advanced age (older than 54 for men, 72 for women), diabetes mellitus, hypertension and male gender  Use of agents associated with hypertension: none  History of target organ damage: none  He has been feeling more tried recently  He says that he does still enjoy doing things such as painting however feels tired all the time  denies any SOB or leg swelling  Does have a history of CHF, systolic  Also has a history of smoking over 30 pack per year  The following portions of the patient's history were reviewed and updated as appropriate:   He  has a past medical history of CHF (congestive heart failure) ([facility] ), Diabetes mellitus ([facility] ), Diverticulitis, 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[facility] ) (11/7/2018), Heart disease, Hyperlipidemia, Hypertension, Kidney stone, Osteoarthritis, and Vascular disorder    He   Patient Active Problem List    Diagnosis Date Noted    Fatigue 03/01/2021    Anemia 03/01/2021    Status post reverse total arthroplasty of right shoulder 11/18/2020    Elevated PSA 08/12/2020    Arthritis 06/23/2020    Familial hypercholesterolemia 06/23/2020    BMI 27 0-27 9,adult 06/23/2020    Chronic right shoulder pain 06/23/2020    S/P Left carotid endarterectomy 1/13/20 01/21/2020    Cerebral aneurysm 11/22/2019    Carotid artery stenosis without cerebral infarction, bilateral 11/05/2019    Coronary artery disease due to lipid rich plaque 10/15/2019    Congestive heart failure, NYHA class 1, acute, systolic ([facility]) 41/47/9074    Cigarette nicotine dependence without complication 53/36/4968    Soft tissue mass 08/22/2018    Essential hypertension 05/07/2018    Mixed hyperlipidemia 05/07/2018    Type 2 diabetes mellitus without complication, without long-term current use of insulin ([facility] ) 05/07/2018    Ventral hernia without obstruction or gangrene 05/07/2018    Erectile dysfunction of non-organic origin 07/16/2013     He  has a past surgical history that includes Abdominal surgery; Laparoscopic colon resection; Colonoscopy; pr colonoscopy flx dx w/collj spec when pfrmd (N/A, 11/3/2017); Inguinal hernia repair (Left); pr thromboendartectmy neck,neck incis (Left, 1/13/2020); and pr reconstr total shoulder implant (Right, 11/17/2020)  His family history includes Colon cancer in his family, father, and paternal grandfather; No Known Problems in his mother  He  reports that he quit smoking about 17 months ago  He has never used smokeless tobacco  He reports current alcohol use of about 2 0 standard drinks of alcohol per week  He reports that he does not use drugs    Current Outpatient Medications   Medication Sig Dispense Refill    amLODIPine (NORVASC) 5 mg tablet Take 1 tablet (5 mg total) by mouth daily 90 tablet 3    ascorbic acid (VITAMIN C) 500 mg tablet Take 500 mg by mouth daily      aspirin 81 mg chewable tablet Chew 1 tablet (81 mg total) daily 90 tablet 3    atorvastatin (LIPITOR) 20 mg tablet Take 1 tablet (20 mg total) by mouth daily 60 tablet 3    carvedilol (COREG) 6 25 mg tablet Take 1 tablet (6 25 mg total) by mouth 2 (two) times a day with meals 180 tablet 3    clopidogrel (PLAVIX) 75 mg tablet TAKE 1 TABLET BY MOUTH  DAILY 90 tablet 3    losartan (COZAAR) 100 MG tablet TAKE 1 TABLET BY MOUTH  DAILY 90 tablet 3    metFORMIN (GLUCOPHAGE) 1000 MG tablet Take 1 tablet (1,000 mg total) by mouth 2 (two) times a day with meals 180 tablet 3    nicotine polacrilex (COMMIT) 2 MG lozenge May take 1 lozenge every 3-4 hours as needed with no more than 10 lozenges in a day 100 each 8    sitaGLIPtin (Januvia) 25 mg tablet Take 1 tablet (25 mg total) by mouth daily 90 tablet 3    acetaminophen (TYLENOL) 650 mg CR tablet Take 1 tablet (650 mg total) by mouth every 8 (eight) hours as needed for mild pain 30 tablet 0    docusate sodium (COLACE) 100 mg capsule Take 1 capsule (100 mg total) by mouth 2 (two) times a day 10 capsule 0    glucose blood test strip Use as instructed 100 each 2    oxyCODONE (ROXICODONE) 5 mg immediate release tablet 1 tablets every 6 hours as needed for severe pain  (Patient not taking: Reported on 3/1/2021) 13 tablet 0    sildenafil (VIAGRA) 25 MG tablet Take 1 tablet (25 mg total) by mouth daily as needed for erectile dysfunction 10 tablet 0     No current facility-administered medications for this visit        Current Outpatient Medications on File Prior to Visit   Medication Sig    amLODIPine (NORVASC) 5 mg tablet Take 1 tablet (5 mg total) by mouth daily    ascorbic acid (VITAMIN C) 500 mg tablet Take 500 mg by mouth daily    aspirin 81 mg chewable tablet Chew 1 tablet (81 mg total) daily    atorvastatin (LIPITOR) 20 mg tablet Take 1 tablet (20 mg total) by mouth daily    carvedilol (COREG) 6 25 mg tablet Take 1 tablet (6 25 mg total) by mouth 2 (two) times a day with meals    clopidogrel (PLAVIX) 75 mg tablet TAKE 1 TABLET BY MOUTH  DAILY    losartan (COZAAR) 100 MG tablet TAKE 1 TABLET BY MOUTH  DAILY    metFORMIN (GLUCOPHAGE) 1000 MG tablet Take 1 tablet (1,000 mg total) by mouth 2 (two) times a day with meals    sitaGLIPtin (Januvia) 25 mg tablet Take 1 tablet (25 mg total) by mouth daily    [DISCONTINUED] nicotine polacrilex (COMMIT) 2 MG lozenge May take 1 lozenge every 3-4 hours as needed with no more than 10 lozenges in a day    acetaminophen (TYLENOL) 650 mg CR tablet Take 1 tablet (650 mg total) by mouth every 8 (eight) hours as needed for mild pain    docusate sodium (COLACE) 100 mg capsule Take 1 capsule (100 mg total) by mouth 2 (two) times a day    glucose blood test strip Use as instructed    oxyCODONE (ROXICODONE) 5 mg immediate release tablet 1 tablets every 6 hours as needed for severe pain  (Patient not taking: Reported on 3/1/2021)    sildenafil (VIAGRA) 25 MG tablet Take 1 tablet (25 mg total) by mouth daily as needed for erectile dysfunction     No current facility-administered medications on file prior to visit  He is allergic to other       Review of Systems   Constitutional: Positive for fatigue  Negative for activity change, appetite change and fever  HENT: Negative for congestion and ear discharge  Respiratory: Negative for cough and shortness of breath  Cardiovascular: Negative for chest pain and palpitations  Gastrointestinal: Negative for diarrhea and nausea  Musculoskeletal: Negative for arthralgias and back pain  Skin: Negative for color change and rash  Neurological: Negative for dizziness and headaches  Psychiatric/Behavioral: Negative for agitation and behavioral problems  Objective:      /82 (BP Location: Left arm, Patient Position: Sitting, Cuff Size: Standard)   Pulse 78   Temp 97 7 °F (36 5 °C)   Ht 6' (1 829 m)   Wt 93 9 kg (207 lb)   SpO2 95%   BMI 28 07 kg/m²          Physical Exam  Constitutional:       General: He is not in acute distress  Appearance: He is well-developed  He is not diaphoretic  Eyes:      General: No scleral icterus  Pupils: Pupils are equal, round, and reactive to light  Cardiovascular:      Rate and Rhythm: Normal rate and regular rhythm  Heart sounds: Normal heart sounds  No murmur  Pulmonary:      Effort: Pulmonary effort is normal  No respiratory distress  Breath sounds: Normal breath sounds  No wheezing  Abdominal:      General: Bowel sounds are normal  There is no distension  Palpations: Abdomen is soft  Tenderness: There is no abdominal tenderness  Skin:     General: Skin is warm and dry  Findings: No rash     Neurological:      Mental Status: He is alert and oriented to person, place, and time

## 2021-03-10 DIAGNOSIS — Z23 ENCOUNTER FOR IMMUNIZATION: ICD-10-CM

## 2021-03-11 DIAGNOSIS — I65.23 CAROTID ARTERY STENOSIS WITHOUT CEREBRAL INFARCTION, BILATERAL: Primary | ICD-10-CM

## 2021-03-11 DIAGNOSIS — I65.23 CAROTID STENOSIS, BILATERAL: ICD-10-CM

## 2021-03-17 ENCOUNTER — LAB (OUTPATIENT)
Dept: LAB | Facility: CLINIC | Age: 71
End: 2021-03-17
Payer: COMMERCIAL

## 2021-03-17 DIAGNOSIS — D64.9 ANEMIA, UNSPECIFIED TYPE: ICD-10-CM

## 2021-03-17 DIAGNOSIS — R53.83 FATIGUE, UNSPECIFIED TYPE: ICD-10-CM

## 2021-03-17 DIAGNOSIS — R97.20 ELEVATED PSA: ICD-10-CM

## 2021-03-17 LAB
BACTERIA UR QL AUTO: NORMAL /HPF
BASOPHILS # BLD AUTO: 0.08 THOUSANDS/ΜL (ref 0–0.1)
BASOPHILS NFR BLD AUTO: 2 % (ref 0–1)
BILIRUB UR QL STRIP: NEGATIVE
CLARITY UR: CLEAR
COLOR UR: YELLOW
EOSINOPHIL # BLD AUTO: 0.19 THOUSAND/ΜL (ref 0–0.61)
EOSINOPHIL NFR BLD AUTO: 4 % (ref 0–6)
ERYTHROCYTE [DISTWIDTH] IN BLOOD BY AUTOMATED COUNT: 13.1 % (ref 11.6–15.1)
GLUCOSE UR STRIP-MCNC: NEGATIVE MG/DL
HCT VFR BLD AUTO: 45.9 % (ref 36.5–49.3)
HEMOCCULT STL QL: NEGATIVE
HGB BLD-MCNC: 14.7 G/DL (ref 12–17)
HGB UR QL STRIP.AUTO: NEGATIVE
HYALINE CASTS #/AREA URNS LPF: NORMAL /LPF
IMM GRANULOCYTES # BLD AUTO: 0.01 THOUSAND/UL (ref 0–0.2)
IMM GRANULOCYTES NFR BLD AUTO: 0 % (ref 0–2)
KETONES UR STRIP-MCNC: NEGATIVE MG/DL
LEUKOCYTE ESTERASE UR QL STRIP: NEGATIVE
LYMPHOCYTES # BLD AUTO: 1.89 THOUSANDS/ΜL (ref 0.6–4.47)
LYMPHOCYTES NFR BLD AUTO: 38 % (ref 14–44)
MCH RBC QN AUTO: 31.7 PG (ref 26.8–34.3)
MCHC RBC AUTO-ENTMCNC: 32 G/DL (ref 31.4–37.4)
MCV RBC AUTO: 99 FL (ref 82–98)
MONOCYTES # BLD AUTO: 0.51 THOUSAND/ΜL (ref 0.17–1.22)
MONOCYTES NFR BLD AUTO: 10 % (ref 4–12)
NEUTROPHILS # BLD AUTO: 2.33 THOUSANDS/ΜL (ref 1.85–7.62)
NEUTS SEG NFR BLD AUTO: 46 % (ref 43–75)
NITRITE UR QL STRIP: NEGATIVE
NON-SQ EPI CELLS URNS QL MICRO: NORMAL /HPF
NRBC BLD AUTO-RTO: 0 /100 WBCS
PH UR STRIP.AUTO: 6.5 [PH]
PLATELET # BLD AUTO: 194 THOUSANDS/UL (ref 149–390)
PMV BLD AUTO: 11 FL (ref 8.9–12.7)
PROT UR STRIP-MCNC: NEGATIVE MG/DL
RBC # BLD AUTO: 4.63 MILLION/UL (ref 3.88–5.62)
RBC #/AREA URNS AUTO: NORMAL /HPF
SP GR UR STRIP.AUTO: 1.02 (ref 1–1.03)
UROBILINOGEN UR QL STRIP.AUTO: 0.2 E.U./DL
WBC # BLD AUTO: 5.01 THOUSAND/UL (ref 4.31–10.16)
WBC #/AREA URNS AUTO: NORMAL /HPF

## 2021-03-17 PROCEDURE — 83540 ASSAY OF IRON: CPT

## 2021-03-17 PROCEDURE — 84154 ASSAY OF PSA FREE: CPT

## 2021-03-17 PROCEDURE — 84443 ASSAY THYROID STIM HORMONE: CPT

## 2021-03-17 PROCEDURE — 84153 ASSAY OF PSA TOTAL: CPT

## 2021-03-17 PROCEDURE — 82746 ASSAY OF FOLIC ACID SERUM: CPT

## 2021-03-17 PROCEDURE — 82272 OCCULT BLD FECES 1-3 TESTS: CPT

## 2021-03-17 PROCEDURE — 82607 VITAMIN B-12: CPT

## 2021-03-17 PROCEDURE — 81001 URINALYSIS AUTO W/SCOPE: CPT | Performed by: FAMILY MEDICINE

## 2021-03-17 PROCEDURE — 36415 COLL VENOUS BLD VENIPUNCTURE: CPT

## 2021-03-17 PROCEDURE — 85025 COMPLETE CBC W/AUTO DIFF WBC: CPT

## 2021-03-18 LAB
FOLATE SERPL-MCNC: 13 NG/ML (ref 3.1–17.5)
IRON SERPL-MCNC: 92 UG/DL (ref 65–175)
TSH SERPL DL<=0.05 MIU/L-ACNC: 1.18 UIU/ML (ref 0.36–3.74)
VIT B12 SERPL-MCNC: 427 PG/ML (ref 100–900)

## 2021-03-19 LAB
PSA FREE MFR SERPL: 8.5 %
PSA FREE SERPL-MCNC: 0.56 NG/ML
PSA SERPL-MCNC: 6.6 NG/ML (ref 0–4)

## 2021-03-22 ENCOUNTER — OFFICE VISIT (OUTPATIENT)
Dept: CARDIOLOGY CLINIC | Facility: CLINIC | Age: 71
End: 2021-03-22
Payer: COMMERCIAL

## 2021-03-22 VITALS
WEIGHT: 208 LBS | OXYGEN SATURATION: 98 % | BODY MASS INDEX: 28.17 KG/M2 | SYSTOLIC BLOOD PRESSURE: 140 MMHG | RESPIRATION RATE: 18 BRPM | HEIGHT: 72 IN | DIASTOLIC BLOOD PRESSURE: 72 MMHG | HEART RATE: 78 BPM

## 2021-03-22 DIAGNOSIS — I25.10 CORONARY ARTERY DISEASE INVOLVING NATIVE CORONARY ARTERY OF NATIVE HEART WITHOUT ANGINA PECTORIS: Primary | ICD-10-CM

## 2021-03-22 PROCEDURE — 1160F RVW MEDS BY RX/DR IN RCRD: CPT | Performed by: INTERNAL MEDICINE

## 2021-03-22 PROCEDURE — 3078F DIAST BP <80 MM HG: CPT | Performed by: INTERNAL MEDICINE

## 2021-03-22 PROCEDURE — 1036F TOBACCO NON-USER: CPT | Performed by: INTERNAL MEDICINE

## 2021-03-22 PROCEDURE — 99214 OFFICE O/P EST MOD 30 MIN: CPT | Performed by: INTERNAL MEDICINE

## 2021-03-22 PROCEDURE — 3077F SYST BP >= 140 MM HG: CPT | Performed by: INTERNAL MEDICINE

## 2021-03-22 PROCEDURE — 3008F BODY MASS INDEX DOCD: CPT | Performed by: INTERNAL MEDICINE

## 2021-03-22 NOTE — PROGRESS NOTES
Cardiology Outpatient Follow up Note    Wil Wallace 78 y o  male MRN: [id number]    03/22/21          Assessment:  1  Moderate nonobstructive CAD  2  NICM with EF: 50%  3  Carotid stenosis s/p L CEA 1/2020  4  DM2  5  Hypertension  6  HLD    Plan:  · He has noted mild dyspnea on exertion as well as possible depression  Further ischemic evaluation and testing for sleep apnea was advised however he would prefer to hold off and monitor his symptoms further  He was advised to contact his PCP for evaluation for possible depression  · Cont carvedilol and losartan  · Cont aspirin, plavix, and atorvastatin   · He follows closely with Vascular surgery  1  Coronary artery disease involving native coronary artery of native heart without angina pectoris         HPI: Wil Wallace is a 78y o  year old male with history of mild cardiomyopathy with EF 45%, nonobstructive CAD and carotid stenosis s/p L CEA who presents for routine follow-up  Past medical history:   · TTE 7/2019: EF: 45%, G1DD, and mild MR, TR   · Cardiac catheterization 9/2019: 60% LAD and 50% LCx stenosis; no intervention performed  · Carotid duplex 10/2019: 50-69% stenosis of the Right ICA; 70-99% stenosis of the Left ICA  · S/p L carotid endarterectomy on 1/13/2020  · Carotid duplex 90583: 50-69% right ICA stenosis, patent left ICA endarterectomy site; 50-69% stenosis in the mid ICA  He has been doing well from a cardiac standpoint since his last evaluation  He underwent right total shoulder arthroplasty and tolerated the procedure well  Recently he has noted mild progressive fatigue, dyspnea on exertion, daytime somnolence, and possible depression  He denies any precipitating factors  He denies chest pain, palpitations, lower extremity edema or any other cardiac concerns at this time  He has been tolerating his medications without side effect        Family history of CAD on his maternal side with his maternal grandfather and uncle having MIs      Social history: Smoked 1ppd for 50 years; quit 1 year ago          Patient Active Problem List   Diagnosis    Essential hypertension    Mixed hyperlipidemia    Type 2 diabetes mellitus without complication, without long-term current use of insulin ([facility] [address] )    Ventral hernia without obstruction or gangrene    Soft tissue mass    Cigarette nicotine dependence without complication    Congestive heart failure, NYHA class 1, acute, systolic ([facility] [address] )    Coronary artery disease due to lipid rich plaque    Carotid artery stenosis without cerebral infarction, bilateral    Cerebral aneurysm    S/P Left carotid endarterectomy 1/13/20    Arthritis    Familial hypercholesterolemia    BMI 27 0-27 9,adult    Chronic right shoulder pain    Elevated PSA    Status post reverse total arthroplasty of right shoulder    Erectile dysfunction of non-organic origin    Fatigue    Anemia       Allergies   Allergen Reactions    Other Hives     Soft shell crabs hives         Current Outpatient Medications:     acetaminophen (TYLENOL) 650 mg CR tablet, Take 1 tablet (650 mg total) by mouth every 8 (eight) hours as needed for mild pain, Disp: 30 tablet, Rfl: 0    amLODIPine (NORVASC) 5 mg tablet, Take 1 tablet (5 mg total) by mouth daily, Disp: 90 tablet, Rfl: 3    ascorbic acid (VITAMIN C) 500 mg tablet, Take 500 mg by mouth daily, Disp: , Rfl:     aspirin 81 mg chewable tablet, Chew 1 tablet (81 mg total) daily, Disp: 90 tablet, Rfl: 3    atorvastatin (LIPITOR) 20 mg tablet, Take 1 tablet (20 mg total) by mouth daily, Disp: 60 tablet, Rfl: 3    carvedilol (COREG) 6 25 mg tablet, Take 1 tablet (6 25 mg total) by mouth 2 (two) times a day with meals, Disp: 180 tablet, Rfl: 3    clopidogrel (PLAVIX) 75 mg tablet, TAKE 1 TABLET BY MOUTH  DAILY, Disp: 90 tablet, Rfl: 3    docusate sodium (COLACE) 100 mg capsule, Take 1 capsule (100 mg total) by mouth 2 (two) times a day, Disp: 10 capsule, Rfl: 0    glucose blood test strip, Use as instructed, Disp: 100 each, Rfl: 2    losartan (COZAAR) 100 MG tablet, TAKE 1 TABLET BY MOUTH  DAILY, Disp: 90 tablet, Rfl: 3    metFORMIN (GLUCOPHAGE) 1000 MG tablet, Take 1 tablet (1,000 mg total) by mouth 2 (two) times a day with meals, Disp: 180 tablet, Rfl: 3    sildenafil (VIAGRA) 25 MG tablet, Take 1 tablet (25 mg total) by mouth daily as needed for erectile dysfunction, Disp: 10 tablet, Rfl: 0    sitaGLIPtin (Januvia) 25 mg tablet, Take 1 tablet (25 mg total) by mouth daily, Disp: 90 tablet, Rfl: 3    nicotine polacrilex (COMMIT) 2 MG lozenge, May take 1 lozenge every 3-4 hours as needed with no more than 10 lozenges in a day (Patient not taking: Reported on 3/22/2021), Disp: 100 each, Rfl: 8    oxyCODONE (ROXICODONE) 5 mg immediate release tablet, 1 tablets every 6 hours as needed for severe pain  (Patient not taking: Reported on 3/1/2021), Disp: 13 tablet, Rfl: 0    Past Medical History:   Diagnosis Date    CHF (congestive heart failure) (HCC)     Diabetes mellitus (HCC)     Diverticulitis     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[address] ) 11/7/2018    Heart disease     Hyperlipidemia     Hypertension     Kidney stone     Osteoarthritis     Vascular disorder        Family History   Problem Relation Age of Onset    Colon cancer Father     Colon cancer Paternal Grandfather     No Known Problems Mother     Colon cancer Family        Past Surgical History:   Procedure Laterality Date    ABDOMINAL SURGERY      COLONOSCOPY      INGUINAL HERNIA REPAIR Left     LAPAROSCOPIC COLON RESECTION      AZ COLONOSCOPY FLX DX W/COLLJ SPEC WHEN PFRMD N/A 11/3/2017    Procedure: COLONOSCOPY;  Surgeon: Amy Jenkins MD;  [facility]: [facility]; Service: Colorectal    AZ RECONSTR TOTAL SHOULDER IMPLANT Right 11/17/2020    Procedure: ARTHROPLASTY SHOULDER REVERSE with biceps tendonesis;   Surgeon: Fernie Jacobsen MD;  Location: [facility];  Service: Orthopedics    [address] Left 2020    Procedure: ENDARTERECTOMY ARTERY CAROTID;  Surgeon: Yajaira Esteban MD;  [facility]: [facility];  Service: Vascular       Social History     Socioeconomic History    Marital status: /Civil Union     Spouse name: Not on file    Number of children: Not on file    Years of education: Not on file    Highest education level: Not on file   Occupational History    Not on file   Social Needs    Financial resource strain: Not on file    Food insecurity     Worry: Not on file     Inability: Not on file    Transportation needs     Medical: Not on file     Non-medical: Not on file   Tobacco Use    Smoking status: Former Smoker     Quit date: 2019     Years since quittin 4    Smokeless tobacco: Never Used   Substance and Sexual Activity    Alcohol use: Yes     Alcohol/week: 2 0 standard drinks     Types: 2 Cans of beer per week     Frequency: 2-4 times a month     Drinks per session: 1 or 2    Drug use: No    Sexual activity: Not Currently   Lifestyle    Physical activity     Days per week: Not on file     Minutes per session: Not on file    Stress: Not on file   Relationships    Social connections     Talks on phone: Not on file     Gets together: Not on file     Attends Holiness service: Not on file     Active member of club or organization: Not on file     Attends meetings of clubs or organizations: Not on file     Relationship status: Not on file    Intimate partner violence     Fear of current or ex partner: Not on file     Emotionally abused: Not on file     Physically abused: Not on file     Forced sexual activity: Not on file   Other Topics Concern    Not on file   Social History Narrative    Caffeine use    Uses safety equipment: seatbelts       Review of Systems   Constitution: Negative for diaphoresis, weight gain and weight loss  HENT: Negative for congestion  Cardiovascular: Positive for dyspnea on exertion   Negative for chest pain, irregular heartbeat, leg swelling, near-syncope, orthopnea, palpitations, paroxysmal nocturnal dyspnea and syncope  Respiratory: Negative for shortness of breath, sleep disturbances due to breathing and snoring  Hematologic/Lymphatic: Does not bruise/bleed easily  Skin: Negative for rash  Musculoskeletal: Negative for myalgias  Gastrointestinal: Negative for nausea and vomiting  Neurological: Positive for excessive daytime sleepiness  Negative for light-headedness  Psychiatric/Behavioral: Positive for depression  The patient is not nervous/anxious  Vitals: /72   Pulse 78   Resp 18   Ht 6' (1 829 m)   Wt 94 3 kg (208 lb)   SpO2 98%   BMI 28 21 kg/m²       Physical Exam:     GEN: Alert and oriented x 3, in no acute distress  Well appearing and well nourished  HEENT: Sclera anicteric, conjunctivae pink, mucous membranes moist  Oropharynx clear  NECK: Supple, no carotid bruits, no significant JVD  Trachea midline, no thyromegaly  HEART: Regular rhythm, normal S1 and S2, no murmurs, clicks, gallops or rubs  PMI nondisplaced, no thrills  LUNGS: Clear to auscultation bilaterally; no wheezes, rales, or rhonchi  No increased work of breathing or signs of respiratory distress  ABDOMEN: Soft, nontender, nondistended, normoactive bowel sounds  EXTREMITIES: Skin warm and well perfused, no clubbing, cyanosis, or edema  NEURO: No focal findings  Normal speech  Mood and affect normal    SKIN: Normal without suspicious lesions on exposed skin        Lab Results:       Lab Results   Component Value Date    HGBA1C 7 4 (H) 02/24/2021    HGBA1C 6 9 (H) 10/21/2020    HGBA1C 7 2 (H) 06/15/2020     Lab Results   Component Value Date    CHOL 163 11/22/2016    CHOL 172 09/18/2015     Lab Results   Component Value Date    HDL 43 02/24/2021    HDL 46 10/21/2020    HDL 45 06/15/2020     Lab Results   Component Value Date    LDLCALC 75 02/24/2021    LDLCALC 91 10/21/2020    LDLCALC 59 06/15/2020     Lab Results   Component Value Date    TRIG 190 (H) 02/24/2021    TRIG 175 (H) 10/21/2020    TRIG 344 (H) 06/15/2020     No results found for: CHOLHDL

## 2021-03-31 ENCOUNTER — HOSPITAL ENCOUNTER (OUTPATIENT)
Dept: VASCULAR ULTRASOUND | Facility: HOSPITAL | Age: 71
Discharge: HOME | End: 2021-03-31
Payer: COMMERCIAL

## 2021-03-31 DIAGNOSIS — I65.23 CAROTID STENOSIS, BILATERAL: ICD-10-CM

## 2021-03-31 PROCEDURE — 93880 EXTRACRANIAL BILAT STUDY: CPT | Performed by: SURGERY

## 2021-03-31 PROCEDURE — 93880 EXTRACRANIAL BILAT STUDY: CPT

## 2021-04-02 DIAGNOSIS — E78.2 MIXED HYPERLIPIDEMIA: ICD-10-CM

## 2021-04-03 RX ORDER — ATORVASTATIN CALCIUM 20 MG/1
TABLET, FILM COATED ORAL
Qty: 90 TABLET | Refills: 3 | Status: SHIPPED | OUTPATIENT
Start: 2021-04-03 | End: 2022-03-04

## 2021-04-12 ENCOUNTER — TELEMEDICINE (OUTPATIENT)
Dept: VASCULAR SURGERY | Facility: CLINIC | Age: 71
End: 2021-04-12
Payer: COMMERCIAL

## 2021-04-12 VITALS — HEIGHT: 72 IN | BODY MASS INDEX: 26.82 KG/M2 | WEIGHT: 198 LBS

## 2021-04-12 DIAGNOSIS — E78.01 FAMILIAL HYPERCHOLESTEROLEMIA: ICD-10-CM

## 2021-04-12 DIAGNOSIS — Z98.890 S/P CAROTID ENDARTERECTOMY: ICD-10-CM

## 2021-04-12 DIAGNOSIS — E11.9 TYPE 2 DIABETES MELLITUS WITHOUT COMPLICATION, WITHOUT LONG-TERM CURRENT USE OF INSULIN (HCC): ICD-10-CM

## 2021-04-12 DIAGNOSIS — I65.23 CAROTID ARTERY STENOSIS WITHOUT CEREBRAL INFARCTION, BILATERAL: Primary | ICD-10-CM

## 2021-04-12 DIAGNOSIS — F17.210 CIGARETTE NICOTINE DEPENDENCE WITHOUT COMPLICATION: ICD-10-CM

## 2021-04-12 PROCEDURE — 99215 OFFICE O/P EST HI 40 MIN: CPT | Performed by: PHYSICIAN ASSISTANT

## 2021-04-12 NOTE — PATIENT INSTRUCTIONS
Concha Heredia,      It was good to talk to you today  Your carotid duplex study is stable with moderate narrowing bilaterally  As we discussed the left carotid artery is small throughout the length of the vessel  We should continue with carotid duplex studies every 6 months to keep a close eye on your carotid disease  Continue to work at good heart healthy diet and improved diabetes mellitus control with primary care  Congratulations on not smoking  Below is information about a heart healthy diet  - regular, daily exercise/ walking  - continue with aspirin, Plavix and atorvastatin 20  - follow-up CV duplex every 6 months  - call 911 for any symptoms of stroke  - follow-up in 1 year or sooner if needed        Symptoms of stroke:  - Unable to speak or understand speech  - Unable to move one side of the body (arm or leg)  - Visual changes  - Call 911 for any symptoms of stroke              Heart Healthy Diet   AMBULATORY CARE:   A heart healthy diet  is an eating plan low in unhealthy fats and sodium (salt)  The plan is high in healthy fats and fiber  A heart healthy diet helps improve your cholesterol levels and lowers your risk for heart disease and stroke  A dietitian will teach you how to read and understand food labels  Heart healthy diet guidelines to follow:   · Choose foods that contain healthy fats  ? Unsaturated fats  include monounsaturated and polyunsaturated fats  Unsaturated fat is found in foods such as soybean, canola, olive, corn, and safflower oils  It is also found in soft tub margarine that is made with liquid vegetable oil  ? Omega-3 fat  is found in certain fish, such as salmon, tuna, and trout, and in walnuts and flaxseed  Eat fish high in omega-3 fats at least 2 times a week  · Get 20 to 30 grams of fiber each day  Fruits, vegetables, whole-grain foods, and legumes (cooked beans) are good sources of fiber  · Limit or do not have unhealthy fats  ? Cholesterol  is found in animal foods, such as eggs and lobster, and in dairy products made from whole milk  Limit cholesterol to less than 200 mg each day  ? Saturated fat  is found in meats, such as ash and hamburger  It is also found in chicken or turkey skin, whole milk, and butter  Limit saturated fat to less than 7% of your total daily calories  ? Trans fat  is found in packaged foods, such as potato chips and cookies  It is also in hard margarine, some fried foods, and shortening  Do not eat foods that contain trans fats  · Limit sodium as directed  You may be told to limit sodium to 2,000 to 2,300 mg each day  Choose low-sodium or no-salt-added foods  Add little or no salt to food you prepare  Use herbs and spices in place of salt  Include the following in your heart healthy plan:  Ask your dietitian or healthcare provider how many servings to have from each of the following food groups:  · Grains:      ? Whole-wheat breads, cereals, and pastas, and brown rice    ? Low-fat, low-sodium crackers and chips    · Vegetables:      ? Broccoli, green beans, green peas, and spinach    ? Collards, kale, and lima beans    ? Carrots, sweet potatoes, tomatoes, and peppers    ? Canned vegetables with no salt added    · Fruits:      ? Bananas, peaches, pears, and pineapple    ? Grapes, raisins, and dates    ? Oranges, tangerines, grapefruit, orange juice, and grapefruit juice    ? Apricots, mangoes, melons, and papaya    ? Raspberries and strawberries    ? Canned fruit with no added sugar    · Low-fat dairy:      ? Nonfat (skim) milk, 1% milk, and low-fat almond, cashew, or soy milks fortified with calcium    ? Low-fat cheese, regular or frozen yogurt, and cottage cheese    · Meats and proteins:      ? Lean cuts of beef and pork (loin, leg, round), skinless chicken and turkey    ?  Legumes, soy products, egg whites, or nuts    Limit or do not include the following in your heart healthy plan: · Unhealthy fats and oils:      ? Whole or 2% milk, cream cheese, sour cream, or cheese    ? High-fat cuts of beef (T-bone steaks, ribs), chicken or turkey with skin, and organ meats such as liver    ? Butter, stick margarine, shortening, and cooking oils such as coconut or palm oil    · Foods and liquids high in sodium:      ? Packaged foods, such as frozen dinners, cookies, macaroni and cheese, and cereals with more than 300 mg of sodium per serving    ? Vegetables with added sodium, such as instant potatoes, vegetables with added sauces, or regular canned vegetables    ? Cured or smoked meats, such as hot dogs, ash, and sausage    ? High-sodium ketchup, barbecue sauce, salad dressing, pickles, olives, soy sauce, or miso    · Foods and liquids high in sugar:      ? Candy, cake, cookies, pies, or doughnuts    ? Soft drinks (soda), sports drinks, or sweetened tea    ? Canned or dry mixes for cakes, soups, sauces, or gravies    Other healthy heart guidelines:   · Do not smoke  Nicotine and other chemicals in cigarettes and cigars can cause lung and heart damage  Ask your healthcare provider for information if you currently smoke and need help to quit  E-cigarettes or smokeless tobacco still contain nicotine  Talk to your healthcare provider before you use these products  · Limit or do not drink alcohol as directed  Alcohol can damage your heart and raise your blood pressure  Your healthcare provider may give you specific daily and weekly limits  The general recommended limit is 1 drink a day for women 21 or older and for men 72 or older  Do not have more than 3 drinks in a day or 7 in a week  The recommended limit is 2 drinks a day for men 24to 59years of age  Do not have more than 4 drinks in a day or 14 in a week  A drink of alcohol is 12 ounces of beer, 5 ounces of wine, or 1½ ounces of liquor  · Exercise regularly    Exercise can help you maintain a healthy weight and improve your blood pressure and cholesterol levels  Regular exercise can also decrease your risk for heart problems  Ask your healthcare provider about the best exercise plan for you  Do not start an exercise program without asking your healthcare provider  Follow up with your doctor or cardiologist as directed:  Write down your questions so you remember to ask them during your visits  © Copyright [address] Information is for End User's use only and may not be sold, redistributed or otherwise used for commercial purposes  All illustrations and images included in CareNotes® are the copyrighted property of [employer]  or [facility] Carlos Becker   The above information is an  only  It is not intended as medical advice for individual conditions or treatments  Talk to your doctor, nurse or pharmacist before following any medical regimen to see if it is safe and effective for you  Carotid Artery Disease   AMBULATORY CARE:   Carotid artery disease  is a condition that causes narrow or blocked carotid arteries  Your carotid arteries are the blood vessels that supply your brain with most of the blood it needs to work  You have 2 carotid arteries, one on each side of your neck  Call 911 for any of the following:   · You have any of the following signs of a stroke:      ? Numbness or drooping on one side of your face     ? Weakness in an arm or leg    ? Confusion or difficulty speaking    ? Dizziness, a severe headache, or vision loss    · You have any of the following signs of a heart attack:      ? Squeezing, pressure, or pain in your chest    ? You may  also have any of the following:     § Discomfort or pain in your back, neck, jaw, stomach, or arm    § Shortness of breath    § Nausea or vomiting    § Lightheadedness or a sudden cold sweat    Contact your healthcare provider if:   · You have questions or concerns about your condition or care  Signs and symptoms of carotid artery disease:   You may have no signs or symptoms  Most commonly, carotid artery disease causes transient ischemic attacks (TIAs), or mini-strokes  You may have numbness, weakness, lack of movement, or vision or speech problems  A TIA goes away quickly and does not cause permanent damage  A TIA may be a warning sign that you are about to have a stroke  If you have any symptoms of a TIA or stroke, seek care immediately  Warning signs of a stroke: The word F A S T  can help you remember and recognize warning signs of a stroke  · F = Face:  One side of the face droops  · A = Arms:  One arm starts to drop when both arms are raised  · S = Speech:  Speech is slurred or sounds different than usual     · T = Time:  A person who is having a stroke needs to be seen immediately  A stroke is a medical emergency that needs immediate treatment  Some medicines and treatments work best if given within a few hours of a stroke  Treatment  for carotid artery disease depends on how narrow your arteries have become, your symptoms, and your general health  The goal of treatment is to lower your risk for a stroke  You may need any of the following:  · Take aspirin if directed  Your healthcare provider may suggest that you take an aspirin a day to prevent blood clots from forming in the carotid arteries  If your healthcare provider wants you to take aspirin daily, do not take acetaminophen or ibuprofen instead  · Control risk factors  High blood pressure, high cholesterol, heart disease, diabetes, and being overweight increase your risk for atherosclerosis  · Procedures can help open blocked arteries  A carotid endarterectomy is used to cut plaque out of the artery  An angioplasty is used to push the plaque against the artery wall with a balloon device  Sometimes a stent is placed during an angioplasty  A stent is a metal mesh tube that is placed in the artery to keep it open  Manage carotid artery disease:   · Eat a variety of healthy foods  Healthy foods include fruit, vegetables, whole-grain breads, low-fat dairy products, lean meat, and fish  Choose fish that are high in omega-3 fatty acids, such as salmon and fresh tuna  Ask your healthcare provider for more information on a heart healthy diet and the DASH eating plan  · Limit sodium (salt)  Sodium may increase your blood pressure  Add less table salt to your foods  Read food labels and choose foods that are low in sodium  Your healthcare provider may suggest you follow a low sodium diet  · Reach or maintain a healthy weight  Extra weight makes your heart work harder  Ask your healthcare provider how much you should weight  He can help you create a safe weight loss plan  Even a weight loss of 10% of your body weight can help your heart function better  · Exercise as directed  Exercise helps improve heart function and can help you manage your weight  Exercise can also help lower your cholesterol and blood sugar levels  Try to get at least 30 minutes of exercise at least 5 times each week  Try to be active every day  Your healthcare provider can help you create an exercise plan that works best for you  · Limit alcohol  Alcohol can increase your blood pressure and triglyceride levels  Men should limit alcohol to 2 drinks per day  Women should limit alcohol to 1 drink per day  A drink of alcohol is 12 ounces of beer, 5 ounces of wine, or 1½ ounces of liquor  · Do not smoke  Nicotine and other chemicals in cigarettes and cigars can cause heart and lung damage  Ask your healthcare provider for information if you currently smoke and need help to quit  E-cigarettes or smokeless tobacco still contain nicotine  Talk to your healthcare provider before you use these products  Follow up with your healthcare provider as directed:  Write down your questions so you remember to ask them during your visits    © Copyright [employer] 2020 Information is for End User's use only and may not be sold, redistributed or otherwise used for commercial purposes  All illustrations and images included in CareNotes® are the copyrighted property of [employer]  or Steffi Worrell  The above information is an  only  It is not intended as medical advice for individual conditions or treatments  Talk to your doctor, nurse or pharmacist before following any medical regimen to see if it is safe and effective for you

## 2021-04-12 NOTE — LETTER
2021     MD Johanna Kimble 5  3rd Floor [facility]    Patient: Lewie Apgar   YOB: 1950   Date of Visit: 2021       Dear Dr Rodrigue Banks: Thank you for referring Lewie Apgar to me for evaluation  Below are my notes for this consultation  If you have questions, please do not hesitate to call me  I look forward to following your patient along with you  Sincerely,        Lang Pacheco PA-C        CC: No Recipients  Keerthi Armando  2021 11:00 PM  Incomplete    Virtual Regular Visit    Assessment/Plan:    Carotid artery stenosis  S/P LEFT carotid endarterectomy Candance Nova, )    - asymptomatic  - quit smoking almost 1 year ago     VAS CV dup 3/25/2021:     R 50-69% 153/50/2  26)     L widely patent prox ICA endart site (59/23/0 83), mid ICA 50-69% (153/52 quite small 2/2 15)    CTA head and neck 2020:      No acute intracranial abnormality  Findings are stable when comparing to the CTA study of 2019  No interval change in the greater then 90% stenosis at the origin of the left ICA with a diffusely small but stable cervical and intracranial ICA  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  Discussion:  Mr Angelina Dacosta remains asymptomatic from a carotid standpoint  He is s/p left carotid endarterectomy with Dr Rodrigue Banks, 2020  The vessel was noted to be diffusely small into the intracranial ICA  Review of the CV duplex suggests moderate bilateral carotid artery stenosis which we will continue to monitor  Recent CTA shows "(left) cervical ICA remains small and a threadlike remnant of the ICA is noted within the bulbar segment, measuring less than 1 mm  There is irregularity and persistent stenosis with a faintly visualized ICA throughout the remaining cervical region " We reviewed the findings of his CV duplex and CT scan which are as expected and overall stable   There is not indication for further surgery at this time  Recommend continued duplex surveillance every 6 months with routine clinical monitoring  We reviewed his recent labs and testing  His hemoglobin A1c is 7 4 and LDL 75  We discussed therapeutic lifestyle choices including exercise and healthy diet  Patient education was provided  We discussed that his LDL goal is less than 70  If we see progression in carotid disease or worsening in his lipid panel, intensified statin therapy could be considered  - regular, daily exercise/ walking  - continue with aspirin, Plavix and atorvastatin 20  - continued smoking cessation was encouraged  - follow-up CV duplex every 6 months  - we reviewed the symptoms of stroke for which he should call 911 for any symptoms of stroke  - follow-up in 1 year or sooner if needed      CC: Dr Adella Cranker      Problem List Items Addressed This Visit        Endocrine    Type 2 diabetes mellitus without complication, without long-term current use of insulin ([facility] [address] )    Relevant Orders    VAS carotid complete study       Cardiovascular and Mediastinum    Carotid artery stenosis without cerebral infarction, bilateral - Primary    Relevant Orders    VAS carotid complete study       Other    Cigarette nicotine dependence without complication    S/P Left carotid endarterectomy 1/13/20    Relevant Orders    VAS carotid complete study    Familial hypercholesterolemia            Chief Complaint   Patient presents with    Virtual Brief Visit    Virtual Regular Visit        Encounter provider Mati Hernandez PA-C    Provider located at [address] [place] [address]  [phone]      Recent Visits  No visits were found meeting these conditions     Showing recent visits within past 7 days and meeting all other requirements     Today's Visits  Date Type Provider Dept   04/12/21 Telemedicine Bib Beasley [facility] today's visits and meeting all other requirements     Future Appointments  No visits were found meeting these conditions  Showing future appointments within next 150 days and meeting all other requirements        The patient was identified by name and date of birth  Lawyer Villarreal was informed that this is a telemedicine visit and that the visit is being conducted through [facility] and patient was informed that this is a secure, HIPAA-compliant platform  He agrees to proceed     My office door was closed  No one else was in the room  He acknowledged consent and understanding of privacy and security of the video platform  The patient has agreed to participate and understands they can discontinue the visit at any time  Patient is aware this is a billable service  Subjective    HPI     Lawyer Villarreal 78year old male hypertension, hyperlipidemia, diabetes mellitus, CAD, mild CMP, tobacco (quit about 1 year ago), R MCA 5 mm aneursym (followed by neuro), bilateral carotid artery stenosis, L CEA (1/13/2020, Ho) who presents for vascular follow-up and to review recent carotid duplex study  Naveed Rosscordell offers no new complaints  He did have a shoulder repair otherwise he has not had any hospitalizations or illnesses  He feels well and is enjoying himself on his boat  He quit smoking about 1 year ago but occasionally smoking a cigar  He reports that the left neck incision is closed after L CEA and he has no numbness  He has no symptoms of TIA/stroke  He denies transient visual loss, burry vision, difficulty speaking, facial numbness/weakness and arm/leg weakness  We reviewed the symptoms of stroke for which he should call 911  He received his first [address] vaccine and will be getting the second shot next week  We reviewed his recent imaging, labs and testing  His hemoglobin A1c is 7 4 and LDL 75  We discussed therapeutic lifestyle choices including exercise and healthy diet  Patient education was provided    We discussed that his LDL goal is less than 70  If we see progression in carotid disease or worsening in his lipid panel, intensified statin therapy could be considered  Medical therapy includes:  aspirin 81, Plavix 75, atorvastatin 20, etc        CTA head and neck and CV duplex studies were reviewed and discussed with patient  Past Medical History:   Diagnosis Date    CHF (congestive heart failure) (HCC)     Diabetes mellitus ([facility] [address] )     Diverticulitis     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[facility] [address] ) 11/7/2018    Heart disease     Hyperlipidemia     Hypertension     Kidney stone     Osteoarthritis     Vascular disorder        Past Surgical History:   Procedure Laterality Date    ABDOMINAL SURGERY      COLONOSCOPY      INGUINAL HERNIA REPAIR Left     LAPAROSCOPIC COLON RESECTION      [place] COLONOSCOPY FLX DX W/COLLJ SPEC WHEN PFRMD N/A 11/3/2017    Procedure: COLONOSCOPY;  Surgeon: Michael Rivas MD;  Location: [facility]  GI LAB; Service: Colorectal    [place] RECONSTR TOTAL SHOULDER IMPLANT Right 11/17/2020    Procedure: ARTHROPLASTY SHOULDER REVERSE with biceps tendonesis;   Surgeon: Emelia Sevilla MD;  Location: [facility] MAIN OR;  Service: Orthopedics    [place] Cong Martin INCIS Left 1/13/2020    Procedure: ENDARTERECTOMY ARTERY CAROTID;  Surgeon: Katheryn Savage MD;  Location: [facility] MAIN OR;  Service: Vascular       Current Outpatient Medications   Medication Sig Dispense Refill    acetaminophen (TYLENOL) 650 mg CR tablet Take 1 tablet (650 mg total) by mouth every 8 (eight) hours as needed for mild pain 30 tablet 0    amLODIPine (NORVASC) 5 mg tablet Take 1 tablet (5 mg total) by mouth daily 90 tablet 3    ascorbic acid (VITAMIN C) 500 mg tablet Take 500 mg by mouth daily      aspirin 81 mg chewable tablet Chew 1 tablet (81 mg total) daily 90 tablet 3    atorvastatin (LIPITOR) 20 mg tablet TAKE 1 TABLET BY MOUTH  DAILY 90 tablet 3    carvedilol (COREG) 6 25 mg tablet Take 1 tablet (6 25 mg total) by mouth 2 (two) times a day with meals 180 tablet 3    clopidogrel (PLAVIX) 75 mg tablet TAKE 1 TABLET BY MOUTH  DAILY 90 tablet 3    docusate sodium (COLACE) 100 mg capsule Take 1 capsule (100 mg total) by mouth 2 (two) times a day 10 capsule 0    glucose blood test strip Use as instructed 100 each 2    losartan (COZAAR) 100 MG tablet TAKE 1 TABLET BY MOUTH  DAILY 90 tablet 3    metFORMIN (GLUCOPHAGE) 1000 MG tablet Take 1 tablet (1,000 mg total) by mouth 2 (two) times a day with meals 180 tablet 3    nicotine polacrilex (COMMIT) 2 MG lozenge May take 1 lozenge every 3-4 hours as needed with no more than 10 lozenges in a day 100 each 8    sildenafil (VIAGRA) 25 MG tablet Take 1 tablet (25 mg total) by mouth daily as needed for erectile dysfunction 10 tablet 0    sitaGLIPtin (Januvia) 25 mg tablet Take 1 tablet (25 mg total) by mouth daily 90 tablet 3    oxyCODONE (ROXICODONE) 5 mg immediate release tablet 1 tablets every 6 hours as needed for severe pain  (Patient not taking: Reported on 3/1/2021) 13 tablet 0     No current facility-administered medications for this visit  Allergies   Allergen Reactions    Other Hives     Soft shell crabs hives       Review of Systems   Constitutional: Negative  HENT: Negative  Eyes: Negative  Respiratory: Negative  Cardiovascular: Negative  Gastrointestinal: Negative  Endocrine: Negative  Genitourinary: Negative  Musculoskeletal: Negative  Skin: Negative  Allergic/Immunologic: Negative  Neurological: Negative  Hematological: Negative  Psychiatric/Behavioral: Negative  Video Exam    Vitals:    04/12/21 1024   Weight: 89 8 kg (198 lb)   Height: 6' (1 829 m)       Physical Exam     Awake alert oriented x3  No distress  Speech clear and appropriate  Smile/frown intact  Moves all extremities  L Neck incision is healed and closed          I spent 20 minutes with patient today in which greater than 50% of the time was spent in counseling/coordination of care regarding Carotid artery stenosis, diabetes mellitus and healthy lifestyle choices  VAS CV dup 3/25/2021  FINDINGS:     Right        Impression  PSV  EDV (cm/s)  Direction of Flow  Ratio    Dist  ICA                 72          31                      1 06    Mid  ICA                 111          46                      1 64    Prox  ICA    50 - 69%    153          50                      2 26    Dist CCA                  62          23                              Mid CCA                   68          25                      1 05    Prox CCA                  65          23                      1 09    Ext Carotid              209          28                      3 07    Prox Vert                 43          11  Antegrade                   Subclavian               108          11                              Innominate                60          13                                 Left         Impression     PSV  EDV (cm/s)  Direction of Flow  Ratio    Dist  ICA                    64          27                      0 90    Mid  ICA     50 - 69%       153          52                      2 15    Prox  ICA    Widely Patent   59          23                      0 83    Dist CCA                     75          17                              Mid CCA                      71          13                      0 73    Prox CCA                     97          24                              Ext Carotid                 140          28                      1 97    Prox Vert                    54          20  Antegrade                   Subclavian                  131           0                                    CONCLUSION:     Impression  RIGHT:  There is 50-69% stenosis noted in the internal carotid artery  Plaque is  heterogenous and irregular  Vertebral artery flow is antegrade  There is no significant subclavian artery  disease       LEFT:  Widely patent internal carotid artery and endarterectomy site  50-69% stenosis noted in the proximal/mid ICA  Vertebral artery flow is antegrade  There is no significant subclavian artery  disease  Compared to previous study on 9/10/2020, no significant interval change  Recommend repeat testing in 6 month as per protocol unless otherwise indicated  CTA head and neck 12/4/2020  FINDINGS:  NONCONTRAST BRAIN  PARENCHYMA:  No intracranial mass, mass effect or midline shift  No CT signs of acute infarction  No acute parenchymal hemorrhage  Intracranial carotid artery atherosclerotic calcifications      VENTRICLES AND EXTRA-AXIAL SPACES:  Normal for the patient's age      VISUALIZED ORBITS AND PARANASAL SINUSES:  Unremarkable  Redemonstrated cavum septum lucidum      CERVICAL VASCULATURE  AORTIC ARCH AND GREAT VESSELS:  Moderate atherosclerotic plaque is noted in the transverse aorta with mild origin stenosis of the great vessels of the neck and mild stenosis in the proximal left subclavian artery      RIGHT VERTEBRAL ARTERY CERVICAL SEGMENT:  Normal origin  The vessel is normal in caliber throughout the neck      LEFT VERTEBRAL ARTERY CERVICAL SEGMENT:  Normal origin  The vessel demonstrates mild atherosclerotic stenosis in the proximal segment  Otherwise, there is no tandem stenosis in the remaining cervical vertebral artery segments      RIGHT EXTRACRANIAL CAROTID SEGMENT:  Moderate atherosclerotic disease of the bifurcation  Approximately 50% stenosis in the distal common carotid artery and ICA origin extending into the proximal bulb  The proximal right cervical ICA segment is also   tortuous and slightly medialized in course within the retropharyngeal space  No tandem cervical ICA stenosis      LEFT EXTRACRANIAL CAROTID SEGMENT:  Severe atherosclerotic disease of the bifurcation    There are surgical clips in the distal common carotid artery and in the expected level of the carotid bulb suggesting prior carotid endarterectomy surgery  The   origin of the cervical ICA remains small and a threadlike remnant of the ICA is noted within the bulbar segment, measuring less than 1 mm  There is irregularity and persistent stenosis with a faintly visualized ICA throughout the remaining cervical   region  The overall caliber of the ICA has not significantly improved although, there is slight better visualization of the ICA origin and then noted on the November 20, 2019 study  There is at least 90% stenosis at the ICA origin by NASCET criteria      NASCET criteria was used to determine the degree of internal carotid artery diameter stenosis        INTRACRANIAL VASCULATURE   INTERNAL CAROTID ARTERIES:  Patent right intracranial carotid without stenosis  Patent left intracranial ICA with a persistently small caliber      ANTERIOR CIRCULATION:  Stable mildly bulbous region at the right A1-2 junction  A discrete saccular aneurysm is not identified  This finding may be due to tortuosity  Both horizontal and vertical MARIO segments remain patent and are unchanged in   appearance      MIDDLE CEREBRAL ARTERY CIRCULATION:  M1 segment and middle cerebral artery branches demonstrate normal enhancement bilaterally  Stable bilobed right MCA bifurcation aneurysm projecting anteriorly, laterally and inferiorly measuring approximately 3 x 5   mm      Early bifurcation of the left MCA with a short M1 segment  The M1 and M2 segments are patent bilaterally      DISTAL VERTEBRAL ARTERIES:  Normal distal vertebral arteries  Posterior inferior cerebellar artery origins are normal  Normal vertebral basilar junction      BASILAR ARTERY:  Basilar artery is normal in caliber  Normal superior cerebellar arteries      POSTERIOR CEREBRAL ARTERIES: Both posterior cerebral arteries arises from the basilar tip  Both arteries demonstrate normal enhancement     Neither posterior communicating arteries well opacified      DURAL VENOUS SINUSES: Normal         NON VASCULAR ANATOMY  BONY STRUCTURES:  No acute osseous abnormality  Cervical spondylosis with reversal the cervical lordosis centered at the C5 and C6 levels  There is a chronic compression deformity of the C5 vertebral body with approximately 50-60% height loss  This is   stable in appearance when compared to the prior study  Stable retrolisthesis of C5 on C6      SOFT TISSUES OF THE NECK:  Normal      THORACIC INLET:  Centrilobular emphysema         IMPRESSION:     No acute intracranial abnormality  Findings are stable when comparing to the CTA study of November 20, 2019      No interval change in the greater then 90% stenosis at the origin of the left ICA with a diffusely small but stable cervical and intracranial ICA      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                        VIRTUAL VISIT DISCLAIMER    Effie Kelly acknowledges that he has consented to an online visit or consultation  He understands that the online visit is based solely on information provided by him, and that, in the absence of a face-to-face physical evaluation by the physician, the diagnosis he receives is both limited and provisional in terms of accuracy and completeness  This is not intended to replace a full medical face-to-face evaluation by the physician  Effie Kelly understands and accepts these terms  Cirilo Lanier PA-C  4/12/2021  3:21 PM  Cosign Needed Addendum    Virtual Regular Visit    Assessment/Plan:    Carotid artery stenosis  S/P LEFT carotid endarterectomy Jose Adame, Jan '20)    - asymptomatic  - quit smoking almost 1 year ago     VAS CV dup 3/25/2021:     R 50-69% 153/50/2  26)     L widely patent prox ICA endart site (59/23/0 83), mid ICA 50-69% (153/52 quite small 2/2 15)    CTA head and neck 12/4/2020:      No acute intracranial abnormality  Findings are stable when comparing to the CTA study of November 20, 2019        No interval change in the greater then 90% stenosis at the origin of the left ICA with a diffusely small but stable cervical and intracranial ICA  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  Discussion:  Mr Shala Mcallister remains asymptomatic from a carotid standpoint  He is s/p left carotid endarterectomy with Dr Aram Baltazar, January 2020  The vessel was noted to be diffusely small into the intracranial ICA  Review of the CV duplex suggests moderate bilateral carotid artery stenosis which we will continue to monitor  Recent CTA shows "(left) cervical ICA remains small and a threadlike remnant of the ICA is noted within the bulbar segment, measuring less than 1 mm  There is irregularity and persistent stenosis with a faintly visualized ICA throughout the remaining cervical region " We reviewed the findings of his CV duplex and CT scan which are as expected and overall stable  There is not indication for further surgery at this time  Recommend continued duplex surveillance every 6 months with routine clinical monitoring  We reviewed his recent labs and testing  His hemoglobin A1c is 7 4 and LDL 75  We discussed therapeutic lifestyle choices including exercise and healthy diet  Patient education was provided  We discussed that his LDL goal is less than 70  If we see progression in carotid disease or worsening in his lipid panel, intensified statin therapy could be considered      - regular, daily exercise/ walking  - continue with aspirin, Plavix and atorvastatin 20  - continued smoking cessation was encouraged  - follow-up CV duplex every 6 months  - we reviewed the symptoms of stroke for which he should call 911 for any symptoms of stroke  - follow-up in 1 year or sooner if needed        Problem List Items Addressed This Visit        Endocrine    Type 2 diabetes mellitus without complication, without long-term current use of insulin (HCC)    Relevant Orders    VAS carotid complete study       Cardiovascular and Mediastinum    Carotid artery stenosis without cerebral infarction, bilateral - Primary    Relevant Orders    VAS carotid complete study       Other    Cigarette nicotine dependence without complication    S/P Left carotid endarterectomy 1/13/20    Relevant Orders    VAS carotid complete study    Familial hypercholesterolemia            Chief Complaint   Patient presents with    Virtual Brief Visit    Virtual Regular Visit        Encounter provider Vianney Mcmanus PA-C    Provider located at [address] [place] [address]  [phone]      Recent Visits  No visits were found meeting these conditions  Showing recent visits within past 7 days and meeting all other requirements     Today's Visits  Date Type Provider Dept   04/12/21 Telemedicine Bib Zavala [facility] today's visits and meeting all other requirements     Future Appointments  No visits were found meeting these conditions  Showing future appointments within next 150 days and meeting all other requirements        The patient was identified by name and date of birth  Ki Adams was informed that this is a telemedicine visit and that the visit is being conducted through [facility] and patient was informed that this is a secure, HIPAA-compliant platform  He agrees to proceed     My office door was closed  No one else was in the room  He acknowledged consent and understanding of privacy and security of the video platform  The patient has agreed to participate and understands they can discontinue the visit at any time  Patient is aware this is a billable service  Subjective    HPI     Ki Adams 78year old male hypertension, hyperlipidemia, diabetes mellitus, CAD, mild CMP, tobacco (quit about 1 year ago), R MCA 5 mm aneursym (followed by neuro), bilateral carotid artery stenosis, L CEA (1/13/2020, [place]) who presents for vascular follow-up and to review recent carotid duplex study       Maria M Gary offers no new complaints  He did have a shoulder repair otherwise he has not had any hospitalizations or illnesses  He feels well and is enjoying himself on his boat  He quit smoking about 1 year ago but occasionally smoking a cigar  He reports that the left neck incision is closed after L CEA and he has no numbness  He has no symptoms of TIA/stroke  He denies transient visual loss, burry vision, difficulty speaking, facial numbness/weakness and arm/leg weakness  We reviewed the symptoms of stroke for which he should call 911  He received his first [address] vaccine and will be getting the second shot next week  We reviewed his recent imaging, labs and testing  His hemoglobin A1c is 7 4 and LDL 75  We discussed therapeutic lifestyle choices including exercise and healthy diet  Patient education was provided  We discussed that his LDL goal is less than 70  If we see progression in carotid disease or worsening in his lipid panel, intensified statin therapy could be considered  Medical therapy includes:  aspirin 81, Plavix 75, atorvastatin 20, etc        CTA head and neck and CV duplex studies were reviewed and discussed with patient  Past Medical History:   Diagnosis Date    CHF (congestive heart failure) (HCC)     Diabetes mellitus ([facility] [address] )     Diverticulitis     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[facility] [address] ) 11/7/2018    Heart disease     Hyperlipidemia     Hypertension     Kidney stone     Osteoarthritis     Vascular disorder        Past Surgical History:   Procedure Laterality Date    ABDOMINAL SURGERY      COLONOSCOPY      INGUINAL HERNIA REPAIR Left     LAPAROSCOPIC COLON RESECTION      [place] COLONOSCOPY FLX DX W/COLLJ SPEC WHEN PFRMD N/A 11/3/2017    Procedure: COLONOSCOPY;  Surgeon: Alpesh Zamora MD;  Location: [facility]  GI LAB; Service: Colorectal    [place] RECONSTR TOTAL SHOULDER IMPLANT Right 11/17/2020    Procedure: ARTHROPLASTY SHOULDER REVERSE with biceps tendonesis;   Surgeon: Kyler Chauhan MD;  Location[facility] MA[facility];  Service: Orthopedics    [facility] [place] VALERIO Left 1/13/2020    Procedure: ENDARTERECTOMY ARTERY CAROTID;  Surgeon: Freya Sage MD;  Location[facility] MA[facility];  Service: Vascular       Current Outpatient Medications   Medication Sig Dispense Refill    acetaminophen (TYLENOL) 650 mg CR tablet Take 1 tablet (650 mg total) by mouth every 8 (eight) hours as needed for mild pain 30 tablet 0    amLODIPine (NORVASC) 5 mg tablet Take 1 tablet (5 mg total) by mouth daily 90 tablet 3    ascorbic acid (VITAMIN C) 500 mg tablet Take 500 mg by mouth daily      aspirin 81 mg chewable tablet Chew 1 tablet (81 mg total) daily 90 tablet 3    atorvastatin (LIPITOR) 20 mg tablet TAKE 1 TABLET BY MOUTH  DAILY 90 tablet 3    carvedilol (COREG) 6 25 mg tablet Take 1 tablet (6 25 mg total) by mouth 2 (two) times a day with meals 180 tablet 3    clopidogrel (PLAVIX) 75 mg tablet TAKE 1 TABLET BY MOUTH  DAILY 90 tablet 3    docusate sodium (COLACE) 100 mg capsule Take 1 capsule (100 mg total) by mouth 2 (two) times a day 10 capsule 0    glucose blood test strip Use as instructed 100 each 2    losartan (COZAAR) 100 MG tablet TAKE 1 TABLET BY MOUTH  DAILY 90 tablet 3    metFORMIN (GLUCOPHAGE) 1000 MG tablet Take 1 tablet (1,000 mg total) by mouth 2 (two) times a day with meals 180 tablet 3    nicotine polacrilex (COMMIT) 2 MG lozenge May take 1 lozenge every 3-4 hours as needed with no more than 10 lozenges in a day 100 each 8    sildenafil (VIAGRA) 25 MG tablet Take 1 tablet (25 mg total) by mouth daily as needed for erectile dysfunction 10 tablet 0    sitaGLIPtin (Januvia) 25 mg tablet Take 1 tablet (25 mg total) by mouth daily 90 tablet 3    oxyCODONE (ROXICODONE) 5 mg immediate release tablet 1 tablets every 6 hours as needed for severe pain  (Patient not taking: Reported on 3/1/2021) 13 tablet 0     No current facility-administered medications for this visit  Allergies   Allergen Reactions    Other Hives     Soft shell crabs hives       Review of Systems   Constitutional: Negative  HENT: Negative  Eyes: Negative  Respiratory: Negative  Cardiovascular: Negative  Gastrointestinal: Negative  Endocrine: Negative  Genitourinary: Negative  Musculoskeletal: Negative  Skin: Negative  Allergic/Immunologic: Negative  Neurological: Negative  Hematological: Negative  Psychiatric/Behavioral: Negative  Video Exam    Vitals:    04/12/21 1024   Weight: 89 8 kg (198 lb)   Height: 6' (1 829 m)       Physical Exam     Awake alert oriented x3  No distress  Speech clear and appropriate  Smile/frown intact  Moves all extremities  L Neck incision is healed and closed  I spent 20 minutes with patient today in which greater than 50% of the time was spent in counseling/coordination of care regarding Carotid artery stenosis, diabetes mellitus and healthy lifestyle choices  VAS CV dup 3/25/2021  FINDINGS:     Right        Impression  PSV  EDV (cm/s)  Direction of Flow  Ratio    Dist  ICA                 72          31                      1 06    Mid  ICA                 111          46                      1 64    Prox   ICA    50 - 69%    153          50                      2 26    Dist CCA                  62          23                              Mid CCA                   68          25                      1 05    Prox CCA                  65          23                      1 09    Ext Carotid              209          28                      3 07    Prox Vert                 43          11  Antegrade                   Subclavian               108          11                              Innominate                60          13                                 Left         Impression     PSV  EDV (cm/s)  Direction of Flow  Ratio    Dist  ICA                    64          27                      0 90 Mid  ICA     50 - 69%       153          52                      2 15    Prox  ICA    Widely Patent   59          23                      0 83    Dist CCA                     75          17                              Mid CCA                      71          13                      0 73    Prox CCA                     97          24                              Ext Carotid                 140          28                      1 97    Prox Vert                    54          20  Antegrade                   Subclavian                  131           0                                    CONCLUSION:     Impression  RIGHT:  There is 50-69% stenosis noted in the internal carotid artery  Plaque is  heterogenous and irregular  Vertebral artery flow is antegrade  There is no significant subclavian artery  disease  LEFT:  Widely patent internal carotid artery and endarterectomy site  50-69% stenosis noted in the proximal/mid ICA  Vertebral artery flow is antegrade  There is no significant subclavian artery  disease  Compared to previous study on 9/10/2020, no significant interval change  Recommend repeat testing in 6 month as per protocol unless otherwise indicated  CTA head and neck 12/4/2020  FINDINGS:  NONCONTRAST BRAIN  PARENCHYMA:  No intracranial mass, mass effect or midline shift  No CT signs of acute infarction  No acute parenchymal hemorrhage  Intracranial carotid artery atherosclerotic calcifications      VENTRICLES AND EXTRA-AXIAL SPACES:  Normal for the patient's age      VISUALIZED ORBITS AND PARANASAL SINUSES:  Unremarkable  Redemonstrated cavum septum lucidum      CERVICAL VASCULATURE  AORTIC ARCH AND GREAT VESSELS:  Moderate atherosclerotic plaque is noted in the transverse aorta with mild origin stenosis of the great vessels of the neck and mild stenosis in the proximal left subclavian artery      RIGHT VERTEBRAL ARTERY CERVICAL SEGMENT:  Normal origin   The vessel is normal in caliber throughout the neck      LEFT VERTEBRAL ARTERY CERVICAL SEGMENT:  Normal origin  The vessel demonstrates mild atherosclerotic stenosis in the proximal segment  Otherwise, there is no tandem stenosis in the remaining cervical vertebral artery segments      RIGHT EXTRACRANIAL CAROTID SEGMENT:  Moderate atherosclerotic disease of the bifurcation  Approximately 50% stenosis in the distal common carotid artery and ICA origin extending into the proximal bulb  The proximal right cervical ICA segment is also   tortuous and slightly medialized in course within the retropharyngeal space  No tandem cervical ICA stenosis      LEFT EXTRACRANIAL CAROTID SEGMENT:  Severe atherosclerotic disease of the bifurcation  There are surgical clips in the distal common carotid artery and in the expected level of the carotid bulb suggesting prior carotid endarterectomy surgery  The   origin of the cervical ICA remains small and a threadlike remnant of the ICA is noted within the bulbar segment, measuring less than 1 mm  There is irregularity and persistent stenosis with a faintly visualized ICA throughout the remaining cervical   region  The overall caliber of the ICA has not significantly improved although, there is slight better visualization of the ICA origin and then noted on the November 20, 2019 study  There is at least 90% stenosis at the ICA origin by NASCET criteria      NASCET criteria was used to determine the degree of internal carotid artery diameter stenosis        INTRACRANIAL VASCULATURE   INTERNAL CAROTID ARTERIES:  Patent right intracranial carotid without stenosis  Patent left intracranial ICA with a persistently small caliber      ANTERIOR CIRCULATION:  Stable mildly bulbous region at the right A1-2 junction  A discrete saccular aneurysm is not identified  This finding may be due to tortuosity    Both horizontal and vertical MARIO segments remain patent and are unchanged in   appearance      MIDDLE CEREBRAL ARTERY CIRCULATION:  M1 segment and middle cerebral artery branches demonstrate normal enhancement bilaterally  Stable bilobed right MCA bifurcation aneurysm projecting anteriorly, laterally and inferiorly measuring approximately 3 x 5   mm      Early bifurcation of the left MCA with a short M1 segment  The M1 and M2 segments are patent bilaterally      DISTAL VERTEBRAL ARTERIES:  Normal distal vertebral arteries  Posterior inferior cerebellar artery origins are normal  Normal vertebral basilar junction      BASILAR ARTERY:  Basilar artery is normal in caliber  Normal superior cerebellar arteries      POSTERIOR CEREBRAL ARTERIES: Both posterior cerebral arteries arises from the basilar tip  Both arteries demonstrate normal enhancement  Neither posterior communicating arteries well opacified      DURAL VENOUS SINUSES:  Normal         NON VASCULAR ANATOMY  BONY STRUCTURES:  No acute osseous abnormality  Cervical spondylosis with reversal the cervical lordosis centered at the C5 and C6 levels  There is a chronic compression deformity of the C5 vertebral body with approximately 50-60% height loss  This is   stable in appearance when compared to the prior study  Stable retrolisthesis of C5 on C6      SOFT TISSUES OF THE NECK:  Normal      THORACIC INLET:  Centrilobular emphysema         IMPRESSION:     No acute intracranial abnormality  Findings are stable when comparing to the CTA study of November 20, 2019      No interval change in the greater then 90% stenosis at the origin of the left ICA with a diffusely small but stable cervical and intracranial ICA      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

## 2021-04-12 NOTE — PROGRESS NOTES
Virtual Regular Visit    Assessment/Plan:    Carotid artery stenosis  S/P LEFT carotid endarterectomy Susan Medrano, Jan '20)    - asymptomatic  - quit smoking almost 1 year ago     VAS CV dup 3/25/2021:     R 50-69% 153/50/2  26)     L widely patent prox ICA endart site (59/23/0 83), mid ICA 50-69% (153/52 quite small 2/2 15)    CTA head and neck 12/4/2020:      No acute intracranial abnormality  Findings are stable when comparing to the CTA study of November 20, 2019  No interval change in the greater then 90% stenosis at the origin of the left ICA with a diffusely small but stable cervical and intracranial ICA  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  Discussion:  Mr Corinne Luevano remains asymptomatic from a carotid standpoint  He is s/p left carotid endarterectomy with Dr Yin Ventura, January 2020  The vessel was noted to be diffusely small into the intracranial ICA  Review of the CV duplex suggests moderate bilateral carotid artery stenosis which we will continue to monitor  Recent CTA shows "(left) cervical ICA remains small and a threadlike remnant of the ICA is noted within the bulbar segment, measuring less than 1 mm  There is irregularity and persistent stenosis with a faintly visualized ICA throughout the remaining cervical region " We reviewed the findings of his CV duplex and CT scan which are as expected and overall stable  There is not indication for further surgery at this time  Recommend continued duplex surveillance every 6 months with routine clinical monitoring  We reviewed his recent labs and testing  His hemoglobin A1c is 7 4 and LDL 75  We discussed therapeutic lifestyle choices including exercise and healthy diet  Patient education was provided  We discussed that his LDL goal is less than 70  If we see progression in carotid disease or worsening in his lipid panel, intensified statin therapy could be considered      - regular, daily exercise/ walking  - continue with aspirin, Plavix and atorvastatin 20  - continued smoking cessation was encouraged  - follow-up CV duplex every 6 months  - we reviewed the symptoms of stroke for which he should call 911 for any symptoms of stroke  - follow-up in 1 year or sooner if needed      CC: Dr Geovani Poe      Problem List Items Addressed This Visit        Endocrine    Type 2 diabetes mellitus without complication, without long-term current use of insulin ([facility] [address] )    Relevant Orders    VAS carotid complete study       Cardiovascular and Mediastinum    Carotid artery stenosis without cerebral infarction, bilateral - Primary    Relevant Orders    VAS carotid complete study       Other    Cigarette nicotine dependence without complication    S/P Left carotid endarterectomy 1/13/20    Relevant Orders    VAS carotid complete study    Familial hypercholesterolemia            Chief Complaint   Patient presents with    Virtual Brief Visit    Virtual Regular Visit        Encounter provider Brandon Lu PA-C    Provider located at [address] [place] [address]  [phone]      Recent Visits  No visits were found meeting these conditions  Showing recent visits within past 7 days and meeting all other requirements     Today's Visits  Date Type Provider Dept   04/12/21 Telemedicine Brandon Lu [address] [facility] today's visits and meeting all other requirements     Future Appointments  No visits were found meeting these conditions  Showing future appointments within next 150 days and meeting all other requirements        The patient was identified by name and date of birth  Asim Juancarloses was informed that this is a telemedicine visit and that the visit is being conducted through [facility] and patient was informed that this is a secure, HIPAA-compliant platform  He agrees to proceed     My office door was closed  No one else was in the room    He acknowledged consent and understanding of privacy and security of the video platform  The patient has agreed to participate and understands they can discontinue the visit at any time  Patient is aware this is a billable service  Subjective    HPI     Sudhir Deras 78year old male hypertension, hyperlipidemia, diabetes mellitus, CAD, mild CMP, tobacco (quit about 1 year ago), R MCA 5 mm aneursym (followed by neuro), bilateral carotid artery stenosis, L CEA (1/13/2020, [place]) who presents for vascular follow-up and to review recent carotid duplex study  Ivette Cornelius offers no new complaints  He did have a shoulder repair otherwise he has not had any hospitalizations or illnesses  He feels well and is enjoying himself on his boat  He quit smoking about 1 year ago but occasionally smoking a cigar  He reports that the left neck incision is closed after L CEA and he has no numbness  He has no symptoms of TIA/stroke  He denies transient visual loss, burry vision, difficulty speaking, facial numbness/weakness and arm/leg weakness  We reviewed the symptoms of stroke for which he should call 911  He received his first [address] vaccine and will be getting the second shot next week  We reviewed his recent imaging, labs and testing  His hemoglobin A1c is 7 4 and LDL 75  We discussed therapeutic lifestyle choices including exercise and healthy diet  Patient education was provided  We discussed that his LDL goal is less than 70  If we see progression in carotid disease or worsening in his lipid panel, intensified statin therapy could be considered  Medical therapy includes:  aspirin 81, Plavix 75, atorvastatin 20, etc        CTA head and neck and CV duplex studies were reviewed and discussed with patient         Past Medical History:   Diagnosis Date    CHF (congestive heart failure) (HCC)     Diabetes mellitus ([facility] [address] )     Diverticulitis     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[facility] [address] ) 11/7/2018    Heart disease     Hyperlipidemia     Hypertension     Kidney stone     Osteoarthritis     Vascular disorder        Past Surgical History:   Procedure Laterality Date    ABDOMINAL SURGERY      COLONOSCOPY      INGUINAL HERNIA REPAIR Left     LAPAROSCOPIC COLON RESECTION      MT COLONOSCOPY FLX DX W/COLLJ SPEC WHEN PFRMD N/A 11/3/2017    Procedure: COLONOSCOPY;  Surgeon: Wilber Carr MD;  Location: [facility]  GI LAB; Service: Colorectal    MT RECONSTR TOTAL SHOULDER IMPLANT Right 11/17/2020    Procedure: ARTHROPLASTY SHOULDER REVERSE with biceps tendonesis;   Surgeon: Divina Hoskins MD;  Location[facility];  Service: Orthopedics    MT Carine Keira INCIS Left 1/13/2020    Procedure: ENDARTERECTOMY ARTERY CAROTID;  Surgeon: Adelso Gutierrez MD;  Location[facility];  Service: Vascular       Current Outpatient Medications   Medication Sig Dispense Refill    acetaminophen (TYLENOL) 650 mg CR tablet Take 1 tablet (650 mg total) by mouth every 8 (eight) hours as needed for mild pain 30 tablet 0    amLODIPine (NORVASC) 5 mg tablet Take 1 tablet (5 mg total) by mouth daily 90 tablet 3    ascorbic acid (VITAMIN C) 500 mg tablet Take 500 mg by mouth daily      aspirin 81 mg chewable tablet Chew 1 tablet (81 mg total) daily 90 tablet 3    atorvastatin (LIPITOR) 20 mg tablet TAKE 1 TABLET BY MOUTH  DAILY 90 tablet 3    carvedilol (COREG) 6 25 mg tablet Take 1 tablet (6 25 mg total) by mouth 2 (two) times a day with meals 180 tablet 3    clopidogrel (PLAVIX) 75 mg tablet TAKE 1 TABLET BY MOUTH  DAILY 90 tablet 3    docusate sodium (COLACE) 100 mg capsule Take 1 capsule (100 mg total) by mouth 2 (two) times a day 10 capsule 0    glucose blood test strip Use as instructed 100 each 2    losartan (COZAAR) 100 MG tablet TAKE 1 TABLET BY MOUTH  DAILY 90 tablet 3    metFORMIN (GLUCOPHAGE) 1000 MG tablet Take 1 tablet (1,000 mg total) by mouth 2 (two) times a day with meals 180 tablet 3    nicotine polacrilex (COMMIT) 2 MG lozenge May take 1 lozenge every 3-4 hours as needed with no more than 10 lozenges in a day 100 each 8    sildenafil (VIAGRA) 25 MG tablet Take 1 tablet (25 mg total) by mouth daily as needed for erectile dysfunction 10 tablet 0    sitaGLIPtin (Januvia) 25 mg tablet Take 1 tablet (25 mg total) by mouth daily 90 tablet 3    oxyCODONE (ROXICODONE) 5 mg immediate release tablet 1 tablets every 6 hours as needed for severe pain  (Patient not taking: Reported on 3/1/2021) 13 tablet 0     No current facility-administered medications for this visit  Allergies   Allergen Reactions    Other Hives     Soft shell crabs hives       Review of Systems   Constitutional: Negative  HENT: Negative  Eyes: Negative  Respiratory: Negative  Cardiovascular: Negative  Gastrointestinal: Negative  Endocrine: Negative  Genitourinary: Negative  Musculoskeletal: Negative  Skin: Negative  Allergic/Immunologic: Negative  Neurological: Negative  Hematological: Negative  Psychiatric/Behavioral: Negative  Video Exam    Vitals:    04/12/21 1024   Weight: 89 8 kg (198 lb)   Height: 6' (1 829 m)       Physical Exam     Awake alert oriented x3  No distress  Speech clear and appropriate  Smile/frown intact  Moves all extremities  L Neck incision is healed and closed  I spent 20 minutes with patient today in which greater than 50% of the time was spent in counseling/coordination of care regarding Carotid artery stenosis, diabetes mellitus and healthy lifestyle choices  VAS CV dup 3/25/2021  FINDINGS:     Right        Impression  PSV  EDV (cm/s)  Direction of Flow  Ratio    Dist  ICA                 72          31                      1 06    Mid  ICA                 111          46                      1 64    Prox   ICA    50 - 69%    153          50                      2 26    Dist CCA                  62          23                              Mid CCA                   68          25                      1 05    Prox CCA                  65          23                      1 09    Ext Carotid              209          28                      3 07    Prox Vert                 43          11  Antegrade                   Subclavian               108          11                              Innominate                60          13                                 Left         Impression     PSV  EDV (cm/s)  Direction of Flow  Ratio    Dist  ICA                    64          27                      0 90    Mid  ICA     50 - 69%       153          52                      2 15    Prox  ICA    Widely Patent   59          23                      0 83    Dist CCA                     75          17                              Mid CCA                      71          13                      0 73    Prox CCA                     97          24                              Ext Carotid                 140          28                      1 97    Prox Vert                    54          20  Antegrade                   Subclavian                  131           0                                    CONCLUSION:     Impression  RIGHT:  There is 50-69% stenosis noted in the internal carotid artery  Plaque is  heterogenous and irregular  Vertebral artery flow is antegrade  There is no significant subclavian artery  disease  LEFT:  Widely patent internal carotid artery and endarterectomy site  50-69% stenosis noted in the proximal/mid ICA  Vertebral artery flow is antegrade  There is no significant subclavian artery  disease  Compared to previous study on 9/10/2020, no significant interval change  Recommend repeat testing in 6 month as per protocol unless otherwise indicated  CTA head and neck 12/4/2020  FINDINGS:  NONCONTRAST BRAIN  PARENCHYMA:  No intracranial mass, mass effect or midline shift  No CT signs of acute infarction  No acute parenchymal hemorrhage  Intracranial carotid artery atherosclerotic calcifications      VENTRICLES AND EXTRA-AXIAL SPACES:  Normal for the patient's age      VISUALIZED ORBITS AND PARANASAL SINUSES:  Unremarkable  Redemonstrated cavum septum lucidum      CERVICAL VASCULATURE  AORTIC ARCH AND GREAT VESSELS:  Moderate atherosclerotic plaque is noted in the transverse aorta with mild origin stenosis of the great vessels of the neck and mild stenosis in the proximal left subclavian artery      RIGHT VERTEBRAL ARTERY CERVICAL SEGMENT:  Normal origin  The vessel is normal in caliber throughout the neck      LEFT VERTEBRAL ARTERY CERVICAL SEGMENT:  Normal origin  The vessel demonstrates mild atherosclerotic stenosis in the proximal segment  Otherwise, there is no tandem stenosis in the remaining cervical vertebral artery segments      RIGHT EXTRACRANIAL CAROTID SEGMENT:  Moderate atherosclerotic disease of the bifurcation  Approximately 50% stenosis in the distal common carotid artery and ICA origin extending into the proximal bulb  The proximal right cervical ICA segment is also   tortuous and slightly medialized in course within the retropharyngeal space  No tandem cervical ICA stenosis      LEFT EXTRACRANIAL CAROTID SEGMENT:  Severe atherosclerotic disease of the bifurcation  There are surgical clips in the distal common carotid artery and in the expected level of the carotid bulb suggesting prior carotid endarterectomy surgery  The   origin of the cervical ICA remains small and a threadlike remnant of the ICA is noted within the bulbar segment, measuring less than 1 mm  There is irregularity and persistent stenosis with a faintly visualized ICA throughout the remaining cervical   region  The overall caliber of the ICA has not significantly improved although, there is slight better visualization of the ICA origin and then noted on the November 20, 2019 study    There is at least 90% stenosis at the ICA origin by NASCET criteria      NASCET criteria was used to determine the degree of internal carotid artery diameter stenosis        INTRACRANIAL VASCULATURE   INTERNAL CAROTID ARTERIES:  Patent right intracranial carotid without stenosis  Patent left intracranial ICA with a persistently small caliber      ANTERIOR CIRCULATION:  Stable mildly bulbous region at the right A1-2 junction  A discrete saccular aneurysm is not identified  This finding may be due to tortuosity  Both horizontal and vertical MARIO segments remain patent and are unchanged in   appearance      MIDDLE CEREBRAL ARTERY CIRCULATION:  M1 segment and middle cerebral artery branches demonstrate normal enhancement bilaterally  Stable bilobed right MCA bifurcation aneurysm projecting anteriorly, laterally and inferiorly measuring approximately 3 x 5   mm      Early bifurcation of the left MCA with a short M1 segment  The M1 and M2 segments are patent bilaterally      DISTAL VERTEBRAL ARTERIES:  Normal distal vertebral arteries  Posterior inferior cerebellar artery origins are normal  Normal vertebral basilar junction      BASILAR ARTERY:  Basilar artery is normal in caliber  Normal superior cerebellar arteries      POSTERIOR CEREBRAL ARTERIES: Both posterior cerebral arteries arises from the basilar tip  Both arteries demonstrate normal enhancement  Neither posterior communicating arteries well opacified      DURAL VENOUS SINUSES:  Normal         NON VASCULAR ANATOMY  BONY STRUCTURES:  No acute osseous abnormality  Cervical spondylosis with reversal the cervical lordosis centered at the C5 and C6 levels  There is a chronic compression deformity of the C5 vertebral body with approximately 50-60% height loss  This is   stable in appearance when compared to the prior study  Stable retrolisthesis of C5 on C6      SOFT TISSUES OF THE NECK:  Normal      THORACIC INLET:  Centrilobular emphysema         IMPRESSION:     No acute intracranial abnormality    Findings are stable when comparing to the CTA study of November 20, 2019      No interval change in the greater then 90% stenosis at the origin of the left ICA with a diffusely small but stable cervical and intracranial ICA      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

## 2021-04-20 ENCOUNTER — OFFICE VISIT (OUTPATIENT)
Dept: UROLOGY | Facility: CLINIC | Age: 71
End: 2021-04-20
Payer: COMMERCIAL

## 2021-04-20 VITALS
BODY MASS INDEX: 27.77 KG/M2 | SYSTOLIC BLOOD PRESSURE: 128 MMHG | HEIGHT: 72 IN | HEART RATE: 70 BPM | DIASTOLIC BLOOD PRESSURE: 82 MMHG | WEIGHT: 205 LBS

## 2021-04-20 DIAGNOSIS — R97.20 ELEVATED PSA: Primary | ICD-10-CM

## 2021-04-20 PROCEDURE — 3008F BODY MASS INDEX DOCD: CPT | Performed by: PHYSICIAN ASSISTANT

## 2021-04-20 PROCEDURE — 3074F SYST BP LT 130 MM HG: CPT | Performed by: PHYSICIAN ASSISTANT

## 2021-04-20 PROCEDURE — 3079F DIAST BP 80-89 MM HG: CPT | Performed by: PHYSICIAN ASSISTANT

## 2021-04-20 PROCEDURE — 1160F RVW MEDS BY RX/DR IN RCRD: CPT | Performed by: PHYSICIAN ASSISTANT

## 2021-04-20 PROCEDURE — 1036F TOBACCO NON-USER: CPT | Performed by: PHYSICIAN ASSISTANT

## 2021-04-20 PROCEDURE — 99213 OFFICE O/P EST LOW 20 MIN: CPT | Performed by: PHYSICIAN ASSISTANT

## 2021-04-20 NOTE — PROGRESS NOTES
1  Elevated PSA  PSA Total, Diagnostic         Assessment and plan:       1  Elevated PSA - managed by Dr Soledad Lozano  2  Abnormal prostate examination  -   I did review with the patient that his PSA has progressed to 6 6 with a low% free PSA  We also reviewed his abnormal rectal examination in the office today  -  I did review previous recommendations of holding off on biopsy for PSA threshold approximately 10 given patient's medical comorbidities  I reviewed the options of continued monitoring with a repeat PSA in approximately 4 months versus proceeding with prostate biopsy given the above findings  We reviewed the risks of bleeding and infection  We also reviewed the risk of potential prostate cancer progression   - Patient wishes to hold off on biopsy at this time understanding that there might be prostate cancer present  He will follow up in 4 months with a repeat diagnostic PSA  Getachew Mcdowell PA-C      Chief Complaint       Elevated PSA    History of Present Illness     Lewie Apgar is a 78 y o  male patient of Dr Soledad Lozano with a history of elevated PSA presenting for follow up    In terms of a personal or family history of urologic malignancy or malady he denies this  Urinating well  Patient denies dysuria, incontinence, hesitancy of urination, gross hematuria, urgency, nocturia, he feels empty after voiding and stream quality is good  No aggravating or alleviating factors, no associated symptoms, no previous therapies     at patient's last consultation is reported to have a normal prostate examination  Given his medical comorbidities recommendations were for observation and possible prostate biopsy at a threshold of 10  He presents today follow-up  Patient's most recent PSA 6 6  With an 8 5% free PSA(3/17/21), previously 5 1 (6/15/2020)  Review of Systems     Review of Systems   Constitutional: Negative  HENT: Negative  Eyes: Negative  Respiratory: Negative  Cardiovascular: Negative  Gastrointestinal: Negative  Endocrine: Negative  Genitourinary: Negative  Musculoskeletal: Negative for arthralgias  Skin: Negative  Allergic/Immunologic: Negative  Neurological: Negative  Hematological: Negative  Psychiatric/Behavioral: Negative  Allergies     Allergies   Allergen Reactions    Other Hives     Soft shell crabs hives       Physical Exam     Physical Exam  Vitals signs and nursing note reviewed  Constitutional:       General: He is not in acute distress  Appearance: He is well-developed  He is not diaphoretic  HENT:      Head: Normocephalic and atraumatic  Eyes:      General:         Right eye: No discharge  Left eye: No discharge  Pupils: Pupils are equal, round, and reactive to light  Neck:      Musculoskeletal: Normal range of motion and neck supple  Thyroid: No thyromegaly  Trachea: No tracheal deviation  Cardiovascular:      Rate and Rhythm: Normal rate  Pulses: Normal pulses  Pulmonary:      Effort: Pulmonary effort is normal  No respiratory distress  Breath sounds: No stridor  No wheezing  Abdominal:      General: There is no distension  Palpations: Abdomen is soft  There is no mass  Tenderness: There is no abdominal tenderness  There is no guarding or rebound  Hernia: No hernia is present  Genitourinary:     Comments: Good rectal tone, Prostate 40 grams, right sided firmness with no definitive nodule  Musculoskeletal: Normal range of motion  General: No swelling, tenderness, deformity or signs of injury  Lymphadenopathy:      Cervical: No cervical adenopathy  Skin:     General: Skin is warm and dry  Coloration: Skin is not pale  Findings: No erythema or rash  Neurological:      Mental Status: He is alert and oriented to person, place, and time  Cranial Nerves: No cranial nerve deficit  Sensory: No sensory deficit        Motor: No weakness or abnormal muscle tone  Coordination: Coordination normal    Psychiatric:         Behavior: Behavior normal          Thought Content: Thought content normal          Judgment: Judgment normal              Vital Signs  There were no vitals filed for this visit  Current Medications       Current Outpatient Medications:     acetaminophen (TYLENOL) 650 mg CR tablet, Take 1 tablet (650 mg total) by mouth every 8 (eight) hours as needed for mild pain, Disp: 30 tablet, Rfl: 0    amLODIPine (NORVASC) 5 mg tablet, Take 1 tablet (5 mg total) by mouth daily, Disp: 90 tablet, Rfl: 3    ascorbic acid (VITAMIN C) 500 mg tablet, Take 500 mg by mouth daily, Disp: , Rfl:     aspirin 81 mg chewable tablet, Chew 1 tablet (81 mg total) daily, Disp: 90 tablet, Rfl: 3    atorvastatin (LIPITOR) 20 mg tablet, TAKE 1 TABLET BY MOUTH  DAILY, Disp: 90 tablet, Rfl: 3    carvedilol (COREG) 6 25 mg tablet, Take 1 tablet (6 25 mg total) by mouth 2 (two) times a day with meals, Disp: 180 tablet, Rfl: 3    clopidogrel (PLAVIX) 75 mg tablet, TAKE 1 TABLET BY MOUTH  DAILY, Disp: 90 tablet, Rfl: 3    docusate sodium (COLACE) 100 mg capsule, Take 1 capsule (100 mg total) by mouth 2 (two) times a day, Disp: 10 capsule, Rfl: 0    glucose blood test strip, Use as instructed, Disp: 100 each, Rfl: 2    losartan (COZAAR) 100 MG tablet, TAKE 1 TABLET BY MOUTH  DAILY, Disp: 90 tablet, Rfl: 3    metFORMIN (GLUCOPHAGE) 1000 MG tablet, Take 1 tablet (1,000 mg total) by mouth 2 (two) times a day with meals, Disp: 180 tablet, Rfl: 3    nicotine polacrilex (COMMIT) 2 MG lozenge, May take 1 lozenge every 3-4 hours as needed with no more than 10 lozenges in a day, Disp: 100 each, Rfl: 8    oxyCODONE (ROXICODONE) 5 mg immediate release tablet, 1 tablets every 6 hours as needed for severe pain   (Patient not taking: Reported on 3/1/2021), Disp: 13 tablet, Rfl: 0    sildenafil (VIAGRA) 25 MG tablet, Take 1 tablet (25 mg total) by mouth daily as needed for erectile dysfunction, Disp: 10 tablet, Rfl: 0    sitaGLIPtin (Januvia) 25 mg tablet, Take 1 tablet (25 mg total) by mouth daily, Disp: 90 tablet, Rfl: 3      Active Problems     Patient Active Problem List   Diagnosis    Essential hypertension    Mixed hyperlipidemia    Type 2 diabetes mellitus without complication, without long-term current use of insulin ([facility])    Ventral hernia without obstruction or gangrene    Soft tissue mass    Cigarette nicotine dependence without complication    Congestive heart failure, NYHA class 1, acute, systolic ([facility])    Coronary artery disease due to lipid rich plaque    Carotid artery stenosis without cerebral infarction, bilateral    Cerebral aneurysm    S/P Left carotid endarterectomy 1/13/20    Arthritis    Familial hypercholesterolemia    BMI 27 0-27 9,adult    Chronic right shoulder pain    Elevated PSA    Status post reverse total arthroplasty of right shoulder    Erectile dysfunction of non-organic origin    Fatigue    Anemia         Past Medical History     Past Medical History:   Diagnosis Date    CHF (congestive heart failure) ([facility])     Diabetes mellitus ([address] )     Diverticulitis     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[facility]) 11/7/2018    Heart disease     Hyperlipidemia     Hypertension     Kidney stone     Osteoarthritis     Vascular disorder          Surgical History     Past Surgical History:   Procedure Laterality Date    ABDOMINAL SURGERY      COLONOSCOPY      INGUINAL HERNIA REPAIR Left     LAPAROSCOPIC COLON RESECTION      TX COLONOSCOPY FLX DX W/COLLJ SPEC WHEN PFRMD N/A 11/3/2017    Procedure: COLONOSCOPY;  Surgeon: Ozzie Vitale MD;  [facility]; Service: Colorectal    TX RECONSTR TOTAL SHOULDER IMPLANT Right 11/17/2020    Procedure: ARTHROPLASTY SHOULDER REVERSE with biceps tendonesis;   Surgeon: Monica Swenson MD;  [facility];  Service: Orthopedics    TX Sudhir Calloway INCIS Left 2020    Procedure: ENDARTERECTOMY ARTERY CAROTID;  Surgeon: Myranda Vicente MD;  [facility];  Service: Vascular         Family History     Family History   Problem Relation Age of Onset    Colon cancer Father     Colon cancer Paternal Grandfather     No Known Problems Mother     Colon cancer Family          Social History     Social History     Social History     Tobacco Use   Smoking Status Former Smoker    Types: Cigars    Quit date: 2019    Years since quittin 5   Smokeless Tobacco Never Used         Pertinent Lab Values     Lab Results   Component Value Date    CREATININE 0 86 2021       Lab Results   Component Value Date    PSA 6 6 (H) 2021    PSA 5 1 (H) 06/15/2020             Pertinent Imaging      There is no pertinent urological imaging for my review

## 2021-05-05 ENCOUNTER — VBI (OUTPATIENT)
Dept: ADMINISTRATIVE | Facility: OTHER | Age: 71
End: 2021-05-05

## 2021-06-17 ENCOUNTER — RA CDI HCC (OUTPATIENT)
Dept: OTHER | Facility: HOSPITAL | Age: 71
End: 2021-06-17

## 2021-06-17 NOTE — PROGRESS NOTES
[facility]  coding opportunities          Chart reviewed, no opportunity found: CHART REVIEWED, NO OPPORTUNITY FOUND                     Patients insurance company: [facility] [address]  (Medicare Advantage and Commercial)

## 2021-06-22 ENCOUNTER — APPOINTMENT (OUTPATIENT)
Dept: LAB | Facility: CLINIC | Age: 71
End: 2021-06-22
Payer: COMMERCIAL

## 2021-06-22 DIAGNOSIS — I25.10 CAD (CORONARY ARTERY DISEASE): ICD-10-CM

## 2021-06-23 ENCOUNTER — OFFICE VISIT (OUTPATIENT)
Dept: FAMILY MEDICINE CLINIC | Facility: CLINIC | Age: 71
End: 2021-06-23
Payer: COMMERCIAL

## 2021-06-23 VITALS
HEART RATE: 70 BPM | OXYGEN SATURATION: 98 % | DIASTOLIC BLOOD PRESSURE: 82 MMHG | TEMPERATURE: 97.1 F | SYSTOLIC BLOOD PRESSURE: 150 MMHG | HEIGHT: 72 IN | BODY MASS INDEX: 27.74 KG/M2 | WEIGHT: 204.8 LBS

## 2021-06-23 DIAGNOSIS — E11.9 TYPE 2 DIABETES MELLITUS WITHOUT COMPLICATION, WITHOUT LONG-TERM CURRENT USE OF INSULIN (HCC): ICD-10-CM

## 2021-06-23 DIAGNOSIS — I10 ESSENTIAL HYPERTENSION: Primary | ICD-10-CM

## 2021-06-23 DIAGNOSIS — Z23 NEED FOR SHINGLES VACCINE: ICD-10-CM

## 2021-06-23 DIAGNOSIS — G56.02 LEFT CARPAL TUNNEL SYNDROME: ICD-10-CM

## 2021-06-23 PROCEDURE — 99214 OFFICE O/P EST MOD 30 MIN: CPT | Performed by: FAMILY MEDICINE

## 2021-06-23 PROCEDURE — 3077F SYST BP >= 140 MM HG: CPT | Performed by: FAMILY MEDICINE

## 2021-06-23 PROCEDURE — 3079F DIAST BP 80-89 MM HG: CPT | Performed by: FAMILY MEDICINE

## 2021-06-23 RX ORDER — PHENYLEPHRINE HYDROCHLORIDE 10 MG/1
TABLET, COATED ORAL
Qty: 1 EACH | Refills: 1 | Status: SHIPPED | OUTPATIENT
Start: 2021-06-23 | End: 2022-03-14

## 2021-06-23 RX ORDER — ASPIRIN 81 MG/1
81 TABLET, CHEWABLE ORAL DAILY
Qty: 90 TABLET | Refills: 3 | Status: SHIPPED | OUTPATIENT
Start: 2021-06-23

## 2021-06-23 RX ORDER — AMLODIPINE BESYLATE 10 MG/1
10 TABLET ORAL DAILY
Qty: 90 TABLET | Refills: 3 | Status: SHIPPED | OUTPATIENT
Start: 2021-06-23 | End: 2022-04-06

## 2021-06-23 NOTE — PROGRESS NOTES
BMI Counseling: Body mass index is 27 78 kg/m²  The BMI is above normal  Nutrition recommendations include reducing portion sizes  Exercise recommendations include moderate aerobic physical activity for 150 minutes/week

## 2021-06-23 NOTE — PROGRESS NOTES
Assessment/Plan:    No problem-specific Assessment & Plan notes found for this encounter  Diagnoses and all orders for this visit:    Essential hypertension  After discussing risks and benefits of medication along with side effects will increase amlodipine to 10 mg  -     amLODIPine (NORVASC) 10 mg tablet; Take 1 tablet (10 mg total) by mouth daily    Need for shingles vaccine  -     Zoster Vac Recomb Adjuvanted 50 MCG/0 5ML SUSR; Inject 1 Dose into a muscle once for 1 dose    Left carpal tunnel syndrome  -     Elastic Bandages & Supports (Carpal Tunnel Wrist Stabilizer) MISC; Use daily at bedtime    Type 2 diabetes mellitus without complication, without long-term current use of insulin ([facility])  HgbA1C- 7 stable controlled    Follow up in 1-2 months        Subjective:      Patient ID: Gilma Dejesus is a 78 y o  male  Patient is here to follow up for hypertension  He denies any symptoms related to this  He takes losartan 100 mg once daily and amlodipine 5 mg daily  He is at increased risk for heart disease  Also has been having left hand 4th and 5th finger numbness and tingling no weakness  He is right hand dominant  Has Type 2 DM  denies any symptoms related to this takes metformin and Januvia daily  The following portions of the patient's history were reviewed and updated as appropriate:   He  has a past medical history of CHF (congestive heart failure) ([facility] [address] ), Diabetes mellitus ([facility] [address] ), Diverticulitis, 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[facility] [address] ) (11/7/2018), Heart disease, Hyperlipidemia, Hypertension, Kidney stone, Osteoarthritis, and Vascular disorder    He   Patient Active Problem List    Diagnosis Date Noted    Left carpal tunnel syndrome 06/23/2021    Fatigue 03/01/2021    Anemia 03/01/2021    Status post reverse total arthroplasty of right shoulder 11/18/2020    Elevated PSA 08/12/2020    Arthritis 06/23/2020    Familial hypercholesterolemia 06/23/2020    BMI 27 0-27 9,adult 06/23/2020    Chronic right shoulder pain 06/23/2020    S/P Left carotid endarterectomy 1/13/20 01/21/2020    Cerebral aneurysm 11/22/2019    Carotid artery stenosis without cerebral infarction, bilateral 11/05/2019    Coronary artery disease due to lipid rich plaque 10/15/2019    Congestive heart failure, NYHA class 1, acute, systolic ([facility]) 34/17/8270    Cigarette nicotine dependence without complication 99/53/9676    Soft tissue mass 08/22/2018    Essential hypertension 05/07/2018    Mixed hyperlipidemia 05/07/2018    Type 2 diabetes mellitus without complication, without long-term current use of insulin ([facility] [address] ) 05/07/2018    Ventral hernia without obstruction or gangrene 05/07/2018    Erectile dysfunction of non-organic origin 07/16/2013     He  has a past surgical history that includes Abdominal surgery; Laparoscopic colon resection; Colonoscopy; pr colonoscopy flx dx w/collj spec when pfrmd (N/A, 11/3/2017); Inguinal hernia repair (Left); pr thromboendartectmy neck,neck incis (Left, 1/13/2020); and pr reconstr total shoulder implant (Right, 11/17/2020)  His family history includes Colon cancer in his family, father, and paternal grandfather; No Known Problems in his mother  He  reports that he quit smoking about 21 months ago  His smoking use included cigars  He has never used smokeless tobacco  He reports current alcohol use of about 2 0 standard drinks of alcohol per week  He reports that he does not use drugs    Current Outpatient Medications   Medication Sig Dispense Refill    acetaminophen (TYLENOL) 650 mg CR tablet Take 1 tablet (650 mg total) by mouth every 8 (eight) hours as needed for mild pain 30 tablet 0    amLODIPine (NORVASC) 10 mg tablet Take 1 tablet (10 mg total) by mouth daily 90 tablet 3    ascorbic acid (VITAMIN C) 500 mg tablet Take 500 mg by mouth daily      aspirin 81 mg chewable tablet Chew 1 tablet (81 mg total) daily 90 tablet 3    atorvastatin (LIPITOR) 20 mg tablet TAKE 1 TABLET BY MOUTH DAILY 90 tablet 3    carvedilol (COREG) 6 25 mg tablet Take 1 tablet (6 25 mg total) by mouth 2 (two) times a day with meals 180 tablet 3    clopidogrel (PLAVIX) 75 mg tablet TAKE 1 TABLET BY MOUTH  DAILY 90 tablet 3    docusate sodium (COLACE) 100 mg capsule Take 1 capsule (100 mg total) by mouth 2 (two) times a day 10 capsule 0    glucose blood test strip Use as instructed 100 each 2    losartan (COZAAR) 100 MG tablet TAKE 1 TABLET BY MOUTH  DAILY 90 tablet 3    metFORMIN (GLUCOPHAGE) 1000 MG tablet Take 1 tablet (1,000 mg total) by mouth 2 (two) times a day with meals 180 tablet 3    nicotine polacrilex (COMMIT) 2 MG lozenge May take 1 lozenge every 3-4 hours as needed with no more than 10 lozenges in a day 100 each 8    oxyCODONE (ROXICODONE) 5 mg immediate release tablet 1 tablets every 6 hours as needed for severe pain  13 tablet 0    sildenafil (VIAGRA) 25 MG tablet Take 1 tablet (25 mg total) by mouth daily as needed for erectile dysfunction 10 tablet 0    sitaGLIPtin (Januvia) 25 mg tablet Take 1 tablet (25 mg total) by mouth daily 90 tablet 3    Elastic Bandages & Supports (Carpal Tunnel Wrist Stabilizer) MISC Use daily at bedtime 1 each 1    Zoster Vac Recomb Adjuvanted 50 MCG/0 5ML SUSR Inject 1 Dose into a muscle once for 1 dose 1 each 0     No current facility-administered medications for this visit       Current Outpatient Medications on File Prior to Visit   Medication Sig    acetaminophen (TYLENOL) 650 mg CR tablet Take 1 tablet (650 mg total) by mouth every 8 (eight) hours as needed for mild pain    ascorbic acid (VITAMIN C) 500 mg tablet Take 500 mg by mouth daily    aspirin 81 mg chewable tablet Chew 1 tablet (81 mg total) daily    atorvastatin (LIPITOR) 20 mg tablet TAKE 1 TABLET BY MOUTH  DAILY    carvedilol (COREG) 6 25 mg tablet Take 1 tablet (6 25 mg total) by mouth 2 (two) times a day with meals    clopidogrel (PLAVIX) 75 mg tablet TAKE 1 TABLET BY MOUTH  DAILY    docusate sodium (COLACE) 100 mg capsule Take 1 capsule (100 mg total) by mouth 2 (two) times a day    glucose blood test strip Use as instructed    losartan (COZAAR) 100 MG tablet TAKE 1 TABLET BY MOUTH  DAILY    metFORMIN (GLUCOPHAGE) 1000 MG tablet Take 1 tablet (1,000 mg total) by mouth 2 (two) times a day with meals    nicotine polacrilex (COMMIT) 2 MG lozenge May take 1 lozenge every 3-4 hours as needed with no more than 10 lozenges in a day    oxyCODONE (ROXICODONE) 5 mg immediate release tablet 1 tablets every 6 hours as needed for severe pain   sildenafil (VIAGRA) 25 MG tablet Take 1 tablet (25 mg total) by mouth daily as needed for erectile dysfunction    sitaGLIPtin (Januvia) 25 mg tablet Take 1 tablet (25 mg total) by mouth daily    [DISCONTINUED] amLODIPine (NORVASC) 5 mg tablet Take 1 tablet (5 mg total) by mouth daily     No current facility-administered medications on file prior to visit  He is allergic to other       Review of Systems   Constitutional: Negative for activity change, appetite change, fatigue and fever  HENT: Negative for congestion and ear discharge  Respiratory: Negative for cough and shortness of breath  Cardiovascular: Negative for chest pain and palpitations  Gastrointestinal: Negative for diarrhea and nausea  Musculoskeletal: Negative for arthralgias and back pain  Skin: Negative for color change and rash  Neurological: Positive for numbness  Negative for dizziness and headaches  Psychiatric/Behavioral: Negative for agitation and behavioral problems  Objective:      /82 (BP Location: Left arm, Patient Position: Sitting, Cuff Size: Large)   Pulse 70   Temp (!) 97 1 °F (36 2 °C)   Ht 6' (1 829 m)   Wt 92 9 kg (204 lb 12 8 oz)   SpO2 98%   BMI 27 78 kg/m²          Physical Exam  Constitutional:       General: He is not in acute distress  Appearance: He is well-developed  He is not diaphoretic  Eyes:      General: No scleral icterus  Pupils: Pupils are equal, round, and reactive to light  Cardiovascular:      Rate and Rhythm: Normal rate and regular rhythm  Heart sounds: Normal heart sounds  No murmur heard  Pulmonary:      Effort: Pulmonary effort is normal  No respiratory distress  Breath sounds: Normal breath sounds  No wheezing  Abdominal:      General: Bowel sounds are normal  There is no distension  Palpations: Abdomen is soft  Tenderness: There is no abdominal tenderness  Skin:     General: Skin is warm and dry  Findings: No rash  Neurological:      Mental Status: He is alert and oriented to person, place, and time

## 2021-07-13 ENCOUNTER — OFFICE VISIT (OUTPATIENT)
Dept: CARDIOLOGY CLINIC | Facility: CLINIC | Age: 71
End: 2021-07-13
Payer: COMMERCIAL

## 2021-07-13 VITALS
HEART RATE: 76 BPM | SYSTOLIC BLOOD PRESSURE: 150 MMHG | OXYGEN SATURATION: 97 % | DIASTOLIC BLOOD PRESSURE: 82 MMHG | HEIGHT: 72 IN | WEIGHT: 202 LBS | RESPIRATION RATE: 16 BRPM | BODY MASS INDEX: 27.36 KG/M2

## 2021-07-13 DIAGNOSIS — I50.21 CONGESTIVE HEART FAILURE, NYHA CLASS 1, ACUTE, SYSTOLIC (HCC): ICD-10-CM

## 2021-07-13 PROCEDURE — 1036F TOBACCO NON-USER: CPT | Performed by: INTERNAL MEDICINE

## 2021-07-13 PROCEDURE — 3008F BODY MASS INDEX DOCD: CPT | Performed by: INTERNAL MEDICINE

## 2021-07-13 PROCEDURE — 99214 OFFICE O/P EST MOD 30 MIN: CPT | Performed by: INTERNAL MEDICINE

## 2021-07-13 PROCEDURE — 1160F RVW MEDS BY RX/DR IN RCRD: CPT | Performed by: INTERNAL MEDICINE

## 2021-07-13 RX ORDER — CARVEDILOL 12.5 MG/1
12.5 TABLET ORAL 2 TIMES DAILY WITH MEALS
Qty: 180 TABLET | Refills: 3 | Status: SHIPPED | OUTPATIENT
Start: 2021-07-13 | End: 2022-01-19 | Stop reason: SDUPTHER

## 2021-07-27 DIAGNOSIS — I65.23 CAROTID ARTERY STENOSIS WITHOUT CEREBRAL INFARCTION, BILATERAL: ICD-10-CM

## 2021-07-29 RX ORDER — CLOPIDOGREL BISULFATE 75 MG/1
TABLET ORAL
Qty: 90 TABLET | Refills: 3 | Status: SHIPPED | OUTPATIENT
Start: 2021-07-29

## 2021-08-09 ENCOUNTER — APPOINTMENT (OUTPATIENT)
Dept: LAB | Facility: CLINIC | Age: 71
End: 2021-08-09
Payer: COMMERCIAL

## 2021-08-09 DIAGNOSIS — R97.20 ELEVATED PSA: ICD-10-CM

## 2021-08-09 LAB — PSA SERPL-MCNC: 6.4 NG/ML (ref 0–4)

## 2021-08-09 PROCEDURE — 84153 ASSAY OF PSA TOTAL: CPT

## 2021-08-12 ENCOUNTER — RA CDI HCC (OUTPATIENT)
Dept: OTHER | Facility: HOSPITAL | Age: 71
End: 2021-08-12

## 2021-08-12 NOTE — PROGRESS NOTES
[facility]  coding opportunities       Chart reviewed, no opportunity found: CHART REVIEWED, NO OPPORTUNITY FOUND                        Patients insurance company: [facility] [address]  (Medicare Advantage and Commercial)

## 2021-08-15 NOTE — PROGRESS NOTES
Problem List Items Addressed This Visit        Endocrine    Type 2 diabetes mellitus without complication, without long-term current use of insulin ([address] )       Cardiovascular and Mediastinum    Coronary artery disease due to lipid rich plaque       Other    Cigarette nicotine dependence without complication - Primary    Elevated PSA    Relevant Orders    PSA Total, Diagnostic            Discussion:     Shailesh Calderon is doing well  He has not smoked in over a year, I congratulated him for these efforts  He does complain that he has gained weight since stopping smoking, but he has been successful which will help decrease his risk of urologic malignancy going forward  His PSA has decreased slightly, is currently 6 4  Given his diabetes and his congestive heart failure and other medical problems he would like to avoid a biopsy  We have agreed upon a biopsy threshold of 10  His prostate is smooth and symmetric without lesions at this time  He will see us on an every 6 month basis with a PSA prior  Should he developed systemic symptoms or abnormal weight loss or bone pain we can expedite his biopsy if needed  Assessment and plan:       Please see problem oriented charting for the assessment plan of today's urological complaints      Alia Dover MD      Chief Complaint     Chief Complaint   Patient presents with    Elevated PSA     still smokes cigars occasionally          History of Present Illness     Skippy Dies is a 73 y o  Man with a history of elevated PSA, he has opted for PSA surveillance  PSA is currently 6 4  No systemic symptoms  Some unintentional weight gain since stopping smoking  Voiding well  On review of systems today he denies dysuria, incontinence, hesitancy of urination, gross hematuria, urgency, nocturia, he feels empty after voiding and stream quality is good      The following portions of the patient's history were reviewed and updated as appropriate: allergies, current medications, past family history, past medical history, past social history, past surgical history and problem list     Detailed Urologic History     - please refer to HPI    Review of Systems     Review of Systems   Constitutional: Negative  HENT: Negative  Eyes: Negative  Respiratory: Negative  Cardiovascular: Negative  Gastrointestinal: Negative  Endocrine: Negative  Genitourinary: Negative  Musculoskeletal: Negative  Skin: Negative  Allergic/Immunologic: Negative  Neurological: Negative  Hematological: Negative  Psychiatric/Behavioral: Negative  Allergies     Allergies   Allergen Reactions    Other Hives     Soft shell crabs hives       Physical Exam     Physical Exam  Vitals reviewed  Constitutional:       General: He is not in acute distress  Appearance: Normal appearance  He is not ill-appearing, toxic-appearing or diaphoretic  HENT:      Head: Normocephalic and atraumatic  Eyes:      General: No scleral icterus  Right eye: No discharge  Left eye: No discharge  Cardiovascular:      Pulses: Normal pulses  Pulmonary:      Effort: Pulmonary effort is normal    Abdominal:      General: There is no distension  Palpations: There is no mass  Tenderness: There is no abdominal tenderness  Hernia: No hernia is present  Genitourinary:     Comments: Prostate smooth, no nodules  Musculoskeletal:         General: No swelling  Skin:     General: Skin is warm  Neurological:      General: No focal deficit present  Mental Status: He is alert and oriented to person, place, and time  Cranial Nerves: No cranial nerve deficit  Psychiatric:         Mood and Affect: Mood normal          Behavior: Behavior normal          Thought Content:  Thought content normal          Judgment: Judgment normal              Vital Signs  Vitals:    08/18/21 1105   BP: 138/76   Pulse: 81   Weight: 91 6 kg (202 lb)   Height: 6' (1 829 m)         Current Medications       Current Outpatient Medications:     amLODIPine (NORVASC) 10 mg tablet, Take 1 tablet (10 mg total) by mouth daily, Disp: 90 tablet, Rfl: 3    ascorbic acid (VITAMIN C) 500 mg tablet, Take 500 mg by mouth daily, Disp: , Rfl:     aspirin 81 mg chewable tablet, Chew 1 tablet (81 mg total) daily, Disp: 90 tablet, Rfl: 3    atorvastatin (LIPITOR) 20 mg tablet, TAKE 1 TABLET BY MOUTH  DAILY, Disp: 90 tablet, Rfl: 3    carvedilol (COREG) 12 5 mg tablet, Take 1 tablet (12 5 mg total) by mouth 2 (two) times a day with meals, Disp: 180 tablet, Rfl: 3    clopidogrel (PLAVIX) 75 mg tablet, TAKE 1 TABLET BY MOUTH  DAILY, Disp: 90 tablet, Rfl: 3    losartan (COZAAR) 100 MG tablet, TAKE 1 TABLET BY MOUTH  DAILY, Disp: 90 tablet, Rfl: 3    metFORMIN (GLUCOPHAGE) 1000 MG tablet, Take 1 tablet (1,000 mg total) by mouth 2 (two) times a day with meals, Disp: 180 tablet, Rfl: 3    nicotine polacrilex (COMMIT) 2 MG lozenge, May take 1 lozenge every 3-4 hours as needed with no more than 10 lozenges in a day, Disp: 100 each, Rfl: 8    sildenafil (VIAGRA) 25 MG tablet, Take 1 tablet (25 mg total) by mouth daily as needed for erectile dysfunction, Disp: 10 tablet, Rfl: 0    sitaGLIPtin (Januvia) 25 mg tablet, Take 1 tablet (25 mg total) by mouth daily, Disp: 90 tablet, Rfl: 3    Elastic Bandages & Supports (Carpal Tunnel Wrist Stabilizer) MISC, Use daily at bedtime, Disp: 1 each, Rfl: 1    glucose blood test strip, Use as instructed, Disp: 100 each, Rfl: 2    oxyCODONE (ROXICODONE) 5 mg immediate release tablet, 1 tablets every 6 hours as needed for severe pain   (Patient not taking: Reported on 7/13/2021), Disp: 13 tablet, Rfl: 0      Active Problems     Patient Active Problem List   Diagnosis    Essential hypertension    Mixed hyperlipidemia    Type 2 diabetes mellitus without complication, without long-term current use of insulin ([facility] )    Ventral hernia without obstruction or gangrene    Soft tissue mass    Cigarette nicotine dependence without complication    Congestive heart failure, NYHA class 1, acute, systolic (HCC)    Coronary artery disease due to lipid rich plaque    Carotid artery stenosis without cerebral infarction, bilateral    Cerebral aneurysm    S/P Left carotid endarterectomy 1/13/20    Arthritis    Familial hypercholesterolemia    BMI 27 0-27 9,adult    Chronic right shoulder pain    Elevated PSA    Status post reverse total arthroplasty of right shoulder    Erectile dysfunction of non-organic origin    Fatigue    Anemia    Left carpal tunnel syndrome         Past Medical History     Past Medical History:   Diagnosis Date    CHF (congestive heart failure) (HCC)     Diabetes mellitus (HCC)     Diverticulitis     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HCC) 11/7/2018    Heart disease     Hyperlipidemia     Hypertension     Kidney stone     Osteoarthritis     Vascular disorder          Surgical History     Past Surgical History:   Procedure Laterality Date    ABDOMINAL SURGERY      COLONOSCOPY      INGUINAL HERNIA REPAIR Left     LAPAROSCOPIC COLON RESECTION      IN COLONOSCOPY FLX DX W/COLLJ SPEC WHEN PFRMD N/A 11/3/2017    Procedure: COLONOSCOPY;  Surgeon: Vik Bender MD;  Location: [facility]; Service: Colorectal    IN RECONSTR TOTAL SHOULDER IMPLANT Right 11/17/2020    Procedure: ARTHROPLASTY SHOULDER REVERSE with biceps tendonesis;   Surgeon: Divine Calloway MD;  [facility];  Service: Orthopedics    IN THROMBOENDARTECTMY Jimenez Barton Left 1/13/2020    Procedure: ENDARTERECTOMY ARTERY CAROTID;  Surgeon: Neeru Gomes MD;  [facility];  Service: Vascular         Family History     Family History   Problem Relation Age of Onset    Colon cancer Father     Colon cancer Paternal Grandfather     No Known Problems Mother     Colon cancer Family          Social History     Social History     Social History Tobacco Use   Smoking Status Former Smoker    Types: Cigarettes, Cigars    Quit date: 2019    Years since quittin 9   Smokeless Tobacco Never Used         Pertinent Lab Values     Lab Results   Component Value Date    CREATININE 0 85 2021       Lab Results   Component Value Date    PSA 6 4 (H) 2021    PSA 6 6 (H) 2021    PSA 5 1 (H) 06/15/2020             Pertinent Imaging        No imaging for my review

## 2021-08-15 NOTE — PATIENT INSTRUCTIONS
Prostate Biopsy   WHAT YOU NEED TO KNOW:   What do I need to know about a prostate biopsy? A prostate biopsy is a procedure to remove samples of tissue from your prostate gland  The prostate is a male sex gland that makes fluid found in semen  It is located just below the bladder  After the samples are removed, they are sent to a lab and tested for cancer  How do I prepare for a prostate biopsy? · Your healthcare provider will talk to you about how to prepare for this procedure  You will need to stop taking blood thinners several days before your procedure  Examples of blood thinners include warfarin and NSAIDs  You may also need to stop taking herbal supplements before your procedure  Your provider may tell you to shower the night before your surgery  He or she may tell you to use a certain soap to help prevent a surgical site infection  Your provider may tell you not to eat or drink anything after midnight on the day of your surgery  He or she will tell you what other medicines to take or not take on the day of your procedure  · You will be given an antibiotic to help prevent a bacterial infection  You may need to give yourself an enema (liquid medicine put in your rectum) to help empty your bowel before your procedure  What will happen during a prostate biopsy? · You may need to lie on your side with your knees pulled up toward your chest  Numbing cream or gel may be put into your rectum, or numbing medicine may be injected near your prostate  You may instead be given general anesthesia to keep you asleep and free from pain during the procedure  A biopsy sample may be taken through your rectum, urethra, or perineum  The perineum is the area between your scrotum and rectum  Most of the time, biopsy samples are taken through the rectum  · If your healthcare provider takes a sample through your rectum, he will insert a small ultrasound probe into your rectum   The ultrasound shows pictures of your prostate on a monitor, and is used to help guide the biopsy needle  Your healthcare provider will push the biopsy needle through the wall of your rectum and into your prostate gland  Your healthcare provider will remove between 6 to 12 samples of tissue from different areas of your prostate gland  The samples will be sent to a lab and tested for cancer  What will happen after a prostate biopsy? You may feel soreness at the biopsy site  You may need to take antibiotics for up to 2 days after your procedure to help prevent an infection  You may have some bleeding from your rectum  You may also have blood in your urine, bowel movements, or semen  What are the risks of a prostate biopsy? You may bleed more than expected or get an infection in your urinary tract or prostate gland  The infection may spread to your blood and the rest of your body  Your bladder may not empty completely when you urinate  You may need a catheter to help empty your bladder for a short period of time  Cancer cells may be missed during your biopsy procedure  You may need another prostate biopsy to check for cancer again  CARE AGREEMENT:   You have the right to help plan your care  Learn about your health condition and how it may be treated  Discuss treatment options with your healthcare providers to decide what care you want to receive  You always have the right to refuse treatment  The above information is an  only  It is not intended as medical advice for individual conditions or treatments  Talk to your doctor, nurse or pharmacist before following any medical regimen to see if it is safe and effective for you  © Copyright [employer] 2021 Information is for End User's use only and may not be sold, redistributed or otherwise used for commercial purposes   All illustrations and images included in CareNotes® are the copyrighted property of [employer]  or [employer]

## 2021-08-18 ENCOUNTER — OFFICE VISIT (OUTPATIENT)
Dept: UROLOGY | Facility: CLINIC | Age: 71
End: 2021-08-18
Payer: COMMERCIAL

## 2021-08-18 VITALS
HEIGHT: 72 IN | BODY MASS INDEX: 27.36 KG/M2 | DIASTOLIC BLOOD PRESSURE: 76 MMHG | WEIGHT: 202 LBS | HEART RATE: 81 BPM | SYSTOLIC BLOOD PRESSURE: 138 MMHG

## 2021-08-18 DIAGNOSIS — R97.20 ELEVATED PSA: ICD-10-CM

## 2021-08-18 DIAGNOSIS — F17.210 CIGARETTE NICOTINE DEPENDENCE WITHOUT COMPLICATION: Primary | ICD-10-CM

## 2021-08-18 DIAGNOSIS — E11.9 TYPE 2 DIABETES MELLITUS WITHOUT COMPLICATION, WITHOUT LONG-TERM CURRENT USE OF INSULIN (HCC): ICD-10-CM

## 2021-08-18 DIAGNOSIS — I25.83 CORONARY ARTERY DISEASE DUE TO LIPID RICH PLAQUE: ICD-10-CM

## 2021-08-18 DIAGNOSIS — I25.10 CORONARY ARTERY DISEASE DUE TO LIPID RICH PLAQUE: ICD-10-CM

## 2021-08-18 PROCEDURE — 99213 OFFICE O/P EST LOW 20 MIN: CPT | Performed by: UROLOGY

## 2021-08-18 PROCEDURE — 1036F TOBACCO NON-USER: CPT | Performed by: UROLOGY

## 2021-08-18 PROCEDURE — 3075F SYST BP GE 130 - 139MM HG: CPT | Performed by: UROLOGY

## 2021-08-18 PROCEDURE — 3078F DIAST BP <80 MM HG: CPT | Performed by: UROLOGY

## 2021-08-18 PROCEDURE — 1160F RVW MEDS BY RX/DR IN RCRD: CPT | Performed by: UROLOGY

## 2021-08-18 PROCEDURE — 3008F BODY MASS INDEX DOCD: CPT | Performed by: UROLOGY

## 2021-08-25 ENCOUNTER — RA CDI HCC (OUTPATIENT)
Dept: OTHER | Facility: HOSPITAL | Age: 71
End: 2021-08-25

## 2021-08-30 DIAGNOSIS — E11.9 TYPE 2 DIABETES MELLITUS WITHOUT COMPLICATION, WITHOUT LONG-TERM CURRENT USE OF INSULIN (HCC): Primary | ICD-10-CM

## 2021-09-03 DIAGNOSIS — F17.210 CIGARETTE NICOTINE DEPENDENCE WITHOUT COMPLICATION: ICD-10-CM

## 2021-09-03 RX ORDER — POLYETHYLENE GLYCOL 3350 17 G
POWDER IN PACKET (EA) ORAL
Qty: 100 EACH | Refills: 0 | Status: SHIPPED | OUTPATIENT
Start: 2021-09-03 | End: 2021-10-06 | Stop reason: SDUPTHER

## 2021-09-22 DIAGNOSIS — I10 ESSENTIAL HYPERTENSION: ICD-10-CM

## 2021-09-23 PROCEDURE — 4010F ACE/ARB THERAPY RXD/TAKEN: CPT | Performed by: UROLOGY

## 2021-09-23 RX ORDER — LOSARTAN POTASSIUM 100 MG/1
TABLET ORAL
Qty: 90 TABLET | Refills: 3 | Status: SHIPPED | OUTPATIENT
Start: 2021-09-23

## 2021-09-30 ENCOUNTER — HOSPITAL ENCOUNTER (OUTPATIENT)
Dept: VASCULAR ULTRASOUND | Facility: HOSPITAL | Age: 71
Discharge: HOME | End: 2021-09-30
Payer: COMMERCIAL

## 2021-09-30 DIAGNOSIS — E11.9 TYPE 2 DIABETES MELLITUS WITHOUT COMPLICATION, WITHOUT LONG-TERM CURRENT USE OF INSULIN (HCC): ICD-10-CM

## 2021-09-30 DIAGNOSIS — Z98.890 S/P CAROTID ENDARTERECTOMY: ICD-10-CM

## 2021-09-30 DIAGNOSIS — I65.23 CAROTID ARTERY STENOSIS WITHOUT CEREBRAL INFARCTION, BILATERAL: ICD-10-CM

## 2021-09-30 PROCEDURE — 93880 EXTRACRANIAL BILAT STUDY: CPT

## 2021-09-30 PROCEDURE — 93880 EXTRACRANIAL BILAT STUDY: CPT | Performed by: SURGERY

## 2021-10-05 ENCOUNTER — APPOINTMENT (OUTPATIENT)
Dept: LAB | Facility: CLINIC | Age: 71
End: 2021-10-05
Payer: COMMERCIAL

## 2021-10-05 DIAGNOSIS — E11.9 TYPE 2 DIABETES MELLITUS WITHOUT COMPLICATION, WITHOUT LONG-TERM CURRENT USE OF INSULIN (HCC): ICD-10-CM

## 2021-10-05 LAB
ALBUMIN SERPL BCP-MCNC: 3.8 G/DL (ref 3.5–5)
ALP SERPL-CCNC: 49 U/L (ref 46–116)
ALT SERPL W P-5'-P-CCNC: 35 U/L (ref 12–78)
ANION GAP SERPL CALCULATED.3IONS-SCNC: 3 MMOL/L (ref 4–13)
AST SERPL W P-5'-P-CCNC: 15 U/L (ref 5–45)
BILIRUB SERPL-MCNC: 0.66 MG/DL (ref 0.2–1)
BUN SERPL-MCNC: 14 MG/DL (ref 5–25)
CALCIUM SERPL-MCNC: 9.1 MG/DL (ref 8.3–10.1)
CHLORIDE SERPL-SCNC: 108 MMOL/L (ref 100–108)
CO2 SERPL-SCNC: 28 MMOL/L (ref 21–32)
CREAT SERPL-MCNC: 0.73 MG/DL (ref 0.6–1.3)
CREAT UR-MCNC: 60.9 MG/DL
EST. AVERAGE GLUCOSE BLD GHB EST-MCNC: 169 MG/DL
GFR SERPL CREATININE-BSD FRML MDRD: 95 ML/MIN/1.73SQ M
GLUCOSE P FAST SERPL-MCNC: 165 MG/DL (ref 65–99)
HBA1C MFR BLD: 7.5 %
MICROALBUMIN UR-MCNC: 29 MG/L (ref 0–20)
MICROALBUMIN/CREAT 24H UR: 48 MG/G CREATININE (ref 0–30)
POTASSIUM SERPL-SCNC: 4.3 MMOL/L (ref 3.5–5.3)
PROT SERPL-MCNC: 7.5 G/DL (ref 6.4–8.2)
SODIUM SERPL-SCNC: 139 MMOL/L (ref 136–145)

## 2021-10-05 PROCEDURE — 36415 COLL VENOUS BLD VENIPUNCTURE: CPT

## 2021-10-05 PROCEDURE — 80053 COMPREHEN METABOLIC PANEL: CPT

## 2021-10-05 PROCEDURE — 3051F HG A1C>EQUAL 7.0%<8.0%: CPT | Performed by: FAMILY MEDICINE

## 2021-10-05 PROCEDURE — 83036 HEMOGLOBIN GLYCOSYLATED A1C: CPT

## 2021-10-05 PROCEDURE — 82043 UR ALBUMIN QUANTITATIVE: CPT

## 2021-10-05 PROCEDURE — 3061F NEG MICROALBUMINURIA REV: CPT | Performed by: FAMILY MEDICINE

## 2021-10-05 PROCEDURE — 82570 ASSAY OF URINE CREATININE: CPT

## 2021-10-06 ENCOUNTER — OFFICE VISIT (OUTPATIENT)
Dept: FAMILY MEDICINE CLINIC | Facility: CLINIC | Age: 71
End: 2021-10-06
Payer: COMMERCIAL

## 2021-10-06 VITALS
SYSTOLIC BLOOD PRESSURE: 128 MMHG | HEART RATE: 70 BPM | DIASTOLIC BLOOD PRESSURE: 82 MMHG | BODY MASS INDEX: 27.67 KG/M2 | WEIGHT: 204 LBS | TEMPERATURE: 67.7 F | OXYGEN SATURATION: 100 %

## 2021-10-06 DIAGNOSIS — E11.9 TYPE 2 DIABETES MELLITUS WITHOUT COMPLICATION, WITHOUT LONG-TERM CURRENT USE OF INSULIN (HCC): Primary | ICD-10-CM

## 2021-10-06 DIAGNOSIS — F17.210 CIGARETTE NICOTINE DEPENDENCE WITHOUT COMPLICATION: ICD-10-CM

## 2021-10-06 PROCEDURE — 3079F DIAST BP 80-89 MM HG: CPT | Performed by: FAMILY MEDICINE

## 2021-10-06 PROCEDURE — 99213 OFFICE O/P EST LOW 20 MIN: CPT | Performed by: FAMILY MEDICINE

## 2021-10-06 PROCEDURE — 3074F SYST BP LT 130 MM HG: CPT | Performed by: FAMILY MEDICINE

## 2021-10-06 PROCEDURE — 1160F RVW MEDS BY RX/DR IN RCRD: CPT | Performed by: FAMILY MEDICINE

## 2021-10-06 PROCEDURE — 1036F TOBACCO NON-USER: CPT | Performed by: FAMILY MEDICINE

## 2021-10-06 RX ORDER — POLYETHYLENE GLYCOL 3350 17 G
POWDER IN PACKET (EA) ORAL
Qty: 72 EACH | Refills: 2 | Status: SHIPPED | OUTPATIENT
Start: 2021-10-06 | End: 2022-01-04 | Stop reason: SDUPTHER

## 2021-10-20 DIAGNOSIS — K43.9 VENTRAL HERNIA WITHOUT OBSTRUCTION OR GANGRENE: ICD-10-CM

## 2021-10-20 DIAGNOSIS — I10 ESSENTIAL HYPERTENSION: ICD-10-CM

## 2021-10-20 DIAGNOSIS — E11.9 TYPE 2 DIABETES MELLITUS WITHOUT COMPLICATION, WITHOUT LONG-TERM CURRENT USE OF INSULIN (HCC): ICD-10-CM

## 2021-10-20 DIAGNOSIS — E78.2 HYPERLIPIDEMIA, MIXED: ICD-10-CM

## 2021-10-20 DIAGNOSIS — E78.01 FAMILIAL HYPERCHOLESTEROLEMIA: ICD-10-CM

## 2021-10-21 RX ORDER — SITAGLIPTIN 25 MG/1
TABLET, FILM COATED ORAL
Qty: 90 TABLET | Refills: 3 | Status: SHIPPED | OUTPATIENT
Start: 2021-10-21

## 2021-11-09 DIAGNOSIS — E11.9 TYPE 2 DIABETES MELLITUS WITHOUT COMPLICATION, WITHOUT LONG-TERM CURRENT USE OF INSULIN (HCC): ICD-10-CM

## 2021-12-03 ENCOUNTER — HOSPITAL ENCOUNTER (OUTPATIENT)
Dept: CT IMAGING | Facility: HOSPITAL | Age: 71
Discharge: HOME | End: 2021-12-03
Payer: COMMERCIAL

## 2021-12-03 DIAGNOSIS — I67.1 CEREBRAL ANEURYSM: ICD-10-CM

## 2021-12-03 DIAGNOSIS — I65.23 CAROTID ARTERY STENOSIS WITHOUT CEREBRAL INFARCTION, BILATERAL: ICD-10-CM

## 2021-12-03 PROCEDURE — 70496 CT ANGIOGRAPHY HEAD: CPT

## 2021-12-03 PROCEDURE — G1004 CDSM NDSC: HCPCS

## 2021-12-03 RX ADMIN — IOHEXOL 85 ML: 350 INJECTION, SOLUTION INTRAVENOUS at 09:38

## 2021-12-08 ENCOUNTER — TELEMEDICINE (OUTPATIENT)
Dept: NEUROSURGERY | Facility: CLINIC | Age: 71
End: 2021-12-08
Payer: COMMERCIAL

## 2021-12-08 DIAGNOSIS — I67.1 CEREBRAL ANEURYSM: Primary | ICD-10-CM

## 2021-12-08 PROCEDURE — 99214 OFFICE O/P EST MOD 30 MIN: CPT | Performed by: NURSE PRACTITIONER

## 2021-12-08 PROCEDURE — 1160F RVW MEDS BY RX/DR IN RCRD: CPT | Performed by: NURSE PRACTITIONER

## 2021-12-08 PROCEDURE — 1036F TOBACCO NON-USER: CPT | Performed by: NURSE PRACTITIONER

## 2022-01-04 DIAGNOSIS — F17.210 CIGARETTE NICOTINE DEPENDENCE WITHOUT COMPLICATION: ICD-10-CM

## 2022-01-04 DIAGNOSIS — N52.8 OTHER MALE ERECTILE DYSFUNCTION: ICD-10-CM

## 2022-01-04 RX ORDER — SILDENAFIL 25 MG/1
TABLET, FILM COATED ORAL
Qty: 10 TABLET | Refills: 0 | Status: SHIPPED | OUTPATIENT
Start: 2022-01-04

## 2022-01-04 RX ORDER — POLYETHYLENE GLYCOL 3350 17 G
POWDER IN PACKET (EA) ORAL
Qty: 72 EACH | Refills: 0 | Status: SHIPPED | OUTPATIENT
Start: 2022-01-04 | End: 2022-01-26 | Stop reason: SDUPTHER

## 2022-01-19 DIAGNOSIS — I50.21 CONGESTIVE HEART FAILURE, NYHA CLASS 1, ACUTE, SYSTOLIC (HCC): ICD-10-CM

## 2022-01-19 RX ORDER — CARVEDILOL 12.5 MG/1
12.5 TABLET ORAL 2 TIMES DAILY WITH MEALS
Qty: 180 TABLET | Refills: 3 | Status: SHIPPED | OUTPATIENT
Start: 2022-01-19 | End: 2022-04-26

## 2022-01-20 ENCOUNTER — RA CDI HCC (OUTPATIENT)
Dept: OTHER | Facility: HOSPITAL | Age: 72
End: 2022-01-20

## 2022-01-20 NOTE — PROGRESS NOTES
[facility]  coding opportunities             Chart Reviewed * (Number of) Inbasket suggestions sent to Provider: 1   J43 2 Centrilobular emphysema (see 12/4/20 imaging study)             Patients insurance company: [address]  (Medicare Advantage and Commercial)

## 2022-01-21 ENCOUNTER — TELEPHONE (OUTPATIENT)
Dept: VASCULAR SURGERY | Facility: CLINIC | Age: 72
End: 2022-01-21

## 2022-01-21 NOTE — TELEPHONE ENCOUNTER
Attempted to contact patient to schedule appointment(s) listed below  Requested patient call [phone] option 3 to schedule appointment(s)  Patient's appointment(s) are due on or after 03/2022      Dopplers  [] Abdominal Aorta Iliac (AOIL)  [x] Carotid (CV)   [] Celiac and/or Mesenteric  [] Endovascular Aortic Repair (EVAR)   [] Hemodialysis Access (HD)   [] Lower Limb Arterial (CHERIE)  [] Lower Limb Venous Duplex with Reflux (LEVDR)  [] Renal Artery  [] Upper Limb (UEA)    Advanced Imaging   [] CTA abdomen    [] CTA abdomen & pelvis    [] CT abdomen with/ without contrast  [] CT abdomen with contrast  [] CT abdomen without contrast    [] CT abdomen & pelvis with/ without contrast  [] CT abdomen & pelvis with contrast  [] CT abdomen & pelvis without contrast    Office Visit  After 4/20/2022  [] New patient, patient last seen over 3 years ago  [] Risk factor modification (RFM)   [x] Follow up

## 2022-01-24 ENCOUNTER — APPOINTMENT (OUTPATIENT)
Dept: LAB | Facility: CLINIC | Age: 72
End: 2022-01-24
Payer: COMMERCIAL

## 2022-01-24 DIAGNOSIS — R97.20 ELEVATED PSA: ICD-10-CM

## 2022-01-24 DIAGNOSIS — E78.2 MIXED HYPERLIPIDEMIA: ICD-10-CM

## 2022-01-24 DIAGNOSIS — E11.9 TYPE 2 DIABETES MELLITUS WITHOUT COMPLICATION, WITHOUT LONG-TERM CURRENT USE OF INSULIN (HCC): Primary | ICD-10-CM

## 2022-01-24 DIAGNOSIS — E11.9 TYPE 2 DIABETES MELLITUS WITHOUT COMPLICATION, WITHOUT LONG-TERM CURRENT USE OF INSULIN (HCC): ICD-10-CM

## 2022-01-24 LAB
ALBUMIN SERPL BCP-MCNC: 4.1 G/DL (ref 3.5–5)
ALP SERPL-CCNC: 47 U/L (ref 46–116)
ALT SERPL W P-5'-P-CCNC: 36 U/L (ref 12–78)
ANION GAP SERPL CALCULATED.3IONS-SCNC: 4 MMOL/L (ref 4–13)
AST SERPL W P-5'-P-CCNC: 16 U/L (ref 5–45)
BILIRUB SERPL-MCNC: 1.38 MG/DL (ref 0.2–1)
BUN SERPL-MCNC: 16 MG/DL (ref 5–25)
CALCIUM SERPL-MCNC: 9.5 MG/DL (ref 8.3–10.1)
CHLORIDE SERPL-SCNC: 105 MMOL/L (ref 100–108)
CHOLEST SERPL-MCNC: 143 MG/DL
CO2 SERPL-SCNC: 29 MMOL/L (ref 21–32)
CREAT SERPL-MCNC: 0.95 MG/DL (ref 0.6–1.3)
EST. AVERAGE GLUCOSE BLD GHB EST-MCNC: 183 MG/DL
GFR SERPL CREATININE-BSD FRML MDRD: 81 ML/MIN/1.73SQ M
GLUCOSE P FAST SERPL-MCNC: 148 MG/DL (ref 65–99)
HBA1C MFR BLD: 8 %
HDLC SERPL-MCNC: 42 MG/DL
LDLC SERPL CALC-MCNC: 72 MG/DL (ref 0–100)
NONHDLC SERPL-MCNC: 101 MG/DL
POTASSIUM SERPL-SCNC: 4.1 MMOL/L (ref 3.5–5.3)
PROT SERPL-MCNC: 8.1 G/DL (ref 6.4–8.2)
PSA SERPL-MCNC: 6.3 NG/ML (ref 0–4)
SODIUM SERPL-SCNC: 138 MMOL/L (ref 136–145)
TRIGL SERPL-MCNC: 144 MG/DL

## 2022-01-24 PROCEDURE — 36415 COLL VENOUS BLD VENIPUNCTURE: CPT

## 2022-01-24 PROCEDURE — 3052F HG A1C>EQUAL 8.0%<EQUAL 9.0%: CPT | Performed by: INTERNAL MEDICINE

## 2022-01-24 PROCEDURE — 84153 ASSAY OF PSA TOTAL: CPT

## 2022-01-24 PROCEDURE — 83036 HEMOGLOBIN GLYCOSYLATED A1C: CPT

## 2022-01-24 PROCEDURE — 80053 COMPREHEN METABOLIC PANEL: CPT

## 2022-01-24 PROCEDURE — 80061 LIPID PANEL: CPT

## 2022-01-26 ENCOUNTER — OFFICE VISIT (OUTPATIENT)
Dept: FAMILY MEDICINE CLINIC | Facility: CLINIC | Age: 72
End: 2022-01-26
Payer: COMMERCIAL

## 2022-01-26 VITALS
DIASTOLIC BLOOD PRESSURE: 80 MMHG | OXYGEN SATURATION: 98 % | WEIGHT: 202 LBS | HEART RATE: 75 BPM | TEMPERATURE: 97.1 F | HEIGHT: 72 IN | BODY MASS INDEX: 27.36 KG/M2 | SYSTOLIC BLOOD PRESSURE: 136 MMHG

## 2022-01-26 DIAGNOSIS — Z23 NEED FOR SHINGLES VACCINE: ICD-10-CM

## 2022-01-26 DIAGNOSIS — E11.9 TYPE 2 DIABETES MELLITUS WITHOUT COMPLICATION, WITHOUT LONG-TERM CURRENT USE OF INSULIN (HCC): Primary | ICD-10-CM

## 2022-01-26 DIAGNOSIS — Z00.00 MEDICARE ANNUAL WELLNESS VISIT, SUBSEQUENT: ICD-10-CM

## 2022-01-26 DIAGNOSIS — Z13.6 SCREENING FOR AAA (ABDOMINAL AORTIC ANEURYSM): ICD-10-CM

## 2022-01-26 DIAGNOSIS — F17.210 CIGARETTE NICOTINE DEPENDENCE WITHOUT COMPLICATION: ICD-10-CM

## 2022-01-26 PROCEDURE — 1125F AMNT PAIN NOTED PAIN PRSNT: CPT | Performed by: FAMILY MEDICINE

## 2022-01-26 PROCEDURE — 3288F FALL RISK ASSESSMENT DOCD: CPT | Performed by: FAMILY MEDICINE

## 2022-01-26 PROCEDURE — 1101F PT FALLS ASSESS-DOCD LE1/YR: CPT | Performed by: FAMILY MEDICINE

## 2022-01-26 PROCEDURE — G0439 PPPS, SUBSEQ VISIT: HCPCS | Performed by: FAMILY MEDICINE

## 2022-01-26 PROCEDURE — 99213 OFFICE O/P EST LOW 20 MIN: CPT | Performed by: FAMILY MEDICINE

## 2022-01-26 PROCEDURE — 1170F FXNL STATUS ASSESSED: CPT | Performed by: FAMILY MEDICINE

## 2022-01-26 RX ORDER — POLYETHYLENE GLYCOL 3350 17 G
POWDER IN PACKET (EA) ORAL
Qty: 72 EACH | Refills: 3 | Status: SHIPPED | OUTPATIENT
Start: 2022-01-26 | End: 2022-06-03 | Stop reason: SDUPTHER

## 2022-01-26 NOTE — PATIENT INSTRUCTIONS
Medicare Preventive Visit Patient Instructions  Thank you for completing your Welcome to Medicare Visit or Medicare Annual Wellness Visit today  Your next wellness visit will be due in one year (1/27/2023)  The screening/preventive services that you may require over the next 5-10 years are detailed below  Some tests may not apply to you based off risk factors and/or age  Screening tests ordered at today's visit but not completed yet may show as past due  Also, please note that scanned in results may not display below  Preventive Screenings:  Service Recommendations Previous Testing/Comments   Colorectal Cancer Screening  · Colonoscopy    · Fecal Occult Blood Test (FOBT)/Fecal Immunochemical Test (FIT)  · Fecal DNA/Cologuard Test  · Flexible Sigmoidoscopy Age: 54-65 years old   Colonoscopy: every 10 years (May be performed more frequently if at higher risk)  OR  FOBT/FIT: every 1 year  OR  Cologuard: every 3 years  OR  Sigmoidoscopy: every 5 years  Screening may be recommended earlier than age 48 if at higher risk for colorectal cancer  Also, an individualized decision between you and your healthcare provider will decide whether screening between the ages of 74-80 would be appropriate   Colonoscopy: 11/03/2017  FOBT/FIT: 03/17/2021  Cologuard: Not on file  Sigmoidoscopy: Not on file    Screening Current     Prostate Cancer Screening Individualized decision between patient and health care provider in men between ages of 53-78   Medicare will cover every 12 months beginning on the day after your 50th birthday PSA: 6 3 ng/mL     Screening Current     Hepatitis C Screening Once for adults born between 1945 and 1965  More frequently in patients at high risk for Hepatitis C Hep C Antibody: 06/06/2019    Screening Current   Diabetes Screening 1-2 times per year if you're at risk for diabetes or have pre-diabetes Fasting glucose: 148 mg/dL   A1C: 8 0 %    Screening Not Indicated  History Diabetes   Cholesterol Screening Once every 5 years if you don't have a lipid disorder  May order more often based on risk factors  Lipid panel: 01/24/2022    Screening Not Indicated  History Lipid Disorder      Other Preventive Screenings Covered by Medicare:  1  Abdominal Aortic Aneurysm (AAA) Screening: covered once if your at risk  You're considered to be at risk if you have a family history of AAA or a male between the age of 73-68 who smoking at least 100 cigarettes in your lifetime  2  Lung Cancer Screening: covers low dose CT scan once per year if you meet all of the following conditions: (1) Age 50-69; (2) No signs or symptoms of lung cancer; (3) Current smoker or have quit smoking within the last 15 years; (4) You have a tobacco smoking history of at least 30 pack years (packs per day x number of years you smoked); (5) You get a written order from a healthcare provider  3  Glaucoma Screening: covered annually if you're considered high risk: (1) You have diabetes OR (2) Family history of glaucoma OR (3)  aged 48 and older OR (3)  American aged 72 and older  3  Osteoporosis Screening: covered every 2 years if you meet one of the following conditions: (1) Have a vertebral abnormality; (2) On glucocorticoid therapy for more than 3 months; (3) Have primary hyperparathyroidism; (4) On osteoporosis medications and need to assess response to drug therapy  5  HIV Screening: covered annually if you're between the age of 12-76  Also covered annually if you are younger than 13 and older than 72 with risk factors for HIV infection  For pregnant patients, it is covered up to 3 times per pregnancy      Immunizations:  Immunization Recommendations   Influenza Vaccine Annual influenza vaccination during flu season is recommended for all persons aged >= 6 months who do not have contraindications   Pneumococcal Vaccine (Prevnar and Pneumovax)  * Prevnar = PCV13  * Pneumovax = PPSV23 Adults 25-60 years old: 1-3 doses may be recommended based on certain risk factors  Adults 72 years old: Prevnar (PCV13) vaccine recommended followed by Pneumovax (PPSV23) vaccine  If already received PPSV23 since turning 65, then PCV13 recommended at least one year after PPSV23 dose  Hepatitis B Vaccine 3 dose series if at intermediate or high risk (ex: diabetes, end stage renal disease, liver disease)   Tetanus (Td) Vaccine - COST NOT COVERED BY MEDICARE PART B Following completion of primary series, a booster dose should be given every 10 years to maintain immunity against tetanus  Td may also be given as tetanus wound prophylaxis  Tdap Vaccine - COST NOT COVERED BY MEDICARE PART B Recommended at least once for all adults  For pregnant patients, recommended with each pregnancy  Shingles Vaccine (Shingrix) - COST NOT COVERED BY MEDICARE PART B  2 shot series recommended in those aged 48 and above     Health Maintenance Due:      Topic Date Due    Colorectal Cancer Screening  11/03/2022    Hepatitis C Screening  Completed     Immunizations Due:      Topic Date Due    Influenza Vaccine (1) Never done     Advance Directives   What are advance directives? Advance directives are legal documents that state your wishes and plans for medical care  These plans are made ahead of time in case you lose your ability to make decisions for yourself  Advance directives can apply to any medical decision, such as the treatments you want, and if you want to donate organs  What are the types of advance directives? There are many types of advance directives, and each state has rules about how to use them  You may choose a combination of any of the following:  · Living will: This is a written record of the treatment you want  You can also choose which treatments you do not want, which to limit, and which to stop at a certain time  This includes surgery, medicine, IV fluid, and tube feedings  · Durable power of  for healthcare [facility]):   This is a written record that states who you want to make healthcare choices for you when you are unable to make them for yourself  This person, called a proxy, is usually a family member or a friend  You may choose more than 1 proxy  · Do not resuscitate (DNR) order:  A DNR order is used in case your heart stops beating or you stop breathing  It is a request not to have certain forms of treatment, such as CPR  A DNR order may be included in other types of advance directives  · Medical directive: This covers the care that you want if you are in a coma, near death, or unable to make decisions for yourself  You can list the treatments you want for each condition  Treatment may include pain medicine, surgery, blood transfusions, dialysis, IV or tube feedings, and a ventilator (breathing machine)  · Values history: This document has questions about your views, beliefs, and how you feel and think about life  This information can help others choose the care that you would choose  Why are advance directives important? An advance directive helps you control your care  Although spoken wishes may be used, it is better to have your wishes written down  Spoken wishes can be misunderstood, or not followed  Treatments may be given even if you do not want them  An advance directive may make it easier for your family to make difficult choices about your care  Weight Management   Why it is important to manage your weight:  Being overweight increases your risk of health conditions such as heart disease, high blood pressure, type 2 diabetes, and certain types of cancer  It can also increase your risk for osteoarthritis, sleep apnea, and other respiratory problems  Aim for a slow, steady weight loss  Even a small amount of weight loss can lower your risk of health problems  How to lose weight safely:  A safe and healthy way to lose weight is to eat fewer calories and get regular exercise   You can lose up about 1 pound a week by decreasing the number of calories you eat by 500 calories each day  Healthy meal plan for weight management:  A healthy meal plan includes a variety of foods, contains fewer calories, and helps you stay healthy  A healthy meal plan includes the following:  · Eat whole-grain foods more often  A healthy meal plan should contain fiber  Fiber is the part of grains, fruits, and vegetables that is not broken down by your body  Whole-grain foods are healthy and provide extra fiber in your diet  Some examples of whole-grain foods are whole-wheat breads and pastas, oatmeal, brown rice, and bulgur  · Eat a variety of vegetables every day  Include dark, leafy greens such as spinach, kale, kriss greens, and mustard greens  Eat yellow and orange vegetables such as carrots, sweet potatoes, and winter squash  · Eat a variety of fruits every day  Choose fresh or canned fruit (canned in its own juice or light syrup) instead of juice  Fruit juice has very little or no fiber  · Eat low-fat dairy foods  Drink fat-free (skim) milk or 1% milk  Eat fat-free yogurt and low-fat cottage cheese  Try low-fat cheeses such as mozzarella and other reduced-fat cheeses  · Choose meat and other protein foods that are low in fat  Choose beans or other legumes such as split peas or lentils  Choose fish, skinless poultry (chicken or turkey), or lean cuts of red meat (beef or pork)  Before you cook meat or poultry, cut off any visible fat  · Use less fat and oil  Try baking foods instead of frying them  Add less fat, such as margarine, sour cream, regular salad dressing and mayonnaise to foods  Eat fewer high-fat foods  Some examples of high-fat foods include french fries, doughnuts, ice cream, and cakes  · Eat fewer sweets  Limit foods and drinks that are high in sugar  This includes candy, cookies, regular soda, and sweetened drinks  Exercise:  Exercise at least 30 minutes per day on most days of the week   Some examples of exercise include walking, biking, dancing, and swimming  You can also fit in more physical activity by taking the stairs instead of the elevator or parking farther away from stores  Ask your healthcare provider about the best exercise plan for you  Narcotic (Opioid) Safety    Use narcotics safely:  · Take prescribed narcotics exactly as directed  · Do not give narcotics to others or take narcotics that belong to someone else  · Do not mix narcotics without medicines or alcohol  · Do not drive or operate heavy machinery after you take the narcotic  · Monitor for side effects and notify your healthcare provider if you experienced side effects such as nausea, sleepiness, itching, or trouble thinking clearly  Manage constipation:    Constipation is the most common side effect of narcotic medicine  Constipation is when you have hard, dry bowel movements, or you go longer than usual between bowel movements  Tell your healthcare provider about all changes in your bowel movements while you are taking narcotics  He or she may recommend laxative medicine to help you have a bowel movement  He or she may also change the kind of narcotic you are taking, or change when you take it  The following are more ways you can prevent or relieve constipation:    · Drink liquids as directed  You may need to drink extra liquids to help soften and move your bowels  Ask how much liquid to drink each day and which liquids are best for you  · Eat high-fiber foods  This may help decrease constipation by adding bulk to your bowel movements  High-fiber foods include fruits, vegetables, whole-grain breads and cereals, and beans  Your healthcare provider or dietitian can help you create a high-fiber meal plan  Your provider may also recommend a fiber supplement if you cannot get enough fiber from food  · Exercise regularly  Regular physical activity can help stimulate your intestines  Walking is a good exercise to prevent or relieve constipation  Ask which exercises are best for you  · Schedule a time each day to have a bowel movement  This may help train your body to have regular bowel movements  Bend forward while you are on the toilet to help move the bowel movement out  Sit on the toilet for at least 10 minutes, even if you do not have a bowel movement  Store narcotics safely:   · Store narcotics where others cannot easily get them  Keep them in a locked cabinet or secure area  Do not  keep them in a purse or other bag you carry with you  A person may be looking for something else and find the narcotics  · Make sure narcotics are stored out of the reach of children  A child can easily overdose on narcotics  Narcotics may look like candy to a small child  The best way to dispose of narcotics: The laws vary by country and area  In the [place], the best way is to return the narcotics through a take-back program  This program is offered by the [employer] ([employer])  The following are options for using the program:  · Take the narcotics to a JOE collection site  The site is often a law enforcement center  Call your local law enforcement center for scheduled take-back days in your area  You will be given information on where to go if the collection site is in a different location  · Take the narcotics to an approved pharmacy or hospital   A pharmacy or hospital may be set up as a collection site  You will need to ask if it is a JOE collection site if you were not directed there  A pharmacy or doctor's office may not be able to take back narcotics unless it is a JOE site  · Use a mail-back system  This means you are given containers to put the narcotics into  You will then mail them in the containers  · Use a take-back drop box  This is a place to leave the narcotics at any time  People and animals will not be able to get into the box   Your local law enforcement agency can tell you where to find a drop box in your area     Other ways to manage pain:   · Ask your healthcare provider about non-narcotic medicines to control pain  Nonprescription medicines include NSAIDs (such as ibuprofen) and acetaminophen  Prescription medicines include muscle relaxers, antidepressants, and steroids  · Pain may be managed without any medicines  Some ways to relieve pain include massage, aromatherapy, or meditation  Physical or occupational therapy may also help  For more information:   · Drug Enforcement Administration  [address] [facility] , [address]  Phone: 8- 576 - [phone]  Web Address: [facility]/drug_disposal/    · [address] and Drug Administration  [facility] , [address] [facility]  Phone: 1- 223 - [phone]  Web Address: http://[employer]/     © Copyright [employer] [facility] 2018 Information is for End User's use only and may not be sold, redistributed or otherwise used for commercial purposes   All illustrations and images included in CareNotes® are the copyrighted property of A D A M , Inc  or [address] [place] [address]

## 2022-01-26 NOTE — PROGRESS NOTES
Assessment and Plan:     Problem List Items Addressed This Visit     None           Preventive health issues were discussed with patient, and age appropriate screening tests were ordered as noted in patient's After Visit Summary  Personalized health advice and appropriate referrals for health education or preventive services given if needed, as noted in patient's After Visit Summary       History of Present Illness:     Patient presents for Medicare Annual Wellness visit    Patient Care Team:  Chito Han MD as PCP - General (Family Medicine)  Kendrick Ahumada, MD as Endoscopist     Problem List:     Patient Active Problem List   Diagnosis    Essential hypertension    Mixed hyperlipidemia    Type 2 diabetes mellitus without complication, without long-term current use of insulin ([address] )    Ventral hernia without obstruction or gangrene    Soft tissue mass    Cigarette nicotine dependence without complication    Congestive heart failure, NYHA class 1, acute, systolic ([address] )    Coronary artery disease due to lipid rich plaque    Carotid artery stenosis without cerebral infarction, bilateral    Cerebral aneurysm    S/P Left carotid endarterectomy 1/13/20    Arthritis    Familial hypercholesterolemia    BMI 27 0-27 9,adult    Chronic right shoulder pain    Elevated PSA    Status post reverse total arthroplasty of right shoulder    Erectile dysfunction of non-organic origin    Fatigue    Anemia    Left carpal tunnel syndrome      Past Medical and Surgical History:     Past Medical History:   Diagnosis Date    CHF (congestive heart failure) ([address] )     Diabetes mellitus ([address] )     Diverticulitis     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[address] ) 11/7/2018    Heart disease     Hyperlipidemia     Hypertension     Kidney stone     Osteoarthritis     Vascular disorder      Past Surgical History:   Procedure Laterality Date    ABDOMINAL SURGERY      COLONOSCOPY      INGUINAL HERNIA REPAIR Left     LAPAROSCOPIC COLON RESECTION      IN COLONOSCOPY FLX DX W/COLLJ SPEC WHEN PFRMD N/A 11/3/2017    Procedure: COLONOSCOPY;  Surgeon: Leigha Tracey MD;  [facility]; Service: Colorectal    IN RECONSTR TOTAL SHOULDER IMPLANT Right 2020    Procedure: ARTHROPLASTY SHOULDER REVERSE with biceps tendonesis; Surgeon: Sima Granado MD;  [facility];  Service: Orthopedics    IN THROMBOENDARTECTMY [place] Left 2020    Procedure: ENDARTERECTOMY ARTERY CAROTID;  Surgeon: Vasile Shaw MD;  [facility];  Service: Vascular      Family History:     Family History   Problem Relation Age of Onset    Colon cancer Father     Colon cancer Paternal Grandfather     No Known Problems Mother     Colon cancer Family       Social History:     Social History     Socioeconomic History    Marital status: /Civil Union     Spouse name: Not on file    Number of children: Not on file    Years of education: Not on file    Highest education level: Not on file   Occupational History    Not on file   Tobacco Use    Smoking status: Former Smoker     Types: Cigarettes, Cigars     Quit date: 2019     Years since quittin 3    Smokeless tobacco: Never Used   Vaping Use    Vaping Use: Never used   Substance and Sexual Activity    Alcohol use:  Yes     Alcohol/week: 2 0 standard drinks     Types: 2 Cans of beer per week    Drug use: No    Sexual activity: Not Currently   Other Topics Concern    Not on file   Social History Narrative    Caffeine use    Uses safety equipment: seatbelts     Social Determinants of Health     Financial Resource Strain: Not on file   Food Insecurity: Not on file   Transportation Needs: Not on file   Physical Activity: Not on file   Stress: Not on file   Social Connections: Not on file   Intimate Partner Violence: Not on file   Housing Stability: Not on file      Medications and Allergies:     Current Outpatient Medications   Medication Sig Dispense Refill    amLODIPine (NORVASC) 10 mg tablet Take 1 tablet (10 mg total) by mouth daily 90 tablet 3    ascorbic acid (VITAMIN C) 500 mg tablet Take 500 mg by mouth daily      aspirin 81 mg chewable tablet Chew 1 tablet (81 mg total) daily 90 tablet 3    atorvastatin (LIPITOR) 20 mg tablet TAKE 1 TABLET BY MOUTH  DAILY 90 tablet 3    carvedilol (COREG) 12 5 mg tablet Take 1 tablet (12 5 mg total) by mouth 2 (two) times a day with meals 180 tablet 3    clopidogrel (PLAVIX) 75 mg tablet TAKE 1 TABLET BY MOUTH  DAILY 90 tablet 3    glucose blood test strip Use as instructed 100 each 2    Januvia 25 MG tablet TAKE 1 TABLET BY MOUTH  DAILY 90 tablet 3    losartan (COZAAR) 100 MG tablet TAKE 1 TABLET BY MOUTH  DAILY 90 tablet 3    metFORMIN (GLUCOPHAGE) 1000 MG tablet TAKE 1 TABLET BY MOUTH  TWICE DAILY WITH MEALS 180 tablet 3    nicotine polacrilex (COMMIT) 2 MG lozenge May take 1 lozenge every 3-4 hours as needed with no more than 10 lozenges in a day 72 each 0    sildenafil (VIAGRA) 25 MG tablet TAKE 1 TABLET BY MOUTH  DAILY AS NEEDED FOR  ERECTILE DYSFUNCTION 10 tablet 0    Elastic Bandages & Supports (Carpal Tunnel Wrist Stabilizer) MISC Use daily at bedtime 1 each 1    oxyCODONE (ROXICODONE) 5 mg immediate release tablet 1 tablets every 6 hours as needed for severe pain  (Patient not taking: Reported on 7/13/2021) 13 tablet 0     No current facility-administered medications for this visit       Allergies   Allergen Reactions    Other Hives     Soft shell crabs hives      Immunizations:     Immunization History   Administered Date(s) Administered    COVID-19 MODERNA VACC 0 5 ML IM 03/23/2021, 04/20/2021, 11/04/2021    Tdap 08/29/2017      Health Maintenance:         Topic Date Due    Colorectal Cancer Screening  11/03/2022    Hepatitis C Screening  Completed         Topic Date Due    Influenza Vaccine (1) Never done      Medicare Health Risk Assessment:     Pulse 75   Temp (!) 97 1 °F (36 2 °C)   Ht 6' (1 829 m)   Wt 91 6 kg (202 lb)   SpO2 98%   BMI 27 40 kg/m²          Health Risk Assessment:   Patient rates overall health as good  Patient feels that their physical health rating is same  Patient is satisfied with their life  Eyesight was rated as same  Hearing was rated as same  Patient feels that their emotional and mental health rating is same  Patients states they are never, rarely angry  Patient states they are often unusually tired/fatigued  Pain experienced in the last 7 days has been none  Patient states that he has experienced no weight loss or gain in last 6 months  Fall Risk Screening: In the past year, patient has experienced: no history of falling in past year      Home Safety:  Patient does not have trouble with stairs inside or outside of their home  Patient has working smoke alarms and has working carbon monoxide detector  Home safety hazards include: none  Nutrition:   Current diet is Regular  Medications:   Patient is not currently taking any over-the-counter supplements  Patient is able to manage medications  Activities of Daily Living (ADLs)/Instrumental Activities of Daily Living (IADLs):   Walk and transfer into and out of bed and chair?: Yes  Dress and groom yourself?: Yes    Bathe or shower yourself?: Yes    Feed yourself?  Yes  Do your laundry/housekeeping?: Yes  Manage your money, pay your bills and track your expenses?: Yes  Make your own meals?: Yes    Do your own shopping?: Yes    Previous Hospitalizations:   Any hospitalizations or ED visits within the last 12 months?: No      Advance Care Planning:     Five wishes given: No      PREVENTIVE SCREENINGS      Cardiovascular Screening:    General: History Lipid Disorder and Screening Current      Diabetes Screening:     General: History Diabetes and Screening Current      Colorectal Cancer Screening:     General: Screening Current      Prostate Cancer Screening:    General: Screening Current Osteoporosis Screening:    General: Screening Not Indicated      Abdominal Aortic Aneurysm (AAA) Screening:    Risk factors include: age between 73-67 yo and tobacco use        Lung Cancer Screening:     General: Screening Not Indicated      Hepatitis C Screening:    General: Screening Current    Hep C Screening Accepted: No     Screening, Brief Intervention, and Referral to Treatment (SBIRT)    Screening  Typical number of drinks in a day: 1  Typical number of drinks in a week: 4  Interpretation: Low risk drinking behavior      Single Item Drug Screening:  How often have you used an illegal drug (including marijuana) or a prescription medication for non-medical reasons in the past year? never    Single Item Drug Screen Score: 0  Interpretation: Negative screen for possible drug use disorder    Review of Current Opioid Use    Opioid Risk Tool (ORT) Interpretation: Complete Opioid Risk Tool (ORT)      Brandon Balderas MD

## 2022-01-26 NOTE — PROGRESS NOTES
Assessment/Plan:    No problem-specific Assessment & Plan notes found for this encounter  Diagnoses and all orders for this visit:    Type 2 diabetes mellitus without complication, without long-term current use of insulin ([facility] )  [id number]  Not controlled  After discussing with patient he would like to do a trial of 3 months with diet modifications    Need for shingles vaccine  -     Zoster Vac Recomb Adjuvanted 50 MCG/0 5ML SUSR; Inject 1 Dose into a muscle once for 1 dose    Cigarette nicotine dependence without complication  -     CT lung screening program; Future  -     nicotine polacrilex (COMMIT) 2 MG lozenge; May take 1 lozenge every 3-4 hours as needed with no more than 10 lozenges in a day    Screening for AAA (abdominal aortic aneurysm)  -      abdominal aorta screening aaa; Future    Medicare annual wellness visit, subsequent  See Medicare wellness note  Follow up in 3 months        Subjective:      Patient ID: Brendan Rosales is a 73 y o  male  Patient is here to follow up for Type 2 DM  He denies any symptoms  He takes metformin and Januvia daily  Recent HgbA1C was 8 0  He denies any changes in his diet  He is a former smoker trying to quit smoking  The following portions of the patient's history were reviewed and updated as appropriate:   He  has a past medical history of CHF (congestive heart failure) ([facility] ), Diabetes mellitus ([facility] ), Diverticulitis, 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[facility] ) (11/7/2018), Heart disease, Hyperlipidemia, Hypertension, Kidney stone, Osteoarthritis, and Vascular disorder    He   Patient Active Problem List    Diagnosis Date Noted    Medicare annual wellness visit, subsequent 01/26/2022    Screening for AAA (abdominal aortic aneurysm) 01/26/2022    Left carpal tunnel syndrome 06/23/2021    Fatigue 03/01/2021    Anemia 03/01/2021    Status post reverse total arthroplasty of right shoulder 11/18/2020    Elevated PSA 08/12/2020    Arthritis 06/23/2020    Familial hypercholesterolemia 06/23/2020    BMI 27 0-27 9,adult 06/23/2020    Chronic right shoulder pain 06/23/2020    S/P Left carotid endarterectomy 1/13/20 01/21/2020    Cerebral aneurysm 11/22/2019    Carotid artery stenosis without cerebral infarction, bilateral 11/05/2019    Coronary artery disease due to lipid rich plaque 10/15/2019    Congestive heart failure, NYHA class 1, acute, systolic (HCC) 44/62/8601    Cigarette nicotine dependence without complication 93/64/0017    Soft tissue mass 08/22/2018    Essential hypertension 05/07/2018    Mixed hyperlipidemia 05/07/2018    Type 2 diabetes mellitus without complication, without long-term current use of insulin ([facility] [address] ) 05/07/2018    Ventral hernia without obstruction or gangrene 05/07/2018    Erectile dysfunction of non-organic origin 07/16/2013     He  has a past surgical history that includes Abdominal surgery; Laparoscopic colon resection; Colonoscopy; pr colonoscopy flx dx w/collj spec when pfrmd (N/A, 11/3/2017); Inguinal hernia repair (Left); pr thromboendartectmy neck,neck incis (Left, 1/13/2020); and pr reconstr total shoulder implant (Right, 11/17/2020)  His family history includes Colon cancer in his family, father, and paternal grandfather; No Known Problems in his mother  He  reports that he quit smoking about 2 years ago  His smoking use included cigarettes and cigars  He has never used smokeless tobacco  He reports current alcohol use of about 2 0 standard drinks of alcohol per week  He reports that he does not use drugs    Current Outpatient Medications   Medication Sig Dispense Refill    amLODIPine (NORVASC) 10 mg tablet Take 1 tablet (10 mg total) by mouth daily 90 tablet 3    ascorbic acid (VITAMIN C) 500 mg tablet Take 500 mg by mouth daily      aspirin 81 mg chewable tablet Chew 1 tablet (81 mg total) daily 90 tablet 3    atorvastatin (LIPITOR) 20 mg tablet TAKE 1 TABLET BY MOUTH  DAILY 90 tablet 3    carvedilol (COREG) 12 5 mg tablet Take 1 tablet (12 5 mg total) by mouth 2 (two) times a day with meals 180 tablet 3    clopidogrel (PLAVIX) 75 mg tablet TAKE 1 TABLET BY MOUTH  DAILY 90 tablet 3    glucose blood test strip Use as instructed 100 each 2    Januvia 25 MG tablet TAKE 1 TABLET BY MOUTH  DAILY 90 tablet 3    losartan (COZAAR) 100 MG tablet TAKE 1 TABLET BY MOUTH  DAILY 90 tablet 3    metFORMIN (GLUCOPHAGE) 1000 MG tablet TAKE 1 TABLET BY MOUTH  TWICE DAILY WITH MEALS 180 tablet 3    nicotine polacrilex (COMMIT) 2 MG lozenge May take 1 lozenge every 3-4 hours as needed with no more than 10 lozenges in a day 72 each 3    sildenafil (VIAGRA) 25 MG tablet TAKE 1 TABLET BY MOUTH  DAILY AS NEEDED FOR  ERECTILE DYSFUNCTION 10 tablet 0    Elastic Bandages & Supports (Carpal Tunnel Wrist Stabilizer) MISC Use daily at bedtime 1 each 1    oxyCODONE (ROXICODONE) 5 mg immediate release tablet 1 tablets every 6 hours as needed for severe pain  (Patient not taking: Reported on 7/13/2021) 13 tablet 0    Zoster Vac Recomb Adjuvanted 50 MCG/0 5ML SUSR Inject 1 Dose into a muscle once for 1 dose 1 each 0     No current facility-administered medications for this visit       Current Outpatient Medications on File Prior to Visit   Medication Sig    amLODIPine (NORVASC) 10 mg tablet Take 1 tablet (10 mg total) by mouth daily    ascorbic acid (VITAMIN C) 500 mg tablet Take 500 mg by mouth daily    aspirin 81 mg chewable tablet Chew 1 tablet (81 mg total) daily    atorvastatin (LIPITOR) 20 mg tablet TAKE 1 TABLET BY MOUTH  DAILY    carvedilol (COREG) 12 5 mg tablet Take 1 tablet (12 5 mg total) by mouth 2 (two) times a day with meals    clopidogrel (PLAVIX) 75 mg tablet TAKE 1 TABLET BY MOUTH  DAILY    glucose blood test strip Use as instructed    Januvia 25 MG tablet TAKE 1 TABLET BY MOUTH  DAILY    losartan (COZAAR) 100 MG tablet TAKE 1 TABLET BY MOUTH  DAILY    metFORMIN (GLUCOPHAGE) 1000 MG tablet TAKE 1 TABLET BY MOUTH TWICE DAILY WITH MEALS    sildenafil (VIAGRA) 25 MG tablet TAKE 1 TABLET BY MOUTH  DAILY AS NEEDED FOR  ERECTILE DYSFUNCTION    [DISCONTINUED] nicotine polacrilex (COMMIT) 2 MG lozenge May take 1 lozenge every 3-4 hours as needed with no more than 10 lozenges in a day    Elastic Bandages & Supports (Carpal Tunnel Wrist Stabilizer) MISC Use daily at bedtime    oxyCODONE (ROXICODONE) 5 mg immediate release tablet 1 tablets every 6 hours as needed for severe pain  (Patient not taking: Reported on 7/13/2021)     No current facility-administered medications on file prior to visit  He is allergic to other       Review of Systems   Constitutional: Negative for activity change, appetite change, fatigue and fever  HENT: Negative for congestion and ear discharge  Respiratory: Negative for cough and shortness of breath  Cardiovascular: Negative for chest pain and palpitations  Gastrointestinal: Negative for diarrhea and nausea  Musculoskeletal: Negative for arthralgias and back pain  Skin: Negative for color change and rash  Neurological: Negative for dizziness and headaches  Psychiatric/Behavioral: Negative for agitation and behavioral problems  Objective:      /80   Pulse 75   Temp (!) 97 1 °F (36 2 °C)   Ht 6' (1 829 m)   Wt 91 6 kg (202 lb)   SpO2 98%   BMI 27 40 kg/m²          Physical Exam  Constitutional:       General: He is not in acute distress  Appearance: He is well-developed  He is not diaphoretic  Eyes:      General: No scleral icterus  Pupils: Pupils are equal, round, and reactive to light  Cardiovascular:      Rate and Rhythm: Normal rate and regular rhythm  Pulses: no weak pulses          Dorsalis pedis pulses are 2+ on the right side and 2+ on the left side  Posterior tibial pulses are 2+ on the right side and 2+ on the left side  Heart sounds: Normal heart sounds  No murmur heard        Pulmonary:      Effort: Pulmonary effort is normal  No respiratory distress  Breath sounds: Normal breath sounds  No wheezing  Abdominal:      General: Bowel sounds are normal  There is no distension  Palpations: Abdomen is soft  Tenderness: There is no abdominal tenderness  Feet:      Right foot:      Skin integrity: No ulcer, skin breakdown, erythema, warmth, callus or dry skin  Left foot:      Skin integrity: No ulcer, skin breakdown, erythema, warmth, callus or dry skin  Skin:     General: Skin is warm and dry  Findings: No rash  Neurological:      Mental Status: He is alert and oriented to person, place, and time  Diabetic Foot Exam    Patient's shoes and socks removed  Right Foot/Ankle   Right Foot Inspection  Skin Exam: skin normal and skin intact  No dry skin, no warmth, no callus, no erythema, no maceration, no abnormal color, no pre-ulcer, no ulcer and no callus  Toe Exam: ROM and strength within normal limits  Sensory   Vibration: intact  Proprioception: intact  Monofilament testing: intact    Vascular  The right DP pulse is 2+  The right PT pulse is 2+  Left Foot/Ankle  Left Foot Inspection  Skin Exam: skin normal and skin intact  No dry skin, no warmth, no erythema, no maceration, normal color, no pre-ulcer, no ulcer and no callus  Toe Exam: ROM and strength within normal limits  Sensory   Vibration: intact  Proprioception: intact  Monofilament testing: intact    Vascular  The left DP pulse is 2+  The left PT pulse is 2+       Assign Risk Category  No deformity present  No loss of protective sensation  No weak pulses  Risk: 0

## 2022-01-31 ENCOUNTER — OFFICE VISIT (OUTPATIENT)
Dept: CARDIOLOGY CLINIC | Facility: CLINIC | Age: 72
End: 2022-01-31
Payer: COMMERCIAL

## 2022-01-31 VITALS
HEART RATE: 77 BPM | WEIGHT: 204 LBS | HEIGHT: 72 IN | OXYGEN SATURATION: 95 % | SYSTOLIC BLOOD PRESSURE: 130 MMHG | BODY MASS INDEX: 27.63 KG/M2 | RESPIRATION RATE: 16 BRPM | DIASTOLIC BLOOD PRESSURE: 80 MMHG

## 2022-01-31 DIAGNOSIS — I20.8 ANGINAL EQUIVALENT (HCC): Primary | ICD-10-CM

## 2022-01-31 PROCEDURE — 3008F BODY MASS INDEX DOCD: CPT | Performed by: INTERNAL MEDICINE

## 2022-01-31 PROCEDURE — 99214 OFFICE O/P EST MOD 30 MIN: CPT | Performed by: INTERNAL MEDICINE

## 2022-01-31 PROCEDURE — 3079F DIAST BP 80-89 MM HG: CPT | Performed by: INTERNAL MEDICINE

## 2022-01-31 PROCEDURE — 3075F SYST BP GE 130 - 139MM HG: CPT | Performed by: INTERNAL MEDICINE

## 2022-01-31 PROCEDURE — 1160F RVW MEDS BY RX/DR IN RCRD: CPT | Performed by: INTERNAL MEDICINE

## 2022-01-31 PROCEDURE — 1036F TOBACCO NON-USER: CPT | Performed by: INTERNAL MEDICINE

## 2022-01-31 NOTE — PROGRESS NOTES
Cardiology Outpatient Follow up Note    Laya Flores 73 y o  male MRN: [id number]    01/31/22          Assessment:  1  Dyspnea on exertion-possible anginal equivalent  2  Moderate nonobstructive CAD  3  NICM with EF: 50%  4  Carotid stenosis s/p L CEA 1/2020  5  DM2- A1c: 8 0  6  Hypertension  7  HLD    Plan:  · Will evaluate with a pharmacologic MPI  · Cont aspirin, plavix, and atorvastatin   · Cont coreg, norvasc and losartan  · He follows closely with Vascular surgery  Ambulatory blood pressure monitoring and maintaining a low sodium diet was advised  · He was advised to notify us with the onset of cardiac symptoms  1  Anginal equivalent ([address] )  NM myocardial perfusion spect (rx stress and/or rest)       HPI: Laya Flores is a 73y o  year old male with history of mild NICM with EF 50%, nonobstructive CAD and carotid stenosis s/p L CEA who presents for routine follow-up  Past medical history:   · TTE 7/2019: EF: 45%, G1DD, and mild MR, TR   · Cardiac catheterization 9/2019: 60% LAD and 50% LCx stenosis; no intervention performed  · Carotid duplex 10/2019: 50-69% stenosis of the Right ICA; 70-99% stenosis of the Left ICA  · S/p L carotid endarterectomy on 1/13/2020  · Carotid duplex 30009: 50-69% right ICA stenosis, patent left ICA endarterectomy site; 50-69% stenosis in the mid ICA  Since his last evaluation he notes progressive dyspnea on exertion particularly when ambulating up a flight of stairs  He denies associated chest pain  His blood pressure has been well controlled on his current antihypertensive regimen  He denies any other cardiac concerns at this time  Family history of CAD on his maternal side with his maternal grandfather and uncle having MIs       Social history: Smoked 1ppd for 50 years; quit 1 year ago          Patient Active Problem List   Diagnosis    Essential hypertension    Mixed hyperlipidemia    Type 2 diabetes mellitus without complication, without long-term current use of insulin (HCC)    Ventral hernia without obstruction or gangrene    Soft tissue mass    Cigarette nicotine dependence without complication    Congestive heart failure, NYHA class 1, acute, systolic (HCC)    Coronary artery disease due to lipid rich plaque    Carotid artery stenosis without cerebral infarction, bilateral    Cerebral aneurysm    S/P Left carotid endarterectomy 1/13/20    Arthritis    Familial hypercholesterolemia    BMI 27 0-27 9,adult    Chronic right shoulder pain    Elevated PSA    Status post reverse total arthroplasty of right shoulder    Erectile dysfunction of non-organic origin    Fatigue    Anemia    Left carpal tunnel syndrome    Medicare annual wellness visit, subsequent    Screening for AAA (abdominal aortic aneurysm)       Allergies   Allergen Reactions    Other Hives     Soft shell crabs hives         Current Outpatient Medications:     amLODIPine (NORVASC) 10 mg tablet, Take 1 tablet (10 mg total) by mouth daily, Disp: 90 tablet, Rfl: 3    ascorbic acid (VITAMIN C) 500 mg tablet, Take 500 mg by mouth daily, Disp: , Rfl:     aspirin 81 mg chewable tablet, Chew 1 tablet (81 mg total) daily, Disp: 90 tablet, Rfl: 3    atorvastatin (LIPITOR) 20 mg tablet, TAKE 1 TABLET BY MOUTH  DAILY, Disp: 90 tablet, Rfl: 3    carvedilol (COREG) 12 5 mg tablet, Take 1 tablet (12 5 mg total) by mouth 2 (two) times a day with meals, Disp: 180 tablet, Rfl: 3    clopidogrel (PLAVIX) 75 mg tablet, TAKE 1 TABLET BY MOUTH  DAILY, Disp: 90 tablet, Rfl: 3    glucose blood test strip, Use as instructed, Disp: 100 each, Rfl: 2    Januvia 25 MG tablet, TAKE 1 TABLET BY MOUTH  DAILY, Disp: 90 tablet, Rfl: 3    losartan (COZAAR) 100 MG tablet, TAKE 1 TABLET BY MOUTH  DAILY, Disp: 90 tablet, Rfl: 3    metFORMIN (GLUCOPHAGE) 1000 MG tablet, TAKE 1 TABLET BY MOUTH  TWICE DAILY WITH MEALS, Disp: 180 tablet, Rfl: 3    nicotine polacrilex (COMMIT) 2 MG lozenge, May take 1 lozenge every 3-4 hours as needed with no more than 10 lozenges in a day, Disp: 72 each, Rfl: 3    sildenafil (VIAGRA) 25 MG tablet, TAKE 1 TABLET BY MOUTH  DAILY AS NEEDED FOR  ERECTILE DYSFUNCTION, Disp: 10 tablet, Rfl: 0    Elastic Bandages & Supports (Carpal Tunnel Wrist Stabilizer) MISC, Use daily at bedtime, Disp: 1 each, Rfl: 1    oxyCODONE (ROXICODONE) 5 mg immediate release tablet, 1 tablets every 6 hours as needed for severe pain  (Patient not taking: Reported on 7/13/2021), Disp: 13 tablet, Rfl: 0    Past Medical History:   Diagnosis Date    CHF (congestive heart failure) (HCC)     Diabetes mellitus (HCC)     Diverticulitis     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[address] ) 11/7/2018    Heart disease     Hyperlipidemia     Hypertension     Kidney stone     Osteoarthritis     Vascular disorder        Family History   Problem Relation Age of Onset    Colon cancer Father     Colon cancer Paternal Grandfather     No Known Problems Mother     Colon cancer Family        Past Surgical History:   Procedure Laterality Date    ABDOMINAL SURGERY      COLONOSCOPY      INGUINAL HERNIA REPAIR Left     LAPAROSCOPIC COLON RESECTION      FL COLONOSCOPY FLX DX W/COLLJ SPEC WHEN PFRMD N/A 11/3/2017    Procedure: COLONOSCOPY;  Surgeon: Alpesh Zamora MD;  Location: [facility]; Service: Colorectal    FL RECONSTR TOTAL SHOULDER IMPLANT Right 11/17/2020    Procedure: ARTHROPLASTY SHOULDER REVERSE with biceps tendonesis;   Surgeon: Viviana Barker MD;  Location: [facility];  Service: Orthopedics    FL THROMBOENDARTECTMY Nathaly Kruger Left 1/13/2020    Procedure: ENDARTERECTOMY ARTERY CAROTID;  Surgeon: Madison Rizzo MD;  Location: [facility];  Service: Vascular       Social History     Socioeconomic History    Marital status: /Civil Union     Spouse name: Not on file    Number of children: Not on file    Years of education: Not on file    Highest education level: Not on file   Occupational History    Not on file   Tobacco Use    Smoking status: Former Smoker     Types: Cigarettes, Cigars     Quit date: 2019     Years since quittin 3    Smokeless tobacco: Never Used   Vaping Use    Vaping Use: Never used   Substance and Sexual Activity    Alcohol use: Yes     Alcohol/week: 2 0 standard drinks     Types: 2 Cans of beer per week    Drug use: No    Sexual activity: Not Currently   Other Topics Concern    Not on file   Social History Narrative    Caffeine use    Uses safety equipment: seatbelts     Social Determinants of Health     Financial Resource Strain: Not on file   Food Insecurity: Not on file   Transportation Needs: Not on file   Physical Activity: Not on file   Stress: Not on file   Social Connections: Not on file   Intimate Partner Violence: Not on file   Housing Stability: Not on file       Review of Systems   Constitutional: Negative for diaphoresis, weight gain and weight loss  HENT: Negative for congestion  Cardiovascular: Positive for dyspnea on exertion  Negative for chest pain, irregular heartbeat, leg swelling, near-syncope, orthopnea, palpitations, paroxysmal nocturnal dyspnea and syncope  Respiratory: Negative for shortness of breath, sleep disturbances due to breathing and snoring  Hematologic/Lymphatic: Does not bruise/bleed easily  Skin: Negative for rash  Musculoskeletal: Negative for myalgias  Gastrointestinal: Negative for nausea and vomiting  Neurological: Negative for excessive daytime sleepiness and light-headedness  Psychiatric/Behavioral: The patient is not nervous/anxious  Vitals: /80 (BP Location: Left arm, Patient Position: Sitting)   Pulse 77   Resp 16   Ht 6' (1 829 m)   Wt 92 5 kg (204 lb)   SpO2 95%   BMI 27 67 kg/m²       Physical Exam:     GEN: Alert and oriented x 3, in no acute distress  Well appearing and well nourished  HEENT: Sclera anicteric, conjunctivae pink, mucous membranes moist  Oropharynx clear     NECK: Supple, no carotid bruits, no significant JVD  Trachea midline, no thyromegaly  HEART: Regular rhythm, normal S1 and S2, no murmurs, clicks, gallops or rubs  PMI nondisplaced, no thrills  LUNGS: Clear to auscultation bilaterally; no wheezes, rales, or rhonchi  No increased work of breathing or signs of respiratory distress  ABDOMEN: Soft, nontender, nondistended, normoactive bowel sounds  EXTREMITIES: Skin warm and well perfused, no clubbing, cyanosis, or edema  NEURO: No focal findings  Normal speech  Mood and affect normal    SKIN: Normal without suspicious lesions on exposed skin        Lab Results:       Lab Results   Component Value Date    HGBA1C 8 0 (H) 01/24/2022    HGBA1C 7 5 (H) 10/05/2021    HGBA1C 7 0 (H) 06/22/2021     Lab Results   Component Value Date    CHOL 163 11/22/2016    CHOL 172 09/18/2015     Lab Results   Component Value Date    HDL 42 01/24/2022    HDL 43 06/22/2021    HDL 43 02/24/2021     Lab Results   Component Value Date    LDLCALC 72 01/24/2022    LDLCALC 61 06/22/2021    LDLCALC 75 02/24/2021     Lab Results   Component Value Date    TRIG 144 01/24/2022    TRIG 349 (H) 06/22/2021    TRIG 190 (H) 02/24/2021     No results found for: CHOLHDL

## 2022-02-10 ENCOUNTER — HOSPITAL ENCOUNTER (OUTPATIENT)
Dept: NON INVASIVE DIAGNOSTICS | Facility: CLINIC | Age: 72
Discharge: HOME | End: 2022-02-10
Payer: COMMERCIAL

## 2022-02-10 VITALS
DIASTOLIC BLOOD PRESSURE: 84 MMHG | BODY MASS INDEX: 27.63 KG/M2 | WEIGHT: 204 LBS | SYSTOLIC BLOOD PRESSURE: 154 MMHG | HEIGHT: 72 IN | HEART RATE: 63 BPM | OXYGEN SATURATION: 98 %

## 2022-02-10 DIAGNOSIS — I20.8 ANGINAL EQUIVALENT (HCC): ICD-10-CM

## 2022-02-10 LAB
NUC STRESS DIASTOLIC VOLUME INDEX: 108 ML/M2
NUC STRESS EJECTION FRACTION: 32 %
NUC STRESS SYSTOLIC VOLUME INDEX: 74 ML/M2
RATE PRESSURE PRODUCT: NORMAL
SL CV REST NUCLEAR ISOTOPE DOSE: 10.69 MCI
SL CV STRESS NUCLEAR ISOTOPE DOSE: 32.7 MCI
SL CV STRESS RECOVERY BP: NORMAL MMHG
SL CV STRESS RECOVERY HR: 75 BPM
STRESS ANGINA INDEX: 0
STRESS BASELINE BP: NORMAL MMHG
STRESS BASELINE HR: 63 BPM
STRESS O2 SAT REST: 98 %
STRESS PEAK HR: 87 BPM
STRESS POST O2 SAT PEAK: 99 %
STRESS POST PEAK BP: 154 MMHG
STRESS/REST PERFUSION RATIO: 1.12

## 2022-02-10 PROCEDURE — 78452 HT MUSCLE IMAGE SPECT MULT: CPT | Performed by: INTERNAL MEDICINE

## 2022-02-10 PROCEDURE — A9502 TC99M TETROFOSMIN: HCPCS

## 2022-02-10 PROCEDURE — 93017 CV STRESS TEST TRACING ONLY: CPT

## 2022-02-10 PROCEDURE — 78452 HT MUSCLE IMAGE SPECT MULT: CPT

## 2022-02-10 PROCEDURE — 93016 CV STRESS TEST SUPVJ ONLY: CPT | Performed by: INTERNAL MEDICINE

## 2022-02-10 PROCEDURE — G1004 CDSM NDSC: HCPCS

## 2022-02-10 PROCEDURE — 93018 CV STRESS TEST I&R ONLY: CPT | Performed by: INTERNAL MEDICINE

## 2022-02-10 RX ADMIN — REGADENOSON 0.4 MG: 0.08 INJECTION, SOLUTION INTRAVENOUS at 12:57

## 2022-02-11 LAB
MAX DIASTOLIC BP: 84 MMHG
MAX HEART RATE: 90 BPM
MAX PREDICTED HEART RATE: 151 BPM
MAX. SYSTOLIC BP: 154 MMHG
PROTOCOL NAME: NORMAL
REASON FOR TERMINATION: NORMAL
TARGET HR FORMULA: NORMAL
TEST INDICATION: NORMAL
TIME IN EXERCISE PHASE: NORMAL

## 2022-02-11 NOTE — RESULT ENCOUNTER NOTE
Please offer him an appointment to review his stress test results  [facility] to [facility]   Thanks

## 2022-02-22 ENCOUNTER — OFFICE VISIT (OUTPATIENT)
Dept: UROLOGY | Facility: CLINIC | Age: 72
End: 2022-02-22
Payer: COMMERCIAL

## 2022-02-22 VITALS
SYSTOLIC BLOOD PRESSURE: 100 MMHG | HEIGHT: 72 IN | BODY MASS INDEX: 27.63 KG/M2 | OXYGEN SATURATION: 95 % | HEART RATE: 80 BPM | WEIGHT: 204 LBS | DIASTOLIC BLOOD PRESSURE: 80 MMHG

## 2022-02-22 DIAGNOSIS — R97.20 ELEVATED PSA: Primary | ICD-10-CM

## 2022-02-22 PROCEDURE — 99213 OFFICE O/P EST LOW 20 MIN: CPT | Performed by: PHYSICIAN ASSISTANT

## 2022-02-22 NOTE — PROGRESS NOTES
2/22/2022      Chief Complaint   Patient presents with    Elevated PSA         Assessment and Plan    73 y o  male -- Dr Teodoro Hernandez    1  Elevated PSA  - most recent PSA 6 3, previously 6 4  - due to multiple comorbidities, threshold for prostate biopsy in this patient is 10  Patient is comfortable with this  - we will continue to monitor PSA every 6 months  - call with any questions or concerns in the meantime  - All questions answered; patient understands and agrees with plan      History of Present Illness  Octavio Dunlap is a 73 y o  male patient of Dr Teodoro Hernandez with history of elevated PSA here for follow up  Most recent PSA 6 3, previously 6 4  Patient is overall doing well  Denies any new or worsening symptoms today  Denies weight loss, bone pain, suprapubic pressure, flank pain, gross hematuria, dysuria, fevers, chills, nausea, vomiting  Threshold for this patient for a prostate biopsy is 10 given his multiple comorbidities including cardiovascular and anticoagulation use  Review of Systems   Constitutional: Negative for activity change, appetite change, chills and fever  HENT: Negative for congestion and trouble swallowing  Respiratory: Negative for cough and shortness of breath  Cardiovascular: Negative for chest pain, palpitations and leg swelling  Gastrointestinal: Negative for abdominal pain, constipation, diarrhea, nausea and vomiting  Genitourinary: Negative for difficulty urinating, dysuria, flank pain, frequency, hematuria and urgency  Musculoskeletal: Negative for back pain and gait problem  Skin: Negative for wound  Allergic/Immunologic: Negative for immunocompromised state  Neurological: Negative for dizziness and syncope  Hematological: Does not bruise/bleed easily  Psychiatric/Behavioral: Negative for confusion  All other systems reviewed and are negative        Vitals  Vitals:    02/22/22 0910   BP: 100/80   Pulse: 80   SpO2: 95%   Weight: 92 5 kg (204 lb) Height: 6' (1 829 m)       Physical Exam  Constitutional:       General: He is not in acute distress  Appearance: Normal appearance  He is not ill-appearing, toxic-appearing or diaphoretic  HENT:      Head: Normocephalic  Nose: No congestion  Eyes:      General: No scleral icterus  Right eye: No discharge  Left eye: No discharge  Conjunctiva/sclera: Conjunctivae normal       Pupils: Pupils are equal, round, and reactive to light  Pulmonary:      Effort: Pulmonary effort is normal    Musculoskeletal:      Cervical back: Normal range of motion  Skin:     General: Skin is warm and dry  Coloration: Skin is not jaundiced or pale  Findings: No bruising, erythema, lesion or rash  Neurological:      General: No focal deficit present  Mental Status: He is alert and oriented to person, place, and time  Mental status is at baseline  Gait: Gait normal    Psychiatric:         Mood and Affect: Mood normal          Behavior: Behavior normal          Thought Content: Thought content normal          Judgment: Judgment normal            Past History  Past Medical History:   Diagnosis Date    [facility]est[facility]) [facility])     Diabetes mellitus ([facility] )     Diverticulitis     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[facility] ) 2018    Heart disease     Hyperlipidemia     Hypertension     Kidney stone     Osteoarthritis     Vascular disorder      Social History     Socioeconomic History    Marital status: /Civil Union     Spouse name: None    Number of children: None    Years of education: None    Highest education level: None   Occupational History    None   Tobacco Use    Smoking status: Former Smoker     Types: Cigarettes, Cigars     Quit date: 2019     Years since quittin 4    Smokeless tobacco: Never Used   Vaping Use    Vaping Use: Never used   Substance and Sexual Activity    Alcohol use:  Yes     Alcohol/week: 2 0 standard drinks     Types: 2 Cans of beer per week    Drug use: No    Sexual activity: Not Currently   Other Topics Concern    None   Social History Narrative    Caffeine use    Uses safety equipment: seatbelts     Social Determinants of Health     Financial Resource Strain: Not on file   Food Insecurity: Not on file   Transportation Needs: Not on file   Physical Activity: Not on file   Stress: Not on file   Social Connections: Not on file   Intimate Partner Violence: Not on file   Housing Stability: Not on file     Social History     Tobacco Use   Smoking Status Former Smoker    Types: Cigarettes, Cigars    Quit date: 2019    Years since quittin 4   Smokeless Tobacco Never Used     Family History   Problem Relation Age of Onset    Colon cancer Father     Colon cancer Paternal Grandfather     No Known Problems Mother     Colon cancer Family        The following portions of the patient's history were reviewed and updated as appropriate: allergies, current medications, past medical history, past social history, past surgical history and problem list     Results  No results found for this or any previous visit (from the past 1 hour(s)) ]  Lab Results   Component Value Date    PSA 6 3 (H) 2022    PSA 6 4 (H) 2021    PSA 6 6 (H) 2021    PSA 5 1 (H) 06/15/2020     Lab Results   Component Value Date    GLUCOSE 112 2020    CALCIUM 9 5 2022     2016    K 4 1 2022    CO2 29 2022     2022    BUN 16 2022    CREATININE 0 95 2022     Lab Results   Component Value Date    WBC 5 01 2021    HGB 14 7 2021    HCT 45 9 2021    MCV 99 (H) 2021     2021       Negro Mullen PA-C

## 2022-02-24 ENCOUNTER — OFFICE VISIT (OUTPATIENT)
Dept: CARDIOLOGY CLINIC | Facility: CLINIC | Age: 72
End: 2022-02-24
Payer: COMMERCIAL

## 2022-02-24 ENCOUNTER — TELEPHONE (OUTPATIENT)
Dept: CARDIOLOGY CLINIC | Facility: CLINIC | Age: 72
End: 2022-02-24

## 2022-02-24 ENCOUNTER — PREP FOR PROCEDURE (OUTPATIENT)
Dept: CARDIOLOGY CLINIC | Facility: CLINIC | Age: 72
End: 2022-02-24

## 2022-02-24 VITALS
RESPIRATION RATE: 16 BRPM | DIASTOLIC BLOOD PRESSURE: 80 MMHG | WEIGHT: 204 LBS | BODY MASS INDEX: 27.63 KG/M2 | SYSTOLIC BLOOD PRESSURE: 130 MMHG | OXYGEN SATURATION: 96 % | HEART RATE: 87 BPM | HEIGHT: 72 IN

## 2022-02-24 DIAGNOSIS — R94.39 ABNORMAL STRESS TEST: Primary | ICD-10-CM

## 2022-02-24 DIAGNOSIS — R94.39 ABNORMAL STRESS TEST: ICD-10-CM

## 2022-02-24 DIAGNOSIS — I25.10 CORONARY ARTERY DISEASE INVOLVING NATIVE HEART WITHOUT ANGINA PECTORIS, UNSPECIFIED VESSEL OR LESION TYPE: Primary | ICD-10-CM

## 2022-02-24 PROCEDURE — 99214 OFFICE O/P EST MOD 30 MIN: CPT | Performed by: INTERNAL MEDICINE

## 2022-02-24 PROCEDURE — 1036F TOBACCO NON-USER: CPT | Performed by: INTERNAL MEDICINE

## 2022-02-24 PROCEDURE — 1160F RVW MEDS BY RX/DR IN RCRD: CPT | Performed by: INTERNAL MEDICINE

## 2022-02-24 PROCEDURE — 3008F BODY MASS INDEX DOCD: CPT | Performed by: INTERNAL MEDICINE

## 2022-02-24 NOTE — TELEPHONE ENCOUNTER
S/w patient, prescreening completed  Aware BW is needed and [facility] lab confirmed  Aware to not take Januvia and Losartan the morning of procedure  Aware to not take Metformin 24 hours prior to procedure  Cath prep/info will be placed in Mychart  Can we please have auth for [facility] at [facility] on 3/11/22?

## 2022-02-24 NOTE — H&P (VIEW-ONLY)
[facility]  [id number] [place] [id number]  Cardiology Office Note    Tate Tucker 73 y o  male MRN: [id number]    02/24/22          Assessment/Plan:  1  Abnormal stress test  · Pharmacologic MPI showed moderate to large predominantly fixed defect of the basal to mid inferior wall that extended to the left basal inferior lateral and inferoseptal walls  Small amount of melissa-infarct ischemia could not be definitively excluded  · Patient endorses progressive dyspnea on exertion and fatigue  · Plan for cardiac catheterization  · Cardiac catheterization was explained to the patient including the risks of death, myocardial infarction, stroke, vascular injury, bleeding, thrombosis, pseudoaneurysm, allergy, infection and neural injury  Patient understood this and consented to the procedure  We did discuss the potential for catheter based intervention including a greater than 90% chance of acute success, 25% chance of restenosis and a 1% or less chance of acute closure which may be associated with myocardial infarction  and/or need for urgent surgery at increased risk  · BMP ordered to evaluate renal function  · Echo ordered     2  Moderate nonobstructive CAD  · Cardiac Catheterization in 2019 showed borderline significant 2-vessel CAD (60% LAD and 50% circumflex)  · Continue aspirin, plavix, carvedilol 12 5mg, and atorvastatin 20mg daily    3  NICM with EF:  50%  · Patient appears euvolemic off of diuretics  · Continue losartan 100 mg and carvedilol 12 5mg BID    4  Carotid stenosis status post left CEA 01/2020  · Continue aspirin and plavix    5  DM type 2-A1c 8 0    6  Hypertension  · /80  · Continue carvedilol 12 5 mg b i d , losartan 100 mg daily, and amlodipine 10 mg daily    7  Hyperlipidemia  · Cholesterol well controlled  · Continue atorvastatin 20 mg daily      Follow up: 2 months or sooner as needed    Recommend aggressive risk factor modification and therapeutic lifestyle changes  Low-salt, low-calorie, low-fat, low-cholesterol diet with regular exercise and to optimize weight  Discussed concepts of atherosclerosis, including signs and symptoms of cardiac disease  Previous studies were reviewed  Safety measures were reviewed  All questions and concerns addressed  Patient was advised to report any problems requiring medical attention  1  Coronary artery disease involving native heart without angina pectoris, unspecified vessel or lesion type  Basic metabolic panel    Echo complete w/ contrast if indicated   2  Abnormal stress test         HPI: Stacie Mustafa is a 73y o  year old male with PMH of CAD, NICM with EF 50%, carotid stenosis s/p CEA 1/2020, DM type 2, HTN, and HLD who presents for follow-up for dyspnea on exertion  Past medical history:  · 07/2019 :  EF 45%, grade 1 DD, and mild MR, TR   · 01/13/2020:  Left carotid endarterectomy  · 09/2020:  50-69% right ICA stenosis, patent left ICA endarterectomy site; 50-69% stenosis in the mid ICA  · 02/10/2022:  Pharmacologic MPI shows a moderate to large predominantly fixed defect of the basal to mid inferior wall that extended to the basal inferolateral and inferoseptal walls  A small amount of melissa-infarct ischemia could not be definitively excluded  Left ventricular function was severely reduced with hypokinesis of the inferior wall noted  Patient reports increased dyspnea on exertion and overall fatigue that has increased over the past 6 months  Patient experiences shortness of breath on going up a flight of stairs  Patient underwent pharmacologic MPI since last visit which was abnormal     Blood pressure well controlled on amlodipine, carvedilol, and losartan  Patient is on Lipitor and denies any myalgias  Lipid panel appears to be well controlled  Patient is known to have EF of 40-45% and is not on a maintenance diuretic       The patient denies chest pain, dyspnea at rest, lower extremity edema, or palpitations and was instructed to call  the office or seek medical attention if any such symptoms develop  All medications reviewed and patient is tolerating medications without side effects  Family History:   Maternal grandfather:  MI  Uncle: MI    Social history:   Tobacco: Quit 1 5 years ago  Endorses rare cigar use  Alcohol: 2 beers per week    9/4/2019 Cardiac Catheterization:  CORONARY VESSELS:   --  The coronary circulation is right dominant  --  There was borderline significant 2-vessel coronary artery disease ( 60 % LAD and 50 % circumflex)  --  Left main: The vessel was normal sized  Angiography showed no evidence of disease  --  LAD: The vessel was medium to large sized  Angiography showed moderate atherosclerosis  There were two major diagonal branches  --  Mid LAD: There was a discrete 60 % stenosis just after D1  There was CHRISTIANO grade 3 flow through the vessel (brisk flow) and a moderate-sized vascular territory distal to the lesion  It appears amenable to percutaneous intervention  --  1st diagonal: The vessel was small sized  Angiography showed moderate atherosclerosis  There was a discrete 60 % stenosis at the ostium of the vessel segment  --  2nd diagonal: The vessel was medium sized  Angiography showed minor luminal irregularities  --  Circumflex: The vessel was normal sized  Angiography showed moderate atherosclerosis  There was one major obtuse marginal   --  Proximal circumflex: There was a discrete 50 % stenosis  There was CHRISTIANO grade 3 flow through the vessel (brisk flow)  --  1st obtuse marginal: The vessel was medium sized  Angiography showed minor luminal irregularities  There was a discrete 40 % stenosis  --  RCA: The vessel was large sized (dominant)  Angiography showed minor luminal irregularities  --  Right PDA: There was a discrete 30 % stenosis at the ostium of the vessel segment      02/10/2022 Pharmacologic MPI:    Abnormal study after pharmacologic vasodilation  There was a moderate to large predominantly fixed defect of the basal to mid inferior wall that extended to the basal inferolateral and inferoseptal walls  A small amount of melissa-infarct ischemia could not be definitively excluded   Left ventricular function was severely reduced with hypokinesis of the inferior wall noted           Patient Active Problem List   Diagnosis    Essential hypertension    Mixed hyperlipidemia    Type 2 diabetes mellitus without complication, without long-term current use of insulin ([address] )    Ventral hernia without obstruction or gangrene    Soft tissue mass    Cigarette nicotine dependence without complication    Congestive heart failure, NYHA class 1, acute, systolic (HCC)    Coronary artery disease due to lipid rich plaque    Carotid artery stenosis without cerebral infarction, bilateral    Cerebral aneurysm    S/P Left carotid endarterectomy 1/13/20    Arthritis    Familial hypercholesterolemia    BMI 27 0-27 9,adult    Chronic right shoulder pain    Elevated PSA    Status post reverse total arthroplasty of right shoulder    Erectile dysfunction of non-organic origin    Fatigue    Anemia    Left carpal tunnel syndrome    Medicare annual wellness visit, subsequent    Screening for AAA (abdominal aortic aneurysm)       Allergies   Allergen Reactions    Other Hives     Soft shell crabs hives         Current Outpatient Medications:     amLODIPine (NORVASC) 10 mg tablet, Take 1 tablet (10 mg total) by mouth daily, Disp: 90 tablet, Rfl: 3    ascorbic acid (VITAMIN C) 500 mg tablet, Take 500 mg by mouth daily, Disp: , Rfl:     aspirin 81 mg chewable tablet, Chew 1 tablet (81 mg total) daily, Disp: 90 tablet, Rfl: 3    atorvastatin (LIPITOR) 20 mg tablet, TAKE 1 TABLET BY MOUTH  DAILY, Disp: 90 tablet, Rfl: 3    carvedilol (COREG) 12 5 mg tablet, Take 1 tablet (12 5 mg total) by mouth 2 (two) times a day with meals, Disp: 180 tablet, Rfl: 3   clopidogrel (PLAVIX) 75 mg tablet, TAKE 1 TABLET BY MOUTH  DAILY, Disp: 90 tablet, Rfl: 3    glucose blood test strip, Use as instructed, Disp: 100 each, Rfl: 2    Januvia 25 MG tablet, TAKE 1 TABLET BY MOUTH  DAILY, Disp: 90 tablet, Rfl: 3    losartan (COZAAR) 100 MG tablet, TAKE 1 TABLET BY MOUTH  DAILY, Disp: 90 tablet, Rfl: 3    metFORMIN (GLUCOPHAGE) 1000 MG tablet, TAKE 1 TABLET BY MOUTH  TWICE DAILY WITH MEALS, Disp: 180 tablet, Rfl: 3    nicotine polacrilex (COMMIT) 2 MG lozenge, May take 1 lozenge every 3-4 hours as needed with no more than 10 lozenges in a day, Disp: 72 each, Rfl: 3    sildenafil (VIAGRA) 25 MG tablet, TAKE 1 TABLET BY MOUTH  DAILY AS NEEDED FOR  ERECTILE DYSFUNCTION, Disp: 10 tablet, Rfl: 0    Elastic Bandages & Supports (Carpal Tunnel Wrist Stabilizer) MISC, Use daily at bedtime, Disp: 1 each, Rfl: 1    oxyCODONE (ROXICODONE) 5 mg immediate release tablet, 1 tablets every 6 hours as needed for severe pain  (Patient not taking: Reported on 7/13/2021), Disp: 13 tablet, Rfl: 0    Past Medical History:   Diagnosis Date    CHF (congestive heart failure) (HCC)     Diabetes mellitus (HCC)     Diverticulitis     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[facility] [address] ) 11/7/2018    Heart disease     Hyperlipidemia     Hypertension     Kidney stone     Osteoarthritis     Vascular disorder        Family History   Problem Relation Age of Onset    Colon cancer Father     Colon cancer Paternal Grandfather     No Known Problems Mother     Colon cancer Family        Past Surgical History:   Procedure Laterality Date    ABDOMINAL SURGERY      COLONOSCOPY      INGUINAL HERNIA REPAIR Left     LAPAROSCOPIC COLON RESECTION      MO COLONOSCOPY FLX DX W/COLLJ SPEC WHEN PFRMD N/A 11/3/2017    Procedure: COLONOSCOPY;  Surgeon: Moses De Souza MD;  [facility];   Service: Colorectal    MO RECONSTR TOTAL SHOULDER IMPLANT Right 11/17/2020    Procedure: ARTHROPLASTY SHOULDER REVERSE with biceps tendonesis; Surgeon: Carlitos Charles MD;  Location: [facility];  Service: Orthopedics    MI THROMBOENDARTECTMY Ramana Nieves Left 2020    Procedure: ENDARTERECTOMY ARTERY CAROTID;  Surgeon: Federica So MD;  [facility];  Service: Vascular       Social History     Socioeconomic History    Marital status: /Civil Union     Spouse name: Not on file    Number of children: Not on file    Years of education: Not on file    Highest education level: Not on file   Occupational History    Not on file   Tobacco Use    Smoking status: Former Smoker     Types: Cigarettes, Cigars     Quit date: 2019     Years since quittin 4    Smokeless tobacco: Never Used   Vaping Use    Vaping Use: Never used   Substance and Sexual Activity    Alcohol use: Yes     Alcohol/week: 2 0 standard drinks     Types: 2 Cans of beer per week    Drug use: No    Sexual activity: Not Currently   Other Topics Concern    Not on file   Social History Narrative    Caffeine use    Uses safety equipment: seatbelts     Social Determinants of Health     Financial Resource Strain: Not on file   Food Insecurity: Not on file   Transportation Needs: Not on file   Physical Activity: Not on file   Stress: Not on file   Social Connections: Not on file   Intimate Partner Violence: Not on file   Housing Stability: Not on file       Review of symptoms:   Review of Systems   Constitutional: Positive for fatigue  Negative for chills, diaphoresis and fever  Respiratory: Positive for shortness of breath  Negative for cough and chest tightness  Cardiovascular: Negative for chest pain, palpitations and leg swelling  Gastrointestinal: Negative for abdominal distention, blood in stool, nausea and vomiting  Genitourinary: Negative for difficulty urinating  Musculoskeletal: Negative for arthralgias and back pain  Neurological: Negative for dizziness, syncope, light-headedness and headaches     Psychiatric/Behavioral: Negative for agitation and confusion  The patient is not nervous/anxious  Vitals: /80 (BP Location: Left arm, Patient Position: Sitting)   Pulse 87   Resp 16   Ht 6' (1 829 m)   Wt 92 5 kg (204 lb)   SpO2 96%   BMI 27 67 kg/m²         Physical Exam:     Physical Exam  Vitals and nursing note reviewed  Constitutional:       Appearance: He is well-developed  HENT:      Head: Normocephalic and atraumatic  Eyes:      Conjunctiva/sclera: Conjunctivae normal    Cardiovascular:      Rate and Rhythm: Normal rate and regular rhythm  Heart sounds: Normal heart sounds  No murmur heard  Pulmonary:      Effort: Pulmonary effort is normal  No respiratory distress  Breath sounds: Normal breath sounds  Abdominal:      Palpations: Abdomen is soft  Tenderness: There is no abdominal tenderness  Musculoskeletal:      Cervical back: Neck supple  Right lower leg: No edema  Left lower leg: No edema  Skin:     General: Skin is warm and dry  Neurological:      Mental Status: He is alert  Thank you for allowing me to participate in the care and evaluation of your patient  Should you have any questions, please feel free to contact me

## 2022-02-24 NOTE — TELEPHONE ENCOUNTER
----- Message from Aziza Suárez, [address] sent at 2/24/2022 10:17 AM EST -----  Regarding: Cardiac Cath  Please schedule this patient for a cardiac cath  He had an abnormal stress test  No dye allergy  He is a diabetic     Thanks,     Peter Miranda

## 2022-02-24 NOTE — PROGRESS NOTES
[address] [id number] [place] [id number]  Cardiology Office Note    Shelda Burkitt 73 y o  male MRN: [id number]    02/24/22          Assessment/Plan:  1  Abnormal stress test  · Pharmacologic MPI showed moderate to large predominantly fixed defect of the basal to mid inferior wall that extended to the left basal inferior lateral and inferoseptal walls  Small amount of melissa-infarct ischemia could not be definitively excluded  · Patient endorses progressive dyspnea on exertion and fatigue  · Plan for cardiac catheterization  · Cardiac catheterization was explained to the patient including the risks of death, myocardial infarction, stroke, vascular injury, bleeding, thrombosis, pseudoaneurysm, allergy, infection and neural injury  Patient understood this and consented to the procedure  We did discuss the potential for catheter based intervention including a greater than 90% chance of acute success, 25% chance of restenosis and a 1% or less chance of acute closure which may be associated with myocardial infarction  and/or need for urgent surgery at increased risk  · BMP ordered to evaluate renal function  · Echo ordered     2  Moderate nonobstructive CAD  · Cardiac Catheterization in 2019 showed borderline significant 2-vessel CAD (60% LAD and 50% circumflex)  · Continue aspirin, plavix, carvedilol 12 5mg, and atorvastatin 20mg daily    3  NICM with EF:  50%  · Patient appears euvolemic off of diuretics  · Continue losartan 100 mg and carvedilol 12 5mg BID    4  Carotid stenosis status post left CEA 01/2020  · Continue aspirin and plavix    5  DM type 2-A1c 8 0    6  Hypertension  · /80  · Continue carvedilol 12 5 mg b i d , losartan 100 mg daily, and amlodipine 10 mg daily    7  Hyperlipidemia  · Cholesterol well controlled  · Continue atorvastatin 20 mg daily      Follow up: 2 months or sooner as needed    Recommend aggressive risk factor modification and therapeutic lifestyle changes  Low-salt, low-calorie, low-fat, low-cholesterol diet with regular exercise and to optimize weight  Discussed concepts of atherosclerosis, including signs and symptoms of cardiac disease  Previous studies were reviewed  Safety measures were reviewed  All questions and concerns addressed  Patient was advised to report any problems requiring medical attention  1  Coronary artery disease involving native heart without angina pectoris, unspecified vessel or lesion type  Basic metabolic panel    Echo complete w/ contrast if indicated   2  Abnormal stress test         HPI: Osmany Patton is a 73y o  year old male with PMH of CAD, NICM with EF 50%, carotid stenosis s/p CEA 1/2020, DM type 2, HTN, and HLD who presents for follow-up for dyspnea on exertion  Past medical history:  · 07/2019 :  EF 45%, grade 1 DD, and mild MR, TR   · 01/13/2020:  Left carotid endarterectomy  · 09/2020:  50-69% right ICA stenosis, patent left ICA endarterectomy site; 50-69% stenosis in the mid ICA  · 02/10/2022:  Pharmacologic MPI shows a moderate to large predominantly fixed defect of the basal to mid inferior wall that extended to the basal inferolateral and inferoseptal walls  A small amount of melissa-infarct ischemia could not be definitively excluded  Left ventricular function was severely reduced with hypokinesis of the inferior wall noted  Patient reports increased dyspnea on exertion and overall fatigue that has increased over the past 6 months  Patient experiences shortness of breath on going up a flight of stairs  Patient underwent pharmacologic MPI since last visit which was abnormal     Blood pressure well controlled on amlodipine, carvedilol, and losartan  Patient is on Lipitor and denies any myalgias  Lipid panel appears to be well controlled  Patient is known to have EF of 40-45% and is not on a maintenance diuretic       The patient denies chest pain, dyspnea at rest, lower extremity edema, or palpitations and was instructed to call  the office or seek medical attention if any such symptoms develop  All medications reviewed and patient is tolerating medications without side effects  Family History:   Maternal grandfather:  MI  Uncle: MI    Social history:   Tobacco: Quit 1 5 years ago  Endorses rare cigar use  Alcohol: 2 beers per week    9/4/2019 Cardiac Catheterization:  CORONARY VESSELS:   --  The coronary circulation is right dominant  --  There was borderline significant 2-vessel coronary artery disease ( 60 % LAD and 50 % circumflex)  --  Left main: The vessel was normal sized  Angiography showed no evidence of disease  --  LAD: The vessel was medium to large sized  Angiography showed moderate atherosclerosis  There were two major diagonal branches  --  Mid LAD: There was a discrete 60 % stenosis just after D1  There was CHRISTIANO grade 3 flow through the vessel (brisk flow) and a moderate-sized vascular territory distal to the lesion  It appears amenable to percutaneous intervention  --  1st diagonal: The vessel was small sized  Angiography showed moderate atherosclerosis  There was a discrete 60 % stenosis at the ostium of the vessel segment  --  2nd diagonal: The vessel was medium sized  Angiography showed minor luminal irregularities  --  Circumflex: The vessel was normal sized  Angiography showed moderate atherosclerosis  There was one major obtuse marginal   --  Proximal circumflex: There was a discrete 50 % stenosis  There was CHRISTIANO grade 3 flow through the vessel (brisk flow)  --  1st obtuse marginal: The vessel was medium sized  Angiography showed minor luminal irregularities  There was a discrete 40 % stenosis  --  RCA: The vessel was large sized (dominant)  Angiography showed minor luminal irregularities  --  Right PDA: There was a discrete 30 % stenosis at the ostium of the vessel segment      02/10/2022 Pharmacologic MPI:    Abnormal study after pharmacologic vasodilation  There was a moderate to large predominantly fixed defect of the basal to mid inferior wall that extended to the basal inferolateral and inferoseptal walls  A small amount of melissa-infarct ischemia could not be definitively excluded   Left ventricular function was severely reduced with hypokinesis of the inferior wall noted           Patient Active Problem List   Diagnosis    Essential hypertension    Mixed hyperlipidemia    Type 2 diabetes mellitus without complication, without long-term current use of insulin ([address] )    Ventral hernia without obstruction or gangrene    Soft tissue mass    Cigarette nicotine dependence without complication    Congestive heart failure, NYHA class 1, acute, systolic (HCC)    Coronary artery disease due to lipid rich plaque    Carotid artery stenosis without cerebral infarction, bilateral    Cerebral aneurysm    S/P Left carotid endarterectomy 1/13/20    Arthritis    Familial hypercholesterolemia    BMI 27 0-27 9,adult    Chronic right shoulder pain    Elevated PSA    Status post reverse total arthroplasty of right shoulder    Erectile dysfunction of non-organic origin    Fatigue    Anemia    Left carpal tunnel syndrome    Medicare annual wellness visit, subsequent    Screening for AAA (abdominal aortic aneurysm)       Allergies   Allergen Reactions    Other Hives     Soft shell crabs hives         Current Outpatient Medications:     amLODIPine (NORVASC) 10 mg tablet, Take 1 tablet (10 mg total) by mouth daily, Disp: 90 tablet, Rfl: 3    ascorbic acid (VITAMIN C) 500 mg tablet, Take 500 mg by mouth daily, Disp: , Rfl:     aspirin 81 mg chewable tablet, Chew 1 tablet (81 mg total) daily, Disp: 90 tablet, Rfl: 3    atorvastatin (LIPITOR) 20 mg tablet, TAKE 1 TABLET BY MOUTH  DAILY, Disp: 90 tablet, Rfl: 3    carvedilol (COREG) 12 5 mg tablet, Take 1 tablet (12 5 mg total) by mouth 2 (two) times a day with meals, Disp: 180 tablet, Rfl: 3   clopidogrel (PLAVIX) 75 mg tablet, TAKE 1 TABLET BY MOUTH  DAILY, Disp: 90 tablet, Rfl: 3    glucose blood test strip, Use as instructed, Disp: 100 each, Rfl: 2    Januvia 25 MG tablet, TAKE 1 TABLET BY MOUTH  DAILY, Disp: 90 tablet, Rfl: 3    losartan (COZAAR) 100 MG tablet, TAKE 1 TABLET BY MOUTH  DAILY, Disp: 90 tablet, Rfl: 3    metFORMIN (GLUCOPHAGE) 1000 MG tablet, TAKE 1 TABLET BY MOUTH  TWICE DAILY WITH MEALS, Disp: 180 tablet, Rfl: 3    nicotine polacrilex (COMMIT) 2 MG lozenge, May take 1 lozenge every 3-4 hours as needed with no more than 10 lozenges in a day, Disp: 72 each, Rfl: 3    sildenafil (VIAGRA) 25 MG tablet, TAKE 1 TABLET BY MOUTH  DAILY AS NEEDED FOR  ERECTILE DYSFUNCTION, Disp: 10 tablet, Rfl: 0    Elastic Bandages & Supports (Carpal Tunnel Wrist Stabilizer) MISC, Use daily at bedtime, Disp: 1 each, Rfl: 1    oxyCODONE (ROXICODONE) 5 mg immediate release tablet, 1 tablets every 6 hours as needed for severe pain  (Patient not taking: Reported on 7/13/2021), Disp: 13 tablet, Rfl: 0    Past Medical History:   Diagnosis Date    CHF (congestive heart failure) (HCC)     Diabetes mellitus (HCC)     Diverticulitis     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[facility] [address] ) 11/7/2018    Heart disease     Hyperlipidemia     Hypertension     Kidney stone     Osteoarthritis     Vascular disorder        Family History   Problem Relation Age of Onset    Colon cancer Father     Colon cancer Paternal Grandfather     No Known Problems Mother     Colon cancer Family        Past Surgical History:   Procedure Laterality Date    ABDOMINAL SURGERY      COLONOSCOPY      INGUINAL HERNIA REPAIR Left     LAPAROSCOPIC COLON RESECTION      TX COLONOSCOPY FLX DX W/COLLJ SPEC WHEN PFRMD N/A 11/3/2017    Procedure: COLONOSCOPY;  Surgeon: Josh South MD;  Location: [facility];   Service: Colorectal    TX RECONSTR TOTAL SHOULDER IMPLANT Right 11/17/2020    Procedure: ARTHROPLASTY SHOULDER REVERSE with biceps tendonesis; Surgeon: Jere Connolly MD;  Location[facility];  Service: Orthopedics    IL THROMBOENDARTECTMY Viktoria Hackett Left 2020    Procedure: ENDARTERECTOMY ARTERY CAROTID;  Surgeon: Stephie Shepherd MD;  Location[facility];  Service: Vascular       Social History     Socioeconomic History    Marital status: /Civil Union     Spouse name: Not on file    Number of children: Not on file    Years of education: Not on file    Highest education level: Not on file   Occupational History    Not on file   Tobacco Use    Smoking status: Former Smoker     Types: Cigarettes, Cigars     Quit date: 2019     Years since quittin 4    Smokeless tobacco: Never Used   Vaping Use    Vaping Use: Never used   Substance and Sexual Activity    Alcohol use: Yes     Alcohol/week: 2 0 standard drinks     Types: 2 Cans of beer per week    Drug use: No    Sexual activity: Not Currently   Other Topics Concern    Not on file   Social History Narrative    Caffeine use    Uses safety equipment: seatbelts     Social Determinants of Health     Financial Resource Strain: Not on file   Food Insecurity: Not on file   Transportation Needs: Not on file   Physical Activity: Not on file   Stress: Not on file   Social Connections: Not on file   Intimate Partner Violence: Not on file   Housing Stability: Not on file       Review of symptoms:   Review of Systems   Constitutional: Positive for fatigue  Negative for chills, diaphoresis and fever  Respiratory: Positive for shortness of breath  Negative for cough and chest tightness  Cardiovascular: Negative for chest pain, palpitations and leg swelling  Gastrointestinal: Negative for abdominal distention, blood in stool, nausea and vomiting  Genitourinary: Negative for difficulty urinating  Musculoskeletal: Negative for arthralgias and back pain  Neurological: Negative for dizziness, syncope, light-headedness and headaches     Psychiatric/Behavioral: Negative for agitation and confusion  The patient is not nervous/anxious  Vitals: /80 (BP Location: Left arm, Patient Position: Sitting)   Pulse 87   Resp 16   Ht 6' (1 829 m)   Wt 92 5 kg (204 lb)   SpO2 96%   BMI 27 67 kg/m²         Physical Exam:     Physical Exam  Vitals and nursing note reviewed  Constitutional:       Appearance: He is well-developed  HENT:      Head: Normocephalic and atraumatic  Eyes:      Conjunctiva/sclera: Conjunctivae normal    Cardiovascular:      Rate and Rhythm: Normal rate and regular rhythm  Heart sounds: Normal heart sounds  No murmur heard  Pulmonary:      Effort: Pulmonary effort is normal  No respiratory distress  Breath sounds: Normal breath sounds  Abdominal:      Palpations: Abdomen is soft  Tenderness: There is no abdominal tenderness  Musculoskeletal:      Cervical back: Neck supple  Right lower leg: No edema  Left lower leg: No edema  Skin:     General: Skin is warm and dry  Neurological:      Mental Status: He is alert  Thank you for allowing me to participate in the care and evaluation of your patient  Should you have any questions, please feel free to contact me

## 2022-02-27 ENCOUNTER — HOSPITAL ENCOUNTER (EMERGENCY)
Facility: HOSPITAL | Age: 72
Discharge: HOME | End: 2022-02-27
Attending: EMERGENCY MEDICINE
Payer: COMMERCIAL

## 2022-02-27 ENCOUNTER — OFFICE VISIT (OUTPATIENT)
Dept: URGENT CARE | Facility: CLINIC | Age: 72
End: 2022-02-27
Payer: COMMERCIAL

## 2022-02-27 ENCOUNTER — APPOINTMENT (EMERGENCY)
Dept: CT IMAGING | Facility: HOSPITAL | Age: 72
End: 2022-02-27
Payer: COMMERCIAL

## 2022-02-27 VITALS
HEART RATE: 79 BPM | DIASTOLIC BLOOD PRESSURE: 78 MMHG | SYSTOLIC BLOOD PRESSURE: 138 MMHG | BODY MASS INDEX: 27.63 KG/M2 | TEMPERATURE: 97.3 F | OXYGEN SATURATION: 97 % | RESPIRATION RATE: 18 BRPM | WEIGHT: 204 LBS | HEIGHT: 72 IN

## 2022-02-27 VITALS
DIASTOLIC BLOOD PRESSURE: 83 MMHG | RESPIRATION RATE: 18 BRPM | TEMPERATURE: 97.8 F | HEART RATE: 76 BPM | SYSTOLIC BLOOD PRESSURE: 150 MMHG | OXYGEN SATURATION: 95 %

## 2022-02-27 DIAGNOSIS — R42 LIGHTHEADEDNESS: Primary | ICD-10-CM

## 2022-02-27 DIAGNOSIS — G44.311 INTRACTABLE ACUTE POST-TRAUMATIC HEADACHE: ICD-10-CM

## 2022-02-27 DIAGNOSIS — R42 DIZZINESS AND GIDDINESS: Primary | ICD-10-CM

## 2022-02-27 LAB
ALBUMIN SERPL BCP-MCNC: 3.8 G/DL (ref 3.5–5)
ALP SERPL-CCNC: 45 U/L (ref 46–116)
ALT SERPL W P-5'-P-CCNC: 28 U/L (ref 12–78)
ANION GAP SERPL CALCULATED.3IONS-SCNC: 7 MMOL/L (ref 4–13)
APTT PPP: 33 SECONDS (ref 23–37)
AST SERPL W P-5'-P-CCNC: 12 U/L (ref 5–45)
BASOPHILS # BLD AUTO: 0.08 THOUSANDS/ΜL (ref 0–0.1)
BASOPHILS NFR BLD AUTO: 1 % (ref 0–1)
BILIRUB SERPL-MCNC: 0.67 MG/DL (ref 0.2–1)
BUN SERPL-MCNC: 23 MG/DL (ref 5–25)
CALCIUM SERPL-MCNC: 9.9 MG/DL (ref 8.3–10.1)
CHLORIDE SERPL-SCNC: 102 MMOL/L (ref 100–108)
CO2 SERPL-SCNC: 30 MMOL/L (ref 21–32)
CREAT SERPL-MCNC: 0.93 MG/DL (ref 0.6–1.3)
EOSINOPHIL # BLD AUTO: 0.25 THOUSAND/ΜL (ref 0–0.61)
EOSINOPHIL NFR BLD AUTO: 4 % (ref 0–6)
ERYTHROCYTE [DISTWIDTH] IN BLOOD BY AUTOMATED COUNT: 12.3 % (ref 11.6–15.1)
GFR SERPL CREATININE-BSD FRML MDRD: 83 ML/MIN/1.73SQ M
GLUCOSE SERPL-MCNC: 160 MG/DL (ref 65–140)
HCT VFR BLD AUTO: 43.4 % (ref 36.5–49.3)
HGB BLD-MCNC: 14.6 G/DL (ref 12–17)
IMM GRANULOCYTES # BLD AUTO: 0.02 THOUSAND/UL (ref 0–0.2)
IMM GRANULOCYTES NFR BLD AUTO: 0 % (ref 0–2)
INR PPP: 0.97 (ref 0.84–1.19)
LYMPHOCYTES # BLD AUTO: 2.16 THOUSANDS/ΜL (ref 0.6–4.47)
LYMPHOCYTES NFR BLD AUTO: 35 % (ref 14–44)
MCH RBC QN AUTO: 32.4 PG (ref 26.8–34.3)
MCHC RBC AUTO-ENTMCNC: 33.6 G/DL (ref 31.4–37.4)
MCV RBC AUTO: 96 FL (ref 82–98)
MONOCYTES # BLD AUTO: 0.72 THOUSAND/ΜL (ref 0.17–1.22)
MONOCYTES NFR BLD AUTO: 12 % (ref 4–12)
NEUTROPHILS # BLD AUTO: 3.01 THOUSANDS/ΜL (ref 1.85–7.62)
NEUTS SEG NFR BLD AUTO: 48 % (ref 43–75)
NRBC BLD AUTO-RTO: 0 /100 WBCS
PLATELET # BLD AUTO: 194 THOUSANDS/UL (ref 149–390)
PMV BLD AUTO: 10.3 FL (ref 8.9–12.7)
POTASSIUM SERPL-SCNC: 3.9 MMOL/L (ref 3.5–5.3)
PROT SERPL-MCNC: 8.2 G/DL (ref 6.4–8.2)
PROTHROMBIN TIME: 12.5 SECONDS (ref 11.6–14.5)
RBC # BLD AUTO: 4.5 MILLION/UL (ref 3.88–5.62)
SODIUM SERPL-SCNC: 139 MMOL/L (ref 136–145)
WBC # BLD AUTO: 6.24 THOUSAND/UL (ref 4.31–10.16)

## 2022-02-27 PROCEDURE — 85025 COMPLETE CBC W/AUTO DIFF WBC: CPT | Performed by: EMERGENCY MEDICINE

## 2022-02-27 PROCEDURE — 80053 COMPREHEN METABOLIC PANEL: CPT | Performed by: EMERGENCY MEDICINE

## 2022-02-27 PROCEDURE — 99285 EMERGENCY DEPT VISIT HI MDM: CPT

## 2022-02-27 PROCEDURE — S9083 URGENT CARE CENTER GLOBAL: HCPCS | Performed by: PHYSICIAN ASSISTANT

## 2022-02-27 PROCEDURE — 99213 OFFICE O/P EST LOW 20 MIN: CPT | Performed by: PHYSICIAN ASSISTANT

## 2022-02-27 PROCEDURE — 85610 PROTHROMBIN TIME: CPT | Performed by: EMERGENCY MEDICINE

## 2022-02-27 PROCEDURE — 93005 ELECTROCARDIOGRAM TRACING: CPT

## 2022-02-27 PROCEDURE — G1004 CDSM NDSC: HCPCS

## 2022-02-27 PROCEDURE — 85730 THROMBOPLASTIN TIME PARTIAL: CPT | Performed by: EMERGENCY MEDICINE

## 2022-02-27 PROCEDURE — 99285 EMERGENCY DEPT VISIT HI MDM: CPT | Performed by: EMERGENCY MEDICINE

## 2022-02-27 PROCEDURE — 70450 CT HEAD/BRAIN W/O DYE: CPT

## 2022-02-27 PROCEDURE — 36415 COLL VENOUS BLD VENIPUNCTURE: CPT | Performed by: EMERGENCY MEDICINE
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
## 2022-03-01 LAB
ATRIAL RATE: 80 BPM
P AXIS: 46 DEGREES
PR INTERVAL: 152 MS
QRS AXIS: 1 DEGREES
QRSD INTERVAL: 86 MS
QT INTERVAL: 374 MS
QTC INTERVAL: 428 MS
T WAVE AXIS: -48 DEGREES
VENTRICULAR RATE: 79 BPM

## 2022-03-01 PROCEDURE — 93010 ELECTROCARDIOGRAM REPORT: CPT | Performed by: INTERNAL MEDICINE

## 2022-03-02 ENCOUNTER — TELEPHONE (OUTPATIENT)
Dept: ADMINISTRATIVE | Facility: OTHER | Age: 72
End: 2022-03-02

## 2022-03-02 DIAGNOSIS — E78.2 MIXED HYPERLIPIDEMIA: ICD-10-CM

## 2022-03-02 NOTE — TELEPHONE ENCOUNTER
Upon review of the In Basket request and the patient's chart, initial outreach has been made via fax, please see Contacts section for details       Thank you  Leeland Riedel

## 2022-03-02 NOTE — TELEPHONE ENCOUNTER
----- Message from Bryan Nieves MA sent at 3/2/2022 12:22 PM EST -----  Regarding: Care Gap Request  03/02/22 12:22 PM    Hello, our patient Asim Negron has had Diabetic Eye Exam completed/performed  Please assist in updating the patient chart by making an External outreach to [facility] located in [place]  The date of service is with in the last year      Thank you,  Bryan Nieves MA   JORGE SHIELDS

## 2022-03-02 NOTE — LETTER
Diabetic Eye Exam Form    Date Requested: 22  Patient: Rico Vora  Patient : 1950   Referring Provider: Praveen Patel MD      DIABETIC Eye Exam Date _______________________________    Type of Exam MUST be documented for Diabetic Eye Exams  Please CHECK ONE  Dilated Retinal Exam      Optomap-Iris Exam      Fundus Photography Completed       Left Eye - Please check Retinopathy or No Retinopathy AND Type      Exam did show retinopathy    Exam did not show retinopathy         Mild     Proliferative           Moderate    Severe            None         Right Eye - Please check Retinopathy or No Retinopathy AND Type      Exam did show retinopathy    Exam did not show retinopathy         Mild     Proliferative        Moderate    Severe        None       Comments __________________________________________________________    Practice Providing Exam ______________________________________________    Exam Performed By (print name) _______________________________________      Provider Signature ___________________________________________________    These reports are needed for  compliance  Please fax this completed form and a copy of the Diabetic Eye Exam report to our office located at [place] [address] as soon as possible via [phone] mamadou Jolly Barley: Phone [phone]    We thank you for your assistance in treating our mutual patient

## 2022-03-04 RX ORDER — ATORVASTATIN CALCIUM 20 MG/1
TABLET, FILM COATED ORAL
Qty: 90 TABLET | Refills: 3 | Status: SHIPPED | OUTPATIENT
Start: 2022-03-04

## 2022-03-08 ENCOUNTER — HOSPITAL ENCOUNTER (OUTPATIENT)
Dept: VASCULAR ULTRASOUND | Facility: HOSPITAL | Age: 72
Discharge: HOME | End: 2022-03-08
Payer: COMMERCIAL

## 2022-03-08 ENCOUNTER — OFFICE VISIT (OUTPATIENT)
Dept: FAMILY MEDICINE CLINIC | Facility: CLINIC | Age: 72
End: 2022-03-08
Payer: COMMERCIAL

## 2022-03-08 VITALS
SYSTOLIC BLOOD PRESSURE: 123 MMHG | HEIGHT: 72 IN | HEART RATE: 85 BPM | TEMPERATURE: 97.7 F | WEIGHT: 203 LBS | BODY MASS INDEX: 27.5 KG/M2 | DIASTOLIC BLOOD PRESSURE: 82 MMHG | OXYGEN SATURATION: 98 %

## 2022-03-08 DIAGNOSIS — H43.399 VITREOUS FLOATERS, UNSPECIFIED LATERALITY: Primary | ICD-10-CM

## 2022-03-08 DIAGNOSIS — I65.22 CAROTID STENOSIS, LEFT: ICD-10-CM

## 2022-03-08 DIAGNOSIS — S09.90XA HEAD INJURIES, INITIAL ENCOUNTER: ICD-10-CM

## 2022-03-08 PROCEDURE — 1036F TOBACCO NON-USER: CPT | Performed by: PHYSICIAN ASSISTANT

## 2022-03-08 PROCEDURE — 99214 OFFICE O/P EST MOD 30 MIN: CPT | Performed by: PHYSICIAN ASSISTANT

## 2022-03-08 PROCEDURE — 3079F DIAST BP 80-89 MM HG: CPT | Performed by: PHYSICIAN ASSISTANT

## 2022-03-08 PROCEDURE — 93880 EXTRACRANIAL BILAT STUDY: CPT

## 2022-03-08 PROCEDURE — 3074F SYST BP LT 130 MM HG: CPT | Performed by: PHYSICIAN ASSISTANT

## 2022-03-08 PROCEDURE — 93880 EXTRACRANIAL BILAT STUDY: CPT | Performed by: SURGERY

## 2022-03-08 PROCEDURE — 1160F RVW MEDS BY RX/DR IN RCRD: CPT | Performed by: PHYSICIAN ASSISTANT

## 2022-03-08 NOTE — PROGRESS NOTES
Assessment/Plan:    No problem-specific Assessment & Plan notes found for this encounter  Diagnoses and all orders for this visit:    Vitreous floaters, unspecified laterality  -     CT head wo contrast; Future    Head injuries, initial encounter        Pt needs to see ophthalmology ASAP to r/o retinal detachment  Patient will call today to move appointment up from Monday  CT head ordered to repeat  Discussed strict return instructions and f/u if change or worsening symptoms      Subjective:      Patient ID: Chaim Olmos is a 73 y o  male  Floaters started the following day after fall and heavy drinking  Was seen in the ER 2/27/22 after heavy night of drinking  Does not recall if he actually hit his head but fell out of a truck apparajntly striking his head without known LOC  Continues for the past 2 weeks of dizziness  Worse with positional changes specifically to the left side  Low grade headache  Patient is on plavix  CT head negative on 2/27/22  laying down had nausea associated with dizziness today otherwise tolerating PO  Had ultrasound this morning of carotids    Eye doc scheduled next Monday    Denies extremity weakness, no change in speech, no change in gait  Denies any signs of bleeding from urine stool       The following portions of the patient's history were reviewed and updated as appropriate: allergies, current medications, past family history, past medical history, past surgical history and problem list     Review of Systems   Constitutional: Negative for chills, fatigue and fever  HENT: Negative for congestion, ear pain, sinus pain, sore throat and trouble swallowing  Eyes: Positive for visual disturbance  Negative for pain, discharge and redness  Respiratory: Negative for cough, chest tightness, shortness of breath and wheezing  Cardiovascular: Negative for chest pain, palpitations and leg swelling  Gastrointestinal: Negative for abdominal pain, diarrhea, nausea and vomiting  Musculoskeletal: Negative for arthralgias, joint swelling and myalgias  Skin: Negative for rash  Neurological: Negative for dizziness, weakness, numbness and headaches  Objective:      /82   Pulse 85   Temp 97 7 °F (36 5 °C)   Ht 6' (1 829 m)   Wt 92 1 kg (203 lb)   SpO2 98%   BMI 27 53 kg/m²          Physical Exam  Vitals and nursing note reviewed  Constitutional:       General: He is not in acute distress  Appearance: Normal appearance  He is well-developed  Eyes:      General: No visual field deficit  Right eye: No foreign body  Left eye: No foreign body  Extraocular Movements:      Right eye: Nystagmus present  Normal extraocular motion  Left eye: Nystagmus present  Normal extraocular motion  Conjunctiva/sclera: Conjunctivae normal    Cardiovascular:      Rate and Rhythm: Normal rate and regular rhythm  Pulmonary:      Effort: Pulmonary effort is normal       Breath sounds: Normal breath sounds  Abdominal:      General: Bowel sounds are normal       Palpations: Abdomen is soft  Abdomen is not rigid  Tenderness: There is no abdominal tenderness  There is no guarding or rebound  Negative signs include Stern's sign and McBurney's sign  Hernia: No hernia is present  Skin:     General: Skin is warm and dry  Neurological:      General: No focal deficit present  Mental Status: He is alert and oriented to person, place, and time  Cranial Nerves: Cranial nerves are intact  Motor: Motor function is intact  Coordination: Coordination is intact  Romberg sign negative  Gait: Gait is intact

## 2022-03-08 NOTE — TELEPHONE ENCOUNTER
As a follow-up, a second attempt has been made for outreach via fax, please see Contacts section for details      Thank you  Leeland Riedel

## 2022-03-09 ENCOUNTER — TRANSCRIBE ORDERS (OUTPATIENT)
Dept: VASCULAR SURGERY | Facility: CLINIC | Age: 72
End: 2022-03-09

## 2022-03-09 DIAGNOSIS — I65.23 CAROTID ARTERY STENOSIS WITHOUT CEREBRAL INFARCTION, BILATERAL: Primary | ICD-10-CM

## 2022-03-09 LAB
LEFT EYE DIABETIC RETINOPATHY: NORMAL
RIGHT EYE DIABETIC RETINOPATHY: NORMAL

## 2022-03-09 PROCEDURE — 2023F DILAT RTA XM W/O RTNOPTHY: CPT | Performed by: PHYSICIAN ASSISTANT

## 2022-03-09 NOTE — TELEPHONE ENCOUNTER
Patient called the office to change the date of LHC due to having a concussion  Patient was agreeable to reschedule cath for 3/21/22

## 2022-03-11 NOTE — TELEPHONE ENCOUNTER
As a final attempt, a third outreach has been made via telephone call  Please see Contacts section for details  This encounter will be closed and completed by end of day  Should we receive the requested information because of previous outreach attempts, the requested patient's chart will be updated appropriately       Thank you  Edmundo Alexander

## 2022-03-11 NOTE — TELEPHONE ENCOUNTER
Upon review of the In Basket request we were able to locate, review, and update the patient chart as requested for Diabetic Eye Exam     Any additional questions or concerns should be emailed to the Practice Liaisons via [email or web address] email, please do not reply via In Basket      Thank you  Leydi Rodas

## 2022-03-14 ENCOUNTER — TELEMEDICINE (OUTPATIENT)
Dept: VASCULAR SURGERY | Facility: CLINIC | Age: 72
End: 2022-03-14
Payer: COMMERCIAL

## 2022-03-14 VITALS
SYSTOLIC BLOOD PRESSURE: 130 MMHG | WEIGHT: 194 LBS | HEIGHT: 72 IN | DIASTOLIC BLOOD PRESSURE: 88 MMHG | BODY MASS INDEX: 26.28 KG/M2

## 2022-03-14 DIAGNOSIS — E78.2 MIXED HYPERLIPIDEMIA: ICD-10-CM

## 2022-03-14 DIAGNOSIS — I10 ESSENTIAL HYPERTENSION: ICD-10-CM

## 2022-03-14 DIAGNOSIS — I65.23 CAROTID ARTERY STENOSIS WITHOUT CEREBRAL INFARCTION, BILATERAL: Primary | ICD-10-CM

## 2022-03-14 PROCEDURE — 3008F BODY MASS INDEX DOCD: CPT | Performed by: PHYSICIAN ASSISTANT

## 2022-03-14 PROCEDURE — 99442 PR PHYS/QHP TELEPHONE EVALUATION 11-20 MIN: CPT

## 2022-03-14 NOTE — ASSESSMENT & PLAN NOTE
-Pt states his BP has been stable at home  Pt states his SBP has been in 120-130s     -Continue adequate BP control and monitoring   -Medical management per PCP

## 2022-03-14 NOTE — ASSESSMENT & PLAN NOTE
Lab Results   Component Value Date    HGBA1C 8 0 (H) 01/24/2022   - Most recent A1c 8 0, not at goal    - Recommend adequate glucose monitoring and control    - Mgmt per PCP

## 2022-03-14 NOTE — PATIENT INSTRUCTIONS
-Continue aspirin, plavix, statin  -Continue adequate blood glucose and blood pressure control    -Maintain a low-cholesterol, low-fat diet  -Recommend regular exercise  -If you start to experience s/sx of TIA/Stroke please call 911 of go to ED immediately for evaluation  -Call office with any question, concerns, or new symptoms  Carotid Artery Disease   AMBULATORY CARE:   Carotid artery disease (CAD)  means the major blood vessels in your neck are narrowed or becoming blocked  These 2 major blood vessels are called the carotid arteries  They supply your brain with blood  The narrow or blocked blood vessels increase your risk for a stroke  CAD is also called carotid artery stenosis  Have someone call your local emergency number (911 in the [address] [facility]) if:   · You have any of the following signs of a stroke:      ? Numbness or drooping on one side of your face     ? Weakness in an arm or leg    ? Confusion or difficulty speaking    ? Dizziness, a severe headache, or vision loss    · You have any of the following signs of a heart attack:      ? Squeezing, pressure, or pain in your chest    ? You may  also have any of the following:     § Discomfort or pain in your back, neck, jaw, stomach, or arm    § Shortness of breath    § Nausea or vomiting    § Lightheadedness or a sudden cold sweat    Call your doctor if:   · You have questions or concerns about your condition or care  Common signs and symptoms:  CAD develops slowly  You may have no signs or symptoms until you have a mini-stroke, or transient ischemic attack (TIA)  A TIA is a temporary lack of blood flow to your brain  A TIA goes away quickly and does not cause permanent damage  A TIA may be a warning sign that you are about to have a stroke  If you have any symptoms of a TIA or stroke, seek care immediately  Warning signs of a stroke:   The words BE FAST can help you remember and recognize warning signs of a stroke:  · B = Balance:  Sudden loss of balance    · E = Eyes:  Loss of vision in one or both eyes    · F = Face:  Face droops on one side    · A = Arms:  Arm drops when both arms are raised    · S = Speech:  Speech is slurred or sounds different    · T = Time:  Time to get help immediately       Treatment  depends on how narrow your arteries have become  Treatment also depends on your symptoms and your general health  The goal of treatment is to lower your risk for a stroke  You may need any of the following:  · Medicines:      ? Aspirin,  or other blood thinner, may be recommended  These will help prevent blood clots from forming in your carotid arteries  If your healthcare provider wants you to take aspirin, do not take acetaminophen or ibuprofen instead  ? Cholesterol medicine  lowers your cholesterol level  ? Blood pressure medicine  lowers or helps control your blood pressure  · Procedures  can help open blocked arteries:     ? Carotid endarterectomy (CEA)  is used to cut and remove plaque buildup from your arteries  ? Carotid angioplasty and stenting (RICKY)  is used to push the plaque against the artery wall with a balloon device  Then, a stent is placed to keep the artery open  A stent is a small metal mesh tube  Prevent a stroke:   · Do not smoke, and avoid secondhand smoke  Nicotine and other chemicals in cigarettes and cigars increase your risk for a stroke  Ask your healthcare provider for information if you currently smoke and need help to quit  E-cigarettes or smokeless tobacco still contain nicotine  Talk to your healthcare provider before you use these products  · Eat a variety of healthy foods  Healthy foods include fruit, vegetables, whole-grain breads, low-fat dairy products, chicken, and fish  Choose fish that are high in omega-3 fatty acids, such as salmon and fresh tuna  Ask your healthcare provider for more information on a heart healthy diet and the DASH eating plan  · Limit sodium (salt)    Sodium may increase your blood pressure  Add less table salt to your foods  Read food labels and choose foods that are low in sodium  Your healthcare provider may suggest you follow a low sodium diet  · Reach or maintain a healthy weight  Extra weight makes your heart work harder  Ask your healthcare provider what a healthy weight is for you  He or she can help you create a safe weight loss plan  Even a weight loss of 10% of your extra body weight can help your heart function better  · Exercise regularly  Exercise helps improve heart function and can help you manage your weight  Exercise can also help lower your cholesterol and blood sugar levels  Try to get at least 30 minutes of exercise 5 times each week  Try to be physically active every day  This may include walking, riding a bicycle, or swimming  Your healthcare provider can help you create an exercise plan that works best for you  · Limit alcohol  Alcohol can increase your blood pressure and triglyceride levels  A drink of alcohol is 12 ounces of beer, 5 ounces of wine, or 1½ ounces of liquor  Follow up with your doctor as directed:  Write down your questions so you remember to ask them during your visits  © Copyright [employer] 2022 Information is for End User's use only and may not be sold, redistributed or otherwise used for commercial purposes  All illustrations and images included in CareNotes® are the copyrighted property of [employer]  or [facility] Carlos Worrell  The above information is an  only  It is not intended as medical advice for individual conditions or treatments  Talk to your doctor, nurse or pharmacist before following any medical regimen to see if it is safe and effective for you

## 2022-03-14 NOTE — PROGRESS NOTES
Virtual Brief Visit- Telephone       Telemedicine consent    Patient: Ki Adams  Provider: Maryann Julien PA-C  Provider located at [address] [facility]  [address] [place] [address]  [address] [facility]  [phone]    The patient was identified by name and date of birth  Ki Adams was informed that this is a telemedicine visit and that the visit is being conducted through Telephone  My office door was closed  No one else was in the room  He acknowledged consent and understanding of privacy and security of the video platform  The patient has agreed to participate and understands they can discontinue the visit at any time  Patient is aware this is a billable service  Patient is located in the following state in which I hold an active license [place]      Assessment/Plan: As below     Problem List Items Addressed This Visit        Cardiovascular and Mediastinum    Essential hypertension     -Pt states his BP has been stable at home  Pt states his SBP has been in 120-130s  -Continue adequate BP control and monitoring   -Medical management per PCP            Carotid artery stenosis without cerebral infarction, bilateral - Primary     52FCI, former smoker (quit 2 years ago), with PMH of DM, HTN, HLD, b/l asymptomatic carotid stenosis s/p L CEA with redo distal anastomosis  1/13/20 by Dr Angie Kauffman  He presents today via virtual visit for RFM and to discuss non-invasive imaging studies done on 3/8/22  He denies TIA/stroke-like symptoms  CV duplex 3/8/22 reviewed(NC)  R ICA 50-69% stenosis   L ICA widely patent endarterectomy site    Plan:   - Will continue to monitor with CV duplex q6m  CV scheduled for 09/09/2022  -Continue ASA, plavix  -Continue statin   - Maintain adequate blood glucose levels and blood pressure control  - Recommend regular exercise, low-cholesterol, low-fat diet     - Educated on s/sx of TIA or stroke and told to call 911 or go to the ED immediately if symptoms occur    - Encouraged to call the office in the interim with questions, concerns, or new symptoms    - Follow up in office in 1 year or sooner if needed              Other    Mixed hyperlipidemia     -Stable   -Encourage low cholesterol, low fat diet  -Continue with atorvastatin 20 mg therapy  -Medical Management per PCP                    HPI: Asim Negron is a 73 y o  male , former smoker, with PMH of DM, HTN, HLD, bilateral asymptomatic carotid stenosis now s/p Left CEA by Dr Geovani Poe on 1/13/20  Pt presents today for RFM and to discuss non-invasive imaging done on 3/8/22  Pt states he is doing ok  Pt notes he had a recent fall 2 weeks ago in which he slipped on ice and suffered a mild concussion  Pt was seen in the ED on 2/27/22 and had a CT scan that was negative for acute bleeding  Pt states he has some dizziness and occasional headache, rating it a 1/10  He notes floaters in the eye and was seen by his optometrist recently  He denies TIA/stroke-like symptoms at this time  He states his BP is controlled and his cardiologist recently increased the dosage of his medication  His SBP has been between 120-130 mmHg  His most recent hemoglobin A1c was 8 0, LDL 72  We discussed lifestyle modifications and importance of regular exercise  We reviewed the results of CV duplex done on 3/8/22, which are as expected and no significant change since his previous study  We will continue to monitor for progression every 6 months with repeat CV duplex  We will follow up in office in 1 year to go over results  Pt was told to call the office in the interim with any questions, concerns, or new symptoms  Review of Systems   Constitutional: Negative  HENT: Negative  Respiratory: Negative  Cardiovascular: Negative  Gastrointestinal: Negative  Endocrine: Negative  Genitourinary: Negative  Musculoskeletal: Negative  Allergic/Immunologic: Negative      Neurological: Positive for dizziness and headaches  Hematological: Negative  Psychiatric/Behavioral: Negative  Physical Exam   Physical examination unable to be performed  Vitals:    03/14/22 0834   BP: 130/88   Weight: 88 kg (194 lb)   Height: 6' (1 829 m)       Patient Active Problem List   Diagnosis    Essential hypertension    Mixed hyperlipidemia    Type 2 diabetes mellitus without complication, without long-term current use of insulin (HCC)    Ventral hernia without obstruction or gangrene    Soft tissue mass    Cigarette nicotine dependence without complication    Congestive heart failure, NYHA class 1, acute, systolic (HCC)    Coronary artery disease due to lipid rich plaque    Carotid artery stenosis without cerebral infarction, bilateral    Cerebral aneurysm    S/P Left carotid endarterectomy 1/13/20    Arthritis    Familial hypercholesterolemia    BMI 27 0-27 9,adult    Chronic right shoulder pain    Elevated PSA    Status post reverse total arthroplasty of right shoulder    Erectile dysfunction of non-organic origin    Fatigue    Anemia    Left carpal tunnel syndrome    Medicare annual wellness visit, subsequent    Screening for AAA (abdominal aortic aneurysm)       Past Surgical History:   Procedure Laterality Date    ABDOMINAL SURGERY      COLONOSCOPY      INGUINAL HERNIA REPAIR Left     LAPAROSCOPIC COLON RESECTION      VT COLONOSCOPY FLX DX W/COLLJ SPEC WHEN PFRMD N/A 11/3/2017    Procedure: COLONOSCOPY;  Surgeon: Josh South MD;  [facility]; Service: Colorectal    VT RECONSTR TOTAL SHOULDER IMPLANT Right 11/17/2020    Procedure: ARTHROPLASTY SHOULDER REVERSE with biceps tendonesis;   Surgeon: Angel Carter MD;  [facility];  Service: Orthopedics    VT THROMBOENDARTECTMY Rosa Hernandez INCIS Left 1/13/2020    Procedure: ENDARTERECTOMY ARTERY CAROTID;  Surgeon: Jessica Sloan MD;  [facility];  Service: Vascular       Family History   Problem Relation Age of Onset    Colon cancer Father     Colon cancer Paternal Grandfather     No Known Problems Mother     Colon cancer Family        Social History     Socioeconomic History    Marital status: /Civil Union     Spouse name: Not on file    Number of children: Not on file    Years of education: Not on file    Highest education level: Not on file   Occupational History    Not on file   Tobacco Use    Smoking status: Former Smoker     Types: Cigarettes, Cigars     Quit date: 2019     Years since quittin 4    Smokeless tobacco: Never Used   Vaping Use    Vaping Use: Never used   Substance and Sexual Activity    Alcohol use:  Yes     Alcohol/week: 2 0 standard drinks     Types: 2 Cans of beer per week    Drug use: No    Sexual activity: Not Currently   Other Topics Concern    Not on file   Social History Narrative    Caffeine use    Uses safety equipment: seatbelts     Social Determinants of Health     Financial Resource Strain: Not on file   Food Insecurity: Not on file   Transportation Needs: Not on file   Physical Activity: Not on file   Stress: Not on file   Social Connections: Not on file   Intimate Partner Violence: Not on file   Housing Stability: Not on file       Allergies   Allergen Reactions    Other Hives     Soft shell crabs hives         Current Outpatient Medications:     amLODIPine (NORVASC) 10 mg tablet, Take 1 tablet (10 mg total) by mouth daily, Disp: 90 tablet, Rfl: 3    ascorbic acid (VITAMIN C) 500 mg tablet, Take 500 mg by mouth daily, Disp: , Rfl:     aspirin 81 mg chewable tablet, Chew 1 tablet (81 mg total) daily, Disp: 90 tablet, Rfl: 3    atorvastatin (LIPITOR) 20 mg tablet, TAKE 1 TABLET BY MOUTH  DAILY, Disp: 90 tablet, Rfl: 3    carvedilol (COREG) 12 5 mg tablet, Take 1 tablet (12 5 mg total) by mouth 2 (two) times a day with meals, Disp: 180 tablet, Rfl: 3    clopidogrel (PLAVIX) 75 mg tablet, TAKE 1 TABLET BY MOUTH  DAILY, Disp: 90 tablet, Rfl: 3   Januvia 25 MG tablet, TAKE 1 TABLET BY MOUTH  DAILY, Disp: 90 tablet, Rfl: 3    losartan (COZAAR) 100 MG tablet, TAKE 1 TABLET BY MOUTH  DAILY, Disp: 90 tablet, Rfl: 3    metFORMIN (GLUCOPHAGE) 1000 MG tablet, TAKE 1 TABLET BY MOUTH  TWICE DAILY WITH MEALS, Disp: 180 tablet, Rfl: 3    nicotine polacrilex (COMMIT) 2 MG lozenge, May take 1 lozenge every 3-4 hours as needed with no more than 10 lozenges in a day, Disp: 72 each, Rfl: 3    sildenafil (VIAGRA) 25 MG tablet, TAKE 1 TABLET BY MOUTH  DAILY AS NEEDED FOR  ERECTILE DYSFUNCTION, Disp: 10 tablet, Rfl: 0    glucose blood test strip, Use as instructed, Disp: 100 each, Rfl: 2       Recent Visits  No visits were found meeting these conditions  Showing recent visits within past 7 days and meeting all other requirements  Today's Visits  Date Type Provider Dept   03/14/22 Telemedicine Patrick Rogers PA-C [facility]   Showing today's visits and meeting all other requirements  Future Appointments  No visits were found meeting these conditions  Showing future appointments within next 150 days and meeting all other requirements         I spent 20 minutes with patient today in which greater than 50% of the time was spent in counseling/coordination of care regarding risk factor modification and discussing non-invasive imaging studies

## 2022-03-14 NOTE — ASSESSMENT & PLAN NOTE
-Stable   -Encourage low cholesterol, low fat diet  -Continue with atorvastatin 20 mg therapy  -Medical Management per PCP

## 2022-03-14 NOTE — ASSESSMENT & PLAN NOTE
89ART, former smoker (quit 2 years ago), with PMH of DM, HTN, HLD, b/l asymptomatic carotid stenosis s/p L CEA with redo distal anastomosis  1/13/20 by Dr Ortega Bey  He presents today via virtual visit for [facility] and to discuss non-invasive imaging studies done on 3/8/22  He denies TIA/stroke-like symptoms  CV duplex 3/8/22 reviewed([facility])  R ICA 50-69% stenosis   L ICA widely patent endarterectomy site    Plan:   - Will continue to monitor with CV duplex q6m  CV scheduled for 09/09/2022  -Continue ASA, plavix  -Continue statin   - Maintain adequate blood glucose levels and blood pressure control  - Recommend regular exercise, low-cholesterol, low-fat diet     - Educated on s/sx of TIA or stroke and told to call 911 or go to the ED immediately if symptoms occur    - Encouraged to call the office in the interim with questions, concerns, or new symptoms    - Follow up in office in 1 year or sooner if needed

## 2022-03-21 ENCOUNTER — HOSPITAL ENCOUNTER (OUTPATIENT)
Facility: HOSPITAL | Age: 72
Setting detail: OUTPATIENT SURGERY
Discharge: HOME | End: 2022-03-21
Attending: INTERNAL MEDICINE | Admitting: INTERNAL MEDICINE
Payer: COMMERCIAL

## 2022-03-21 VITALS
TEMPERATURE: 97.4 F | SYSTOLIC BLOOD PRESSURE: 138 MMHG | OXYGEN SATURATION: 97 % | DIASTOLIC BLOOD PRESSURE: 78 MMHG | RESPIRATION RATE: 16 BRPM | WEIGHT: 200.18 LBS | BODY MASS INDEX: 27.11 KG/M2 | HEIGHT: 72 IN | HEART RATE: 63 BPM

## 2022-03-21 DIAGNOSIS — R94.39 ABNORMAL STRESS TEST: ICD-10-CM

## 2022-03-21 LAB
ANION GAP SERPL CALCULATED.3IONS-SCNC: 11 MMOL/L (ref 4–13)
BUN SERPL-MCNC: 17 MG/DL (ref 5–25)
CALCIUM SERPL-MCNC: 9 MG/DL (ref 8.3–10.1)
CHLORIDE SERPL-SCNC: 104 MMOL/L (ref 100–108)
CO2 SERPL-SCNC: 24 MMOL/L (ref 21–32)
CREAT SERPL-MCNC: 0.82 MG/DL (ref 0.6–1.3)
GFR SERPL CREATININE-BSD FRML MDRD: 90 ML/MIN/1.73SQ M
GLUCOSE P FAST SERPL-MCNC: 171 MG/DL (ref 65–99)
GLUCOSE SERPL-MCNC: 171 MG/DL (ref 65–140)
GLUCOSE SERPL-MCNC: 172 MG/DL (ref 65–140)
POTASSIUM SERPL-SCNC: 4.1 MMOL/L (ref 3.5–5.3)
SODIUM SERPL-SCNC: 139 MMOL/L (ref 136–145)

## 2022-03-21 PROCEDURE — 99152 MOD SED SAME PHYS/QHP 5/>YRS: CPT | Performed by: INTERNAL MEDICINE

## 2022-03-21 PROCEDURE — 82948 REAGENT STRIP/BLOOD GLUCOSE: CPT

## 2022-03-21 PROCEDURE — C1894 INTRO/SHEATH, NON-LASER: HCPCS | Performed by: INTERNAL MEDICINE

## 2022-03-21 PROCEDURE — 99153 MOD SED SAME PHYS/QHP EA: CPT | Performed by: INTERNAL MEDICINE

## 2022-03-21 PROCEDURE — 93454 CORONARY ARTERY ANGIO S&I: CPT | Performed by: INTERNAL MEDICINE

## 2022-03-21 PROCEDURE — C1769 GUIDE WIRE: HCPCS | Performed by: INTERNAL MEDICINE

## 2022-03-21 PROCEDURE — C1887 CATHETER, GUIDING: HCPCS | Performed by: INTERNAL MEDICINE

## 2022-03-21 PROCEDURE — 80048 BASIC METABOLIC PNL TOTAL CA: CPT | Performed by: INTERNAL MEDICINE

## 2022-03-21 RX ORDER — SODIUM CHLORIDE 9 MG/ML
100 INJECTION, SOLUTION INTRAVENOUS CONTINUOUS
Status: DISCONTINUED | OUTPATIENT
Start: 2022-03-21 | End: 2022-03-21 | Stop reason: HOSPADM

## 2022-03-21 RX ORDER — MIDAZOLAM HYDROCHLORIDE 2 MG/2ML
INJECTION, SOLUTION INTRAMUSCULAR; INTRAVENOUS AS NEEDED
Status: DISCONTINUED | OUTPATIENT
Start: 2022-03-21 | End: 2022-03-21 | Stop reason: HOSPADM

## 2022-03-21 RX ORDER — CLOPIDOGREL BISULFATE 75 MG/1
TABLET ORAL AS NEEDED
Status: DISCONTINUED | OUTPATIENT
Start: 2022-03-21 | End: 2022-03-21 | Stop reason: HOSPADM

## 2022-03-21 RX ORDER — LIDOCAINE WITH 8.4% SOD BICARB 0.9%(10ML)
SYRINGE (ML) INJECTION AS NEEDED
Status: DISCONTINUED | OUTPATIENT
Start: 2022-03-21 | End: 2022-03-21 | Stop reason: HOSPADM

## 2022-03-21 RX ORDER — HEPARIN SODIUM 1000 [USP'U]/ML
INJECTION, SOLUTION INTRAVENOUS; SUBCUTANEOUS AS NEEDED
Status: DISCONTINUED | OUTPATIENT
Start: 2022-03-21 | End: 2022-03-21 | Stop reason: HOSPADM

## 2022-03-21 RX ORDER — NITROGLYCERIN 20 MG/100ML
INJECTION INTRAVENOUS AS NEEDED
Status: DISCONTINUED | OUTPATIENT
Start: 2022-03-21 | End: 2022-03-21 | Stop reason: HOSPADM

## 2022-03-21 RX ORDER — FENTANYL CITRATE 50 UG/ML
INJECTION, SOLUTION INTRAMUSCULAR; INTRAVENOUS AS NEEDED
Status: DISCONTINUED | OUTPATIENT
Start: 2022-03-21 | End: 2022-03-21 | Stop reason: HOSPADM

## 2022-03-21 RX ORDER — ASPIRIN 325 MG
TABLET, DELAYED RELEASE (ENTERIC COATED) ORAL AS NEEDED
Status: DISCONTINUED | OUTPATIENT
Start: 2022-03-21 | End: 2022-03-21 | Stop reason: HOSPADM

## 2022-03-21 NOTE — DISCHARGE INSTRUCTIONS
After Heart Catheterization   AMBULATORY CARE:   Call your local emergency number (911 in the [address] [facility]) if:   · You have chest pain  · You have any of the following signs of a heart attack:      ? Squeezing, pressure, or pain in your chest    ? You may  also have any of the following:     § Discomfort or pain in your back, neck, jaw, stomach, or arm    § Shortness of breath    § Nausea or vomiting    § Lightheadedness or a sudden cold sweat    · You have any of the following signs of a stroke:      ? Numbness or drooping on one side of your face     ? Weakness in an arm or leg    ? Confusion or difficulty speaking    ? Dizziness, a severe headache, or vision loss    · You cough up blood  · You have trouble breathing  · You cannot stop the bleeding from your wound even after you hold firm pressure for 10 minutes  Call your doctor if:   · You have a fever or chills  · Blood soaks through your bandage  · Your stitches come apart  · Your arm or leg feels numb, cool, or looks pale  · Your wound gets swollen quickly  · Your wound is red, swollen, or draining pus  · Your wound looks more bruised or you have new bruising on the side of your leg or arm  · You have nausea or are vomiting  · Your skin is itchy, swollen, or you have a rash  · You have questions or concerns about your condition or care  Medicines: You may need any of the following:  · Blood thinners  help prevent blood clots  Clots can cause strokes, heart attacks, and death  The following are general safety guidelines to follow while you are taking a blood thinner:    ? Watch for bleeding and bruising while you take blood thinners  Watch for bleeding from your gums or nose  Watch for blood in your urine and bowel movements  Use a soft washcloth on your skin, and a soft toothbrush to brush your teeth  This can keep your skin and gums from bleeding  If you shave, use an electric shaver  Do not play contact sports  ? Tell your dentist and other healthcare providers that you take a blood thinner  Wear a bracelet or necklace that says you take this medicine  ? Do not start or stop any other medicines unless your healthcare provider tells you to  Many medicines cannot be used with blood thinners  ? Take your blood thinner exactly as prescribed by your healthcare provider  Do not skip does or take less than prescribed  Tell your provider right away if you forget to take your blood thinner, or if you take too much  ? Warfarin  is a blood thinner that you may need to take  The following are things you should be aware of if you take warfarin:     § Foods and medicines can affect the amount of warfarin in your blood  Do not make major changes to your diet while you take warfarin  Warfarin works best when you eat about the same amount of vitamin K every day  Vitamin K is found in green leafy vegetables and certain other foods  Ask for more information about what to eat when you are taking warfarin  § You will need to see your healthcare provider for follow-up visits when you are on warfarin  You will need regular blood tests  These tests are used to decide how much medicine you need  · Acetaminophen  helps decrease pain and fever  This medicine is available without a doctor's order  Ask how much medicine is safe to take, and how often to take it  Acetaminophen can cause liver damage if not taken correctly  · Take your medicine as directed  Contact your healthcare provider if you think your medicine is not helping or if you have side effects  Tell him or her if you are allergic to any medicine  Keep a list of the medicines, vitamins, and herbs you take  Include the amounts, and when and why you take them  Bring the list or the pill bottles to follow-up visits  Carry your medicine list with you in case of an emergency  Bathing: You may be able to shower the day after your procedure   Remove your pressure bandage before you shower  Do not take baths or go in hot tubs or pools  Carefully wash the wound with soap and water  Pat the area dry  Care for your wound as directed:  Change your bandage when it gets wet or dirty  A small bandage can be placed on your wound after you remove the pressure bandage  Do not put powders, lotions, or creams on your wound  They may cause your wound to get infected  Monitor your wound every day for signs of infection, such as redness, swelling, or pus  Mild bruising is normal and expected  If bleeding from your wound occurs:  Apply firm, steady pressure to stop the bleeding  Apply pressure with a clean gauze or towel for 5 to 10 minutes  Call 911 if bleeding becomes heavy or does not stop  Activity:  Do not lift anything heavier than 5 pounds until directed by your healthcare provider  Heavy lifting can put stress on your wound and cause bleeding  Do not push or pull with the arm that was used for the procedure  Do not do vigorous activity for at least 48 hours  Vigorous activity may cause bleeding from your wound  Rest and do quiet activities  Short walks to the bathroom and around the house are okay  Limit your stair climbing to prevent bleeding  Ask your healthcare provider when you can return to your normal activities  Do not strain when you have a bowel movement:  Your wound may bleed if you strain to have a bowel movement  Keep your legs flat on the floor and your hips at a 90° angle  Talk to your healthcare provider if you are constipated  You may need medicine to make it easier for you to have a bowel movement and to prevent straining  Drink liquids as directed:  Liquids will help flush the contrast liquid from your body  Ask how much liquid to drink each day and which liquids are best for you  Driving:  Ask your healthcare provider when it is okay for you to drive  He or she may tell you to wait 48 hours before you drive to decrease your risk for bleeding  Returning to work: You may not be able to return to work for at least 2 days after your procedure if your job involves heavy lifting  Ask your healthcare provider when it is okay for you to return to work  Healthy living tips: The following are general healthy guidelines  If your chest pain is caused by a heart problem, your healthcare provider will give you specific guidelines to follow  · Manage other health conditions  Diabetes and high cholesterol increases your risk for another heart attack and stroke  Talk to your healthcare provider about your management plan  He or she will make a plan that helps you manage your conditions  · Do not smoke  Nicotine and other chemicals in cigarettes and cigars can cause lung and heart damage  Ask your healthcare provider for information if you currently smoke and need help to quit  E-cigarettes or smokeless tobacco still contain nicotine  Talk to your healthcare provider before you use these products  · Eat a variety of healthy, low-fat, low-salt foods  Healthy foods include fruits, vegetables, whole-grain breads, low-fat dairy products, beans, lean meats, and fish  Ask for more information about a heart healthy diet  · Drink plenty of water every day  Your body is made of mostly water  Water helps your body to control your temperature and blood pressure  Ask your healthcare provider how much water you should drink every day  · Ask about activity  Your healthcare provider will tell you which activities to limit or avoid  Ask when you can drive, return to work, and have sex  Ask about the best exercise plan for you  · Maintain a healthy weight  Ask your healthcare provider how much you should weigh  Ask him or her to help you create a weight loss plan if you are overweight  · Get the flu and pneumonia vaccines  All adults should get the influenza (flu) vaccine  Get it every year as soon as it becomes available   The pneumococcal vaccine is given to adults aged 72 years or older  The vaccine is given every 5 years to prevent pneumococcal disease, such as pneumonia  If you have a stent:   · Carry your stent card with you at all times  · Let all healthcare providers know that you have a stent  © Copyright [employer] 2022 Information is for End User's use only and may not be sold, redistributed or otherwise used for commercial purposes  All illustrations and images included in CareNotes® are the copyrighted property of [employer]  or [facility]   The above information is an  only  It is not intended as medical advice for individual conditions or treatments  Talk to your doctor, nurse or pharmacist before following any medical regimen to see if it is safe and effective for you

## 2022-03-21 NOTE — INTERVAL H&P NOTE
Update: (This section must be completed if the H&P was completed greater than 24 hrs to procedure or admission)    H&P reviewed  After examining the patient, I find no changed to the H&P since it had been written  I have discussed in detail with patient regarding the indications, alternatives, risks and benefit of cardiac cath and possible PCI  Patient demonstrates clear understanding and wants to proceed with the procedure  Patient re-evaluated   Accept as history and physical     Mi Tay MD/March 21, 2022/8:20 AM

## 2022-04-06 DIAGNOSIS — I10 ESSENTIAL HYPERTENSION: ICD-10-CM

## 2022-04-06 RX ORDER — AMLODIPINE BESYLATE 10 MG/1
TABLET ORAL
Qty: 90 TABLET | Refills: 3 | Status: SHIPPED | OUTPATIENT
Start: 2022-04-06

## 2022-04-26 ENCOUNTER — TELEPHONE (OUTPATIENT)
Dept: CARDIOLOGY CLINIC | Facility: CLINIC | Age: 72
End: 2022-04-26

## 2022-04-26 ENCOUNTER — OFFICE VISIT (OUTPATIENT)
Dept: CARDIOLOGY CLINIC | Facility: CLINIC | Age: 72
End: 2022-04-26
Payer: COMMERCIAL

## 2022-04-26 VITALS
OXYGEN SATURATION: 95 % | DIASTOLIC BLOOD PRESSURE: 78 MMHG | RESPIRATION RATE: 16 BRPM | HEART RATE: 81 BPM | HEIGHT: 72 IN | BODY MASS INDEX: 26.82 KG/M2 | SYSTOLIC BLOOD PRESSURE: 130 MMHG | WEIGHT: 198 LBS

## 2022-04-26 DIAGNOSIS — I25.10 CORONARY ARTERY DISEASE INVOLVING NATIVE CORONARY ARTERY OF NATIVE HEART WITHOUT ANGINA PECTORIS: Primary | ICD-10-CM

## 2022-04-26 PROCEDURE — 3078F DIAST BP <80 MM HG: CPT | Performed by: INTERNAL MEDICINE

## 2022-04-26 PROCEDURE — 3075F SYST BP GE 130 - 139MM HG: CPT | Performed by: INTERNAL MEDICINE

## 2022-04-26 PROCEDURE — 1160F RVW MEDS BY RX/DR IN RCRD: CPT | Performed by: INTERNAL MEDICINE

## 2022-04-26 PROCEDURE — 99214 OFFICE O/P EST MOD 30 MIN: CPT | Performed by: INTERNAL MEDICINE

## 2022-04-26 PROCEDURE — 3008F BODY MASS INDEX DOCD: CPT | Performed by: INTERNAL MEDICINE

## 2022-04-26 NOTE — PROGRESS NOTES
Cardiology Outpatient Follow up Note    Juan Low 73 y o  male MRN: [id number]    04/26/22          Assessment:  1  Nonobstructive CAD  2  NICM with EF recovery  3  Carotid stenosis s/p L CEA 1/2020  4  DM2- A1c: 8 0  5  Hypertension  6  HLD    Plan:  · Cont aspirin, plavix, and atorvastatin   · Cont coreg, norvasc and losartan  · TTE pending  · He follows closely with Vascular surgery  Ambulatory blood pressure monitoring and maintaining a low sodium diet was advised  · He was advised to notify us with the onset of cardiac symptoms  1  Coronary artery disease involving native coronary artery of native heart without angina pectoris         HPI: Juan Low is a 73y o  year old male with history of mild NICM with EF 50%, nonobstructive CAD and carotid stenosis s/p L CEA who presents for routine follow-up  Past medical history:   · TTE 7/2019: EF: 45%, G1DD, and mild MR, TR   · Cardiac catheterization 9/2019: 60% LAD and 50% LCx stenosis; no intervention performed  · Carotid duplex 10/2019: 50-69% stenosis of the Right ICA; 70-99% stenosis of the Left ICA  · S/p L carotid endarterectomy on 1/13/2020  · Carotid duplex 93252: 50-69% right ICA stenosis, patent left ICA endarterectomy site; 50-69% stenosis in the mid ICA  On his last evaluation he noted dyspnea on exertion  He was evaluated with a pharmacologic MPI that revealed a large inferior infarct  A small amount of melissa-infarct ischemic could not be excluded  He was evaluated with a cardiac catheterization that revealed no significant obstructive epicardial CAD  TTE is pending  On presentation he still notes mild occasional dyspnea but his symptoms have been stable overall  He denies chest pain or any other associated cardiac concerns at this time  He is attempting to restrict his carbohydrate intake  Family history of CAD on his maternal side with his maternal grandfather and uncle having MIs       Social history: Smoked 1ppd for 48 years; quit 1 year ago          Patient Active Problem List   Diagnosis    Essential hypertension    Mixed hyperlipidemia    Type 2 diabetes mellitus without complication, without long-term current use of insulin (HCC)    Ventral hernia without obstruction or gangrene    Soft tissue mass    Cigarette nicotine dependence without complication    Congestive heart failure, NYHA class 1, acute, systolic (HCC)    Coronary artery disease due to lipid rich plaque    Carotid artery stenosis without cerebral infarction, bilateral    Cerebral aneurysm    S/P Left carotid endarterectomy 1/13/20    Arthritis    Familial hypercholesterolemia    BMI 27 0-27 9,adult    Chronic right shoulder pain    Elevated PSA    Status post reverse total arthroplasty of right shoulder    Erectile dysfunction of non-organic origin    Fatigue    Anemia    Left carpal tunnel syndrome    Medicare annual wellness visit, subsequent    Screening for AAA (abdominal aortic aneurysm)       Allergies   Allergen Reactions    Other Hives     Soft shell crabs hives         Current Outpatient Medications:     amLODIPine (NORVASC) 10 mg tablet, TAKE 1 TABLET BY MOUTH  DAILY, Disp: 90 tablet, Rfl: 3    ascorbic acid (VITAMIN C) 500 mg tablet, Take 500 mg by mouth daily, Disp: , Rfl:     aspirin 81 mg chewable tablet, Chew 1 tablet (81 mg total) daily, Disp: 90 tablet, Rfl: 3    atorvastatin (LIPITOR) 20 mg tablet, TAKE 1 TABLET BY MOUTH  DAILY, Disp: 90 tablet, Rfl: 3    carvedilol (COREG) 12 5 mg tablet, Take 1 tablet (12 5 mg total) by mouth 2 (two) times a day with meals, Disp: 180 tablet, Rfl: 3    clopidogrel (PLAVIX) 75 mg tablet, TAKE 1 TABLET BY MOUTH  DAILY, Disp: 90 tablet, Rfl: 3    glucose blood test strip, Use as instructed, Disp: 100 each, Rfl: 2    Januvia 25 MG tablet, TAKE 1 TABLET BY MOUTH  DAILY, Disp: 90 tablet, Rfl: 3    losartan (COZAAR) 100 MG tablet, TAKE 1 TABLET BY MOUTH  DAILY, Disp: 90 tablet, Rfl: 3    metFORMIN (GLUCOPHAGE) 1000 MG tablet, TAKE 1 TABLET BY MOUTH  TWICE DAILY WITH MEALS, Disp: 180 tablet, Rfl: 3    nicotine polacrilex (COMMIT) 2 MG lozenge, May take 1 lozenge every 3-4 hours as needed with no more than 10 lozenges in a day, Disp: 72 each, Rfl: 3    sildenafil (VIAGRA) 25 MG tablet, TAKE 1 TABLET BY MOUTH  DAILY AS NEEDED FOR  ERECTILE DYSFUNCTION, Disp: 10 tablet, Rfl: 0    Past Medical History:   Diagnosis Date    CHF (congestive heart failure) (HCC)     Diabetes mellitus (HCC)     Diverticulitis     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[facility] [address] ) 11/7/2018    Heart disease     Hyperlipidemia     Hypertension     Kidney stone     Osteoarthritis     Vascular disorder        Family History   Problem Relation Age of Onset    Colon cancer Father     Colon cancer Paternal Grandfather     No Known Problems Mother     Colon cancer Family        Past Surgical History:   Procedure Laterality Date    ABDOMINAL SURGERY      CARDIAC CATHETERIZATION N/A 3/21/2022    Procedure: Cardiac catheterization;  Surgeon: Donovan Sidhu MD;  Location: [address] [place] [address] CATH LAB; Service: Cardiology    CARDIAC CATHETERIZATION N/A 3/21/2022    Procedure: Cardiac Coronary Angiogram;  Surgeon: Donovan Sidhu MD;  Location: [address] [place] [address] CATH LAB; Service: Cardiology    COLONOSCOPY      INGUINAL HERNIA REPAIR Left     LAPAROSCOPIC COLON RESECTION      DC COLONOSCOPY FLX DX W/COLLJ SPEC WHEN PFRMD N/A 11/3/2017    Procedure: COLONOSCOPY;  Surgeon: Pelon Palomo MD;  Location: [facility]  GI LAB; Service: Colorectal    DC RECONSTR TOTAL SHOULDER IMPLANT Right 11/17/2020    Procedure: ARTHROPLASTY SHOULDER REVERSE with biceps tendonesis;   Surgeon: April Falk MD;  Location[facility];  Service: Orthopedics    DC THROMBOENDARTECTMY Lety Every INCIS Left 1/13/2020    Procedure: ENDARTERECTOMY ARTERY CAROTID;  Surgeon: Isabel Kline MD;  [facility];  Service: Vascular       Social History Socioeconomic History    Marital status: /Civil Union     Spouse name: Not on file    Number of children: Not on file    Years of education: Not on file    Highest education level: Not on file   Occupational History    Not on file   Tobacco Use    Smoking status: Former Smoker     Types: Cigarettes, Cigars     Quit date: 2019     Years since quittin 5    Smokeless tobacco: Never Used   Vaping Use    Vaping Use: Never used   Substance and Sexual Activity    Alcohol use: Yes     Alcohol/week: 3 0 standard drinks     Types: 3 Cans of beer per week    Drug use: No    Sexual activity: Not on file   Other Topics Concern    Not on file   Social History Narrative    Caffeine use    Uses safety equipment: seatbelts     Social Determinants of Health     Financial Resource Strain: Not on file   Food Insecurity: Not on file   Transportation Needs: Not on file   Physical Activity: Not on file   Stress: Not on file   Social Connections: Not on file   Intimate Partner Violence: Not on file   Housing Stability: Not on file       Review of Systems   Constitutional: Negative for diaphoresis, weight gain and weight loss  HENT: Negative for congestion  Cardiovascular: Positive for dyspnea on exertion (intermittent, stable)  Negative for chest pain, irregular heartbeat, leg swelling, near-syncope, orthopnea, palpitations, paroxysmal nocturnal dyspnea and syncope  Respiratory: Negative for shortness of breath, sleep disturbances due to breathing and snoring  Hematologic/Lymphatic: Does not bruise/bleed easily  Skin: Negative for rash  Musculoskeletal: Negative for myalgias  Gastrointestinal: Negative for nausea and vomiting  Neurological: Negative for excessive daytime sleepiness and light-headedness  Psychiatric/Behavioral: The patient is not nervous/anxious          Vitals: /78 (BP Location: Left arm, Patient Position: Sitting)   Pulse 81   Resp 16   Ht 6' (1 829 m)   Wt 89 8 kg (198 lb)   SpO2 95%   BMI 26 85 kg/m²       Physical Exam:     GEN: Alert and oriented x 3, in no acute distress  Well appearing and well nourished  HEENT: Sclera anicteric, conjunctivae pink, mucous membranes moist  Oropharynx clear  NECK: Supple, no carotid bruits, no significant JVD  Trachea midline, no thyromegaly  HEART: Regular rhythm, normal S1 and S2, no murmurs, clicks, gallops or rubs  PMI nondisplaced, no thrills  LUNGS: Clear to auscultation bilaterally; no wheezes, rales, or rhonchi  No increased work of breathing or signs of respiratory distress  ABDOMEN: Soft, nontender, nondistended, normoactive bowel sounds  EXTREMITIES: Skin warm and well perfused, no clubbing, cyanosis, or edema  NEURO: No focal findings  Normal speech  Mood and affect normal    SKIN: Normal without suspicious lesions on exposed skin        Lab Results:       Lab Results   Component Value Date    HGBA1C 8 0 (H) 01/24/2022    HGBA1C 7 5 (H) 10/05/2021    HGBA1C 7 0 (H) 06/22/2021     Lab Results   Component Value Date    CHOL 163 11/22/2016    CHOL 172 09/18/2015     Lab Results   Component Value Date    HDL 42 01/24/2022    HDL 43 06/22/2021    HDL 43 02/24/2021     Lab Results   Component Value Date    LDLCALC 72 01/24/2022    LDLCALC 61 06/22/2021    LDLCALC 75 02/24/2021     Lab Results   Component Value Date    TRIG 144 01/24/2022    TRIG 349 (H) 06/22/2021    TRIG 190 (H) 02/24/2021     No results found for: CHOLHDL

## 2022-04-26 NOTE — TELEPHONE ENCOUNTER
----- Message from Mazin Amaral MD sent at 4/26/2022  1:12 PM EDT -----  Hi can you please reach out to testing to call him to schedule his echo       Thanks

## 2022-06-02 ENCOUNTER — TELEPHONE (OUTPATIENT)
Dept: FAMILY MEDICINE CLINIC | Facility: CLINIC | Age: 72
End: 2022-06-02

## 2022-06-02 ENCOUNTER — HOSPITAL ENCOUNTER (OUTPATIENT)
Dept: NON INVASIVE DIAGNOSTICS | Facility: CLINIC | Age: 72
Discharge: HOME | End: 2022-06-02
Payer: COMMERCIAL

## 2022-06-02 VITALS
HEART RATE: 65 BPM | BODY MASS INDEX: 26.82 KG/M2 | SYSTOLIC BLOOD PRESSURE: 130 MMHG | DIASTOLIC BLOOD PRESSURE: 78 MMHG | WEIGHT: 198 LBS | HEIGHT: 72 IN

## 2022-06-02 DIAGNOSIS — I25.10 CORONARY ARTERY DISEASE INVOLVING NATIVE HEART WITHOUT ANGINA PECTORIS, UNSPECIFIED VESSEL OR LESION TYPE: ICD-10-CM

## 2022-06-02 LAB
AORTIC ROOT: 3.6 CM
APICAL FOUR CHAMBER EJECTION FRACTION: 55 %
ASCENDING AORTA: 3.3 CM
AV LVOT PEAK GRADIENT: 1 MMHG
AV PEAK GRADIENT: 9 MMHG
E WAVE DECELERATION TIME: 190 MS
FRACTIONAL SHORTENING: 26 (ref 28–44)
INTERVENTRICULAR SEPTUM IN DIASTOLE (PARASTERNAL SHORT AXIS VIEW): 1.2 CM
INTERVENTRICULAR SEPTUM: 1.2 CM (ref 0.6–1.1)
LAAS-AP2: 23.2 CM2
LAAS-AP4: 18.2 CM2
LEFT ATRIUM AREA SYSTOLE SINGLE PLANE A4C: 16.5 CM2
LEFT ATRIUM SIZE: 4.2 CM
LEFT INTERNAL DIMENSION IN SYSTOLE: 3.9 CM (ref 2.1–4)
LEFT VENTRICULAR INTERNAL DIMENSION IN DIASTOLE: 5.3 CM (ref 3.5–6)
LEFT VENTRICULAR POSTERIOR WALL IN END DIASTOLE: 1.1 CM
LEFT VENTRICULAR STROKE VOLUME: 68 ML
LVSV (TEICH): 68 ML
MITRAL REGURGITATION PEAK VELOCITY: 4.68 M/S
MITRAL VALVE REGURGITANT PEAK GRADIENT: 88 MMHG
MV E'TISSUE VEL-SEP: 7 CM/S
MV PEAK A VEL: 1.08 M/S
MV PEAK E VEL: 54 CM/S
MV STENOSIS PRESSURE HALF TIME: 55 MS
MV VALVE AREA P 1/2 METHOD: 4
RIGHT ATRIAL 2D VOLUME: 41 ML
RIGHT ATRIUM AREA SYSTOLE A4C: 15.5 CM2
RIGHT VENTRICLE ID DIMENSION: 4.2 CM
SL CV LEFT ATRIUM LENGTH A2C: 5.4 CM
SL CV LV EF: 55
SL CV PED ECHO LEFT VENTRICLE DIASTOLIC VOLUME (MOD BIPLANE) 2D: 135 ML
SL CV PED ECHO LEFT VENTRICLE SYSTOLIC VOLUME (MOD BIPLANE) 2D: 68 ML
TR MAX PG: 28 MMHG
TR PEAK VELOCITY: 2.7 M/S
TRICUSPID VALVE PEAK REGURGITATION VELOCITY: 2.65 M/S

## 2022-06-02 PROCEDURE — 93306 TTE W/DOPPLER COMPLETE: CPT | Performed by: INTERNAL MEDICINE

## 2022-06-02 PROCEDURE — 93306 TTE W/DOPPLER COMPLETE: CPT

## 2022-06-03 ENCOUNTER — OFFICE VISIT (OUTPATIENT)
Dept: FAMILY MEDICINE CLINIC | Facility: CLINIC | Age: 72
End: 2022-06-03
Payer: COMMERCIAL

## 2022-06-03 VITALS
SYSTOLIC BLOOD PRESSURE: 108 MMHG | BODY MASS INDEX: 27.09 KG/M2 | WEIGHT: 200 LBS | TEMPERATURE: 98 F | DIASTOLIC BLOOD PRESSURE: 70 MMHG | HEIGHT: 72 IN | HEART RATE: 74 BPM | OXYGEN SATURATION: 93 %

## 2022-06-03 DIAGNOSIS — E11.9 TYPE 2 DIABETES MELLITUS WITHOUT COMPLICATION, WITHOUT LONG-TERM CURRENT USE OF INSULIN (HCC): Primary | ICD-10-CM

## 2022-06-03 DIAGNOSIS — F17.210 CIGARETTE NICOTINE DEPENDENCE WITHOUT COMPLICATION: ICD-10-CM

## 2022-06-03 LAB — SL AMB POCT HEMOGLOBIN AIC: 7.3 (ref ?–6.5)

## 2022-06-03 PROCEDURE — 83036 HEMOGLOBIN GLYCOSYLATED A1C: CPT | Performed by: FAMILY MEDICINE

## 2022-06-03 PROCEDURE — 3051F HG A1C>EQUAL 7.0%<8.0%: CPT | Performed by: FAMILY MEDICINE

## 2022-06-03 PROCEDURE — 1036F TOBACCO NON-USER: CPT | Performed by: FAMILY MEDICINE

## 2022-06-03 PROCEDURE — 3008F BODY MASS INDEX DOCD: CPT | Performed by: FAMILY MEDICINE

## 2022-06-03 PROCEDURE — 1160F RVW MEDS BY RX/DR IN RCRD: CPT | Performed by: FAMILY MEDICINE

## 2022-06-03 PROCEDURE — 99213 OFFICE O/P EST LOW 20 MIN: CPT | Performed by: FAMILY MEDICINE

## 2022-06-03 RX ORDER — POLYETHYLENE GLYCOL 3350 17 G
POWDER IN PACKET (EA) ORAL
Qty: 72 EACH | Refills: 3 | Status: SHIPPED | OUTPATIENT
Start: 2022-06-03

## 2022-06-03 NOTE — PROGRESS NOTES
Assessment/Plan:    No problem-specific Assessment & Plan notes found for this encounter  Diagnoses and all orders for this visit:    Type 2 diabetes mellitus without complication, without long-term current use of insulin ([facility])  -     POCT hemoglobin A1c-7 3  Stable controlled    Cigarette nicotine dependence without complication  -     nicotine polacrilex (COMMIT) 2 MG lozenge; May take 1 lozenge every 3-4 hours as needed with no more than 10 lozenges in a day      Follow up in 6 months    Subjective:      Patient ID: Mary Mcdonald is a 73 y o  male  Patient is here to follow up for Type 2 diabetes Mellitus he denies any symptoms related to this  trying to lose cherrie and follow a low carb diet  Taking his medications daily  The following portions of the patient's history were reviewed and updated as appropriate:   He  has a past medical history of CHF (congestive heart failure) ([facility] [address] ), Diabetes mellitus ([facility] [address] ), Diverticulitis, 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[facility] [address] ) (11/7/2018), Heart disease, Hyperlipidemia, Hypertension, Kidney stone, Osteoarthritis, and Vascular disorder    He   Patient Active Problem List    Diagnosis Date Noted    Medicare annual wellness visit, subsequent 01/26/2022    Screening for AAA (abdominal aortic aneurysm) 01/26/2022    Left carpal tunnel syndrome 06/23/2021    Fatigue 03/01/2021    Anemia 03/01/2021    Status post reverse total arthroplasty of right shoulder 11/18/2020    Elevated PSA 08/12/2020    Arthritis 06/23/2020    Familial hypercholesterolemia 06/23/2020    BMI 27 0-27 9,adult 06/23/2020    Chronic right shoulder pain 06/23/2020    S/P Left carotid endarterectomy 1/13/20 01/21/2020    Cerebral aneurysm 11/22/2019    Carotid artery stenosis without cerebral infarction, bilateral 11/05/2019    Coronary artery disease due to lipid rich plaque 10/15/2019    Congestive heart failure, NYHA class 1, acute, systolic ([facility]) 58/14/8174    Cigarette nicotine dependence without complication 99/27/7364    Soft tissue mass 08/22/2018    Essential hypertension 05/07/2018    Mixed hyperlipidemia 05/07/2018    Type 2 diabetes mellitus without complication, without long-term current use of insulin ([facility] [address] ) 05/07/2018    Ventral hernia without obstruction or gangrene 05/07/2018    Erectile dysfunction of non-organic origin 07/16/2013     He  has a past surgical history that includes Abdominal surgery; Laparoscopic colon resection; Colonoscopy; pr colonoscopy flx dx w/collj spec when pfrmd (N/A, 11/3/2017); Inguinal hernia repair (Left); pr thromboendartectmy neck,neck incis (Left, 1/13/2020); pr reconstr total shoulder implant (Right, 11/17/2020); Cardiac catheterization (N/A, 3/21/2022); and Cardiac catheterization (N/A, 3/21/2022)  His family history includes Colon cancer in his family, father, and paternal grandfather; No Known Problems in his mother  He  reports that he quit smoking about 2 years ago  His smoking use included cigarettes and cigars  He has never used smokeless tobacco  He reports current alcohol use of about 3 0 standard drinks of alcohol per week  He reports that he does not use drugs    Current Outpatient Medications   Medication Sig Dispense Refill    nicotine polacrilex (COMMIT) 2 MG lozenge May take 1 lozenge every 3-4 hours as needed with no more than 10 lozenges in a day 72 each 3    amLODIPine (NORVASC) 10 mg tablet TAKE 1 TABLET BY MOUTH  DAILY 90 tablet 3    ascorbic acid (VITAMIN C) 500 mg tablet Take 500 mg by mouth daily      aspirin 81 mg chewable tablet Chew 1 tablet (81 mg total) daily 90 tablet 3    atorvastatin (LIPITOR) 20 mg tablet TAKE 1 TABLET BY MOUTH  DAILY 90 tablet 3    carvedilol (COREG) 12 5 mg tablet Take 1 tablet (12 5 mg total) by mouth 2 (two) times a day with meals 180 tablet 3    clopidogrel (PLAVIX) 75 mg tablet TAKE 1 TABLET BY MOUTH  DAILY 90 tablet 3    glucose blood test strip Use as instructed 100 each 2  Januvia 25 MG tablet TAKE 1 TABLET BY MOUTH  DAILY 90 tablet 3    losartan (COZAAR) 100 MG tablet TAKE 1 TABLET BY MOUTH  DAILY 90 tablet 3    metFORMIN (GLUCOPHAGE) 1000 MG tablet TAKE 1 TABLET BY MOUTH  TWICE DAILY WITH MEALS 180 tablet 3    sildenafil (VIAGRA) 25 MG tablet TAKE 1 TABLET BY MOUTH  DAILY AS NEEDED FOR  ERECTILE DYSFUNCTION 10 tablet 0     No current facility-administered medications for this visit  Current Outpatient Medications on File Prior to Visit   Medication Sig    amLODIPine (NORVASC) 10 mg tablet TAKE 1 TABLET BY MOUTH  DAILY    ascorbic acid (VITAMIN C) 500 mg tablet Take 500 mg by mouth daily    aspirin 81 mg chewable tablet Chew 1 tablet (81 mg total) daily    atorvastatin (LIPITOR) 20 mg tablet TAKE 1 TABLET BY MOUTH  DAILY    carvedilol (COREG) 12 5 mg tablet Take 1 tablet (12 5 mg total) by mouth 2 (two) times a day with meals    clopidogrel (PLAVIX) 75 mg tablet TAKE 1 TABLET BY MOUTH  DAILY    glucose blood test strip Use as instructed    Januvia 25 MG tablet TAKE 1 TABLET BY MOUTH  DAILY    losartan (COZAAR) 100 MG tablet TAKE 1 TABLET BY MOUTH  DAILY    metFORMIN (GLUCOPHAGE) 1000 MG tablet TAKE 1 TABLET BY MOUTH  TWICE DAILY WITH MEALS    sildenafil (VIAGRA) 25 MG tablet TAKE 1 TABLET BY MOUTH  DAILY AS NEEDED FOR  ERECTILE DYSFUNCTION    [DISCONTINUED] nicotine polacrilex (COMMIT) 2 MG lozenge May take 1 lozenge every 3-4 hours as needed with no more than 10 lozenges in a day     No current facility-administered medications on file prior to visit  He is allergic to other       Review of Systems   Constitutional: Negative for activity change, appetite change, fatigue and fever  HENT: Negative for congestion and ear discharge  Respiratory: Negative for cough and shortness of breath  Cardiovascular: Negative for chest pain and palpitations  Gastrointestinal: Negative for diarrhea and nausea     Musculoskeletal: Negative for arthralgias and back pain    Skin: Negative for color change and rash  Neurological: Negative for dizziness and headaches  Psychiatric/Behavioral: Negative for agitation and behavioral problems  Objective:      /70   Pulse 74   Temp 98 °F (36 7 °C)   Ht 6' (1 829 m)   Wt 90 7 kg (200 lb)   SpO2 93%   BMI 27 12 kg/m²          Physical Exam  Constitutional:       General: He is not in acute distress  Appearance: He is well-developed  He is not diaphoretic  Eyes:      General: No scleral icterus  Pupils: Pupils are equal, round, and reactive to light  Cardiovascular:      Rate and Rhythm: Normal rate and regular rhythm  Heart sounds: Normal heart sounds  No murmur heard  Pulmonary:      Effort: Pulmonary effort is normal  No respiratory distress  Breath sounds: Normal breath sounds  No wheezing  Abdominal:      General: Bowel sounds are normal  There is no distension  Palpations: Abdomen is soft  Tenderness: There is no abdominal tenderness  Skin:     General: Skin is warm and dry  Findings: No rash  Neurological:      Mental Status: He is alert and oriented to person, place, and time

## 2022-08-24 ENCOUNTER — TELEPHONE (OUTPATIENT)
Dept: UROLOGY | Facility: CLINIC | Age: 72
End: 2022-08-24

## 2022-08-24 NOTE — TELEPHONE ENCOUNTER
THE [facility] reminding patient to obtian PSA PTV on 8/29/22  Office number provided should he need to call back

## 2022-08-25 DIAGNOSIS — I10 ESSENTIAL HYPERTENSION: ICD-10-CM

## 2022-08-26 RX ORDER — LOSARTAN POTASSIUM 100 MG/1
TABLET ORAL
Qty: 90 TABLET | Refills: 3 | Status: SHIPPED | OUTPATIENT
Start: 2022-08-26

## 2022-09-02 ENCOUNTER — APPOINTMENT (OUTPATIENT)
Dept: LAB | Facility: CLINIC | Age: 72
End: 2022-09-02
Payer: COMMERCIAL

## 2022-09-02 DIAGNOSIS — R97.20 ELEVATED PSA: ICD-10-CM

## 2022-09-02 LAB
ANION GAP SERPL CALCULATED.3IONS-SCNC: 11 MMOL/L (ref 4–13)
BUN SERPL-MCNC: 17 MG/DL (ref 5–25)
CALCIUM SERPL-MCNC: 10.1 MG/DL (ref 8.3–10.1)
CHLORIDE SERPL-SCNC: 106 MMOL/L (ref 96–108)
CO2 SERPL-SCNC: 22 MMOL/L (ref 21–32)
CREAT SERPL-MCNC: 0.95 MG/DL (ref 0.6–1.3)
GFR SERPL CREATININE-BSD FRML MDRD: 80 ML/MIN/1.73SQ M
GLUCOSE SERPL-MCNC: 183 MG/DL (ref 65–140)
POTASSIUM SERPL-SCNC: 4.4 MMOL/L (ref 3.5–5.3)
PSA SERPL-MCNC: 8.5 NG/ML (ref 0–4)
SODIUM SERPL-SCNC: 139 MMOL/L (ref 135–147)

## 2022-09-02 PROCEDURE — 84153 ASSAY OF PSA TOTAL: CPT

## 2022-09-06 ENCOUNTER — OFFICE VISIT (OUTPATIENT)
Dept: UROLOGY | Facility: CLINIC | Age: 72
End: 2022-09-06
Payer: COMMERCIAL

## 2022-09-06 VITALS
DIASTOLIC BLOOD PRESSURE: 90 MMHG | BODY MASS INDEX: 28.01 KG/M2 | HEART RATE: 74 BPM | WEIGHT: 206.8 LBS | OXYGEN SATURATION: 96 % | SYSTOLIC BLOOD PRESSURE: 130 MMHG | HEIGHT: 72 IN

## 2022-09-06 DIAGNOSIS — R97.20 ELEVATED PSA: Primary | ICD-10-CM

## 2022-09-06 PROCEDURE — 99213 OFFICE O/P EST LOW 20 MIN: CPT | Performed by: PHYSICIAN ASSISTANT

## 2022-09-06 NOTE — PROGRESS NOTES
9/6/2022      Chief Complaint   Patient presents with    Follow-up     PSA     Assessment and Plan    1  Elevated PSA  - Most recent PSA from 9/2/22 is elevated to 8 5  Previously PSA was 6 3 from 1/24/22  - Due to medical comorbidities, established threshold for prostate biopsy in this patient is 10 or if patient is to develop any systemic symptoms or abnormal weight loss or bone pain which he does not have  Patient also wishes to avoid prostate biopsy at this time  - VIKRAM unremarkable  - follow-up in 6 months with repeat PSA  History of Present Illness  Shelda Burkitt is a 81 y o  male here for follow up evaluation of  elevated PSA  He had seen Dr Regina Gan in August of 2021 and prostate biopsy was recommended only if PSA reach the threshold of 10 due to patient's significant medical comorbidities  His most recent PSA is 8 5  Previously PSA was 6 3 in January of this year  He has had normal prostate exams  He denies any unexplained weight loss or bone pain  No urinary complaints or gross hematuria  Review of Systems   Constitutional: Negative for chills and fever  Respiratory: Negative for shortness of breath  Cardiovascular: Negative for chest pain  Gastrointestinal: Negative for abdominal pain  Genitourinary: Negative for difficulty urinating, dysuria, flank pain, frequency, hematuria and urgency  Neurological: Negative for dizziness                    Past Medical History  Past Medical History:   Diagnosis Date    CHF (congestive heart failure) (HCC)     Diabetes mellitus ([facility] [address] )     Diverticulitis     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[facility] [address] ) 11/7/2018    Heart disease     Hyperlipidemia     Hypertension     Kidney stone     Osteoarthritis     Vascular disorder        Past Social History  Past Surgical History:   Procedure Laterality Date    ABDOMINAL SURGERY      CARDIAC CATHETERIZATION N/A 3/21/2022    Procedure: Cardiac catheterization;  Surgeon: Javi Silva MD; Location: [facility]; Service: Cardiology    CARDIAC CATHETERIZATION N/A 3/21/2022    Procedure: Cardiac Coronary Angiogram;  Surgeon: Eve Ruiz MD;  Location: [facility] CATH LAB; Service: Cardiology    COLONOSCOPY      INGUINAL HERNIA REPAIR Left     LAPAROSCOPIC COLON RESECTION      AL COLONOSCOPY FLX DX W/COLLJ SPEC WHEN PFRMD N/A 11/3/2017    Procedure: COLONOSCOPY;  Surgeon: Ulises Jackson MD;  Location: [facility]  GI LAB; Service: Colorectal    AL RECONSTR TOTAL SHOULDER IMPLANT Right 2020    Procedure: ARTHROPLASTY SHOULDER REVERSE with biceps tendonesis; Surgeon: Mandy Russo MD;  Location: [facility];  Service: Orthopedics    AL THROMBOENDARTECTMY Aylin Aguiar Left 2020    Procedure: ENDARTERECTOMY ARTERY CAROTID;  Surgeon: Dejan Walker MD;  Location: [facility];  Service: Vascular     Social History     Tobacco Use   Smoking Status Former Smoker    Types: Cigarettes, Cigars    Quit date: 2019    Years since quittin 9   Smokeless Tobacco Never Used       Past Family History  Family History   Problem Relation Age of Onset    Colon cancer Father     Colon cancer Paternal Grandfather     No Known Problems Mother     Colon cancer Family        Past Social history  Social History     Socioeconomic History    Marital status: /Civil Union     Spouse name: Not on file    Number of children: Not on file    Years of education: Not on file    Highest education level: Not on file   Occupational History    Not on file   Tobacco Use    Smoking status: Former Smoker     Types: Cigarettes, Cigars     Quit date: 2019     Years since quittin 9    Smokeless tobacco: Never Used   Vaping Use    Vaping Use: Never used   Substance and Sexual Activity    Alcohol use:  Yes     Alcohol/week: 3 0 standard drinks     Types: 3 Cans of beer per week    Drug use: No    Sexual activity: Not on file   Other Topics Concern    Not on file   Social History Narrative    Caffeine use    Uses safety equipment: seatbelts     Social Determinants of Health     Financial Resource Strain: Not on file   Food Insecurity: Not on file   Transportation Needs: Not on file   Physical Activity: Not on file   Stress: Not on file   Social Connections: Not on file   Intimate Partner Violence: Not on file   Housing Stability: Not on file       Current Medications  Current Outpatient Medications   Medication Sig Dispense Refill    amLODIPine (NORVASC) 10 mg tablet TAKE 1 TABLET BY MOUTH  DAILY 90 tablet 3    ascorbic acid (VITAMIN C) 500 mg tablet Take 500 mg by mouth daily      aspirin 81 mg chewable tablet Chew 1 tablet (81 mg total) daily 90 tablet 3    atorvastatin (LIPITOR) 20 mg tablet TAKE 1 TABLET BY MOUTH  DAILY 90 tablet 3    clopidogrel (PLAVIX) 75 mg tablet TAKE 1 TABLET BY MOUTH  DAILY 90 tablet 3    glucose blood test strip Use as instructed 100 each 2    Januvia 25 MG tablet TAKE 1 TABLET BY MOUTH  DAILY 90 tablet 3    losartan (COZAAR) 100 MG tablet TAKE 1 TABLET BY MOUTH  DAILY 90 tablet 3    metFORMIN (GLUCOPHAGE) 1000 MG tablet TAKE 1 TABLET BY MOUTH  TWICE DAILY WITH MEALS 180 tablet 3    nicotine polacrilex (COMMIT) 2 MG lozenge May take 1 lozenge every 3-4 hours as needed with no more than 10 lozenges in a day 72 each 3    sildenafil (VIAGRA) 25 MG tablet TAKE 1 TABLET BY MOUTH  DAILY AS NEEDED FOR  ERECTILE DYSFUNCTION 10 tablet 0    carvedilol (COREG) 12 5 mg tablet Take 1 tablet (12 5 mg total) by mouth 2 (two) times a day with meals 180 tablet 3     No current facility-administered medications for this visit         Allergies  Allergies   Allergen Reactions    Other Hives     Soft shell crabs hives         The following portions of the patient's history were reviewed and updated as appropriate: allergies, current medications, past medical history, past social history, past surgical history and problem list       Vitals  Vitals:    09/06/22 0902   BP: 130/90   BP Location: Left arm   Patient Position: Sitting   Cuff Size: Adult   Pulse: 74   SpO2: 96%   Weight: 93 8 kg (206 lb 12 8 oz)   Height: 6' (1 829 m)           Physical Exam  Physical Exam  Constitutional:       Appearance: Normal appearance  HENT:      Head: Normocephalic and atraumatic  Right Ear: External ear normal       Left Ear: External ear normal    Eyes:      General: No scleral icterus  Conjunctiva/sclera: Conjunctivae normal    Cardiovascular:      Pulses: Normal pulses  Pulmonary:      Effort: Pulmonary effort is normal    Genitourinary:     Comments: No prostatic nodules or tenderness  Musculoskeletal:         General: Normal range of motion  Cervical back: Normal range of motion  Skin:     General: Skin is warm and dry  Neurological:      General: No focal deficit present  Mental Status: He is alert and oriented to person, place, and time  Psychiatric:         Mood and Affect: Mood normal          Behavior: Behavior normal          Thought Content: Thought content normal          Judgment: Judgment normal            Results  No results found for this or any previous visit (from the past 1 hour(s)) ]  Lab Results   Component Value Date    PSA 8 5 (H) 09/02/2022    PSA 6 3 (H) 01/24/2022    PSA 6 4 (H) 08/09/2021     Lab Results   Component Value Date    GLUCOSE 112 01/13/2020    CALCIUM 10 1 09/02/2022     11/22/2016    K 4 4 09/02/2022    CO2 22 09/02/2022     09/02/2022    BUN 17 09/02/2022    CREATININE 0 95 09/02/2022     Lab Results   Component Value Date    WBC 6 24 02/27/2022    HGB 14 6 02/27/2022    HCT 43 4 02/27/2022    MCV 96 02/27/2022     02/27/2022           Orders  No orders of the defined types were placed in this encounter      Ramesh Sarabia PA-C

## 2022-09-21 ENCOUNTER — HOSPITAL ENCOUNTER (OUTPATIENT)
Dept: VASCULAR ULTRASOUND | Facility: HOSPITAL | Age: 72
Discharge: HOME | End: 2022-09-21
Payer: COMMERCIAL

## 2022-09-21 ENCOUNTER — HOSPITAL ENCOUNTER (OUTPATIENT)
Dept: CT IMAGING | Facility: HOSPITAL | Age: 72
Discharge: HOME | End: 2022-09-21
Payer: COMMERCIAL

## 2022-09-21 DIAGNOSIS — I65.23 CAROTID ARTERY STENOSIS WITHOUT CEREBRAL INFARCTION, BILATERAL: ICD-10-CM

## 2022-09-21 DIAGNOSIS — H43.399 VITREOUS FLOATERS, UNSPECIFIED LATERALITY: ICD-10-CM

## 2022-09-21 PROCEDURE — 70450 CT HEAD/BRAIN W/O DYE: CPT

## 2022-09-21 PROCEDURE — 93880 EXTRACRANIAL BILAT STUDY: CPT | Performed by: SURGERY

## 2022-09-21 PROCEDURE — 93880 EXTRACRANIAL BILAT STUDY: CPT

## 2022-09-22 ENCOUNTER — OFFICE VISIT (OUTPATIENT)
Dept: CARDIOLOGY CLINIC | Facility: CLINIC | Age: 72
End: 2022-09-22
Payer: COMMERCIAL

## 2022-09-22 ENCOUNTER — TRANSCRIBE ORDERS (OUTPATIENT)
Dept: VASCULAR SURGERY | Facility: CLINIC | Age: 72
End: 2022-09-22

## 2022-09-22 VITALS
HEIGHT: 72 IN | BODY MASS INDEX: 27.63 KG/M2 | SYSTOLIC BLOOD PRESSURE: 122 MMHG | OXYGEN SATURATION: 98 % | WEIGHT: 204 LBS | HEART RATE: 77 BPM | RESPIRATION RATE: 16 BRPM | DIASTOLIC BLOOD PRESSURE: 82 MMHG

## 2022-09-22 DIAGNOSIS — I10 ESSENTIAL HYPERTENSION: ICD-10-CM

## 2022-09-22 DIAGNOSIS — I65.23 CAROTID ARTERY STENOSIS WITHOUT CEREBRAL INFARCTION, BILATERAL: Primary | ICD-10-CM

## 2022-09-22 DIAGNOSIS — E78.01 FAMILIAL HYPERCHOLESTEROLEMIA: ICD-10-CM

## 2022-09-22 DIAGNOSIS — I42.8 NICM (NONISCHEMIC CARDIOMYOPATHY) (HCC): ICD-10-CM

## 2022-09-22 DIAGNOSIS — E78.2 HYPERLIPIDEMIA, MIXED: ICD-10-CM

## 2022-09-22 DIAGNOSIS — E11.9 TYPE 2 DIABETES MELLITUS WITHOUT COMPLICATION, WITHOUT LONG-TERM CURRENT USE OF INSULIN (HCC): ICD-10-CM

## 2022-09-22 DIAGNOSIS — I25.10 CORONARY ARTERY DISEASE INVOLVING NATIVE CORONARY ARTERY OF NATIVE HEART WITHOUT ANGINA PECTORIS: Primary | ICD-10-CM

## 2022-09-22 DIAGNOSIS — K43.9 VENTRAL HERNIA WITHOUT OBSTRUCTION OR GANGRENE: ICD-10-CM

## 2022-09-22 PROCEDURE — 99214 OFFICE O/P EST MOD 30 MIN: CPT | Performed by: INTERNAL MEDICINE

## 2022-09-22 PROCEDURE — 1160F RVW MEDS BY RX/DR IN RCRD: CPT | Performed by: INTERNAL MEDICINE

## 2022-09-22 NOTE — PROGRESS NOTES
Cardiology Outpatient Follow up Note    Ladi Trinidad 81 y o  male MRN: [id number]    09/22/22          Assessment:  1  Nonobstructive CAD  2  NICM with EF recovery  3  Carotid stenosis s/p L CEA 1/2020  4  DM2- A1c: 7 3  5  Hypertension  6  HLD    Plan:  · Continue aspirin, Plavix, and atorvastatin   · BP at goal, Continue Norvasc, Coreg and losartan  · He follows closely with Vascular surgery for carotid stenosis  · He follows with Neurosurgery for cerebral aneurysm  Ambulatory blood pressure monitoring and maintaining a low sodium diet was advised  · He was advised to notify us with the onset of cardiac symptoms  1  Coronary artery disease involving native coronary artery of native heart without angina pectoris     2  NICM (nonischemic cardiomyopathy) (HCC)         HPI: Ladi Trinidad is a 81y o  year old male with history of mild NICM with EF 50%, nonobstructive CAD and carotid stenosis s/p L CEA who presents for routine follow-up  Past medical history:   · TTE 7/2019: EF: 45%, G1DD, and mild MR, TR   · Cardiac catheterization 9/2019: 60% LAD and 50% LCx stenosis; no intervention performed  · Carotid duplex 10/2019: 50-69% stenosis of the Right ICA; 70-99% stenosis of the Left ICA  · S/p L carotid endarterectomy on 1/13/2020  · Carotid duplex 43605: 50-69% right ICA stenosis, patent left ICA endarterectomy site; 50-69% stenosis in the mid ICA  · Pharmacologic MPI 2/2022: large inferior infarct  A small amount of melissa-infarct ischemic could not be excluded  · Cardiac catheterization 3/2022: no significant obstructive epicardial CAD  · TTE 6/2022: EF: 55%, g1dd, mild LA dilation       He has been doing very well from a cardiac standpoint since his last evaluation  He notes chronic stable shortness of breath  His cardiac evaluation was unrevealing as noted above  PFTs reportedly pending  Recent carotid duplex revealed stable carotid stenosis    His blood pressure has been well controlled on his current antihypertensive regimen  He denies chest pain, palpitations or any other cardiac concerns at this time  Family history of CAD on his maternal side with his maternal grandfather and uncle having MIs       Social history: Smoked 1ppd for 48 years; quit 1 year ago          Patient Active Problem List   Diagnosis    Essential hypertension    Mixed hyperlipidemia    Type 2 diabetes mellitus without complication, without long-term current use of insulin ([facility]    Ventral hernia without obstruction or gangrene    Soft tissue mass    Cigarette nicotine dependence without complication    Congestive heart failure, NYHA class 1, acute, systolic ([facility] )    Coronary artery disease due to lipid rich plaque    Carotid artery stenosis without cerebral infarction, bilateral    Cerebral aneurysm    S/P Left carotid endarterectomy 1/13/20    Arthritis    Familial hypercholesterolemia    BMI 27 0-27 9,adult    Chronic right shoulder pain    Elevated PSA    Status post reverse total arthroplasty of right shoulder    Erectile dysfunction of non-organic origin    Fatigue    Anemia    Left carpal tunnel syndrome    Medicare annual wellness visit, subsequent    Screening for AAA (abdominal aortic aneurysm)       Allergies   Allergen Reactions    Other Hives     Soft shell crabs hives         Current Outpatient Medications:     amLODIPine (NORVASC) 10 mg tablet, TAKE 1 TABLET BY MOUTH  DAILY, Disp: 90 tablet, Rfl: 3    ascorbic acid (VITAMIN C) 500 mg tablet, Take 500 mg by mouth daily, Disp: , Rfl:     aspirin 81 mg chewable tablet, Chew 1 tablet (81 mg total) daily, Disp: 90 tablet, Rfl: 3    atorvastatin (LIPITOR) 20 mg tablet, TAKE 1 TABLET BY MOUTH  DAILY, Disp: 90 tablet, Rfl: 3    carvedilol (COREG) 12 5 mg tablet, Take 1 tablet (12 5 mg total) by mouth 2 (two) times a day with meals, Disp: 180 tablet, Rfl: 3    clopidogrel (PLAVIX) 75 mg tablet, TAKE 1 TABLET BY MOUTH DAILY, Disp: 90 tablet, Rfl: 3    glucose blood test strip, Use as instructed, Disp: 100 each, Rfl: 2    Januvia 25 MG tablet, TAKE 1 TABLET BY MOUTH  DAILY, Disp: 90 tablet, Rfl: 3    losartan (COZAAR) 100 MG tablet, TAKE 1 TABLET BY MOUTH  DAILY, Disp: 90 tablet, Rfl: 3    metFORMIN (GLUCOPHAGE) 1000 MG tablet, TAKE 1 TABLET BY MOUTH  TWICE DAILY WITH MEALS, Disp: 180 tablet, Rfl: 3    sildenafil (VIAGRA) 25 MG tablet, TAKE 1 TABLET BY MOUTH  DAILY AS NEEDED FOR  ERECTILE DYSFUNCTION, Disp: 10 tablet, Rfl: 0    Past Medical History:   Diagnosis Date    CHF (congestive heart failure) (HCC)     Diabetes mellitus (HCC)     Diverticulitis     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HCC) 11/7/2018    Heart disease     Hyperlipidemia     Hypertension     Kidney stone     Osteoarthritis     Vascular disorder        Family History   Problem Relation Age of Onset    Colon cancer Father     Colon cancer Paternal Grandfather     No Known Problems Mother     Colon cancer Family        Past Surgical History:   Procedure Laterality Date    ABDOMINAL SURGERY      CARDIAC CATHETERIZATION N/A 3/21/2022    Procedure: Cardiac catheterization;  Surgeon: Daryle Mountain, MD;  Location: [address] [place] [address] CATH LAB; Service: Cardiology    CARDIAC CATHETERIZATION N/A 3/21/2022    Procedure: Cardiac Coronary Angiogram;  Surgeon: Daryle Mountain, MD;  Location: [address] [place] [address] CATH LAB; Service: Cardiology    COLONOSCOPY      INGUINAL HERNIA REPAIR Left     LAPAROSCOPIC COLON RESECTION      GA COLONOSCOPY FLX DX W/COLLJ SPEC WHEN PFRMD N/A 11/3/2017    Procedure: COLONOSCOPY;  Surgeon: Laird Baumgarten, MD;  Location: [facility]  GI LAB; Service: Colorectal    GA RECONSTR TOTAL SHOULDER IMPLANT Right 11/17/2020    Procedure: ARTHROPLASTY SHOULDER REVERSE with biceps tendonesis;   Surgeon: Alexx Mansfield MD;  Location: [facility] MAIN OR;  Service: Orthopedics    GA THROMBOENDARTECTMY Willy Ewings INCIS Left 1/13/2020    Procedure: ENDARTERECTOMY ARTERY CAROTID;  Surgeon: Vasile Shaw MD;  Location: [facility];  Service: Vascular       Social History     Socioeconomic History    Marital status: /Civil Union     Spouse name: Not on file    Number of children: Not on file    Years of education: Not on file    Highest education level: Not on file   Occupational History    Not on file   Tobacco Use    Smoking status: Former Smoker     Types: Cigarettes, Cigars     Quit date: 9/22/2019     Years since quitting: 3 0    Smokeless tobacco: Never Used   Vaping Use    Vaping Use: Never used   Substance and Sexual Activity    Alcohol use: Yes     Alcohol/week: 3 0 standard drinks     Types: 3 Cans of beer per week    Drug use: No    Sexual activity: Not on file   Other Topics Concern    Not on file   Social History Narrative    Caffeine use    Uses safety equipment: seatbelts     Social Determinants of Health     Financial Resource Strain: Not on file   Food Insecurity: Not on file   Transportation Needs: Not on file   Physical Activity: Not on file   Stress: Not on file   Social Connections: Not on file   Intimate Partner Violence: Not on file   Housing Stability: Not on file       Review of Systems   Constitutional: Negative for diaphoresis, weight gain and weight loss  HENT: Negative for congestion  Cardiovascular: Negative for chest pain, dyspnea on exertion, irregular heartbeat, leg swelling, near-syncope, orthopnea, palpitations, paroxysmal nocturnal dyspnea and syncope  Respiratory: Positive for shortness of breath  Negative for sleep disturbances due to breathing and snoring  Hematologic/Lymphatic: Does not bruise/bleed easily  Skin: Negative for rash  Musculoskeletal: Negative for myalgias  Gastrointestinal: Negative for nausea and vomiting  Neurological: Negative for excessive daytime sleepiness and light-headedness  Psychiatric/Behavioral: The patient is not nervous/anxious          Vitals: /82 (BP Location: Right arm, Patient Position: Sitting)   Pulse 77   Resp 16   Ht 6' (1 829 m)   Wt 92 5 kg (204 lb)   SpO2 98%   BMI 27 67 kg/m²       Physical Exam:     GEN: Alert and oriented x 3, in no acute distress  Well appearing and well nourished  HEENT: Sclera anicteric, conjunctivae pink, mucous membranes moist  Oropharynx clear  NECK: Supple, no carotid bruits, no significant JVD  Trachea midline, no thyromegaly  HEART: Regular rhythm, normal S1 and S2, no murmurs, clicks, gallops or rubs  PMI nondisplaced, no thrills  LUNGS: Clear to auscultation bilaterally; no wheezes, rales, or rhonchi  No increased work of breathing or signs of respiratory distress  ABDOMEN: Soft, nontender, nondistended, normoactive bowel sounds  EXTREMITIES: Skin warm and well perfused, no clubbing, cyanosis, or edema  NEURO: No focal findings  Normal speech  Mood and affect normal    SKIN: Normal without suspicious lesions on exposed skin            Lab Results:       Lab Results   Component Value Date    HGBA1C 7 3 (A) 06/03/2022    HGBA1C 8 0 (H) 01/24/2022    HGBA1C 7 5 (H) 10/05/2021     Lab Results   Component Value Date    CHOL 163 11/22/2016    CHOL 172 09/18/2015     Lab Results   Component Value Date    HDL 42 01/24/2022    HDL 43 06/22/2021    HDL 43 02/24/2021     Lab Results   Component Value Date    LDLCALC 72 01/24/2022    LDLCALC 61 06/22/2021    LDLCALC 75 02/24/2021     Lab Results   Component Value Date    TRIG 144 01/24/2022    TRIG 349 (H) 06/22/2021    TRIG 190 (H) 02/24/2021     No results found for: CHOLHDL

## 2022-09-23 RX ORDER — SITAGLIPTIN 25 MG/1
TABLET, FILM COATED ORAL
Qty: 90 TABLET | Refills: 3 | Status: SHIPPED | OUTPATIENT
Start: 2022-09-23

## 2022-10-04 ENCOUNTER — OFFICE VISIT (OUTPATIENT)
Dept: FAMILY MEDICINE CLINIC | Facility: CLINIC | Age: 72
End: 2022-10-04
Payer: COMMERCIAL

## 2022-10-04 VITALS
HEART RATE: 72 BPM | BODY MASS INDEX: 28.09 KG/M2 | DIASTOLIC BLOOD PRESSURE: 82 MMHG | HEIGHT: 72 IN | TEMPERATURE: 97.8 F | SYSTOLIC BLOOD PRESSURE: 124 MMHG | WEIGHT: 207.4 LBS | OXYGEN SATURATION: 97 %

## 2022-10-04 DIAGNOSIS — E11.9 TYPE 2 DIABETES MELLITUS WITHOUT COMPLICATION, WITHOUT LONG-TERM CURRENT USE OF INSULIN (HCC): Primary | ICD-10-CM

## 2022-10-04 DIAGNOSIS — Z23 NEED FOR PNEUMOCOCCAL VACCINE: ICD-10-CM

## 2022-10-04 DIAGNOSIS — H81.10 BENIGN PAROXYSMAL POSITIONAL VERTIGO, UNSPECIFIED LATERALITY: ICD-10-CM

## 2022-10-04 DIAGNOSIS — R97.20 ELEVATED PSA: ICD-10-CM

## 2022-10-04 LAB — SL AMB POCT HEMOGLOBIN AIC: 7.6 (ref ?–6.5)

## 2022-10-04 PROCEDURE — 99214 OFFICE O/P EST MOD 30 MIN: CPT | Performed by: FAMILY MEDICINE

## 2022-10-04 PROCEDURE — 83036 HEMOGLOBIN GLYCOSYLATED A1C: CPT | Performed by: FAMILY MEDICINE

## 2022-10-04 PROCEDURE — 90677 PCV20 VACCINE IM: CPT | Performed by: FAMILY MEDICINE

## 2022-10-04 PROCEDURE — G0009 ADMIN PNEUMOCOCCAL VACCINE: HCPCS | Performed by: FAMILY MEDICINE

## 2022-10-04 NOTE — PROGRESS NOTES
BMI Counseling: Body mass index is 28 13 kg/m²  The BMI is above normal  Nutrition recommendations include reducing portion sizes, decreasing overall calorie intake and 3-5 servings of fruits/vegetables daily  Exercise recommendations include exercising 3-5 times per week

## 2022-10-04 NOTE — PROGRESS NOTES
Name: Yareli Cee      : 1950      MRN: [id number]  Encounter Provider: Mitchell Workman MD  Encounter Date: 10/4/2022   Encounter department: [address] [place] [address]     1  Type 2 diabetes mellitus without complication, without long-term current use of insulin ([facility])  -     POCT hemoglobin A1c- 7 6  Stable controlled    2  Elevated PSA  F/U with urology    3  Need for pneumococcal vaccine  -     Pneumococcal Conjugate Vaccine 20-valent (Pcv20)    4  Benign paroxysmal positional vertigo, unspecified laterality  Referred to Physical therapy    Follow up in 6 months         Subjective     Patient is here to follow up for Type 2 DM  denies any symptoms related to this and takes his medications daily  Has elevated PSA and follows up with urology denies any urinary symptoms  Has been having dizziness symptoms at rest when is laying down and when he moves his head from one side to the other  He has checked his BP when the episodes occur and has been normal  Has not checked his sugars during episodes  Has a Hx of right Carotid stenosis 60% and left CEA  Review of Systems   Constitutional: Negative for activity change, appetite change, fatigue and fever  HENT: Negative for congestion and ear discharge  Respiratory: Negative for cough and shortness of breath  Cardiovascular: Negative for chest pain and palpitations  Gastrointestinal: Negative for diarrhea and nausea  Musculoskeletal: Negative for arthralgias and back pain  Skin: Negative for color change and rash  Neurological: Positive for dizziness  Negative for headaches  Psychiatric/Behavioral: Negative for agitation and behavioral problems         Past Medical History:   Diagnosis Date    CHF (congestive heart failure) ([facility])     Diabetes mellitus ([facility]     Diverticulitis     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[facility] ) 2018    Heart disease     Hyperlipidemia     Hypertension     Kidney stone     Osteoarthritis     Vascular disorder      Past Surgical History:   Procedure Laterality Date    ABDOMINAL SURGERY      CARDIAC CATHETERIZATION N/A 3/21/2022    Procedure: Cardiac catheterization;  Surgeon: Daniel Valentin MD;  Location: [address] [place] [address] CATH LAB; Service: Cardiology    CARDIAC CATHETERIZATION N/A 3/21/2022    Procedure: Cardiac Coronary Angiogram;  Surgeon: Daniel Valentin MD;  Location: [address] [place] [address] CATH LAB; Service: Cardiology    COLONOSCOPY      INGUINAL HERNIA REPAIR Left     LAPAROSCOPIC COLON RESECTION      ND COLONOSCOPY FLX DX W/COLLJ SPEC WHEN PFRMD N/A 11/3/2017    Procedure: COLONOSCOPY;  Surgeon: Saturnino Bueno MD;  Location: [facility]  GI LAB; Service: Colorectal    ND RECONSTR TOTAL SHOULDER IMPLANT Right 11/17/2020    Procedure: ARTHROPLASTY SHOULDER REVERSE with biceps tendonesis; Surgeon: Jakob Cadet MD;  [facility];  Service: Orthopedics    ND THROMBOENDARTECTMY Beverli Peers Left 1/13/2020    Procedure: ENDARTERECTOMY ARTERY CAROTID;  Surgeon: Ofelia Pereira MD;  [facility];  Service: Vascular     Family History   Problem Relation Age of Onset    Colon cancer Father     Colon cancer Paternal Grandfather     No Known Problems Mother     Colon cancer Family      Social History     Socioeconomic History    Marital status: /Civil Union     Spouse name: None    Number of children: None    Years of education: None    Highest education level: None   Occupational History    None   Tobacco Use    Smoking status: Former Smoker     Types: Cigarettes, Cigars     Quit date: 9/22/2019     Years since quitting: 3 0    Smokeless tobacco: Never Used   Vaping Use    Vaping Use: Never used   Substance and Sexual Activity    Alcohol use:  Yes     Alcohol/week: 3 0 standard drinks     Types: 3 Cans of beer per week    Drug use: No    Sexual activity: None   Other Topics Concern    None   Social History Narrative    Caffeine use    Uses safety equipment: seatbelts     Social Determinants of Health     Financial Resource Strain: Not on file   Food Insecurity: Not on file   Transportation Needs: Not on file   Physical Activity: Not on file   Stress: Not on file   Social Connections: Not on file   Intimate Partner Violence: Not on file   Housing Stability: Not on file     Current Outpatient Medications on File Prior to Visit   Medication Sig    amLODIPine (NORVASC) 10 mg tablet TAKE 1 TABLET BY MOUTH  DAILY    ascorbic acid (VITAMIN C) 500 mg tablet Take 500 mg by mouth daily    aspirin 81 mg chewable tablet Chew 1 tablet (81 mg total) daily    atorvastatin (LIPITOR) 20 mg tablet TAKE 1 TABLET BY MOUTH  DAILY    carvedilol (COREG) 12 5 mg tablet Take 1 tablet (12 5 mg total) by mouth 2 (two) times a day with meals    clopidogrel (PLAVIX) 75 mg tablet TAKE 1 TABLET BY MOUTH  DAILY    glucose blood test strip Use as instructed    Januvia 25 MG tablet TAKE 1 TABLET BY MOUTH  DAILY    losartan (COZAAR) 100 MG tablet TAKE 1 TABLET BY MOUTH  DAILY    metFORMIN (GLUCOPHAGE) 1000 MG tablet TAKE 1 TABLET BY MOUTH  TWICE DAILY WITH MEALS    sildenafil (VIAGRA) 25 MG tablet TAKE 1 TABLET BY MOUTH  DAILY AS NEEDED FOR  ERECTILE DYSFUNCTION     Allergies   Allergen Reactions    Other Hives     Soft shell crabs hives     Immunization History   Administered Date(s) Administered    COVID-19 MODERNA VACC 0 5 ML IM 03/23/2021, 04/20/2021, 11/04/2021    Pneumococcal Conjugate Vaccine 20-valent (Pcv20), Polysace 10/04/2022    Tdap 08/29/2017       Objective     /82 (BP Location: Right arm, Patient Position: Sitting)   Pulse 72   Temp 97 8 °F (36 6 °C) (Temporal)   Ht 6' (1 829 m)   Wt 94 1 kg (207 lb 6 4 oz)   SpO2 97%   BMI 28 13 kg/m²     Physical Exam  Constitutional:       General: He is not in acute distress  Appearance: He is well-developed  He is not diaphoretic     HENT:      Right Ear: Tympanic membrane, ear canal and external ear normal  There is no impacted cerumen  Left Ear: Tympanic membrane, ear canal and external ear normal  There is no impacted cerumen  Eyes:      General: No scleral icterus  Pupils: Pupils are equal, round, and reactive to light  Cardiovascular:      Rate and Rhythm: Normal rate and regular rhythm  Heart sounds: Normal heart sounds  No murmur heard  Pulmonary:      Effort: Pulmonary effort is normal  No respiratory distress  Breath sounds: Normal breath sounds  No wheezing  Abdominal:      General: Bowel sounds are normal  There is no distension  Palpations: Abdomen is soft  Tenderness: There is no abdominal tenderness  Skin:     General: Skin is warm and dry  Findings: No rash  Neurological:      Mental Status: He is alert and oriented to person, place, and time         Dagoberto Cook MD

## 2022-10-12 PROBLEM — Z00.00 MEDICARE ANNUAL WELLNESS VISIT, SUBSEQUENT: Status: RESOLVED | Noted: 2022-01-26 | Resolved: 2022-10-12

## 2022-10-13 DIAGNOSIS — E11.9 TYPE 2 DIABETES MELLITUS WITHOUT COMPLICATION, WITHOUT LONG-TERM CURRENT USE OF INSULIN (HCC): ICD-10-CM

## 2022-10-27 ENCOUNTER — TELEPHONE (OUTPATIENT)
Dept: FAMILY MEDICINE CLINIC | Facility: CLINIC | Age: 72
End: 2022-10-27

## 2022-10-27 ENCOUNTER — EVALUATION (OUTPATIENT)
Dept: PHYSICAL THERAPY | Facility: MEDICAL CENTER | Age: 72
End: 2022-10-27
Payer: COMMERCIAL

## 2022-10-27 DIAGNOSIS — H81.10 BENIGN PAROXYSMAL POSITIONAL VERTIGO, UNSPECIFIED LATERALITY: Primary | ICD-10-CM

## 2022-10-27 DIAGNOSIS — R11.0 NAUSEA: Primary | ICD-10-CM

## 2022-10-27 PROCEDURE — 97161 PT EVAL LOW COMPLEX 20 MIN: CPT

## 2022-10-27 RX ORDER — ONDANSETRON 4 MG/1
4 TABLET, FILM COATED ORAL EVERY 8 HOURS PRN
Qty: 30 TABLET | Refills: 3 | Status: SHIPPED | OUTPATIENT
Start: 2022-10-27

## 2022-10-27 NOTE — PROGRESS NOTES
PT Evaluation       Today's date: 10/27/2022  Patient name: Demetra Knowles  : 1950  MRN: [id number]  Referring provider: Ana Gant MD  Dx:   Encounter Diagnosis     ICD-10-CM    1  Benign paroxysmal positional vertigo, unspecified laterality  H81 10 Ambulatory Referral to Physical Therapy     PT plan of care cert/re-cert         Assessment  Assessment details: Patient is a 81 y o  Male who presents to skilled outpatient PT with reports of dizziness  Patient presented with L upbeating torisional nystagmus, noted challenges with dizziness, unable to reduce due to patient's presentation of nausea with emesis  Patient's symptoms could be related to Posterior canal bppv as well due L sided Upbeating, torisional nystagmus, attempted L Epley trial CRT trial, but could not make it through trial due to patient's nausea and suggestion to cease attempt due to vomiting  Patient recommended to monitor his dizziness and if his dizziness presents with unilateral weakness, facial droop, or new neurologic symptoms to seek ED attention immediately  Patient given APTA handout for post BPPV recommendations and patient education  Patient was nauseous after session  Plan to reassess positionals next session  Patient advised to monitor movements and balance post session, patient was assisted to car with therapist due to subjective reports of unsteadiness and advised to monitor balance for the rest of the day and to contact higher level of care if needed, patient educated on residual dizziness and potential stimulation of the vestibular system  Patient advised to contact family if unable to drive or feeling unsteady, as therapist advised that it is unsafe to drive or operate a vehicle if feeling unsafe to drive from dizziness  Upper Cervical stability and VBI WNL/negative with testing  Patient requires skilled PT to maximize function and to improve his dizziness  Semont may be indicated due to persistence of dizziness  Patient verbalized understanding to all recommendations  Patient verbalized understanding of POC  Please contact me if you have any questions or recommendations  Thank you for the referral and the opportunity to share in Shantal Jimenez's care        Cut off score   All date taken from APTA Neuro Section or Rehab Measures    DGI:  MDC for Vestibular Disorders: 4 points  [facility] for Geriatrics/Community Dwelling Older Adults: 3 Points  Falls risk cut off: <19/24    FGA:  MCID: 4 points  Geriatrics/Community Dwelling Older Adults: </= 22/30 fall risk  Geriatrics/Community Dwelling Older Adults: </= 20/30 unexplained falls in the next 6 months  Parkinsons: </= 18/30 fall risk    mCTSIB (normed on ages 19-56, lower number is less sway or better static balance)  Eyes open firm surface (norm 0 21-0 48)  Eyes closed firm surface (norm 0 48-0 99)  Eyes open foam surface (norm 0 38-0 71)  Eyes closed foam surface (norm 0 70-2 22)    DHI:  0-39: low perception of handicap  40-69: moderate perception of handicap  : severe perception of handicap  > 60: increased risk for falls        Impairments: activity intolerance, impaired balance, lacks appropriate, HEP, safety issue  Understanding of Dx/Px/POC: Excellent  Prognosis: Excellent      Goals     BPPV Goals:  - Patient will report complete resolution of symptoms in order to promote return to PLOF  - Patient will complete FGA in order to promote return to safe performance of ADLs  - Patient will demonstrate (-) [place]-Hallpike test on L side  - Patient will demonstrate (-) Roll Test on L side           Plan  Plan details: CRT, VRT, balance  Patient would benefit from: PT Eval  Planned therapy interventions: balance, HEP, manual therapy, neuromuscular re-education, patient education  Frequency: 1-3x per week  Duration in weeks: 12  Plan of Care beginning date: 10/27/2022  Plan of Care expiration date: 3 months - 2023  Treatment plan discussed with: Patient and Family        Subjective Evaluation    History of Present Illness  - Mechanism of injury: Patient is a 81year old male presenting to skilled PT with reports of vertigo  Patient recently saw PCP, summary of appointment noted below:     "Patient is here to follow up for Type 2 DM  denies any symptoms related to this and takes his medications daily  Has elevated PSA and follows up with urology denies any urinary symptoms  Has been having dizziness symptoms at rest when is laying down and when he moves his head from one side to the other  He has checked his BP when the episodes occur and has been normal  Has not checked his sugars during episodes  Has a Hx of right Carotid stenosis 60% and left CEA "    Patient notes that he had a concussion last year, states he is recovered from that       Dizziness Subjective  - How long does dizziness last: 10 seconds  - How would you describe the dizziness: Room spinning when laying down or rolling over  - Rolling in bed: Yes  - Supine to/from sit: Yes  - Recent hearing loss: No  - Tinnitus: No  - Aural fullness/ear pain: No  - Vision changes: No  - History of recent viral infections: No  - History of migraines: No    Red Flag Screen  - Numbness: No  - Tingling: No  - Weakness: No  - Unilateral hearing loss: No  - Slurred speech: No  - Progressive hearing loss: No  - Tremors: No  - Poor coordination: No  - UMN signs: No  - LoC: No  - Rigidity: No  - Visual field loss: No  - Memory loss: No  - CN dysfunction: No  - Vertical nystagmus: No    Pain  Current pain ratin/10  At best pain ratin/10  At worst pain ratin/10  Location: No pain reported  Aggravating factors: No pain reported    Objective      BPPV Objective  Integrity Testing  - mVBI: WNL bilaterally  - Sharp Eliz: WNL bilaterally   - Alar Ligament Stability Test: WNL bilaterally   - Posture: Mild forward head, rounded shoulders  - Palpation: Mild tonicity    Positional Testing  - R [place]-Hallpike: Negative 2x  - L [place]-Hallpike: Upbeating torsional L beating nystagmus lasting approximately 10 seconds, 2x  - R Roll Test: Negative 2x  - L Roll Test: Negative 2x     L Epley Trial- 1 x, patient deferred through second position due to nausea and subsequent vomiting      Outcome Measures Initial Eval  10/27/2022        mCTSIB  - FTEO (firm)  - FTEC (firm)  - FTEO (foam)  - FTEC (foam)   Defer        DGI Defer        FGA Defer        10 meter Defer        [address] [place]                                                          Precautions:   Past Medical History:   Diagnosis Date   • CHF (congestive heart failure) ([facility] )    • Diabetes mellitus ([facility] )    • Diverticulitis    • 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[facility] ) 11/7/2018   • Heart disease    • Hyperlipidemia    • Hypertension    • Kidney stone    • Osteoarthritis    • Vascular disorder

## 2022-10-27 NOTE — TELEPHONE ENCOUNTER
Patient went to vestibular therapy today and he couldn't make it through it cause he threw up  Anything he can take for nausea prior to going back tomorrow?

## 2022-10-28 ENCOUNTER — OFFICE VISIT (OUTPATIENT)
Dept: PHYSICAL THERAPY | Facility: MEDICAL CENTER | Age: 72
End: 2022-10-28
Payer: COMMERCIAL

## 2022-10-28 DIAGNOSIS — H81.10 BENIGN PAROXYSMAL POSITIONAL VERTIGO, UNSPECIFIED LATERALITY: Primary | ICD-10-CM

## 2022-10-28 PROCEDURE — 97140 MANUAL THERAPY 1/> REGIONS: CPT | Performed by: PHYSICAL THERAPIST

## 2022-10-28 NOTE — PROGRESS NOTES
Daily Note     Today's date: 10/28/2022  Patient name: Dionna Acosta  : 1950  MRN: [id number]  Referring provider: Erlin Brandt MD  Dx:   Encounter Diagnosis     ICD-10-CM    1  Benign paroxysmal positional vertigo, unspecified laterality  H81 10                   Subjective:  Pt states he took a chemotherapy pill for nausea prior to tx this morning, in hopes that he can get through treatment today  Objective: See treatment diary below    (-) s/l test for horizontal canal  (+) L hallpike-vinnie      Assessment: Tolerated treatment fair  Patient would benefit from continued PT  Assessed horizontal canal due to severity of pt's sx's after last visit to start, however was (-)  Immed upbeating torsional nystagmus present with L hallpike that last approx 10-15 sec  Pt did become diaphoretic and nauseated but able to take him through entire CRT  Pt did feel decreased dizzness and nausea post sitting for 10 min  Pt states feeling slightly "whoozy" upon leaving, but states he feels better than when he came in       Plan: Continue per plan of care           Precautions:  DM< CAD, anemia      Manuals 10/27 10/28           L CRT  attempted x1                                                  Neuro Re-Ed                                                                                                        Ther Ex                                                                                                                     Ther Activity                                       Gait Training                                       Modalities

## 2022-10-31 ENCOUNTER — OFFICE VISIT (OUTPATIENT)
Dept: PHYSICAL THERAPY | Facility: MEDICAL CENTER | Age: 72
End: 2022-10-31

## 2022-10-31 DIAGNOSIS — H81.10 BENIGN PAROXYSMAL POSITIONAL VERTIGO, UNSPECIFIED LATERALITY: Primary | ICD-10-CM

## 2022-10-31 NOTE — PROGRESS NOTES
Daily Note     Today's date: 10/31/2022  Patient name: Robyn Kapadia  : 1950  MRN: [id number]  Referring provider: Jessica Barahona MD  Dx:   Encounter Diagnosis     ICD-10-CM    1  Benign paroxysmal positional vertigo, unspecified laterality  H81 10                   Subjective:  Pt states he has not taken any meds since Friday last week, and denies any dizziness since  Pt states he always feels he has a low grade headache though  Pt denies any imbalance  Objective: See treatment diary below    (-) head thrust b/l  Normal VOR  (-) b/l hallpike-vinnie      Assessment: Tolerated treatment fair  Patient All positional testing was neg  No further skilled PT indicated at this time  Pt educated on BPPV and given educational packet  Pt advised to call if sx's return  Plan: Cont PT as needed          Precautions:  DM< CAD, anemia      Manuals 10/27 10/28 10/31          L CRT  attempted x1 NP                                                 Neuro Re-Ed                                                                                                        Ther Ex                                                                                                                     Ther Activity                                       Gait Training                                       Modalities

## 2022-11-08 ENCOUNTER — HOSPITAL ENCOUNTER (OUTPATIENT)
Dept: CT IMAGING | Facility: HOSPITAL | Age: 72
Discharge: HOME | End: 2022-11-08
Attending: FAMILY MEDICINE

## 2022-11-08 DIAGNOSIS — F17.210 CIGARETTE NICOTINE DEPENDENCE WITHOUT COMPLICATION: ICD-10-CM

## 2022-11-22 DIAGNOSIS — I50.21 CONGESTIVE HEART FAILURE, NYHA CLASS 1, ACUTE, SYSTOLIC (HCC): ICD-10-CM

## 2022-11-22 DIAGNOSIS — I25.10 CAD (CORONARY ARTERY DISEASE): ICD-10-CM

## 2022-11-22 RX ORDER — CARVEDILOL 12.5 MG/1
12.5 TABLET ORAL 2 TIMES DAILY WITH MEALS
Qty: 180 TABLET | Refills: 3 | Status: SHIPPED | OUTPATIENT
Start: 2022-11-22

## 2022-11-22 RX ORDER — ASPIRIN 81 MG/1
TABLET, CHEWABLE ORAL
Qty: 90 TABLET | Refills: 3 | Status: SHIPPED | OUTPATIENT
Start: 2022-11-22

## 2022-11-30 ENCOUNTER — TELEPHONE (OUTPATIENT)
Dept: NEUROSURGERY | Facility: CLINIC | Age: 72
End: 2022-11-30

## 2022-11-30 DIAGNOSIS — I67.1 CEREBRAL ANEURYSM: Primary | ICD-10-CM

## 2022-11-30 NOTE — TELEPHONE ENCOUNTER
LAUREN for patient informing him that he requires bloodwork prior to his CTA which is scheduled on 12/8

## 2022-11-30 NOTE — TELEPHONE ENCOUNTER
----- Message from [address] sent at 11/30/2022 11:21 AM EST -----  Please call patient and let him know he needs creat/bun prior to CTA thanks

## 2022-12-06 ENCOUNTER — TELEPHONE (OUTPATIENT)
Dept: FAMILY MEDICINE CLINIC | Facility: CLINIC | Age: 72
End: 2022-12-06

## 2022-12-06 ENCOUNTER — APPOINTMENT (OUTPATIENT)
Dept: LAB | Facility: CLINIC | Age: 72
End: 2022-12-06

## 2022-12-06 DIAGNOSIS — R93.89 ABNORMAL FINDING ON CT SCAN: Primary | ICD-10-CM

## 2022-12-06 DIAGNOSIS — I67.1 CEREBRAL ANEURYSM: ICD-10-CM

## 2022-12-06 NOTE — TELEPHONE ENCOUNTER
Please order that CT Abdomen that you were going to order off of the results of his CT lung screening    Patient called to schedule and couldn't because there was no order

## 2022-12-07 LAB
BUN SERPL-MCNC: 18 MG/DL (ref 5–25)
CREAT SERPL-MCNC: 0.97 MG/DL (ref 0.6–1.3)
GFR SERPL CREATININE-BSD FRML MDRD: 78 ML/MIN/1.73SQ M

## 2022-12-08 ENCOUNTER — HOSPITAL ENCOUNTER (OUTPATIENT)
Dept: CT IMAGING | Facility: HOSPITAL | Age: 72
End: 2022-12-08

## 2022-12-08 DIAGNOSIS — I67.1 CEREBRAL ANEURYSM: ICD-10-CM

## 2022-12-08 DIAGNOSIS — R93.89 ABNORMAL FINDING ON CT SCAN: ICD-10-CM

## 2022-12-08 RX ADMIN — IOHEXOL 100 ML: 350 INJECTION, SOLUTION INTRAVENOUS at 15:02

## 2022-12-21 ENCOUNTER — TELEMEDICINE (OUTPATIENT)
Dept: NEUROSURGERY | Facility: CLINIC | Age: 72
End: 2022-12-21

## 2022-12-21 DIAGNOSIS — I67.1 CEREBRAL ANEURYSM: Primary | ICD-10-CM

## 2022-12-21 NOTE — PROGRESS NOTES
Virtual Regular Visit    Verification of patient location:    Patient is located in the following state in which I hold an active license [place]      Assessment/Plan:    Problem List Items Addressed This Visit        Cardiovascular and Mediastinum    Cerebral aneurysm - Primary     Returns for 1 year follow up of a 5mm right MCA bifurcation aneurysm  · Found on work up of carotid stenosis in 2019, s/p left CEA 1/2020  On DAPT  · No new neurological symptoms since last seen  · Exam: grossly non-focal     Imaging:  · CTA head w/wo, 12/8/22: No acute intracranial abnormality  Unchanged 0 5 cm aneurysm in right MCA bifurcation projecting inferolaterally  Unchanged diffusely small and irregular caliber of left ICA throughout the visualized distal cervical and intracranial segments  Moderate chronic microangiopathy:No change  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  Stable diffusely small cervical and intracranial left internal carotid artery  Plan:  · Reviewed imaging with patient via phone  His aneurysm is stable on imaging  · With current size and location of aneurysm, based on UCAS patient has a <1%/yr risk of aneurysm rupture  Discussed modifiable risk factors including HTN and smoking  Denies any family history of aneurysms or sudden death  · Discussed signs and symptoms of aneurysm rupture including severe, sudden onset headache, neck pain, nausea vomiting, and seizure  Reiterated that the symptoms should prompt patient to visit in emergency department immediately  · Follow-up in 2 years with CTA head w/wo as snpx with Dr Caleb Choudhary  · Call sooner with any questions or concerns            Relevant Orders    BUN    Creatinine, serum    CTA head w wo contrast            Reason for visit is   Chief Complaint   Patient presents with   • Virtual Regular Visit   • Follow-up        Encounter provider Ladonna Frias PA-C    Provider located at [facility] TAMARAPARRISCHINTAN Cardenas [facility] [address]   [phone]      Recent Visits  No visits were found meeting these conditions  Showing recent visits within past 7 days and meeting all other requirements  Today's Visits  Date Type Provider Dept   12/21/22 Telemedicine Lachelle Agosto PA-C [facility]   Showing today's visits and meeting all other requirements  Future Appointments  No visits were found meeting these conditions  Showing future appointments within next 150 days and meeting all other requirements       The patient was identified by name and date of birth  Marin Rodrigues was informed that this is a telemedicine visit and that the visit is being conducted through Telephone  My office door was closed  No one else was in the room  He acknowledged consent and understanding of privacy and security of the video platform  The patient has agreed to participate and understands they can discontinue the visit at any time  Patient is aware this is a billable service  Subjective  Marin Rodrigues is a 81 y o  male phoned for follow up of a right MCA bifurcation aneurysm  81year old gentleman seen for 1 year follow up of a 5mm right MCA bifurcation aneurysm found on work up of carotid stenosis in 2019  Ultimately underwent left CEA 1/2020, currently on DAPT  He is doing well  Denies headaches, blurry vision, new weakness or numbness/tingling  No recent falls  Reports some dizziness, he did vestibular therapy and this resolved  Quit smoking about 2 years ago  He denies any family history of aneurysm or sudden death  BP reportedly well controlled         Past Medical History:   Diagnosis Date   • CHF (congestive heart failure) (HCC)    • Diabetes mellitus ([facility] [address] )    • Diverticulitis    • 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[facility] [address] ) 11/7/2018   • Heart disease    • Hyperlipidemia    • Hypertension    • Kidney stone    • Osteoarthritis    • Vascular disorder        Past Surgical History: Procedure Laterality Date   • ABDOMINAL SURGERY     • CARDIAC CATHETERIZATION N/A 3/21/2022    Procedure: Cardiac catheterization;  Surgeon: Patricia Garcia MD;  Location: [facility] CATH LAB; Service: Cardiology   • CARDIAC CATHETERIZATION N/A 3/21/2022    Procedure: Cardiac Coronary Angiogram;  Surgeon: Patricia Garcia MD;  Location: [facility] CATH LAB; Service: Cardiology   • COLONOSCOPY     • INGUINAL HERNIA REPAIR Left    • LAPAROSCOPIC COLON RESECTION     • AL COLONOSCOPY FLX DX W/COLLJ SPEC WHEN PFRMD N/A 11/3/2017    Procedure: COLONOSCOPY;  Surgeon: Corrina Greenberg MD;  Location: [facility]  GI LAB; Service: Colorectal   • AL RECONSTR TOTAL SHOULDER IMPLANT Right 11/17/2020    Procedure: ARTHROPLASTY SHOULDER REVERSE with biceps tendonesis;   Surgeon: Rajan Harmon MD;  Location: [facility] MAIN OR;  Service: Orthopedics   • AL THROMBOENDARTECTMY Ai Hensley Left 1/13/2020    Procedure: ENDARTERECTOMY ARTERY CAROTID;  Surgeon: Vickie Yip MD;  Location: [facility] MAIN OR;  Service: Vascular       Current Outpatient Medications   Medication Sig Dispense Refill   • amLODIPine (NORVASC) 10 mg tablet TAKE 1 TABLET BY MOUTH  DAILY 90 tablet 3   • ascorbic acid (VITAMIN C) 500 mg tablet Take 500 mg by mouth daily     • aspirin 81 mg chewable tablet CHEW AND SWALLOW 1 TABLET  DAILY 90 tablet 3   • atorvastatin (LIPITOR) 20 mg tablet TAKE 1 TABLET BY MOUTH  DAILY 90 tablet 3   • carvedilol (COREG) 12 5 mg tablet Take 1 tablet (12 5 mg total) by mouth 2 (two) times a day with meals 180 tablet 3   • clopidogrel (PLAVIX) 75 mg tablet TAKE 1 TABLET BY MOUTH  DAILY 90 tablet 3   • glucose blood test strip Use as instructed 100 each 2   • Januvia 25 MG tablet TAKE 1 TABLET BY MOUTH  DAILY 90 tablet 3   • losartan (COZAAR) 100 MG tablet TAKE 1 TABLET BY MOUTH  DAILY 90 tablet 3   • metFORMIN (GLUCOPHAGE) 1000 MG tablet TAKE 1 TABLET BY MOUTH  TWICE DAILY WITH MEALS 180 tablet 3   • sildenafil (VIAGRA) 25 MG tablet TAKE 1 TABLET BY MOUTH  DAILY AS NEEDED FOR  ERECTILE DYSFUNCTION (Patient not taking: Reported on 12/21/2022) 10 tablet 0     No current facility-administered medications for this visit  Allergies   Allergen Reactions   • Other Hives     Soft shell crabs hives       Review of Systems   Constitutional: Negative  Eyes: Negative  Respiratory: Negative  Cardiovascular: Negative  Gastrointestinal: Negative  Negative for constipation and diarrhea  Endocrine: Negative  Genitourinary: Negative  Negative for urgency  Musculoskeletal: Negative  Skin: Negative  Allergic/Immunologic: Negative  Neurological: Negative  Negative for dizziness  Hematological: Bruises/bleeds easily (medication )  Psychiatric/Behavioral: Negative  Video Exam    There were no vitals filed for this visit  Physical Exam  Eyes:      General: No visual field deficit  Neurological:      Mental Status: He is alert and oriented to person, place, and time  GCS: GCS eye subscore is 4  GCS verbal subscore is 5  GCS motor subscore is 6  Cranial Nerves: No dysarthria or facial asymmetry  Sensory: Sensation is intact  Motor: Motor function is intact            I spent 20 minutes with patient today in which greater than 50% of the time was spent in counseling/coordination of care regarding right MCA bifurcation aneurysm

## 2022-12-21 NOTE — ASSESSMENT & PLAN NOTE
Returns for 1 year follow up of a 5mm right MCA bifurcation aneurysm  · Found on work up of carotid stenosis in 2019, s/p left CEA 1/2020  On DAPT  · No new neurological symptoms since last seen  · Exam: grossly non-focal     Imaging:  · CTA head w/wo, 12/8/22: No acute intracranial abnormality  Unchanged 0 5 cm aneurysm in right MCA bifurcation projecting inferolaterally  Unchanged diffusely small and irregular caliber of left ICA throughout the visualized distal cervical and intracranial segments  Moderate chronic microangiopathy:No change  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  Stable diffusely small cervical and intracranial left internal carotid artery  Plan:  · Reviewed imaging with patient via phone  His aneurysm is stable on imaging  · With current size and location of aneurysm, based on UCAS patient has a <1%/yr risk of aneurysm rupture  Discussed modifiable risk factors including HTN and smoking  Denies any family history of aneurysms or sudden death  · Discussed signs and symptoms of aneurysm rupture including severe, sudden onset headache, neck pain, nausea vomiting, and seizure  Reiterated that the symptoms should prompt patient to visit in emergency department immediately  · Follow-up in 2 years with CTA head w/wo as snpx with Dr Nenita Roberts  · Call sooner with any questions or concerns

## 2022-12-22 ENCOUNTER — TELEPHONE (OUTPATIENT)
Dept: NEUROSURGERY | Facility: CLINIC | Age: 72
End: 2022-12-22

## 2023-01-06 ENCOUNTER — TELEPHONE (OUTPATIENT)
Dept: UROLOGY | Facility: CLINIC | Age: 73
End: 2023-01-06

## 2023-01-06 ENCOUNTER — OFFICE VISIT (OUTPATIENT)
Dept: FAMILY MEDICINE CLINIC | Facility: CLINIC | Age: 73
End: 2023-01-06

## 2023-01-06 VITALS
TEMPERATURE: 97.5 F | OXYGEN SATURATION: 96 % | HEART RATE: 78 BPM | WEIGHT: 206 LBS | BODY MASS INDEX: 27.9 KG/M2 | DIASTOLIC BLOOD PRESSURE: 88 MMHG | SYSTOLIC BLOOD PRESSURE: 148 MMHG | HEIGHT: 72 IN

## 2023-01-06 DIAGNOSIS — E78.2 HYPERLIPIDEMIA, MIXED: ICD-10-CM

## 2023-01-06 DIAGNOSIS — E11.9 TYPE 2 DIABETES MELLITUS WITHOUT COMPLICATION, WITHOUT LONG-TERM CURRENT USE OF INSULIN (HCC): Primary | ICD-10-CM

## 2023-01-06 LAB — SL AMB POCT HEMOGLOBIN AIC: 8.2 (ref ?–6.5)

## 2023-01-06 NOTE — TELEPHONE ENCOUNTER
Called and left VM per communication consent  PT needs PSA PTV  Office number was provided if PT has any questions

## 2023-01-06 NOTE — TELEPHONE ENCOUNTER
Per patient's phone call, he would like a call back to clarify if he needs to have PSA done prior to his 1/12 appt or 3/6 appt  He was under the impression that his 1/12 appt was to review CT scan results    patient feeling better, no vomiting in the ER, labs, US, cat scan reviwed in the ER, spoke with Dr. Duffy, case discussed will f/u in office on Monday, patient to continue protonix, bentyl and take zofran as needed, understands to return to ER for fever, severe pain or prolonged vomiting, recommended liquid diet and slow progression to solid food

## 2023-01-06 NOTE — TELEPHONE ENCOUNTER
Called and spoke to PT about PSA  Was not aware PT had another appointment 3/6/23 for the PSA follow up  I let PT Know to have PSA PTV for 3/6/23  PT verbalized understanding and was thankful for the call

## 2023-01-06 NOTE — PROGRESS NOTES
Name: Armond Blue      : 1950      MRN: [id number]  Encounter Provider: Hayder Larkin MD  Encounter Date: 2023   Encounter department: [address] [place] [address]     1  Type 2 diabetes mellitus without complication, without long-term current use of insulin ([facility])  -     POCT hemoglobin A1c- 8 2  Elevated  After discussing with patient he would like to try a low carb diet  -     Comprehensive metabolic panel; Future  -     Microalbumin / creatinine urine ratio    2  Hyperlipidemia, mixed  -     Lipid panel; Future    F/U in 3-6 months       Subjective     Patient is here to follow up for Type 2 DM  He denies any symptoms related to this  Taking metformin and Januvia daily  Review of Systems   Constitutional: Negative for activity change, appetite change, fatigue and fever  HENT: Negative for congestion and ear discharge  Respiratory: Negative for cough and shortness of breath  Cardiovascular: Negative for chest pain and palpitations  Gastrointestinal: Negative for diarrhea and nausea  Musculoskeletal: Negative for arthralgias and back pain  Skin: Negative for color change and rash  Neurological: Negative for dizziness and headaches  Psychiatric/Behavioral: Negative for agitation and behavioral problems  Past Medical History:   Diagnosis Date   • CHF (congestive heart failure) ([facility])    • Diabetes mellitus ([facility] [address] )    • Diverticulitis    • 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[facility] [address] ) 2018   • Heart disease    • Hyperlipidemia    • Hypertension    • Kidney stone    • Osteoarthritis    • Vascular disorder      Past Surgical History:   Procedure Laterality Date   • ABDOMINAL SURGERY     • CARDIAC CATHETERIZATION N/A 3/21/2022    Procedure: Cardiac catheterization;  Surgeon: Brad Murphy MD;  Location: [address] [place] [address] CATH LAB;   Service: Cardiology   • CARDIAC CATHETERIZATION N/A 3/21/2022    Procedure: Cardiac Coronary Angiogram;  Surgeon: Brad Murphy MD; Location: [facility] CARDIAC CATH LAB; Service: Cardiology   • COLONOSCOPY     • INGUINAL HERNIA REPAIR Left    • LAPAROSCOPIC COLON RESECTION     • AZ ARTHROPLASTY GLENOHUMERAL JOINT TOTAL SHOULDER Right 11/17/2020    Procedure: ARTHROPLASTY SHOULDER REVERSE with biceps tendonesis; Surgeon: Emelia Sevilla MD;  [facility]: [facility];  Service: Orthopedics   • AZ COLONOSCOPY FLX DX W/COLLJ SPEC WHEN PFRMD N/A 11/3/2017    Procedure: COLONOSCOPY;  Surgeon: Tyron Farfan MD;  Location: [facility]  GI LAB; Service: Colorectal   • AZ TEAEC W/PATCH GRF CAROTID VERTB SUBCLAV NECK INC Left 1/13/2020    Procedure: ENDARTERECTOMY ARTERY CAROTID;  Surgeon: Katheryn Savage MD;  Location: [facility];  Service: Vascular     Family History   Problem Relation Age of Onset   • Colon cancer Father    • Colon cancer Paternal Grandfather    • No Known Problems Mother    • Colon cancer Family      Social History     Socioeconomic History   • Marital status: /Civil Union     Spouse name: None   • Number of children: None   • Years of education: None   • Highest education level: None   Occupational History   • None   Tobacco Use   • Smoking status: Former     Types: Cigarettes, Cigars     Quit date: 9/22/2019     Years since quitting: 3 2   • Smokeless tobacco: Never   Vaping Use   • Vaping Use: Never used   Substance and Sexual Activity   • Alcohol use:  Yes     Alcohol/week: 3 0 standard drinks     Types: 3 Cans of beer per week   • Drug use: No   • Sexual activity: None   Other Topics Concern   • None   Social History Narrative    Caffeine use    Uses safety equipment: seatbelts     Social Determinants of Health     Financial Resource Strain: Not on file   Food Insecurity: Not on file   Transportation Needs: Not on file   Physical Activity: Not on file   Stress: Not on file   Social Connections: Not on file   Intimate Partner Violence: Not on file   Housing Stability: Not on file     Current Outpatient Medications on File Prior to Visit   Medication Sig   • amLODIPine (NORVASC) 10 mg tablet TAKE 1 TABLET BY MOUTH  DAILY   • ascorbic acid (VITAMIN C) 500 mg tablet Take 500 mg by mouth daily   • aspirin 81 mg chewable tablet CHEW AND SWALLOW 1 TABLET  DAILY   • atorvastatin (LIPITOR) 20 mg tablet TAKE 1 TABLET BY MOUTH  DAILY   • carvedilol (COREG) 12 5 mg tablet Take 1 tablet (12 5 mg total) by mouth 2 (two) times a day with meals   • clopidogrel (PLAVIX) 75 mg tablet TAKE 1 TABLET BY MOUTH  DAILY   • glucose blood test strip Use as instructed   • Januvia 25 MG tablet TAKE 1 TABLET BY MOUTH  DAILY   • losartan (COZAAR) 100 MG tablet TAKE 1 TABLET BY MOUTH  DAILY   • metFORMIN (GLUCOPHAGE) 1000 MG tablet TAKE 1 TABLET BY MOUTH  TWICE DAILY WITH MEALS   • sildenafil (VIAGRA) 25 MG tablet TAKE 1 TABLET BY MOUTH  DAILY AS NEEDED FOR  ERECTILE DYSFUNCTION (Patient not taking: Reported on 12/21/2022)     Allergies   Allergen Reactions   • Other Hives     Soft shell crabs hives     Immunization History   Administered Date(s) Administered   • COVID-19 MODERNA VACC 0 5 ML IM 03/23/2021, 04/20/2021, 11/04/2021   • Pneumococcal Conjugate Vaccine 20-valent (Pcv20), Polysace 10/04/2022   • Tdap 08/29/2017       Objective     /88   Pulse 78   Temp 97 5 °F (36 4 °C)   Ht 6' (1 829 m)   Wt 93 4 kg (206 lb)   SpO2 96%   BMI 27 94 kg/m²     Physical Exam  Constitutional:       General: He is not in acute distress  Appearance: He is well-developed  He is not diaphoretic  Eyes:      General: No scleral icterus  Pupils: Pupils are equal, round, and reactive to light  Cardiovascular:      Rate and Rhythm: Normal rate and regular rhythm  Heart sounds: Normal heart sounds  No murmur heard  Pulmonary:      Effort: Pulmonary effort is normal  No respiratory distress  Breath sounds: Normal breath sounds  No wheezing  Abdominal:      General: Bowel sounds are normal  There is no distension  Palpations: Abdomen is soft  Tenderness: There is no abdominal tenderness  Skin:     General: Skin is warm and dry  Findings: No rash  Neurological:      Mental Status: He is alert and oriented to person, place, and time         Helen Hinkle MD

## 2023-01-11 NOTE — PROGRESS NOTES
1/12/2023      Chief Complaint   Patient presents with   • Follow-up   • Elevated PSA     Assessment and Plan    1  Large obstructing left ureteral stone  - Initially incidentally seen to have left renal fullness on CT lung from 11/8/22  - CT renal stone study from 12/8/22 showing left hydronephrosis secondary to a 10 x 8 x 9 mm stone at the proximal/mid left ureter at the level of the L3-L4 disc space  - Patient is completely asymptomatic  - Kidney function 12/6/22 - Cr 0 97, GFR 78  - Recommend left ureteroscopy with laser lithotripsy given large size of stone and mild obstruction  Reviewed procedure details, risks, and expected post operative course  He is agreeable  - Urine sent out for pre-op culture, pending surgery date  - Surgical consent signed  - ER precautions reviewed    2  Elevated PSA  - Most recent PSA from 9/2/22 is elevated to 8 5  Previously PSA was 6 3 from 1/24/22  - Due to medical comorbidities, established threshold for prostate biopsy in this patient is 10 or if patient is to develop any systemic symptoms or abnormal weight loss or bone pain which he does not have  Patient also wishes to avoid prostate biopsy at this time  - VIKRAM benign at last visit (9/2022)  - Obtain repeat PSA, 6 months from prior in March  Could consider multi parametric prostate MRI    - Follow up for surgery  History of Present Illness  Oralia Vazquez is a 81 y o  male here for follow up evaluation of  New issue of kidney stone  He was incidentally seen to have left renal fullness on lung CT in November 2022  CT renal stone study was obtained last month with showed an obstructing left 10 mm ureteral stone  His renal function is normal   He denies any abdominal pain, flank pain, dysuria, hematuria, nausea, vomiting, fever, or chills  Does report prior history of kidney stone many years ago requiring lithotripsy  He denies any prior adverse reactions with this anesthesia      Recently patient had been seen for an elevated PSA  See above  Review of Systems   Constitutional: Negative for chills and fever  Respiratory: Negative for shortness of breath  Cardiovascular: Negative for chest pain  Gastrointestinal: Negative for abdominal pain  Genitourinary: Negative for difficulty urinating, dysuria, flank pain, frequency, hematuria and urgency  Neurological: Negative for dizziness  Past Medical History  Past Medical History:   Diagnosis Date   • CHF (congestive heart failure) ([facility] )    • Diabetes mellitus ([facility] )    • Diverticulitis    • 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[facility] ) 11/7/2018   • Heart disease    • Hyperlipidemia    • Hypertension    • Kidney stone    • Osteoarthritis    • Vascular disorder        Past Social History  Past Surgical History:   Procedure Laterality Date   • ABDOMINAL SURGERY     • CARDIAC CATHETERIZATION N/A 3/21/2022    Procedure: Cardiac catheterization;  Surgeon: Daryle Mountain, MD;  Location: [address] [place] [address] CATH LAB; Service: Cardiology   • CARDIAC CATHETERIZATION N/A 3/21/2022    Procedure: Cardiac Coronary Angiogram;  Surgeon: Daryle Mountain, MD;  Location: [address] [place] [address] CATH LAB; Service: Cardiology   • COLONOSCOPY     • INGUINAL HERNIA REPAIR Left    • LAPAROSCOPIC COLON RESECTION     • WA ARTHROPLASTY GLENOHUMERAL JOINT TOTAL SHOULDER Right 11/17/2020    Procedure: ARTHROPLASTY SHOULDER REVERSE with biceps tendonesis; Surgeon: Alexx Mansfield MD;  Location: [facility];  Service: Orthopedics   • WA COLONOSCOPY FLX DX W/COLLJ SPEC WHEN PFRMD N/A 11/3/2017    Procedure: COLONOSCOPY;  Surgeon: Laird Baumgarten, MD;  Location: [facility]  GI LAB;   Service: Colorectal   • WA TEAEC W/PATCH GRF CAROTID VERTB SUBCLAV NECK INC Left 1/13/2020    Procedure: ENDARTERECTOMY ARTERY CAROTID;  Surgeon: Deena Rojas MD;  Location: [facility];  Service: Vascular     Social History     Tobacco Use   Smoking Status Former   • Types: Cigarettes, Cigars   • Quit date: 9/22/2019   • Years since quitting: 3 3   Smokeless Tobacco Never       Past Family History  Family History   Problem Relation Age of Onset   • Colon cancer Father    • Colon cancer Paternal Grandfather    • No Known Problems Mother    • Colon cancer Family        Past Social history  Social History     Socioeconomic History   • Marital status: /Civil Union     Spouse name: Not on file   • Number of children: Not on file   • Years of education: Not on file   • Highest education level: Not on file   Occupational History   • Not on file   Tobacco Use   • Smoking status: Former     Types: Cigarettes, Cigars     Quit date: 9/22/2019     Years since quitting: 3 3   • Smokeless tobacco: Never   Vaping Use   • Vaping Use: Never used   Substance and Sexual Activity   • Alcohol use:  Yes     Alcohol/week: 3 0 standard drinks     Types: 3 Cans of beer per week   • Drug use: No   • Sexual activity: Not on file   Other Topics Concern   • Not on file   Social History Narrative    Caffeine use    Uses safety equipment: seatbelts     Social Determinants of Health     Financial Resource Strain: Not on file   Food Insecurity: Not on file   Transportation Needs: Not on file   Physical Activity: Not on file   Stress: Not on file   Social Connections: Not on file   Intimate Partner Violence: Not on file   Housing Stability: Not on file       Current Medications  Current Outpatient Medications   Medication Sig Dispense Refill   • amLODIPine (NORVASC) 10 mg tablet TAKE 1 TABLET BY MOUTH  DAILY 90 tablet 3   • ascorbic acid (VITAMIN C) 500 mg tablet Take 500 mg by mouth daily     • aspirin 81 mg chewable tablet CHEW AND SWALLOW 1 TABLET  DAILY 90 tablet 3   • atorvastatin (LIPITOR) 20 mg tablet TAKE 1 TABLET BY MOUTH  DAILY 90 tablet 3   • carvedilol (COREG) 12 5 mg tablet Take 1 tablet (12 5 mg total) by mouth 2 (two) times a day with meals 180 tablet 3   • clopidogrel (PLAVIX) 75 mg tablet TAKE 1 TABLET BY MOUTH  DAILY 90 tablet 3   • glucose blood test strip Use as instructed 100 each 2   • Januvia 25 MG tablet TAKE 1 TABLET BY MOUTH  DAILY 90 tablet 3   • losartan (COZAAR) 100 MG tablet TAKE 1 TABLET BY MOUTH  DAILY 90 tablet 3   • metFORMIN (GLUCOPHAGE) 1000 MG tablet TAKE 1 TABLET BY MOUTH  TWICE DAILY WITH MEALS 180 tablet 3   • sildenafil (VIAGRA) 25 MG tablet TAKE 1 TABLET BY MOUTH  DAILY AS NEEDED FOR  ERECTILE DYSFUNCTION (Patient not taking: Reported on 12/21/2022) 10 tablet 0     No current facility-administered medications for this visit  Allergies  Allergies   Allergen Reactions   • Other Hives     Soft shell crabs hives         The following portions of the patient's history were reviewed and updated as appropriate: allergies, current medications, past medical history, past social history, past surgical history and problem list       Vitals  Vitals:    01/12/23 0908   BP: 148/92   Pulse: 80   SpO2: 99%   Weight: 93 4 kg (206 lb)   Height: 6' (1 829 m)           Physical Exam  Physical Exam  Constitutional:       Appearance: Normal appearance  HENT:      Head: Normocephalic and atraumatic  Right Ear: External ear normal       Left Ear: External ear normal       Nose: Nose normal    Eyes:      General: No scleral icterus  Conjunctiva/sclera: Conjunctivae normal    Cardiovascular:      Rate and Rhythm: Normal rate and regular rhythm  Pulses: Normal pulses  Heart sounds: Normal heart sounds  Pulmonary:      Effort: Pulmonary effort is normal       Breath sounds: Normal breath sounds  Abdominal:      General: Abdomen is flat  Bowel sounds are normal       Palpations: Abdomen is soft  Tenderness: There is no abdominal tenderness  There is no right CVA tenderness, left CVA tenderness, guarding or rebound  Musculoskeletal:         General: Normal range of motion  Cervical back: Normal range of motion  Skin:     General: Skin is warm and dry  Neurological:      General: No focal deficit present        Mental Status: He is alert and oriented to person, place, and time  Psychiatric:         Mood and Affect: Mood normal          Behavior: Behavior normal          Thought Content:  Thought content normal          Judgment: Judgment normal            Results  Recent Results (from the past 1 hour(s))   POCT urine dip    Collection Time: 01/12/23  9:13 AM   Result Value Ref Range    LEUKOCYTE ESTERASE,UA -     NITRITE,UA -     SL AMB POCT UROBILINOGEN 0 2     POCT URINE PROTEIN -      PH,UA 6 5     BLOOD,UA +++     SPECIFIC GRAVITY,UA 1 015     KETONES,UA -     BILIRUBIN,UA -     GLUCOSE, UA -      COLOR,UA Yellow     CLARITY,UA Clear    ]  Lab Results   Component Value Date    PSA 8 5 (H) 09/02/2022    PSA 6 3 (H) 01/24/2022    PSA 6 4 (H) 08/09/2021     Lab Results   Component Value Date    GLUCOSE 112 01/13/2020    CALCIUM 10 1 09/02/2022     11/22/2016    K 4 4 09/02/2022    CO2 22 09/02/2022     09/02/2022    BUN 18 12/06/2022    CREATININE 0 97 12/06/2022     Lab Results   Component Value Date    WBC 6 24 02/27/2022    HGB 14 6 02/27/2022    HCT 43 4 02/27/2022    MCV 96 02/27/2022     02/27/2022           Orders  Orders Placed This Encounter   Procedures   • POCT urine dip       Nitin Chong

## 2023-01-12 ENCOUNTER — OFFICE VISIT (OUTPATIENT)
Dept: UROLOGY | Facility: CLINIC | Age: 73
End: 2023-01-12

## 2023-01-12 VITALS
OXYGEN SATURATION: 99 % | WEIGHT: 206 LBS | DIASTOLIC BLOOD PRESSURE: 92 MMHG | HEIGHT: 72 IN | SYSTOLIC BLOOD PRESSURE: 148 MMHG | BODY MASS INDEX: 27.9 KG/M2 | HEART RATE: 80 BPM

## 2023-01-12 DIAGNOSIS — N13.2 HYDRONEPHROSIS WITH URINARY OBSTRUCTION DUE TO URETERAL CALCULUS: ICD-10-CM

## 2023-01-12 DIAGNOSIS — R97.20 ELEVATED PSA: Primary | ICD-10-CM

## 2023-01-12 LAB
SL AMB  POCT GLUCOSE, UA: NORMAL
SL AMB LEUKOCYTE ESTERASE,UA: NORMAL
SL AMB POCT BILIRUBIN,UA: NORMAL
SL AMB POCT BLOOD,UA: NORMAL
SL AMB POCT CLARITY,UA: CLEAR
SL AMB POCT COLOR,UA: YELLOW
SL AMB POCT KETONES,UA: NORMAL
SL AMB POCT NITRITE,UA: NORMAL
SL AMB POCT PH,UA: 6.5
SL AMB POCT SPECIFIC GRAVITY,UA: 1.01
SL AMB POCT URINE PROTEIN: NORMAL
SL AMB POCT UROBILINOGEN: 0.2

## 2023-01-13 LAB — BACTERIA UR CULT: NORMAL

## 2023-01-27 ENCOUNTER — APPOINTMENT (OUTPATIENT)
Dept: RADIOLOGY | Facility: MEDICAL CENTER | Age: 73
End: 2023-01-27

## 2023-01-27 ENCOUNTER — OFFICE VISIT (OUTPATIENT)
Dept: OBGYN CLINIC | Facility: MEDICAL CENTER | Age: 73
End: 2023-01-27

## 2023-01-27 VITALS
WEIGHT: 208 LBS | DIASTOLIC BLOOD PRESSURE: 75 MMHG | HEART RATE: 79 BPM | HEIGHT: 72 IN | BODY MASS INDEX: 28.17 KG/M2 | SYSTOLIC BLOOD PRESSURE: 114 MMHG

## 2023-01-27 DIAGNOSIS — G89.29 CHRONIC LEFT-SIDED LOW BACK PAIN WITHOUT SCIATICA: ICD-10-CM

## 2023-01-27 DIAGNOSIS — M25.552 PAIN IN LEFT HIP: Primary | ICD-10-CM

## 2023-01-27 DIAGNOSIS — M25.552 PAIN IN LEFT HIP: ICD-10-CM

## 2023-01-27 DIAGNOSIS — M54.50 CHRONIC LEFT-SIDED LOW BACK PAIN WITHOUT SCIATICA: ICD-10-CM

## 2023-01-27 RX ORDER — LIDOCAINE HYDROCHLORIDE 5 MG/ML
2 INJECTION, SOLUTION INFILTRATION; PERINEURAL
Status: COMPLETED | OUTPATIENT
Start: 2023-01-27 | End: 2023-01-27

## 2023-01-27 RX ORDER — BUPIVACAINE HYDROCHLORIDE 5 MG/ML
2 INJECTION, SOLUTION EPIDURAL; INTRACAUDAL
Status: COMPLETED | OUTPATIENT
Start: 2023-01-27 | End: 2023-01-27

## 2023-01-27 RX ORDER — BETAMETHASONE SODIUM PHOSPHATE AND BETAMETHASONE ACETATE 3; 3 MG/ML; MG/ML
12 INJECTION, SUSPENSION INTRA-ARTICULAR; INTRALESIONAL; INTRAMUSCULAR; SOFT TISSUE
Status: COMPLETED | OUTPATIENT
Start: 2023-01-27 | End: 2023-01-27

## 2023-01-27 RX ADMIN — BETAMETHASONE SODIUM PHOSPHATE AND BETAMETHASONE ACETATE 12 MG: 3; 3 INJECTION, SUSPENSION INTRA-ARTICULAR; INTRALESIONAL; INTRAMUSCULAR; SOFT TISSUE at 15:23

## 2023-01-27 RX ADMIN — BUPIVACAINE HYDROCHLORIDE 2 ML: 5 INJECTION, SOLUTION EPIDURAL; INTRACAUDAL at 15:23

## 2023-01-27 RX ADMIN — LIDOCAINE HYDROCHLORIDE 2 ML: 5 INJECTION, SOLUTION INFILTRATION; PERINEURAL at 15:23

## 2023-01-27 NOTE — PROGRESS NOTES
ASSESSMENT/PLAN:    Assessment:   81 y o  male with chronic low back pain left-sided and inflammation of gluteus medius    Plan:   Erickson's  XRays, clinical exam and history are not consistent with left hip osteoarthritis  His pain is local to the insertion point of gluteus medius along the posterior pelvis  A local corticosteroid injection was offered and accepted to him to hopefully decrease inflammation and improve pain symptoms  This procedure is outlined below and he tolerated this well  If he does not notice any relief in his symptomatology and or has a return of pain he may benefit with referral to one of our spine physicians for future follow-up    The above diagnosis and plan has been dicussed with the patient and caregiver  They verbalized an understanding and will follow up accordingly  _____________________________________________________  CHIEF COMPLAINT:  Chief Complaint   Patient presents with   • Left Hip - Pain         SUBJECTIVE:  Romy Dillon is a 81 y o  male who presents today for evaluation of left sided low back pain that is chronic in nature  He describes approximately a 6-year history of intermittent left lower back discomfort that began without injury  Of late his pain has been increasing especially with prolonged walking and when walking up hills and inclines  He denies radiation  He denies numbness or tingling  He has no history of lumbar spine issues  He denies groin pain  He does not note any increased difficulty getting up from seated to standing positions  He has had no prior treatment or work-up  Pain is improved by rest   Pain is aggravated by walking      Radiation of pain Negative  Numbness/tingling Negative    PAST MEDICAL HISTORY:  Past Medical History:   Diagnosis Date   • CHF (congestive heart failure) ([facility] )    • Diabetes mellitus ([facility] )    • Diverticulitis    • 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[facility] ) 11/7/2018   • Heart disease    • Hyperlipidemia    • Hypertension    • Kidney stone    • Osteoarthritis    • Vascular disorder        PAST SURGICAL HISTORY:  Past Surgical History:   Procedure Laterality Date   • ABDOMINAL SURGERY     • CARDIAC CATHETERIZATION N/A 3/21/2022    Procedure: Cardiac catheterization;  Surgeon: Jason Loomis MD;  Location: [address] [place] [address] CATH LAB; Service: Cardiology   • CARDIAC CATHETERIZATION N/A 3/21/2022    Procedure: Cardiac Coronary Angiogram;  Surgeon: Jason Loomis MD;  Location: [address] [place] [address] CATH LAB; Service: Cardiology   • COLONOSCOPY     • INGUINAL HERNIA REPAIR Left    • LAPAROSCOPIC COLON RESECTION     • HI ARTHROPLASTY GLENOHUMERAL JOINT TOTAL SHOULDER Right 11/17/2020    Procedure: ARTHROPLASTY SHOULDER REVERSE with biceps tendonesis; Surgeon: Alexus Cornell MD;  [facility];  Service: Orthopedics   • HI COLONOSCOPY FLX DX W/COLLJ SPEC WHEN PFRMD N/A 11/3/2017    Procedure: COLONOSCOPY;  Surgeon: Amber Castorena MD;  Location: [facility]  GI LAB; Service: Colorectal   • HI TEAEC W/PATCH GRF CAROTID VERTB SUBCLAV NECK INC Left 1/13/2020    Procedure: ENDARTERECTOMY ARTERY CAROTID;  Surgeon: Nancy Sharma MD;  [facility];  Service: Vascular       FAMILY HISTORY:  Family History   Problem Relation Age of Onset   • Colon cancer Father    • Colon cancer Paternal Grandfather    • No Known Problems Mother    • Colon cancer Family        SOCIAL HISTORY:  Social History     Tobacco Use   • Smoking status: Former     Types: Cigarettes, Cigars     Quit date: 9/22/2019     Years since quitting: 3 3   • Smokeless tobacco: Never   Vaping Use   • Vaping Use: Never used   Substance Use Topics   • Alcohol use:  Yes     Alcohol/week: 3 0 standard drinks     Types: 3 Cans of beer per week   • Drug use: No       MEDICATIONS:    Current Outpatient Medications:   •  amLODIPine (NORVASC) 10 mg tablet, TAKE 1 TABLET BY MOUTH  DAILY, Disp: 90 tablet, Rfl: 3  •  ascorbic acid (VITAMIN C) 500 mg tablet, Take 500 mg by mouth daily, Disp: , Rfl:   •  aspirin 81 mg chewable tablet, CHEW AND SWALLOW 1 TABLET  DAILY, Disp: 90 tablet, Rfl: 3  •  atorvastatin (LIPITOR) 20 mg tablet, TAKE 1 TABLET BY MOUTH  DAILY, Disp: 90 tablet, Rfl: 3  •  carvedilol (COREG) 12 5 mg tablet, Take 1 tablet (12 5 mg total) by mouth 2 (two) times a day with meals, Disp: 180 tablet, Rfl: 3  •  clopidogrel (PLAVIX) 75 mg tablet, TAKE 1 TABLET BY MOUTH  DAILY, Disp: 90 tablet, Rfl: 3  •  glucose blood test strip, Use as instructed, Disp: 100 each, Rfl: 2  •  Januvia 25 MG tablet, TAKE 1 TABLET BY MOUTH  DAILY, Disp: 90 tablet, Rfl: 3  •  losartan (COZAAR) 100 MG tablet, TAKE 1 TABLET BY MOUTH  DAILY, Disp: 90 tablet, Rfl: 3  •  metFORMIN (GLUCOPHAGE) 1000 MG tablet, TAKE 1 TABLET BY MOUTH  TWICE DAILY WITH MEALS, Disp: 180 tablet, Rfl: 3  •  sildenafil (VIAGRA) 25 MG tablet, TAKE 1 TABLET BY MOUTH  DAILY AS NEEDED FOR  ERECTILE DYSFUNCTION, Disp: 10 tablet, Rfl: 0    ALLERGIES:  Allergies   Allergen Reactions   • Other Hives     Soft shell crabs hives       REVIEW OF SYSTEMS:  ROS is negative other than that noted in the HPI  Constitutional: Negative for fatigue and fever  HENT: Negative for sore throat  Respiratory: Negative for shortness of breath  Cardiovascular: Negative for chest pain  Gastrointestinal: Negative for abdominal pain  Endocrine: Negative for cold intolerance and heat intolerance  Genitourinary: Negative for flank pain  Musculoskeletal: Negative for back pain  Skin: Negative for rash  Allergic/Immunologic: Negative for immunocompromised state  Neurological: Negative for dizziness  Psychiatric/Behavioral: Negative for agitation           _____________________________________________________  PHYSICAL EXAMINATION:  Vitals:    01/27/23 1445   BP: 114/75   Pulse: 79     General/Constitutional: NAD, well developed, well nourished  HENT: Normocephalic, atraumatic  CV: Intact distal pulses, regular rate  Resp: No respiratory distress or labored breathing  Abd: Soft and NT  Lymphatic: No lymphadenopathy palpated  Neuro: Alert,no focal deficits  Psych: Normal mood  Skin: Warm, dry, no rashes, no erythema      MUSCULOSKELETAL EXAMINATION:  BACK  · Skin intact  · Tenderness to palpation over PSIS tenderness spine  · 5/5 strength with hip flexion/extension/abduction, knee flexion/extension, ankle dorsi/plantar flexion, EHL/FHL bilateral lower extremities  · Sensation intact L2-S1 bilateral lower extremities  · negative straight leg raise  · 2+ deep tendon reflexes noted at patella tendon, achilles tendon bilateral lower extremities  · FROM both hips with reproducible pain  · No lateral GT tenderness present  · Negative Fabers    _____________________________________________________  STUDIES REVIEWED:  XRays taken here today AP pelvis and left hip demonstrate minimal joint space narrowing, if any  No evidence of AVN or bony lesions  PROCEDURES PERFORMED:    Large joint arthrocentesis  Procedure Details  Location: hip - Hip joint: PSIS    Needle size: 22 G (spinal needle)  Approach: posterior  Medications administered: 2 mL lidocaine 0 5 %; 12 mg betamethasone acetate-betamethasone sodium phosphate 6 (3-3) mg/mL; 2 mL bupivacaine (PF) 0 5 %

## 2023-02-06 DIAGNOSIS — E78.2 MIXED HYPERLIPIDEMIA: ICD-10-CM

## 2023-02-07 RX ORDER — ATORVASTATIN CALCIUM 20 MG/1
TABLET, FILM COATED ORAL
Qty: 90 TABLET | Refills: 3 | Status: SHIPPED | OUTPATIENT
Start: 2023-02-07

## 2023-03-02 DIAGNOSIS — N52.8 OTHER MALE ERECTILE DYSFUNCTION: ICD-10-CM

## 2023-03-02 RX ORDER — SILDENAFIL 25 MG/1
TABLET, FILM COATED ORAL
Qty: 10 TABLET | Refills: 0 | Status: SHIPPED | OUTPATIENT
Start: 2023-03-02

## 2023-03-02 NOTE — H&P (VIEW-ONLY)
3/6/2023      Chief Complaint   Patient presents with   • Follow-up     Assessment and Plan    1  Large obstructing left ureteral stone  - Initially incidentally seen to have left renal fullness on CT lung from 11/8/22  - CT renal stone study from 12/8/22 showing left hydronephrosis secondary to a 10 x 8 x 9 mm stone at the proximal/mid left ureter at the level of the L3-L4 disc space  - Patient is completely asymptomatic  - Kidney function 12/6/22 - Cr 0 97, GFR 78  - Scheduled for left ureteroscopy with laser lithotripsy on 3/23/23   - Urine sent out for pre-op culture  Obtain pre-op labs and EKG as ordered  - Surgical consent signed at prior visit  2  Elevated PSA  - Due to medical comorbidities, established threshold for prostate biopsy in this patient is 10 or if patient is to develop any systemic symptoms or abnormal weight loss or bone pain which he does not have   Patient also wishes to avoid prostate biopsy at this time  - VIKRAM benign at last visit (9/2022)  - Most recent PSA continued to progressively increase to 9 6 from 3/3/23, prior PSA trend below  - Obtain mpMRI for further evaluation and should this be positive for concerning lesion discussed proceeding with MRI fusion prostate biopsy which he is agreeable to  History of Present Illness  Effie Kelly is a 81 y o  male here for follow up evaluation of kidney stone and elevated PSA  He is currently scheduled for cystoscopy, left ureteroscopy with laser lithotripsy homing laser, retrograde pyelogram, insertion of ureteral stent on 3/23/2023 for an obstructing left ureteral calculus with Dr Ryanne Brian  He was incidentally seen to have left renal fullness on lung CT in November 2022  CT renal stone study was obtained last month with showed an obstructing left 10 mm ureteral stone  His renal function is normal   He denies any abdominal pain, flank pain, dysuria, hematuria, nausea, vomiting, fever, or chills    Does report prior history of kidney stone many years ago requiring lithotripsy  He denies any prior adverse reactions with this anesthesia  Denies any recent changes to medical history  Patient previously has been seen by our office for an elevated PSA  Most recent PSA from this month progressively increased to 9 6  He was not recommended to have a prostate biopsy due to his medical comorbidities  Review of Systems   Constitutional: Negative for chills and fever  Respiratory: Negative for shortness of breath  Cardiovascular: Negative for chest pain  Gastrointestinal: Negative for abdominal pain  Genitourinary: Negative for difficulty urinating, dysuria, flank pain, frequency, hematuria and urgency  Neurological: Negative for dizziness  Past Medical History  Past Medical History:   Diagnosis Date   • [facility]F [facility]congestive heart failure) [facility] )    • [facility]    • Diverticulitis    • 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[facility] ) 11/7/2018   • Heart disease    • Hyperlipidemia    • Hypertension    • Kidney stone    • Osteoarthritis    • Vascular disorder        Past Social History  Past Surgical History:   Procedure Laterality Date   • ABDOMINAL SURGERY     • CARDIAC CATHETERIZATION N/A 3/21/2022    Procedure: Cardiac catheterization;  Surgeon: Jenelle Lewis MD;  Location: [address] [place] [address] CATH LAB; Service: Cardiology   • CARDIAC CATHETERIZATION N/A 3/21/2022    Procedure: Cardiac Coronary Angiogram;  Surgeon: Jenelle Lewis MD;  Location: [address] [place] [address] CATH LAB; Service: Cardiology   • COLONOSCOPY     • INGUINAL HERNIA REPAIR Left    • LAPAROSCOPIC COLON RESECTION     • IN ARTHROPLASTY GLENOHUMERAL JOINT TOTAL SHOULDER Right 11/17/2020    Procedure: ARTHROPLASTY SHOULDER REVERSE with biceps tendonesis;   Surgeon: Dong Cano MD;  Location: [facility] MAIN OR;  Service: Orthopedics   • IN COLONOSCOPY FLX DX W/COLLJ SPEC WHEN PFRMD N/A 11/3/2017    Procedure: COLONOSCOPY;  Surgeon: Luis Felipe Brown MD;  Location: [facility]  GI LAB; Service: Colorectal   • NM TEAEC W/PATCH GRF CAROTID VERTB SUBCLAV NECK INC Left 1/13/2020    Procedure: ENDARTERECTOMY ARTERY CAROTID;  Surgeon: Meri Montalvo MD;  Location: [facility] MAIN OR;  Service: Vascular     Social History     Tobacco Use   Smoking Status Former   • Types: Cigarettes, Cigars   • Quit date: 9/22/2019   • Years since quitting: 3 4   Smokeless Tobacco Never       Past Family History  Family History   Problem Relation Age of Onset   • Colon cancer Father    • Colon cancer Paternal Grandfather    • No Known Problems Mother    • Colon cancer Family        Past Social history  Social History     Socioeconomic History   • Marital status: /Civil Union     Spouse name: Not on file   • Number of children: Not on file   • Years of education: Not on file   • Highest education level: Not on file   Occupational History   • Not on file   Tobacco Use   • Smoking status: Former     Types: Cigarettes, Cigars     Quit date: 9/22/2019     Years since quitting: 3 4   • Smokeless tobacco: Never   Vaping Use   • Vaping Use: Never used   Substance and Sexual Activity   • Alcohol use:  Yes     Alcohol/week: 3 0 standard drinks     Types: 3 Cans of beer per week   • Drug use: No   • Sexual activity: Not on file   Other Topics Concern   • Not on file   Social History Narrative    Caffeine use    Uses safety equipment: seatbelts     Social Determinants of Health     Financial Resource Strain: Not on file   Food Insecurity: Not on file   Transportation Needs: Not on file   Physical Activity: Not on file   Stress: Not on file   Social Connections: Not on file   Intimate Partner Violence: Not on file   Housing Stability: Not on file       Current Medications  Current Outpatient Medications   Medication Sig Dispense Refill   • amLODIPine (NORVASC) 10 mg tablet TAKE 1 TABLET BY MOUTH  DAILY 90 tablet 3   • ascorbic acid (VITAMIN C) 500 mg tablet Take 500 mg by mouth daily     • aspirin 81 mg chewable tablet CHEW AND SWALLOW 1 TABLET  DAILY 90 tablet 3   • atorvastatin (LIPITOR) 20 mg tablet TAKE 1 TABLET BY MOUTH  DAILY 90 tablet 3   • carvedilol (COREG) 12 5 mg tablet Take 1 tablet (12 5 mg total) by mouth 2 (two) times a day with meals 180 tablet 3   • clopidogrel (PLAVIX) 75 mg tablet TAKE 1 TABLET BY MOUTH  DAILY 90 tablet 3   • glucose blood test strip Use as instructed 100 each 2   • Januvia 25 MG tablet TAKE 1 TABLET BY MOUTH  DAILY 90 tablet 3   • losartan (COZAAR) 100 MG tablet TAKE 1 TABLET BY MOUTH  DAILY 90 tablet 3   • metFORMIN (GLUCOPHAGE) 1000 MG tablet TAKE 1 TABLET BY MOUTH  TWICE DAILY WITH MEALS 180 tablet 3   • sildenafil (VIAGRA) 25 MG tablet TAKE 1 TABLET BY MOUTH  DAILY AS NEEDED FOR  ERECTILE DYSFUNCTION 10 tablet 0     No current facility-administered medications for this visit  Allergies  Allergies   Allergen Reactions   • Other Hives     Soft shell crabs hives         The following portions of the patient's history were reviewed and updated as appropriate: allergies, current medications, past medical history, past social history, past surgical history and problem list       Vitals  Vitals:    03/06/23 0901   BP: 108/80   Pulse: 80   SpO2: 97%   Weight: 92 5 kg (204 lb)   Height: 6' (1 829 m)           Physical Exam  Physical Exam  Constitutional:       Appearance: Normal appearance  HENT:      Head: Normocephalic and atraumatic  Right Ear: External ear normal       Left Ear: External ear normal       Nose: Nose normal    Eyes:      General: No scleral icterus  Conjunctiva/sclera: Conjunctivae normal    Cardiovascular:      Rate and Rhythm: Normal rate and regular rhythm  Pulses: Normal pulses  Heart sounds: Normal heart sounds  Pulmonary:      Effort: Pulmonary effort is normal       Breath sounds: Normal breath sounds  Abdominal:      General: Abdomen is flat  Bowel sounds are normal       Palpations: Abdomen is soft  Tenderness: There is no abdominal tenderness  There is no right CVA tenderness, left CVA tenderness, guarding or rebound  Musculoskeletal:         General: Normal range of motion  Cervical back: Normal range of motion  Right lower leg: No edema  Left lower leg: No edema  Skin:     General: Skin is warm and dry  Neurological:      General: No focal deficit present  Mental Status: He is alert and oriented to person, place, and time  Psychiatric:         Mood and Affect: Mood normal          Behavior: Behavior normal          Thought Content: Thought content normal          Judgment: Judgment normal            Results  Recent Results (from the past 1 hour(s))   POCT urine dip    Collection Time: 03/06/23  9:09 AM   Result Value Ref Range    LEUKOCYTE ESTERASE,UA ++     NITRITE,UA -     SL AMB POCT UROBILINOGEN 0 2     POCT URINE PROTEIN +      PH,UA 5 0     BLOOD,UA ++     SPECIFIC GRAVITY,UA 1 025     KETONES,UA -     BILIRUBIN,UA -     GLUCOSE, UA -      COLOR,UA yellow     CLARITY,UA clear    ]  Lab Results   Component Value Date    PSA 9 6 (H) 03/03/2023    PSA 8 5 (H) 09/02/2022    PSA 6 3 (H) 01/24/2022     Lab Results   Component Value Date    GLUCOSE 112 01/13/2020    CALCIUM 10 1 09/02/2022     11/22/2016    K 4 4 09/02/2022    CO2 22 09/02/2022     09/02/2022    BUN 18 12/06/2022    CREATININE 0 97 12/06/2022     Lab Results   Component Value Date    WBC 6 24 02/27/2022    HGB 14 6 02/27/2022    HCT 43 4 02/27/2022    MCV 96 02/27/2022     02/27/2022           Orders  Orders Placed This Encounter   Procedures   • MRI prostate multiparametric wo w contrast     Standing Status:   Future     Standing Expiration Date:   3/6/2027     Scheduling Instructions: There is no preparation for this test  Please leave your jewelry and valuables at home, wedding rings are the exception  All patients will be required to change into a hospital gown and pants  Street clothes are not permitted in the MRI    Magnetic nail polish must be removed prior to arrival for your test  Please bring your insurance cards, a form of photo ID and a list of your medications with you  Arrive 15 minutes prior to your appointment time in order to register  Please bring any prior CT or MRI studies of this area that were not performed at a [facility]  To schedule this appointment, please contact Central Scheduling at [phone]  Prior to your appointment, please make sure you complete the MRI Screening Form when you e-Check in for your appointment  This will be available starting 7 days before your appointment in [address]  You may receive an e-mail with an activation code if you do not have a [employer] account  If you do not have access to a device, we will complete your screening at your appointment  Order Specific Question:   Release to patient through [employer]     Answer:   Immediate     Order Specific Question:   Is order priority selected as STAT? Answer:   No     Order Specific Question:   Reason for Exam (FREE TEXT)     Answer:   elevated PSA     Order Specific Question:   Reason for Exam:     Answer:   elevated PSA     Order Specific Question:   What is the patient's sedation requirement? Answer:   No Sedation     Order Specific Question:   Does the patient have metallic implants?      Answer:   No   • POCT urine dip       Ryan Lei

## 2023-03-02 NOTE — PROGRESS NOTES
3/6/2023      Chief Complaint   Patient presents with   • Follow-up     Assessment and Plan    1  Large obstructing left ureteral stone  - Initially incidentally seen to have left renal fullness on CT lung from 11/8/22  - CT renal stone study from 12/8/22 showing left hydronephrosis secondary to a 10 x 8 x 9 mm stone at the proximal/mid left ureter at the level of the L3-L4 disc space  - Patient is completely asymptomatic  - Kidney function 12/6/22 - Cr 0 97, GFR 78  - Scheduled for left ureteroscopy with laser lithotripsy on 3/23/23   - Urine sent out for pre-op culture  Obtain pre-op labs and EKG as ordered  - Surgical consent signed at prior visit  2  Elevated PSA  - Due to medical comorbidities, established threshold for prostate biopsy in this patient is 10 or if patient is to develop any systemic symptoms or abnormal weight loss or bone pain which he does not have   Patient also wishes to avoid prostate biopsy at this time  - VIKRAM benign at last visit (9/2022)  - Most recent PSA continued to progressively increase to 9 6 from 3/3/23, prior PSA trend below  - Obtain mpMRI for further evaluation and should this be positive for concerning lesion discussed proceeding with MRI fusion prostate biopsy which he is agreeable to  History of Present Illness  Lex Tavares is a 81 y o  male here for follow up evaluation of kidney stone and elevated PSA  He is currently scheduled for cystoscopy, left ureteroscopy with laser lithotripsy homing laser, retrograde pyelogram, insertion of ureteral stent on 3/23/2023 for an obstructing left ureteral calculus with Dr Jules Vo  He was incidentally seen to have left renal fullness on lung CT in November 2022  CT renal stone study was obtained last month with showed an obstructing left 10 mm ureteral stone  His renal function is normal   He denies any abdominal pain, flank pain, dysuria, hematuria, nausea, vomiting, fever, or chills    Does report prior history of kidney stone many years ago requiring lithotripsy  He denies any prior adverse reactions with this anesthesia  Denies any recent changes to medical history  Patient previously has been seen by our office for an elevated PSA  Most recent PSA from this month progressively increased to 9 6  He was not recommended to have a prostate biopsy due to his medical comorbidities  Review of Systems   Constitutional: Negative for chills and fever  Respiratory: Negative for shortness of breath  Cardiovascular: Negative for chest pain  Gastrointestinal: Negative for abdominal pain  Genitourinary: Negative for difficulty urinating, dysuria, flank pain, frequency, hematuria and urgency  Neurological: Negative for dizziness  Past Medical History  Past Medical History:   Diagnosis Date   • CHF (congestive heart failure) ([facility] )    • [facility]    • Diverticulitis    • 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[facility] ) 11/7/2018   • Heart disease    • Hyperlipidemia    • Hypertension    • Kidney stone    • Osteoarthritis    • Vascular disorder        Past Social History  Past Surgical History:   Procedure Laterality Date   • ABDOMINAL SURGERY     • CARDIAC CATHETERIZATION N/A 3/21/2022    Procedure: Cardiac catheterization;  Surgeon: Lety York MD;  Location: [address] [place] [address] CATH LAB; Service: Cardiology   • CARDIAC CATHETERIZATION N/A 3/21/2022    Procedure: Cardiac Coronary Angiogram;  Surgeon: Lety York MD;  Location: [address] [place] [address] CATH LAB; Service: Cardiology   • COLONOSCOPY     • INGUINAL HERNIA REPAIR Left    • LAPAROSCOPIC COLON RESECTION     • DE ARTHROPLASTY GLENOHUMERAL JOINT TOTAL SHOULDER Right 11/17/2020    Procedure: ARTHROPLASTY SHOULDER REVERSE with biceps tendonesis;   Surgeon: Odessa Morgan MD;  Location: [facility];  Service: Orthopedics   • DE COLONOSCOPY FLX DX W/COLLJ SPEC WHEN PFRMD N/A 11/3/2017    Procedure: COLONOSCOPY;  Surgeon: Yang Mei MD;  Location: [facility]  GI LAB; Service: Colorectal   • AR TEAEC W/PATCH GRF CAROTID VERTB SUBCLAV NECK INC Left 1/13/2020    Procedure: ENDARTERECTOMY ARTERY CAROTID;  Surgeon: Lisette Garcia MD;  Location: [facility];  Service: Vascular     Social History     Tobacco Use   Smoking Status Former   • Types: Cigarettes, Cigars   • Quit date: 9/22/2019   • Years since quitting: 3 4   Smokeless Tobacco Never       Past Family History  Family History   Problem Relation Age of Onset   • Colon cancer Father    • Colon cancer Paternal Grandfather    • No Known Problems Mother    • Colon cancer Family        Past Social history  Social History     Socioeconomic History   • Marital status: /Civil Union     Spouse name: Not on file   • Number of children: Not on file   • Years of education: Not on file   • Highest education level: Not on file   Occupational History   • Not on file   Tobacco Use   • Smoking status: Former     Types: Cigarettes, Cigars     Quit date: 9/22/2019     Years since quitting: 3 4   • Smokeless tobacco: Never   Vaping Use   • Vaping Use: Never used   Substance and Sexual Activity   • Alcohol use:  Yes     Alcohol/week: 3 0 standard drinks     Types: 3 Cans of beer per week   • Drug use: No   • Sexual activity: Not on file   Other Topics Concern   • Not on file   Social History Narrative    Caffeine use    Uses safety equipment: seatbelts     Social Determinants of Health     Financial Resource Strain: Not on file   Food Insecurity: Not on file   Transportation Needs: Not on file   Physical Activity: Not on file   Stress: Not on file   Social Connections: Not on file   Intimate Partner Violence: Not on file   Housing Stability: Not on file       Current Medications  Current Outpatient Medications   Medication Sig Dispense Refill   • amLODIPine (NORVASC) 10 mg tablet TAKE 1 TABLET BY MOUTH  DAILY 90 tablet 3   • ascorbic acid (VITAMIN C) 500 mg tablet Take 500 mg by mouth daily     • aspirin 81 mg chewable tablet CHEW AND SWALLOW 1 TABLET  DAILY 90 tablet 3   • atorvastatin (LIPITOR) 20 mg tablet TAKE 1 TABLET BY MOUTH  DAILY 90 tablet 3   • carvedilol (COREG) 12 5 mg tablet Take 1 tablet (12 5 mg total) by mouth 2 (two) times a day with meals 180 tablet 3   • clopidogrel (PLAVIX) 75 mg tablet TAKE 1 TABLET BY MOUTH  DAILY 90 tablet 3   • glucose blood test strip Use as instructed 100 each 2   • Januvia 25 MG tablet TAKE 1 TABLET BY MOUTH  DAILY 90 tablet 3   • losartan (COZAAR) 100 MG tablet TAKE 1 TABLET BY MOUTH  DAILY 90 tablet 3   • metFORMIN (GLUCOPHAGE) 1000 MG tablet TAKE 1 TABLET BY MOUTH  TWICE DAILY WITH MEALS 180 tablet 3   • sildenafil (VIAGRA) 25 MG tablet TAKE 1 TABLET BY MOUTH  DAILY AS NEEDED FOR  ERECTILE DYSFUNCTION 10 tablet 0     No current facility-administered medications for this visit  Allergies  Allergies   Allergen Reactions   • Other Hives     Soft shell crabs hives         The following portions of the patient's history were reviewed and updated as appropriate: allergies, current medications, past medical history, past social history, past surgical history and problem list       Vitals  Vitals:    03/06/23 0901   BP: 108/80   Pulse: 80   SpO2: 97%   Weight: 92 5 kg (204 lb)   Height: 6' (1 829 m)           Physical Exam  Physical Exam  Constitutional:       Appearance: Normal appearance  HENT:      Head: Normocephalic and atraumatic  Right Ear: External ear normal       Left Ear: External ear normal       Nose: Nose normal    Eyes:      General: No scleral icterus  Conjunctiva/sclera: Conjunctivae normal    Cardiovascular:      Rate and Rhythm: Normal rate and regular rhythm  Pulses: Normal pulses  Heart sounds: Normal heart sounds  Pulmonary:      Effort: Pulmonary effort is normal       Breath sounds: Normal breath sounds  Abdominal:      General: Abdomen is flat  Bowel sounds are normal       Palpations: Abdomen is soft  Tenderness: There is no abdominal tenderness  There is no right CVA tenderness, left CVA tenderness, guarding or rebound  Musculoskeletal:         General: Normal range of motion  Cervical back: Normal range of motion  Right lower leg: No edema  Left lower leg: No edema  Skin:     General: Skin is warm and dry  Neurological:      General: No focal deficit present  Mental Status: He is alert and oriented to person, place, and time  Psychiatric:         Mood and Affect: Mood normal          Behavior: Behavior normal          Thought Content: Thought content normal          Judgment: Judgment normal            Results  Recent Results (from the past 1 hour(s))   POCT urine dip    Collection Time: 03/06/23  9:09 AM   Result Value Ref Range    LEUKOCYTE ESTERASE,UA ++     NITRITE,UA -     SL AMB POCT UROBILINOGEN 0 2     POCT URINE PROTEIN +      PH,UA 5 0     BLOOD,UA ++     SPECIFIC GRAVITY,UA 1 025     KETONES,UA -     BILIRUBIN,UA -     GLUCOSE, UA -      COLOR,UA yellow     CLARITY,UA clear    ]  Lab Results   Component Value Date    PSA 9 6 (H) 03/03/2023    PSA 8 5 (H) 09/02/2022    PSA 6 3 (H) 01/24/2022     Lab Results   Component Value Date    GLUCOSE 112 01/13/2020    CALCIUM 10 1 09/02/2022     11/22/2016    K 4 4 09/02/2022    CO2 22 09/02/2022     09/02/2022    BUN 18 12/06/2022    CREATININE 0 97 12/06/2022     Lab Results   Component Value Date    WBC 6 24 02/27/2022    HGB 14 6 02/27/2022    HCT 43 4 02/27/2022    MCV 96 02/27/2022     02/27/2022           Orders  Orders Placed This Encounter   Procedures   • MRI prostate multiparametric wo w contrast     Standing Status:   Future     Standing Expiration Date:   3/6/2027     Scheduling Instructions: There is no preparation for this test  Please leave your jewelry and valuables at home, wedding rings are the exception  All patients will be required to change into a hospital gown and pants  Street clothes are not permitted in the MRI    Magnetic nail polish must be removed prior to arrival for your test  Please bring your insurance cards, a form of photo ID and a list of your medications with you  Arrive 15 minutes prior to your appointment time in order to register  Please bring any prior CT or MRI studies of this area that were not performed at a [facility]  To schedule this appointment, please contact Central Scheduling at [phone]  Prior to your appointment, please make sure you complete the MRI Screening Form when you e-Check in for your appointment  This will be available starting 7 days before your appointment in [address]  You may receive an e-mail with an activation code if you do not have a [employer] account  If you do not have access to a device, we will complete your screening at your appointment  Order Specific Question:   Release to patient through [email or web address]     Answer:   Immediate     Order Specific Question:   Is order priority selected as STAT? Answer:   No     Order Specific Question:   Reason for Exam (FREE TEXT)     Answer:   elevated PSA     Order Specific Question:   Reason for Exam:     Answer:   elevated PSA     Order Specific Question:   What is the patient's sedation requirement? Answer:   No Sedation     Order Specific Question:   Does the patient have metallic implants?      Answer:   No   • POCT urine dip       Ramirez Tan

## 2023-03-03 ENCOUNTER — APPOINTMENT (OUTPATIENT)
Dept: LAB | Facility: CLINIC | Age: 73
End: 2023-03-03

## 2023-03-03 DIAGNOSIS — R97.20 ELEVATED PSA: ICD-10-CM

## 2023-03-04 LAB — PSA SERPL-MCNC: 9.6 NG/ML (ref 0–4)

## 2023-03-06 ENCOUNTER — OFFICE VISIT (OUTPATIENT)
Dept: UROLOGY | Facility: CLINIC | Age: 73
End: 2023-03-06

## 2023-03-06 VITALS
BODY MASS INDEX: 27.63 KG/M2 | HEIGHT: 72 IN | HEART RATE: 80 BPM | WEIGHT: 204 LBS | OXYGEN SATURATION: 97 % | SYSTOLIC BLOOD PRESSURE: 108 MMHG | DIASTOLIC BLOOD PRESSURE: 80 MMHG

## 2023-03-06 DIAGNOSIS — R97.20 ELEVATED PSA: Primary | ICD-10-CM

## 2023-03-06 DIAGNOSIS — N13.2 HYDRONEPHROSIS WITH URINARY OBSTRUCTION DUE TO URETERAL CALCULUS: ICD-10-CM

## 2023-03-06 LAB
SL AMB  POCT GLUCOSE, UA: NORMAL
SL AMB LEUKOCYTE ESTERASE,UA: NORMAL
SL AMB POCT BILIRUBIN,UA: NORMAL
SL AMB POCT BLOOD,UA: NORMAL
SL AMB POCT CLARITY,UA: CLEAR
SL AMB POCT COLOR,UA: YELLOW
SL AMB POCT KETONES,UA: NORMAL
SL AMB POCT NITRITE,UA: NORMAL
SL AMB POCT PH,UA: 5
SL AMB POCT SPECIFIC GRAVITY,UA: 1.02
SL AMB POCT URINE PROTEIN: NORMAL
SL AMB POCT UROBILINOGEN: 0.2

## 2023-03-07 LAB — BACTERIA UR CULT: NORMAL

## 2023-03-15 ENCOUNTER — APPOINTMENT (OUTPATIENT)
Dept: LAB | Facility: CLINIC | Age: 73
End: 2023-03-15

## 2023-03-15 ENCOUNTER — TELEPHONE (OUTPATIENT)
Dept: VASCULAR SURGERY | Facility: CLINIC | Age: 73
End: 2023-03-15

## 2023-03-15 ENCOUNTER — TELEPHONE (OUTPATIENT)
Dept: CARDIOLOGY CLINIC | Facility: CLINIC | Age: 73
End: 2023-03-15

## 2023-03-15 DIAGNOSIS — I65.23 CAROTID ARTERY STENOSIS WITHOUT CEREBRAL INFARCTION, BILATERAL: ICD-10-CM

## 2023-03-15 DIAGNOSIS — E11.9 TYPE 2 DIABETES MELLITUS WITHOUT COMPLICATION, WITHOUT LONG-TERM CURRENT USE OF INSULIN (HCC): ICD-10-CM

## 2023-03-15 DIAGNOSIS — E78.2 HYPERLIPIDEMIA, MIXED: ICD-10-CM

## 2023-03-15 DIAGNOSIS — N13.2 HYDRONEPHROSIS WITH URINARY OBSTRUCTION DUE TO URETERAL CALCULUS: ICD-10-CM

## 2023-03-15 LAB
ALBUMIN SERPL BCP-MCNC: 3.9 G/DL (ref 3.5–5)
ALP SERPL-CCNC: 38 U/L (ref 46–116)
ALT SERPL W P-5'-P-CCNC: 35 U/L (ref 12–78)
ANION GAP SERPL CALCULATED.3IONS-SCNC: 3 MMOL/L (ref 4–13)
AST SERPL W P-5'-P-CCNC: 17 U/L (ref 5–45)
BASOPHILS # BLD AUTO: 0.1 THOUSANDS/ÂΜL (ref 0–0.1)
BASOPHILS NFR BLD AUTO: 2 % (ref 0–1)
BILIRUB SERPL-MCNC: 0.48 MG/DL (ref 0.2–1)
BUN SERPL-MCNC: 18 MG/DL (ref 5–25)
CALCIUM SERPL-MCNC: 9.7 MG/DL (ref 8.3–10.1)
CHLORIDE SERPL-SCNC: 105 MMOL/L (ref 96–108)
CHOLEST SERPL-MCNC: 125 MG/DL
CO2 SERPL-SCNC: 27 MMOL/L (ref 21–32)
CREAT SERPL-MCNC: 0.93 MG/DL (ref 0.6–1.3)
CREAT UR-MCNC: 93.3 MG/DL
EOSINOPHIL # BLD AUTO: 0.36 THOUSAND/ÂΜL (ref 0–0.61)
EOSINOPHIL NFR BLD AUTO: 6 % (ref 0–6)
ERYTHROCYTE [DISTWIDTH] IN BLOOD BY AUTOMATED COUNT: 12.3 % (ref 11.6–15.1)
GFR SERPL CREATININE-BSD FRML MDRD: 82 ML/MIN/1.73SQ M
GLUCOSE P FAST SERPL-MCNC: 192 MG/DL (ref 65–99)
HCT VFR BLD AUTO: 41.8 % (ref 36.5–49.3)
HDLC SERPL-MCNC: 34 MG/DL
HGB BLD-MCNC: 13.9 G/DL (ref 12–17)
IMM GRANULOCYTES # BLD AUTO: 0.01 THOUSAND/UL (ref 0–0.2)
IMM GRANULOCYTES NFR BLD AUTO: 0 % (ref 0–2)
LDLC SERPL CALC-MCNC: 57 MG/DL (ref 0–100)
LYMPHOCYTES # BLD AUTO: 1.85 THOUSANDS/ÂΜL (ref 0.6–4.47)
LYMPHOCYTES NFR BLD AUTO: 29 % (ref 14–44)
MCH RBC QN AUTO: 33.3 PG (ref 26.8–34.3)
MCHC RBC AUTO-ENTMCNC: 33.3 G/DL (ref 31.4–37.4)
MCV RBC AUTO: 100 FL (ref 82–98)
MICROALBUMIN UR-MCNC: 98.6 MG/L (ref 0–20)
MICROALBUMIN/CREAT 24H UR: 106 MG/G CREATININE (ref 0–30)
MONOCYTES # BLD AUTO: 0.63 THOUSAND/ÂΜL (ref 0.17–1.22)
MONOCYTES NFR BLD AUTO: 10 % (ref 4–12)
NEUTROPHILS # BLD AUTO: 3.36 THOUSANDS/ÂΜL (ref 1.85–7.62)
NEUTS SEG NFR BLD AUTO: 53 % (ref 43–75)
NONHDLC SERPL-MCNC: 91 MG/DL
NRBC BLD AUTO-RTO: 0 /100 WBCS
PLATELET # BLD AUTO: 290 THOUSANDS/UL (ref 149–390)
PMV BLD AUTO: 10.2 FL (ref 8.9–12.7)
POTASSIUM SERPL-SCNC: 4.2 MMOL/L (ref 3.5–5.3)
PROT SERPL-MCNC: 7.6 G/DL (ref 6.4–8.4)
RBC # BLD AUTO: 4.18 MILLION/UL (ref 3.88–5.62)
SODIUM SERPL-SCNC: 135 MMOL/L (ref 135–147)
TRIGL SERPL-MCNC: 169 MG/DL
WBC # BLD AUTO: 6.31 THOUSAND/UL (ref 4.31–10.16)

## 2023-03-15 RX ORDER — CLOPIDOGREL BISULFATE 75 MG/1
75 TABLET ORAL DAILY
Qty: 90 TABLET | Refills: 0 | Status: SHIPPED | OUTPATIENT
Start: 2023-03-15

## 2023-03-15 NOTE — TELEPHONE ENCOUNTER
Pt called the office to request a refill of plavix  He states that he has been out since 3/10  Informed him that I would send a refill request to our triage provider  Pt also states that he needs to start holding plavix now for urology procedure that is scheduled for 3/23  Informed him I would send hold request to triage provider as well  Pt is scheduled for carotid duplex 3/30 and OV 4/3

## 2023-03-15 NOTE — TELEPHONE ENCOUNTER
Marychuy Waldrop PA-C  You; Vascular Triage 5 minutes ago (2:34 PM)     RD  Ok to hold clopidogrel for lithotripsy  May refill 30 days of clopidogrel but annual OV is due  Please schedule

## 2023-03-15 NOTE — PRE-PROCEDURE INSTRUCTIONS
Pre-Surgery Instructions:   Medication Instructions   • amLODIPine (NORVASC) 10 mg tablet Take day of surgery  • ascorbic acid (VITAMIN C) 500 mg tablet Stop taking 7 days prior to surgery  • aspirin 81 mg chewable tablet per MD   • atorvastatin (LIPITOR) 20 mg tablet Take night before surgery   • carvedilol (COREG) 12 5 mg tablet Take day of surgery  • clopidogrel (PLAVIX) 75 mg tablet last dose 3-10-23 per MD(pt will confirm)   • Januvia 25 MG tablet Hold day of surgery  • losartan (COZAAR) 100 MG tablet Hold day of surgery  • metFORMIN (GLUCOPHAGE) 1000 MG tablet Hold day of surgery  • sildenafil (VIAGRA) 25 MG tablet Stop taking 1 day prior to surgery  Medication instructions for day surgery reviewed  Please use only a sip of water to take your instructed medications  Avoid all over the counter vitamins, supplements and NSAIDS for one week prior to surgery per anesthesia guidelines  Tylenol is ok to take as needed  You will receive a call one business day prior to surgery with an arrival time and hospital directions  If your surgery is scheduled on a Monday, the hospital will be calling you on the Friday prior to your surgery  If you have not heard from anyone by 8pm, please call the hospital supervisor through the hospital  at [phone]  Do not eat or drink anything after midnight the night before your surgery, including candy, mints, lifesavers, or chewing gum  Do not drink alcohol 24hrs before your surgery  Try not to smoke at least 24hrs before your surgery  Follow the pre surgery showering instructions as listed in the [facility] Surgical Experience Booklet” or otherwise provided by your surgeon's office  Do not shave the surgical area 24 hours before surgery  Do not apply any lotions, creams, including makeup, cologne, deodorant, or perfumes after showering on the day of your surgery  No contact lenses, eye make-up, or artificial eyelashes   Remove nail polish, including gel polish, and any artificial, gel, or acrylic nails if possible  Remove all jewelry including rings and body piercing jewelry  Wear causal clothing that is easy to take on and off  Consider your type of surgery  Keep any valuables, jewelry, piercings at home  Please bring any specially ordered equipment (sling, braces) if indicated  Arrange for a responsible person to drive you to and from the hospital on the day of your surgery  Visitor Guidelines discussed  Call the surgeon's office with any new illnesses, exposures, or additional questions prior to surgery  Please reference your [facility] Surgical Experience Booklet” for additional information to prepare for your upcoming surgery

## 2023-03-15 NOTE — TELEPHONE ENCOUNTER
Please advise him to check with the surgeon  He may need to hold the blood thinners  If so he should stop the aspirin and Plavix 5 days prior to the procedure

## 2023-03-15 NOTE — TELEPHONE ENCOUNTER
Patient Carlyn Ayala [phone] contacting office scheduled to have removal of kidney stones  Patient is inquiring if he needs to hold any medications prior to surgery

## 2023-03-16 LAB — BACTERIA UR CULT: NORMAL

## 2023-03-17 ENCOUNTER — CLINICAL SUPPORT (OUTPATIENT)
Dept: URGENT CARE | Facility: CLINIC | Age: 73
End: 2023-03-17

## 2023-03-17 DIAGNOSIS — N13.2 HYDRONEPHROSIS WITH URINARY OBSTRUCTION DUE TO URETERAL CALCULUS: ICD-10-CM

## 2023-03-22 LAB
ATRIAL RATE: 77 BPM
P AXIS: 40 DEGREES
PR INTERVAL: 168 MS
QRS AXIS: -5 DEGREES
QRSD INTERVAL: 86 MS
QT INTERVAL: 386 MS
QTC INTERVAL: 436 MS
T WAVE AXIS: -62 DEGREES
VENTRICULAR RATE: 77 BPM

## 2023-03-23 ENCOUNTER — ANESTHESIA (OUTPATIENT)
Dept: PERIOP | Facility: HOSPITAL | Age: 73
End: 2023-03-23

## 2023-03-23 ENCOUNTER — HOSPITAL ENCOUNTER (OUTPATIENT)
Facility: HOSPITAL | Age: 73
Setting detail: OUTPATIENT SURGERY
Discharge: HOME | End: 2023-03-23
Attending: UROLOGY | Admitting: UROLOGY

## 2023-03-23 ENCOUNTER — TELEPHONE (OUTPATIENT)
Dept: UROLOGY | Facility: CLINIC | Age: 73
End: 2023-03-23

## 2023-03-23 ENCOUNTER — ANESTHESIA EVENT (OUTPATIENT)
Dept: PERIOP | Facility: HOSPITAL | Age: 73
End: 2023-03-23

## 2023-03-23 ENCOUNTER — APPOINTMENT (OUTPATIENT)
Dept: RADIOLOGY | Facility: HOSPITAL | Age: 73
End: 2023-03-23

## 2023-03-23 VITALS
RESPIRATION RATE: 15 BRPM | SYSTOLIC BLOOD PRESSURE: 118 MMHG | HEIGHT: 72 IN | WEIGHT: 197.97 LBS | TEMPERATURE: 98 F | OXYGEN SATURATION: 97 % | DIASTOLIC BLOOD PRESSURE: 61 MMHG | HEART RATE: 62 BPM | BODY MASS INDEX: 26.81 KG/M2

## 2023-03-23 DIAGNOSIS — N20.1 LEFT URETERAL STONE: Primary | ICD-10-CM

## 2023-03-23 DIAGNOSIS — N13.2 HYDRONEPHROSIS WITH URINARY OBSTRUCTION DUE TO URETERAL CALCULUS: ICD-10-CM

## 2023-03-23 LAB
GLUCOSE SERPL-MCNC: 171 MG/DL (ref 65–140)
GLUCOSE SERPL-MCNC: 179 MG/DL (ref 65–140)

## 2023-03-23 DEVICE — INLAY OPTIMA URETERAL STENT W/O GUIDEWIRE
Type: IMPLANTABLE DEVICE | Site: URETER | Status: FUNCTIONAL
Brand: BARD® INLAY OPTIMA® URETERAL STENT

## 2023-03-23 RX ORDER — MAGNESIUM HYDROXIDE 1200 MG/15ML
LIQUID ORAL AS NEEDED
Status: DISCONTINUED | OUTPATIENT
Start: 2023-03-23 | End: 2023-03-23 | Stop reason: HOSPADM

## 2023-03-23 RX ORDER — DICLOFENAC POTASSIUM 50 MG/1
50 TABLET, FILM COATED ORAL 2 TIMES DAILY
Qty: 10 TABLET | Refills: 0 | Status: SHIPPED | OUTPATIENT
Start: 2023-03-23 | End: 2023-03-28

## 2023-03-23 RX ORDER — LIDOCAINE HYDROCHLORIDE 10 MG/ML
0.5 INJECTION, SOLUTION EPIDURAL; INFILTRATION; INTRACAUDAL; PERINEURAL ONCE AS NEEDED
Status: DISCONTINUED | OUTPATIENT
Start: 2023-03-23 | End: 2023-03-23 | Stop reason: HOSPADM

## 2023-03-23 RX ORDER — FENTANYL CITRATE/PF 50 MCG/ML
25 SYRINGE (ML) INJECTION
Status: DISCONTINUED | OUTPATIENT
Start: 2023-03-23 | End: 2023-03-23 | Stop reason: HOSPADM

## 2023-03-23 RX ORDER — FENTANYL CITRATE 50 UG/ML
INJECTION, SOLUTION INTRAMUSCULAR; INTRAVENOUS AS NEEDED
Status: DISCONTINUED | OUTPATIENT
Start: 2023-03-23 | End: 2023-03-23

## 2023-03-23 RX ORDER — SODIUM CHLORIDE, SODIUM LACTATE, POTASSIUM CHLORIDE, CALCIUM CHLORIDE 600; 310; 30; 20 MG/100ML; MG/100ML; MG/100ML; MG/100ML
125 INJECTION, SOLUTION INTRAVENOUS CONTINUOUS
Status: CANCELLED | OUTPATIENT
Start: 2023-03-23

## 2023-03-23 RX ORDER — SODIUM CHLORIDE, SODIUM LACTATE, POTASSIUM CHLORIDE, CALCIUM CHLORIDE 600; 310; 30; 20 MG/100ML; MG/100ML; MG/100ML; MG/100ML
125 INJECTION, SOLUTION INTRAVENOUS CONTINUOUS
Status: DISCONTINUED | OUTPATIENT
Start: 2023-03-23 | End: 2023-03-23 | Stop reason: HOSPADM

## 2023-03-23 RX ORDER — SENNOSIDES 8.6 MG
8.6 TABLET ORAL
Qty: 5 TABLET | Refills: 0 | Status: SHIPPED | OUTPATIENT
Start: 2023-03-23 | End: 2023-03-28

## 2023-03-23 RX ORDER — OXYCODONE HYDROCHLORIDE 5 MG/1
5 TABLET ORAL EVERY 4 HOURS PRN
Status: CANCELLED | OUTPATIENT
Start: 2023-03-23

## 2023-03-23 RX ORDER — ACETAMINOPHEN 500 MG
500 TABLET ORAL EVERY 6 HOURS
Qty: 20 TABLET | Refills: 0 | Status: SHIPPED | OUTPATIENT
Start: 2023-03-23 | End: 2023-03-28

## 2023-03-23 RX ORDER — HYDROMORPHONE HCL/PF 1 MG/ML
0.2 SYRINGE (ML) INJECTION
Status: CANCELLED | OUTPATIENT
Start: 2023-03-23

## 2023-03-23 RX ORDER — ALBUTEROL SULFATE 2.5 MG/3ML
2.5 SOLUTION RESPIRATORY (INHALATION) ONCE AS NEEDED
Status: DISCONTINUED | OUTPATIENT
Start: 2023-03-23 | End: 2023-03-23 | Stop reason: HOSPADM

## 2023-03-23 RX ORDER — SULFAMETHOXAZOLE AND TRIMETHOPRIM 800; 160 MG/1; MG/1
1 TABLET ORAL EVERY 12 HOURS SCHEDULED
Qty: 6 TABLET | Refills: 0 | Status: SHIPPED | OUTPATIENT
Start: 2023-03-23 | End: 2023-03-26

## 2023-03-23 RX ORDER — DIPHENHYDRAMINE HCL 25 MG
12.5 TABLET ORAL EVERY 6 HOURS PRN
Status: CANCELLED | OUTPATIENT
Start: 2023-03-23

## 2023-03-23 RX ORDER — ONDANSETRON 2 MG/ML
INJECTION INTRAMUSCULAR; INTRAVENOUS AS NEEDED
Status: DISCONTINUED | OUTPATIENT
Start: 2023-03-23 | End: 2023-03-23

## 2023-03-23 RX ORDER — TAMSULOSIN HYDROCHLORIDE 0.4 MG/1
0.4 CAPSULE ORAL
Qty: 14 CAPSULE | Refills: 0 | Status: SHIPPED | OUTPATIENT
Start: 2023-03-23 | End: 2023-04-06

## 2023-03-23 RX ORDER — MIDAZOLAM HYDROCHLORIDE 2 MG/2ML
INJECTION, SOLUTION INTRAMUSCULAR; INTRAVENOUS AS NEEDED
Status: DISCONTINUED | OUTPATIENT
Start: 2023-03-23 | End: 2023-03-23

## 2023-03-23 RX ORDER — PROMETHAZINE HYDROCHLORIDE 25 MG/ML
12.5 INJECTION, SOLUTION INTRAMUSCULAR; INTRAVENOUS ONCE AS NEEDED
Status: DISCONTINUED | OUTPATIENT
Start: 2023-03-23 | End: 2023-03-23 | Stop reason: HOSPADM

## 2023-03-23 RX ORDER — KETOROLAC TROMETHAMINE 30 MG/ML
INJECTION, SOLUTION INTRAMUSCULAR; INTRAVENOUS AS NEEDED
Status: DISCONTINUED | OUTPATIENT
Start: 2023-03-23 | End: 2023-03-23

## 2023-03-23 RX ORDER — ONDANSETRON 2 MG/ML
4 INJECTION INTRAMUSCULAR; INTRAVENOUS ONCE AS NEEDED
Status: DISCONTINUED | OUTPATIENT
Start: 2023-03-23 | End: 2023-03-23 | Stop reason: HOSPADM

## 2023-03-23 RX ORDER — HYDROMORPHONE HCL/PF 1 MG/ML
0.5 SYRINGE (ML) INJECTION
Status: DISCONTINUED | OUTPATIENT
Start: 2023-03-23 | End: 2023-03-23 | Stop reason: HOSPADM

## 2023-03-23 RX ORDER — MEPERIDINE HYDROCHLORIDE 50 MG/ML
12.5 INJECTION INTRAMUSCULAR; INTRAVENOUS; SUBCUTANEOUS ONCE
Status: DISCONTINUED | OUTPATIENT
Start: 2023-03-23 | End: 2023-03-23 | Stop reason: HOSPADM

## 2023-03-23 RX ORDER — PROPOFOL 10 MG/ML
INJECTION, EMULSION INTRAVENOUS AS NEEDED
Status: DISCONTINUED | OUTPATIENT
Start: 2023-03-23 | End: 2023-03-23

## 2023-03-23 RX ORDER — LIDOCAINE HYDROCHLORIDE 10 MG/ML
INJECTION, SOLUTION EPIDURAL; INFILTRATION; INTRACAUDAL; PERINEURAL AS NEEDED
Status: DISCONTINUED | OUTPATIENT
Start: 2023-03-23 | End: 2023-03-23

## 2023-03-23 RX ORDER — CEFAZOLIN SODIUM 2 G/50ML
2000 SOLUTION INTRAVENOUS ONCE
Status: COMPLETED | OUTPATIENT
Start: 2023-03-23 | End: 2023-03-23

## 2023-03-23 RX ORDER — ACETAMINOPHEN 325 MG/1
650 TABLET ORAL EVERY 6 HOURS PRN
Status: CANCELLED | OUTPATIENT
Start: 2023-03-23

## 2023-03-23 RX ORDER — PHENAZOPYRIDINE HYDROCHLORIDE 100 MG/1
100 TABLET, FILM COATED ORAL
Status: CANCELLED | OUTPATIENT
Start: 2023-03-23

## 2023-03-23 RX ORDER — OXYCODONE HYDROCHLORIDE 5 MG/1
2.5 TABLET ORAL EVERY 4 HOURS PRN
Status: CANCELLED | OUTPATIENT
Start: 2023-03-23

## 2023-03-23 RX ORDER — LABETALOL HYDROCHLORIDE 5 MG/ML
5 INJECTION, SOLUTION INTRAVENOUS
Status: DISCONTINUED | OUTPATIENT
Start: 2023-03-23 | End: 2023-03-23 | Stop reason: HOSPADM

## 2023-03-23 RX ORDER — ONDANSETRON 2 MG/ML
4 INJECTION INTRAMUSCULAR; INTRAVENOUS EVERY 6 HOURS PRN
Status: CANCELLED | OUTPATIENT
Start: 2023-03-23

## 2023-03-23 RX ORDER — CEFAZOLIN SODIUM 2 G/50ML
SOLUTION INTRAVENOUS AS NEEDED
Status: DISCONTINUED | OUTPATIENT
Start: 2023-03-23 | End: 2023-03-23

## 2023-03-23 RX ORDER — DEXAMETHASONE SODIUM PHOSPHATE 10 MG/ML
INJECTION, SOLUTION INTRAMUSCULAR; INTRAVENOUS AS NEEDED
Status: DISCONTINUED | OUTPATIENT
Start: 2023-03-23 | End: 2023-03-23

## 2023-03-23 RX ADMIN — ONDANSETRON 4 MG: 2 INJECTION INTRAMUSCULAR; INTRAVENOUS at 10:08

## 2023-03-23 RX ADMIN — FENTANYL CITRATE 25 MCG: 50 INJECTION INTRAMUSCULAR; INTRAVENOUS at 10:19

## 2023-03-23 RX ADMIN — DEXAMETHASONE SODIUM PHOSPHATE 10 MG: 10 INJECTION, SOLUTION INTRAMUSCULAR; INTRAVENOUS at 10:08

## 2023-03-23 RX ADMIN — FENTANYL CITRATE 50 MCG: 50 INJECTION INTRAMUSCULAR; INTRAVENOUS at 10:13

## 2023-03-23 RX ADMIN — CEFAZOLIN SODIUM 2000 MG: 2 SOLUTION INTRAVENOUS at 10:04

## 2023-03-23 RX ADMIN — FENTANYL CITRATE 25 MCG: 50 INJECTION INTRAMUSCULAR; INTRAVENOUS at 10:42

## 2023-03-23 RX ADMIN — PROPOFOL 150 MG: 10 INJECTION, EMULSION INTRAVENOUS at 10:05

## 2023-03-23 RX ADMIN — KETOROLAC TROMETHAMINE 30 MG: 30 INJECTION, SOLUTION INTRAMUSCULAR at 11:08

## 2023-03-23 RX ADMIN — MIDAZOLAM HYDROCHLORIDE 2 MG: 1 INJECTION, SOLUTION INTRAMUSCULAR; INTRAVENOUS at 10:00

## 2023-03-23 RX ADMIN — SODIUM CHLORIDE, SODIUM LACTATE, POTASSIUM CHLORIDE, AND CALCIUM CHLORIDE 125 ML/HR: .6; .31; .03; .02 INJECTION, SOLUTION INTRAVENOUS at 09:20

## 2023-03-23 RX ADMIN — LIDOCAINE HYDROCHLORIDE 50 MG: 10 INJECTION, SOLUTION EPIDURAL; INFILTRATION; INTRACAUDAL; PERINEURAL at 10:05

## 2023-03-23 RX ADMIN — CEFAZOLIN SODIUM 2000 MG: 2 SOLUTION INTRAVENOUS at 09:55

## 2023-03-23 NOTE — TELEPHONE ENCOUNTER
The patient is status post extensive left ureteroscopy with laser lithotripsy and stone basketing  He has a 6 Western Bernie by 28 cm left ureteral stent  Please arrange for cystoscopy with ureteral stent removal in 2 to 3 weeks    At that visit a follow-up KUB and renal ultrasound should be ordered for 6 weeks after stent removal

## 2023-03-23 NOTE — INTERVAL H&P NOTE
H&P reviewed  After examining the patient I find no changes in the patients condition since the H&P had been written      Vitals:    03/23/23 0913   BP: 151/77   Pulse: 68   Resp: 18   Temp: (!) 97 3 °F (36 3 °C)   SpO2: 98%     Proceed to left ureteroscopy with laser lithotripsy and all indicated procedures

## 2023-03-23 NOTE — ANESTHESIA POSTPROCEDURE EVALUATION
Post-Op Assessment Note    CV Status:  Stable  Pain Score: 0    Pain management: adequate  Multimodal analgesia used between 6 hours prior to anesthesia start to PACU discharge    Mental Status:  Sleepy   Hydration Status:  Stable   PONV Controlled:  None   Airway Patency:  Patent   Two or more mitigation strategies used for obstructive sleep apnea   Post Op Vitals Reviewed: Yes      Staff: CRNA         No notable events documented      BP  163/77    Temp  98 1   Pulse  57   Resp  16    SpO2  100

## 2023-03-23 NOTE — ANESTHESIA PREPROCEDURE EVALUATION
Procedure:  CYSTOSCOPY URETEROSCOPY WITH LITHOTRIPSY HOLMIUM LASER, RETROGRADE PYELOGRAM AND INSERTION STENT URETERAL (Left: Bladder)    Relevant Problems   CARDIO   (+) Coronary artery disease due to lipid rich plaque   (+) Essential hypertension   (+) Familial hypercholesterolemia   (+) Mixed hyperlipidemia      ENDO   (+) Type 2 diabetes mellitus without complication, without long-term current use of insulin (HCC)      HEMATOLOGY   (+) Anemia      MUSCULOSKELETAL   (+) Arthritis      NEURO/PSYCH   (+) Chronic right shoulder pain        ECHO SUMMARY 02/05/2020     PROCEDURE INFORMATION:  This was a technically difficult study  Intravenous contrast (  6mL of Defintiy in NSS) was administered to opacify the left ventricle      LEFT VENTRICLE:  Systolic function was at the lower limits of normal  Ejection fraction was estimated to be 50 %  There were no regional wall motion abnormalities      LEFT ATRIUM:  The atrium was mildly dilated      RIGHT ATRIUM:  The atrium was mildly dilated  Physical Exam    Airway    Mallampati score: II  TM Distance: >3 FB  Neck ROM: full     Dental   No notable dental hx     Cardiovascular  Rhythm: regular, Rate: normal, Cardiovascular exam normal    Pulmonary  Pulmonary exam normal Breath sounds clear to auscultation,     Other Findings        Anesthesia Plan  ASA Score- 3     Anesthesia Type- general with ASA Monitors  Additional Monitors:   Airway Plan:     Comment: Patient seen and examined, history reviewed  Patient to be done under general anesthesia with LMA and routine monitors  Risks discussed with the patient, consent obtained          Plan Factors-Exercise tolerance (METS): >4 METS  Chart reviewed  Existing labs reviewed  Patient summary reviewed  Patient instructed to abstain from smoking on day of procedure  Patient did not smoke on day of surgery  Induction- intravenous  Postoperative Plan- Plan for postoperative opioid use       Informed Consent- Anesthetic plan and risks discussed with patient  I personally reviewed this patient with the CRNA  Discussed and agreed on the Anesthesia Plan with the CRNA  Lexie King

## 2023-03-23 NOTE — DISCHARGE INSTR - AVS FIRST PAGE
Rosa Isela Neri,    Today you had ureteroscopy with laser lithotripsy  You may experience urgency, frequency, and blood in the urine after today's procedure  Please stop taking vitamin C as this can promote/contribute to stone formation in stone formers  I have given you medications to make your recovery more tolerable  I was able to treat both your ureteral stone and your renal stone today  You will need your stents for the next 2 to 3 weeks  I will arrange to have this removed in our office  Once your stent is removed we will obtain an ultrasound to ensure that your kidney is draining well  We will also obtain a x-ray  This will likely show some small stone debris but my hope is that it will show no further large stone burden  I hope that you feel better soon,    Dr Moose Bertrand    Portions of the above record have been created with voice recognition software  Occasional wrong word or "sound alike" substitution may have occurred due to the inherent limitations of voice recognition software  Read the chart carefully and recognize, using context, where substitution may have occurred

## 2023-03-23 NOTE — OP NOTE
OPERATIVE REPORT  PATIENT NAME: Lawyer Villarreal    :  1950  MRN: [id number]  Pt Location: [facility] OR ROOM 04    SURGERY DATE: 3/23/2023    Surgeon(s) and Role:     * Figueroa Edmondson MD - Primary    Preop Diagnosis:  Hydronephrosis with urinary obstruction due to ureteral calculus [N13 2]    Post-Op Diagnosis Codes: * Hydronephrosis with urinary obstruction due to ureteral calculus [N13 2]    Procedure(s):  Left - CYSTOSCOPY URETEROSCOPY WITH LITHOTRIPSY HOLMIUM LASER  RETROGRADE PYELOGRAM AND INSERTION STENT URETERAL  STONE BASKET    Specimen(s):  ID Type Source Tests Collected by Time Destination   A : Left Renal and Ureteral Stone Fragments Calculus Ureter, Left STONE ANALYSIS Figueroa Edmondson MD 3/23/2023 1050        Estimated Blood Loss:   Minimal    Drains:  Ureteral Internal Stent Left ureter (Active)   Number of days: 0       Anesthesia Type:   General    Operative Indications:  Hydronephrosis with urinary obstruction due to ureteral calculus [N13 2]      Operative Findings:  Two separate stones were treated, a large stone within the proximal ureter was decimated and fragments were then basketed free  A nonobstructing stone obstructing one of the upper pole calyces was also engaged and broken with fragmentation settings  All large stone fragments were basketed free  The ureter is edematous where the ureteral stone had been present, as such a 6 Western Bernie by 20 cm left ureteral stent was left indwelling with a planned dwell time of 2 to 3 weeks  Complications:   None    Procedure and Technique:      NOTE:  There were no qualified teaching residents to assist with this case    ANESTHESIA: General     COMPLICATIONS:   None    ANTIBIOTICS:  Ancef    INTRAOPERATIVE THROMBOEMBOLISM PROPHYLAXIS:  Pneumatic compression stockings       FINDINGS:    1  The LEFT calculi were radiopaque on plain fluoroscopy  2  Retrograde pyelogram was performed on the LEFT side using a 5 Fr open ended catheter    10 mL contrast used   3  The following findings were noted: Severe hydronephrosis      INDICATIONS FOR PROCEDURE:  Prateek Jordan is an 81 y o  old male with a ureteral and renal  calculus on the left-hand side  After discussing the options for treatment, including medical expulsive therapy, extracorporeal shockwave lithotripsy, and ureteroscopy, the patient elected to undergo ureteroscopy and ureteral stent placement  We discussed the procedure in detail, the alternatives, and the risks, and they signed informed consent to proceed (these are outlined in the surgical consent form)  PROCEDURE IN DETAIL:     The patient was identified by name, date of birth, and MRN  and brought to the OR  Antibiotic prophylaxis and DVT prophylaxis were administered as per the guidelines  They were placed in the dorsal lithotomy position with care to pad all pressure points  They were prepped and draped in the usual sterile fashion  A surgical time out was performed with all in the room in agreement with the correct patient, procedure, indications, and laterality  A 21-Mosotho rigid cystoscope was used to enter the bladder  The bladder was inspected in its entirety and there were no lesions noted  The ureteral orifices were identified in their normal orthotopic positions  The left ureteral orifice was identified and a 5 Fr open ended catheter was placed into the ureteral orifice  A retrograde pyelogram was performed with the findings as described above  A Solo wire was advanced up to the kidney under fluoroscopic guidance  Leaving this safety wire in place, the bladder was drained  A semirigid scope was placed  This could not be passed to the level of the stone  A wire was coiled distal to the impacted stone  A flexible scope was placed over this wire        The stone was encountered in the mid ureter location  The stone was noted to be impacted    A holmium laser fiber was passed through the ureteroscope and laser lithotripsy was commenced at settings of 1 J and 10 hz  The stones were fragmented to very small pieces and dust       These fragments are seen to migrate into the renal pelvis  A 12/14 [place] access sheath was then placed  A flexible scope was used to basketed free all of these fragments  Systematic pyeloscopy then showed a stone obstructing the upper pole calyx  This corresponds to the area on CT scan  Further later today, extensive, was used to treat the stone in a separate location of the collecting system  These fragments were then basketed free and removed    The ureteroscope was backed down the ureter under vision and there were no residual fragments and the ureter was noted to be intact with no injury and moderate edema where the stone was located  A 6 [place] by 28 cm left JJ stent was then passed up the wire  under fluoroscopic guidance into the kidney with a good curl noted in the kidney and in the bladder  The stent string was removed  The bladder was drained  All instrument counts and sponge counts were correct  The patient was placed back into the supine position, awakened from general anesthesia and brought to recovery room in stable condition  ESTIMATED BLOOD LOSS:  Minimal      DRAINS:   Ureteral Internal Stent Left ureter (Active)       SPECIMENS:   Order Name Source Comment Collection Info Order Time   STONE ANALYSIS Ureter, Left Left Renal and Ureteral Stone Fragments Collected By: Holli Lopez MD 3/23/2023 10:51 AM        IMPLANTS:   Implant Name Type Inv  Item Serial No   Lot No  LRB No  Used Action   STENT URETERAL 6 FR 28CM INLAY OPTIMA - [id number] Stent STENT URETERAL 6 FR 28CM INLAY OPTIMA  [id number] [address] [id number] Left 1 Implanted        COMPLICATIONS: None    DISPOSITION: PACU     PLAN:  The patient will be discharged to home after urinating  We will arrange for cystoscopic stent removal in 7 weeks    He will see us 6 weeks later with a KUB and renal ultrasound to be ordered at that visit for follow-up purposes    I have asked him to please stop taking vitamin C as this can promote stone formation in some patients     I was present for the entire procedure and A qualified resident physician was not available    Patient Disposition:  PACU         SIGNATURE: Eber Hui MD  DATE: March 23, 2023  TIME: 11:14 AM

## 2023-03-24 NOTE — TELEPHONE ENCOUNTER
Called and spoke to patient  Patient doing well after surgery  Patient confirmed appointment in [place] with Merlyn Madrid for stent removal  Reviewed post-op stent care, increase hydration, avoid bladder irritants  Patient has stool softeners  Patient did state he has the typical blood in urine, burning with urination, frequency  Patient stated he did have some nausea when urinating but that has since subsided  Patient stated he is unsure if he has all of the medications that I reviewed with him, informed patient if he would like to check and see  Informed patient he is able to call our office and we can resend anything he may need  Patient verbalized understanding and thankful for call

## 2023-03-29 LAB
CALCIUM OXALATE DIHYDRATE MFR STONE IR: 60 %
COLOR STONE: NORMAL
COM MFR STONE: 40 %
COMMENT-STONE3: NORMAL
COMPOSITION: NORMAL
LABORATORY COMMENT REPORT: NORMAL
PHOTO: NORMAL
SIZE STONE: NORMAL MM
SPEC SOURCE SUBJ: NORMAL
STONE ANALYSIS-IMP: NORMAL
WT STONE: 271 MG

## 2023-03-30 ENCOUNTER — HOSPITAL ENCOUNTER (OUTPATIENT)
Dept: VASCULAR ULTRASOUND | Facility: HOSPITAL | Age: 73
Discharge: HOME | End: 2023-03-30

## 2023-03-30 DIAGNOSIS — I65.23 CAROTID ARTERY STENOSIS WITHOUT CEREBRAL INFARCTION, BILATERAL: ICD-10-CM

## 2023-04-03 ENCOUNTER — TELEMEDICINE (OUTPATIENT)
Dept: VASCULAR SURGERY | Facility: CLINIC | Age: 73
End: 2023-04-03

## 2023-04-03 VITALS — WEIGHT: 192 LBS | HEIGHT: 72 IN | BODY MASS INDEX: 26.01 KG/M2

## 2023-04-03 DIAGNOSIS — E78.2 MIXED HYPERLIPIDEMIA: ICD-10-CM

## 2023-04-03 DIAGNOSIS — Z98.890 S/P CAROTID ENDARTERECTOMY: ICD-10-CM

## 2023-04-03 DIAGNOSIS — I65.23 CAROTID ARTERY STENOSIS WITHOUT CEREBRAL INFARCTION, BILATERAL: Primary | ICD-10-CM

## 2023-04-03 NOTE — ASSESSMENT & PLAN NOTE
82 yo Male, former smoker with uncontrolled DM Type 2 (A1c 8 2), HTN, HLD, CAD, CHF, cerebral aneurysm, and b/l asymptomatic carotid stenosis s/p L CEA with redo distal anastomosis  1/13/20  [facility]) presents via telephone visit for RFM and to discuss non-invasive imaging studies done on 3/30/23      - Patient w/o complaints  Denies TIA/stroke-like symptoms  CV duplex 3/30/23 - without significant change or progression of disease  R ICA 50-69% stenosis 181/72 Ratio 2,46  L ICA widely patent endarterectomy site    Plan:   - Continue q6m carotid duplex surveillance  - Return to office in 1 year, duplex review/ RFM   -Continue ASA, plavix  Will follow up with cardiology and neuro need for DAPT and then d/w patient  From a vascular standpoint plavix no longer needed and can be maintained on single antiplatelet    -Continue statin   - Maintain adequate blood glucose levels and blood pressure control  - Recommend regular exercise, low-cholesterol, low-fat diet  - Educated on TIA/ stroke like symptoms and when to call 911/ go to ED    - Call or return to office in the interim with questions, concerns, or new symptoms

## 2023-04-03 NOTE — PROGRESS NOTES
Virtual Brief Visit    Patient is located in the following state in which I hold an active license [place]  Telemedicine consent    Patient: Lio Liz  Provider: MAURO Velazquez  Provider located at [address] [place] [address]  [phone]    The patient was identified by name and date of birth  Lio Liz was informed that this is a telemedicine visit and that the visit is being conducted through Telephone  My office door was closed  No one else was in the room  He acknowledged consent and understanding of privacy and security of the video platform  The patient has agreed to participate and understands they can discontinue the visit at any time  Patient is aware this is a billable service  I spent 12 minutes with the patient during this visit  Assessment/Plan:    Problem List Items Addressed This Visit        Cardiovascular and Mediastinum    Carotid artery stenosis without cerebral infarction, bilateral - Primary     82 yo Male, former smoker with uncontrolled DM Type 2 (A1c 8 2), HTN, HLD, CAD, CHF, cerebral aneurysm, and b/l asymptomatic carotid stenosis s/p L CEA with redo distal anastomosis  1/13/20  [facility]) presents via telephone visit for RFM and to discuss non-invasive imaging studies done on 3/30/23      - Patient w/o complaints  Denies TIA/stroke-like symptoms  CV duplex 3/30/23 - without significant change or progression of disease  R ICA 50-69% stenosis 181/72 Ratio 2,46  L ICA widely patent endarterectomy site    Plan:   - Continue q6m carotid duplex surveillance  - Return to office in 1 year, duplex review/ RFM   -Continue ASA, plavix  Will follow up with cardiology and neuro need for DAPT and then d/w patient   From a vascular standpoint plavix no longer needed and can be maintained on single antiplatelet    -Continue statin   - Maintain adequate blood glucose levels and blood pressure control  - Recommend regular exercise, low-cholesterol, low-fat diet  - Educated on TIA/ stroke like symptoms and when to call 911/ go to ED    - Call or return to office in the interim with questions, concerns, or new symptoms  Relevant Orders    VAS carotid complete study       Other    Mixed hyperlipidemia     -stable  -continue statin therapy  -management per PCP         Relevant Orders    VAS carotid complete study    S/P Left carotid endarterectomy 1/13/20    Relevant Orders    VAS carotid complete study       Recent Visits  No visits were found meeting these conditions  Showing recent visits within past 7 days and meeting all other requirements  Today's Visits  Date Type Provider Dept   04/03/23 [facility], [address] [place] [address] today's visits and meeting all other requirements  Future Appointments  No visits were found meeting these conditions    Showing future appointments within next 150 days and meeting all other requirements

## 2023-04-05 DIAGNOSIS — I10 ESSENTIAL HYPERTENSION: ICD-10-CM

## 2023-04-06 ENCOUNTER — PROCEDURE VISIT (OUTPATIENT)
Dept: UROLOGY | Facility: CLINIC | Age: 73
End: 2023-04-06

## 2023-04-06 VITALS
DIASTOLIC BLOOD PRESSURE: 70 MMHG | WEIGHT: 199.4 LBS | SYSTOLIC BLOOD PRESSURE: 122 MMHG | RESPIRATION RATE: 16 BRPM | BODY MASS INDEX: 27.01 KG/M2 | HEIGHT: 72 IN | OXYGEN SATURATION: 95 % | HEART RATE: 80 BPM

## 2023-04-06 DIAGNOSIS — Z96.0 RETAINED URETERAL STENT: Primary | ICD-10-CM

## 2023-04-06 RX ORDER — AMLODIPINE BESYLATE 10 MG/1
TABLET ORAL
Qty: 90 TABLET | Refills: 3 | Status: SHIPPED | OUTPATIENT
Start: 2023-04-06

## 2023-04-06 NOTE — PROGRESS NOTES
Cystoscopy     Date/Time 4/6/2023 2:00 PM     Performed by  Magdalene Ross PA-C     Authorized by Magdalene Ross PA-C      Universal Protocol:  Consent given by: patient  Patient identity confirmed: verbally with patient        Procedure Details:  Procedure type: simple removal of a foreign body, stone, or stent    Patient tolerance: Patient tolerated the procedure well with no immediate complications    Additional Procedure Details: 72-year-old man history of kidney stones  He had a ureteroscopy and stone removal March 23 and is here for stent removal   Penis is prepped and draped in usual fashion  2% lidocaine used for local anesthetic  The flexible cystoscope was passed per the meatus  The pendulous urethra was normal   The prostate shows by lobar hypertrophy with calcifications  Upon entering the bladder the lesion identified emanating from the left ureteral orifice  It was grasped with the forceps and removed without difficulty  Patient is made aware of postprocedure care  Signs and symptoms of post stent removal that may occur and understands  He will follow-up in 6 weeks with an ultrasound and KUB prior to visit

## 2023-05-09 ENCOUNTER — OFFICE VISIT (OUTPATIENT)
Dept: FAMILY MEDICINE CLINIC | Facility: CLINIC | Age: 73
End: 2023-05-09

## 2023-05-09 VITALS
OXYGEN SATURATION: 97 % | WEIGHT: 201.2 LBS | BODY MASS INDEX: 27.25 KG/M2 | DIASTOLIC BLOOD PRESSURE: 78 MMHG | SYSTOLIC BLOOD PRESSURE: 122 MMHG | HEIGHT: 72 IN | HEART RATE: 76 BPM | TEMPERATURE: 97.9 F

## 2023-05-09 DIAGNOSIS — Z00.00 MEDICARE ANNUAL WELLNESS VISIT, SUBSEQUENT: ICD-10-CM

## 2023-05-09 DIAGNOSIS — L98.9 FACIAL SKIN LESION: ICD-10-CM

## 2023-05-09 DIAGNOSIS — E11.9 TYPE 2 DIABETES MELLITUS WITHOUT COMPLICATION, WITHOUT LONG-TERM CURRENT USE OF INSULIN (HCC): ICD-10-CM

## 2023-05-09 DIAGNOSIS — N50.89 LUMP IN THE TESTICLE: ICD-10-CM

## 2023-05-09 DIAGNOSIS — R97.20 ELEVATED PSA: ICD-10-CM

## 2023-05-09 DIAGNOSIS — I10 ESSENTIAL HYPERTENSION: Primary | ICD-10-CM

## 2023-05-09 PROBLEM — R53.83 FATIGUE: Status: RESOLVED | Noted: 2021-03-01 | Resolved: 2023-05-09

## 2023-05-09 PROBLEM — Z13.6 SCREENING FOR AAA (ABDOMINAL AORTIC ANEURYSM): Status: RESOLVED | Noted: 2022-01-26 | Resolved: 2023-05-09

## 2023-05-09 PROBLEM — M79.89 SOFT TISSUE MASS: Status: RESOLVED | Noted: 2018-08-22 | Resolved: 2023-05-09

## 2023-05-09 PROBLEM — M19.90 ARTHRITIS: Status: RESOLVED | Noted: 2020-06-23 | Resolved: 2023-05-09

## 2023-05-09 RX ORDER — PROCHLORPERAZINE 25 MG/1
SUPPOSITORY RECTAL
Qty: 9 EACH | Refills: 3 | Status: SHIPPED | OUTPATIENT
Start: 2023-05-09

## 2023-05-09 RX ORDER — PROCHLORPERAZINE 25 MG/1
SUPPOSITORY RECTAL
Qty: 1 EACH | Refills: 3 | Status: SHIPPED | OUTPATIENT
Start: 2023-05-09

## 2023-05-09 RX ORDER — AMLODIPINE BESYLATE 10 MG/1
10 TABLET ORAL DAILY
Qty: 90 TABLET | Refills: 3 | Status: SHIPPED | OUTPATIENT
Start: 2023-05-09

## 2023-05-09 RX ORDER — PROCHLORPERAZINE 25 MG/1
SUPPOSITORY RECTAL
Qty: 1 EACH | Refills: 0 | Status: SHIPPED | OUTPATIENT
Start: 2023-05-09

## 2023-05-09 NOTE — PROGRESS NOTES
Name: Max Luciano      : 1950      MRN: [id number]  Encounter Provider: Emeterio Lee MD  Encounter Date: 2023   Encounter department: [address] [place] [address]     1  Essential hypertension  Stable controlled  -     amLODIPine (NORVASC) 10 mg tablet; Take 1 tablet (10 mg total) by mouth daily    2  Type 2 diabetes mellitus without complication, without long-term current use of insulin ([facility])  HgbA1C- pending  -     Continuous Blood Gluc  (Dexcom G6 ) ANITA; 1 DEXCOM G6  FOR CONTINUOUS GLUCOSE MONITORING  -     Continuous Blood Gluc Transmit (Dexcom G6 Transmitter) MISC; 1 TRANSMITTED EVERY 3 MONTHS FOR CONTINUOUS GLUCOSE MONITORING  -     Continuous Blood Gluc Sensor (Dexcom G6 Sensor) MISC; 3 PACK SENSOR FOR CONTINUOUS GLUCOSE MONITORING    3  Facial skin lesion  -     Ambulatory Referral to Dermatology; Future    4  Lump in the testicle  -     US scrotum and testicles; Future; Expected date: 2023    5  Elevated PSA  Going for a prostate biopsy    6  Medicare annual wellness visit, subsequent  See Medicare wellness note  F/U in 6 months         Subjective     Patient is here for a follow up  Has Hypertension deneis any symptoms  Also has Type 2 DM  Denies any symptoms takes his medication daily  Has a facial skin lesion changing size and color  Also has a lump on his left testicle mobile non tender  Has elevated PSA, MRI was concerning for prostate cancer  Going for a prostate biopsy  Review of Systems   Constitutional: Negative for activity change, appetite change, fatigue and fever  HENT: Negative for congestion and ear discharge  Respiratory: Negative for cough and shortness of breath  Cardiovascular: Negative for chest pain and palpitations  Gastrointestinal: Negative for diarrhea and nausea  Genitourinary: Positive for testicular pain  Musculoskeletal: Negative for arthralgias and back pain     Skin: Positive for color change  Negative for rash  Neurological: Negative for dizziness and headaches  Psychiatric/Behavioral: Negative for agitation and behavioral problems  Past Medical History:   Diagnosis Date   • CHF (congestive heart failure) ([facility])    • Diabetes mellitus ([facility] [address] )    • Diverticulitis    • 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[facility] [address] ) 11/7/2018   • Heart disease    • Hyperlipidemia    • Hypertension    • Kidney stone    • Osteoarthritis    • Vascular disorder      Past Surgical History:   Procedure Laterality Date   • ABDOMINAL SURGERY     • CARDIAC CATHETERIZATION N/A 3/21/2022    Procedure: Cardiac catheterization;  Surgeon: Ryanne Musa MD;  Location: [address] [place] [address] CATH LAB; Service: Cardiology   • CARDIAC CATHETERIZATION N/A 3/21/2022    Procedure: Cardiac Coronary Angiogram;  Surgeon: Ryanne Musa MD;  Location: [address] [place] [address] CATH LAB; Service: Cardiology   • COLONOSCOPY     • FL RETROGRADE PYELOGRAM  3/23/2023   • INGUINAL HERNIA REPAIR Left    • LAPAROSCOPIC COLON RESECTION     • RI ARTHROPLASTY GLENOHUMERAL JOINT TOTAL SHOULDER Right 11/17/2020    Procedure: ARTHROPLASTY SHOULDER REVERSE with biceps tendonesis; Surgeon: Néstor Oconnor MD;  Location: [facility] MAIN OR;  Service: Orthopedics   • RI COLONOSCOPY FLX DX W/COLLJ SPEC WHEN PFRMD N/A 11/3/2017    Procedure: COLONOSCOPY;  Surgeon: Geraldine Garcia MD;  Location: [facility]  GI LAB;   Service: Colorectal   • RI CYSTO/URETERO W/LITHOTRIPSY &INDWELL STENT INSRT Left 3/23/2023    Procedure: CYSTOSCOPY URETEROSCOPY WITH LITHOTRIPSY HOLMIUM LASER, RETROGRADE PYELOGRAM AND INSERTION STENT URETERAL, STONE BASKET;  Surgeon: Uriel Roper MD;  Location: [facility] MAIN OR;  Service: Urology   • [place] [facility] GR[facility] CAROTID VERTB [facility] Left 1/13/2020    Procedure: ENDARTERECTOMY ARTERY CAROTID;  Surgeon: Alcide Nyhan, MD;  Location: [facility] MAIN OR;  Service: Vascular     Family History   Problem Relation Age of Onset   • Colon cancer Father    • Colon cancer Paternal Grandfather    • No Known Problems Mother    • Colon cancer Family      Social History     Socioeconomic History   • Marital status: /Civil Union     Spouse name: None   • Number of children: None   • Years of education: None   • Highest education level: None   Occupational History   • None   Tobacco Use   • Smoking status: Former     Types: Cigarettes, Cigars     Quit date: 9/22/2019     Years since quitting: 3 6   • Smokeless tobacco: Never   Vaping Use   • Vaping Use: Never used   Substance and Sexual Activity   • Alcohol use: Yes     Alcohol/week: 3 0 standard drinks     Types: 3 Cans of beer per week     Comment: once per week   • Drug use: No   • Sexual activity: None   Other Topics Concern   • None   Social History Narrative    Caffeine use    Uses safety equipment: seatbelts     Social Determinants of Health     Financial Resource Strain: Low Risk    • Difficulty of Paying Living Expenses: Not very hard   Food Insecurity: Not on file   Transportation Needs: No Transportation Needs   • Lack of Transportation (Medical): No   • Lack of Transportation (Non-Medical):  No   Physical Activity: Not on file   Stress: Not on file   Social Connections: Not on file   Intimate Partner Violence: Not on file   Housing Stability: Not on file     Current Outpatient Medications on File Prior to Visit   Medication Sig   • aspirin 81 mg chewable tablet CHEW AND SWALLOW 1 TABLET  DAILY (Patient taking differently: Chew 81 mg daily at bedtime)   • atorvastatin (LIPITOR) 20 mg tablet TAKE 1 TABLET BY MOUTH  DAILY (Patient taking differently: Take 20 mg by mouth daily at bedtime)   • carvedilol (COREG) 12 5 mg tablet Take 1 tablet (12 5 mg total) by mouth 2 (two) times a day with meals   • glucose blood test strip Use as instructed   • Januvia 25 MG tablet TAKE 1 TABLET BY MOUTH  DAILY (Patient taking differently: Take 25 mg by mouth every morning)   • losartan (COZAAR) 100 MG tablet TAKE 1 TABLET BY MOUTH  DAILY (Patient taking differently: Take 100 mg by mouth every morning)   • metFORMIN (GLUCOPHAGE) 1000 MG tablet TAKE 1 TABLET BY MOUTH  TWICE DAILY WITH MEALS   • sildenafil (VIAGRA) 25 MG tablet TAKE 1 TABLET BY MOUTH  DAILY AS NEEDED FOR  ERECTILE DYSFUNCTION   • [DISCONTINUED] amLODIPine (NORVASC) 10 mg tablet TAKE 1 TABLET BY MOUTH  DAILY   • clopidogrel (PLAVIX) 75 mg tablet Take 1 tablet (75 mg total) by mouth daily (Patient not taking: Reported on 5/9/2023)   • diclofenac potassium (CATAFLAM) 50 mg tablet Take 1 tablet (50 mg total) by mouth 2 (two) times a day for 5 days   • senna (SENOKOT) 8 6 mg Take 1 tablet (8 6 mg total) by mouth daily at bedtime for 5 days   • tamsulosin (FLOMAX) 0 4 mg Take 1 capsule (0 4 mg total) by mouth daily with dinner for 14 days (Patient not taking: Reported on 4/3/2023)     Allergies   Allergen Reactions   • Other Hives     Soft shell crabs hives     Immunization History   Administered Date(s) Administered   • COVID-19 MODERNA VACC 0 5 ML IM 03/23/2021, 04/20/2021, 11/04/2021   • Pneumococcal Conjugate Vaccine 20-valent (Pcv20), Polysace 10/04/2022   • Tdap 08/29/2017       Objective     /78 (BP Location: Left arm, Patient Position: Sitting)   Pulse 76   Temp 97 9 °F (36 6 °C) (Temporal)   Ht 6' (1 829 m)   Wt 91 3 kg (201 lb 3 2 oz)   SpO2 97%   BMI 27 29 kg/m²     Physical Exam  Constitutional:       General: He is not in acute distress  Appearance: He is well-developed  He is not diaphoretic  Eyes:      General: No scleral icterus  Pupils: Pupils are equal, round, and reactive to light  Cardiovascular:      Rate and Rhythm: Normal rate and regular rhythm  Pulses: no weak pulses          Dorsalis pedis pulses are 2+ on the right side and 2+ on the left side  Posterior tibial pulses are 2+ on the right side and 2+ on the left side  Heart sounds: Normal heart sounds  No murmur heard    Pulmonary:      Effort: Pulmonary effort is normal  No respiratory distress  Breath sounds: Normal breath sounds  No wheezing  Abdominal:      General: Bowel sounds are normal  There is no distension  Palpations: Abdomen is soft  Tenderness: There is no abdominal tenderness  Genitourinary:     Comments: Left sided testicular lump 1 cm, mobile  Feet:      Right foot:      Skin integrity: No ulcer, skin breakdown, erythema, warmth, callus or dry skin  Left foot:      Skin integrity: No ulcer, skin breakdown, erythema, warmth, callus or dry skin  Skin:     General: Skin is warm and dry  Findings: No rash  Comments: A round left forehead skin lesion noted  Regular borders flat   Neurological:      Mental Status: He is alert and oriented to person, place, and time  Emeterio Lee MD     Diabetic Foot Exam    Patient's shoes and socks removed  Right Foot/Ankle   Right Foot Inspection  Skin Exam: skin normal and skin intact  No dry skin, no warmth, no callus, no erythema, no maceration, no abnormal color, no pre-ulcer, no ulcer and no callus  Toe Exam: ROM and strength within normal limits  Sensory   Vibration: intact  Proprioception: intact  Monofilament testing: intact    Vascular  The right DP pulse is 2+  The right PT pulse is 2+  Left Foot/Ankle  Left Foot Inspection  Skin Exam: skin normal and skin intact  No dry skin, no warmth, no erythema, no maceration, normal color, no pre-ulcer, no ulcer and no callus  Toe Exam: ROM and strength within normal limits  Sensory   Vibration: intact  Proprioception: intact  Monofilament testing: intact    Vascular  The left DP pulse is 2+  The left PT pulse is 2+       Assign Risk Category  No deformity present  No loss of protective sensation  No weak pulses  Risk: 0

## 2023-05-09 NOTE — PROGRESS NOTES
Assessment and Plan:     Problem List Items Addressed This Visit    None       Preventive health issues were discussed with patient, and age appropriate screening tests were ordered as noted in patient's After Visit Summary  Personalized health advice and appropriate referrals for health education or preventive services given if needed, as noted in patient's After Visit Summary       History of Present Illness:     Patient presents for a Medicare Wellness Visit    HPI   Patient Care Team:  Jennifer Armstrong MD as PCP - General (Family Medicine)  Lizeth Edmondson MD as Endoscopist     Review of Systems:     Review of Systems     Problem List:     Patient Active Problem List   Diagnosis   • Essential hypertension   • Mixed hyperlipidemia   • Type 2 diabetes mellitus without complication, without long-term current use of insulin ([facility] [address] )   • Ventral hernia without obstruction or gangrene   • Soft tissue mass   • Cigarette nicotine dependence without complication   • Congestive heart failure, NYHA class 1, acute, systolic ([facility])   • Coronary artery disease due to lipid rich plaque   • Carotid artery stenosis without cerebral infarction, bilateral   • Cerebral aneurysm   • S/P Left carotid endarterectomy 1/13/20   • Arthritis   • Familial hypercholesterolemia   • BMI 27 0-27 9,adult   • Chronic right shoulder pain   • Elevated PSA   • Status post reverse total arthroplasty of right shoulder   • Erectile dysfunction of non-organic origin   • Fatigue   • Anemia   • Left carpal tunnel syndrome   • Screening for AAA (abdominal aortic aneurysm)   • Benign paroxysmal positional vertigo      Past Medical and Surgical History:     Past Medical History:   Diagnosis Date   • CHF (congestive heart failure) ([address] )    • Diabetes mellitus ([facility] [address] )    • Diverticulitis    • 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[facility] [address] ) 11/7/2018   • Heart disease    • Hyperlipidemia    • Hypertension    • Kidney stone    • Osteoarthritis    • Vascular disorder      Past Surgical History:   Procedure Laterality Date   • ABDOMINAL SURGERY     • CARDIAC CATHETERIZATION N/A 3/21/2022    Procedure: Cardiac catheterization;  Surgeon: Ludy Allred MD;  Location: [address] [place] [address] CATH LAB; Service: Cardiology   • CARDIAC CATHETERIZATION N/A 3/21/2022    Procedure: Cardiac Coronary Angiogram;  Surgeon: Ludy Allred MD;  Location: [address] [place] [address] CATH LAB; Service: Cardiology   • COLONOSCOPY     • FL RETROGRADE PYELOGRAM  3/23/2023   • INGUINAL HERNIA REPAIR Left    • LAPAROSCOPIC COLON RESECTION     • KS ARTHROPLASTY GLENOHUMERAL JOINT TOTAL SHOULDER Right 11/17/2020    Procedure: ARTHROPLASTY SHOULDER REVERSE with biceps tendonesis; Surgeon: Rambo Saldana MD;  Location: [facility];  Service: Orthopedics   • KS COLONOSCOPY FLX DX W/COLLJ SPEC WHEN PFRMD N/A 11/3/2017    Procedure: COLONOSCOPY;  Surgeon: Any Santamaria MD;  Location: [facility]  GI LAB;   Service: Colorectal   • KS CYSTO/URETERO W/LITHOTRIPSY &INDWELL STENT INSRT Left 3/23/2023    Procedure: CYSTOSCOPY URETEROSCOPY WITH LITHOTRIPSY HOLMIUM LASER, RETROGRADE PYELOGRAM AND INSERTION STENT URETERAL, STONE BASKET;  Surgeon: Axel Mercado MD;  Location: [facility];  Service: Urology   • [place] [facility] CAROTID VERT[facility] Left 1/13/2020    Procedure: ENDARTERECTOMY ARTERY CAROTID;  Surgeon: Karyna Graves MD;  Location: [facility];  Service: Vascular      Family History:     Family History   Problem Relation Age of Onset   • Colon cancer Father    • Colon cancer Paternal Grandfather    • No Known Problems Mother    • Colon cancer Family       Social History:     Social History     Socioeconomic History   • Marital status: /Civil Union     Spouse name: None   • Number of children: None   • Years of education: None   • Highest education level: None   Occupational History   • None   Tobacco Use   • Smoking status: Former     Types: Cigarettes, Cigars     Quit date: 9/22/2019     Years since quitting: 3 6 • Smokeless tobacco: Never   Vaping Use   • Vaping Use: Never used   Substance and Sexual Activity   • Alcohol use:  Yes     Alcohol/week: 3 0 standard drinks     Types: 3 Cans of beer per week     Comment: once per week   • Drug use: No   • Sexual activity: None   Other Topics Concern   • None   Social History Narrative    Caffeine use    Uses safety equipment: seatbelts     Social Determinants of Health     Financial Resource Strain: Not on file   Food Insecurity: Not on file   Transportation Needs: Not on file   Physical Activity: Not on file   Stress: Not on file   Social Connections: Not on file   Intimate Partner Violence: Not on file   Housing Stability: Not on file      Medications and Allergies:     Current Outpatient Medications   Medication Sig Dispense Refill   • amLODIPine (NORVASC) 10 mg tablet TAKE 1 TABLET BY MOUTH  DAILY 90 tablet 3   • aspirin 81 mg chewable tablet CHEW AND SWALLOW 1 TABLET  DAILY (Patient taking differently: Chew 81 mg daily at bedtime) 90 tablet 3   • atorvastatin (LIPITOR) 20 mg tablet TAKE 1 TABLET BY MOUTH  DAILY (Patient taking differently: Take 20 mg by mouth daily at bedtime) 90 tablet 3   • carvedilol (COREG) 12 5 mg tablet Take 1 tablet (12 5 mg total) by mouth 2 (two) times a day with meals 180 tablet 3   • glucose blood test strip Use as instructed 100 each 2   • Januvia 25 MG tablet TAKE 1 TABLET BY MOUTH  DAILY (Patient taking differently: Take 25 mg by mouth every morning) 90 tablet 3   • losartan (COZAAR) 100 MG tablet TAKE 1 TABLET BY MOUTH  DAILY (Patient taking differently: Take 100 mg by mouth every morning) 90 tablet 3   • metFORMIN (GLUCOPHAGE) 1000 MG tablet TAKE 1 TABLET BY MOUTH  TWICE DAILY WITH MEALS 180 tablet 3   • sildenafil (VIAGRA) 25 MG tablet TAKE 1 TABLET BY MOUTH  DAILY AS NEEDED FOR  ERECTILE DYSFUNCTION 10 tablet 0   • clopidogrel (PLAVIX) 75 mg tablet Take 1 tablet (75 mg total) by mouth daily (Patient not taking: Reported on 5/9/2023) 90 tablet 0   • diclofenac potassium (CATAFLAM) 50 mg tablet Take 1 tablet (50 mg total) by mouth 2 (two) times a day for 5 days 10 tablet 0   • senna (SENOKOT) 8 6 mg Take 1 tablet (8 6 mg total) by mouth daily at bedtime for 5 days 5 tablet 0   • tamsulosin (FLOMAX) 0 4 mg Take 1 capsule (0 4 mg total) by mouth daily with dinner for 14 days (Patient not taking: Reported on 4/3/2023) 14 capsule 0     No current facility-administered medications for this visit  Allergies   Allergen Reactions   • Other Hives     Soft shell crabs hives      Immunizations:     Immunization History   Administered Date(s) Administered   • COVID-19 MODERNA VACC 0 5 ML IM 03/23/2021, 04/20/2021, 11/04/2021   • Pneumococcal Conjugate Vaccine 20-valent (Pcv20), Polysace 10/04/2022   • Tdap 08/29/2017      Health Maintenance:         Topic Date Due   • Colorectal Cancer Screening  11/03/2022   • Hepatitis C Screening  Completed         Topic Date Due   • COVID-19 Vaccine (4 - Booster for [employer]asia Phu series) 12/30/2021      Medicare Screening Tests and Risk Assessments:     Florentino Gorman is here for his Subsequent Wellness visit  Health Risk Assessment:   Patient rates overall health as good  Patient feels that their physical health rating is same  Patient is very satisfied with their life  Eyesight was rated as same  Hearing was rated as same  Patient feels that their emotional and mental health rating is same  Patients states they are never, rarely angry  Patient states they are sometimes unusually tired/fatigued  Pain experienced in the last 7 days has been none  Patient states that he has experienced no weight loss or gain in last 6 months  Depression Screening:   PHQ-2 Score: 0      Fall Risk Screening: In the past year, patient has experienced: no history of falling in past year      Home Safety:  Patient does not have trouble with stairs inside or outside of their home   Patient has working smoke alarms and has no working carbon monoxide detector  Home safety hazards include: none  Nutrition:   Current diet is Diabetic  Medications:   Patient is not currently taking any over-the-counter supplements  Patient is able to manage medications  Activities of Daily Living (ADLs)/Instrumental Activities of Daily Living (IADLs):   Walk and transfer into and out of bed and chair?: Yes  Dress and groom yourself?: Yes    Bathe or shower yourself?: Yes    Feed yourself? Yes  Do your laundry/housekeeping?: Yes  Manage your money, pay your bills and track your expenses?: Yes  Make your own meals?: Yes    Do your own shopping?: Yes    Previous Hospitalizations:   Any hospitalizations or ED visits within the last 12 months?: Yes    How many hospitalizations have you had in the last year?: 1-2    PREVENTIVE SCREENINGS      Cardiovascular Screening:    General: History Lipid Disorder and Screening Current      Diabetes Screening:     General: History Diabetes and Screening Current      Colorectal Cancer Screening:     General: Screening Current      Prostate Cancer Screening:    General: Screening Current      Abdominal Aortic Aneurysm (AAA) Screening:    Risk factors include: age between 73-67 yo and tobacco use        Lung Cancer Screening:     General: Screening Not Indicated      Hepatitis C Screening:    General: Screening Current    Screening, Brief Intervention, and Referral to Treatment (SBIRT)    Screening  Typical number of drinks in a day: 0    Single Item Drug Screening:  How often have you used an illegal drug (including marijuana) or a prescription medication for non-medical reasons in the past year? never    Single Item Drug Screen Score: 0  Interpretation: Negative screen for possible drug use disorder    No results found       Physical Exam:     /78 (BP Location: Left arm, Patient Position: Sitting)   Pulse 76   Temp 97 9 °F (36 6 °C) (Temporal)   Ht 6' (1 829 m)   Wt 91 3 kg (201 lb 3 2 oz)   SpO2 97%   BMI 27 29 kg/m² Physical Exam     Ced Thao MD

## 2023-05-11 ENCOUNTER — APPOINTMENT (OUTPATIENT)
Dept: LAB | Facility: CLINIC | Age: 73
End: 2023-05-11

## 2023-05-11 DIAGNOSIS — R97.20 ELEVATED PROSTATE SPECIFIC ANTIGEN (PSA): ICD-10-CM

## 2023-05-11 LAB
ALBUMIN SERPL BCP-MCNC: 3.9 G/DL (ref 3.5–5)
ALP SERPL-CCNC: 46 U/L (ref 46–116)
ALT SERPL W P-5'-P-CCNC: 25 U/L (ref 12–78)
ANION GAP SERPL CALCULATED.3IONS-SCNC: 2 MMOL/L (ref 4–13)
APTT PPP: 29 SECONDS (ref 23–37)
AST SERPL W P-5'-P-CCNC: 14 U/L (ref 5–45)
BASOPHILS # BLD AUTO: 0.06 THOUSANDS/ÂΜL (ref 0–0.1)
BASOPHILS NFR BLD AUTO: 1 % (ref 0–1)
BILIRUB SERPL-MCNC: 0.7 MG/DL (ref 0.2–1)
BUN SERPL-MCNC: 20 MG/DL (ref 5–25)
CALCIUM SERPL-MCNC: 9.3 MG/DL (ref 8.3–10.1)
CHLORIDE SERPL-SCNC: 106 MMOL/L (ref 96–108)
CO2 SERPL-SCNC: 26 MMOL/L (ref 21–32)
CREAT SERPL-MCNC: 0.93 MG/DL (ref 0.6–1.3)
EOSINOPHIL # BLD AUTO: 0.21 THOUSAND/ÂΜL (ref 0–0.61)
EOSINOPHIL NFR BLD AUTO: 4 % (ref 0–6)
ERYTHROCYTE [DISTWIDTH] IN BLOOD BY AUTOMATED COUNT: 12.8 % (ref 11.6–15.1)
EST. AVERAGE GLUCOSE BLD GHB EST-MCNC: 160 MG/DL
GFR SERPL CREATININE-BSD FRML MDRD: 82 ML/MIN/1.73SQ M
GLUCOSE P FAST SERPL-MCNC: 214 MG/DL (ref 65–99)
HBA1C MFR BLD: 7.2 %
HCT VFR BLD AUTO: 43.9 % (ref 36.5–49.3)
HGB BLD-MCNC: 14.4 G/DL (ref 12–17)
IMM GRANULOCYTES # BLD AUTO: 0.01 THOUSAND/UL (ref 0–0.2)
IMM GRANULOCYTES NFR BLD AUTO: 0 % (ref 0–2)
INR PPP: 0.91 (ref 0.84–1.19)
LYMPHOCYTES # BLD AUTO: 1.39 THOUSANDS/ÂΜL (ref 0.6–4.47)
LYMPHOCYTES NFR BLD AUTO: 29 % (ref 14–44)
MCH RBC QN AUTO: 33 PG (ref 26.8–34.3)
MCHC RBC AUTO-ENTMCNC: 32.8 G/DL (ref 31.4–37.4)
MCV RBC AUTO: 101 FL (ref 82–98)
MONOCYTES # BLD AUTO: 0.5 THOUSAND/ÂΜL (ref 0.17–1.22)
MONOCYTES NFR BLD AUTO: 11 % (ref 4–12)
NEUTROPHILS # BLD AUTO: 2.55 THOUSANDS/ÂΜL (ref 1.85–7.62)
NEUTS SEG NFR BLD AUTO: 55 % (ref 43–75)
NRBC BLD AUTO-RTO: 0 /100 WBCS
PLATELET # BLD AUTO: 215 THOUSANDS/UL (ref 149–390)
PMV BLD AUTO: 10.6 FL (ref 8.9–12.7)
POTASSIUM SERPL-SCNC: 4.5 MMOL/L (ref 3.5–5.3)
PROT SERPL-MCNC: 7.9 G/DL (ref 6.4–8.4)
PROTHROMBIN TIME: 12.5 SECONDS (ref 11.6–14.5)
RBC # BLD AUTO: 4.36 MILLION/UL (ref 3.88–5.62)
SODIUM SERPL-SCNC: 134 MMOL/L (ref 135–147)
WBC # BLD AUTO: 4.72 THOUSAND/UL (ref 4.31–10.16)

## 2023-05-11 NOTE — PRE-PROCEDURE INSTRUCTIONS
Pre-Surgery Instructions:   Medication Instructions   • amLODIPine (NORVASC) 10 mg tablet Take day of surgery  • aspirin 81 mg chewable tablet Stop taking 5 days prior to surgery  per surgeon   • atorvastatin (LIPITOR) 20 mg tablet Take night before surgery   • carvedilol (COREG) 12 5 mg tablet Take day of surgery  • Januvia 25 MG tablet Hold day of surgery  • losartan (COZAAR) 100 MG tablet Hold day of surgery  • metFORMIN (GLUCOPHAGE) 1000 MG tablet Hold day of surgery  • sildenafil (VIAGRA) 25 MG tablet Hold day of surgery  Medication instructions for day surgery reviewed  Please use only a sip of water to take your instructed medications  Avoid all over the counter vitamins, supplements and NSAIDS for one week prior to surgery per anesthesia guidelines  Tylenol is ok to take as needed  You will receive a call one business day prior to surgery with an arrival time and hospital directions  If your surgery is scheduled on a Monday, the hospital will be calling you on the Friday prior to your surgery  If you have not heard from anyone by 8pm, please call the hospital supervisor through the hospital  at [phone]  [employer] [phone])  Do not eat or drink anything after midnight the night before your surgery, including candy, mints, lifesavers, or chewing gum  Do not drink alcohol 24hrs before your surgery  Try not to smoke at least 24hrs before your surgery  Follow the pre surgery showering instructions as listed in the [facility] Surgical Experience Booklet” or otherwise provided by your surgeon's office  Do not shave the surgical area 24 hours before surgery  Do not apply any lotions, creams, including makeup, cologne, deodorant, or perfumes after showering on the day of your surgery  No contact lenses, eye make-up, or artificial eyelashes  Remove nail polish, including gel polish, and any artificial, gel, or acrylic nails if possible   Remove all jewelry including rings and body piercing jewelry  Wear causal clothing that is easy to take on and off  Consider your type of surgery  Keep any valuables, jewelry, piercings at home  Please bring any specially ordered equipment (sling, braces) if indicated  Arrange for a responsible person to drive you to and from the hospital on the day of your surgery  Visitor Guidelines discussed  Call the surgeon's office with any new illnesses, exposures, or additional questions prior to surgery  Please reference your [facility] Surgical Experience Booklet” for additional information to prepare for your upcoming surgery      Aware he needs to arrive with a comfortably full bladder

## 2023-05-12 LAB — BACTERIA UR CULT: NORMAL

## 2023-05-13 ENCOUNTER — ANESTHESIA EVENT (OUTPATIENT)
Dept: PERIOP | Facility: HOSPITAL | Age: 73
End: 2023-05-13

## 2023-05-15 NOTE — H&P
HISTORY AND PHYSICAL  ? ? Patient Name: Tawanna Ryder  Patient MRN: [id number]  Attending Provider: Pretty Muro MD  Service: Urology  Chief Complaint    Elevated PSA, PI-RADS 5 lesion on MRI    HPI   Tawanna Ryder is a 81 y o  male with PSA of 9 6  MR I reveals a PI-RADS 5 lesion  I plan transperineal fusion guided prostate biopsies  Potential risks and complications discussed, and informed consent was given by the patient  Medications  Meds/Allergies   sodium chloride, 125 mL/hr        Prior to Admission Medications   Prescriptions Last Dose Informant Patient Reported? Taking?    Continuous Blood Gluc  (Dexcom G6 ) ANITA   No No   Si DEXCOM G6  FOR CONTINUOUS GLUCOSE MONITORING   Continuous Blood Gluc Sensor (Dexcom G6 Sensor) MISC   No No   Sig: 3 PACK SENSOR FOR CONTINUOUS GLUCOSE MONITORING   Continuous Blood Gluc Transmit (Dexcom G6 Transmitter) MISC   No No   Si TRANSMITTED EVERY 3 MONTHS FOR CONTINUOUS GLUCOSE MONITORING   Januvia 25 MG tablet 5/15/2023 at 0900  No Yes   Sig: TAKE 1 TABLET BY MOUTH  DAILY   Patient taking differently: Take 25 mg by mouth every morning   amLODIPine (NORVASC) 10 mg tablet 5/15/2023 at 0900  No Yes   Sig: Take 1 tablet (10 mg total) by mouth daily   Patient taking differently: Take 10 mg by mouth every morning   aspirin 81 mg chewable tablet 2023  No Yes   Sig: CHEW AND SWALLOW 1 TABLET  DAILY   Patient taking differently: Chew 81 mg daily at bedtime   atorvastatin (LIPITOR) 20 mg tablet 5/15/2023 at 1800  No Yes   Sig: TAKE 1 TABLET BY MOUTH  DAILY   Patient taking differently: Take 20 mg by mouth daily at bedtime   carvedilol (COREG) 12 5 mg tablet 5/15/2023 at 1800  No Yes   Sig: Take 1 tablet (12 5 mg total) by mouth 2 (two) times a day with meals   glucose blood test strip   No No   Sig: Use as instructed   losartan (COZAAR) 100 MG tablet 5/15/2023 at 0900  No Yes   Sig: TAKE 1 TABLET BY MOUTH  DAILY   Patient taking differently: Take 100 mg by mouth every morning   metFORMIN (GLUCOPHAGE) 1000 MG tablet 5/15/2023 at 0900  No Yes   Sig: TAKE 1 TABLET BY MOUTH  TWICE DAILY WITH MEALS   Patient taking differently: Take 1,000 mg by mouth 2 (two) times a day with meals   sildenafil (VIAGRA) 25 MG tablet 5/14/2023  No Yes   Sig: TAKE 1 TABLET BY MOUTH  DAILY AS NEEDED FOR  ERECTILE DYSFUNCTION   Patient taking differently: Take 25 mg by mouth as needed      Facility-Administered Medications: None       Current Facility-Administered Medications:   •  cefTRIAXone (ROCEPHIN) 1,000 mg in dextrose 5 % 50 mL IVPB, 1,000 mg, Intravenous, Once, Michelle Ferreira MD  •  sodium chloride 0 9 % infusion, 125 mL/hr, Intravenous, Continuous, Yin Lowery DO  Review of Systems  10 point review of systems negative except as noted in HPI  Allergies  Allergies   Allergen Reactions   • Other Hives     Soft shell crabs      PMH  Past Medical History:   Diagnosis Date   • CHF (congestive heart failure) ([facility])    • Diabetes mellitus ([facility] [address] )    • Diverticulitis    • 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[facility] [address] ) 11/7/2018   • Heart disease    • Hyperlipidemia    • Hypertension    • Kidney stone    • Osteoarthritis    • Vascular disorder      Past surgical history  Past Surgical History:   Procedure Laterality Date   • ABDOMINAL SURGERY     • CARDIAC CATHETERIZATION N/A 3/21/2022    Procedure: Cardiac catheterization;  Surgeon: Jose Duran MD;  Location: [address] [place] [address] CATH LAB; Service: Cardiology   • CARDIAC CATHETERIZATION N/A 3/21/2022    Procedure: Cardiac Coronary Angiogram;  Surgeon: Jose Duran MD;  Location: [address] [place] [address] CATH LAB; Service: Cardiology   • COLONOSCOPY     • FL RETROGRADE PYELOGRAM  3/23/2023   • INGUINAL HERNIA REPAIR Left    • LAPAROSCOPIC COLON RESECTION     • [place] ARTHROPLASTY GLENOHUMERAL JOINT TOTAL SHOULDER Right 11/17/2020    Procedure: ARTHROPLASTY SHOULDER REVERSE with biceps tendonesis;   Surgeon: Lara Price MD;  Location:  MAIN OR;  Service: Orthopedics   • UT COLONOSCOPY FLX DX W/COLLJ SPEC WHEN PFRMD N/A 11/3/2017    Procedure: COLONOSCOPY;  Surgeon: Sony Luo MD;  Location: [facility]  GI LAB; Service: Colorectal   • UT CYSTO/URETERO W/LITHOTRIPSY &INDWELL STENT INSRT Left 3/23/2023    Procedure: CYSTOSCOPY URETEROSCOPY WITH LITHOTRIPSY HOLMIUM LASER, RETROGRADE PYELOGRAM AND INSERTION STENT URETERAL, STONE BASKET;  Surgeon: Serena Boss MD;  Location: [place] MAIN OR;  Service: Urology   • [facility]    Procedure: ENDARTERECTOMY ARTERY CAROTID;  Surgeon: Rubina Lowery MD;  Location:  MAIN OR;  Service: Vascular     Social history  Social History     Tobacco Use   • Smoking status: Former     Types: Cigarettes, Cigars     Quit date: 9/22/2019     Years since quitting: 3 6   • Smokeless tobacco: Never   Vaping Use   • Vaping Use: Never used   Substance Use Topics   • Alcohol use: Yes     Alcohol/week: 3 0 standard drinks     Types: 3 Cans of beer per week     Comment: Socially   • Drug use: Never     ?   Physical Exam      /77   Pulse 60   Temp 97 9 °F (36 6 °C) (Temporal)   Resp 16   Ht 6' (1 829 m)   Wt 89 1 kg (196 lb 6 9 oz)   SpO2 97%   BMI 26 64 kg/m²   General appearance: alert and oriented, in no acute distress  Head: Normocephalic, without obvious abnormality, atraumatic  Neck: no JVD and supple, symmetrical, trachea midline  Lungs: clear to auscultation bilaterally  Heart: regular rate and rhythm  Abdomen: soft, non-tender; bowel sounds normal; no masses,  no organomegaly  Extremities: extremities normal, warm and well-perfused; no cyanosis, clubbing, or edema  Neurologic: Grossly normal     Paula Colon MD

## 2023-05-16 ENCOUNTER — HOSPITAL ENCOUNTER (OUTPATIENT)
Facility: HOSPITAL | Age: 73
Setting detail: OUTPATIENT SURGERY
Discharge: HOME | End: 2023-05-16
Attending: UROLOGY | Admitting: UROLOGY

## 2023-05-16 ENCOUNTER — ANESTHESIA (OUTPATIENT)
Dept: PERIOP | Facility: HOSPITAL | Age: 73
End: 2023-05-16

## 2023-05-16 VITALS
HEIGHT: 72 IN | TEMPERATURE: 97.1 F | WEIGHT: 196.43 LBS | OXYGEN SATURATION: 96 % | SYSTOLIC BLOOD PRESSURE: 147 MMHG | HEART RATE: 68 BPM | RESPIRATION RATE: 12 BRPM | DIASTOLIC BLOOD PRESSURE: 72 MMHG | BODY MASS INDEX: 26.61 KG/M2

## 2023-05-16 DIAGNOSIS — R97.20 ELEVATED PROSTATE SPECIFIC ANTIGEN (PSA): ICD-10-CM

## 2023-05-16 PROBLEM — F17.210 CIGARETTE NICOTINE DEPENDENCE WITHOUT COMPLICATION: Status: RESOLVED | Noted: 2019-06-04 | Resolved: 2023-05-16

## 2023-05-16 LAB
GLUCOSE SERPL-MCNC: 121 MG/DL (ref 65–140)
GLUCOSE SERPL-MCNC: 146 MG/DL (ref 65–140)

## 2023-05-16 RX ORDER — ACETAMINOPHEN 325 MG/1
975 TABLET ORAL EVERY 6 HOURS SCHEDULED
Status: DISCONTINUED | OUTPATIENT
Start: 2023-05-16 | End: 2023-05-16 | Stop reason: HOSPADM

## 2023-05-16 RX ORDER — ONDANSETRON 2 MG/ML
4 INJECTION INTRAMUSCULAR; INTRAVENOUS ONCE AS NEEDED
Status: DISCONTINUED | OUTPATIENT
Start: 2023-05-16 | End: 2023-05-16 | Stop reason: HOSPADM

## 2023-05-16 RX ORDER — OXYCODONE HYDROCHLORIDE 5 MG/1
5 TABLET ORAL EVERY 4 HOURS PRN
Status: DISCONTINUED | OUTPATIENT
Start: 2023-05-16 | End: 2023-05-16 | Stop reason: HOSPADM

## 2023-05-16 RX ORDER — PROPOFOL 10 MG/ML
INJECTION, EMULSION INTRAVENOUS AS NEEDED
Status: DISCONTINUED | OUTPATIENT
Start: 2023-05-16 | End: 2023-05-16

## 2023-05-16 RX ORDER — SODIUM CHLORIDE 9 MG/ML
125 INJECTION, SOLUTION INTRAVENOUS CONTINUOUS
Status: DISCONTINUED | OUTPATIENT
Start: 2023-05-16 | End: 2023-05-16 | Stop reason: HOSPADM

## 2023-05-16 RX ORDER — FENTANYL CITRATE/PF 50 MCG/ML
25 SYRINGE (ML) INJECTION
Status: DISCONTINUED | OUTPATIENT
Start: 2023-05-16 | End: 2023-05-16 | Stop reason: HOSPADM

## 2023-05-16 RX ORDER — PROPOFOL 10 MG/ML
INJECTION, EMULSION INTRAVENOUS CONTINUOUS PRN
Status: DISCONTINUED | OUTPATIENT
Start: 2023-05-16 | End: 2023-05-16

## 2023-05-16 RX ADMIN — SODIUM CHLORIDE 125 ML/HR: 0.9 INJECTION, SOLUTION INTRAVENOUS at 11:27

## 2023-05-16 RX ADMIN — CEFTRIAXONE SODIUM 1000 MG: 10 INJECTION, POWDER, FOR SOLUTION INTRAVENOUS at 13:38

## 2023-05-16 RX ADMIN — PROPOFOL 120 MCG/KG/MIN: 10 INJECTION, EMULSION INTRAVENOUS at 13:45

## 2023-05-16 RX ADMIN — PROPOFOL 50 MG: 10 INJECTION, EMULSION INTRAVENOUS at 13:41

## 2023-05-16 RX ADMIN — PROPOFOL 30 MG: 10 INJECTION, EMULSION INTRAVENOUS at 13:53

## 2023-05-16 RX ADMIN — PROPOFOL 20 MG: 10 INJECTION, EMULSION INTRAVENOUS at 13:43

## 2023-05-16 NOTE — ANESTHESIA PREPROCEDURE EVALUATION
Procedure:  TRANSPERINEAL MRI FUSION BIOPSY PROSTATE (Perineum)    Relevant Problems   CARDIO   (+) CHF (congestive heart failure) (HCC)   (+) Coronary artery disease due to lipid rich plaque   (+) Essential hypertension   (+) Familial hypercholesterolemia   (+) Mixed hyperlipidemia      ENDO   (+) Type 2 diabetes mellitus without complication, without long-term current use of insulin (HCC)      HEMATOLOGY   (+) Anemia      NEURO/PSYCH   (+) Chronic right shoulder pain      Other   (+) S/P Left carotid endarterectomy 1/13/20        Physical Exam    Airway    Mallampati score: II  TM Distance: >3 FB  Neck ROM: full     Dental   upper dentures and lower dentures,     Cardiovascular  Rhythm: regular, Rate: normal, Cardiovascular exam normal    Pulmonary  Pulmonary exam normal Breath sounds clear to auscultation,     Other Findings        Anesthesia Plan  ASA Score- 2     Anesthesia Type- IV sedation with anesthesia with ASA Monitors  Additional Monitors:   Airway Plan:     Comment: GA prn  Plan Factors-    Patient summary reviewed  Patient is a current smoker (Occasional cigar)  Patient instructed to abstain from smoking on day of procedure  Patient did not smoke on day of surgery  There is medical exclusion for perioperative obstructive sleep apnea risk education  Induction- intravenous  Postoperative Plan-     Informed Consent- Anesthetic plan and risks discussed with patient and spouse Tawny Tenorio

## 2023-05-16 NOTE — OP NOTE
OPERATIVE REPORT  PATIENT NAME: Abdiaziz Dove    :  1950  MRN: [id number]  Pt Location: [facility] OR ROOM 06    SURGERY DATE: 2023    Surgeon(s) and Role:     * Jeanette Sy MD - Primary    Preop Diagnosis:  Elevated prostate specific antigen (PSA) [R97 20]    Post-Op Diagnosis Codes:     * Elevated prostate specific antigen (PSA) [R97 20]     * Abnormal MRI, pelvis [R93 5]    Procedure(s):  TRANSPERINEAL MRI FUSION BIOPSY PROSTATE    Specimen(s):  ID Type Source Tests Collected by Time Destination   1 : REGION OF INTEREST Tissue Prostate TISSUE EXAM Jeanette Sy MD 2023 1340    2 : RIGHT BASE Tissue Prostate TISSUE EXAM Jeanette Sy MD 2023 1339    3 : RIGHT POSTERIOR MEDIAL Tissue Prostate TISSUE EXAM Jeanette Sy MD 2023 1339    4 : LEFT BASE Tissue Prostate TISSUE EXAM Jeanette Sy MD 2023 1339    5 : LEFT POSTERIOR MEDIAL Tissue Prostate TISSUE EXAM Jeanette Sy MD 2023 1339    6 : LEFT POSTERIOR LATERAL Tissue Prostate TISSUE EXAM Jeanette Sy MD 2023 1339    7 : LEFT ANTERIOR LATERAL Tissue Prostate TISSUE EXAM Jeanette Sy MD 2023 1339    8 : LEFT ANTERIOR MEDIAL Tissue Prostate TISSUE EXAM Jeanette Sy MD 2023 1339    9 : RIGHT POSTERIOR LATERAL Tissue Prostate TISSUE EXAM Jeanette Sy MD 2023 1339    10 : RIGHT ANTERIOR LATERAL Tissue Prostate TISSUE EXAM Jeanette Sy MD 2023 1339    11 : RIGHT ANTERIOR MEDIAL Tissue Prostate TISSUE EXAM Jeanette Sy MD 2023 1339        Estimated Blood Loss:   Minimal    Drains:  Ureteral Internal Stent Left ureter (Active)   Number of days: 54       Anesthesia Type:   IV Sedation with Anesthesia    Operative Indications:  Elevated prostate specific antigen (PSA) [R97 20]  Abnormal MRI Prostate- Pirads 5    Operative Findings:  Successful targeting of the Pirads 5 lesion noted on MRI  Standard Saturation Transperineal biopsies      Complications:   None    Procedure and Technique:  The patient was brought to the operating room properly identified  Monitored anesthesia was then administered  He was placed in lithotomy position  He was then prepped in the usual sterile fashion  Intravenous antibiotic was administered in the holding area  An appropriate time-out was performed  The scrotum was taped to the anterior abdominal wall with tape  The transrectal ultrasound probe with precision point transperineal access system was then placed into the rectum  The prostate was visualized  A sweep was then performed to allow fusion with the MRI images  Local anesthesia consisting of 0 25% Marcaine and 2 % xylocaine was then administered to the perineum, subcutaneous tissue and to the melissa prostatic tissue and levator muscle bilaterally  The Precision  point access needle was then placed into the perineum and transperineal biopsies performed of the pirads 5  lesion noted on MRI  6 biopsies were performed as the lesion was successfully targeted  Adequate tissue was obtained  Next, standard transperineal biopsies were performed  Multiple biopsies were taken of the anterior medial, anterior lateral, posterior medial at, posterior lateral and base of the prostate bilaterally to ensure the prostate was well biopsied  Multiple cores were taken to ensure adequate samples were obtained and that saturation biopsies of the prostate were performed  23 saturation  biopsies were performed  29 biopsies were performed in all  Once all samples were obtained the precision point needle was removed  Pressure was held on the perineum for approximately 5 minutes  This scrotal tape removed  The patient tolerated the procedure well  Blood loss was minimal   He was awakened from anesthesia and taken to the recovery room in satisfactory condition  I was present for the entire procedure      Patient Disposition:  PACU  and hemodynamically stable        SIGNATURE: Nick Bhakta MD  DATE: May 16, 2023  TIME: 2:16 PM

## 2023-05-16 NOTE — INTERVAL H&P NOTE
H&P reviewed  After examining the patient I find no changes in the patients condition since the H&P had been written  Vitals:    05/16/23 1112   BP: 153/77   Pulse: 60   Resp: 16   Temp: 97 9 °F (36 6 °C)   SpO2: 97%   Procedure and risk discussed with the patient in the holding area  Informed consent provided  Consent form was signed

## 2023-05-16 NOTE — DISCHARGE INSTR - AVS FIRST PAGE
Rest  and drink plenty of fluids  No heavy lifting x 1 week  Use Tylenol or ibuprofen for pain  An icepack can also be helpful  You can shower tomorrow and begin to use sitz baths if needed on Friday  Expect some blood in the urine  Blood in the semen is common if you are sexually active  Call for fever, heavy urinary bleeding with clots or for difficulty voiding

## 2023-05-16 NOTE — ANESTHESIA POSTPROCEDURE EVALUATION
Post-Op Assessment Note    CV Status:  Stable  Pain Score: 3    Pain management: adequate     Mental Status:  Alert and awake   Hydration Status:  Euvolemic   PONV Controlled:  Controlled   Airway Patency:  Patent   Two or more mitigation strategies used for obstructive sleep apnea   Post Op Vitals Reviewed: Yes      Staff: Anesthesiologist         No notable events documented      BP      Temp      Pulse     Resp      SpO2      /80   Pulse 58   Temp 97 8 °F (36 6 °C) (Temporal)   Resp 12   Ht 6' (1 829 m)   Wt 89 1 kg (196 lb 6 9 oz)   SpO2 95%   BMI 26 64 kg/m²

## 2023-05-24 NOTE — PROGRESS NOTES
Problem List Items Addressed This Visit        Endocrine    Type 2 diabetes mellitus without complication, without long-term current use of insulin (HCC)       Cardiovascular and Mediastinum    Congestive heart failure, NYHA class 1, acute, systolic (HCC)       Other    Elevated PSA    Erectile dysfunction of non-organic origin   Other Visit Diagnoses     Prostate cancer ([facility] [address] )    -  Primary    Relevant Orders    NM bone scan whole body    Comprehensive metabolic panel    Ambulatory Referral to Radiation Oncology              Discussion:      Today we discussed his pathology from his prostate biopsy including definition of prostate cancer risk groups, a discussion of the [place] score and the meaning of his biopsy results in terms of his future management and overall health and treatment  I had a discussion with the patient regarding the natural history and treatment options for prostate cancer and the potential need for multimodal treatments  Active surveillance, surgical excision in the form of open or robotic surgery, and radiation therapies plus or minus androgen deprivation therapy were discussed at length with the patient  With regard to multimodal therapy discussion this could include surgical excision with postoperative radiation therapy (RT) +/-androgen deprivation therapy (ADT) if adverse pathology or RT with long-term ADT  I discussed robot assisted laparoscopic radical prostatectomy with the inclusion or exclusion of bilateral pelvic lymph node dissection (depending on disease parameters and intraoperative findings) in depth with the patient        I discussed that most men stay in the hospital for 1-2 days and go home with a catheter for 7-10 days  I discussed the side effects of surgery including stress incontinence, and I counseled him that most men leak urine immediately after surgery   I expect a stress incontinence rate at six months in the 10%-15% range, improving to 5%-10% in one "year   I did communicate that there additional treatments for urinary incontinence that may be persistent in the post-prostatectomy setting  He does understand that his sexual function postoperatively is a function of his current sexual function preoperatively and is closely tied to his overall general health  He understands that there are other therapies for erectile dysfunction the post-prostatectomy setting including oral therapies, injections, and penile prosthesis placement  Additionally, I did stress to him that his post-operative sexual function will not be as good as his preoperative sexual function due to the nature of prostate surgery  We discussed EBRT and brachytherapy and androgen deprivation therapy (he would need ADT for 2 years)  We discussed staging studies to look for localized versus metastatic disease    His previous surgical history of colon resection and hernia repairs with mesh would potentially make his surgery more difficult    He will return for review of imaging studies and with his final treatment decision in some weeks            Portions of the above record have been created with voice recognition software  Occasional wrong word or \"sound alike\" substitution may have occurred due to the inherent limitations of voice recognition software  Read the chart carefully and recognize, using context, where substitution may have occurred  Assessment and plan:       Please see problem oriented charting for the assessment plan of today's urological complaints      David Rodriguez MD      Chief Complaint     As listed above      History of Present Illness     Mary Conti is a 81 y o  man with elevated psa now s/p mri targeted biopsy  This shows high risk prostate cancer as listed below  We had a long discussion today regarding all of his options   Multiple questions and concerns answered and addressed    The following portions of the patient's history were reviewed and updated as " appropriate: allergies, current medications, past family history, past medical history, past social history, past surgical history and problem list     Detailed Urologic History     - please refer to HPI    Review of Systems     Review of Systems   Constitutional: Positive for fatigue  HENT: Negative  Eyes: Negative  Respiratory: Negative  Cardiovascular: Negative  Gastrointestinal: Negative  Endocrine: Negative  Genitourinary: Negative  Musculoskeletal: Negative  Skin: Negative  Allergic/Immunologic: Negative  Neurological: Negative  Hematological: Negative  Psychiatric/Behavioral: Positive for dysphoric mood (struggling with adjusting to aging)  Allergies     Allergies   Allergen Reactions   • Other Hives     Soft shell crabs        Physical Exam     Physical Exam  Vitals reviewed  Constitutional:       General: He is not in acute distress  Appearance: Normal appearance  He is not ill-appearing, toxic-appearing or diaphoretic  HENT:      Head: Normocephalic and atraumatic  Eyes:      General: No scleral icterus  Right eye: No discharge  Left eye: No discharge  Pulmonary:      Effort: Pulmonary effort is normal    Musculoskeletal:         General: No deformity  Skin:     Coloration: Skin is not jaundiced  Neurological:      General: No focal deficit present  Mental Status: He is alert and oriented to person, place, and time  Psychiatric:         Mood and Affect: Mood normal          Behavior: Behavior normal          Thought Content: Thought content normal          Judgment: Judgment normal              Vital Signs  There were no vitals filed for this visit        Current Medications       Current Outpatient Medications:   •  amLODIPine (NORVASC) 10 mg tablet, Take 1 tablet (10 mg total) by mouth daily (Patient taking differently: Take 10 mg by mouth every morning), Disp: 90 tablet, Rfl: 3  •  aspirin 81 mg chewable tablet, CHEW AND SWALLOW 1 TABLET  DAILY (Patient taking differently: Chew 81 mg daily at bedtime), Disp: 90 tablet, Rfl: 3  •  atorvastatin (LIPITOR) 20 mg tablet, TAKE 1 TABLET BY MOUTH  DAILY (Patient taking differently: Take 20 mg by mouth daily at bedtime), Disp: 90 tablet, Rfl: 3  •  carvedilol (COREG) 12 5 mg tablet, Take 1 tablet (12 5 mg total) by mouth 2 (two) times a day with meals, Disp: 180 tablet, Rfl: 3  •  Continuous Blood Gluc  (Dexcom G6 ) ANITA, 1 DEXCOM G6  FOR CONTINUOUS GLUCOSE MONITORING, Disp: 1 each, Rfl: 0  •  Continuous Blood Gluc Sensor (Dexcom G6 Sensor) MISC, 3 PACK SENSOR FOR CONTINUOUS GLUCOSE MONITORING, Disp: 9 each, Rfl: 3  •  Continuous Blood Gluc Transmit (Dexcom G6 Transmitter) MISC, 1 TRANSMITTED EVERY 3 MONTHS FOR CONTINUOUS GLUCOSE MONITORING, Disp: 1 each, Rfl: 3  •  glucose blood test strip, Use as instructed, Disp: 100 each, Rfl: 2  •  Januvia 25 MG tablet, TAKE 1 TABLET BY MOUTH  DAILY (Patient taking differently: Take 25 mg by mouth every morning), Disp: 90 tablet, Rfl: 3  •  losartan (COZAAR) 100 MG tablet, TAKE 1 TABLET BY MOUTH  DAILY (Patient taking differently: Take 100 mg by mouth every morning), Disp: 90 tablet, Rfl: 3  •  metFORMIN (GLUCOPHAGE) 1000 MG tablet, TAKE 1 TABLET BY MOUTH  TWICE DAILY WITH MEALS (Patient taking differently: Take 1,000 mg by mouth 2 (two) times a day with meals), Disp: 180 tablet, Rfl: 3  •  sildenafil (VIAGRA) 25 MG tablet, TAKE 1 TABLET BY MOUTH  DAILY AS NEEDED FOR  ERECTILE DYSFUNCTION (Patient taking differently: Take 25 mg by mouth as needed), Disp: 10 tablet, Rfl: 0      Active Problems     Patient Active Problem List   Diagnosis   • Essential hypertension   • Mixed hyperlipidemia   • Type 2 diabetes mellitus without complication, without long-term current use of insulin ([facility])   • Ventral hernia without obstruction or gangrene   • Congestive heart failure, NYHA class 1, acute, systolic (HCC)   • Coronary artery disease due to lipid rich plaque   • Carotid artery stenosis without cerebral infarction, bilateral   • Cerebral aneurysm   • S/P Left carotid endarterectomy 1/13/20   • Familial hypercholesterolemia   • BMI 27 0-27 9,adult   • Chronic right shoulder pain   • Elevated PSA   • Status post reverse total arthroplasty of right shoulder   • Erectile dysfunction of non-organic origin   • Anemia   • Left carpal tunnel syndrome   • Medicare annual wellness visit, subsequent   • Benign paroxysmal positional vertigo   • Facial skin lesion   • Lump in the testicle   • CHF (congestive heart failure) ([facility] [address] )         Past Medical History     Past Medical History:   Diagnosis Date   • CHF (congestive heart failure) ([facility])    • Diabetes mellitus ([facility] [address] )    • Diverticulitis    • 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 11/7/2018   • Heart disease    • Hyperlipidemia    • Hypertension    • Kidney stone    • Osteoarthritis    • Vascular disorder          Surgical History     Past Surgical History:   Procedure Laterality Date   • ABDOMINAL SURGERY     • CARDIAC CATHETERIZATION N/A 3/21/2022    Procedure: Cardiac catheterization;  Surgeon: Too Cook MD;  Location: [address] [place] [address] CATH LAB; Service: Cardiology   • CARDIAC CATHETERIZATION N/A 3/21/2022    Procedure: Cardiac Coronary Angiogram;  Surgeon: Too Cook MD;  Location: [address] [place] [address] CATH LAB; Service: Cardiology   • COLONOSCOPY     • FL RETROGRADE PYELOGRAM  3/23/2023   • INGUINAL HERNIA REPAIR Left    • LAPAROSCOPIC COLON RESECTION     • IA ARTHROPLASTY GLENOHUMERAL JOINT TOTAL SHOULDER Right 11/17/2020    Procedure: ARTHROPLASTY SHOULDER REVERSE with biceps tendonesis;   Surgeon: Marla Colindres MD;  Location:  MAIN OR;  Service: Orthopedics   • IA BX PROSTATE STRTCTC SATURATION SAMPLING IMG GID N/A 5/16/2023    Procedure: TRANSPERINEAL MRI FUSION BIOPSY PROSTATE;  Surgeon: Maria Elena Watson MD;  Location: [facility] Main OR;  Service: Urology   • IA COLONOSCOPY FLX DX W/COLLJ SPEC WHEN PFRMD N/A 11/3/2017    Procedure: COLONOSCOPY;  Surgeon: Lisa Hartman MD;  Location: [facility]  GI LAB; Service: Colorectal   • NM CYSTO/URETERO W/LITHOTRIPSY &INDWELL STENT INSRT Left 3/23/2023    Procedure: CYSTOSCOPY URETEROSCOPY WITH LITHOTRIPSY HOLMIUM LASER, RETROGRADE PYELOGRAM AND INSERTION STENT URETERAL, STONE BASKET;  Surgeon: Mariana Palomo MD;  Location: [facility] MAIN OR;  Service: Urology   • NM TEAEC [facility] GRF CAROTID VERTB [place] Left 1/13/2020    Procedure: ENDARTERECTOMY ARTERY CAROTID;  Surgeon: Ady Chakraborty MD;  Location: [facility] MAIN OR;  Service: Vascular         Family History     Family History   Problem Relation Age of Onset   • Colon cancer Father    • Colon cancer Paternal Grandfather    • No Known Problems Mother    • Colon cancer Family          Social History     Social History     Social History     Tobacco Use   Smoking Status Former   • Types: Cigarettes, Cigars   • Quit date: 9/22/2019   • Years since quitting: 3 6   Smokeless Tobacco Never         Pertinent Lab Values     Lab Results   Component Value Date    CREATININE 0 93 05/11/2023       Lab Results   Component Value Date    PSA 9 6 (H) 03/03/2023    PSA 8 5 (H) 09/02/2022    PSA 6 3 (H) 01/24/2022         Final Diagnosis   A  Prostate, REGION OF INTEREST:  - Prostatic adenocarcinoma, [place] grade 3 + 4 = 7 (60% pattern 3 and 40% pattern 4, Grade group 2), involving four of four prostatic cores and 90% of the total biopsy tissue   - Perineural invasion is present  Note: The fifth core is skeletal muscle only      B  Prostate, RIGHT BASE:  - Prostatic adenocarcinoma, [place] grade 4+4 = 8 (Grade group 4), involving two of two cores and 60% of the tissue   - Perineural invasion is present  Note: Small portion of pattern 5 can't be ruled out due to procedure artifact      C   Prostate, RIGHT POSTERIOR MEDIAL:  - Prostatic adenocarcinoma, [place] grade 4+3 = 7 (50% pattern 4 and 50% pattern 3, Grade group 3), involving two of two cores and 50% of the tissue   - Perineural invasion is present      D  Prostate, LEFT BASE:  - Benign prostate glands and stroma      E  Prostate, LEFT POSTERIOR MEDIAL:  - Prostatic adenocarcinoma, [place] grade 4+4 = 8 (Grade group 4), involving one of two cores  20% of involved core  and 10% of the tissue   - Perineural invasion is present  F  Prostate, LEFT POSTERIOR LATERAL:  - Benign prostate glands and stroma      G  Prostate, LEFT ANTERIOR LATERAL:  - Benign prostate glands and stroma      H  Prostate, LEFT ANTERIOR MEDIAL:  - Benign prostate glands and stroma      I  Prostate, RIGHT POSTERIOR LATERAL:  - Prostatic adenocarcinoma, [place] grade 3 + 4 = 7 (60% pattern 3 and 40% pattern 4, Grade group 2), involving one of two cores, 90% of involved core and 60% of the tissue   - Perineural invasion is present  J  Prostate, RIGHT ANTERIOR LATERAL:  - Prostatic adenocarcinoma, [place] grade 4+3 = 7 (70% pattern 4 and 30% pattern 3, Grade group 3), involving two of two cores and 70% of the tissue   - Perineural invasion is present      K  Prostate, RIGHT ANTERIOR MEDIAL:  - Benign prostate glands and stroma      Intradepartmental consultation is in agreement       Electronically signed by Lissy Alva MD on 5/19/2023 at  1:20 PM           Pertinent Imaging     IMPRESSION:     1  PI-RADSv2 1 Category 5 - Very high (clinically significant cancer is highly likely to be present)  2 6 cm right lateral peripheral zone lesion, from base to apex      2  No extraprostatic tumor, seminal vesicle invasion, pelvic lymphadenopathy, or pelvic osseous metastatic disease      3  Calculated prostate volume of 33 cc

## 2023-05-30 ENCOUNTER — OFFICE VISIT (OUTPATIENT)
Dept: UROLOGY | Facility: CLINIC | Age: 73
End: 2023-05-30

## 2023-05-30 VITALS
WEIGHT: 196 LBS | HEART RATE: 75 BPM | BODY MASS INDEX: 26.55 KG/M2 | SYSTOLIC BLOOD PRESSURE: 116 MMHG | OXYGEN SATURATION: 97 % | DIASTOLIC BLOOD PRESSURE: 70 MMHG | HEIGHT: 72 IN

## 2023-05-30 DIAGNOSIS — E11.9 TYPE 2 DIABETES MELLITUS WITHOUT COMPLICATION, WITHOUT LONG-TERM CURRENT USE OF INSULIN (HCC): ICD-10-CM

## 2023-05-30 DIAGNOSIS — C61 PROSTATE CANCER (HCC): Primary | ICD-10-CM

## 2023-05-30 DIAGNOSIS — F52.21 ERECTILE DYSFUNCTION OF NON-ORGANIC ORIGIN: ICD-10-CM

## 2023-05-30 DIAGNOSIS — I50.21 CONGESTIVE HEART FAILURE, NYHA CLASS 1, ACUTE, SYSTOLIC (HCC): ICD-10-CM

## 2023-05-30 DIAGNOSIS — R97.20 ELEVATED PSA: ICD-10-CM

## 2023-06-14 PROBLEM — C61 PROSTATE CANCER (HCC): Status: ACTIVE | Noted: 2023-06-14

## 2023-06-20 ENCOUNTER — HOSPITAL ENCOUNTER (OUTPATIENT)
Dept: NUCLEAR MEDICINE | Facility: HOSPITAL | Age: 73
Discharge: HOME | End: 2023-06-20
Attending: UROLOGY
Payer: COMMERCIAL

## 2023-06-20 ENCOUNTER — HOSPITAL ENCOUNTER (OUTPATIENT)
Dept: NUCLEAR MEDICINE | Facility: HOSPITAL | Age: 73
Discharge: HOME | End: 2023-06-20
Attending: UROLOGY

## 2023-06-20 ENCOUNTER — APPOINTMENT (OUTPATIENT)
Dept: RADIOLOGY | Facility: HOSPITAL | Age: 73
End: 2023-06-20
Payer: COMMERCIAL

## 2023-06-20 DIAGNOSIS — C61 PROSTATE CANCER (HCC): ICD-10-CM

## 2023-06-20 PROCEDURE — A9503 TC99M MEDRONATE: HCPCS

## 2023-06-20 PROCEDURE — 78306 BONE IMAGING WHOLE BODY: CPT

## 2023-06-20 PROCEDURE — G1004 CDSM NDSC: HCPCS

## 2023-06-23 DIAGNOSIS — E11.9 TYPE 2 DIABETES MELLITUS WITHOUT COMPLICATION, WITHOUT LONG-TERM CURRENT USE OF INSULIN (HCC): ICD-10-CM

## 2023-06-23 RX ORDER — PROCHLORPERAZINE 25 MG/1
SUPPOSITORY RECTAL
Qty: 1 EACH | Refills: 0 | Status: SHIPPED | OUTPATIENT
Start: 2023-06-23

## 2023-06-23 RX ORDER — PROCHLORPERAZINE 25 MG/1
SUPPOSITORY RECTAL
Qty: 1 EACH | Refills: 3 | Status: SHIPPED | OUTPATIENT
Start: 2023-06-23

## 2023-06-23 RX ORDER — PROCHLORPERAZINE 25 MG/1
SUPPOSITORY RECTAL
Qty: 9 EACH | Refills: 3 | Status: SHIPPED | OUTPATIENT
Start: 2023-06-23

## 2023-06-23 NOTE — TELEPHONE ENCOUNTER
Spoke with [facility] and they don't fill Dexcom for medicare patients  Got number for company that does ([phone])  Will fax the prescriptions to them   Fax # [phone]

## 2023-06-26 ENCOUNTER — CLINICAL SUPPORT (OUTPATIENT)
Dept: RADIATION ONCOLOGY | Facility: CLINIC | Age: 73
End: 2023-06-26
Attending: RADIOLOGY
Payer: COMMERCIAL

## 2023-06-26 VITALS
WEIGHT: 203 LBS | DIASTOLIC BLOOD PRESSURE: 76 MMHG | RESPIRATION RATE: 16 BRPM | BODY MASS INDEX: 27.53 KG/M2 | HEART RATE: 71 BPM | TEMPERATURE: 97.4 F | SYSTOLIC BLOOD PRESSURE: 130 MMHG | OXYGEN SATURATION: 95 %

## 2023-06-26 DIAGNOSIS — C61 PROSTATE CANCER (HCC): Primary | ICD-10-CM

## 2023-06-26 DIAGNOSIS — C61 PROSTATE CANCER (HCC): ICD-10-CM

## 2023-06-26 PROCEDURE — 99211 OFF/OP EST MAY X REQ PHY/QHP: CPT | Performed by: RADIOLOGY

## 2023-06-26 NOTE — PROGRESS NOTES
Ching Murillo 1950 is a 81 y o  male who was diagnosed with prostate cancer, Anna 4+4=8  He was following with Urology for elevated PSA and underwent transperineal MRI fusion biopsy  He is being referred by Dr Rafaela Benavides and presents today for consult  3/6/23 Urology, Regina Han  Due to medical comorbidities, established threshold for prostate biopsy in this patient is 8 or if patient is to develop any systemic symptoms or abnormal weight loss or bone pain which he does not have  Patient also wishes to avoid prostate biopsy at this time  - VIKRAM benign at last visit (9/2022)  - Most recent PSA continued to progressively increase to 9 6 from 3/3/23, prior PSA trend below  - Obtain mpMRI for further evaluation and should this be positive for concerning lesion discussed proceeding with MRI fusion prostate biopsy which he is agreeable to      3/23/23 CYSTOSCOPY URETEROSCOPY WITH LITHOTRIPSY HOLMIUM LASER, RETROGRADE PYELOGRAM AND INSERTION STENT URETERAL, STONE BASKET     4/10/23 mpMRI prostate  1  PI-RADSv2 1 Category 5 - Very high (clinically significant cancer is highly likely to be present)  2 6 cm right lateral peripheral zone lesion, from base to apex  2  No extraprostatic tumor, seminal vesicle invasion, pelvic lymphadenopathy, or pelvic osseous metastatic disease  3  Calculated prostate volume of 33 cc        4/14/23 Telephone encounter- Ameer-beg   proceed with fusion guided biopsy if MRI would be concerning    MRI is showing PI-RADS 5 lesion and patient should undergo fusion guided biopsy    5/16/23 TRANSPERINEAL MRI FUSION BIOPSY PROSTATE     5/30/23 Dr Shane Ahumada, surgical excision in the form of open or robotic surgery, and radiation therapies plus or minus androgen deprivation therapy were discussed   With regard to multimodal therapy discussion this could include surgical excision with postoperative radiation therapy (RT) +/-androgen deprivation therapy (ADT) if adverse pathology or RT with long-term ADT  discussed EBRT and brachytherapy and androgen deprivation therapy (he would need ADT for 2 years  Return after imaging and to discuss final treatment decision  23 bone scan  1  No scintigraphic evidence of osseous metastasis  Component PSA, Total   Latest Ref Rng & Units 0 0 - 4 0 ng/mL   2021 6 4 (H)   2022 6 3 (H)   2022 8 5 (H)   3/3/2023 9 6 (H)       Upcomin23 Dr Moises Cyr      Oncology History   Prostate cancer [facility])   2023 Biopsy    TRANSPERINEAL MRI FUSION BIOPSY PROSTATE     A  Prostate, REGION OF INTEREST:  - Prostatic adenocarcinoma, Anna grade 3 + 4 = 7 (60% pattern 3 and 40% pattern 4, Grade group 2), involving four of four prostatic cores and 90% of the total biopsy tissue   - Perineural invasion is present  Note: The fifth core is skeletal muscle only  B  Prostate, RIGHT BASE:  - Prostatic adenocarcinoma, Astatula grade 4+4 = 8 (Grade group 4), involving two of two cores and 60% of the tissue   - Perineural invasion is present  Note: Small portion of pattern 5 can't be ruled out due to procedure artifact  C  Prostate, RIGHT POSTERIOR MEDIAL:  - Prostatic adenocarcinoma, Astatula grade 4+3 = 7 (50% pattern 4 and 50% pattern 3, Grade group 3), involving two of two cores and 50% of the tissue   - Perineural invasion is present  D  Prostate, LEFT BASE:  - Benign prostate glands and stroma  E  Prostate, LEFT POSTERIOR MEDIAL:  - Prostatic adenocarcinoma, Astatula grade 4+4 = 8 (Grade group 4), involving one of two cores  20% of involved core  and 10% of the tissue   - Perineural invasion is present  F  Prostate, LEFT POSTERIOR LATERAL:  - Benign prostate glands and stroma  G  Prostate, LEFT ANTERIOR LATERAL:  - Benign prostate glands and stroma  H  Prostate, LEFT ANTERIOR MEDIAL:  - Benign prostate glands and stroma       I  Prostate, RIGHT POSTERIOR LATERAL:  - Prostatic adenocarcinoma, Anna grade 3 + 4 = 7 (60% pattern 3 and 40% pattern 4, Grade group 2), involving one of two cores, 90% of involved core and 60% of the tissue   - Perineural invasion is present  J  Prostate, RIGHT ANTERIOR LATERAL:  - Prostatic adenocarcinoma, Anna grade 4+3 = 7 (70% pattern 4 and 30% pattern 3, Grade group 3), involving two of two cores and 70% of the tissue   - Perineural invasion is present  K  Prostate, RIGHT ANTERIOR MEDIAL:  - Benign prostate glands and stroma  Intradepartmental consultation is in agreement  6/14/2023 Initial Diagnosis    Prostate cancer ([facility] [address] )         Review of Systems:  Review of Systems   Constitutional: Positive for fatigue  HENT: Positive for dental problem (full dentures) and hearing loss (mild)  Eyes: Negative  Respiratory: Negative  Cardiovascular: Negative  Gastrointestinal: Negative  Genitourinary: Positive for frequency and urgency  Nocturia x 1   Musculoskeletal: Negative  Skin: Negative  Allergic/Immunologic: Positive for food allergies  Neurological: Negative  Hematological: Negative  Psychiatric/Behavioral: Positive for sleep disturbance  Clinical Trial: no    IPSS Questionnaire (AUA-7): Over the past month…    1)  How often have you had a sensation of not emptying your bladder completely after you finish urinating? 0 - Not at all   2)  How often have you had to urinate again less than two hours after you finished urinating? 5 - Almost always   3)  How often have you found you stopped and started again several times when you urinated? 0 - Not at all   4) How difficult have you found it to postpone urination? 4 - More than half the time   5) How often have you had a weak urinary stream?  0 - Not at all   6) How often have you had to push or strain to begin urination? 0 - Not at all   7) How many times did you most typically get up to urinate from the time you went to bed until the time you got up in the morning?   1 - 1 time Total Score:  10       Pain assessment: 0    PFT N/A    Prior Radiation no    Teaching NIH book, side effects, SIM, Kegel exercises     MST completed    Implantable Devices (Port, pacemaker, pain stimulator) no    Hip Replacement no    Health Maintenance   Topic Date Due   • COVID-19 Vaccine (4 - Moderna series) 12/30/2021   • Colorectal Cancer Screening  11/03/2022   • Influenza Vaccine (Season Ended) 09/01/2023   • BMI: Followup Plan  10/04/2023   • HEMOGLOBIN A1C  11/11/2023   • DM Eye Exam  03/09/2024   • Kidney Health Evaluation: Albumin/Creatinine Ratio  03/15/2024   • Fall Risk  05/09/2024   • Depression Screening  05/09/2024   • Medicare Annual Wellness Visit (AWV)  05/09/2024   • Diabetic Foot Exam  05/09/2024   • Kidney Health Evaluation: GFR  05/11/2024   • BMI: Adult  05/30/2024   • Hepatitis C Screening  Completed   • Pneumococcal Vaccine: 65+ Years  Completed   • HIB Vaccine  Aged Out   • IPV Vaccine  Aged Out   • Hepatitis A Vaccine  Aged Out   • Meningococcal ACWY Vaccine  Aged Out   • HPV Vaccine  Aged Out       Past Medical History:   Diagnosis Date   • CHF (congestive heart failure) ([facility] [address] )    • Diabetes mellitus ([facility] [address] )    • Diverticulitis    • 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[facility] [address] ) 11/7/2018   • Heart disease    • Hyperlipidemia    • Hypertension    • Kidney stone    • Osteoarthritis    • Vascular disorder        Past Surgical History:   Procedure Laterality Date   • ABDOMINAL SURGERY     • CARDIAC CATHETERIZATION N/A 3/21/2022    Procedure: Cardiac catheterization;  Surgeon: Marsiabel Christine MD;  Location: [address] [place] [address] CATH LAB; Service: Cardiology   • CARDIAC CATHETERIZATION N/A 3/21/2022    Procedure: Cardiac Coronary Angiogram;  Surgeon: Marisabel Christine MD;  Location: [address] [place] [address] CATH LAB;   Service: Cardiology   • COLONOSCOPY     • FL RETROGRADE PYELOGRAM  3/23/2023   • INGUINAL HERNIA REPAIR Left    • LAPAROSCOPIC COLON RESECTION     • MA ARTHROPLASTY GLENOHUMERAL JOINT TOTAL SHOULDER Right 11/17/2020    Procedure: ARTHROPLASTY SHOULDER REVERSE with biceps tendonesis; Surgeon: Clotilde Rangel MD;  Location: [facility];  Service: Orthopedics   • FL BX PROSTATE STRTCTC SATURATION SAMPLING IMG GID N/A 5/16/2023    Procedure: TRANSPERINEAL MRI FUSION BIOPSY PROSTATE;  Surgeon: Mary Kate Hernandez MD;  Location: [facility];  Service: Urology   • FL COLONOSCOPY FLX DX W/COLLJ [facility] WHEN PFRMD N/A 11/3/2017    Procedure: COLONOSCOPY;  Surgeon: Puneet Hou MD;  Location: [facility]; Service: Colorectal   • FL CYSTO/URETERO W/LITHOTRIPSY &INDWELL STENT INSRT Left 3/23/2023    Procedure: CYSTOSCOPY URETEROSCOPY WITH LITHOTRIPSY HOLMIUM LASER, RETROGRADE PYELOGRAM AND INSERTION STENT URETERAL, STONE BASKET;  Surgeon: Gino Gordon MD;  Location: [facility];  Service: Urology   • [place] [facility] CAR[facility] [place] Left 1/13/2020    Procedure: ENDARTERECTOMY ARTERY CAROTID;  Surgeon: Omar Vides MD;  Location: [facility];  Service: Vascular       Family History   Problem Relation Age of Onset   • Colon cancer Father    • No Known Problems Mother    • Colon cancer Paternal Grandfather    • Colon cancer Family    • Diabetes Half-Sister    • Obesity Half-Sister        Social History     Tobacco Use   • Smoking status: Some Days     Types: Cigars     Passive exposure: Past   • Smokeless tobacco: Never   Vaping Use   • Vaping Use: Never used   Substance Use Topics   • Alcohol use:  Yes     Alcohol/week: 3 0 standard drinks of alcohol     Types: 3 Cans of beer per week     Comment: Socially   • Drug use: Never          Current Outpatient Medications:   •  amLODIPine (NORVASC) 10 mg tablet, Take 1 tablet (10 mg total) by mouth daily (Patient taking differently: Take 10 mg by mouth every morning), Disp: 90 tablet, Rfl: 3  •  aspirin 81 mg chewable tablet, CHEW AND SWALLOW 1 TABLET  DAILY (Patient taking differently: Chew 81 mg daily at bedtime), Disp: 90 tablet, Rfl: 3  •  atorvastatin (LIPITOR) 20 mg tablet, TAKE 1 TABLET BY MOUTH  DAILY (Patient taking differently: Take 20 mg by mouth daily at bedtime), Disp: 90 tablet, Rfl: 3  •  carvedilol (COREG) 12 5 mg tablet, Take 1 tablet (12 5 mg total) by mouth 2 (two) times a day with meals, Disp: 180 tablet, Rfl: 3  •  Continuous Blood Gluc  (Dexcom G6 ) ANITA, 1 DEXCOM G6  FOR CONTINUOUS GLUCOSE MONITORING, Disp: 1 each, Rfl: 0  •  Continuous Blood Gluc Sensor (Dexcom G6 Sensor) MISC, 3 PACK SENSOR FOR CONTINUOUS GLUCOSE MONITORING, Disp: 9 each, Rfl: 3  •  Continuous Blood Gluc Transmit (Dexcom G6 Transmitter) MISC, 1 TRANSMITTED EVERY 3 MONTHS FOR CONTINUOUS GLUCOSE MONITORING, Disp: 1 each, Rfl: 3  •  glucose blood test strip, Use as instructed, Disp: 100 each, Rfl: 2  •  Januvia 25 MG tablet, TAKE 1 TABLET BY MOUTH  DAILY (Patient taking differently: Take 25 mg by mouth every morning), Disp: 90 tablet, Rfl: 3  •  losartan (COZAAR) 100 MG tablet, TAKE 1 TABLET BY MOUTH  DAILY (Patient taking differently: Take 100 mg by mouth every morning), Disp: 90 tablet, Rfl: 3  •  metFORMIN (GLUCOPHAGE) 1000 MG tablet, TAKE 1 TABLET BY MOUTH  TWICE DAILY WITH MEALS (Patient taking differently: Take 1,000 mg by mouth 2 (two) times a day with meals), Disp: 180 tablet, Rfl: 3  •  sildenafil (VIAGRA) 25 MG tablet, TAKE 1 TABLET BY MOUTH  DAILY AS NEEDED FOR  ERECTILE DYSFUNCTION (Patient taking differently: Take 25 mg by mouth as needed), Disp: 10 tablet, Rfl: 0    Allergies   Allergen Reactions   • Other Hives     Soft shell crabs         There were no vitals filed for this visit

## 2023-06-26 NOTE — LETTER
2023     Uziel Rivera Angelita Clark [address] [facility]    Patient: Corona Salcedo   YOB: 1950   Date of Visit: 2023       Dear Dr Ana Rosa Hernandez: Thank you for referring Corona Salcedo to me for evaluation  Below are my notes for this consultation  If you have questions, please do not hesitate to call me  I look forward to following your patient along with you  Sincerely,        Praful Romeo MD        CC: No Recipients    Praful Romeo MD  2023  6:23 PM  Sign when Signing Visit  Consultation - Radiation Oncology      Sona Rizo : 1950  Encounter: [id number]  Patient Information: [facility]  Chief Complaint   Patient presents with   • Prostate Cancer   • Consult     Cancer Staging   Prostate cancer [facility])  Staging form: Prostate, AJCC 8th Edition  - Clinical stage from 2023: Stage IIC (cT1c, cN0, cM0, PSA: 9 6, Grade Group: 4) - Signed by Praful Romeo MD on 2023  Stage prefix: Initial diagnosis  Prostate specific antigen (PSA) range: Less than 10  Anna primary pattern: 4  [place] secondary pattern: 4  Anna score: 8  Histologic grading system: 5 grade system         History of Present Illness   Corona Salcedo is a 81 y o  man with multiple comorbidities including DM, distant laparoscopic partial colectomy, CAD status post cath in 2022, and nephrolithiasis treated with lithotripsy last in  diagnosed with T1cN0 prostate cancer, Anna 4+4=8 and pretreatment PSA of 9 6ng/mL in May 2023  He was following with Urology for elevated PSA and underwent transperineal MRI fusion biopsy  He is being referred by Dr Ana Rosa Hernandez and presents today for consult  Briefly, the patient has a history of elevated PSA, but was asymptomatic  Due to comorbidities biopsy was deferred until PSA began rising progressively    Component PSA, Total   Latest Ref Rng & Units 0 0 - 4 0 ng/mL   2021 6 4 (H)   2022 6 3 (H) 9/2/2022 8 5 (H)   3/3/2023 9 6 (H)         3/6/23 Urology, Severo Bong  - VIKRAM benign at last visit (9/2022)  - Most recent PSA continued to progressively increase to 9 6 from 3/3/23, prior PSA trend below  - Obtain mpMRI for further evaluation and should this be positive for concerning lesion discussed proceeding with MRI fusion prostate biopsy which he is agreeable to       3/23/23 CYSTOSCOPY URETEROSCOPY WITH LITHOTRIPSY HOLMIUM LASER, RETROGRADE PYELOGRAM AND INSERTION STENT URETERAL, STONE BASKET      4/10/23 mpMRI prostate  1  PI-RADSv2 1 Category 5 - Very high  2 6 cm right lateral peripheral zone lesion, from base to apex  2  No extraprostatic tumor, seminal vesicle invasion, pelvic lymphadenopathy, or pelvic osseous metastatic disease  3  Calculated prostate volume of 33 cc       5/16/23 TRANSPERINEAL MRI FUSION BIOPSY PROSTATE   Demonstrated prostatic adenocarcinoma with a mix of Anna 7 and 8 involving 6/11 cores  Specifically, Anna score 8 (4+4) was identified in the right base and left posterior medial cores involving 10 to 60% of the submitted tissue  Prostatic adenocarcinoma Roca score 7 (4+3) was noted in the right posterior medial and right anterior lateral cores involving 50 to 70% of the submitted tissue  Prostatic adenocarcinoma Roca score 7 (3+4) was identified the area of interest identified on MRI as well as in the right posterior lateral cores  90% of the submitted tissue from the region of interest was positive for cancer  2% of the tissue was involved in the right posterior lateral core  All cores demonstrated perineural invasion  5/30/23 Dr Isabel Frias, surgical excision in the form of open or robotic surgery, and radiation therapies plus or minus androgen deprivation therapy were discussed    With regard to multimodal therapy discussion this could include surgical excision with postoperative radiation therapy (RT) +/-androgen deprivation therapy (ADT) if adverse pathology or RT with long-term ADT  discussed EBRT and brachytherapy and androgen deprivation therapy (he would need ADT for 2 years  Return after imaging and to discuss final treatment decision  23 bone scan  1  No scintigraphic evidence of osseous metastasis  Patient denies bone pain or unexplained weight loss  He denies significant urinary symptoms including hematuria, dysuria, or urinary incontinence  IPSS score is 10 due to begin urgency and frequency during the day  He was slight urge incontinence during the day  It is 0-1 times per night  His bladder without difficulty  He denies diarrhea or rectal bleeding  He does have a distant history of partial colectomy  Last colonoscopy done for history of polyps was on 2017 with Dr Lopez Parents  This was benign with normal colorectal anastomosis noted  He is due for repeat colonoscopy  Upcomin23 Dr Shane Weeks   Oncology History   Prostate cancer [facility])   2023 Biopsy    TRANSPERINEAL MRI FUSION BIOPSY PROSTATE     A  Prostate, REGION OF INTEREST:  - Prostatic adenocarcinoma, [place] grade 3 + 4 = 7 (60% pattern 3 and 40% pattern 4, Grade group 2), involving four of four prostatic cores and 90% of the total biopsy tissue   - Perineural invasion is present  Note: The fifth core is skeletal muscle only  B  Prostate, RIGHT BASE:  - Prostatic adenocarcinoma, Anna grade 4+4 = 8 (Grade group 4), involving two of two cores and 60% of the tissue   - Perineural invasion is present  Note: Small portion of pattern 5 can't be ruled out due to procedure artifact  C  Prostate, RIGHT POSTERIOR MEDIAL:  - Prostatic adenocarcinoma, Anna grade 4+3 = 7 (50% pattern 4 and 50% pattern 3, Grade group 3), involving two of two cores and 50% of the tissue   - Perineural invasion is present  D  Prostate, LEFT BASE:  - Benign prostate glands and stroma       E  Prostate, LEFT POSTERIOR MEDIAL:  - Prostatic adenocarcinoma, Anna grade 4+4 = 8 (Grade group 4), involving one of two cores  20% of involved core  and 10% of the tissue   - Perineural invasion is present  F  Prostate, LEFT POSTERIOR LATERAL:  - Benign prostate glands and stroma  G  Prostate, LEFT ANTERIOR LATERAL:  - Benign prostate glands and stroma  H  Prostate, LEFT ANTERIOR MEDIAL:  - Benign prostate glands and stroma  I  Prostate, RIGHT POSTERIOR LATERAL:  - Prostatic adenocarcinoma, [place] grade 3 + 4 = 7 (60% pattern 3 and 40% pattern 4, Grade group 2), involving one of two cores, 90% of involved core and 60% of the tissue   - Perineural invasion is present  J  Prostate, RIGHT ANTERIOR LATERAL:  - Prostatic adenocarcinoma, Anna grade 4+3 = 7 (70% pattern 4 and 30% pattern 3, Grade group 3), involving two of two cores and 70% of the tissue   - Perineural invasion is present  K  Prostate, RIGHT ANTERIOR MEDIAL:  - Benign prostate glands and stroma  Intradepartmental consultation is in agreement          5/16/2023 -  Cancer Staged    Staging form: Prostate, AJCC 8th Edition  - Clinical stage from 5/16/2023: Stage IIC (cT1c, cN0, cM0, PSA: 9 6, Grade Group: 4) - Signed by Praful Romeo MD on 6/26/2023  Stage prefix: Initial diagnosis  Prostate specific antigen (PSA) range: Less than 10  [place] primary pattern: 4  [place] secondary pattern: 4  Anna score: 8  Histologic grading system: 5 grade system       6/14/2023 Initial Diagnosis    Prostate cancer [facility])           Past Medical History:   Diagnosis Date   • CHF (congestive heart failure) ([facility] [address] )    • Diabetes mellitus ([facility] [address] )    • Diverticulitis    • 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[facility] [address] ) 11/07/2018   • Heart disease    • Hyperlipidemia    • Hypertension    • Kidney stone    • Osteoarthritis    • Prostate cancer [facility])    • Vascular disorder      Past Surgical History:   Procedure Laterality Date   • ABDOMINAL SURGERY     • CARDIAC CATHETERIZATION N/A 3/21/2022    Procedure: Cardiac catheterization;  Surgeon: Roly Gaffney MD;  Location: [address] [place] [address] CATH LAB; Service: Cardiology   • CARDIAC CATHETERIZATION N/A 3/21/2022    Procedure: Cardiac Coronary Angiogram;  Surgeon: Roly Gaffney MD;  Location: [address] [place] [address] CATH LAB; Service: Cardiology   • COLONOSCOPY     • FL RETROGRADE PYELOGRAM  3/23/2023   • INGUINAL HERNIA REPAIR Left    • LAPAROSCOPIC COLON RESECTION     • NC ARTHROPLASTY GLENOHUMERAL JOINT TOTAL SHOULDER Right 11/17/2020    Procedure: ARTHROPLASTY SHOULDER REVERSE with biceps tendonesis; Surgeon: Ree Faust MD;  Location:  MAIN OR;  Service: Orthopedics   • NC BX PROSTATE STRTCTC SATURATION SAMPLING IMG GID N/A 5/16/2023    Procedure: TRANSPERINEAL MRI FUSION BIOPSY PROSTATE;  Surgeon: Marlena Ward MD;  Location: [facility] Main OR;  Service: Urology   • [facility] N/A 11/3/2017    Procedure: COLONOSCOPY;  Surgeon: Bharat Alexandra MD;  Location: [facility] GI LAB;   Service: Colorectal   • NC CYSTO/URETERO W/LITHOTRIPSY &INDWELL STENT INSRT Left 3/23/2023    Procedure: CYSTOSCOPY URETEROSCOPY WITH LITHOTRIPSY HOLMIUM LASER, RETROGRADE PYELOGRAM AND INSERTION STENT URETERAL, STONE BASKET;  Surgeon: Jazzy Stover MD;  Location: [facility] MAIN OR;  Service: Urology   • NC TEAEC [facility] GRF CAROTID VERTB [facility] Left 1/13/2020    Procedure: ENDARTERECTOMY ARTERY CAROTID;  Surgeon: Misti Conti MD;  Location:  MAIN OR;  Service: Vascular       Family History   Problem Relation Age of Onset   • Colon cancer Father    • No Known Problems Mother    • Colon cancer Paternal Grandfather    • Colon cancer Family    • Diabetes Half-Sister    • Obesity Half-Sister        Social History   Social History     Substance and Sexual Activity   Alcohol Use Yes   • Alcohol/week: 3 0 standard drinks of alcohol   • Types: 3 Cans of beer per week    Comment: Socially     Social History     Substance and Sexual Activity Drug Use Never     Social History     Tobacco Use   Smoking Status Some Days   • Types: Cigars   • Passive exposure: Past   Smokeless Tobacco Never         Meds/Allergies     Current Outpatient Medications:   •  amLODIPine (NORVASC) 10 mg tablet, Take 1 tablet (10 mg total) by mouth daily (Patient taking differently: Take 10 mg by mouth every morning), Disp: 90 tablet, Rfl: 3  •  aspirin 81 mg chewable tablet, CHEW AND SWALLOW 1 TABLET  DAILY (Patient taking differently: Chew 81 mg daily at bedtime), Disp: 90 tablet, Rfl: 3  •  atorvastatin (LIPITOR) 20 mg tablet, TAKE 1 TABLET BY MOUTH  DAILY (Patient taking differently: Take 20 mg by mouth daily at bedtime), Disp: 90 tablet, Rfl: 3  •  carvedilol (COREG) 12 5 mg tablet, Take 1 tablet (12 5 mg total) by mouth 2 (two) times a day with meals, Disp: 180 tablet, Rfl: 3  •  Continuous Blood Gluc  (Dexcom G6 ) ANITA, 1 DEXCOM G6  FOR CONTINUOUS GLUCOSE MONITORING, Disp: 1 each, Rfl: 0  •  Continuous Blood Gluc Sensor (Dexcom G6 Sensor) MISC, 3 PACK SENSOR FOR CONTINUOUS GLUCOSE MONITORING, Disp: 9 each, Rfl: 3  •  Continuous Blood Gluc Transmit (Dexcom G6 Transmitter) MISC, 1 TRANSMITTED EVERY 3 MONTHS FOR CONTINUOUS GLUCOSE MONITORING, Disp: 1 each, Rfl: 3  •  glucose blood test strip, Use as instructed, Disp: 100 each, Rfl: 2  •  losartan (COZAAR) 100 MG tablet, TAKE 1 TABLET BY MOUTH  DAILY (Patient taking differently: Take 100 mg by mouth every morning), Disp: 90 tablet, Rfl: 3  •  metFORMIN (GLUCOPHAGE) 1000 MG tablet, TAKE 1 TABLET BY MOUTH  TWICE DAILY WITH MEALS (Patient taking differently: Take 1,000 mg by mouth 2 (two) times a day with meals), Disp: 180 tablet, Rfl: 3  •  sildenafil (VIAGRA) 25 MG tablet, TAKE 1 TABLET BY MOUTH  DAILY AS NEEDED FOR  ERECTILE DYSFUNCTION (Patient taking differently: Take 25 mg by mouth as needed), Disp: 10 tablet, Rfl: 0  •  Januvia 25 MG tablet, TAKE 1 TABLET BY MOUTH  DAILY (Patient taking differently: Take 25 mg by mouth every morning), Disp: 90 tablet, Rfl: 3  Allergies   Allergen Reactions   • Other Hives     Soft shell crabs          Review of Systems   Constitutional: Positive for fatigue  HENT: Positive for dental problem (full dentures) and hearing loss (mild)  Eyes: Negative  Respiratory: Negative  Cardiovascular: Negative  Gastrointestinal: Negative  Genitourinary: Positive for frequency and urgency  Nocturia x 1   Musculoskeletal: Negative  Skin: Negative  Allergic/Immunologic: Positive for food allergies  Neurological: Negative  Hematological: Negative  Psychiatric/Behavioral: Positive for sleep disturbance  OBJECTIVE:   /76   Pulse 71   Temp (!) 97 4 °F (36 3 °C)   Resp 16   Wt 92 1 kg (203 lb)   SpO2 95%   BMI 27 53 kg/m²   Performance Status: Karnofsky: 90 - Able to carry on normal activity; minor signs or symptoms of disease     Physical Exam  Vitals and nursing note reviewed  Constitutional:       General: He is not in acute distress  Cardiovascular:      Rate and Rhythm: Normal rate and regular rhythm  Pulmonary:      Breath sounds: No wheezing, rhonchi or rales  Abdominal:      Palpations: Abdomen is soft  Tenderness: There is no abdominal tenderness  There is no right CVA tenderness or left CVA tenderness  Genitourinary:     Comments: VIKRAM deferred by patient  Musculoskeletal:      Right lower leg: No edema  Left lower leg: No edema  Comments: No spinal tenderness to percussion   Lymphadenopathy:      Cervical: No cervical adenopathy  Lower Body: No right inguinal adenopathy  No left inguinal adenopathy  Neurological:      Mental Status: He is alert and oriented to person, place, and time        Gait: Gait normal          RESULTS  Lab Results  Lab Results   Component Value Date    PSA 9 6 (H) 03/03/2023    PSA 8 5 (H) 09/02/2022    PSA 6 3 (H) 01/24/2022       Imaging Studies  NM bone scan whole body    Result Date: 6/20/2023  Narrative: BONE SCAN  WHOLE BODY INDICATION: C61: Malignant neoplasm of prostate PREVIOUS FILM CORRELATION:    Comparison is made to the skeletal structures of MRI of the prostate dated/10/23, CT renal stone study dated 12/8/2022, and CT of chest dated 11/8/2022 TECHNIQUE:   This study was performed following the intravenous administration of 26 1 mCi Tc-99m labeled MDP  Delayed, anterior and posterior whole body images were acquired, 2-3 hours after radiopharmaceutical administration  FINDINGS: There is no focal tracer activity characteristic of osseous metastatic disease  There is nonspecific multifocal tracer activity in a pattern most consistent with degenerative/arthritic changes involving the left acromioclavicular articulation, bilateral sternoclavicular articulations, sternomanubrial junction thoracolumbar spine, bilateral first carpal metacarpal articulations, and bilateral knees  Both kidneys are visualized        Impression: 1  No scintigraphic evidence of osseous metastasis  Workstation performed: [id number]     4/10/2023 MULTIPARAMETRIC MRI OF THE PROSTATE WITH AND WITHOUT CONTRAST-WITH 3-D POSTPROCESSING  INDICATION:   R97 20: Elevated prostate specific antigen (PSA)  FINDINGS:   PROSTATE:     Size: 4 9 x 3 9 x 3 3 cm = 33 cc  Post-biopsy hemorrhage:  None  Central gland enlargement (BPH): None  Focal lesions - localization as follows:   Lesion: 1       Size: 2 6 x 1 2 x 2 1 cm, series 250 image 12, series 200 image 12, series 3 image 16  Location: Right lateral peripheral zone, base to apex  T2-weighted images: Score 5: Circumscribed, homogeneous moderate hypointense focus/mass greater than or equal to 1 5 cm in greatest dimension OR definite extraprostatic extension/invasive behavior         Diffusion-weighted images: Score 5: Focal markedly hypointense on ADC and markedly hyperintense on high b-value DWI, but greater than or equal to 1 5 cm in greatest dimension or definite extraprostatic extension/invasive behavior  Dynamic post-contrast images: (+) Focal, earlier or contemporaneous with, enhancement of adjacent normal prostatic tissues; corresponds to a finding on T2-weighted and/or DWI  PI-RADS Assessment Category: 5, Very high (clinically significant cancer is highly likely to be present)  Extra-prostatic extension (EPE): Broadly abuts capsule without visualized gross EPE  SEMINAL VESICLES: No seminal vesicle invasion  Intrinsic T1 shortening in the right seminal vesicle, suggestive of proteinaceous/ hemorrhagic contents  Note: Clinically significant cancer is defined on pathology/histology as Anna score greater than or equal to 7, and/or volume of greater than or equal to 0 5 mL, and/or extraprostatic extension  URINARY BLADDER: Unremarkable  LYMPH NODES: No pelvic lymphadenopathy  BONES: No suspicious osseous lesion  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 and associated degenerative changes  Prior left inguinal hernia repair  OTHER: Small right inguinal hernia containing nondilated loop of small bowel and fat, similar to prior CT  Partially imaged right renal cyst and extrarenal pelvis  IMPRESSION:   1  PI-RADSv2 1 Category 5 - Very high (clinically significant cancer is highly likely to be present)  2 6 cm right lateral peripheral zone lesion, from base to apex  2  No extraprostatic tumor, seminal vesicle invasion, pelvic lymphadenopathy, or pelvic osseous metastatic disease  3  Calculated prostate volume of 33 cc  Prostate gland boundaries and areas of concern for significant prostate cancer were segmented using 3D advanced post-processing on an independent [employer] system workstation with active physician participation  The segmentation was performed should   MR-ultrasound fusion biopsy be required          Pathology:  • Collected 5/16/2023 13:39 • Status: Final result    • Visible to patient: Yes (not seen)     • Dx: Elevated prostate specific antigen (PSA)     • 0 Result Notes      Component    Case Report   Surgical Pathology Report                         Case: [id number]                                    Authorizing Provider:  Alexey Breen MD        Collected:           05/16/2023 1339               Ordering Location:     [facility] [place]        Received:            05/16/2023 [facility] [address] [place] [address] Operating Room                                                      Pathologist:           Lauren Daigle MD                                                                  Specimens:   A) - Prostate, REGION OF INTEREST                                                                   B) - Prostate, RIGHT BASE                                                                           C) - Prostate, RIGHT POSTERIOR MEDIAL                                                               D) - Prostate, LEFT BASE                                                                             E) - Prostate, LEFT POSTERIOR MEDIAL                                                                 F) - Prostate, LEFT POSTERIOR LATERAL                                                               G) - Prostate, LEFT ANTERIOR LATERAL                                                                 H) - Prostate, LEFT ANTERIOR MEDIAL                                                                 I) - Prostate, RIGHT POSTERIOR LATERAL                                                               J) - Prostate, RIGHT ANTERIOR LATERAL                                                               K) - Prostate, RIGHT ANTERIOR MEDIAL                                                       Final Diagnosis   A   Prostate, REGION OF INTEREST:  - Prostatic adenocarcinoma, [place] grade 3 + 4 = 7 (60% pattern 3 and 40% pattern 4, Grade group 2), involving four of four prostatic cores and 90% of the total biopsy tissue   - Perineural invasion is present  Note: The fifth core is skeletal muscle only  B  Prostate, RIGHT BASE:  - Prostatic adenocarcinoma, [place] grade 4+4 = 8 (Grade group 4), involving two of two cores and 60% of the tissue   - Perineural invasion is present  Note: Small portion of pattern 5 can't be ruled out due to procedure artifact  C  Prostate, RIGHT POSTERIOR MEDIAL:  - Prostatic adenocarcinoma, Anna grade 4+3 = 7 (50% pattern 4 and 50% pattern 3, Grade group 3), involving two of two cores and 50% of the tissue   - Perineural invasion is present  D  Prostate, LEFT BASE:  - Benign prostate glands and stroma  E  Prostate, LEFT POSTERIOR MEDIAL:  - Prostatic adenocarcinoma, Anna grade 4+4 = 8 (Grade group 4), involving one of two cores  20% of involved core  and 10% of the tissue   - Perineural invasion is present  F  Prostate, LEFT POSTERIOR LATERAL:  - Benign prostate glands and stroma  G  Prostate, LEFT ANTERIOR LATERAL:  - Benign prostate glands and stroma  H  Prostate, LEFT ANTERIOR MEDIAL:  - Benign prostate glands and stroma  I  Prostate, RIGHT POSTERIOR LATERAL:  - Prostatic adenocarcinoma, [place] grade 3 + 4 = 7 (60% pattern 3 and 40% pattern 4, Grade group 2), involving one of two cores, 90% of involved core and 60% of the tissue   - Perineural invasion is present  J  Prostate, RIGHT ANTERIOR LATERAL:  - Prostatic adenocarcinoma, Anna grade 4+3 = 7 (70% pattern 4 and 30% pattern 3, Grade group 3), involving two of two cores and 70% of the tissue   - Perineural invasion is present  K  Prostate, RIGHT ANTERIOR MEDIAL:  - Benign prostate glands and stroma  Intradepartmental consultation is in agreement  Electronically signed by Naty Erazo MD on 5/19/2023 at 132              ASSESSMENT  1   Prostate cancer [facility])  Ambulatory Referral to Radiation Oncology        Cancer Staging Prostate cancer [facility])  Staging form: Prostate, AJCC 8th Edition  - Clinical stage from 5/16/2023: Stage IIC (cT1c, cN0, cM0, PSA: 9 6, Grade Group: 4) - Signed by Martin Michaels MD on 6/26/2023  Stage prefix: Initial diagnosis  Prostate specific antigen (PSA) range: Less than 10  [place] primary pattern: 4  [place] secondary pattern: 4  Anna score: 8  Histologic grading system: 5 grade system      PLAN/DISCUSSION  Mary Cadena is a 81 y o male with multiple comorbidities but excellent performance status who was recently diagnosed with clinical stage [id number] prostatic adenocarcinoma, [place] score 8 (4+4) with a pretreatment PSA of 9 6 ng/mL  >50% cores demonstrated disease and all associated with PNI  MRI imaging demonstrates lesion in the right prostate extending from base to apex and a notably small, measuring 33 cc  There was no evidence of KATHLEEN  His presentation is consistent with high risk prostate cancer as defined by the AUA and NCCN guidelines  We discussed treatment options including active surveillance, definitive radiation +/- ADT, and trimodality therapy  Surgical resection previously discussed at length with Urology  I offered the patient 18-24 months of ADT with concurrent definitive radiation to the prostate with inclusion of draining pelvic lymph nodes given MSKCC nomogram risk of 19-30% lymph node involvement  The radiation would be a total dose of 79 2 Gy to prostate as tolerated by normal tissues  This would be in line with NCCN guidelines  The rationale and potential benefits, as well as the risks and acute and late side effects and potential toxicities of radiation were discussed with the patient at length  Side effects discussed included, but were not limited to: Fatigue, irritative urinary side effects, diarrhea, rectal urgency, radiation proctitis, erectile dysfunction, and radiation cystitis   There may be increased risk of bowel injury and obstruction due to previous "surgery  We discussed brachytherapy as a boost with EBRT + ADT  I explained that trimodality therapy has demonstrated improved biochemical FFF and decreases overall treatment time, but is associated with some increased acute toxicity and no definite survival benefit  He could be considered for brachytherapy alone with ADT  I explained we do not perform prostate brachytherapy and offered referral to tertiary hospital for consultation  He wished to pursue this option  I will offer 2nd opinion to [facility] where proton therapy can also be considered if indicated given his previous colon surgery  The patient and his wife were given the opportunity to ask questions that were answered at length  We discussed continued surveillance as he has significant comorbidities  His performance status is very good and his comorbidities appear largely controlled at this time  We discussed that high risk prostate cancer has increased risk for distant metastases over the next 5 years  This would still be treatable, but not curable  He was undecided regarding treatment  He will plan to discuss with [facility] and Dr Caro Campos  I have asked that if he wishes to pursue radiation at [facility] to please contact our office and we will work with Urology to coordinate care  I feel that he would likely benefit from [facility] and fiducial markers if it can be placed  They agreed with this plan  Total Time Spent  63 minutes spent reviewing EMR in preparation for visit, with the patient, coordination with other providers, and documentation  Greater than 50% of total time was spent with the patient and/or family for counseling and/or coordination of care  Corinne Seaman, MD  6/26/2023,12:24 PM      Portions of the record may have been created with voice recognition software  Occasional wrong word or \"sound a like\" substitutions may have occurred due to the inherent limitations of voice recognition software    Read the " chart carefully and recognize, using context, where substitutions have occurred

## 2023-06-26 NOTE — PROGRESS NOTES
Consultation - Radiation Oncology      Cheyenne Trujillo : 1950  Encounter: [id number]  Patient Information: Flavio Maldonado      CHIEF COMPLAINT  Chief Complaint   Patient presents with   • Prostate Cancer   • Consult     Cancer Staging   Prostate cancer [facility])  Staging form: Prostate, AJCC 8th Edition  - Clinical stage from 2023: Stage IIC (cT1c, cN0, cM0, PSA: 9 6, Grade Group: 4) - Signed by Adelia Butler MD on 2023  Stage prefix: Initial diagnosis  Prostate specific antigen (PSA) range: Less than 10  [place] primary pattern: 4  Anna secondary pattern: 4  [place] score: 8  Histologic grading system: 5 grade system         History of Present Illness   Flavio Maldonado is a 81 y o  man with multiple comorbidities including DM, distant laparoscopic partial colectomy, CAD status post cath in 2022, and nephrolithiasis treated with lithotripsy last in  diagnosed with T1cN0 prostate cancer, Anna 4+4=8 and pretreatment PSA of 9 6ng/mL in May 2023  He was following with Urology for elevated PSA and underwent transperineal MRI fusion biopsy  He is being referred by Dr Isabelle Dotson and presents today for consult      Briefly, the patient has a history of elevated PSA, but was asymptomatic  Due to comorbidities biopsy was deferred until PSA began rising progressively  Component PSA, Total   Latest Ref Rng & Units 0 0 - 4 0 ng/mL   2021 6 4 (H)   2022 6 3 (H)   2022 8 5 (H)   3/3/2023 9 6 (H)         3/6/23 Urology, [facility]  - VIKRAM benign at last visit (2022)  - Most recent PSA continued to progressively increase to 9 6 from 3/3/23, prior PSA trend below  - Obtain mpMRI for further evaluation and should this be positive for concerning lesion discussed proceeding with MRI fusion prostate biopsy which he is agreeable to       3/23/23 CYSTOSCOPY URETEROSCOPY WITH LITHOTRIPSY HOLMIUM LASER, RETROGRADE PYELOGRAM AND INSERTION STENT URETERAL, STONE BASKET      4/10/23 mpMRI prostate  1   PI-RADSv2 1 Category 5 - Very high  2 6 cm right lateral peripheral zone lesion, from base to apex    2  No extraprostatic tumor, seminal vesicle invasion, pelvic lymphadenopathy, or pelvic osseous metastatic disease    3  Calculated prostate volume of 33 cc       5/16/23 TRANSPERINEAL MRI FUSION BIOPSY PROSTATE   Demonstrated prostatic adenocarcinoma with a mix of Sawyer 7 and 8 involving 6/11 cores  Specifically, Anna score 8 (4+4) was identified in the right base and left posterior medial cores involving 10 to 60% of the submitted tissue  Prostatic adenocarcinoma Anna score 7 (4+3) was noted in the right posterior medial and right anterior lateral cores involving 50 to 70% of the submitted tissue  Prostatic adenocarcinoma Anna score 7 (3+4) was identified the area of interest identified on MRI as well as in the right posterior lateral cores  90% of the submitted tissue from the region of interest was positive for cancer  2% of the tissue was involved in the right posterior lateral core  All cores demonstrated perineural invasion      5/30/23 Dr Glenna Goldmann, surgical excision in the form of open or robotic surgery, and radiation therapies plus or minus androgen deprivation therapy were discussed  With regard to multimodal therapy discussion this could include surgical excision with postoperative radiation therapy (RT) +/-androgen deprivation therapy (ADT) if adverse pathology or RT with long-term ADT  discussed EBRT and brachytherapy and androgen deprivation therapy (he would need ADT for 2 years  Return after imaging and to discuss final treatment decision      6/20/23 bone scan  1   No scintigraphic evidence of osseous metastasis      Patient denies bone pain or unexplained weight loss  He denies significant urinary symptoms including hematuria, dysuria, or urinary incontinence  IPSS score is 10 due to begin urgency and frequency during the day    He was slight urge incontinence during the day  It is 0-1 times per night  His bladder without difficulty  He denies diarrhea or rectal bleeding  He does have a distant history of partial colectomy  Last colonoscopy done for history of polyps was on 2017 with Dr Wanda Prasad  This was benign with normal colorectal anastomosis noted  He is due for repeat colonoscopy  Upcomin23 Dr Zina Lee   Oncology History   Prostate cancer [facility])   2023 Biopsy    TRANSPERINEAL MRI FUSION BIOPSY PROSTATE     A  Prostate, REGION OF INTEREST:  - Prostatic adenocarcinoma, Anna grade 3 + 4 = 7 (60% pattern 3 and 40% pattern 4, Grade group 2), involving four of four prostatic cores and 90% of the total biopsy tissue   - Perineural invasion is present  Note: The fifth core is skeletal muscle only  B  Prostate, RIGHT BASE:  - Prostatic adenocarcinoma, Anna grade 4+4 = 8 (Grade group 4), involving two of two cores and 60% of the tissue   - Perineural invasion is present  Note: Small portion of pattern 5 can't be ruled out due to procedure artifact  C  Prostate, RIGHT POSTERIOR MEDIAL:  - Prostatic adenocarcinoma, Anna grade 4+3 = 7 (50% pattern 4 and 50% pattern 3, Grade group 3), involving two of two cores and 50% of the tissue   - Perineural invasion is present  D  Prostate, LEFT BASE:  - Benign prostate glands and stroma  E  Prostate, LEFT POSTERIOR MEDIAL:  - Prostatic adenocarcinoma, Anna grade 4+4 = 8 (Grade group 4), involving one of two cores  20% of involved core  and 10% of the tissue   - Perineural invasion is present  F  Prostate, LEFT POSTERIOR LATERAL:  - Benign prostate glands and stroma  G  Prostate, LEFT ANTERIOR LATERAL:  - Benign prostate glands and stroma  H  Prostate, LEFT ANTERIOR MEDIAL:  - Benign prostate glands and stroma       I  Prostate, RIGHT POSTERIOR LATERAL:  - Prostatic adenocarcinoma, Anna grade 3 + 4 = 7 (60% pattern 3 and 40% pattern 4, Grade group 2), involving one of two cores, 90% of involved core and 60% of the tissue   - Perineural invasion is present  J  Prostate, RIGHT ANTERIOR LATERAL:  - Prostatic adenocarcinoma, [place] grade 4+3 = 7 (70% pattern 4 and 30% pattern 3, Grade group 3), involving two of two cores and 70% of the tissue   - Perineural invasion is present  K  Prostate, RIGHT ANTERIOR MEDIAL:  - Benign prostate glands and stroma  Intradepartmental consultation is in agreement  5/16/2023 -  Cancer Staged    Staging form: Prostate, AJCC 8th Edition  - Clinical stage from 5/16/2023: Stage IIC (cT1c, cN0, cM0, PSA: 9 6, Grade Group: 4) - Signed by Roxie Romero MD on 6/26/2023  Stage prefix: Initial diagnosis  Prostate specific antigen (PSA) range: Less than 10  Anna primary pattern: 4  Anna secondary pattern: 4  [place] score: 8  Histologic grading system: 5 grade system       6/14/2023 Initial Diagnosis    Prostate cancer [facility])           Past Medical History:   Diagnosis Date   • CHF (congestive heart failure) ([address] )    • Diabetes mellitus ([address] )    • Diverticulitis    • 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[address] ) 11/07/2018   • Heart disease    • Hyperlipidemia    • Hypertension    • Kidney stone    • Osteoarthritis    • Prostate cancer [facility])    • Vascular disorder      Past Surgical History:   Procedure Laterality Date   • ABDOMINAL SURGERY     • CARDIAC CATHETERIZATION N/A 3/21/2022    Procedure: Cardiac catheterization;  Surgeon: Gino Barfield MD;  Location: [address] [place] [address] CATH LAB; Service: Cardiology   • CARDIAC CATHETERIZATION N/A 3/21/2022    Procedure: Cardiac Coronary Angiogram;  Surgeon: Gino Barfield MD;  Location: [address] [place] [address] CATH LAB;   Service: Cardiology   • COLONOSCOPY     • FL RETROGRADE PYELOGRAM  3/23/2023   • INGUINAL HERNIA REPAIR Left    • LAPAROSCOPIC COLON RESECTION     • GA ARTHROPLASTY GLENOHUMERAL JOINT TOTAL SHOULDER Right 11/17/2020    Procedure: ARTHROPLASTY SHOULDER REVERSE with biceps tendonesis; Surgeon: Wilfrido Coto MD;  Location: [facility] MAIN OR;  Service: Orthopedics   • NM BX PROSTATE STRTCTC SATURATION SAMPLING IMG GID N/A 5/16/2023    Procedure: TRANSPERINEAL MRI FUSION BIOPSY PROSTATE;  Surgeon: Tash Nielsen MD;  Location: [facility] Main OR;  Service: Urology   • NM COLONOSCOPY FLX DX W/COLLJ [facility] WHEN PFRMD N/A 11/3/2017    Procedure: COLONOSCOPY;  Surgeon: Nichol Shipley MD;  Location: [facility]  GI LAB;   Service: Colorectal   • NM CYSTO/URETERO W/LITHOTRIPSY &INDWELL STENT INSRT Left 3/23/2023    Procedure: CYSTOSCOPY URETEROSCOPY WITH LITHOTRIPSY HOLMIUM LASER, RETROGRADE PYELOGRAM AND INSERTION STENT URETERAL, STONE BASKET;  Surgeon: Sukumar Trevizo MD;  Location: [facility] MAIN [facility];  Service: Urology   • [facility] CAROT[facility]ERT[facility] [place] Left 1/13/2020    Procedure: ENDARTERECTOMY ARTERY CAROTID;  Surgeon: Lester Hua MD;  Location: [facility] MAIN OR;  Service: Vascular       Family History   Problem Relation Age of Onset   • Colon cancer Father    • No Known Problems Mother    • Colon cancer Paternal Grandfather    • Colon cancer Family    • Diabetes Half-Sister    • Obesity Half-Sister        Social History   Social History     Substance and Sexual Activity   Alcohol Use Yes   • Alcohol/week: 3 0 standard drinks of alcohol   • Types: 3 Cans of beer per week    Comment: Socially     Social History     Substance and Sexual Activity   Drug Use Never     Social History     Tobacco Use   Smoking Status Some Days   • Types: Cigars   • Passive exposure: Past   Smokeless Tobacco Never         Meds/Allergies     Current Outpatient Medications:   •  amLODIPine (NORVASC) 10 mg tablet, Take 1 tablet (10 mg total) by mouth daily (Patient taking differently: Take 10 mg by mouth every morning), Disp: 90 tablet, Rfl: 3  •  aspirin 81 mg chewable tablet, CHEW AND SWALLOW 1 TABLET  DAILY (Patient taking differently: Chew 81 mg daily at bedtime), Disp: 90 tablet, Rfl: 3  •  atorvastatin (LIPITOR) 20 mg tablet, TAKE 1 TABLET BY MOUTH  DAILY (Patient taking differently: Take 20 mg by mouth daily at bedtime), Disp: 90 tablet, Rfl: 3  •  carvedilol (COREG) 12 5 mg tablet, Take 1 tablet (12 5 mg total) by mouth 2 (two) times a day with meals, Disp: 180 tablet, Rfl: 3  •  Continuous Blood Gluc  (Dexcom G6 ) ANITA, 1 DEXCOM G6  FOR CONTINUOUS GLUCOSE MONITORING, Disp: 1 each, Rfl: 0  •  Continuous Blood Gluc Sensor (Dexcom G6 Sensor) MISC, 3 PACK SENSOR FOR CONTINUOUS GLUCOSE MONITORING, Disp: 9 each, Rfl: 3  •  Continuous Blood Gluc Transmit (Dexcom G6 Transmitter) MISC, 1 TRANSMITTED EVERY 3 MONTHS FOR CONTINUOUS GLUCOSE MONITORING, Disp: 1 each, Rfl: 3  •  glucose blood test strip, Use as instructed, Disp: 100 each, Rfl: 2  •  losartan (COZAAR) 100 MG tablet, TAKE 1 TABLET BY MOUTH  DAILY (Patient taking differently: Take 100 mg by mouth every morning), Disp: 90 tablet, Rfl: 3  •  metFORMIN (GLUCOPHAGE) 1000 MG tablet, TAKE 1 TABLET BY MOUTH  TWICE DAILY WITH MEALS (Patient taking differently: Take 1,000 mg by mouth 2 (two) times a day with meals), Disp: 180 tablet, Rfl: 3  •  sildenafil (VIAGRA) 25 MG tablet, TAKE 1 TABLET BY MOUTH  DAILY AS NEEDED FOR  ERECTILE DYSFUNCTION (Patient taking differently: Take 25 mg by mouth as needed), Disp: 10 tablet, Rfl: 0  •  Januvia 25 MG tablet, TAKE 1 TABLET BY MOUTH  DAILY (Patient taking differently: Take 25 mg by mouth every morning), Disp: 90 tablet, Rfl: 3  Allergies   Allergen Reactions   • Other Hives     Soft shell crabs          Review of Systems   Constitutional: Positive for fatigue  HENT: Positive for dental problem (full dentures) and hearing loss (mild)  Eyes: Negative  Respiratory: Negative  Cardiovascular: Negative  Gastrointestinal: Negative  Genitourinary: Positive for frequency and urgency  Nocturia x 1   Musculoskeletal: Negative  Skin: Negative      Allergic/Immunologic: Positive for food allergies  Neurological: Negative  Hematological: Negative  Psychiatric/Behavioral: Positive for sleep disturbance  OBJECTIVE:   /76   Pulse 71   Temp (!) 97 4 °F (36 3 °C)   Resp 16   Wt 92 1 kg (203 lb)   SpO2 95%   BMI 27 53 kg/m²   Performance Status: Karnofsky: 90 - Able to carry on normal activity; minor signs or symptoms of disease     Physical Exam  Vitals and nursing note reviewed  Constitutional:       General: He is not in acute distress  Cardiovascular:      Rate and Rhythm: Normal rate and regular rhythm  Pulmonary:      Breath sounds: No wheezing, rhonchi or rales  Abdominal:      Palpations: Abdomen is soft  Tenderness: There is no abdominal tenderness  There is no right CVA tenderness or left CVA tenderness  Genitourinary:     Comments: VIKRAM deferred by patient  Musculoskeletal:      Right lower leg: No edema  Left lower leg: No edema  Comments: No spinal tenderness to percussion   Lymphadenopathy:      Cervical: No cervical adenopathy  Lower Body: No right inguinal adenopathy  No left inguinal adenopathy  Neurological:      Mental Status: He is alert and oriented to person, place, and time  Gait: Gait normal          RESULTS  Lab Results  Lab Results   Component Value Date    PSA 9 6 (H) 03/03/2023    PSA 8 5 (H) 09/02/2022    PSA 6 3 (H) 01/24/2022       Imaging Studies  NM bone scan whole body    Result Date: 6/20/2023  Narrative: BONE SCAN  WHOLE BODY INDICATION: C61: Malignant neoplasm of prostate PREVIOUS FILM CORRELATION:    Comparison is made to the skeletal structures of MRI of the prostate dated/10/23, CT renal stone study dated 12/8/2022, and CT of chest dated 11/8/2022 TECHNIQUE:   This study was performed following the intravenous administration of 26 1 mCi Tc-99m labeled MDP  Delayed, anterior and posterior whole body images were acquired, 2-3 hours after radiopharmaceutical administration   FINDINGS: There is no focal tracer activity characteristic of osseous metastatic disease  There is nonspecific multifocal tracer activity in a pattern most consistent with degenerative/arthritic changes involving the left acromioclavicular articulation, bilateral sternoclavicular articulations, sternomanubrial junction thoracolumbar spine, bilateral first carpal metacarpal articulations, and bilateral knees  Both kidneys are visualized        Impression: 1  No scintigraphic evidence of osseous metastasis  Workstation performed: [id number]     4/10/2023 MULTIPARAMETRIC MRI OF THE PROSTATE WITH AND WITHOUT CONTRAST-WITH 3-D POSTPROCESSING  INDICATION:   R97 20: Elevated prostate specific antigen (PSA)  FINDINGS:   PROSTATE:     Size: 4 9 x 3 9 x 3 3 cm = 33 cc  Post-biopsy hemorrhage:  None  Central gland enlargement (BPH): None  Focal lesions - localization as follows:   Lesion: 1       Size: 2 6 x 1 2 x 2 1 cm, series 250 image 12, series 200 image 12, series 3 image 16  Location: Right lateral peripheral zone, base to apex  T2-weighted images: Score 5: Circumscribed, homogeneous moderate hypointense focus/mass greater than or equal to 1 5 cm in greatest dimension OR definite extraprostatic extension/invasive behavior  Diffusion-weighted images: Score 5: Focal markedly hypointense on ADC and markedly hyperintense on high b-value DWI, but greater than or equal to 1 5 cm in greatest dimension or definite extraprostatic extension/invasive behavior  Dynamic post-contrast images: (+) Focal, earlier or contemporaneous with, enhancement of adjacent normal prostatic tissues; corresponds to a finding on T2-weighted and/or DWI  PI-RADS Assessment Category: 5, Very high (clinically significant cancer is highly likely to be present)  Extra-prostatic extension (EPE): Broadly abuts capsule without visualized gross EPE  SEMINAL VESICLES: No seminal vesicle invasion    Intrinsic T1 shortening in the right seminal vesicle, suggestive of proteinaceous/ hemorrhagic contents  Note: Clinically significant cancer is defined on pathology/histology as Anna score greater than or equal to 7, and/or volume of greater than or equal to 0 5 mL, and/or extraprostatic extension  URINARY BLADDER: Unremarkable  LYMPH NODES: No pelvic lymphadenopathy  BONES: No suspicious osseous lesion  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 and associated degenerative changes  Prior left inguinal hernia repair  OTHER: Small right inguinal hernia containing nondilated loop of small bowel and fat, similar to prior CT  Partially imaged right renal cyst and extrarenal pelvis  IMPRESSION:   1  PI-RADSv2 1 Category 5 - Very high (clinically significant cancer is highly likely to be present)  2 6 cm right lateral peripheral zone lesion, from base to apex  2  No extraprostatic tumor, seminal vesicle invasion, pelvic lymphadenopathy, or pelvic osseous metastatic disease  3  Calculated prostate volume of 33 cc  Prostate gland boundaries and areas of concern for significant prostate cancer were segmented using 3D advanced post-processing on an independent [employer] system workstation with active physician participation  The segmentation was performed should   MR-ultrasound fusion biopsy be required          Pathology:  • Collected 5/16/2023 13:39     • Status: Final result    • Visible to patient: Yes (not seen)     • Dx: Elevated prostate specific antigen (PSA)     • 0 Result Notes      Component    Case Report   Surgical Pathology Report                         Case: [id number]                                    Authorizing Provider:  Rocky Haynes MD        Collected:           05/16/2023 1339               Ordering Location:     [facility]        Received:            05/16/2023 Mireille Franks Pathologist:           Laila Bacon MD                                                                  Specimens:   A) - Prostate, REGION OF INTEREST                                                                   B) - Prostate, RIGHT BASE                                                                           C) - Prostate, RIGHT POSTERIOR MEDIAL                                                               D) - Prostate, LEFT BASE                                                                             E) - Prostate, LEFT POSTERIOR MEDIAL                                                                 F) - Prostate, LEFT POSTERIOR LATERAL                                                               G) - Prostate, LEFT ANTERIOR LATERAL                                                                 H) - Prostate, LEFT ANTERIOR MEDIAL                                                                 I) - Prostate, RIGHT POSTERIOR LATERAL                                                               J) - Prostate, RIGHT ANTERIOR LATERAL                                                               K) - Prostate, RIGHT ANTERIOR MEDIAL                                                       Final Diagnosis   A  Prostate, REGION OF INTEREST:  - Prostatic adenocarcinoma, Anna grade 3 + 4 = 7 (60% pattern 3 and 40% pattern 4, Grade group 2), involving four of four prostatic cores and 90% of the total biopsy tissue   - Perineural invasion is present  Note: The fifth core is skeletal muscle only  B  Prostate, RIGHT BASE:  - Prostatic adenocarcinoma, Anna grade 4+4 = 8 (Grade group 4), involving two of two cores and 60% of the tissue   - Perineural invasion is present  Note: Small portion of pattern 5 can't be ruled out due to procedure artifact       C  Prostate, RIGHT POSTERIOR MEDIAL:  - Prostatic adenocarcinoma, [place] grade 4+3 = 7 (50% pattern 4 and 50% pattern 3, Grade group 3), involving two of two cores and 50% of the tissue   - Perineural invasion is present  D  Prostate, LEFT BASE:  - Benign prostate glands and stroma  E  Prostate, LEFT POSTERIOR MEDIAL:  - Prostatic adenocarcinoma, Anna grade 4+4 = 8 (Grade group 4), involving one of two cores  20% of involved core  and 10% of the tissue   - Perineural invasion is present  F  Prostate, LEFT POSTERIOR LATERAL:  - Benign prostate glands and stroma  G  Prostate, LEFT ANTERIOR LATERAL:  - Benign prostate glands and stroma  H  Prostate, LEFT ANTERIOR MEDIAL:  - Benign prostate glands and stroma  I  Prostate, RIGHT POSTERIOR LATERAL:  - Prostatic adenocarcinoma, [place] grade 3 + 4 = 7 (60% pattern 3 and 40% pattern 4, Grade group 2), involving one of two cores, 90% of involved core and 60% of the tissue   - Perineural invasion is present  J  Prostate, RIGHT ANTERIOR LATERAL:  - Prostatic adenocarcinoma, [place] grade 4+3 = 7 (70% pattern 4 and 30% pattern 3, Grade group 3), involving two of two cores and 70% of the tissue   - Perineural invasion is present  K  Prostate, RIGHT ANTERIOR MEDIAL:  - Benign prostate glands and stroma  Intradepartmental consultation is in agreement  Electronically signed by Lisandro Villafana MD on 5/19/2023 at 132              ASSESSMENT  1   Prostate cancer [facility])  Ambulatory Referral to Radiation Oncology        Cancer Staging   Prostate cancer [facility])  Staging form: Prostate, AJCC 8th Edition  - Clinical stage from 5/16/2023: Stage IIC (cT1c, cN0, cM0, PSA: 9 6, Grade Group: 4) - Signed by Megan Pittman MD on 6/26/2023  Stage prefix: Initial diagnosis  Prostate specific antigen (PSA) range: Less than 10  Anna primary pattern: 4  Anna secondary pattern: 4  Anna score: 8  Histologic grading system: 5 grade system      PLAN/DISCUSSION  Anita Adams is a 81 y o male with multiple comorbidities but excellent performance status who was recently diagnosed with clinical stage [id number] prostatic adenocarcinoma, Anna score 8 (4+4) with a pretreatment PSA of 9 6 ng/mL  >50% cores demonstrated disease and all associated with PNI  MRI imaging demonstrates lesion in the right prostate extending from base to apex and a notably small, measuring 33 cc  There was no evidence of KATHLEEN  His presentation is consistent with high risk prostate cancer as defined by the AUA and NCCN guidelines  We discussed treatment options including active surveillance, definitive radiation +/- ADT, and trimodality therapy  Surgical resection previously discussed at length with Urology  I offered the patient 18-24 months of ADT with concurrent definitive radiation to the prostate with inclusion of draining pelvic lymph nodes given [facility] nomogram risk of 19-30% lymph node involvement  The radiation would be a total dose of 79 2 Gy to prostate as tolerated by normal tissues  This would be in line with NCCN guidelines  The rationale and potential benefits, as well as the risks and acute and late side effects and potential toxicities of radiation were discussed with the patient at length  Side effects discussed included, but were not limited to: Fatigue, irritative urinary side effects, diarrhea, rectal urgency, radiation proctitis, erectile dysfunction, and radiation cystitis  There may be increased risk of bowel injury and obstruction due to previous surgery  We discussed brachytherapy as a boost with EBRT + ADT  I explained that trimodality therapy has demonstrated improved biochemical FFF and decreases overall treatment time, but is associated with some increased acute toxicity and no definite survival benefit  He could be considered for brachytherapy alone with ADT  I explained we do not perform prostate brachytherapy and offered referral to tertiary hospital for consultation  He wished to pursue this option    I will offer 2nd opinion to [facility] where proton therapy can also be "considered if indicated given his previous colon surgery  The patient and his wife were given the opportunity to ask questions that were answered at length  We discussed continued surveillance as he has significant comorbidities  His performance status is very good and his comorbidities appear largely controlled at this time  We discussed that high risk prostate cancer has increased risk for distant metastases over the next 5 years  This would still be treatable, but not curable  He was undecided regarding treatment  He will plan to discuss with [facility] and Dr Katerin Tucker  I have asked that if he wishes to pursue radiation at [facility] to please contact our office and we will work with Urology to coordinate care  I feel that he would likely benefit from [facility] and fiducial markers if it can be placed  They agreed with this plan  Total Time Spent  63 minutes spent reviewing EMR in preparation for visit, with the patient, coordination with other providers, and documentation  Greater than 50% of total time was spent with the patient and/or family for counseling and/or coordination of care  Cha Oropeza MD  6/26/2023,12:24 PM      Portions of the record may have been created with voice recognition software  Occasional wrong word or \"sound a like\" substitutions may have occurred due to the inherent limitations of voice recognition software  Read the chart carefully and recognize, using context, where substitutions have occurred          "

## 2023-06-29 ENCOUNTER — VBI (OUTPATIENT)
Dept: ADMINISTRATIVE | Facility: OTHER | Age: 73
End: 2023-06-29

## 2023-07-08 PROBLEM — Z00.00 MEDICARE ANNUAL WELLNESS VISIT, SUBSEQUENT: Status: RESOLVED | Noted: 2022-01-26 | Resolved: 2023-07-08

## 2023-07-12 ENCOUNTER — TELEPHONE (OUTPATIENT)
Dept: UROLOGY | Facility: AMBULATORY SURGERY CENTER | Age: 73
End: 2023-07-12

## 2023-07-12 NOTE — TELEPHONE ENCOUNTER
Pt under care of Dr Genny Tolliver Seen: 5/30/23    Pt calling due to: Pt needs to r/s appt that was for 7/20/23 with Dr Kendy Roche please review when pt can be seen     Pt can be reached at: [phone]

## 2023-07-22 DIAGNOSIS — I10 ESSENTIAL HYPERTENSION: ICD-10-CM

## 2023-07-24 RX ORDER — LOSARTAN POTASSIUM 100 MG/1
100 TABLET ORAL EVERY MORNING
Qty: 90 TABLET | Refills: 3 | Status: SHIPPED | OUTPATIENT
Start: 2023-07-24

## 2023-07-31 ENCOUNTER — TELEPHONE (OUTPATIENT)
Dept: RADIATION ONCOLOGY | Facility: CLINIC | Age: 73
End: 2023-07-31

## 2023-07-31 NOTE — TELEPHONE ENCOUNTER
Barrera Cadena called. He has decided to have brachytherapy at [facility] with Dr. Jonas Govea. Per Dr. Bonita Omalley, Dr. Jonas Govea is recommending 1-2 years of ADT. Barrera Cadena is aware and has follow up with Dr. Hillary Kapadia on 8/8/23. Barrera Cadena will have whole pelvis radiation at [address] in [place] after completing brachytherapy and Dr. Jonas Govea will be in contact for coordination of whole pelvis radiation per Dr. Bonita Omalley. Barrera Cadena is aware and was advised him to contact Dr. John Savage office once he receives ADT.

## 2023-08-02 ENCOUNTER — TELEPHONE (OUTPATIENT)
Dept: UROLOGY | Facility: CLINIC | Age: 73
End: 2023-08-02

## 2023-08-02 PROBLEM — Z79.818 ANDROGEN DEPRIVATION THERAPY: Status: ACTIVE | Noted: 2023-08-02

## 2023-08-02 NOTE — PROGRESS NOTES
Problem List Items Addressed This Visit        Genitourinary    Prostate cancer ([address])    Relevant Medications    degarelix acetate (FIRMAGON) injection 240 mg (Completed)       Other    Elevated PSA    Androgen deprivation therapy - Primary    Relevant Medications    Calcium Carb-Cholecalciferol (calcium carbonate-vitamin D) 500 mg-5 mcg tablet               Discussion:      Sindy Godfrey and I discussed his staging studies (negative for metastasis) and the mechanism of action of degarelix and leuprolide and what to expect on androgen deprivation therapy as well as the need to look after bone health and to incorporate strength training for maintenance of bone health. He will return in 4 weeks for his 6-months lupron injection after which he can see us every 6 months with a psa prior and for lupron injections with a planned 1-2 years of ADT        Portions of the above record have been created with voice recognition software. Occasional wrong word or "sound alike" substitution may have occurred due to the inherent limitations of voice recognition software. Read the chart carefully and recognize, using context, where substitution may have occurred. Assessment and plan:       Please see problem oriented charting for the assessment plan of today's urological complaints      Angelina Louise MD      Chief Complaint     As listed above      History of Present Illness     Cordell Rey is a 72 y.o. man with high risk prostate cancer. He has decided upon brachytherapy and requires androgen deprivation therapy as part of this treatment plan. I discussed the mechanism of action behind these drugs and the planned duration of likely 2 years (firmagon loading dose to be followed by maintenance lupron injection).     ECOG is currently 0    New complaints include none    The following portions of the patient's history were reviewed and updated as appropriate: allergies, current medications, past family history, past medical history, past social history, past surgical history and problem list.    Detailed Urologic History     - please refer to HPI    Review of Systems     Review of Systems   Constitutional: Negative. HENT: Negative. Eyes: Negative. Respiratory: Negative. Cardiovascular: Negative. Gastrointestinal: Negative. Endocrine: Negative. Genitourinary: Negative. Musculoskeletal: Negative. Skin: Negative. Allergic/Immunologic: Negative. Neurological: Negative. Hematological: Negative. Psychiatric/Behavioral: Negative. Allergies     Allergies   Allergen Reactions   • Other Hives     Soft shell crabs        Physical Exam     Physical Exam  Vitals reviewed. Constitutional:       General: He is not in acute distress. Appearance: Normal appearance. He is not ill-appearing, toxic-appearing or diaphoretic. HENT:      Head: Normocephalic and atraumatic. Eyes:      General: No scleral icterus. Right eye: No discharge. Left eye: No discharge. Cardiovascular:      Pulses: Normal pulses. Pulmonary:      Effort: Pulmonary effort is normal.   Abdominal:      General: There is no distension. Palpations: There is no mass. Musculoskeletal:         General: No swelling. Skin:     Coloration: Skin is not jaundiced. Neurological:      General: No focal deficit present. Mental Status: He is alert and oriented to person, place, and time. Cranial Nerves: No cranial nerve deficit. Psychiatric:         Mood and Affect: Mood normal.         Behavior: Behavior normal.         Thought Content:  Thought content normal.         Judgment: Judgment normal.             Vital Signs  Vitals:    08/08/23 1251   BP: 130/70   BP Location: Left arm   Patient Position: Sitting   Cuff Size: Large   Pulse: 82   Resp: 16   SpO2: 96%   Weight: 89.5 kg (197 lb 6.4 oz)   Height: 6' (1.829 m)         Current Medications       Current Outpatient Medications:   •  amLODIPine (NORVASC) 10 mg tablet, Take 1 tablet (10 mg total) by mouth daily (Patient taking differently: Take 10 mg by mouth every morning), Disp: 90 tablet, Rfl: 3  •  aspirin 81 mg chewable tablet, CHEW AND SWALLOW 1 TABLET  DAILY (Patient taking differently: Chew 81 mg daily at bedtime), Disp: 90 tablet, Rfl: 3  •  atorvastatin (LIPITOR) 20 mg tablet, TAKE 1 TABLET BY MOUTH  DAILY (Patient taking differently: Take 20 mg by mouth daily at bedtime), Disp: 90 tablet, Rfl: 3  •  Calcium Carb-Cholecalciferol (calcium carbonate-vitamin D) 500 mg-5 mcg tablet, Take 1 tablet by mouth 2 (two) times a day with meals, Disp: 180 tablet, Rfl: 3  •  carvedilol (COREG) 12.5 mg tablet, Take 1 tablet (12.5 mg total) by mouth 2 (two) times a day with meals, Disp: 180 tablet, Rfl: 3  •  Continuous Blood Gluc  (Dexcom G6 ) ANITA, 1 DEXCOM G6  FOR CONTINUOUS GLUCOSE MONITORING, Disp: 1 each, Rfl: 0  •  Continuous Blood Gluc Sensor (Dexcom G6 Sensor) MISC, 3 PACK SENSOR FOR CONTINUOUS GLUCOSE MONITORING, Disp: 9 each, Rfl: 3  •  Continuous Blood Gluc Transmit (Dexcom G6 Transmitter) MISC, 1 TRANSMITTED EVERY 3 MONTHS FOR CONTINUOUS GLUCOSE MONITORING, Disp: 1 each, Rfl: 3  •  glucose blood test strip, Use as instructed, Disp: 100 each, Rfl: 2  •  Januvia 25 MG tablet, TAKE 1 TABLET BY MOUTH  DAILY (Patient taking differently: Take 25 mg by mouth every morning), Disp: 90 tablet, Rfl: 3  •  losartan (COZAAR) 100 MG tablet, Take 1 tablet (100 mg total) by mouth every morning, Disp: 90 tablet, Rfl: 3  •  metFORMIN (GLUCOPHAGE) 1000 MG tablet, TAKE 1 TABLET BY MOUTH  TWICE DAILY WITH MEALS (Patient taking differently: Take 1,000 mg by mouth 2 (two) times a day with meals), Disp: 180 tablet, Rfl: 3  •  sildenafil (VIAGRA) 25 MG tablet, TAKE 1 TABLET BY MOUTH  DAILY AS NEEDED FOR  ERECTILE DYSFUNCTION (Patient taking differently: Take 25 mg by mouth as needed), Disp: 10 tablet, Rfl: 0  No current facility-administered medications for this visit. Active Problems     Patient Active Problem List   Diagnosis   • Essential hypertension   • Mixed hyperlipidemia   • Type 2 diabetes mellitus without complication, without long-term current use of insulin ([facility])   • Ventral hernia without obstruction or gangrene   • Congestive heart failure, NYHA class 1, acute, systolic ([facility])   • Coronary artery disease due to lipid rich plaque   • Carotid artery stenosis without cerebral infarction, bilateral   • Cerebral aneurysm   • S/P Left carotid endarterectomy 1/13/20   • Familial hypercholesterolemia   • BMI 27.0-27.9,adult   • Chronic right shoulder pain   • Elevated PSA   • Status post reverse total arthroplasty of right shoulder   • Erectile dysfunction of non-organic origin   • Anemia   • Left carpal tunnel syndrome   • Benign paroxysmal positional vertigo   • Facial skin lesion   • Lump in the testicle   • CHF (congestive heart failure) ([address])   • Prostate cancer ([address])   • Androgen deprivation therapy         Past Medical History     Past Medical History:   Diagnosis Date   • CHF (congestive heart failure) ([address])    • Diabetes mellitus ([address])    • Diverticulitis    • 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[address]) 11/07/2018   • Heart disease    • Hyperlipidemia    • Hypertension    • Kidney stone    • Osteoarthritis    • Prostate cancer [facility])    • Vascular disorder          Surgical History     Past Surgical History:   Procedure Laterality Date   • ABDOMINAL SURGERY     • CARDIAC CATHETERIZATION N/A 3/21/2022    Procedure: Cardiac catheterization;  Surgeon: Valerio Duenas MD;  Location: [address] [place] [address] CATH LAB; Service: Cardiology   • CARDIAC CATHETERIZATION N/A 3/21/2022    Procedure: Cardiac Coronary Angiogram;  Surgeon: Valerio Duenas MD;  Location: [address] [place] [address] CATH LAB;   Service: Cardiology   • COLONOSCOPY     • FL RETROGRADE PYELOGRAM  3/23/2023   • INGUINAL HERNIA REPAIR Left    • LAPAROSCOPIC COLON RESECTION     • WY ARTHROPLASTY GLENOHUMERAL JOINT TOTAL SHOULDER Right 11/17/2020    Procedure: ARTHROPLASTY SHOULDER REVERSE with biceps tendonesis; Surgeon: Cameron Hobbs MD;  Location: [facility] MAIN [facility];  Service: Orthopedics   • SD BX PROSTATE STRTCTC SATURATION SAMPLING IMG GID N/A 5/16/2023    Procedure: TRANSPERINEAL MRI FUSION BIOPSY PROSTATE;  Surgeon: Ciaran Chang MD;  Location: [facility] Main [facility];  Service: Urology   • [facility] WHEN PFR[facility] N/A 11/3/2017    Procedure: COLONOSCOPY;  Surgeon: Jose Angel Reyna MD;  Location: [facility]  GI LAB;   Service: Colorectal   • SD CYSTO/URETERO W/LITHOTRIPSY &INDWELL STENT INSRT Left 3/23/2023    Procedure: CYSTOSCOPY URETEROSCOPY WITH LITHOTRIPSY HOLMIUM LASER, RETROGRADE PYELOGRAM AND INSERTION STENT URETERAL, STONE BASKET;  Surgeon: Gary Garcia MD;  Location: [facility] MA[facility] OR;  Service: Urology   • SD TEAEC [facility] GRF CAROTID VERTB [address] Left 1/13/2020    Procedure: ENDARTERECTOMY ARTERY CAROTID;  Surgeon: Erasmo Adams MD;  Location: [facility];  Service: Vascular         Family History     Family History   Problem Relation Age of Onset   • Colon cancer Father    • No Known Problems Mother    • Colon cancer Paternal Grandfather    • Colon cancer Family    • Diabetes Half-Sister    • Obesity Half-Sister          Social History     Social History     Social History     Tobacco Use   Smoking Status Some Days   • Types: Cigars   • Passive exposure: Past   Smokeless Tobacco Never         Pertinent Lab Values     Lab Results   Component Value Date    CREATININE 1.17 08/07/2023       Lab Results   Component Value Date    PSA 9.6 (H) 03/03/2023    PSA 8.5 (H) 09/02/2022    PSA 6.3 (H) 01/24/2022               Pertinent Imaging     MRI prostate 7/24/23 no pelvic LAD, no bony lesions, organ confined disease with some capsular abutment and irregularity on the right    Bone scan without evidence of metastatic prostate cancer

## 2023-08-03 ENCOUNTER — DOCUMENTATION (OUTPATIENT)
Dept: HEMATOLOGY ONCOLOGY | Facility: CLINIC | Age: 73
End: 2023-08-03

## 2023-08-03 NOTE — PROGRESS NOTES
Patient will be receiving Brachytherapy for his prostate cancer at [facility] with Dr Codi Hayes. [facility] is recommending 1-2 years of ADT. Patient is getting loading dose of ADT on 8/8/23. After Brachytherapy he will come back to [facility] with Dr Grisel Hall and receive whole pelvic RT. Patient and Dr. Codi Hayes are responsible to communicate back to [facility] when he completes Brachytherapy. I will continue to follow patient and proceed as needed.

## 2023-08-07 ENCOUNTER — APPOINTMENT (OUTPATIENT)
Dept: LAB | Facility: CLINIC | Age: 73
End: 2023-08-07
Payer: COMMERCIAL

## 2023-08-07 DIAGNOSIS — C61 PROSTATE CANCER (HCC): ICD-10-CM

## 2023-08-07 LAB
ALBUMIN SERPL BCP-MCNC: 3.6 G/DL (ref 3.5–5)
ALP SERPL-CCNC: 50 U/L (ref 46–116)
ALT SERPL W P-5'-P-CCNC: 29 U/L (ref 12–78)
ANION GAP SERPL CALCULATED.3IONS-SCNC: 6 MMOL/L
AST SERPL W P-5'-P-CCNC: 17 U/L (ref 5–45)
BILIRUB SERPL-MCNC: 0.64 MG/DL (ref 0.2–1)
BUN SERPL-MCNC: 19 MG/DL (ref 5–25)
CALCIUM SERPL-MCNC: 10 MG/DL (ref 8.3–10.1)
CHLORIDE SERPL-SCNC: 110 MMOL/L (ref 96–108)
CO2 SERPL-SCNC: 27 MMOL/L (ref 21–32)
CREAT SERPL-MCNC: 1.17 MG/DL (ref 0.6–1.3)
GFR SERPL CREATININE-BSD FRML MDRD: 62 ML/MIN/1.73SQ M
GLUCOSE SERPL-MCNC: 137 MG/DL (ref 65–140)
POTASSIUM SERPL-SCNC: 4.3 MMOL/L (ref 3.5–5.3)
PROT SERPL-MCNC: 7.8 G/DL (ref 6.4–8.4)
SODIUM SERPL-SCNC: 143 MMOL/L (ref 135–147)

## 2023-08-07 PROCEDURE — 80053 COMPREHEN METABOLIC PANEL: CPT

## 2023-08-07 PROCEDURE — 36415 COLL VENOUS BLD VENIPUNCTURE: CPT

## 2023-08-08 ENCOUNTER — CLINICAL SUPPORT (OUTPATIENT)
Dept: UROLOGY | Facility: CLINIC | Age: 73
End: 2023-08-08
Payer: COMMERCIAL

## 2023-08-08 VITALS
OXYGEN SATURATION: 96 % | BODY MASS INDEX: 26.74 KG/M2 | DIASTOLIC BLOOD PRESSURE: 70 MMHG | WEIGHT: 197.4 LBS | RESPIRATION RATE: 16 BRPM | HEART RATE: 82 BPM | HEIGHT: 72 IN | SYSTOLIC BLOOD PRESSURE: 130 MMHG

## 2023-08-08 DIAGNOSIS — R97.20 ELEVATED PSA: ICD-10-CM

## 2023-08-08 DIAGNOSIS — C61 PROSTATE CANCER (HCC): ICD-10-CM

## 2023-08-08 DIAGNOSIS — Z79.818 ANDROGEN DEPRIVATION THERAPY: Primary | ICD-10-CM

## 2023-08-08 PROCEDURE — 96402 CHEMO HORMON ANTINEOPL SQ/IM: CPT

## 2023-08-08 PROCEDURE — 99214 OFFICE O/P EST MOD 30 MIN: CPT | Performed by: UROLOGY

## 2023-08-08 RX ORDER — LANOLIN ALCOHOL/MO/W.PET/CERES
1 CREAM (GRAM) TOPICAL 2 TIMES DAILY WITH MEALS
Qty: 180 TABLET | Refills: 3 | Status: SHIPPED | OUTPATIENT
Start: 2023-08-08 | End: 2024-08-02

## 2023-08-09 ENCOUNTER — DOCUMENTATION (OUTPATIENT)
Dept: HEMATOLOGY ONCOLOGY | Facility: CLINIC | Age: 73
End: 2023-08-09

## 2023-08-09 NOTE — PROGRESS NOTES
Patient received ADT on 8/8/23. Patient will be getting Brachytherapy at [facility]. I will follow up in the near future for update on his treatment.

## 2023-08-25 DIAGNOSIS — K43.9 VENTRAL HERNIA WITHOUT OBSTRUCTION OR GANGRENE: ICD-10-CM

## 2023-08-25 DIAGNOSIS — E78.2 HYPERLIPIDEMIA, MIXED: ICD-10-CM

## 2023-08-25 DIAGNOSIS — E11.9 TYPE 2 DIABETES MELLITUS WITHOUT COMPLICATION, WITHOUT LONG-TERM CURRENT USE OF INSULIN (HCC): ICD-10-CM

## 2023-08-25 DIAGNOSIS — I10 ESSENTIAL HYPERTENSION: ICD-10-CM

## 2023-08-25 DIAGNOSIS — E78.01 FAMILIAL HYPERCHOLESTEROLEMIA: ICD-10-CM

## 2023-08-28 RX ORDER — SITAGLIPTIN 25 MG/1
TABLET, FILM COATED ORAL
Qty: 90 TABLET | Refills: 3 | Status: SHIPPED | OUTPATIENT
Start: 2023-08-28

## 2023-09-08 ENCOUNTER — PROCEDURE VISIT (OUTPATIENT)
Dept: UROLOGY | Facility: CLINIC | Age: 73
End: 2023-09-08
Payer: COMMERCIAL

## 2023-09-08 VITALS
SYSTOLIC BLOOD PRESSURE: 138 MMHG | HEIGHT: 72 IN | WEIGHT: 197 LBS | BODY MASS INDEX: 26.68 KG/M2 | OXYGEN SATURATION: 97 % | HEART RATE: 77 BPM | DIASTOLIC BLOOD PRESSURE: 78 MMHG

## 2023-09-08 DIAGNOSIS — C61 PROSTATE CANCER (HCC): Primary | ICD-10-CM

## 2023-09-08 PROCEDURE — 96402 CHEMO HORMON ANTINEOPL SQ/IM: CPT

## 2023-09-08 RX ORDER — TAMSULOSIN HYDROCHLORIDE 0.4 MG/1
0.4 CAPSULE ORAL
COMMUNITY
Start: 2023-08-17

## 2023-09-08 NOTE — PROGRESS NOTES
9/8/2023  Sandra Santiago is a 72 y.o. male  [id number]    Diagnosis:  Chief Complaint    Prostate Cancer         Patient presents for 45mg lupron injection managed by Dr. Bienvenido Johns:  Plan to follow up with [facility]. Plan to follow up as scheduled for lupron injections with AP with PSA ptv. Medication administration:    Site prepped with alcohol. Medication injected on right gluteal muscle per manufacturers recommendation. Band aid provided over injection site. Patient tolerated well.           Vitals:    09/08/23 1034   BP: 138/78   Pulse: 77   SpO2: 97%   Weight: 89.4 kg (197 lb)   Height: 6' (1.829 m)         Trinidad Teague RN

## 2023-09-08 NOTE — Clinical Note
Hi! Migel Parr received his first lupron today, he also completed his brachytherapy. Just wanted to give a heads up! Thanks!

## 2023-09-13 ENCOUNTER — OFFICE VISIT (OUTPATIENT)
Dept: CARDIOLOGY CLINIC | Facility: CLINIC | Age: 73
End: 2023-09-13
Payer: COMMERCIAL

## 2023-09-13 ENCOUNTER — DOCUMENTATION (OUTPATIENT)
Dept: HEMATOLOGY ONCOLOGY | Facility: CLINIC | Age: 73
End: 2023-09-13

## 2023-09-13 VITALS
SYSTOLIC BLOOD PRESSURE: 100 MMHG | HEIGHT: 72 IN | RESPIRATION RATE: 16 BRPM | BODY MASS INDEX: 26.28 KG/M2 | OXYGEN SATURATION: 97 % | HEART RATE: 78 BPM | WEIGHT: 194 LBS | DIASTOLIC BLOOD PRESSURE: 60 MMHG

## 2023-09-13 DIAGNOSIS — I10 ESSENTIAL HYPERTENSION: ICD-10-CM

## 2023-09-13 DIAGNOSIS — E78.2 MIXED HYPERLIPIDEMIA: ICD-10-CM

## 2023-09-13 DIAGNOSIS — I25.10 CAD (CORONARY ARTERY DISEASE): ICD-10-CM

## 2023-09-13 PROCEDURE — 99214 OFFICE O/P EST MOD 30 MIN: CPT | Performed by: INTERNAL MEDICINE

## 2023-09-13 PROCEDURE — 77263 THER RADIOLOGY TX PLNG CPLX: CPT | Performed by: RADIOLOGY

## 2023-09-13 RX ORDER — ASPIRIN 81 MG/1
81 TABLET, CHEWABLE ORAL DAILY
Qty: 90 TABLET | Refills: 3 | Status: SHIPPED | OUTPATIENT
Start: 2023-09-13

## 2023-09-13 RX ORDER — LOSARTAN POTASSIUM 50 MG/1
50 TABLET ORAL DAILY
Start: 2023-09-13

## 2023-09-13 RX ORDER — ATORVASTATIN CALCIUM 20 MG/1
20 TABLET, FILM COATED ORAL
Qty: 90 TABLET | Refills: 6 | Status: SHIPPED | OUTPATIENT
Start: 2023-09-13

## 2023-09-13 NOTE — PROGRESS NOTES
Cardiology Outpatient Follow up Note    Christina Wallace 72 y.o. male MRN: [id number]    09/13/23          Assessment:  1. Nonobstructive CAD  2. NICM with EF recovery  3. Carotid stenosis s/p L CEA 1/2020  4. DM2- A1c: 7.2  5. Hypertension  6. HLD    Plan:  · Soft BP noted in the setting of weight loss. Will cut losartan to 50mg/day. Consider cessation of norvasc if limited improvement. Cont coreg. · Cont aspirin and atorvastatin. · He follows with Vascular surgery for carotid stenosis  Ambulatory blood pressure monitoring and maintaining a low sodium diet was advised. · He was advised to notify us with the onset of cardiac symptoms. Follow up: 6 months or sooner as needed     1. Essential hypertension  losartan (COZAAR) 50 mg tablet      2. CAD (coronary artery disease)  aspirin 81 mg chewable tablet      3. Mixed hyperlipidemia  atorvastatin (LIPITOR) 20 mg tablet          HPI: Christina Wallace is a 72y.o. year old male with history of mild NICM with EF 50%, nonobstructive CAD and carotid stenosis s/p L CEA who presents for routine follow-up. Past medical history:   · TTE 7/2019: EF: 45%, G1DD, and mild MR, TR.  · Cardiac catheterization 9/2019: 60% LAD and 50% LCx stenosis; no intervention performed  · Carotid duplex 10/2019: 50-69% stenosis of the Right ICA; 70-99% stenosis of the Left ICA  · S/p L carotid endarterectomy on 1/13/2020  · Carotid duplex 21690: 50-69% right ICA stenosis, patent left ICA endarterectomy site; 50-69% stenosis in the mid ICA. · Pharmacologic MPI 2/2022: large inferior infarct. A small amount of melissa-infarct ischemic could not be excluded. · LHC 3/2022: no significant obstructive epicardial CAD. · TTE 6/2022: EF: 55%, g1dd, mild LA dilation     He has been doing well from a cardiac standpoint since his last evaluation. He notes occasional myalgias. He denies anginal symptoms or equivalents. Soft BP noted on presentation today.   He notes 12lb weight loss with use of CGM.   He denies any other cardiac concerns. Family history of CAD on his maternal side with his maternal grandfather and uncle having MIs.      Social history: Smoked 1ppd for 50 years; quit 1 year ago          Patient Active Problem List   Diagnosis   • Essential hypertension   • Mixed hyperlipidemia   • Type 2 diabetes mellitus without complication, without long-term current use of insulin ([address])   • Ventral hernia without obstruction or gangrene   • Congestive heart failure, NYHA class 1, acute, systolic ([facility])   • Coronary artery disease due to lipid rich plaque   • Carotid artery stenosis without cerebral infarction, bilateral   • Cerebral aneurysm   • S/P Left carotid endarterectomy 1/13/20   • Familial hypercholesterolemia   • BMI 27.0-27.9,adult   • Chronic right shoulder pain   • Elevated PSA   • Status post reverse total arthroplasty of right shoulder   • Erectile dysfunction of non-organic origin   • Anemia   • Left carpal tunnel syndrome   • Benign paroxysmal positional vertigo   • Facial skin lesion   • Lump in the testicle   • CHF (congestive heart failure) ([facility])   • Prostate cancer ([address])   • Androgen deprivation therapy       Allergies   Allergen Reactions   • Other Hives     Soft shell crabs          Current Outpatient Medications:   •  amLODIPine (NORVASC) 10 mg tablet, Take 1 tablet (10 mg total) by mouth daily (Patient taking differently: Take 10 mg by mouth every morning), Disp: 90 tablet, Rfl: 3  •  aspirin 81 mg chewable tablet, Chew 1 tablet (81 mg total) daily, Disp: 90 tablet, Rfl: 3  •  atorvastatin (LIPITOR) 20 mg tablet, Take 1 tablet (20 mg total) by mouth daily at bedtime, Disp: 90 tablet, Rfl: 6  •  Calcium Carb-Cholecalciferol (calcium carbonate-vitamin D) 500 mg-5 mcg tablet, Take 1 tablet by mouth 2 (two) times a day with meals, Disp: 180 tablet, Rfl: 3  •  carvedilol (COREG) 12.5 mg tablet, Take 1 tablet (12.5 mg total) by mouth 2 (two) times a day with meals, Disp: 180 tablet, Rfl: 3  •  Continuous Blood Gluc  (Dexcom G6 ) ANITA, 1 DEXCOM G6  FOR CONTINUOUS GLUCOSE MONITORING, Disp: 1 each, Rfl: 0  •  Continuous Blood Gluc Sensor (Dexcom G6 Sensor) MISC, 3 PACK SENSOR FOR CONTINUOUS GLUCOSE MONITORING, Disp: 9 each, Rfl: 3  •  Continuous Blood Gluc Transmit (Dexcom G6 Transmitter) MISC, 1 TRANSMITTED EVERY 3 MONTHS FOR CONTINUOUS GLUCOSE MONITORING, Disp: 1 each, Rfl: 3  •  glucose blood test strip, Use as instructed, Disp: 100 each, Rfl: 2  •  Januvia 25 MG tablet, TAKE 1 TABLET BY MOUTH  DAILY, Disp: 90 tablet, Rfl: 3  •  losartan (COZAAR) 50 mg tablet, Take 1 tablet (50 mg total) by mouth daily, Disp: , Rfl:   •  metFORMIN (GLUCOPHAGE) 1000 MG tablet, TAKE 1 TABLET BY MOUTH  TWICE DAILY WITH MEALS (Patient taking differently: Take 1,000 mg by mouth 2 (two) times a day with meals), Disp: 180 tablet, Rfl: 3  •  sildenafil (VIAGRA) 25 MG tablet, TAKE 1 TABLET BY MOUTH  DAILY AS NEEDED FOR  ERECTILE DYSFUNCTION (Patient taking differently: Take 25 mg by mouth as needed), Disp: 10 tablet, Rfl: 0  •  tamsulosin (FLOMAX) 0.4 mg, Take 0.4 mg by mouth, Disp: , Rfl:     Past Medical History:   Diagnosis Date   • CHF (congestive heart failure) (HCC)    • Diabetes mellitus (HCC)    • Diverticulitis    • 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HCC) 11/07/2018   • Heart disease    • Hyperlipidemia    • Hypertension    • Kidney stone    • Osteoarthritis    • Prostate cancer ([address])    • Vascular disorder        Family History   Problem Relation Age of Onset   • Colon cancer Father    • No Known Problems Mother    • Colon cancer Paternal Grandfather    • Colon cancer Family    • Diabetes Half-Sister    • Obesity Half-Sister        Past Surgical History:   Procedure Laterality Date   • ABDOMINAL SURGERY     • CARDIAC CATHETERIZATION N/A 3/21/2022    Procedure: Cardiac catheterization;  Surgeon: Elver King MD;  Location: [address] [place] [address] CATH LAB;   Service: Cardiology   • CARDIAC CATHETERIZATION N/A 3/21/2022    Procedure: Cardiac Coronary Angiogram;  Surgeon: Evan Stone MD;  Location: [address] [place], [place] [address] CATH LAB; Service: Cardiology   • COLONOSCOPY     • FL RETROGRADE PYELOGRAM  3/23/2023   • INGUINAL HERNIA REPAIR Left    • LAPAROSCOPIC COLON RESECTION     • LA ARTHROPLASTY GLENOHUMERAL JOINT TOTAL SHOULDER Right 11/17/2020    Procedure: ARTHROPLASTY SHOULDER REVERSE with biceps tendonesis; Surgeon: Aileen Cancino MD;  Location: [facility];  Service: Orthopedics   • LA BX PROSTATE STRTCTC SATURATION SAMPLING IMG GID N/A 5/16/2023    Procedure: TRANSPERINEAL MRI FUSION BIOPSY PROSTATE;  Surgeon: Alejandra Quintanilla MD;  Location: [facility];  Service: Urology   • LA COLONOSCOPY FLX DX W/COLLJ [facility] WHEN PFRMD N/A 11/3/2017    Procedure: COLONOSCOPY;  Surgeon: Ozzie Gunter MD;  Location: [facility]; Service: Colorectal   • LA CYSTO/URETERO W/LITHOTRIPSY &INDWELL STENT INSRT Left 3/23/2023    Procedure: CYSTOSCOPY URETEROSCOPY WITH LITHOTRIPSY HOLMIUM LASER, RETROGRADE PYELOGRAM AND INSERTION STENT URETERAL, STONE BASKET;  Surgeon: Cori Lake MD;  Location: [facility];  Service: Urology   • LA [facility] CAROTID VERTB [address] Left 1/13/2020    Procedure: ENDARTERECTOMY ARTERY CAROTID;  Surgeon: Le Bryan MD;  Location: [facility];  Service: Vascular       Social History     Socioeconomic History   • Marital status: /Civil Union     Spouse name: Not on file   • Number of children: Not on file   • Years of education: Not on file   • Highest education level: Not on file   Occupational History   • Not on file   Tobacco Use   • Smoking status: Some Days     Types: Cigars     Passive exposure: Past   • Smokeless tobacco: Never   Vaping Use   • Vaping Use: Never used   Substance and Sexual Activity   • Alcohol use:  Yes     Alcohol/week: 3.0 standard drinks of alcohol     Types: 3 Cans of beer per week     Comment: Socially   • Drug use: Never   • Sexual activity: Yes   Other Topics Concern   • Not on file   Social History Narrative    Caffeine use    Uses safety equipment: seatbelts     Social Determinants of Health     Financial Resource Strain: Low Risk  (5/9/2023)    Overall Financial Resource Strain (CARDIA)    • Difficulty of Paying Living Expenses: Not very hard   Food Insecurity: Not on file   Transportation Needs: No Transportation Needs (5/9/2023)    PRAPARE - Transportation    • Lack of Transportation (Medical): No    • Lack of Transportation (Non-Medical): No   Physical Activity: Not on file   Stress: Not on file   Social Connections: Not on file   Intimate Partner Violence: Not on file   Housing Stability: Not on file       Review of Systems   Constitutional: Positive for weight loss. Negative for diaphoresis and weight gain. HENT: Negative for congestion. Cardiovascular: Negative for chest pain, dyspnea on exertion, irregular heartbeat, leg swelling, near-syncope, orthopnea, palpitations, paroxysmal nocturnal dyspnea and syncope. Respiratory: Negative for shortness of breath, sleep disturbances due to breathing and snoring. Hematologic/Lymphatic: Does not bruise/bleed easily. Skin: Negative for rash. Musculoskeletal: Negative for myalgias. Gastrointestinal: Negative for nausea and vomiting. Neurological: Negative for excessive daytime sleepiness and light-headedness. Psychiatric/Behavioral: The patient is not nervous/anxious. Vitals: /60 (BP Location: Left arm, Patient Position: Sitting, Cuff Size: Standard)   Pulse 78   Resp 16   Ht 6' (1.829 m)   Wt 88 kg (194 lb)   SpO2 97%   BMI 26.31 kg/m²       Physical Exam:   GEN: Alert and oriented x 3, in no acute distress. Well appearing and well nourished. HEENT: Sclera anicteric, conjunctivae pink, mucous membranes moist. Oropharynx clear. NECK: Supple, no carotid bruits, no significant JVD. Trachea midline, no thyromegaly.    HEART: Regular rhythm, normal S1 and S2, no murmurs, clicks, gallops or rubs. PMI nondisplaced, no thrills. LUNGS: Clear to auscultation bilaterally; no wheezes, rales, or rhonchi. No increased work of breathing or signs of respiratory distress. ABDOMEN: Soft, nontender, nondistended, normoactive bowel sounds. EXTREMITIES: Skin warm and well perfused, no clubbing, cyanosis, or edema. NEURO: No focal findings. Normal speech. Mood and affect normal.   SKIN: Normal without suspicious lesions on exposed skin.          Lab Results:       Lab Results   Component Value Date    HGBA1C 7.2 (H) 05/11/2023    HGBA1C 8.2 (A) 01/06/2023    HGBA1C 7.6 (A) 10/04/2022     Lab Results   Component Value Date    CHOL 163 11/22/2016    CHOL 172 09/18/2015     Lab Results   Component Value Date    HDL 34 (L) 03/15/2023    HDL 42 01/24/2022    HDL 43 06/22/2021     Lab Results   Component Value Date    LDLCALC 57 03/15/2023    LDLCALC 72 01/24/2022    LDLCALC 61 06/22/2021     Lab Results   Component Value Date    TRIG 169 (H) 03/15/2023    TRIG 144 01/24/2022    TRIG 349 (H) 06/22/2021     No results found for: "CHOLHDL"

## 2023-09-19 DIAGNOSIS — E11.9 TYPE 2 DIABETES MELLITUS WITHOUT COMPLICATION, WITHOUT LONG-TERM CURRENT USE OF INSULIN (HCC): ICD-10-CM

## 2023-09-19 RX ORDER — PROCHLORPERAZINE 25 MG/1
SUPPOSITORY RECTAL
Qty: 1 EACH | Refills: 0 | Status: SHIPPED | OUTPATIENT
Start: 2023-09-19

## 2023-09-19 RX ORDER — PROCHLORPERAZINE 25 MG/1
SUPPOSITORY RECTAL
Qty: 9 EACH | Refills: 0 | Status: SHIPPED | OUTPATIENT
Start: 2023-09-19

## 2023-09-27 ENCOUNTER — RADIATION ONCOLOGY FOLLOW-UP (OUTPATIENT)
Dept: RADIATION ONCOLOGY | Facility: CLINIC | Age: 73
End: 2023-09-27
Attending: RADIOLOGY
Payer: COMMERCIAL

## 2023-09-27 VITALS
HEART RATE: 75 BPM | SYSTOLIC BLOOD PRESSURE: 120 MMHG | BODY MASS INDEX: 26.45 KG/M2 | OXYGEN SATURATION: 96 % | WEIGHT: 195 LBS | DIASTOLIC BLOOD PRESSURE: 78 MMHG | RESPIRATION RATE: 16 BRPM | TEMPERATURE: 97.8 F

## 2023-09-27 DIAGNOSIS — C61 PROSTATE CANCER (HCC): Primary | ICD-10-CM

## 2023-09-27 PROCEDURE — 77334 RADIATION TREATMENT AID(S): CPT | Performed by: RADIOLOGY

## 2023-09-27 PROCEDURE — 99214 OFFICE O/P EST MOD 30 MIN: CPT | Performed by: RADIOLOGY

## 2023-09-27 NOTE — PROGRESS NOTES
Follow-up - Radiation Oncology   Lamar Medina 1950 72 y.o. male [id number]      History of Present Illness   Cancer Staging   Prostate cancer [facility])  Staging form: Prostate, AJCC 8th Edition  - Clinical stage from 2023: Stage IIC (cT1c, cN0, cM0, PSA: 9.6, Grade Group: 4) - Signed by Aliya Oswald MD on 2023  Stage prefix: Initial diagnosis  Prostate specific antigen (PSA) range: Less than 10  Anna primary pattern: 4  Anna secondary pattern: 4  Anna score: 8  Histologic grading system: 5 grade system      Lamar Medina is a 72 y.o. man with multiple comorbidities but excellent performance status recently diagnosed with clinical stage [id number] prostatic adenocarcinoma, Golden score 8 (4+4) with a pretreatment PSA of 9.6 ng/mL.   >50% cores demonstrated disease and all associated with PNI.  MRI imaging demonstrates lesion in the right prostate extending from base to apex and a notably small, measuring 33 cc. There was no evidence of KATHLEEN. He received 15 Gy in 1 fraction with HDR brachytherapy at \[facility]. He presents today for follow up and SIM for external beam radiation.        23 [facility]- colonoscopy  A.  Ascending colon polyp, polypectomy:   Tubular adenoma. Norma Rasheed colon polyps, polypectomy:   Fragments (x2) of tubular adenoma(s). C.  Rectal polyp, polypectomy:   Hyperplastic polyp.         23 completed 15 Gy in 1 fraction with HDR brachytherapy at [facility] with Dr. Any Sosa. HDR treatment resulted in excellent coverage with 98.7% of the gland receiving prescribed dose.     The patient notes some decrease in urinary stream strength and increase in frequency and urgency. He is maintained on tamsulosin. He denies significant urinary symptoms including hematuria, dysuria, or incontinence. He is able to empty bladder fully. He denies diarrhea or rectal bleeding.   He denies bone pain.      [facility]      Historical Information   Oncology History   Prostate cancer ([address] )   5/16/2023 Biopsy    TRANSPERINEAL MRI FUSION BIOPSY PROSTATE     A. Prostate, REGION OF INTEREST:  - Prostatic adenocarcinoma, Anna grade 3 + 4 = 7 (60% pattern 3 and 40% pattern 4, Grade group 2), involving four of four prostatic cores and 90% of the total biopsy tissue.  - Perineural invasion is present. Note: The fifth core is skeletal muscle only. B. Prostate, RIGHT BASE:  - Prostatic adenocarcinoma, Anna grade 4+4 = 8 (Grade group 4), involving two of two cores and 60% of the tissue.  - Perineural invasion is present. Note: Small portion of pattern 5 can't be ruled out due to procedure artifact. C. Prostate, RIGHT POSTERIOR MEDIAL:  - Prostatic adenocarcinoma, [place] grade 4+3 = 7 (50% pattern 4 and 50% pattern 3, Grade group 3), involving two of two cores and 50% of the tissue.  - Perineural invasion is present. D. Prostate, LEFT BASE:  - Benign prostate glands and stroma. E. Prostate, LEFT POSTERIOR MEDIAL:  - Prostatic adenocarcinoma, Anna grade 4+4 = 8 (Grade group 4), involving one of two cores. 20% of involved core  and 10% of the tissue.  - Perineural invasion is present. F. Prostate, LEFT POSTERIOR LATERAL:  - Benign prostate glands and stroma. G. Prostate, LEFT ANTERIOR LATERAL:  - Benign prostate glands and stroma. H. Prostate, LEFT ANTERIOR MEDIAL:  - Benign prostate glands and stroma. I. Prostate, RIGHT POSTERIOR LATERAL:  - Prostatic adenocarcinoma, [place] grade 3 + 4 = 7 (60% pattern 3 and 40% pattern 4, Grade group 2), involving one of two cores, 90% of involved core and 60% of the tissue.  - Perineural invasion is present. J. Prostate, RIGHT ANTERIOR LATERAL:  - Prostatic adenocarcinoma, [place] grade 4+3 = 7 (70% pattern 4 and 30% pattern 3, Grade group 3), involving two of two cores and 70% of the tissue.  - Perineural invasion is present. K. Prostate, RIGHT ANTERIOR MEDIAL:  - Benign prostate glands and stroma. Intradepartmental consultation is in agreement. 5/16/2023 -  Cancer Staged    Staging form: Prostate, AJCC 8th Edition  - Clinical stage from 5/16/2023: Stage IIC (cT1c, cN0, cM0, PSA: 9.6, Grade Group: 4) - Signed by Erika Tsang MD on 6/26/2023  Stage prefix: Initial diagnosis  Prostate specific antigen (PSA) range: Less than 10  Anna primary pattern: 4  [place] secondary pattern: 4  Anna score: 8  Histologic grading system: 5 grade system       6/14/2023 Initial Diagnosis    Prostate cancer ([address])     8/8/2023 -  Hormone Therapy    Firmagon 240 mg     9/8/2023 -  Hormone Therapy    Lupron 45 mg         Past Medical History:   Diagnosis Date   • CHF (congestive heart failure) (HCC)    • Diabetes mellitus (HCC)    • Diverticulitis    • 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[address]) 11/07/2018   • Heart disease    • Hyperlipidemia    • Hypertension    • Kidney stone    • Osteoarthritis    • Prostate cancer [facility])    • Vascular disorder      Past Surgical History:   Procedure Laterality Date   • ABDOMINAL SURGERY     • CARDIAC CATHETERIZATION N/A 3/21/2022    Procedure: Cardiac catheterization;  Surgeon: Gerardo Bowles MD;  Location: [address] [place] [address] CATH LAB; Service: Cardiology   • CARDIAC CATHETERIZATION N/A 3/21/2022    Procedure: Cardiac Coronary Angiogram;  Surgeon: Gerardo Bowles MD;  Location: [address] [place] [address] CATH LAB; Service: Cardiology   • COLONOSCOPY     • FL RETROGRADE PYELOGRAM  3/23/2023   • INGUINAL HERNIA REPAIR Left    • LAPAROSCOPIC COLON RESECTION     • UT ARTHROPLASTY GLENOHUMERAL JOINT TOTAL SHOULDER Right 11/17/2020    Procedure: ARTHROPLASTY SHOULDER REVERSE with biceps tendonesis;   Surgeon: Nery Her MD;  Location:  MAIN OR;  Service: Orthopedics   • UT BX PROSTATE STRTCTC SATURATION SAMPLING IMG GID N/A 5/16/2023    Procedure: TRANSPERINEAL MRI FUSION BIOPSY PROSTATE;  Surgeon: Darlyn Langston MD;  Location: [facility] Main OR;  Service: Urology   • UT COLONOSCOPY FLX DX W/COLLJ SPEC WHEN PFRMD N/A 11/3/2017    Procedure: COLONOSCOPY;  Surgeon: Ashkan Antonio MD;  Location: [facility]  GI LAB;   Service: Colorectal   • OR CYSTO/URETERO W/LITHOTRIPSY &INDWELL STENT INSRT Left 3/23/2023    Procedure: CYSTOSCOPY URETEROSCOPY WITH LITHOTRIPSY HOLMIUM LASER, RETROGRADE PYELOGRAM AND INSERTION STENT URETERAL, STONE BASKET;  Surgeon: Jennifer Chua MD;  Location: [facility] MAIN OR;  Service: Urology   • OR TEAEC [facility] GRF CAROTID VERTB [address] Left 1/13/2020    Procedure: ENDARTERECTOMY ARTERY CAROTID;  Surgeon: Mariaelena Nogueira MD;  Location: [facility] MAIN OR;  Service: Vascular       Social History   Social History     Substance and Sexual Activity   Alcohol Use Yes   • Alcohol/week: 3.0 standard drinks of alcohol   • Types: 3 Cans of beer per week    Comment: Socially     Social History     Substance and Sexual Activity   Drug Use Never     Social History     Tobacco Use   Smoking Status Some Days   • Types: Cigars   • Passive exposure: Past   Smokeless Tobacco Never         Meds/Allergies     Current Outpatient Medications:   •  amLODIPine (NORVASC) 10 mg tablet, Take 1 tablet (10 mg total) by mouth daily (Patient taking differently: Take 10 mg by mouth every morning), Disp: 90 tablet, Rfl: 3  •  aspirin 81 mg chewable tablet, Chew 1 tablet (81 mg total) daily, Disp: 90 tablet, Rfl: 3  •  atorvastatin (LIPITOR) 20 mg tablet, Take 1 tablet (20 mg total) by mouth daily at bedtime, Disp: 90 tablet, Rfl: 6  •  Calcium Carb-Cholecalciferol (calcium carbonate-vitamin D) 500 mg-5 mcg tablet, Take 1 tablet by mouth 2 (two) times a day with meals, Disp: 180 tablet, Rfl: 3  •  carvedilol (COREG) 12.5 mg tablet, Take 1 tablet (12.5 mg total) by mouth 2 (two) times a day with meals, Disp: 180 tablet, Rfl: 3  •  Continuous Blood Gluc  (Dexcom G6 ) ANITA, 1 DEXCOM G6  FOR CONTINUOUS GLUCOSE MONITORING, Disp: 1 each, Rfl: 0  •  Continuous Blood Gluc Sensor (Dexcom G6 Sensor) MISC, 3 PACK SENSOR FOR CONTINUOUS GLUCOSE MONITORING, Disp: 9 each, Rfl: 0  •  Continuous Blood Gluc Transmit (Dexcom G6 Transmitter) MISC, 1 TRANSMITTED EVERY 3 MONTHS FOR CONTINUOUS GLUCOSE MONITORING, Disp: 1 each, Rfl: 0  •  Januvia 25 MG tablet, TAKE 1 TABLET BY MOUTH  DAILY, Disp: 90 tablet, Rfl: 3  •  losartan (COZAAR) 50 mg tablet, Take 1 tablet (50 mg total) by mouth daily, Disp: , Rfl:   •  metFORMIN (GLUCOPHAGE) 1000 MG tablet, TAKE 1 TABLET BY MOUTH  TWICE DAILY WITH MEALS (Patient taking differently: Take 1,000 mg by mouth 2 (two) times a day with meals), Disp: 180 tablet, Rfl: 3  •  sildenafil (VIAGRA) 25 MG tablet, TAKE 1 TABLET BY MOUTH  DAILY AS NEEDED FOR  ERECTILE DYSFUNCTION (Patient taking differently: Take 25 mg by mouth as needed), Disp: 10 tablet, Rfl: 0  •  tamsulosin (FLOMAX) 0.4 mg, Take 0.4 mg by mouth, Disp: , Rfl:   •  glucose blood test strip, Use as instructed, Disp: 100 each, Rfl: 2  Allergies   Allergen Reactions   • Other Hives     Soft shell crabs        Review of Systems   Constitutional: Positive for diaphoresis (hot flashes due to ADT) and fatigue. HENT: Negative. Eyes: Negative. Respiratory: Negative. Cardiovascular: Negative. Gastrointestinal: Negative. Genitourinary: Positive for frequency and urgency. Occasional urge incontinence    Musculoskeletal: Negative. Skin: Negative. Allergic/Immunologic: Positive for food allergies. Neurological: Negative. Hematological: Negative. Psychiatric/Behavioral: Positive for decreased concentration and sleep disturbance. IPSS Questionnaire (AUA-7): Over the past month…     1)  How often have you had a sensation of not emptying your bladder completely after you finish urinating? 0 - Not at all   2)  How often have you had to urinate again less than two hours after you finished urinating? 4 - More than half the time   3)  How often have you found you stopped and started again several times when you urinated?   0 - Not at all   4) How difficult have you found it to postpone urination? 2 - Less than half the time   5) How often have you had a weak urinary stream?  5 - Almost always   6) How often have you had to push or strain to begin urination? 0 - Not at all   7) How many times did you most typically get up to urinate from the time you went to bed until the time you got up in the morning? 1 - 1 time   Total Score:  12         OBJECTIVE:   /78   Pulse 75   Temp 97.8 °F (36.6 °C)   Resp 16   Wt 88.5 kg (195 lb)   SpO2 96%   BMI 26.45 kg/m²   Karnofsky: 90 - Able to carry on normal activity; minor signs or symptoms of disease     Physical Exam  Vitals and nursing note reviewed. Constitutional:       General: He is not in acute distress. Cardiovascular:      Rate and Rhythm: Normal rate and regular rhythm. Pulmonary:      Breath sounds: No wheezing, rhonchi or rales. Abdominal:      Palpations: Abdomen is soft. Tenderness: There is no abdominal tenderness. There is no right CVA tenderness or left CVA tenderness. Musculoskeletal:      Right lower leg: No edema. Left lower leg: No edema. Comments: No spinal tenderness   Lymphadenopathy:      Cervical: No cervical adenopathy. Neurological:      Mental Status: He is alert and oriented to person, place, and time. Gait: Gait normal.           Assessment/Plan:  Monica Schreiber is a 72 y.o. man diagnosed with clinical stage [id number] prostatic adenocarcinoma, [place] score 8 (4+4) with a pretreatment PSA of 9.6 ng/mL.  He received 15 Gy in 1 fraction with HDR brachytherapy at [facility]. He presents today for follow up and SIM for external beam radiation. I again offered VMAT radiation to the prostate, seminal vesicles, and draining pelvic lymph nodes. We would plan a dose of 45Gy as part of trimodality therapy. This is in accordance with NCCN guidelines.     We reviewed the rationale and potential benefits, as well as the risks and acute and late side effects and potential toxicities of radiation were discussed with the patient at length. Side effects discussed included, but were not limited to: Fatigue, irritative urinary side effects, diarrhea, rectal urgency, radiation proctitis, erectile dysfunction, and radiation cystitis. He was given a chance to ask questions that were answered at length. He asked if ADT duration could be shortened from 18 months given brachytherapy boost.  We will plan to discuss with Dl Marie, but in past we have recommended at least 1 year with trimodality approach. We discussed that EBRT ideally would begin in 4 weeks after completion of brachytherapy. 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 CT simulation today. We will begin radiation when he returns. He agreed with this plan. Total Time Spent  35 minutes spent reviewing EMR in preparation for visit, with the patient, coordination with other providers, and documentation. Greater than 50% of total time was spent with the patient and/or family for counseling and/or coordination of care. Luana Wilson MD  2023,2:02 PM    Portions of the record may have been created with voice recognition software.  Occasional wrong word or "sound a like" substitutions may have occurred due to the inherent limitations of voice recognition software.  Read the chart carefully and recognize, using context, where substitutions have occurred.

## 2023-09-27 NOTE — LETTER
September 28, 2023     Michaela Becerra, [facility]  [address]    Patient: Rafaela Masters   YOB: 1950   Date of Visit: 9/27/2023       Dear Dr. Gio Ortiz: Thank you for referring Rafaela Masters to me for evaluation. Below are my notes for this consultation. If you have questions, please do not hesitate to call me. I look forward to following your patient along with you. Sincerely,        Erika Tsang MD        CC: No Recipients    Erika Tsang MD  9/28/2023  7:43 PM  Sign when Signing Visit  Follow-up - Radiation Oncology   Rafaela Masters 1950 72 y.o. male [id number]      History of Present Illness   Cancer Staging   Prostate cancer [facility])  Staging form: Prostate, AJCC 8th Edition  - Clinical stage from 5/16/2023: Stage IIC (cT1c, cN0, cM0, PSA: 9.6, Grade Group: 4) - Signed by Erika Tsang MD on 6/26/2023  Stage prefix: Initial diagnosis  Prostate specific antigen (PSA) range: Less than 10  Anna primary pattern: 4  [place] secondary pattern: 4  [place] score: 8  Histologic grading system: 5 grade system      Rafaela Masters is a 72 y.o. man with multiple comorbidities but excellent performance status recently diagnosed with clinical stage [id number] prostatic adenocarcinoma, [place] score 8 (4+4) with a pretreatment PSA of 9.6 ng/mL. >50% cores demonstrated disease and all associated with PNI. MRI imaging demonstrates lesion in the right prostate extending from base to apex and a notably small, measuring 33 cc. There was no evidence of KATHLEEN. He received 15 Gy in 1 fraction with HDR brachytherapy at [facility]. He presents today for follow up and SIM for external beam radiation. 8/29/23 [facility]- colonoscopy  A. Ascending colon polyp, polypectomy:   Tubular adenoma. B.  Sigmoid colon polyps, polypectomy:   Fragments (x2) of tubular adenoma(s). C.  Rectal polyp, polypectomy:   Hyperplastic polyp.          9/5/23 completed 15 Gy in 1 fraction with HDR brachytherapy at [facility] with Dr. Omar Linares. HDR treatment resulted in excellent coverage with 98.7% of the gland receiving prescribed dose. The patient notes some decrease in urinary stream strength and increase in frequency and urgency. He is maintained on tamsulosin. He denies significant urinary symptoms including hematuria, dysuria, or incontinence. He is able to empty bladder fully. He denies diarrhea or rectal bleeding. He denies bone pain. [facility]      Historical Information   Oncology History   Prostate cancer [facility])   2023 Biopsy    TRANSPERINEAL MRI FUSION BIOPSY PROSTATE     A. Prostate, REGION OF INTEREST:  - Prostatic adenocarcinoma, [place] grade 3 + 4 = 7 (60% pattern 3 and 40% pattern 4, Grade group 2), involving four of four prostatic cores and 90% of the total biopsy tissue.  - Perineural invasion is present. Note: The fifth core is skeletal muscle only. B. Prostate, RIGHT BASE:  - Prostatic adenocarcinoma, [place] grade 4+4 = 8 (Grade group 4), involving two of two cores and 60% of the tissue.  - Perineural invasion is present. Note: Small portion of pattern 5 can't be ruled out due to procedure artifact. C. Prostate, RIGHT POSTERIOR MEDIAL:  - Prostatic adenocarcinoma, [place] grade 4+3 = 7 (50% pattern 4 and 50% pattern 3, Grade group 3), involving two of two cores and 50% of the tissue.  - Perineural invasion is present. D. Prostate, LEFT BASE:  - Benign prostate glands and stroma. E. Prostate, LEFT POSTERIOR MEDIAL:  - Prostatic adenocarcinoma, [place] grade 4+4 = 8 (Grade group 4), involving one of two cores. 20% of involved core  and 10% of the tissue.  - Perineural invasion is present. F. Prostate, LEFT POSTERIOR LATERAL:  - Benign prostate glands and stroma. G. Prostate, LEFT ANTERIOR LATERAL:  - Benign prostate glands and stroma. H. Prostate, LEFT ANTERIOR MEDIAL:  - Benign prostate glands and stroma.      I. Prostate, RIGHT POSTERIOR LATERAL:  - Prostatic adenocarcinoma, [place] grade 3 + 4 = 7 (60% pattern 3 and 40% pattern 4, Grade group 2), involving one of two cores, 90% of involved core and 60% of the tissue.  - Perineural invasion is present. J. Prostate, RIGHT ANTERIOR LATERAL:  - Prostatic adenocarcinoma, [place] grade 4+3 = 7 (70% pattern 4 and 30% pattern 3, Grade group 3), involving two of two cores and 70% of the tissue.  - Perineural invasion is present. K. Prostate, RIGHT ANTERIOR MEDIAL:  - Benign prostate glands and stroma. Intradepartmental consultation is in agreement. 5/16/2023 -  Cancer Staged    Staging form: Prostate, AJCC 8th Edition  - Clinical stage from 5/16/2023: Stage IIC (cT1c, cN0, cM0, PSA: 9.6, Grade Group: 4) - Signed by Beverly Michael MD on 6/26/2023  Stage prefix: Initial diagnosis  Prostate specific antigen (PSA) range: Less than 10  [place] primary pattern: 4  Anna secondary pattern: 4  Anna score: 8  Histologic grading system: 5 grade system       6/14/2023 Initial Diagnosis    Prostate cancer ([address])     8/8/2023 -  Hormone Therapy    Firmagon 240 mg     9/8/2023 -  Hormone Therapy    Lupron 45 mg         Past Medical History:   Diagnosis Date   • CHF (congestive heart failure) (HCC)    • Diabetes mellitus (HCC)    • Diverticulitis    • 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[address]) 11/07/2018   • Heart disease    • Hyperlipidemia    • Hypertension    • Kidney stone    • Osteoarthritis    • Prostate cancer [facility])    • Vascular disorder      Past Surgical History:   Procedure Laterality Date   • ABDOMINAL SURGERY     • CARDIAC CATHETERIZATION N/A 3/21/2022    Procedure: Cardiac catheterization;  Surgeon: Maya Sidhu MD;  Location: [address] [place] [address] Ave CATH LAB; Service: Cardiology   • CARDIAC CATHETERIZATION N/A 3/21/2022    Procedure: Cardiac Coronary Angiogram;  Surgeon: Maya Sidhu MD;  Location: [facility] CATH LAB;   Service: Cardiology   • COLONOSCOPY     • FL RETROGRADE PYELOGRAM  3/23/2023   • INGUINAL HERNIA REPAIR Left    • LAPAROSCOPIC COLON RESECTION     • MI ARTHROPLASTY GLENOHUMERAL JOINT TOTAL SHOULDER Right 11/17/2020    Procedure: ARTHROPLASTY SHOULDER REVERSE with biceps tendonesis; Surgeon: Coleen Nielsen MD;  Location: [facility] MAIN OR;  Service: Orthopedics   • MI BX PROSTATE STRTCTC SATURATION SAMPLING IMG GID N/A 5/16/2023    Procedure: TRANSPERINEAL MRI FUSION BIOPSY PROSTATE;  Surgeon: Kehinde Aguilar MD;  Location: [facility];  Service: Urology   • MI COLONOSCOPY FLX DX W/COLLJ [facility] WHEN PFRMD N/A 11/3/2017    Procedure: COLONOSCOPY;  Surgeon: Agustina Mock MD;  Location: [facility] GI LAB;   Service: Colorectal   • MI CYSTO/URETERO W/LITHOTRIPSY &INDWELL STENT INSRT Left 3/23/2023    Procedure: CYSTOSCOPY URETEROSCOPY WITH LITHOTRIPSY HOLMIUM LASER, RETROGRADE PYELOGRAM AND INSERTION STENT URETERAL, STONE BASKET;  Surgeon: Leslie Bender MD;  Location: [facility];  Service: Urology   • MI TEAEC [facility] GRF CAROTID VERTB [address] Left 1/13/2020    Procedure: ENDARTERECTOMY ARTERY CAROTID;  Surgeon: Petty Rashid MD;  Location: [facility];  Service: Vascular       Social History   Social History     Substance and Sexual Activity   Alcohol Use Yes   • Alcohol/week: 3.0 standard drinks of alcohol   • Types: 3 Cans of beer per week    Comment: Socially     Social History     Substance and Sexual Activity   Drug Use Never     Social History     Tobacco Use   Smoking Status Some Days   • Types: Cigars   • Passive exposure: Past   Smokeless Tobacco Never         Meds/Allergies     Current Outpatient Medications:   •  amLODIPine (NORVASC) 10 mg tablet, Take 1 tablet (10 mg total) by mouth daily (Patient taking differently: Take 10 mg by mouth every morning), Disp: 90 tablet, Rfl: 3  •  aspirin 81 mg chewable tablet, Chew 1 tablet (81 mg total) daily, Disp: 90 tablet, Rfl: 3  •  atorvastatin (LIPITOR) 20 mg tablet, Take 1 tablet (20 mg total) by mouth daily at bedtime, Disp: 90 tablet, Rfl: 6  •  Calcium Carb-Cholecalciferol (calcium carbonate-vitamin D) 500 mg-5 mcg tablet, Take 1 tablet by mouth 2 (two) times a day with meals, Disp: 180 tablet, Rfl: 3  •  carvedilol (COREG) 12.5 mg tablet, Take 1 tablet (12.5 mg total) by mouth 2 (two) times a day with meals, Disp: 180 tablet, Rfl: 3  •  Continuous Blood Gluc  (Dexcom G6 ) ANITA, 1 DEXCOM G6  FOR CONTINUOUS GLUCOSE MONITORING, Disp: 1 each, Rfl: 0  •  Continuous Blood Gluc Sensor (Dexcom G6 Sensor) MISC, 3 PACK SENSOR FOR CONTINUOUS GLUCOSE MONITORING, Disp: 9 each, Rfl: 0  •  Continuous Blood Gluc Transmit (Dexcom G6 Transmitter) MISC, 1 TRANSMITTED EVERY 3 MONTHS FOR CONTINUOUS GLUCOSE MONITORING, Disp: 1 each, Rfl: 0  •  Januvia 25 MG tablet, TAKE 1 TABLET BY MOUTH  DAILY, Disp: 90 tablet, Rfl: 3  •  losartan (COZAAR) 50 mg tablet, Take 1 tablet (50 mg total) by mouth daily, Disp: , Rfl:   •  metFORMIN (GLUCOPHAGE) 1000 MG tablet, TAKE 1 TABLET BY MOUTH  TWICE DAILY WITH MEALS (Patient taking differently: Take 1,000 mg by mouth 2 (two) times a day with meals), Disp: 180 tablet, Rfl: 3  •  sildenafil (VIAGRA) 25 MG tablet, TAKE 1 TABLET BY MOUTH  DAILY AS NEEDED FOR  ERECTILE DYSFUNCTION (Patient taking differently: Take 25 mg by mouth as needed), Disp: 10 tablet, Rfl: 0  •  tamsulosin (FLOMAX) 0.4 mg, Take 0.4 mg by mouth, Disp: , Rfl:   •  glucose blood test strip, Use as instructed, Disp: 100 each, Rfl: 2  Allergies   Allergen Reactions   • Other Hives     Soft shell crabs        Review of Systems   Constitutional: Positive for diaphoresis (hot flashes due to ADT) and fatigue. HENT: Negative. Eyes: Negative. Respiratory: Negative. Cardiovascular: Negative. Gastrointestinal: Negative. Genitourinary: Positive for frequency and urgency. Occasional urge incontinence    Musculoskeletal: Negative. Skin: Negative. Allergic/Immunologic: Positive for food allergies. Neurological: Negative. Hematological: Negative. Psychiatric/Behavioral: Positive for decreased concentration and sleep disturbance. IPSS Questionnaire (AUA-7): Over the past month…     1)  How often have you had a sensation of not emptying your bladder completely after you finish urinating? 0 - Not at all   2)  How often have you had to urinate again less than two hours after you finished urinating? 4 - More than half the time   3)  How often have you found you stopped and started again several times when you urinated? 0 - Not at all   4) How difficult have you found it to postpone urination? 2 - Less than half the time   5) How often have you had a weak urinary stream?  5 - Almost always   6) How often have you had to push or strain to begin urination? 0 - Not at all   7) How many times did you most typically get up to urinate from the time you went to bed until the time you got up in the morning? 1 - 1 time   Total Score:  12         OBJECTIVE:   /78   Pulse 75   Temp 97.8 °F (36.6 °C)   Resp 16   Wt 88.5 kg (195 lb)   SpO2 96%   BMI 26.45 kg/m²   [place]: 90 - Able to carry on normal activity; minor signs or symptoms of disease     Physical Exam  Vitals and nursing note reviewed. Constitutional:       General: He is not in acute distress. Cardiovascular:      Rate and Rhythm: Normal rate and regular rhythm. Pulmonary:      Breath sounds: No wheezing, rhonchi or rales. Abdominal:      Palpations: Abdomen is soft. Tenderness: There is no abdominal tenderness. There is no right CVA tenderness or left CVA tenderness. Musculoskeletal:      Right lower leg: No edema. Left lower leg: No edema. Comments: No spinal tenderness   Lymphadenopathy:      Cervical: No cervical adenopathy. Neurological:      Mental Status: He is alert and oriented to person, place, and time.       Gait: Gait normal.           Assessment/Plan:  Delisa Fox is a 72 y.o. man diagnosed with clinical stage [id number] prostatic adenocarcinoma, [place] score 8 (4+4) with a pretreatment PSA of 9.6 ng/mL. He received 15 Gy in 1 fraction with HDR brachytherapy at [facility]. He presents today for follow up and SIM for external beam radiation. I again offered VMAT radiation to the prostate, seminal vesicles, and draining pelvic lymph nodes. We would plan a dose of 45Gy as part of trimodality therapy. This is in accordance with NCCN guidelines. We reviewed the rationale and potential benefits, as well as the risks and acute and late side effects and potential toxicities of radiation were discussed with the patient at length. Side effects discussed included, but were not limited to: Fatigue, irritative urinary side effects, diarrhea, rectal urgency, radiation proctitis, erectile dysfunction, and radiation cystitis. He was given a chance to ask questions that were answered at length. He asked if ADT duration could be shortened from 18 months given brachytherapy boost.  We will plan to discuss with Corinne Lerner, but in past we have recommended at least 1 year with trimodality approach. We discussed that EBRT ideally would begin in 4 weeks after completion of brachytherapy. 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 CT simulation today. We will begin radiation when he returns. He agreed with this plan. Total Time Spent  35 minutes spent reviewing EMR in preparation for visit, with the patient, coordination with other providers, and documentation. Greater than 50% of total time was spent with the patient and/or family for counseling and/or coordination of care. Lucas Ruano MD  9/27/2023,2:02 PM    Portions of the record may have been created with voice recognition software.   Occasional wrong word or "sound a like" substitutions may have occurred due to the inherent limitations of voice recognition software. Read the chart carefully and recognize, using context, where substitutions have occurred.

## 2023-09-27 NOTE — PROGRESS NOTES
Latia Mcdowell 1950 is a 72 y.o. male with multiple comorbidities but excellent performance status who was recently diagnosed with clinical stage [id number] prostatic adenocarcinoma, [place] score 8 (4+4) with a pretreatment PSA of 9.6 ng/mL. >50% cores demonstrated disease and all associated with PNI. MRI imaging demonstrates lesion in the right prostate extending from base to apex and a notably small, measuring 33 cc. There was no evidence of KATHLEEN. He received 15 Gy in 1 fraction with HDR brachytherapy at [facility]. He presents today for follow up and SIM for external beam radiation. 23 [facility]- colonoscopy  A. Ascending colon polyp, polypectomy:   Tubular adenoma. B.  Sigmoid colon polyps, polypectomy:   Fragments (x2) of tubular adenoma(s). C.  Rectal polyp, polypectomy:   Hyperplastic polyp. 23 completed 15 Gy in 1 fraction with HDR brachytherapy at [facility] with Dr. Gisel Rodriguez. HDR treatment resulted in excellent coverage with 98.7% of the gland receiving prescribed dose. [facility]      Follow up visit     Oncology History   Prostate cancer ([address])   2023 Biopsy    TRANSPERINEAL MRI FUSION BIOPSY PROSTATE     A. Prostate, REGION OF INTEREST:  - Prostatic adenocarcinoma, Anna grade 3 + 4 = 7 (60% pattern 3 and 40% pattern 4, Grade group 2), involving four of four prostatic cores and 90% of the total biopsy tissue.  - Perineural invasion is present. Note: The fifth core is skeletal muscle only. B. Prostate, RIGHT BASE:  - Prostatic adenocarcinoma, [place] grade 4+4 = 8 (Grade group 4), involving two of two cores and 60% of the tissue.  - Perineural invasion is present. Note: Small portion of pattern 5 can't be ruled out due to procedure artifact.      C. Prostate, RIGHT POSTERIOR MEDIAL:  - Prostatic adenocarcinoma, [place] grade 4+3 = 7 (50% pattern 4 and 50% pattern 3, Grade group 3), involving two of two cores and 50% of the tissue.  - Perineural invasion is present. D. Prostate, LEFT BASE:  - Benign prostate glands and stroma. E. Prostate, LEFT POSTERIOR MEDIAL:  - Prostatic adenocarcinoma, Anna grade 4+4 = 8 (Grade group 4), involving one of two cores. 20% of involved core  and 10% of the tissue.  - Perineural invasion is present. F. Prostate, LEFT POSTERIOR LATERAL:  - Benign prostate glands and stroma. G. Prostate, LEFT ANTERIOR LATERAL:  - Benign prostate glands and stroma. H. Prostate, LEFT ANTERIOR MEDIAL:  - Benign prostate glands and stroma. I. Prostate, RIGHT POSTERIOR LATERAL:  - Prostatic adenocarcinoma, Anna grade 3 + 4 = 7 (60% pattern 3 and 40% pattern 4, Grade group 2), involving one of two cores, 90% of involved core and 60% of the tissue.  - Perineural invasion is present. J. Prostate, RIGHT ANTERIOR LATERAL:  - Prostatic adenocarcinoma, [place] grade 4+3 = 7 (70% pattern 4 and 30% pattern 3, Grade group 3), involving two of two cores and 70% of the tissue.  - Perineural invasion is present. K. Prostate, RIGHT ANTERIOR MEDIAL:  - Benign prostate glands and stroma. Intradepartmental consultation is in agreement. 5/16/2023 -  Cancer Staged    Staging form: Prostate, AJCC 8th Edition  - Clinical stage from 5/16/2023: Stage IIC (cT1c, cN0, cM0, PSA: 9.6, Grade Group: 4) - Signed by Pablito Sanchez MD on 6/26/2023  Stage prefix: Initial diagnosis  Prostate specific antigen (PSA) range: Less than 10  [place] primary pattern: 4  [place] secondary pattern: 4  [place] score: 8  Histologic grading system: 5 grade system       6/14/2023 Initial Diagnosis    Prostate cancer ([address])     8/8/2023 -  Hormone Therapy    Firmagon 240 mg     9/8/2023 -  Hormone Therapy    Lupron 45 mg         Review of Systems:  Review of Systems   Constitutional: Positive for diaphoresis (hot flashes due to ADT) and fatigue. HENT: Negative. Eyes: Negative. Respiratory: Negative. Cardiovascular: Negative. Gastrointestinal: Negative. Genitourinary: Positive for frequency and urgency. Occasional urge incontinence    Musculoskeletal: Negative. Skin: Negative. Allergic/Immunologic: Positive for food allergies. Neurological: Negative. Hematological: Negative. Psychiatric/Behavioral: Positive for decreased concentration and sleep disturbance. Clinical Trial: no    IPSS Questionnaire (AUA-7): Over the past month…    1)  How often have you had a sensation of not emptying your bladder completely after you finish urinating? 0 - Not at all   2)  How often have you had to urinate again less than two hours after you finished urinating? 4 - More than half the time   3)  How often have you found you stopped and started again several times when you urinated? 0 - Not at all   4) How difficult have you found it to postpone urination? 2 - Less than half the time   5) How often have you had a weak urinary stream?  5 - Almost always   6) How often have you had to push or strain to begin urination? 0 - Not at all   7) How many times did you most typically get up to urinate from the time you went to bed until the time you got up in the morning?   1 - 1 time   Total Score:  12       Teaching: reviewed [facility]    Health Maintenance   Topic Date Due   • COVID-19 Vaccine (4 - Moderna series) 12/30/2021   • Colorectal Cancer Screening  11/03/2022   • Influenza Vaccine (1) 09/01/2023   • BMI: Followup Plan  10/04/2023   • HEMOGLOBIN A1C  11/11/2023   • DM Eye Exam  03/09/2024   • Kidney Health Evaluation: Albumin/Creatinine Ratio  03/15/2024   • Fall Risk  05/09/2024   • Medicare Annual Wellness Visit (AWV)  05/09/2024   • Diabetic Foot Exam  05/09/2024   • Depression Screening  06/26/2024   • Kidney Health Evaluation: GFR  08/17/2024   • BMI: Adult  09/13/2024   • Hepatitis C Screening  Completed   • Pneumococcal Vaccine: 65+ Years  Completed   • HIB Vaccine  Aged Out   • IPV Vaccine  Aged Out   • Hepatitis A Vaccine  Aged Out   • Meningococcal ACWY Vaccine  Aged Out   • HPV Vaccine  Aged Out     Patient Active Problem List   Diagnosis   • Essential hypertension   • Mixed hyperlipidemia   • Type 2 diabetes mellitus without complication, without long-term current use of insulin ([facility])   • Ventral hernia without obstruction or gangrene   • Congestive heart failure, NYHA class 1, acute, systolic ([facility])   • Coronary artery disease due to lipid rich plaque   • Carotid artery stenosis without cerebral infarction, bilateral   • Cerebral aneurysm   • S/P Left carotid endarterectomy 1/13/20   • Familial hypercholesterolemia   • BMI 27.0-27.9,adult   • Chronic right shoulder pain   • Elevated PSA   • Status post reverse total arthroplasty of right shoulder   • Erectile dysfunction of non-organic origin   • Anemia   • Left carpal tunnel syndrome   • Benign paroxysmal positional vertigo   • Facial skin lesion   • Lump in the testicle   • CHF (congestive heart failure) ([facility])   • Prostate cancer ([address])   • Androgen deprivation therapy     Past Medical History:   Diagnosis Date   • CHF (congestive heart failure) ([address])    • Diabetes mellitus ([address])    • Diverticulitis    • 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[address]) 11/07/2018   • Heart disease    • Hyperlipidemia    • Hypertension    • Kidney stone    • Osteoarthritis    • Prostate cancer [facility])    • Vascular disorder      Past Surgical History:   Procedure Laterality Date   • ABDOMINAL SURGERY     • CARDIAC CATHETERIZATION N/A 3/21/2022    Procedure: Cardiac catheterization;  Surgeon: Eduard Davidson MD;  Location: [address] [place] [address] CATH LAB; Service: Cardiology   • CARDIAC CATHETERIZATION N/A 3/21/2022    Procedure: Cardiac Coronary Angiogram;  Surgeon: Eduard Davidson MD;  Location: [address] [place] [address] CATH LAB;   Service: Cardiology   • COLONOSCOPY     • [place] RETROGRADE PYELOGRAM  3/23/2023   • INGUINAL HERNIA REPAIR Left    • LAPAROSCOPIC COLON RESECTION     • CT ARTHROPLASTY GLENOHUMERAL JOINT TOTAL SHOULDER Right 11/17/2020    Procedure: ARTHROPLASTY SHOULDER REVERSE with biceps tendonesis; Surgeon: Bella Mann MD;  Location: [facility] MAIN OR;  Service: Orthopedics   • AL BX PROSTATE STRTCTC SATURATION SAMPLING IMG GID N/A 5/16/2023    Procedure: TRANSPERINEAL MRI FUSION BIOPSY PROSTATE;  Surgeon: Dodie Beasley MD;  Location: [facility] Main OR;  Service: Urology   • AL COLONOSCOPY FLX DX W/COLLJ [facility] REHABILITATION WHEN PFRMD N/A 11/3/2017    Procedure: COLONOSCOPY;  Surgeon: Bola Cote MD;  Location: [facility]  GI LAB; Service: Colorectal   • AL CYSTO/URETERO W/LITHOTRIPSY &INDWELL STENT INSRT Left 3/23/2023    Procedure: CYSTOSCOPY URETEROSCOPY WITH LITHOTRIPSY HOLMIUM LASER, RETROGRADE PYELOGRAM AND INSERTION STENT URETERAL, STONE BASKET;  Surgeon: Pop Marshall MD;  Location: [facility] MAIN OR;  Service: Urology   • AL TEAEC [facility] GRF CAROTID VERTB [address] Left 1/13/2020    Procedure: ENDARTERECTOMY ARTERY CAROTID;  Surgeon: Eloy Anna MD;  Location: [facility] MAIN OR;  Service: Vascular     Family History   Problem Relation Age of Onset   • Colon cancer Father    • No Known Problems Mother    • Colon cancer Paternal Grandfather    • Colon cancer Family    • Diabetes Half-Sister    • Obesity Half-Sister      Social History     Socioeconomic History   • Marital status: /Civil Union     Spouse name: Not on file   • Number of children: Not on file   • Years of education: Not on file   • Highest education level: Not on file   Occupational History   • Not on file   Tobacco Use   • Smoking status: Some Days     Types: Cigars     Passive exposure: Past   • Smokeless tobacco: Never   Vaping Use   • Vaping Use: Never used   Substance and Sexual Activity   • Alcohol use:  Yes     Alcohol/week: 3.0 standard drinks of alcohol     Types: 3 Cans of beer per week     Comment: Socially   • Drug use: Never   • Sexual activity: Yes   Other Topics Concern   • Not on file   Social History Narrative    Caffeine use    Uses safety equipment: seatbelts     Social Determinants of Health     Financial Resource Strain: Low Risk  (5/9/2023)    Overall Financial Resource Strain (CARDIA)    • Difficulty of Paying Living Expenses: Not very hard   Food Insecurity: Not on file   Transportation Needs: No Transportation Needs (5/9/2023)    PRAPARE - Transportation    • Lack of Transportation (Medical): No    • Lack of Transportation (Non-Medical):  No   Physical Activity: Not on file   Stress: Not on file   Social Connections: Not on file   Intimate Partner Violence: Not on file   Housing Stability: Not on file       Current Outpatient Medications:   •  amLODIPine (NORVASC) 10 mg tablet, Take 1 tablet (10 mg total) by mouth daily (Patient taking differently: Take 10 mg by mouth every morning), Disp: 90 tablet, Rfl: 3  •  aspirin 81 mg chewable tablet, Chew 1 tablet (81 mg total) daily, Disp: 90 tablet, Rfl: 3  •  atorvastatin (LIPITOR) 20 mg tablet, Take 1 tablet (20 mg total) by mouth daily at bedtime, Disp: 90 tablet, Rfl: 6  •  Calcium Carb-Cholecalciferol (calcium carbonate-vitamin D) 500 mg-5 mcg tablet, Take 1 tablet by mouth 2 (two) times a day with meals, Disp: 180 tablet, Rfl: 3  •  carvedilol (COREG) 12.5 mg tablet, Take 1 tablet (12.5 mg total) by mouth 2 (two) times a day with meals, Disp: 180 tablet, Rfl: 3  •  Continuous Blood Gluc  (Dexcom G6 ) ANITA, 1 DEXCOM G6  FOR CONTINUOUS GLUCOSE MONITORING, Disp: 1 each, Rfl: 0  •  Continuous Blood Gluc Sensor (Dexcom G6 Sensor) MISC, 3 PACK SENSOR FOR CONTINUOUS GLUCOSE MONITORING, Disp: 9 each, Rfl: 0  •  Continuous Blood Gluc Transmit (Dexcom G6 Transmitter) MISC, 1 TRANSMITTED EVERY 3 MONTHS FOR CONTINUOUS GLUCOSE MONITORING, Disp: 1 each, Rfl: 0  •  glucose blood test strip, Use as instructed, Disp: 100 each, Rfl: 2  •  Januvia 25 MG tablet, TAKE 1 TABLET BY MOUTH  DAILY, Disp: 90 tablet, Rfl: 3  •  losartan (COZAAR) 50 mg tablet, Take 1 tablet (50 mg total) by mouth daily, Disp: , Rfl:   •  metFORMIN (GLUCOPHAGE) 1000 MG tablet, TAKE 1 TABLET BY MOUTH  TWICE DAILY WITH MEALS (Patient taking differently: Take 1,000 mg by mouth 2 (two) times a day with meals), Disp: 180 tablet, Rfl: 3  •  sildenafil (VIAGRA) 25 MG tablet, TAKE 1 TABLET BY MOUTH  DAILY AS NEEDED FOR  ERECTILE DYSFUNCTION (Patient taking differently: Take 25 mg by mouth as needed), Disp: 10 tablet, Rfl: 0  •  tamsulosin (FLOMAX) 0.4 mg, Take 0.4 mg by mouth, Disp: , Rfl:   Allergies   Allergen Reactions   • Other Hives     Soft shell crabs      There were no vitals filed for this visit.

## 2023-10-13 DIAGNOSIS — I50.21 CONGESTIVE HEART FAILURE, NYHA CLASS 1, ACUTE, SYSTOLIC (HCC): ICD-10-CM

## 2023-10-16 ENCOUNTER — HOSPITAL ENCOUNTER (OUTPATIENT)
Dept: VASCULAR ULTRASOUND | Facility: HOSPITAL | Age: 73
Discharge: HOME | End: 2023-10-16
Payer: COMMERCIAL

## 2023-10-16 DIAGNOSIS — E78.2 MIXED HYPERLIPIDEMIA: ICD-10-CM

## 2023-10-16 DIAGNOSIS — Z98.890 S/P CAROTID ENDARTERECTOMY: ICD-10-CM

## 2023-10-16 DIAGNOSIS — I65.23 CAROTID ARTERY STENOSIS WITHOUT CEREBRAL INFARCTION, BILATERAL: ICD-10-CM

## 2023-10-16 PROCEDURE — 93880 EXTRACRANIAL BILAT STUDY: CPT

## 2023-10-16 PROCEDURE — 93880 EXTRACRANIAL BILAT STUDY: CPT | Performed by: SURGERY

## 2023-10-16 RX ORDER — CARVEDILOL 12.5 MG/1
12.5 TABLET ORAL 2 TIMES DAILY WITH MEALS
Qty: 180 TABLET | Refills: 3 | Status: SHIPPED | OUTPATIENT
Start: 2023-10-16

## 2023-10-17 ENCOUNTER — TRANSCRIBE ORDERS (OUTPATIENT)
Dept: ADMINISTRATIVE | Facility: HOSPITAL | Age: 73
End: 2023-10-17

## 2023-10-17 DIAGNOSIS — I65.23 CAROTID ARTERY STENOSIS WITHOUT CEREBRAL INFARCTION, BILATERAL: Primary | ICD-10-CM

## 2023-10-18 ENCOUNTER — APPOINTMENT (OUTPATIENT)
Dept: RADIATION ONCOLOGY | Facility: CLINIC | Age: 73
End: 2023-10-18
Attending: RADIOLOGY
Payer: COMMERCIAL

## 2023-10-18 PROCEDURE — 77385 HB NTSTY MODUL RAD TX DLVR SMPL: CPT | Performed by: RADIOLOGY

## 2023-10-18 PROCEDURE — 77338 DESIGN MLC DEVICE FOR IMRT: CPT | Performed by: RADIOLOGY

## 2023-10-18 PROCEDURE — 77387 GUIDANCE FOR RADJ TX DLVR: CPT | Performed by: RADIOLOGY

## 2023-10-18 PROCEDURE — 77427 RADIATION TX MANAGEMENT X5: CPT | Performed by: RADIOLOGY

## 2023-10-18 PROCEDURE — 77300 RADIATION THERAPY DOSE PLAN: CPT | Performed by: RADIOLOGY

## 2023-10-18 PROCEDURE — 77301 RADIOTHERAPY DOSE PLAN IMRT: CPT | Performed by: RADIOLOGY

## 2023-10-19 ENCOUNTER — APPOINTMENT (OUTPATIENT)
Dept: RADIATION ONCOLOGY | Facility: CLINIC | Age: 73
End: 2023-10-19
Attending: RADIOLOGY
Payer: COMMERCIAL

## 2023-10-19 PROCEDURE — 77385 HB NTSTY MODUL RAD TX DLVR SMPL: CPT | Performed by: STUDENT IN AN ORGANIZED HEALTH CARE EDUCATION/TRAINING PROGRAM

## 2023-10-19 PROCEDURE — 77387 GUIDANCE FOR RADJ TX DLVR: CPT | Performed by: STUDENT IN AN ORGANIZED HEALTH CARE EDUCATION/TRAINING PROGRAM

## 2023-10-20 ENCOUNTER — APPOINTMENT (OUTPATIENT)
Dept: RADIATION ONCOLOGY | Facility: CLINIC | Age: 73
End: 2023-10-20
Attending: RADIOLOGY
Payer: COMMERCIAL

## 2023-10-20 PROCEDURE — 77387 GUIDANCE FOR RADJ TX DLVR: CPT | Performed by: RADIOLOGY

## 2023-10-20 PROCEDURE — 77385 HB NTSTY MODUL RAD TX DLVR SMPL: CPT | Performed by: RADIOLOGY

## 2023-10-23 ENCOUNTER — APPOINTMENT (OUTPATIENT)
Dept: RADIATION ONCOLOGY | Facility: CLINIC | Age: 73
End: 2023-10-23
Attending: RADIOLOGY
Payer: COMMERCIAL

## 2023-10-23 PROCEDURE — 77387 GUIDANCE FOR RADJ TX DLVR: CPT | Performed by: RADIOLOGY

## 2023-10-23 PROCEDURE — 77385 HB NTSTY MODUL RAD TX DLVR SMPL: CPT | Performed by: RADIOLOGY

## 2023-10-24 ENCOUNTER — APPOINTMENT (OUTPATIENT)
Dept: RADIATION ONCOLOGY | Facility: CLINIC | Age: 73
End: 2023-10-24
Attending: RADIOLOGY
Payer: COMMERCIAL

## 2023-10-24 PROCEDURE — 77385 HB NTSTY MODUL RAD TX DLVR SMPL: CPT | Performed by: RADIOLOGY

## 2023-10-24 PROCEDURE — 77336 RADIATION PHYSICS CONSULT: CPT | Performed by: RADIOLOGY

## 2023-10-24 PROCEDURE — 77387 GUIDANCE FOR RADJ TX DLVR: CPT | Performed by: RADIOLOGY

## 2023-10-25 ENCOUNTER — APPOINTMENT (OUTPATIENT)
Dept: RADIATION ONCOLOGY | Facility: CLINIC | Age: 73
End: 2023-10-25
Attending: RADIOLOGY
Payer: COMMERCIAL

## 2023-10-25 PROCEDURE — 77387 GUIDANCE FOR RADJ TX DLVR: CPT | Performed by: RADIOLOGY

## 2023-10-25 PROCEDURE — 77427 RADIATION TX MANAGEMENT X5: CPT | Performed by: STUDENT IN AN ORGANIZED HEALTH CARE EDUCATION/TRAINING PROGRAM

## 2023-10-25 PROCEDURE — 77385 HB NTSTY MODUL RAD TX DLVR SMPL: CPT | Performed by: RADIOLOGY

## 2023-10-26 ENCOUNTER — APPOINTMENT (OUTPATIENT)
Dept: RADIATION ONCOLOGY | Facility: CLINIC | Age: 73
End: 2023-10-26
Attending: RADIOLOGY
Payer: COMMERCIAL

## 2023-10-26 PROCEDURE — 77387 GUIDANCE FOR RADJ TX DLVR: CPT | Performed by: STUDENT IN AN ORGANIZED HEALTH CARE EDUCATION/TRAINING PROGRAM

## 2023-10-26 PROCEDURE — 77385 HB NTSTY MODUL RAD TX DLVR SMPL: CPT | Performed by: STUDENT IN AN ORGANIZED HEALTH CARE EDUCATION/TRAINING PROGRAM

## 2023-10-27 ENCOUNTER — APPOINTMENT (OUTPATIENT)
Dept: RADIATION ONCOLOGY | Facility: CLINIC | Age: 73
End: 2023-10-27
Attending: RADIOLOGY
Payer: COMMERCIAL

## 2023-10-27 PROCEDURE — 77387 GUIDANCE FOR RADJ TX DLVR: CPT | Performed by: RADIOLOGY

## 2023-10-27 PROCEDURE — 77385 HB NTSTY MODUL RAD TX DLVR SMPL: CPT | Performed by: RADIOLOGY

## 2023-10-30 ENCOUNTER — APPOINTMENT (OUTPATIENT)
Dept: RADIATION ONCOLOGY | Facility: CLINIC | Age: 73
End: 2023-10-30
Attending: RADIOLOGY
Payer: COMMERCIAL

## 2023-10-30 PROCEDURE — 77387 GUIDANCE FOR RADJ TX DLVR: CPT | Performed by: RADIOLOGY

## 2023-10-30 PROCEDURE — 77385 HB NTSTY MODUL RAD TX DLVR SMPL: CPT | Performed by: RADIOLOGY

## 2023-10-31 ENCOUNTER — APPOINTMENT (OUTPATIENT)
Dept: RADIATION ONCOLOGY | Facility: CLINIC | Age: 73
End: 2023-10-31
Attending: RADIOLOGY
Payer: COMMERCIAL

## 2023-10-31 PROCEDURE — 77387 GUIDANCE FOR RADJ TX DLVR: CPT | Performed by: STUDENT IN AN ORGANIZED HEALTH CARE EDUCATION/TRAINING PROGRAM

## 2023-10-31 PROCEDURE — 77385 HB NTSTY MODUL RAD TX DLVR SMPL: CPT | Performed by: STUDENT IN AN ORGANIZED HEALTH CARE EDUCATION/TRAINING PROGRAM

## 2023-10-31 PROCEDURE — 77336 RADIATION PHYSICS CONSULT: CPT | Performed by: STUDENT IN AN ORGANIZED HEALTH CARE EDUCATION/TRAINING PROGRAM

## 2023-11-01 ENCOUNTER — APPOINTMENT (OUTPATIENT)
Dept: RADIATION ONCOLOGY | Facility: CLINIC | Age: 73
End: 2023-11-01
Attending: RADIOLOGY
Payer: COMMERCIAL

## 2023-11-01 PROCEDURE — 77385 HB NTSTY MODUL RAD TX DLVR SMPL: CPT | Performed by: RADIOLOGY

## 2023-11-01 PROCEDURE — 77387 GUIDANCE FOR RADJ TX DLVR: CPT | Performed by: RADIOLOGY

## 2023-11-01 PROCEDURE — 77427 RADIATION TX MANAGEMENT X5: CPT | Performed by: RADIOLOGY

## 2023-11-02 ENCOUNTER — APPOINTMENT (OUTPATIENT)
Dept: RADIATION ONCOLOGY | Facility: CLINIC | Age: 73
End: 2023-11-02
Attending: RADIOLOGY
Payer: COMMERCIAL

## 2023-11-02 PROCEDURE — 77387 GUIDANCE FOR RADJ TX DLVR: CPT | Performed by: STUDENT IN AN ORGANIZED HEALTH CARE EDUCATION/TRAINING PROGRAM

## 2023-11-02 PROCEDURE — 77385 HB NTSTY MODUL RAD TX DLVR SMPL: CPT | Performed by: STUDENT IN AN ORGANIZED HEALTH CARE EDUCATION/TRAINING PROGRAM

## 2023-11-03 ENCOUNTER — APPOINTMENT (OUTPATIENT)
Dept: RADIATION ONCOLOGY | Facility: CLINIC | Age: 73
End: 2023-11-03
Attending: RADIOLOGY
Payer: COMMERCIAL

## 2023-11-06 ENCOUNTER — APPOINTMENT (OUTPATIENT)
Dept: RADIATION ONCOLOGY | Facility: CLINIC | Age: 73
End: 2023-11-06
Attending: RADIOLOGY
Payer: COMMERCIAL

## 2023-11-06 ENCOUNTER — RA CDI HCC (OUTPATIENT)
Dept: OTHER | Facility: HOSPITAL | Age: 73
End: 2023-11-06

## 2023-11-06 LAB
LEFT EYE DIABETIC RETINOPATHY: NORMAL
RIGHT EYE DIABETIC RETINOPATHY: NORMAL

## 2023-11-06 PROCEDURE — 77387 GUIDANCE FOR RADJ TX DLVR: CPT | Performed by: RADIOLOGY

## 2023-11-06 PROCEDURE — 77385 HB NTSTY MODUL RAD TX DLVR SMPL: CPT | Performed by: RADIOLOGY

## 2023-11-07 ENCOUNTER — APPOINTMENT (OUTPATIENT)
Dept: RADIATION ONCOLOGY | Facility: CLINIC | Age: 73
End: 2023-11-07
Attending: RADIOLOGY
Payer: COMMERCIAL

## 2023-11-07 PROCEDURE — 77387 GUIDANCE FOR RADJ TX DLVR: CPT | Performed by: RADIOLOGY

## 2023-11-07 PROCEDURE — 77385 HB NTSTY MODUL RAD TX DLVR SMPL: CPT | Performed by: RADIOLOGY

## 2023-11-08 ENCOUNTER — APPOINTMENT (OUTPATIENT)
Dept: RADIATION ONCOLOGY | Facility: CLINIC | Age: 73
End: 2023-11-08
Attending: RADIOLOGY
Payer: COMMERCIAL

## 2023-11-08 PROCEDURE — 77336 RADIATION PHYSICS CONSULT: CPT | Performed by: RADIOLOGY

## 2023-11-08 PROCEDURE — 77385 HB NTSTY MODUL RAD TX DLVR SMPL: CPT | Performed by: RADIOLOGY

## 2023-11-08 PROCEDURE — 77387 GUIDANCE FOR RADJ TX DLVR: CPT | Performed by: RADIOLOGY

## 2023-11-09 ENCOUNTER — APPOINTMENT (OUTPATIENT)
Dept: RADIATION ONCOLOGY | Facility: CLINIC | Age: 73
End: 2023-11-09
Attending: RADIOLOGY
Payer: COMMERCIAL

## 2023-11-09 PROCEDURE — 77427 RADIATION TX MANAGEMENT X5: CPT | Performed by: RADIOLOGY

## 2023-11-09 PROCEDURE — 77387 GUIDANCE FOR RADJ TX DLVR: CPT | Performed by: STUDENT IN AN ORGANIZED HEALTH CARE EDUCATION/TRAINING PROGRAM

## 2023-11-09 PROCEDURE — 77385 HB NTSTY MODUL RAD TX DLVR SMPL: CPT | Performed by: STUDENT IN AN ORGANIZED HEALTH CARE EDUCATION/TRAINING PROGRAM

## 2023-11-10 ENCOUNTER — VBI (OUTPATIENT)
Dept: ADMINISTRATIVE | Facility: OTHER | Age: 73
End: 2023-11-10

## 2023-11-10 ENCOUNTER — APPOINTMENT (OUTPATIENT)
Dept: RADIATION ONCOLOGY | Facility: CLINIC | Age: 73
End: 2023-11-10
Attending: RADIOLOGY
Payer: COMMERCIAL

## 2023-11-10 PROCEDURE — 77385 HB NTSTY MODUL RAD TX DLVR SMPL: CPT | Performed by: RADIOLOGY

## 2023-11-10 PROCEDURE — 77387 GUIDANCE FOR RADJ TX DLVR: CPT | Performed by: RADIOLOGY

## 2023-11-13 ENCOUNTER — APPOINTMENT (OUTPATIENT)
Dept: RADIATION ONCOLOGY | Facility: CLINIC | Age: 73
End: 2023-11-13
Attending: RADIOLOGY
Payer: COMMERCIAL

## 2023-11-13 ENCOUNTER — OFFICE VISIT (OUTPATIENT)
Dept: FAMILY MEDICINE CLINIC | Facility: CLINIC | Age: 73
End: 2023-11-13
Payer: COMMERCIAL

## 2023-11-13 VITALS
SYSTOLIC BLOOD PRESSURE: 126 MMHG | HEART RATE: 67 BPM | BODY MASS INDEX: 26.88 KG/M2 | DIASTOLIC BLOOD PRESSURE: 84 MMHG | TEMPERATURE: 98.2 F | OXYGEN SATURATION: 99 % | WEIGHT: 198.2 LBS

## 2023-11-13 DIAGNOSIS — I10 ESSENTIAL HYPERTENSION: ICD-10-CM

## 2023-11-13 DIAGNOSIS — E11.9 TYPE 2 DIABETES MELLITUS WITHOUT COMPLICATION, WITHOUT LONG-TERM CURRENT USE OF INSULIN (HCC): Primary | ICD-10-CM

## 2023-11-13 DIAGNOSIS — E78.2 HYPERLIPIDEMIA, MIXED: ICD-10-CM

## 2023-11-13 DIAGNOSIS — E78.01 FAMILIAL HYPERCHOLESTEROLEMIA: ICD-10-CM

## 2023-11-13 DIAGNOSIS — C61 PROSTATE CANCER (HCC): ICD-10-CM

## 2023-11-13 DIAGNOSIS — E11.65 TYPE 2 DIABETES MELLITUS WITH HYPERGLYCEMIA, WITHOUT LONG-TERM CURRENT USE OF INSULIN (HCC): ICD-10-CM

## 2023-11-13 LAB — SL AMB POCT HEMOGLOBIN AIC: 7.3 (ref ?–6.5)

## 2023-11-13 PROCEDURE — 99214 OFFICE O/P EST MOD 30 MIN: CPT | Performed by: FAMILY MEDICINE

## 2023-11-13 PROCEDURE — 77385 HB NTSTY MODUL RAD TX DLVR SMPL: CPT | Performed by: RADIOLOGY

## 2023-11-13 PROCEDURE — 83036 HEMOGLOBIN GLYCOSYLATED A1C: CPT | Performed by: FAMILY MEDICINE

## 2023-11-13 PROCEDURE — 77387 GUIDANCE FOR RADJ TX DLVR: CPT | Performed by: RADIOLOGY

## 2023-11-13 NOTE — PROGRESS NOTES
Name: Fay Dandy      : 1950      MRN: [id number]  Encounter Provider: Pierre Torres MD  Encounter Date: 2023   Encounter department: [address] [place] [address]     1. Type 2 diabetes mellitus without complication, without long-term current use of insulin (HCC)  -     POCT hemoglobin A1c- 7.3  Elevated  After discussing risks and benefits of medication along with side effects will increase Januvia's dose to 50 mg.  -     Albumin / creatinine urine ratio; Future; Expected date: 2024  -     Comprehensive metabolic panel; Future; Expected date: 2024  -     Hemoglobin A1C; Future; Expected date: 2024  -     sitaGLIPtin (Januvia) 50 mg tablet; Take 1 tablet (50 mg total) by mouth daily    2. Hyperlipidemia, mixed  -    continue statin    3. Essential hypertension  -    continue losartan    4. Prostate cancer [facility])  F/U with radiation therapy and Urology    Follow up in 6 months         Subjective     Patient is here to follow up for Type 2 DM. He denies any symptoms related to this. He has been taking [place] and metformin daily. Denies any side effects. He states that his morning glucose levels have been elevated above 150 mg/dl. Also has a Prostate cancer currently on radiation therapy, Leupron. Follows up with urologist.      Review of Systems   Constitutional:  Negative for activity change, appetite change, fatigue and fever. HENT:  Negative for congestion and ear discharge. Respiratory:  Negative for cough and shortness of breath. Cardiovascular:  Negative for chest pain and palpitations. Gastrointestinal:  Negative for diarrhea and nausea. Musculoskeletal:  Negative for arthralgias and back pain. Skin:  Negative for color change and rash. Neurological:  Negative for dizziness and headaches. Psychiatric/Behavioral:  Negative for agitation and behavioral problems.         Past Medical History:   Diagnosis Date    CHF (congestive heart failure) ([address])     Diabetes mellitus ([address])     Diverticulitis     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[address]) 11/07/2018    Heart disease     Hyperlipidemia     Hypertension     Kidney stone     Osteoarthritis     Prostate cancer [facility])     Vascular disorder      Past Surgical History:   Procedure Laterality Date    ABDOMINAL SURGERY      CARDIAC CATHETERIZATION N/A 3/21/2022    Procedure: Cardiac catheterization;  Surgeon: Kimberly Quintero MD;  Location: [address] CATH LAB; Service: Cardiology    CARDIAC CATHETERIZATION N/A 3/21/2022    Procedure: Cardiac Coronary Angiogram;  Surgeon: Kimberly Quintero MD;  Location: [address] CATH LAB; Service: Cardiology    COLONOSCOPY      [facility] RETROGRADE PYELOGRAM  3/23/2023    INGUINAL HERNIA REPAIR Left     LAPAROSCOPIC COLON RESECTION      [place] ARTHROPLASTY GLENOHUMERAL JOINT TOTAL SHOULDER Right 11/17/2020    Procedure: ARTHROPLASTY SHOULDER REVERSE with biceps tendonesis; Surgeon: Lauren De Leon MD;  Location: [facility];  Service: Orthopedics     [address] IMG GID N/A 5/16/2023    Procedure: TRANSPERINEAL MRI FUSION BIOPSY PROSTATE;  Surgeon: Shantanu Nieves MD;  Location: [facility] Main OR;  Service: Urology    NV COLONOSCOPY FLX DX W/COLLJ [facility] INPATIENT REHABILITATION WHEN PFRMD N/A 11/3/2017    Procedure: COLONOSCOPY;  Surgeon: Gal Leon MD;  Location: [facility] SP GI LAB;   Service: Colorectal    NV CYSTO/URETERO W/LITHOTRIPSY &INDWELL STENT INSRT Left 3/23/2023    Procedure: CYSTOSCOPY URETEROSCOPY WITH LITHOTRIPSY HOLMIUM LASER, RETROGRADE PYELOGRAM AND INSERTION STENT URETERAL, STONE BASKET;  Surgeon: Leonie Santana MD;  Location: [facility];  Service: Urology    [place] [facility] [address]ID [facility]B [address] 1/13/2020    Procedure: ENDARTERECTOMY ARTERY CAROTID;  Surgeon: Vasile Lr MD;  Location: [facility] MAIN OR;  Service: Vascular     Family History   Problem Relation Age of Onset    Colon cancer Father     No Known Problems Mother     Colon cancer Paternal Grandfather     Colon cancer Family     Diabetes Half-Sister     Obesity Half-Sister      Social History     Socioeconomic History    Marital status: /Civil Union     Spouse name: None    Number of children: None    Years of education: None    Highest education level: None   Occupational History    None   Tobacco Use    Smoking status: Some Days     Types: Cigars     Passive exposure: Past    Smokeless tobacco: Never   Vaping Use    Vaping Use: Never used   Substance and Sexual Activity    Alcohol use: Yes     Alcohol/week: 3.0 standard drinks of alcohol     Types: 3 Cans of beer per week     Comment: Socially    Drug use: Never    Sexual activity: Yes   Other Topics Concern    None   Social History Narrative    Caffeine use    Uses safety equipment: seatbelts     Social Determinants of Health     Financial Resource Strain: Low Risk  (5/9/2023)    Overall Financial Resource Strain (CARDIA)     Difficulty of Paying Living Expenses: Not very hard   Food Insecurity: Not on file   Transportation Needs: No Transportation Needs (5/9/2023)    PRAPARE - Transportation     Lack of Transportation (Medical): No     Lack of Transportation (Non-Medical):  No   Physical Activity: Not on file   Stress: Not on file   Social Connections: Not on file   Intimate Partner Violence: Not on file   Housing Stability: Not on file     Current Outpatient Medications on File Prior to Visit   Medication Sig    aspirin 81 mg chewable tablet Chew 1 tablet (81 mg total) daily    atorvastatin (LIPITOR) 20 mg tablet Take 1 tablet (20 mg total) by mouth daily at bedtime    Calcium Carb-Cholecalciferol (calcium carbonate-vitamin D) 500 mg-5 mcg tablet Take 1 tablet by mouth 2 (two) times a day with meals    carvedilol (COREG) 12.5 mg tablet TAKE 1 TABLET BY MOUTH TWICE  DAILY WITH MEALS    Continuous Blood Gluc  (Dexcom G6 ) ANITA 1 DEXCOM G6  FOR CONTINUOUS GLUCOSE MONITORING    Continuous Blood Gluc Sensor (Dexcom G6 Sensor) MISC 3 PACK SENSOR FOR CONTINUOUS GLUCOSE MONITORING    Continuous Blood Gluc Transmit (Dexcom G6 Transmitter) MISC 1 TRANSMITTED EVERY 3 MONTHS FOR CONTINUOUS GLUCOSE MONITORING    glucose blood test strip Use as instructed    losartan (COZAAR) 50 mg tablet Take 1 tablet (50 mg total) by mouth daily    tamsulosin (FLOMAX) 0.4 mg Take 0.4 mg by mouth    [DISCONTINUED] Januvia 25 MG tablet TAKE 1 TABLET BY MOUTH  DAILY    amLODIPine (NORVASC) 10 mg tablet Take 1 tablet (10 mg total) by mouth daily (Patient taking differently: Take 10 mg by mouth every morning)    metFORMIN (GLUCOPHAGE) 1000 MG tablet TAKE 1 TABLET BY MOUTH  TWICE DAILY WITH MEALS (Patient taking differently: Take 1,000 mg by mouth 2 (two) times a day with meals)    sildenafil (VIAGRA) 25 MG tablet TAKE 1 TABLET BY MOUTH  DAILY AS NEEDED FOR  ERECTILE DYSFUNCTION (Patient taking differently: Take 25 mg by mouth as needed)     Allergies   Allergen Reactions    Other Hives     Soft shell crabs      Immunization History   Administered Date(s) Administered    COVID-19 MODERNA VACC 0.5 ML IM 03/23/2021, 04/20/2021, 11/04/2021    Pneumococcal Conjugate Vaccine 20-valent (Pcv20), Polysace 10/04/2022    Tdap 08/29/2017       Objective     /84   Pulse 67   Temp 98.2 °F (36.8 °C)   Wt 89.9 kg (198 lb 3.2 oz)   SpO2 99%   BMI 26.88 kg/m²     Physical Exam  Constitutional:       General: He is not in acute distress. Appearance: He is well-developed. He is not diaphoretic. Eyes:      General: No scleral icterus. Pupils: Pupils are equal, round, and reactive to light. Cardiovascular:      Rate and Rhythm: Normal rate and regular rhythm. Heart sounds: Normal heart sounds. No murmur heard. Pulmonary:      Effort: Pulmonary effort is normal. No respiratory distress. Breath sounds: Normal breath sounds. No wheezing. Abdominal:      General: Bowel sounds are normal. There is no distension.       Palpations: Abdomen is soft. Tenderness: There is no abdominal tenderness. Skin:     General: Skin is warm and dry. Findings: No rash. Neurological:      Mental Status: He is alert and oriented to person, place, and time.        Amanda Alonso MD

## 2023-11-14 ENCOUNTER — APPOINTMENT (OUTPATIENT)
Dept: RADIATION ONCOLOGY | Facility: CLINIC | Age: 73
End: 2023-11-14
Attending: RADIOLOGY
Payer: COMMERCIAL

## 2023-11-14 DIAGNOSIS — C61 PROSTATE CANCER (HCC): Primary | ICD-10-CM

## 2023-11-14 PROCEDURE — 77387 GUIDANCE FOR RADJ TX DLVR: CPT | Performed by: STUDENT IN AN ORGANIZED HEALTH CARE EDUCATION/TRAINING PROGRAM

## 2023-11-14 PROCEDURE — 77385 HB NTSTY MODUL RAD TX DLVR SMPL: CPT | Performed by: STUDENT IN AN ORGANIZED HEALTH CARE EDUCATION/TRAINING PROGRAM

## 2023-11-14 RX ORDER — TAMSULOSIN HYDROCHLORIDE 0.4 MG/1
0.4 CAPSULE ORAL
Qty: 30 CAPSULE | Refills: 1 | Status: SHIPPED | OUTPATIENT
Start: 2023-11-14

## 2023-11-15 ENCOUNTER — APPOINTMENT (OUTPATIENT)
Dept: RADIATION ONCOLOGY | Facility: CLINIC | Age: 73
End: 2023-11-15
Attending: RADIOLOGY
Payer: COMMERCIAL

## 2023-11-15 PROCEDURE — 77387 GUIDANCE FOR RADJ TX DLVR: CPT | Performed by: RADIOLOGY

## 2023-11-15 PROCEDURE — 77336 RADIATION PHYSICS CONSULT: CPT | Performed by: RADIOLOGY

## 2023-11-15 PROCEDURE — 77385 HB NTSTY MODUL RAD TX DLVR SMPL: CPT | Performed by: RADIOLOGY

## 2023-11-16 ENCOUNTER — APPOINTMENT (OUTPATIENT)
Dept: RADIATION ONCOLOGY | Facility: CLINIC | Age: 73
End: 2023-11-16
Attending: RADIOLOGY
Payer: COMMERCIAL

## 2023-11-16 DIAGNOSIS — E11.9 TYPE 2 DIABETES MELLITUS WITHOUT COMPLICATION, WITHOUT LONG-TERM CURRENT USE OF INSULIN (HCC): ICD-10-CM

## 2023-11-16 PROCEDURE — 77427 RADIATION TX MANAGEMENT X5: CPT | Performed by: STUDENT IN AN ORGANIZED HEALTH CARE EDUCATION/TRAINING PROGRAM

## 2023-11-16 PROCEDURE — 77385 HB NTSTY MODUL RAD TX DLVR SMPL: CPT | Performed by: RADIOLOGY

## 2023-11-16 PROCEDURE — 77387 GUIDANCE FOR RADJ TX DLVR: CPT | Performed by: RADIOLOGY

## 2023-11-17 ENCOUNTER — APPOINTMENT (OUTPATIENT)
Dept: RADIATION ONCOLOGY | Facility: CLINIC | Age: 73
End: 2023-11-17
Attending: RADIOLOGY
Payer: COMMERCIAL

## 2023-11-17 PROCEDURE — 77387 GUIDANCE FOR RADJ TX DLVR: CPT | Performed by: RADIOLOGY

## 2023-11-17 PROCEDURE — 77385 HB NTSTY MODUL RAD TX DLVR SMPL: CPT | Performed by: RADIOLOGY

## 2023-11-19 ENCOUNTER — APPOINTMENT (OUTPATIENT)
Dept: RADIATION ONCOLOGY | Facility: CLINIC | Age: 73
End: 2023-11-19
Attending: RADIOLOGY
Payer: COMMERCIAL

## 2023-11-19 PROCEDURE — 77385 HB NTSTY MODUL RAD TX DLVR SMPL: CPT | Performed by: RADIOLOGY

## 2023-11-19 PROCEDURE — 77387 GUIDANCE FOR RADJ TX DLVR: CPT | Performed by: RADIOLOGY

## 2023-11-20 ENCOUNTER — APPOINTMENT (OUTPATIENT)
Dept: RADIATION ONCOLOGY | Facility: CLINIC | Age: 73
End: 2023-11-20
Attending: RADIOLOGY
Payer: COMMERCIAL

## 2023-11-20 ENCOUNTER — APPOINTMENT (OUTPATIENT)
Dept: RADIATION ONCOLOGY | Facility: CLINIC | Age: 73
End: 2023-11-20
Payer: COMMERCIAL

## 2023-11-20 PROCEDURE — 77387 GUIDANCE FOR RADJ TX DLVR: CPT | Performed by: RADIOLOGY

## 2023-11-20 PROCEDURE — 77385 HB NTSTY MODUL RAD TX DLVR SMPL: CPT | Performed by: RADIOLOGY

## 2023-11-21 ENCOUNTER — APPOINTMENT (OUTPATIENT)
Dept: RADIATION ONCOLOGY | Facility: CLINIC | Age: 73
End: 2023-11-21
Attending: RADIOLOGY
Payer: COMMERCIAL

## 2023-11-21 PROCEDURE — 77336 RADIATION PHYSICS CONSULT: CPT | Performed by: STUDENT IN AN ORGANIZED HEALTH CARE EDUCATION/TRAINING PROGRAM

## 2023-11-21 PROCEDURE — 77385 HB NTSTY MODUL RAD TX DLVR SMPL: CPT | Performed by: STUDENT IN AN ORGANIZED HEALTH CARE EDUCATION/TRAINING PROGRAM

## 2023-11-21 PROCEDURE — 77387 GUIDANCE FOR RADJ TX DLVR: CPT | Performed by: STUDENT IN AN ORGANIZED HEALTH CARE EDUCATION/TRAINING PROGRAM

## 2023-11-22 ENCOUNTER — APPOINTMENT (OUTPATIENT)
Dept: RADIATION ONCOLOGY | Facility: CLINIC | Age: 73
End: 2023-11-22
Payer: COMMERCIAL

## 2023-12-15 ENCOUNTER — VBI (OUTPATIENT)
Dept: ADMINISTRATIVE | Facility: OTHER | Age: 73
End: 2023-12-15

## 2023-12-19 NOTE — PROGRESS NOTES
12/20/2023      Chief Complaint   Patient presents with    Follow-up       Assessment and Plan    High risk prostate cancer  - clinical stage [id number] prostatic adenocarcinoma, Prescott score 8 (4+4)  - Pretreatment PSA 9.4  - S/p brachytherapy at [facility] with Dr. Lenz completed on 9/5/23   - Firmagon 240 mg on 8/8/23  - Lupron 45 mg on 9/8/23, next Lupron due 24 weeks from prior (recommendations for 18 months total duration per [facility])  - Most recent s/p EBRT completed last month  - Continue Flomax for irritative voiding symptoms.   - Follow up in February with PSA prior     2. Left ureteral stone s/p left URS with LL on 3/23/23  - Obtain KUB and US as ordered.     History of Present Illness  Erickson Jimenez is a 73 y.o. male here for follow up evaluation of  above. He recently finished radiation therapy. He continues to have some irritative voiding symptoms which has improved with Flomax.         Review of Systems   Constitutional:  Negative for chills and fever.   Respiratory:  Negative for shortness of breath.    Cardiovascular:  Negative for chest pain.   Gastrointestinal:  Negative for abdominal pain.   Genitourinary:  Negative for difficulty urinating, dysuria, flank pain, frequency, hematuria and urgency.   Neurological:  Negative for dizziness.                  Past Medical History  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    Heart disease     Hyperlipidemia     Hypertension     Kidney stone     Osteoarthritis     Prostate cancer (HCC)     Vascular disorder        Past Social History  Past Surgical History:   Procedure Laterality Date    ABDOMINAL SURGERY      CARDIAC CATHETERIZATION N/A 3/21/2022    Procedure: Cardiac catheterization;  Surgeon: Stephy Horner MD;  [facility];  Service: Cardiology    CARDIAC CATHETERIZATION N/A 3/21/2022    Procedure:  Cardiac Coronary Angiogram;  Surgeon: Stephy Horner MD;  [facility];  Service: Cardiology    COLONOSCOPY      FL RETROGRADE PYELOGRAM  3/23/2023    INGUINAL HERNIA REPAIR Left     LAPAROSCOPIC COLON RESECTION      AZ ARTHROPLASTY GLENOHUMERAL JOINT TOTAL SHOULDER Right 11/17/2020    Procedure: ARTHROPLASTY SHOULDER REVERSE with biceps tendonesis;  Surgeon: Robby Marquez MD;  [facility];  Service: Orthopedics    AZ BX PROSTATE STRTCTC SATURATION SAMPLING IMG GID N/A 5/16/2023    Procedure: TRANSPERINEAL MRI FUSION BIOPSY PROSTATE;  Surgeon: Quan Velasco MD;  [facility];  Service: Urology    AZ COLONOSCOPY FLX DX W/COLLJ SPEC WHEN PFRMD N/A 11/3/2017    Procedure: COLONOSCOPY;  Surgeon: SUZAN Muñiz MD;  [facility];  Service: Colorectal    AZ CYSTO/URETERO W/LITHOTRIPSY &INDWELL STENT INSRT Left 3/23/2023    Procedure: CYSTOSCOPY URETEROSCOPY WITH LITHOTRIPSY HOLMIUM LASER, RETROGRADE PYELOGRAM AND INSERTION STENT URETERAL, STONE BASKET;  Surgeon: Michelet Johns MD;  [facility];  Service: Urology    AZ TEAEC W/PATCH GRF CAROTID VERTB SUBCLAV NECK INC Left 1/13/2020    Procedure: ENDARTERECTOMY ARTERY CAROTID;  Surgeon: Amado Teague MD;  [facility];  Service: Vascular     Social History     Tobacco Use   Smoking Status Some Days    Types: Cigars    Passive exposure: Past   Smokeless Tobacco Never       Past Family History  Family History   Problem Relation Age of Onset    Colon cancer Father     No Known Problems Mother     Colon cancer Paternal Grandfather     Colon cancer Family     Diabetes Half-Sister     Obesity Half-Sister        Past Social history  Social History     Socioeconomic History    Marital status: /Civil Union     Spouse name: Not on file    Number of children: Not on file    Years of education: Not on file    Highest education level: Not on file   Occupational History    Not on file   Tobacco Use     Smoking status: Some Days     Types: Cigars     Passive exposure: Past    Smokeless tobacco: Never   Vaping Use    Vaping status: Never Used   Substance and Sexual Activity    Alcohol use: Yes     Alcohol/week: 3.0 standard drinks of alcohol     Types: 3 Cans of beer per week     Comment: Socially    Drug use: Never    Sexual activity: Yes   Other Topics Concern    Not on file   Social History Narrative    Caffeine use    Uses safety equipment: seatbelts     Social Determinants of Health     Financial Resource Strain: Low Risk  (5/9/2023)    Overall Financial Resource Strain (CARDIA)     Difficulty of Paying Living Expenses: Not very hard   Food Insecurity: Not on file   Transportation Needs: No Transportation Needs (5/9/2023)    PRAPARE - Transportation     Lack of Transportation (Medical): No     Lack of Transportation (Non-Medical): No   Physical Activity: Not on file   Stress: Not on file   Social Connections: Not on file   Intimate Partner Violence: Not on file   Housing Stability: Not on file       Current Medications  Current Outpatient Medications   Medication Sig Dispense Refill    amLODIPine (NORVASC) 10 mg tablet Take 1 tablet (10 mg total) by mouth daily (Patient taking differently: Take 10 mg by mouth every morning) 90 tablet 3    aspirin 81 mg chewable tablet Chew 1 tablet (81 mg total) daily 90 tablet 3    atorvastatin (LIPITOR) 20 mg tablet Take 1 tablet (20 mg total) by mouth daily at bedtime 90 tablet 6    Calcium Carb-Cholecalciferol (calcium carbonate-vitamin D) 500 mg-5 mcg tablet Take 1 tablet by mouth 2 (two) times a day with meals 180 tablet 3    carvedilol (COREG) 12.5 mg tablet TAKE 1 TABLET BY MOUTH TWICE  DAILY WITH MEALS 180 tablet 3    Continuous Blood Gluc  (Dexcom G6 ) ANITA 1 DEXCOM G6  FOR CONTINUOUS GLUCOSE MONITORING 1 each 0    Continuous Blood Gluc Sensor (Dexcom G6 Sensor) MISC 3 PACK SENSOR FOR CONTINUOUS GLUCOSE MONITORING 9 each 0    Continuous Blood  Gluc Transmit (Dexcom G6 Transmitter) MISC 1 TRANSMITTED EVERY 3 MONTHS FOR CONTINUOUS GLUCOSE MONITORING 1 each 0    glucose blood test strip Use as instructed 100 each 2    losartan (COZAAR) 100 MG tablet       losartan (COZAAR) 50 mg tablet Take 1 tablet (50 mg total) by mouth daily      metFORMIN (GLUCOPHAGE) 1000 MG tablet Take 1 tablet (1,000 mg total) by mouth 2 (two) times a day with meals 180 tablet 3    sildenafil (VIAGRA) 25 MG tablet TAKE 1 TABLET BY MOUTH  DAILY AS NEEDED FOR  ERECTILE DYSFUNCTION (Patient taking differently: Take 25 mg by mouth as needed) 10 tablet 0    sitaGLIPtin (Januvia) 50 mg tablet Take 1 tablet (50 mg total) by mouth daily 90 tablet 3    tamsulosin (FLOMAX) 0.4 mg Take 1 capsule (0.4 mg total) by mouth daily with dinner 30 capsule 1     No current facility-administered medications for this visit.       Allergies  Allergies   Allergen Reactions    Other Hives     Soft shell crabs          The following portions of the patient's history were reviewed and updated as appropriate: allergies, current medications, past medical history, past social history, past surgical history and problem list.      Vitals  Vitals:    12/20/23 0915   BP: 118/76   Pulse: 79   SpO2: 99%   Weight: 85.7 kg (189 lb)   Height: 6' (1.829 m)           Physical Exam  Physical Exam  Constitutional:       Appearance: Normal appearance.   HENT:      Head: Normocephalic and atraumatic.      Right Ear: External ear normal.      Left Ear: External ear normal.      Nose: Nose normal.   Eyes:      General: No scleral icterus.     Conjunctiva/sclera: Conjunctivae normal.   Cardiovascular:      Pulses: Normal pulses.   Pulmonary:      Effort: Pulmonary effort is normal.   Musculoskeletal:         General: Normal range of motion.      Cervical back: Normal range of motion.   Neurological:      General: No focal deficit present.      Mental Status: He is alert and oriented to person, place, and time.   Psychiatric:          Mood and Affect: Mood normal.         Behavior: Behavior normal.         Thought Content: Thought content normal.         Judgment: Judgment normal.           Results  No results found for this or any previous visit (from the past 1 hour(s)).]  Lab Results   Component Value Date    PSA 9.6 (H) 03/03/2023    PSA 8.5 (H) 09/02/2022    PSA 6.3 (H) 01/24/2022     Lab Results   Component Value Date    GLUCOSE 112 01/13/2020    CALCIUM 10.0 08/07/2023     11/22/2016    K 4.3 08/07/2023    CO2 27 08/07/2023     (H) 08/07/2023    BUN 19 08/07/2023    CREATININE 1.17 08/07/2023     Lab Results   Component Value Date    WBC 4.72 05/11/2023    HGB 14.4 05/11/2023    HCT 43.9 05/11/2023     (H) 05/11/2023     05/11/2023           Orders  No orders of the defined types were placed in this encounter.      Maryjane Coronado

## 2023-12-20 ENCOUNTER — OFFICE VISIT (OUTPATIENT)
Dept: UROLOGY | Facility: CLINIC | Age: 73
End: 2023-12-20
Payer: COMMERCIAL

## 2023-12-20 VITALS
WEIGHT: 189 LBS | HEIGHT: 72 IN | BODY MASS INDEX: 25.6 KG/M2 | HEART RATE: 79 BPM | DIASTOLIC BLOOD PRESSURE: 76 MMHG | OXYGEN SATURATION: 99 % | SYSTOLIC BLOOD PRESSURE: 118 MMHG

## 2023-12-20 DIAGNOSIS — N20.0 NEPHROLITHIASIS: Primary | ICD-10-CM

## 2023-12-20 DIAGNOSIS — C61 PROSTATE CANCER (HCC): ICD-10-CM

## 2023-12-20 PROCEDURE — 99213 OFFICE O/P EST LOW 20 MIN: CPT | Performed by: PHYSICIAN ASSISTANT

## 2023-12-20 RX ORDER — LOSARTAN POTASSIUM 100 MG/1
TABLET ORAL
COMMUNITY
Start: 2023-11-12

## 2023-12-20 RX ORDER — TAMSULOSIN HYDROCHLORIDE 0.4 MG/1
0.4 CAPSULE ORAL
Qty: 90 CAPSULE | Refills: 3 | Status: SHIPPED | OUTPATIENT
Start: 2023-12-20

## 2023-12-26 DIAGNOSIS — E11.9 TYPE 2 DIABETES MELLITUS WITHOUT COMPLICATION, WITHOUT LONG-TERM CURRENT USE OF INSULIN (HCC): Primary | ICD-10-CM

## 2023-12-26 RX ORDER — ACYCLOVIR 400 MG/1
1 TABLET ORAL
Qty: 9 EACH | Refills: 3 | Status: SHIPPED | OUTPATIENT
Start: 2023-12-26 | End: 2024-03-25

## 2023-12-26 RX ORDER — ACYCLOVIR 400 MG/1
1 TABLET ORAL 3 TIMES DAILY
Qty: 1 EACH | Refills: 0 | Status: SHIPPED | OUTPATIENT
Start: 2023-12-26

## 2023-12-27 ENCOUNTER — CLINICAL SUPPORT (OUTPATIENT)
Dept: RADIATION ONCOLOGY | Facility: CLINIC | Age: 73
End: 2023-12-27
Attending: RADIOLOGY
Payer: COMMERCIAL

## 2023-12-27 VITALS
OXYGEN SATURATION: 95 % | SYSTOLIC BLOOD PRESSURE: 126 MMHG | WEIGHT: 203 LBS | HEART RATE: 60 BPM | RESPIRATION RATE: 16 BRPM | BODY MASS INDEX: 27.53 KG/M2 | TEMPERATURE: 97.5 F | DIASTOLIC BLOOD PRESSURE: 84 MMHG

## 2023-12-27 DIAGNOSIS — C61 PROSTATE CANCER (HCC): Primary | ICD-10-CM

## 2023-12-27 PROCEDURE — 99211 OFF/OP EST MAY X REQ PHY/QHP: CPT | Performed by: RADIOLOGY

## 2023-12-27 PROCEDURE — 99024 POSTOP FOLLOW-UP VISIT: CPT | Performed by: RADIOLOGY

## 2023-12-27 NOTE — PROGRESS NOTES
Erickson Jimenez 1950 is a 73 y.o. male male with multiple comorbidities but excellent performance status diagnosed with clinical stage [id number] prostatic adenocarcinoma, Anna score 8 (4+4) with a pretreatment PSA of 9.6 ng/mL. >50% cores demonstrated disease and all associated with PNI.  He underwent partial brachytherapy implant followed by pelvic radiation which he completed on 23. He presents today for follow up.    The patient experienced G1+  symptoms and G3+ diarrhea controlled with diet modifications and Imodium to moderate effect.  He suffered no other significant toxicity due to radiation.      23 Urology, [facility]  High risk prostate cancer  - clinical stage [id number] prostatic adenocarcinoma, Anna score 8 (4+4)  - Pretreatment PSA 9.4  - S/p brachytherapy at [facility] with Dr. Lenz completed on 23   - Firmagon 240 mg on 23  - Lupron 45 mg on 23, next Lupron due 24 weeks from prior (recommendations for 18 months total duration per [facility])  - Most recent s/p EBRT completed last month  - Continue Flomax for irritative voiding symptoms.   - Follow up in February with PSA prior         PSA ordered for 2024        [facility]    Follow up visit     Oncology History   Prostate cancer (HCC)   2023 Biopsy    TRANSPERINEAL MRI FUSION BIOPSY PROSTATE     A. Prostate, REGION OF INTEREST:  - Prostatic adenocarcinoma, Frohna grade 3 + 4 = 7 (60% pattern 3 and 40% pattern 4, Grade group 2), involving four of four prostatic cores and 90% of the total biopsy tissue.  - Perineural invasion is present.     Note: The fifth core is skeletal muscle only.     B. Prostate, RIGHT BASE:  - Prostatic adenocarcinoma, Frohna grade 4+4 = 8 (Grade group 4), involving two of two cores and 60% of the tissue.  - Perineural invasion is present.     Note: Small portion of pattern 5 can't be ruled out due to procedure artifact.     C. Prostate, RIGHT POSTERIOR MEDIAL:  -  Prostatic adenocarcinoma, Shelburne grade 4+3 = 7 (50% pattern 4 and 50% pattern 3, Grade group 3), involving two of two cores and 50% of the tissue.  - Perineural invasion is present.     D. Prostate, LEFT BASE:  - Benign prostate glands and stroma.     E. Prostate, LEFT POSTERIOR MEDIAL:  - Prostatic adenocarcinoma, Anna grade 4+4 = 8 (Grade group 4), involving one of two cores. 20% of involved core  and 10% of the tissue.  - Perineural invasion is present.     F. Prostate, LEFT POSTERIOR LATERAL:  - Benign prostate glands and stroma.     G. Prostate, LEFT ANTERIOR LATERAL:  - Benign prostate glands and stroma.     H. Prostate, LEFT ANTERIOR MEDIAL:  - Benign prostate glands and stroma.     I. Prostate, RIGHT POSTERIOR LATERAL:  - Prostatic adenocarcinoma, Shelburne grade 3 + 4 = 7 (60% pattern 3 and 40% pattern 4, Grade group 2), involving one of two cores, 90% of involved core and 60% of the tissue.  - Perineural invasion is present.     J. Prostate, RIGHT ANTERIOR LATERAL:  - Prostatic adenocarcinoma, Shel[place]ne grade 4+3 = 7 (70% pattern 4 and 30% pattern 3, Grade group 3), involving two of two cores and 70% of the tissue.  - Perineural invasion is present.     K. Prostate, RIGHT ANTERIOR MEDIAL:  - Benign prostate glands and stroma.     Intradepartmental consultation is in agreement.        5/16/2023 -  Cancer Staged    Staging form: Prostate, AJCC 8th Edition  - Clinical stage from 5/16/2023: Stage IIC (cT1c, cN0, cM0, PSA: 9.6, Grade Group: 4) - Signed by Jodee Guerra MD on 6/26/2023  Stage prefix: Initial diagnosis  Prostate specific antigen (PSA) range: Less than 10  Anna primary pattern: 4  [place]hel[place] secondary pattern: 4  Anna score: 8  Histologic grading system: 5 grade system       6/14/2023 Initial Diagnosis    Prostate cancer (HCC)     8/8/2023 -  Hormone Therapy    Firmagon 240 mg     9/8/2023 -  Hormone Therapy    Lupron 45 mg     10/18/2023 - 11/21/2023 Radiation    Treatments:  Course: C1  Plan ID  Energy Fractions Dose per Fraction (cGy) Dose Correction (cGy) Total Dose Delivered (cGy) Elapsed Days   Whole Pelvis 10X 25 / 25 180 0 4,500 34    Treatment Dates:  10/18/2023 - 11/21/2023.          Review of Systems:  Review of Systems   Constitutional:  Positive for diaphoresis (hot flashes due to ADT) and fatigue.   HENT: Negative.     Eyes: Negative.    Respiratory: Negative.     Cardiovascular: Negative.    Gastrointestinal:  Positive for diarrhea (one liquid stool per day).   Genitourinary:  Positive for frequency and urgency.        Occasional urge incontinence improving  Nocturia x 1-2   Musculoskeletal: Negative.    Skin: Negative.    Allergic/Immunologic: Positive for food allergies.   Neurological:  Positive for light-headedness.   Hematological: Negative.    Psychiatric/Behavioral:  Positive for decreased concentration and sleep disturbance.        Clinical Trial: no    IPSS Questionnaire (AUA-7):  Over the past month…    1)  How often have you had a sensation of not emptying your bladder completely after you finish urinating?  0 - Not at all   2)  How often have you had to urinate again less than two hours after you finished urinating? 4 - More than half the time   3)  How often have you found you stopped and started again several times when you urinated?  1 - Less than 1 time in 5   4) How difficult have you found it to postpone urination?  4 - More than half the time   5) How often have you had a weak urinary stream?  1 - Less than 1 time in 5   6) How often have you had to push or strain to begin urination?  0 - Not at all   7) How many times did you most typically get up to urinate from the time you went to bed until the time you got up in the morning?  1 - 1 time   Total Score:  11       Health Maintenance   Topic Date Due    Colorectal Cancer Screening  11/03/2022    BMI: Followup Plan  10/04/2023    COVID-19 Vaccine (5 - 2023-24 season) 12/10/2023    DM Eye Exam  03/09/2024    Kidney Health  Evaluation: Albumin/Creatinine Ratio  03/15/2024    Fall Risk  05/09/2024    Medicare Annual Wellness Visit (AWV)  05/09/2024    Diabetic Foot Exam  05/09/2024    HEMOGLOBIN A1C  05/13/2024    Kidney Health Evaluation: GFR  08/17/2024    Depression Screening  09/27/2024    BMI: Adult  12/20/2024    Hepatitis C Screening  Completed    Pneumococcal Vaccine: 65+ Years  Completed    Influenza Vaccine  Completed    HIB Vaccine  Aged Out    IPV Vaccine  Aged Out    Hepatitis A Vaccine  Aged Out    Meningococcal ACWY Vaccine  Aged Out    HPV Vaccine  Aged Out     Patient Active Problem List   Diagnosis    Essential hypertension    Mixed hyperlipidemia    Type 2 diabetes mellitus without complication, without long-term current use of insulin (HCC)    Ventral hernia without obstruction or gangrene    Congestive heart failure, NYHA class 1, acute, systolic (HCC)    Coronary artery disease due to lipid rich plaque    Carotid artery stenosis without cerebral infarction, bilateral    Cerebral aneurysm    S/P Left carotid endarterectomy 1/13/20    Familial hypercholesterolemia    BMI 27.0-27.9,adult    Chronic right shoulder pain    Elevated PSA    Status post reverse total arthroplasty of right shoulder    Erectile dysfunction of non-organic origin    Anemia    Left carpal tunnel syndrome    Benign paroxysmal positional vertigo    Facial skin lesion    Lump in the testicle    CHF (congestive heart failure) (HCC)    Prostate cancer (HCC)    Androgen deprivation therapy     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    Heart disease     Hyperlipidemia     Hypertension     Kidney stone     Osteoarthritis     Prostate cancer (HCC)     Vascular disorder      Past Surgical History:   Procedure Laterality Date    ABDOMINAL SURGERY      CARDIAC CATHETERIZATION N/A 3/21/2022    Procedure: Cardiac  catheterization;  Surgeon: Stephy Horner MD;  [facility];  Service: Cardiology    CARDIAC CATHETERIZATION N/A 3/21/2022    Procedure: Cardiac Coronary Angiogram;  Surgeon: Stephy Horner MD;  [facility];  Service: Cardiology    COLONOSCOPY      FL RETROGRADE PYELOGRAM  3/23/2023    INGUINAL HERNIA REPAIR Left     LAPAROSCOPIC COLON RESECTION      MD ARTHROPLASTY GLENOHUMERAL JOINT TOTAL SHOULDER Right 11/17/2020    Procedure: ARTHROPLASTY SHOULDER REVERSE with biceps tendonesis;  Surgeon: Robby Marquez MD;  [facility];  Service: Orthopedics    MD BX PROSTATE STRTCTC SATURATION SAMPLING IMG GID N/A 5/16/2023    Procedure: TRANSPERINEAL MRI FUSION BIOPSY PROSTATE;  Surgeon: Quan Velasco MD;  [facility];  Service: Urology    MD COLONOSCOPY FLX DX W/COLLJ SPEC WHEN PFRMD N/A 11/3/2017    Procedure: COLONOSCOPY;  Surgeon: SUZAN Muñiz MD;  [facility];  Service: Colorectal    MD CYSTO/URETERO W/LITHOTRIPSY &INDWELL STENT INSRT Left 3/23/2023    Procedure: CYSTOSCOPY URETEROSCOPY WITH LITHOTRIPSY HOLMIUM LASER, RETROGRADE PYELOGRAM AND INSERTION STENT URETERAL, STONE BASKET;  Surgeon: Michelet Johns MD;  [facility];  Service: Urology    MD TEAEC W/PATCH GRF CAROTID VERTB SUBCLAV NECK INC Left 1/13/2020    Procedure: ENDARTERECTOMY ARTERY CAROTID;  Surgeon: Amado Teague MD;  [facility];  Service: Vascular     Family History   Problem Relation Age of Onset    Colon cancer Father     No Known Problems Mother     Colon cancer Paternal Grandfather     Colon cancer Family     Diabetes Half-Sister     Obesity Half-Sister      Social History     Socioeconomic History    Marital status: /Civil Union     Spouse name: Not on file    Number of children: Not on file    Years of education: Not on file    Highest education level: Not on file   Occupational History    Not on file   Tobacco Use    Smoking status: Some  Days     Types: Cigars     Passive exposure: Past    Smokeless tobacco: Never   Vaping Use    Vaping status: Never Used   Substance and Sexual Activity    Alcohol use: Yes     Alcohol/week: 3.0 standard drinks of alcohol     Types: 3 Cans of beer per week     Comment: Socially    Drug use: Never    Sexual activity: Yes   Other Topics Concern    Not on file   Social History Narrative    Caffeine use    Uses safety equipment: seatbelts     Social Determinants of Health     Financial Resource Strain: Low Risk  (5/9/2023)    Overall Financial Resource Strain (CARDIA)     Difficulty of Paying Living Expenses: Not very hard   Food Insecurity: Not on file   Transportation Needs: No Transportation Needs (5/9/2023)    PRAPARE - Transportation     Lack of Transportation (Medical): No     Lack of Transportation (Non-Medical): No   Physical Activity: Not on file   Stress: Not on file   Social Connections: Not on file   Intimate Partner Violence: Not on file   Housing Stability: Not on file       Current Outpatient Medications:     Continuous Blood Gluc  (Dexcom G7 ) ANITA, Use 1 Device 3 (three) times a day, Disp: 1 each, Rfl: 0    Continuous Blood Gluc Sensor (Dexcom G7 Sensor), Use 1 Device every 10 days, Disp: 9 each, Rfl: 3    amLODIPine (NORVASC) 10 mg tablet, Take 1 tablet (10 mg total) by mouth daily (Patient taking differently: Take 10 mg by mouth every morning), Disp: 90 tablet, Rfl: 3    aspirin 81 mg chewable tablet, Chew 1 tablet (81 mg total) daily, Disp: 90 tablet, Rfl: 3    atorvastatin (LIPITOR) 20 mg tablet, Take 1 tablet (20 mg total) by mouth daily at bedtime, Disp: 90 tablet, Rfl: 6    Calcium Carb-Cholecalciferol (calcium carbonate-vitamin D) 500 mg-5 mcg tablet, Take 1 tablet by mouth 2 (two) times a day with meals, Disp: 180 tablet, Rfl: 3    carvedilol (COREG) 12.5 mg tablet, TAKE 1 TABLET BY MOUTH TWICE  DAILY WITH MEALS, Disp: 180 tablet, Rfl: 3    Continuous Blood Gluc Transmit (Dexcom  G6 Transmitter) MISC, 1 TRANSMITTED EVERY 3 MONTHS FOR CONTINUOUS GLUCOSE MONITORING, Disp: 1 each, Rfl: 0    glucose blood test strip, Use as instructed, Disp: 100 each, Rfl: 2    losartan (COZAAR) 100 MG tablet, , Disp: , Rfl:     losartan (COZAAR) 50 mg tablet, Take 1 tablet (50 mg total) by mouth daily, Disp: , Rfl:     metFORMIN (GLUCOPHAGE) 1000 MG tablet, Take 1 tablet (1,000 mg total) by mouth 2 (two) times a day with meals, Disp: 180 tablet, Rfl: 3    sildenafil (VIAGRA) 25 MG tablet, TAKE 1 TABLET BY MOUTH  DAILY AS NEEDED FOR  ERECTILE DYSFUNCTION (Patient taking differently: Take 25 mg by mouth as needed), Disp: 10 tablet, Rfl: 0    sitaGLIPtin (Januvia) 50 mg tablet, Take 1 tablet (50 mg total) by mouth daily, Disp: 90 tablet, Rfl: 3    tamsulosin (FLOMAX) 0.4 mg, Take 1 capsule (0.4 mg total) by mouth daily with dinner, Disp: 90 capsule, Rfl: 3  Allergies   Allergen Reactions    Other Hives     Soft shell crabs      There were no vitals filed for this visit.

## 2023-12-27 NOTE — PROGRESS NOTES
Follow-up - Radiation Oncology   Erickson Jimenez 1950 73 y.o. male [id number]      History of Present Illness   Cancer Staging   Prostate cancer (HCC)  Staging form: Prostate, AJCC 8th Edition  - Clinical stage from 5/16/2023: Stage IIC (cT1c, cN0, cM0, PSA: 9.6, Grade Group: 4) - Signed by Jodee Guerra MD on 6/26/2023  Stage prefix: Initial diagnosis  Prostate specific antigen (PSA) range: Less than 10  [place] primary pattern: 4  Anna secondary pattern: 4  Anna score: 8  Histologic grading system: 5 grade system      Erickson Jimenez is a 73 y.o.man with multiple comorbidities but excellent performance status diagnosed with clinical stage [id number] prostatic adenocarcinoma, Anna score 8 (4+4) with a pretreatment PSA of 9.6 ng/mL. >50% cores demonstrated disease and all associated with PNI.  He underwent partial brachytherapy implant followed by pelvic radiation which he completed on 11/21/23. He presents today for follow up.     12/20/23 Urology, [place]  High risk prostate cancer  - clinical stage [id number] prostatic adenocarcinoma, Anna score 8 (4+4)  - Pretreatment PSA 9.4  - S/p brachytherapy at [facility] with Dr. Lenz completed on 9/5/23   - Firmagon 240 mg on 8/8/23  - Lupron 45 mg on 9/8/23, next Lupron due 24 weeks from prior (recommendations for 18 months total duration per [facility])  - Most recent s/p EBRT completed last month  - Continue Flomax for irritative voiding symptoms.   - Follow up in February with PSA prior      The patient notes that his urinary frequency and urgency are improving.  He has occasional slight urge incontinence weekly.  This is improving and he does not wear pads.  Nocturia 1-2x.  Empties his bladder fully.  Using tamsulosin once a day.  He denies dysuria or hematuria or edilma urinary incontinence.  The patient has 1 loose bowel movement a day.  He is not using Imodium anymore and he denies blood.      He complains of continued hot flashes.  Using Evening Primrose bid.   Energy slowly improving.  PSA ordered for February 2024 with 2/26/24 Urology follow-up.      Historical Information   Oncology History   Prostate cancer (HCC)   5/16/2023 Biopsy    TRANSPERINEAL MRI FUSION BIOPSY PROSTATE     A. Prostate, REGION OF INTEREST:  - Prostatic adenocarcinoma, Anna grade 3 + 4 = 7 (60% pattern 3 and 40% pattern 4, Grade group 2), involving four of four prostatic cores and 90% of the total biopsy tissue.  - Perineural invasion is present.     Note: The fifth core is skeletal muscle only.     B. Prostate, RIGHT BASE:  - Prostatic adenocarcinoma, Anna grade 4+4 = 8 (Grade group 4), involving two of two cores and 60% of the tissue.  - Perineural invasion is present.     Note: Small portion of pattern 5 can't be ruled out due to procedure artifact.     C. Prostate, RIGHT POSTERIOR MEDIAL:  - Prostatic adenocarcinoma, Anna grade 4+3 = 7 (50% pattern 4 and 50% pattern 3, Grade group 3), involving two of two cores and 50% of the tissue.  - Perineural invasion is present.     D. Prostate, LEFT BASE:  - Benign prostate glands and stroma.     E. Prostate, LEFT POSTERIOR MEDIAL:  - Prostatic adenocarcinoma, [place] grade 4+4 = 8 (Grade group 4), involving one of two cores. 20% of involved core  and 10% of the tissue.  - Perineural invasion is present.     F. Prostate, LEFT POSTERIOR LATERAL:  - Benign prostate glands and stroma.     G. Prostate, LEFT ANTERIOR LATERAL:  - Benign prostate glands and stroma.     H. Prostate, LEFT ANTERIOR MEDIAL:  - Benign prostate glands and stroma.     I. Prostate, RIGHT POSTERIOR LATERAL:  - Prostatic adenocarcinoma, Anna grade 3 + 4 = 7 (60% pattern 3 and 40% pattern 4, Grade group 2), involving one of two cores, 90% of involved core and 60% of the tissue.  - Perineural invasion is present.     J. Prostate, RIGHT ANTERIOR LATERAL:  - Prostatic adenocarcinoma, [place] grade 4+3 = 7 (70% pattern 4 and 30% pattern 3, Grade group 3), involving two of two  cores and 70% of the tissue.  - Perineural invasion is present.     K. Prostate, RIGHT ANTERIOR MEDIAL:  - Benign prostate glands and stroma.     Intradepartmental consultation is in agreement.        5/16/2023 -  Cancer Staged    Staging form: Prostate, AJCC 8th Edition  - Clinical stage from 5/16/2023: Stage IIC (cT1c, cN0, cM0, PSA: 9.6, Grade Group: 4) - Signed by Jodee Guerra MD on 6/26/2023  Stage prefix: Initial diagnosis  Prostate specific antigen (PSA) range: Less than 10  Anna primary pattern: 4  Anna secondary pattern: 4  [place] score: 8  Histologic grading system: 5 grade system       6/14/2023 Initial Diagnosis    Prostate cancer (HCC)     8/8/2023 -  Hormone Therapy    Firmagon 240 mg     9/8/2023 -  Hormone Therapy    Lupron 45 mg     10/18/2023 - 11/21/2023 Radiation    Treatments:  Course: C1  Plan ID Energy Fractions Dose per Fraction (cGy) Dose Correction (cGy) Total Dose Delivered (cGy) Elapsed Days   Whole Pelvis 10X 25 / 25 180 0 4,500 34    Treatment Dates:  10/18/2023 - 11/21/2023.          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    Heart disease     Hyperlipidemia     Hypertension     Kidney stone     Osteoarthritis     Prostate cancer (HCC)     Vascular disorder      Past Surgical History:   Procedure Laterality Date    ABDOMINAL SURGERY      CARDIAC CATHETERIZATION N/A 3/21/2022    Procedure: Cardiac catheterization;  Surgeon: Stephy Horner MD;  [facility];  Service: Cardiology    CARDIAC CATHETERIZATION N/A 3/21/2022    Procedure: Cardiac Coronary Angiogram;  Surgeon: Stephy Horner MD;  [facility];  Service: Cardiology    COLONOSCOPY      FL RETROGRADE PYELOGRAM  3/23/2023    INGUINAL HERNIA REPAIR Left     LAPAROSCOPIC COLON RESECTION      VT ARTHROPLASTY GLENOHUMERAL JOINT TOTAL SHOULDER Right  11/17/2020    Procedure: ARTHROPLASTY SHOULDER REVERSE with biceps tendonesis;  Surgeon: Robby Marquez MD;  [facility];  Service: Orthopedics    UT BX PROSTATE STRTCTC SATURATION SAMPLING IMG GID N/A 5/16/2023    Procedure: TRANSPERINEAL MRI FUSION BIOPSY PROSTATE;  Surgeon: Quan Velasco MD;  [facility];  Service: Urology    UT COLONOSCOPY FLX DX W/COLLJ SPEC WHEN PFRMD N/A 11/3/2017    Procedure: COLONOSCOPY;  Surgeon: SUZAN Muñiz MD;  [facility];  Service: Colorectal    UT CYSTO/URETERO W/LITHOTRIPSY &INDWELL STENT INSRT Left 3/23/2023    Procedure: CYSTOSCOPY URETEROSCOPY WITH LITHOTRIPSY HOLMIUM LASER, RETROGRADE PYELOGRAM AND INSERTION STENT URETERAL, STONE BASKET;  Surgeon: Michelet Johns MD;  [facility];  Service: Urology    UT TEAEC W/PATCH GRF CAROTID VERTB SUBCLAV NECK INC Left 1/13/2020    Procedure: ENDARTERECTOMY ARTERY CAROTID;  Surgeon: Amado Teague MD;  [facility];  Service: Vascular       Social History   Social History     Substance and Sexual Activity   Alcohol Use Yes    Alcohol/week: 3.0 standard drinks of alcohol    Types: 3 Cans of beer per week    Comment: Socially     Social History     Substance and Sexual Activity   Drug Use Never     Social History     Tobacco Use   Smoking Status Some Days    Types: Cigars    Passive exposure: Past   Smokeless Tobacco Never         Meds/Allergies     Current Outpatient Medications:     amLODIPine (NORVASC) 10 mg tablet, Take 1 tablet (10 mg total) by mouth daily (Patient taking differently: Take 10 mg by mouth every morning), Disp: 90 tablet, Rfl: 3    aspirin 81 mg chewable tablet, Chew 1 tablet (81 mg total) daily, Disp: 90 tablet, Rfl: 3    atorvastatin (LIPITOR) 20 mg tablet, Take 1 tablet (20 mg total) by mouth daily at bedtime, Disp: 90 tablet, Rfl: 6    Calcium Carb-Cholecalciferol (calcium carbonate-vitamin D) 500 mg-5 mcg tablet, Take 1 tablet by mouth 2 (two) times a day  with meals, Disp: 180 tablet, Rfl: 3    carvedilol (COREG) 12.5 mg tablet, TAKE 1 TABLET BY MOUTH TWICE  DAILY WITH MEALS, Disp: 180 tablet, Rfl: 3    Continuous Blood Gluc  (Dexcom G7 ) ANTIA, Use 1 Device 3 (three) times a day, Disp: 1 each, Rfl: 0    Continuous Blood Gluc Sensor (Dexcom G7 Sensor), Use 1 Device every 10 days, Disp: 9 each, Rfl: 3    Continuous Blood Gluc Transmit (Dexcom G6 Transmitter) MISC, 1 TRANSMITTED EVERY 3 MONTHS FOR CONTINUOUS GLUCOSE MONITORING, Disp: 1 each, Rfl: 0    losartan (COZAAR) 50 mg tablet, Take 1 tablet (50 mg total) by mouth daily, Disp: , Rfl:     metFORMIN (GLUCOPHAGE) 1000 MG tablet, Take 1 tablet (1,000 mg total) by mouth 2 (two) times a day with meals, Disp: 180 tablet, Rfl: 3    sildenafil (VIAGRA) 25 MG tablet, TAKE 1 TABLET BY MOUTH  DAILY AS NEEDED FOR  ERECTILE DYSFUNCTION (Patient taking differently: Take 25 mg by mouth as needed), Disp: 10 tablet, Rfl: 0    sitaGLIPtin (Januvia) 50 mg tablet, Take 1 tablet (50 mg total) by mouth daily, Disp: 90 tablet, Rfl: 3    tamsulosin (FLOMAX) 0.4 mg, Take 1 capsule (0.4 mg total) by mouth daily with dinner, Disp: 90 capsule, Rfl: 3    glucose blood test strip, Use as instructed, Disp: 100 each, Rfl: 2    losartan (COZAAR) 100 MG tablet, , Disp: , Rfl:   Allergies   Allergen Reactions    Other Hives     Soft shell crabs          Review of Systems   Constitutional:  Positive for diaphoresis (hot flashes due to ADT) and fatigue.   HENT: Negative.     Eyes: Negative.    Respiratory: Negative.     Cardiovascular: Negative.    Gastrointestinal:  Positive for diarrhea (one liquid stool per day).   Genitourinary:  Positive for frequency and urgency.        Occasional urge incontinence improving  Nocturia x 1-2   Musculoskeletal: Negative.    Skin: Negative.    Allergic/Immunologic: Positive for food allergies.   Neurological:  Positive for light-headedness.   Hematological: Negative.    Psychiatric/Behavioral:   "Positive for decreased concentration and sleep disturbance.          OBJECTIVE:   /84   Pulse 60   Temp 97.5 °F (36.4 °C)   Resp 16   Wt 92.1 kg (203 lb)   SpO2 95%   BMI 27.53 kg/m²   Karnofsky: 80 - Normal activity with effort; some signs or symptoms of disease    Physical Exam  Vitals and nursing note reviewed.   Constitutional:       General: He is not in acute distress.     Appearance: He is not ill-appearing.   Cardiovascular:      Rate and Rhythm: Normal rate and regular rhythm.   Pulmonary:      Breath sounds: No wheezing, rhonchi or rales.   Abdominal:      Tenderness: There is no right CVA tenderness or left CVA tenderness.   Musculoskeletal:      Right lower leg: No edema.      Left lower leg: No edema.   Neurological:      Mental Status: He is alert and oriented to person, place, and time.      Gait: Gait normal.          Assessment/Plan:  Erickson Jimenez is a 73 y.o. man with a history of high volume, high risk prostate cancer status post trimodality therapy with ADT, EBRT and brachy boost.  He continues to recover from radiation, but is improving well.  PSA check scheduled in February with next ADT injection.    Patient asked if continued ADT is necessary.  We again reviewed that duration of ADT with trimodality therapy is an area of discussion, but generally at least 1 year is recommended at this time.  He will consider options as he is interested in stopping earlier.    Follow-up in 6 months.  We will see him sooner should need arsie.     Jodee Guerra MD  12/27/2023,3:20 PM    Portions of the record may have been created with voice recognition software.  Occasional wrong word or \"sound a like\" substitutions may have occurred due to the inherent limitations of voice recognition software.  Read the chart carefully and recognize, using context, where substitutions have occurred.        "

## 2024-01-04 DIAGNOSIS — I25.10 CAD (CORONARY ARTERY DISEASE): ICD-10-CM

## 2024-01-04 RX ORDER — ASPIRIN 81 MG/1
81 TABLET, CHEWABLE ORAL DAILY
Qty: 90 TABLET | Refills: 3 | Status: SHIPPED | OUTPATIENT
Start: 2024-01-04

## 2024-01-06 ENCOUNTER — HOSPITAL ENCOUNTER (OUTPATIENT)
Dept: ULTRASOUND IMAGING | Facility: HOSPITAL | Age: 74
Discharge: HOME | End: 2024-01-06
Payer: COMMERCIAL

## 2024-01-06 DIAGNOSIS — Z96.0 RETAINED URETERAL STENT: ICD-10-CM

## 2024-01-06 DIAGNOSIS — N20.0 RENAL STONES: ICD-10-CM

## 2024-01-06 PROCEDURE — 76775 US EXAM ABDO BACK WALL LIM: CPT

## 2024-02-09 ENCOUNTER — RA CDI HCC (OUTPATIENT)
Dept: OTHER | Facility: HOSPITAL | Age: 74
End: 2024-02-09

## 2024-02-09 NOTE — PROGRESS NOTES
J43.2, I11.0  HCC coding opportunities          Chart Reviewed number of suggestions sent to Provider: 2     Patients Insurance     Medicare Insurance: [employer] Advantage

## 2024-02-19 ENCOUNTER — APPOINTMENT (OUTPATIENT)
Dept: LAB | Facility: CLINIC | Age: 74
End: 2024-02-19
Payer: COMMERCIAL

## 2024-02-19 ENCOUNTER — OFFICE VISIT (OUTPATIENT)
Dept: FAMILY MEDICINE CLINIC | Facility: CLINIC | Age: 74
End: 2024-02-19
Payer: COMMERCIAL

## 2024-02-19 ENCOUNTER — TELEPHONE (OUTPATIENT)
Dept: ADMINISTRATIVE | Facility: OTHER | Age: 74
End: 2024-02-19

## 2024-02-19 VITALS
OXYGEN SATURATION: 97 % | SYSTOLIC BLOOD PRESSURE: 128 MMHG | TEMPERATURE: 95.7 F | HEART RATE: 71 BPM | DIASTOLIC BLOOD PRESSURE: 84 MMHG | WEIGHT: 208 LBS | HEIGHT: 72 IN | BODY MASS INDEX: 28.17 KG/M2

## 2024-02-19 DIAGNOSIS — I50.9 CONGESTIVE HEART FAILURE, UNSPECIFIED HF CHRONICITY, UNSPECIFIED HEART FAILURE TYPE (HCC): ICD-10-CM

## 2024-02-19 DIAGNOSIS — I67.1 CEREBRAL ANEURYSM: ICD-10-CM

## 2024-02-19 DIAGNOSIS — C61 PROSTATE CANCER (HCC): ICD-10-CM

## 2024-02-19 DIAGNOSIS — E78.01 FAMILIAL HYPERCHOLESTEROLEMIA: ICD-10-CM

## 2024-02-19 DIAGNOSIS — E11.9 TYPE 2 DIABETES MELLITUS WITHOUT COMPLICATION, WITHOUT LONG-TERM CURRENT USE OF INSULIN (HCC): Primary | ICD-10-CM

## 2024-02-19 DIAGNOSIS — E11.9 TYPE 2 DIABETES MELLITUS WITHOUT COMPLICATION, WITHOUT LONG-TERM CURRENT USE OF INSULIN (HCC): ICD-10-CM

## 2024-02-19 DIAGNOSIS — E11.65 TYPE 2 DIABETES MELLITUS WITH HYPERGLYCEMIA, WITHOUT LONG-TERM CURRENT USE OF INSULIN (HCC): ICD-10-CM

## 2024-02-19 PROBLEM — I50.21: Status: RESOLVED | Noted: 2019-07-11 | Resolved: 2024-02-19

## 2024-02-19 LAB
ALBUMIN SERPL BCP-MCNC: 4.3 G/DL (ref 3.5–5)
ALP SERPL-CCNC: 41 U/L (ref 34–104)
ALT SERPL W P-5'-P-CCNC: 19 U/L (ref 7–52)
ANION GAP SERPL CALCULATED.3IONS-SCNC: 10 MMOL/L
AST SERPL W P-5'-P-CCNC: 16 U/L (ref 13–39)
BILIRUB SERPL-MCNC: 0.85 MG/DL (ref 0.2–1)
BUN SERPL-MCNC: 18 MG/DL (ref 5–25)
CALCIUM SERPL-MCNC: 9.5 MG/DL (ref 8.4–10.2)
CHLORIDE SERPL-SCNC: 102 MMOL/L (ref 96–108)
CHOLEST SERPL-MCNC: 156 MG/DL
CO2 SERPL-SCNC: 27 MMOL/L (ref 21–32)
CREAT SERPL-MCNC: 0.89 MG/DL (ref 0.6–1.3)
CREAT UR-MCNC: 90.6 MG/DL
GFR SERPL CREATININE-BSD FRML MDRD: 86 ML/MIN/1.73SQ M
GLUCOSE P FAST SERPL-MCNC: 206 MG/DL (ref 65–99)
HDLC SERPL-MCNC: 47 MG/DL
LDLC SERPL CALC-MCNC: 74 MG/DL (ref 0–100)
MICROALBUMIN UR-MCNC: 24.2 MG/L
MICROALBUMIN/CREAT 24H UR: 27 MG/G CREATININE (ref 0–30)
NONHDLC SERPL-MCNC: 109 MG/DL
POTASSIUM SERPL-SCNC: 4.5 MMOL/L (ref 3.5–5.3)
PROT SERPL-MCNC: 7.3 G/DL (ref 6.4–8.4)
PSA SERPL-MCNC: 0.35 NG/ML (ref 0–4)
SL AMB POCT HEMOGLOBIN AIC: 7.8 (ref ?–6.5)
SODIUM SERPL-SCNC: 139 MMOL/L (ref 135–147)
TRIGL SERPL-MCNC: 175 MG/DL

## 2024-02-19 PROCEDURE — 83036 HEMOGLOBIN GLYCOSYLATED A1C: CPT | Performed by: FAMILY MEDICINE

## 2024-02-19 PROCEDURE — 80061 LIPID PANEL: CPT

## 2024-02-19 PROCEDURE — 80053 COMPREHEN METABOLIC PANEL: CPT

## 2024-02-19 PROCEDURE — 82570 ASSAY OF URINE CREATININE: CPT

## 2024-02-19 PROCEDURE — 82043 UR ALBUMIN QUANTITATIVE: CPT

## 2024-02-19 PROCEDURE — 99213 OFFICE O/P EST LOW 20 MIN: CPT | Performed by: FAMILY MEDICINE

## 2024-02-19 NOTE — TELEPHONE ENCOUNTER
----- Message from Medina Padgett sent at 2/19/2024  8:15 AM EST -----  Regarding: Colonoscopy  02/19/24 8:15 AM    Hello, our patient Erickson Jimenez has had CRC: Colonoscopy completed/performed. Please assist in updating the patient chart by making an External outreach to [facility] facility located in [place], [place]. The date of service is 08/29/2023.    Thank you,  Erick SHIELDS

## 2024-02-19 NOTE — TELEPHONE ENCOUNTER
Upon review of the In Basket request we were able to locate, review, and update the patient chart as requested for CRC: Colonoscopy.    Any additional questions or concerns should be emailed to the Practice Liaisons via the appropriate education email address, please do not reply via In Basket.    Thank you  Hayley Paige

## 2024-02-19 NOTE — PROGRESS NOTES
Name: Erickson Jimenez      : 1950      MRN: [id number]  Encounter Provider: Efrain Rhodes MD  Encounter Date: 2024   Encounter department: [facility]    Assessment & Plan     1. Type 2 diabetes mellitus without complication, without long-term current use of insulin (HCC)  -     POCT hemoglobin A1c-7.8  Elevated compared to last hgbA1C  He would like to do a trial of a low carb diet    2. Congestive heart failure, unspecified HF chronicity, unspecified heart failure type (HCC)  Denies any SOB    4. Prostate cancer (HCC)  Continue leupron  F/U with Urologist    5. Familial hypercholesterolemia  -     Lipid panel; Future    F/U in 5-6 months       Subjective     Patient is here to follow up for Type 2 DM. He denies any symptoms related to this and takes metformin and Januvia daily.  Also has a Hx of CHF denies any shortness of breath.  Also has a History of Prostate cancer, takes leupron injections.      Review of Systems   Constitutional:  Negative for activity change, appetite change, fatigue and fever.   HENT:  Negative for congestion and ear discharge.    Respiratory:  Negative for cough and shortness of breath.    Cardiovascular:  Negative for chest pain and palpitations.   Gastrointestinal:  Negative for diarrhea and nausea.   Musculoskeletal:  Negative for arthralgias and back pain.   Skin:  Negative for color change and rash.   Neurological:  Negative for dizziness and headaches.   Psychiatric/Behavioral:  Negative for agitation and behavioral problems.        Past Medical History:   Diagnosis Date    CHF (congestive heart failure) (HCC)     Diabetes mellitus (HCC)     Diverticulitis     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HCC) 2018    Heart disease     Hyperlipidemia     Hypertension     Kidney stone     Osteoarthritis     Prostate cancer (HCC)     Vascular disorder      Past Surgical History:   Procedure Laterality Date    ABDOMINAL SURGERY      CARDIAC CATHETERIZATION N/A  3/21/2022    Procedure: Cardiac catheterization;  Surgeon: Stephy Horner MD;  [facility];  Service: Cardiology    CARDIAC CATHETERIZATION N/A 3/21/2022    Procedure: Cardiac Coronary Angiogram;  Surgeon: Stephy Horner MD;  [facility];  Service: Cardiology    COLONOSCOPY      FL RETROGRADE PYELOGRAM  3/23/2023    INGUINAL HERNIA REPAIR Left     LAPAROSCOPIC COLON RESECTION      ME ARTHROPLASTY GLENOHUMERAL JOINT TOTAL SHOULDER Right 11/17/2020    Procedure: ARTHROPLASTY SHOULDER REVERSE with biceps tendonesis;  Surgeon: Robby Marquez MD;  [facility];  Service: Orthopedics    ME BX PROSTATE STRTCTC SATURATION SAMPLING IMG GID N/A 5/16/2023    Procedure: TRANSPERINEAL MRI FUSION BIOPSY PROSTATE;  Surgeon: Quan Velasco MD;  [facility];  Service: Urology    ME COLONOSCOPY FLX DX W/COLLJ SPEC WHEN PFRMD N/A 11/3/2017    Procedure: COLONOSCOPY;  Surgeon: SUZAN Muñiz MD;  [facility];  Service: Colorectal    ME CYSTO/URETERO W/LITHOTRIPSY &INDWELL STENT INSRT Left 3/23/2023    Procedure: CYSTOSCOPY URETEROSCOPY WITH LITHOTRIPSY HOLMIUM LASER, RETROGRADE PYELOGRAM AND INSERTION STENT URETERAL, STONE BASKET;  Surgeon: Michelet Johns MD;  [facility];  Service: Urology    ME TEAEC W/PATCH GRF CAROTID VERTB SUBCLAV NECK INC Left 1/13/2020    Procedure: ENDARTERECTOMY ARTERY CAROTID;  Surgeon: Amado Teague MD;  [facility];  Service: Vascular     Family History   Problem Relation Age of Onset    Colon cancer Father     No Known Problems Mother     Colon cancer Paternal Grandfather     Colon cancer Family     Diabetes Half-Sister     Obesity Half-Sister      Social History     Socioeconomic History    Marital status: /Civil Union     Spouse name: None    Number of children: None    Years of education: None    Highest education level: None   Occupational History    None   Tobacco Use    Smoking status: Some Days      Types: Cigars     Passive exposure: Past    Smokeless tobacco: Never   Vaping Use    Vaping status: Never Used   Substance and Sexual Activity    Alcohol use: Yes     Alcohol/week: 3.0 standard drinks of alcohol     Types: 3 Cans of beer per week     Comment: Socially    Drug use: Never    Sexual activity: Yes   Other Topics Concern    None   Social History Narrative    Caffeine use    Uses safety equipment: seatbelts     Social Determinants of Health     Financial Resource Strain: Low Risk  (5/9/2023)    Overall Financial Resource Strain (CARDIA)     Difficulty of Paying Living Expenses: Not very hard   Food Insecurity: Not on file   Transportation Needs: No Transportation Needs (5/9/2023)    PRAPARE - Transportation     Lack of Transportation (Medical): No     Lack of Transportation (Non-Medical): No   Physical Activity: Not on file   Stress: Not on file   Social Connections: Not on file   Intimate Partner Violence: Not on file   Housing Stability: Not on file     Current Outpatient Medications on File Prior to Visit   Medication Sig    amLODIPine (NORVASC) 10 mg tablet Take 1 tablet (10 mg total) by mouth daily (Patient taking differently: Take 10 mg by mouth every morning)    aspirin 81 mg chewable tablet Chew 1 tablet (81 mg total) daily    atorvastatin (LIPITOR) 20 mg tablet Take 1 tablet (20 mg total) by mouth daily at bedtime    Calcium Carb-Cholecalciferol (calcium carbonate-vitamin D) 500 mg-5 mcg tablet Take 1 tablet by mouth 2 (two) times a day with meals    carvedilol (COREG) 12.5 mg tablet TAKE 1 TABLET BY MOUTH TWICE  DAILY WITH MEALS    Continuous Blood Gluc  (Dexcom G7 ) ANITA Use 1 Device 3 (three) times a day    Continuous Blood Gluc Sensor (Dexcom G7 Sensor) Use 1 Device every 10 days    Continuous Blood Gluc Transmit (Dexcom G6 Transmitter) MISC 1 TRANSMITTED EVERY 3 MONTHS FOR CONTINUOUS GLUCOSE MONITORING    glucose blood test strip Use as instructed    losartan (COZAAR) 50  mg tablet Take 1 tablet (50 mg total) by mouth daily    metFORMIN (GLUCOPHAGE) 1000 MG tablet Take 1 tablet (1,000 mg total) by mouth 2 (two) times a day with meals    sildenafil (VIAGRA) 25 MG tablet TAKE 1 TABLET BY MOUTH  DAILY AS NEEDED FOR  ERECTILE DYSFUNCTION (Patient taking differently: Take 25 mg by mouth as needed)    sitaGLIPtin (Januvia) 50 mg tablet Take 1 tablet (50 mg total) by mouth daily    tamsulosin (FLOMAX) 0.4 mg Take 1 capsule (0.4 mg total) by mouth daily with dinner    [DISCONTINUED] losartan (COZAAR) 100 MG tablet  (Patient not taking: Reported on 12/27/2023)     Allergies   Allergen Reactions    Other Hives     Soft shell crabs      Immunization History   Administered Date(s) Administered    COVID-19 MODERNA VACC 0.5 ML IM 03/23/2021, 04/20/2021, 11/04/2021    COVID-19 Moderna Vac BIVALENT 12 Yr+ IM 0.5 ML 10/26/2022    COVID-19 Pfizer mRNA vacc PF cholo-sucrose 12 yr and older (Comirnaty) 09/25/2023    COVID-19, unspecified 10/15/2023    INFLUENZA 09/25/2023, 10/15/2023    Pneumococcal Conjugate Vaccine 20-valent (Pcv20), Polysace 10/04/2022    Tdap 08/29/2017       Objective     /84 (BP Location: Left arm, Patient Position: Sitting, Cuff Size: Large)   Pulse 71   Temp (!) 95.7 °F (35.4 °C)   Ht 6' (1.829 m)   Wt 94.3 kg (208 lb)   SpO2 97%   BMI 28.21 kg/m²     Physical Exam  Constitutional:       General: He is not in acute distress.     Appearance: He is well-developed. He is not diaphoretic.   Eyes:      General: No scleral icterus.     Pupils: Pupils are equal, round, and reactive to light.   Cardiovascular:      Rate and Rhythm: Normal rate and regular rhythm.      Heart sounds: Normal heart sounds. No murmur heard.  Pulmonary:      Effort: Pulmonary effort is normal. No respiratory distress.      Breath sounds: Normal breath sounds. No wheezing.   Abdominal:      General: Bowel sounds are normal. There is no distension.      Palpations: Abdomen is soft.      Tenderness:  There is no abdominal tenderness.   Skin:     General: Skin is warm and dry.      Findings: No rash.   Neurological:      Mental Status: He is alert and oriented to person, place, and time.       Efrain Rhodes MD

## 2024-02-26 ENCOUNTER — OFFICE VISIT (OUTPATIENT)
Dept: UROLOGY | Facility: CLINIC | Age: 74
End: 2024-02-26
Payer: COMMERCIAL

## 2024-02-26 VITALS
TEMPERATURE: 98 F | OXYGEN SATURATION: 97 % | BODY MASS INDEX: 27.63 KG/M2 | RESPIRATION RATE: 16 BRPM | HEIGHT: 72 IN | WEIGHT: 204 LBS | DIASTOLIC BLOOD PRESSURE: 82 MMHG | HEART RATE: 73 BPM | SYSTOLIC BLOOD PRESSURE: 140 MMHG

## 2024-02-26 DIAGNOSIS — N20.0 NEPHROLITHIASIS: ICD-10-CM

## 2024-02-26 DIAGNOSIS — Z79.818 ANDROGEN DEPRIVATION THERAPY: ICD-10-CM

## 2024-02-26 DIAGNOSIS — C61 PROSTATE CANCER (HCC): Primary | ICD-10-CM

## 2024-02-26 PROCEDURE — 99214 OFFICE O/P EST MOD 30 MIN: CPT | Performed by: PHYSICIAN ASSISTANT

## 2024-02-26 PROCEDURE — 96402 CHEMO HORMON ANTINEOPL SQ/IM: CPT

## 2024-02-26 RX ORDER — TAMSULOSIN HYDROCHLORIDE 0.4 MG/1
0.8 CAPSULE ORAL
Qty: 180 CAPSULE | Refills: 3 | Status: SHIPPED | OUTPATIENT
Start: 2024-02-26

## 2024-02-26 NOTE — PROGRESS NOTES
2/26/2024      Chief Complaint   Patient presents with    Follow-up     Assessment and Plan    High risk prostate cancer  - clinical stage [id number] prostatic adenocarcinoma, [place] score 8 (4+4)  - Pretreatment PSA 9.4  - S/p brachytherapy at [facility] with Dr. Lenz completed on 9/5/23   - Firmagon 240 mg on 8/8/23  - Lupron 45 mg given today (recommendations for 18 months total duration per [facility])  - S/p EBRT completed on 11/21/23   - Most recent PSA from 2/19/24 was 0.35  - Continue evening primrose for hot flashes. Offered Megace which he declines at this time.      2. Left ureteral stone s/p left URS with LL on 3/23/23  - US kidney bladder from 1/6/24 - Nonobstructing bilateral intrarenal calculi. Mild right and moderate left pyelectasis. Normal bilateral ureteral jets.  Multiple simple bilateral renal cysts.  - Asymptomatic    3. BPH with LUTS  - Will trial increasing Flomax to double dose     - Follow up in 24 weeks for final Lupron and with PSA prior     History of Present Illness  Erickson Jimenez is a 73 y.o. male here for follow up evaluation of above.    He reports he is feeling fatigued and having hot flashes from the Lupron.     Also continues to have urinary urgency and pressure. Reports overall good benefit with Flomax. No dysuria or hematuria.      Firmagon 240 mg on 8/8/23  Lupron 45 mg on 9/8/23    Review of Systems   Constitutional:  Negative for chills and fever.   Respiratory:  Negative for shortness of breath.    Cardiovascular:  Negative for chest pain.   Gastrointestinal:  Negative for abdominal pain.   Genitourinary:  Positive for urgency. Negative for difficulty urinating, dysuria, flank pain, frequency and hematuria.   Neurological:  Negative for dizziness.                  Past Medical History  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    Heart disease      Hyperlipidemia     Hypertension     Kidney stone     Osteoarthritis     Prostate cancer (HCC)     Vascular disorder        Past Social History  Past Surgical History:   Procedure Laterality Date    ABDOMINAL SURGERY      CARDIAC CATHETERIZATION N/A 3/21/2022    Procedure: Cardiac catheterization;  Surgeon: Stephy Horner MD;  [facility];  Service: Cardiology    CARDIAC CATHETERIZATION N/A 3/21/2022    Procedure: Cardiac Coronary Angiogram;  Surgeon: Stephy Horner MD;  [facility];  Service: Cardiology    COLONOSCOPY      FL RETROGRADE PYELOGRAM  3/23/2023    INGUINAL HERNIA REPAIR Left     LAPAROSCOPIC COLON RESECTION      MO ARTHROPLASTY GLENOHUMERAL JOINT TOTAL SHOULDER Right 11/17/2020    Procedure: ARTHROPLASTY SHOULDER REVERSE with biceps tendonesis;  Surgeon: Robby Marquez MD;  [facility];  Service: Orthopedics    MO BX PROSTATE STRTCTC SATURATION SAMPLING IMG GID N/A 5/16/2023    Procedure: TRANSPERINEAL MRI FUSION BIOPSY PROSTATE;  Surgeon: Quan Velasco MD;  [facility];  Service: Urology    MO COLONOSCOPY FLX DX W/COLLJ SPEC WHEN PFRMD N/A 11/3/2017    Procedure: COLONOSCOPY;  Surgeon: SUZAN Muñiz MD;  Location: [facility];  Service: Colorectal    MO CYSTO/URETERO W/LITHOTRIPSY &INDWELL STENT INSRT Left 3/23/2023    Procedure: CYSTOSCOPY URETEROSCOPY WITH LITHOTRIPSY HOLMIUM LASER, RETROGRADE PYELOGRAM AND INSERTION STENT URETERAL, STONE BASKET;  Surgeon: Michelet Johns MD;  [facility];  Service: Urology    MO TEAEC W/PATCH GRF CAROTID VERTB SUBCLAV NECK INC Left 1/13/2020    Procedure: ENDARTERECTOMY ARTERY CAROTID;  Surgeon: Amado Teague MD;  [facility];  Service: Vascular     Social History     Tobacco Use   Smoking Status Some Days    Types: Cigars    Passive exposure: Past   Smokeless Tobacco Current       Past Family History  Family History   Problem Relation Age of Onset    Colon cancer Father     No  Known Problems Mother     Colon cancer Paternal Grandfather     Colon cancer Family     Diabetes Half-Sister     Obesity Half-Sister        Past Social history  Social History     Socioeconomic History    Marital status: /Civil Union     Spouse name: Not on file    Number of children: Not on file    Years of education: Not on file    Highest education level: Not on file   Occupational History    Not on file   Tobacco Use    Smoking status: Some Days     Types: Cigars     Passive exposure: Past    Smokeless tobacco: Current   Vaping Use    Vaping status: Never Used   Substance and Sexual Activity    Alcohol use: Yes     Alcohol/week: 3.0 standard drinks of alcohol     Types: 3 Cans of beer per week     Comment: Socially    Drug use: Never    Sexual activity: Not Currently   Other Topics Concern    Not on file   Social History Narrative    Caffeine use    Uses safety equipment: seatbelts     Social Determinants of Health     Financial Resource Strain: Low Risk  (5/9/2023)    Overall Financial Resource Strain (CARDIA)     Difficulty of Paying Living Expenses: Not very hard   Food Insecurity: Not on file   Transportation Needs: No Transportation Needs (5/9/2023)    PRAPARE - Transportation     Lack of Transportation (Medical): No     Lack of Transportation (Non-Medical): No   Physical Activity: Not on file   Stress: Not on file   Social Connections: Not on file   Intimate Partner Violence: Not on file   Housing Stability: Not on file       Current Medications  Current Outpatient Medications   Medication Sig Dispense Refill    amLODIPine (NORVASC) 10 mg tablet Take 1 tablet (10 mg total) by mouth daily (Patient taking differently: Take 10 mg by mouth every morning) 90 tablet 3    aspirin 81 mg chewable tablet Chew 1 tablet (81 mg total) daily 90 tablet 3    atorvastatin (LIPITOR) 20 mg tablet Take 1 tablet (20 mg total) by mouth daily at bedtime 90 tablet 6    Calcium Carb-Cholecalciferol (calcium  carbonate-vitamin D) 500 mg-5 mcg tablet Take 1 tablet by mouth 2 (two) times a day with meals 180 tablet 3    carvedilol (COREG) 12.5 mg tablet TAKE 1 TABLET BY MOUTH TWICE  DAILY WITH MEALS 180 tablet 3    Continuous Blood Gluc  (Dexcom G7 ) ANITA Use 1 Device 3 (three) times a day 1 each 0    Continuous Blood Gluc Sensor (Dexcom G7 Sensor) Use 1 Device every 10 days 9 each 3    Continuous Blood Gluc Transmit (Dexcom G6 Transmitter) MISC 1 TRANSMITTED EVERY 3 MONTHS FOR CONTINUOUS GLUCOSE MONITORING 1 each 0    glucose blood test strip Use as instructed 100 each 2    losartan (COZAAR) 50 mg tablet Take 1 tablet (50 mg total) by mouth daily      metFORMIN (GLUCOPHAGE) 1000 MG tablet Take 1 tablet (1,000 mg total) by mouth 2 (two) times a day with meals 180 tablet 3    sildenafil (VIAGRA) 25 MG tablet TAKE 1 TABLET BY MOUTH  DAILY AS NEEDED FOR  ERECTILE DYSFUNCTION (Patient taking differently: Take 25 mg by mouth as needed) 10 tablet 0    sitaGLIPtin (Januvia) 50 mg tablet Take 1 tablet (50 mg total) by mouth daily 90 tablet 3    tamsulosin (FLOMAX) 0.4 mg Take 1 capsule (0.4 mg total) by mouth daily with dinner 90 capsule 3     No current facility-administered medications for this visit.       Allergies  Allergies   Allergen Reactions    Other Hives     Soft shell crabs          The following portions of the patient's history were reviewed and updated as appropriate: allergies, current medications, past medical history, past social history, past surgical history and problem list.      Vitals  Vitals:    02/26/24 0931   BP: 140/82   BP Location: Left arm   Patient Position: Sitting   Cuff Size: Standard   Pulse: 73   Resp: 16   Temp: 98 °F (36.7 °C)   SpO2: 97%   Weight: 92.5 kg (204 lb)   Height: 6' (1.829 m)           Physical Exam  Physical Exam  Constitutional:       Appearance: Normal appearance.   HENT:      Head: Normocephalic and atraumatic.      Right Ear: External ear normal.      Left Ear:  External ear normal.      Nose: Nose normal.   Eyes:      General: No scleral icterus.     Conjunctiva/sclera: Conjunctivae normal.   Cardiovascular:      Pulses: Normal pulses.   Pulmonary:      Effort: Pulmonary effort is normal.   Musculoskeletal:         General: Normal range of motion.      Cervical back: Normal range of motion.   Neurological:      General: No focal deficit present.      Mental Status: He is alert and oriented to person, place, and time.   Psychiatric:         Mood and Affect: Mood normal.         Behavior: Behavior normal.         Thought Content: Thought content normal.         Judgment: Judgment normal.           Results  No results found for this or any previous visit (from the past 1 hour(s)).]  Lab Results   Component Value Date    PSA 0.35 02/19/2024    PSA 9.6 (H) 03/03/2023    PSA 8.5 (H) 09/02/2022     Lab Results   Component Value Date    GLUCOSE 112 01/13/2020    CALCIUM 9.5 02/19/2024     11/22/2016    K 4.5 02/19/2024    CO2 27 02/19/2024     02/19/2024    BUN 18 02/19/2024    CREATININE 0.89 02/19/2024     Lab Results   Component Value Date    WBC 4.72 05/11/2023    HGB 14.4 05/11/2023    HCT 43.9 05/11/2023     (H) 05/11/2023     05/11/2023           Orders  No orders of the defined types were placed in this encounter.      Maryjane Coronado

## 2024-02-29 ENCOUNTER — APPOINTMENT (EMERGENCY)
Dept: RADIOLOGY | Facility: HOSPITAL | Age: 74
End: 2024-02-29
Payer: COMMERCIAL

## 2024-02-29 ENCOUNTER — TELEPHONE (OUTPATIENT)
Dept: CARDIOLOGY CLINIC | Facility: CLINIC | Age: 74
End: 2024-02-29

## 2024-02-29 ENCOUNTER — HOSPITAL ENCOUNTER (EMERGENCY)
Facility: HOSPITAL | Age: 74
Discharge: HOME | End: 2024-02-29
Attending: EMERGENCY MEDICINE
Payer: COMMERCIAL

## 2024-02-29 VITALS
TEMPERATURE: 97.7 F | RESPIRATION RATE: 17 BRPM | DIASTOLIC BLOOD PRESSURE: 81 MMHG | SYSTOLIC BLOOD PRESSURE: 130 MMHG | OXYGEN SATURATION: 96 % | HEART RATE: 76 BPM

## 2024-02-29 DIAGNOSIS — R00.2 PALPITATIONS: Primary | ICD-10-CM

## 2024-02-29 LAB
ALBUMIN SERPL BCP-MCNC: 4.2 G/DL (ref 3.5–5)
ALP SERPL-CCNC: 47 U/L (ref 34–104)
ALT SERPL W P-5'-P-CCNC: 13 U/L (ref 7–52)
ANION GAP SERPL CALCULATED.3IONS-SCNC: 9 MMOL/L
AST SERPL W P-5'-P-CCNC: 15 U/L (ref 13–39)
BASOPHILS # BLD AUTO: 0.05 THOUSANDS/ÂΜL (ref 0–0.1)
BASOPHILS NFR BLD AUTO: 2 % (ref 0–1)
BILIRUB SERPL-MCNC: 0.8 MG/DL (ref 0.2–1)
BUN SERPL-MCNC: 17 MG/DL (ref 5–25)
CALCIUM SERPL-MCNC: 9.5 MG/DL (ref 8.4–10.2)
CARDIAC TROPONIN I PNL SERPL HS: 7 NG/L
CHLORIDE SERPL-SCNC: 104 MMOL/L (ref 96–108)
CO2 SERPL-SCNC: 22 MMOL/L (ref 21–32)
CREAT SERPL-MCNC: 0.92 MG/DL (ref 0.6–1.3)
EOSINOPHIL # BLD AUTO: 0.13 THOUSAND/ÂΜL (ref 0–0.61)
EOSINOPHIL NFR BLD AUTO: 4 % (ref 0–6)
ERYTHROCYTE [DISTWIDTH] IN BLOOD BY AUTOMATED COUNT: 12.3 % (ref 11.6–15.1)
GFR SERPL CREATININE-BSD FRML MDRD: 83 ML/MIN/1.73SQ M
GLUCOSE SERPL-MCNC: 206 MG/DL (ref 65–140)
HCT VFR BLD AUTO: 39.6 % (ref 36.5–49.3)
HGB BLD-MCNC: 13.2 G/DL (ref 12–17)
IMM GRANULOCYTES # BLD AUTO: 0.03 THOUSAND/UL (ref 0–0.2)
IMM GRANULOCYTES NFR BLD AUTO: 1 % (ref 0–2)
LYMPHOCYTES # BLD AUTO: 0.53 THOUSANDS/ÂΜL (ref 0.6–4.47)
LYMPHOCYTES NFR BLD AUTO: 18 % (ref 14–44)
MCH RBC QN AUTO: 33.2 PG (ref 26.8–34.3)
MCHC RBC AUTO-ENTMCNC: 33.3 G/DL (ref 31.4–37.4)
MCV RBC AUTO: 100 FL (ref 82–98)
MONOCYTES # BLD AUTO: 0.35 THOUSAND/ÂΜL (ref 0.17–1.22)
MONOCYTES NFR BLD AUTO: 12 % (ref 4–12)
NEUTROPHILS # BLD AUTO: 1.92 THOUSANDS/ÂΜL (ref 1.85–7.62)
NEUTS SEG NFR BLD AUTO: 63 % (ref 43–75)
NRBC BLD AUTO-RTO: 0 /100 WBCS
PLATELET # BLD AUTO: 133 THOUSANDS/UL (ref 149–390)
PMV BLD AUTO: 10.9 FL (ref 8.9–12.7)
POTASSIUM SERPL-SCNC: 4.1 MMOL/L (ref 3.5–5.3)
PROT SERPL-MCNC: 7.4 G/DL (ref 6.4–8.4)
RBC # BLD AUTO: 3.98 MILLION/UL (ref 3.88–5.62)
SODIUM SERPL-SCNC: 135 MMOL/L (ref 135–147)
WBC # BLD AUTO: 3.01 THOUSAND/UL (ref 4.31–10.16)

## 2024-02-29 PROCEDURE — 85025 COMPLETE CBC W/AUTO DIFF WBC: CPT | Performed by: EMERGENCY MEDICINE

## 2024-02-29 PROCEDURE — 93005 ELECTROCARDIOGRAM TRACING: CPT

## 2024-02-29 PROCEDURE — 80053 COMPREHEN METABOLIC PANEL: CPT | Performed by: EMERGENCY MEDICINE

## 2024-02-29 PROCEDURE — 36415 COLL VENOUS BLD VENIPUNCTURE: CPT

## 2024-02-29 PROCEDURE — 84484 ASSAY OF TROPONIN QUANT: CPT | Performed by: EMERGENCY MEDICINE

## 2024-02-29 PROCEDURE — 99285 EMERGENCY DEPT VISIT HI MDM: CPT | Performed by: EMERGENCY MEDICINE

## 2024-02-29 PROCEDURE — 99285 EMERGENCY DEPT VISIT HI MDM: CPT

## 2024-02-29 NOTE — ED PROVIDER NOTES
"History  Chief Complaint   Patient presents with    Palpitations     Patient reports feeling \"vibrations\" in his chest for the last few days.  Denies any other symptoms     72 yo male with several days of intermittent vibration feeling over his L pectoralis muscle. Occurs once every 4 minutes. Lasts a few seconds then resolves. No dyspnea, diaphoresis, chest pain, cough, hemoptysis. No leg pain or swelling. No exertional worsening. No h/o similar sxs. Additional history from wife at bedside.         Prior to Admission Medications   Prescriptions Last Dose Informant Patient Reported? Taking?   Calcium Carb-Cholecalciferol (calcium carbonate-vitamin D) 500 mg-5 mcg tablet  Self No No   Sig: Take 1 tablet by mouth 2 (two) times a day with meals   Continuous Blood Gluc  (Dexcom G7 ) ANITA  Self No No   Sig: Use 1 Device 3 (three) times a day   Continuous Blood Gluc Sensor (Dexcom G7 Sensor)  Self No No   Sig: Use 1 Device every 10 days   Continuous Blood Gluc Transmit (Dexcom G6 Transmitter) MISC  Self No No   Si TRANSMITTED EVERY 3 MONTHS FOR CONTINUOUS GLUCOSE MONITORING   amLODIPine (NORVASC) 10 mg tablet  Self No No   Sig: Take 1 tablet (10 mg total) by mouth daily   Patient taking differently: Take 10 mg by mouth every morning   aspirin 81 mg chewable tablet  Self No No   Sig: Chew 1 tablet (81 mg total) daily   atorvastatin (LIPITOR) 20 mg tablet  Self No No   Sig: Take 1 tablet (20 mg total) by mouth daily at bedtime   carvedilol (COREG) 12.5 mg tablet  Self No No   Sig: TAKE 1 TABLET BY MOUTH TWICE  DAILY WITH MEALS   glucose blood test strip  Self No No   Sig: Use as instructed   losartan (COZAAR) 50 mg tablet  Self No No   Sig: Take 1 tablet (50 mg total) by mouth daily   metFORMIN (GLUCOPHAGE) 1000 MG tablet  Self No No   Sig: Take 1 tablet (1,000 mg total) by mouth 2 (two) times a day with meals   sildenafil (VIAGRA) 25 MG tablet  Self No No   Sig: TAKE 1 TABLET BY MOUTH  DAILY AS NEEDED " FOR  ERECTILE DYSFUNCTION   Patient taking differently: Take 25 mg by mouth as needed   sitaGLIPtin (Januvia) 50 mg tablet  Self No No   Sig: Take 1 tablet (50 mg total) by mouth daily   tamsulosin (FLOMAX) 0.4 mg   No No   Sig: Take 2 capsules (0.8 mg total) by mouth daily with dinner      Facility-Administered Medications: None       Past Medical History:   Diagnosis Date    CHF (congestive heart failure) ([facility])     Diabetes mellitus (HCC)     Diverticulitis     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HCC) 11/07/2018    Heart disease     Hyperlipidemia     Hypertension     Kidney stone     Osteoarthritis     Prostate cancer (HCC)     Vascular disorder        Past Surgical History:   Procedure Laterality Date    ABDOMINAL SURGERY      CARDIAC CATHETERIZATION N/A 3/21/2022    Procedure: Cardiac catheterization;  Surgeon: Stephy Horner MD;  [facility];  Service: Cardiology    CARDIAC CATHETERIZATION N/A 3/21/2022    Procedure: Cardiac Coronary Angiogram;  Surgeon: Stephy Horner MD;  [facility];  Service: Cardiology    COLONOSCOPY      FL RETROGRADE PYELOGRAM  3/23/2023    INGUINAL HERNIA REPAIR Left     LAPAROSCOPIC COLON RESECTION      NH ARTHROPLASTY GLENOHUMERAL JOINT TOTAL SHOULDER Right 11/17/2020    Procedure: ARTHROPLASTY SHOULDER REVERSE with biceps tendonesis;  Surgeon: Robby Marquez MD;  [facility];  Service: Orthopedics    NH BX PROSTATE STRTCTC SATURATION SAMPLING IMG GID N/A 5/16/2023    Procedure: TRANSPERINEAL MRI FUSION BIOPSY PROSTATE;  Surgeon: Quan Velasco MD;  [facility];  Service: Urology    NH COLONOSCOPY FLX DX W/COLLJ SPEC WHEN PFRMD N/A 11/3/2017    Procedure: COLONOSCOPY;  Surgeon: SUZAN Muñiz MD;  [facility];  Service: Colorectal    NH CYSTO/URETERO W/LITHOTRIPSY &INDWELL STENT INSRT Left 3/23/2023    Procedure: CYSTOSCOPY URETEROSCOPY WITH LITHOTRIPSY HOLMIUM LASER, RETROGRADE PYELOGRAM AND INSERTION STENT  URETERAL, STONE BASKET;  Surgeon: Michelet Johns MD;  [facility];  Service: Urology    ME TEAEC W/PATCH GRF CAROTID VERTB SUBCLAV NECK INC Left 1/13/2020    Procedure: ENDARTERECTOMY ARTERY CAROTID;  Surgeon: Amado Teague MD;  Location:  MAIN OR;  Service: Vascular       Family History   Problem Relation Age of Onset    Colon cancer Father     No Known Problems Mother     Colon cancer Paternal Grandfather     Colon cancer Family     Diabetes Half-Sister     Obesity Half-Sister      I have reviewed and agree with the history as documented.    E-Cigarette/Vaping    E-Cigarette Use Never User      E-Cigarette/Vaping Substances    Nicotine No lozenges 0845    THC No     CBD No     Flavoring No     Other No     Unknown No      Social History     Tobacco Use    Smoking status: Some Days     Types: Cigars     Passive exposure: Past    Smokeless tobacco: Current   Vaping Use    Vaping status: Never Used   Substance Use Topics    Alcohol use: Yes     Alcohol/week: 3.0 standard drinks of alcohol     Types: 3 Cans of beer per week     Comment: Socially    Drug use: Never       Review of Systems   Cardiovascular:  Positive for palpitations.       Physical Exam  Physical Exam  Vitals and nursing note reviewed.   Constitutional:       General: He is not in acute distress.     Appearance: Normal appearance. He is well-developed. He is not ill-appearing, toxic-appearing or diaphoretic.   HENT:      Head: Normocephalic and atraumatic.      Mouth/Throat:      Mouth: Mucous membranes are moist.      Pharynx: Oropharynx is clear.   Eyes:      Conjunctiva/sclera: Conjunctivae normal.      Pupils: Pupils are equal, round, and reactive to light.   Neck:      Vascular: No JVD.   Cardiovascular:      Rate and Rhythm: Normal rate and regular rhythm.      Pulses: Normal pulses.      Heart sounds: Normal heart sounds. No murmur heard.     No friction rub. No gallop.   Pulmonary:      Effort: Pulmonary effort is normal. No  respiratory distress.      Breath sounds: Normal breath sounds. No stridor. No wheezing, rhonchi or rales.   Chest:      Chest wall: No tenderness.   Abdominal:      General: There is no distension.      Palpations: Abdomen is soft.      Tenderness: There is no abdominal tenderness. There is no guarding or rebound.   Musculoskeletal:         General: No swelling, tenderness, deformity or signs of injury. Normal range of motion.      Cervical back: Normal range of motion and neck supple. No rigidity.   Skin:     General: Skin is warm and dry.      Capillary Refill: Capillary refill takes less than 2 seconds.      Coloration: Skin is not jaundiced or pale.      Findings: No bruising or erythema.   Neurological:      General: No focal deficit present.      Mental Status: He is alert and oriented to person, place, and time.      Cranial Nerves: No cranial nerve deficit.      Sensory: No sensory deficit.      Motor: No weakness or abnormal muscle tone.      Coordination: Coordination normal.      Gait: Gait normal.         Vital Signs  ED Triage Vitals [02/29/24 1509]   Temperature Pulse Respirations Blood Pressure SpO2   97.7 °F (36.5 °C) 76 17 130/81 96 %      Temp Source Heart Rate Source Patient Position - Orthostatic VS BP Location FiO2 (%)   Oral Monitor Sitting Left arm --      Pain Score       --           Vitals:    02/29/24 1509   BP: 130/81   Pulse: 76   Patient Position - Orthostatic VS: Sitting         Visual Acuity      ED Medications  Medications - No data to display    Diagnostic Studies  Results Reviewed       Procedure Component Value Units Date/Time    CBC and differential [[id number]]  (Abnormal) Collected: 02/29/24 1513    Lab Status: Final result Specimen: Blood from Arm, Right Updated: 02/29/24 1657     WBC 3.01 Thousand/uL      RBC 3.98 Million/uL      Hemoglobin 13.2 g/dL      Hematocrit 39.6 %       fL      MCH 33.2 pg      MCHC 33.3 g/dL      RDW 12.3 %      MPV 10.9 fL      Platelets 133  Thousands/uL      nRBC 0 /100 WBCs      Neutrophils Relative 63 %      Immat GRANS % 1 %      Lymphocytes Relative 18 %      Monocytes Relative 12 %      Eosinophils Relative 4 %      Basophils Relative 2 %      Neutrophils Absolute 1.92 Thousands/µL      Immature Grans Absolute 0.03 Thousand/uL      Lymphocytes Absolute 0.53 Thousands/µL      Monocytes Absolute 0.35 Thousand/µL      Eosinophils Absolute 0.13 Thousand/µL      Basophils Absolute 0.05 Thousands/µL     HS Troponin 0hr (reflex protocol) [[id number]]  (Normal) Collected: 02/29/24 1513    Lab Status: Final result Specimen: Blood from Arm, Right Updated: 02/29/24 1544     hs TnI 0hr 7 ng/L     Comprehensive metabolic panel [[id number]]  (Abnormal) Collected: 02/29/24 1513    Lab Status: Final result Specimen: Blood from Arm, Right Updated: 02/29/24 1536     Sodium 135 mmol/L      Potassium 4.1 mmol/L      Chloride 104 mmol/L      CO2 22 mmol/L      ANION GAP 9 mmol/L      BUN 17 mg/dL      Creatinine 0.92 mg/dL      Glucose 206 mg/dL      Calcium 9.5 mg/dL      AST 15 U/L      ALT 13 U/L      Alkaline Phosphatase 47 U/L      Total Protein 7.4 g/dL      Albumin 4.2 g/dL      Total Bilirubin 0.80 mg/dL      eGFR 83 ml/min/1.73sq m     Narrative:      National Kidney Disease Foundation guidelines for Chronic Kidney Disease (CKD):     Stage 1 with normal or high GFR (GFR > 90 mL/min/1.73 square meters)    Stage 2 Mild CKD (GFR = 60-89 mL/min/1.73 square meters)    Stage 3A Moderate CKD (GFR = 45-59 mL/min/1.73 square meters)    Stage 3B Moderate CKD (GFR = 30-44 mL/min/1.73 square meters)    Stage 4 Severe CKD (GFR = 15-29 mL/min/1.73 square meters)    Stage 5 End Stage CKD (GFR <15 mL/min/1.73 square meters)  Note: GFR calculation is accurate only with a steady state creatinine                   No orders to display              Procedures  ECG 12 Lead Documentation Only    Date/Time: 2/29/2024 4:59 PM    Performed by: Julián Rivers MD  Authorized by: Julián VAZQUEZ  MD Silvestre    Indications / Diagnosis:  Palpitations  ECG reviewed by me, the ED Provider: yes    Patient location:  ED  Previous ECG:     Previous ECG:  Compared to current    Comparison ECG info:  17 mar 2023    Similarity:  No change  Interpretation:     Interpretation: non-specific    Rate:     ECG rate:  80    ECG rate assessment: normal    Rhythm:     Rhythm: sinus rhythm    Comments:      Redemonstrated nonspecific st changes. No acute ischemia. No ectopy.            ED Course                                             Medical Decision Making  Problems Addressed:  Palpitations: complicated acute illness or injury     Details: Ddx includes arrhytmia, acute coronary syndrome, electrolyte abnormality, etc.     Amount and/or Complexity of Data Reviewed  Labs: ordered.  Radiology: ordered.             Disposition  Final diagnoses:   Palpitations     Time reflects when diagnosis was documented in both MDM as applicable and the Disposition within this note       Time User Action Codes Description Comment    2/29/2024  5:21 PM Julián Rivers Add [R00.2] Palpitations           ED Disposition       ED Disposition   Discharge    Condition   Stable    Date/Time   u Feb 29, 2024  5:21 PM    Comment   Erickson Jimenez discharge to home/self care.                   Follow-up Information       Follow up With Specialties Details Why Contact Info Additional Information    [facility] Emergency Department Emergency Medicine  If symptoms worsen [address] [facility] [id number] [phone] [facility], [address] [place], [place], [address]            Discharge Medication List as of 2/29/2024  5:25 PM        CONTINUE these medications which have NOT CHANGED    Details   amLODIPine (NORVASC) 10 mg tablet Take 1 tablet (10 mg total) by mouth daily, Starting Tue 5/9/2023, Normal      aspirin 81 mg chewable tablet Chew 1 tablet (81 mg total)  daily, Starting Thu 1/4/2024, Normal      atorvastatin (LIPITOR) 20 mg tablet Take 1 tablet (20 mg total) by mouth daily at bedtime, Starting Wed 9/13/2023, Normal      Calcium Carb-Cholecalciferol (calcium carbonate-vitamin D) 500 mg-5 mcg tablet Take 1 tablet by mouth 2 (two) times a day with meals, Starting Tue 8/8/2023, Until Fri 8/2/2024, Normal      carvedilol (COREG) 12.5 mg tablet TAKE 1 TABLET BY MOUTH TWICE  DAILY WITH MEALS, Starting Mon 10/16/2023, Normal      Continuous Blood Gluc  (Dexcom G7 ) ANITA Use 1 Device 3 (three) times a day, Starting Tue 12/26/2023, Normal      Continuous Blood Gluc Sensor (Dexcom G7 Sensor) Use 1 Device every 10 days, Starting Tue 12/26/2023, Until Mon 3/25/2024, Normal      Continuous Blood Gluc Transmit (Dexcom G6 Transmitter) MISC 1 TRANSMITTED EVERY 3 MONTHS FOR CONTINUOUS GLUCOSE MONITORING, Normal      glucose blood test strip Use as instructed, Normal      losartan (COZAAR) 50 mg tablet Take 1 tablet (50 mg total) by mouth daily, Starting Wed 9/13/2023, No Print      metFORMIN (GLUCOPHAGE) 1000 MG tablet Take 1 tablet (1,000 mg total) by mouth 2 (two) times a day with meals, Starting Thu 11/16/2023, Normal      sildenafil (VIAGRA) 25 MG tablet TAKE 1 TABLET BY MOUTH  DAILY AS NEEDED FOR  ERECTILE DYSFUNCTION, Normal      sitaGLIPtin (Januvia) 50 mg tablet Take 1 tablet (50 mg total) by mouth daily, Starting Mon 11/13/2023, Normal      tamsulosin (FLOMAX) 0.4 mg Take 2 capsules (0.8 mg total) by mouth daily with dinner, Starting Mon 2/26/2024, Normal             No discharge procedures on file.    PDMP Review         Value Time User    PDMP Reviewed  Yes 11/17/2020  9:29 AM Sheila Guallpa PA-C            ED Provider  Electronically Signed by             Julián Rivers MD  02/29/24 1959

## 2024-02-29 NOTE — TELEPHONE ENCOUNTER
PT is experiencing what he explains to be vibrations in his chest. He states they last 3-4 seconds and are happening in 4 minute intervals. Patient not having any pain

## 2024-02-29 NOTE — TELEPHONE ENCOUNTER
Spoke with patient, pt stated that he has been experiencing vibration in his chest every 4 min's for last two and half days.     Patient stated that he has no other symptoms.    Patient is very concern.    Advised patient to go to the ED, patient verbally understood.

## 2024-03-03 LAB
ATRIAL RATE: 80 BPM
P AXIS: 32 DEGREES
PR INTERVAL: 158 MS
QRS AXIS: -9 DEGREES
QRSD INTERVAL: 84 MS
QT INTERVAL: 366 MS
QTC INTERVAL: 422 MS
T WAVE AXIS: -34 DEGREES
VENTRICULAR RATE: 80 BPM

## 2024-03-03 PROCEDURE — 93010 ELECTROCARDIOGRAM REPORT: CPT | Performed by: INTERNAL MEDICINE

## 2024-03-18 ENCOUNTER — OFFICE VISIT (OUTPATIENT)
Dept: CARDIOLOGY CLINIC | Facility: CLINIC | Age: 74
End: 2024-03-18
Payer: COMMERCIAL

## 2024-03-18 VITALS
BODY MASS INDEX: 27.5 KG/M2 | HEART RATE: 80 BPM | WEIGHT: 203 LBS | OXYGEN SATURATION: 98 % | SYSTOLIC BLOOD PRESSURE: 126 MMHG | RESPIRATION RATE: 16 BRPM | DIASTOLIC BLOOD PRESSURE: 76 MMHG | HEIGHT: 72 IN

## 2024-03-18 DIAGNOSIS — E11.65 TYPE 2 DIABETES MELLITUS WITH HYPERGLYCEMIA, WITHOUT LONG-TERM CURRENT USE OF INSULIN (HCC): ICD-10-CM

## 2024-03-18 DIAGNOSIS — E78.2 MIXED HYPERLIPIDEMIA: ICD-10-CM

## 2024-03-18 DIAGNOSIS — I10 ESSENTIAL HYPERTENSION: ICD-10-CM

## 2024-03-18 DIAGNOSIS — R00.2 PALPITATIONS: Primary | ICD-10-CM

## 2024-03-18 DIAGNOSIS — I65.23 CAROTID ARTERY STENOSIS WITHOUT CEREBRAL INFARCTION, BILATERAL: ICD-10-CM

## 2024-03-18 DIAGNOSIS — Z98.890 S/P CAROTID ENDARTERECTOMY: ICD-10-CM

## 2024-03-18 DIAGNOSIS — I25.83 CORONARY ARTERY DISEASE DUE TO LIPID RICH PLAQUE: ICD-10-CM

## 2024-03-18 DIAGNOSIS — I25.10 CORONARY ARTERY DISEASE DUE TO LIPID RICH PLAQUE: ICD-10-CM

## 2024-03-18 PROBLEM — E11.9 TYPE 2 DIABETES MELLITUS WITHOUT COMPLICATION, WITHOUT LONG-TERM CURRENT USE OF INSULIN (HCC): Status: RESOLVED | Noted: 2018-05-07 | Resolved: 2024-03-18

## 2024-03-18 PROCEDURE — 99214 OFFICE O/P EST MOD 30 MIN: CPT

## 2024-03-18 RX ORDER — LOSARTAN POTASSIUM 25 MG/1
25 TABLET ORAL DAILY
Start: 2024-03-18

## 2024-03-18 NOTE — PROGRESS NOTES
[facility]   Office Visit    Erickson Jimenez 73 y.o. male MRN: [id number]    03/18/24        Assessment/Plan:  1.  Palpitations  Last episode was 7-8 days ago.  Plan for 2-week ZIO event monitor.  Will check TSH level.  Discussed consideration for TTE, patient wishes to hold off at this time and will let our office know for any new/worsening symptoms.  Recommend avoidance of excessive caffeine/stimulants.    2.  Nonobstructive CAD  Denies chest pain or anginal equivalent.  Continue ASA, atorvastatin, Coreg, and amlodipine.    3.  NICM with EF recovery  Most recent echo shows EF 55%, no RWMA, and G1 DD.  Continue Coreg and losartan.    4.  Hypertension  BP is well-controlled.  Continue amlodipine and Coreg.  Patient has only been taking losartan 25 mg daily and BP remains normotensive, continue at 25 mg daily.  Encourage ambulatory monitoring and low-sodium diet.    5.  Hyperlipidemia  Continue atorvastatin and dietary control.    6.  Carotid stenosis s/p L CEA (1/2020) - follows with vascular surgery, continue periodic surveillance.  7.  T2DM, A1c 7.8        HPI: Erickson Jimenez is a 73 y.o. year old male with history of nonobstructive CAD, and ICM with EF recovery, hypertension, hyperlipidemia, carotid stenosis s/p L CEA, and T2DM who presents for office visit.  Patient was recently evaluated by ED for palpitations.  EKG at that time showed NSR.  These episodes are described as vibrations in his chest lasting usually lasting a few seconds and sometimes occurring periodically just a few minutes apart.  The last episode of such occurred 7-8 days ago.  Otherwise, patient reports he has been well overall from a cardiac standpoint since last visit with cardiology.  He has only been taking losartan 25 mg daily and notes blood pressure remains well-controlled.  Endorses strict compliance to all other medications.  Patient tries to remain active overall and looks forward to mushroom hunting as the weather warms.  He  is able to tolerate exertional activities such as working on siding without experiencing chest pain/anginal equivalent.  He drinks several cups of coffee per day.  Family history significant for CAD on maternal side with his maternal grandmother and uncle having MIs.  History of tobacco use noted, smoked 1 PPD for 50 years, quit several years ago.  Denies chest pain, SOB, LE edema, lightheadedness/dizziness or syncope.  Follows with Dr. Barrera Means his primary cardiologist.      Cardiovascular imaging:  TTE (6/2/2022) - EF 55%, no RWMA, G1 DD, mild left atrial dilation, mild MAC    Cardiac catheterization (3/21/2022) - no significant obstructive epicardial CAD.  There is 60% ostial stenosis of a diagonal branch which is small and not amenable to PCI.    Pharmacological MPI stress test (2/10/2022) - there is a moderate to large predominantly fixed defect of the basal to mid inferior wall that extended to the basal inferolateral and inferoseptal walls. A small amount of melissa-infarct ischemia could not be definitively excluded. Left ventricular function was severely reduced with hypokinesis of the inferior wall noted.       Review of Systems:  Review of Systems   Constitutional:  Negative for chills and fever.   HENT:  Negative for ear pain and sore throat.    Eyes:  Negative for pain and visual disturbance.   Respiratory:  Negative for cough and shortness of breath.    Cardiovascular:  Positive for palpitations. Negative for chest pain and leg swelling.   Gastrointestinal:  Negative for abdominal pain and vomiting.   Genitourinary:  Negative for dysuria and hematuria.   Musculoskeletal:  Negative for arthralgias and back pain.   Skin:  Negative for color change and rash.   Neurological:  Negative for seizures and syncope.   All other systems reviewed and are negative.      PHYSICAL EXAM:  Vitals:   Vitals:    03/18/24 1542   BP: 126/76   BP Location: Left arm   Patient Position: Sitting   Cuff Size: Standard    Pulse: 80   Resp: 16   SpO2: 98%   Weight: 92.1 kg (203 lb)   Height: 6' (1.829 m)        Physical Exam:  GEN: Alert and oriented x 3, in no acute distress.  Well appearing and well nourished.   HEENT: Sclera anicteric, conjunctivae pink, mucous membranes moist. Oropharynx clear.   NECK: Supple, no carotid bruits, no significant JVD. Trachea midline, no thyromegaly.   HEART: Regular rhythm, normal S1 and S2, no murmurs, clicks, gallops or rubs. PMI nondisplaced, no thrills.   LUNGS: Clear to auscultation bilaterally; no wheezes, rales, or rhonchi. No increased work of breathing or signs of respiratory distress.   ABDOMEN: Soft, nontender, nondistended, normoactive bowel sounds.   EXTREMITIES: Skin warm and well perfused, no clubbing, cyanosis, or edema.  NEURO: No focal findings. Normal speech. Mood and affect normal.   SKIN: Normal without suspicious lesions on exposed skin.    Follow up: 3 months or sooner as needed    Allergies   Allergen Reactions    Other Hives     Soft shell crabs          Current Outpatient Medications:     amLODIPine (NORVASC) 10 mg tablet, Take 1 tablet (10 mg total) by mouth daily (Patient taking differently: Take 10 mg by mouth every morning), Disp: 90 tablet, Rfl: 3    aspirin 81 mg chewable tablet, Chew 1 tablet (81 mg total) daily, Disp: 90 tablet, Rfl: 3    atorvastatin (LIPITOR) 20 mg tablet, Take 1 tablet (20 mg total) by mouth daily at bedtime, Disp: 90 tablet, Rfl: 6    Calcium Carb-Cholecalciferol (calcium carbonate-vitamin D) 500 mg-5 mcg tablet, Take 1 tablet by mouth 2 (two) times a day with meals, Disp: 180 tablet, Rfl: 3    carvedilol (COREG) 12.5 mg tablet, TAKE 1 TABLET BY MOUTH TWICE  DAILY WITH MEALS, Disp: 180 tablet, Rfl: 3    Continuous Blood Gluc  (Dexcom G7 ) ANITA, Use 1 Device 3 (three) times a day, Disp: 1 each, Rfl: 0    Continuous Blood Gluc Sensor (Dexcom G7 Sensor), Use 1 Device every 10 days, Disp: 9 each, Rfl: 3    Continuous Blood Gluc  Transmit (Dexcom G6 Transmitter) MISC, 1 TRANSMITTED EVERY 3 MONTHS FOR CONTINUOUS GLUCOSE MONITORING, Disp: 1 each, Rfl: 0    glucose blood test strip, Use as instructed, Disp: 100 each, Rfl: 2    losartan (COZAAR) 25 mg tablet, Take 1 tablet (25 mg total) by mouth daily, Disp: , Rfl:     metFORMIN (GLUCOPHAGE) 1000 MG tablet, Take 1 tablet (1,000 mg total) by mouth 2 (two) times a day with meals, Disp: 180 tablet, Rfl: 3    sildenafil (VIAGRA) 25 MG tablet, TAKE 1 TABLET BY MOUTH  DAILY AS NEEDED FOR  ERECTILE DYSFUNCTION (Patient taking differently: Take 25 mg by mouth as needed), Disp: 10 tablet, Rfl: 0    sitaGLIPtin (Januvia) 50 mg tablet, Take 1 tablet (50 mg total) by mouth daily, Disp: 90 tablet, Rfl: 3    tamsulosin (FLOMAX) 0.4 mg, Take 2 capsules (0.8 mg total) by mouth daily with dinner, Disp: 180 capsule, Rfl: 3    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    Heart disease     Hyperlipidemia     Hypertension     Kidney stone     Osteoarthritis     Prostate cancer (HCC)     Vascular disorder        Family History   Problem Relation Age of Onset    Colon cancer Father     No Known Problems Mother     Colon cancer Paternal Grandfather     Colon cancer Family     Diabetes Half-Sister     Obesity Half-Sister        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    Heart disease     Hyperlipidemia     Hypertension     Kidney stone     Osteoarthritis     Prostate cancer (HCC)     Vascular disorder        Past Surgical History:   Procedure Laterality Date    ABDOMINAL SURGERY      CARDIAC CATHETERIZATION N/A 3/21/2022    Procedure: Cardiac catheterization;  Surgeon: Stephy Horner MD;  Location: [facility];  Service: Cardiology     CARDIAC CATHETERIZATION N/A 3/21/2022    Procedure: Cardiac Coronary Angiogram;  Surgeon: Stephy Horner MD;  Location: [facility];  Service: Cardiology    COLONOSCOPY      FL RETROGRADE PYELOGRAM  3/23/2023    INGUINAL HERNIA REPAIR Left     LAPAROSCOPIC COLON RESECTION      MT ARTHROPLASTY GLENOHUMERAL JOINT TOTAL SHOULDER Right 11/17/2020    Procedure: ARTHROPLASTY SHOULDER REVERSE with biceps tendonesis;  Surgeon: Robby Marquez MD;  Location: [facility];  Service: Orthopedics    MT BX PROSTATE STRTCTC SATURATION SAMPLING IMG GID N/A 5/16/2023    Procedure: TRANSPERINEAL MRI FUSION BIOPSY PROSTATE;  Surgeon: Quan Velasco MD;  Location: [facility];  Service: Urology    MT COLONOSCOPY FLX DX W/COLLJ SPEC WHEN PFRMD N/A 11/3/2017    Procedure: COLONOSCOPY;  Surgeon: SUZAN Muñiz MD;  Location: [facility];  Service: Colorectal    MT CYSTO/URETERO W/LITHOTRIPSY &INDWELL STENT INSRT Left 3/23/2023    Procedure: CYSTOSCOPY URETEROSCOPY WITH LITHOTRIPSY HOLMIUM LASER, RETROGRADE PYELOGRAM AND INSERTION STENT URETERAL, STONE BASKET;  Surgeon: Michelet Johns MD;  Location: [facility];  Service: Urology    MT TEAEC W/PATCH GRF CAROTID VERTB SUBCLAV NECK INC Left 1/13/2020    Procedure: ENDARTERECTOMY ARTERY CAROTID;  Surgeon: Amado Teague MD;  Location: [facility];  Service: Vascular       Social History     Socioeconomic History    Marital status: /Civil Union     Spouse name: Not on file    Number of children: Not on file    Years of education: Not on file    Highest education level: Not on file   Occupational History    Not on file   Tobacco Use    Smoking status: Some Days     Types: Cigars     Passive exposure: Past    Smokeless tobacco: Current   Vaping Use    Vaping status: Never Used   Substance and Sexual Activity    Alcohol use: Yes     Alcohol/week: 3.0 standard drinks of alcohol     Types: 3 Cans of beer per week     Comment: Socially    Drug use: Never    Sexual activity:  Not Currently   Other Topics Concern    Not on file   Social History Narrative    Caffeine use    Uses safety equipment: seatbelts     Social Determinants of Health     Financial Resource Strain: Low Risk  (5/9/2023)    Overall Financial Resource Strain (CARDIA)     Difficulty of Paying Living Expenses: Not very hard   Food Insecurity: Not on file   Transportation Needs: No Transportation Needs (5/9/2023)    PRAPARE - Transportation     Lack of Transportation (Medical): No     Lack of Transportation (Non-Medical): No   Physical Activity: Not on file   Stress: Not on file   Social Connections: Not on file   Intimate Partner Violence: Not on file   Housing Stability: Not on file             LABORATORY RESULTS:    Lab Results   Component Value Date    WBC 3.01 (L) 02/29/2024    HGB 13.2 02/29/2024    HCT 39.6 02/29/2024     (H) 02/29/2024     (L) 02/29/2024     Lab Results   Component Value Date    GLUCOSE 112 01/13/2020    CALCIUM 9.5 02/29/2024     11/22/2016    K 4.1 02/29/2024    CO2 22 02/29/2024     02/29/2024    BUN 17 02/29/2024    CREATININE 0.92 02/29/2024     Lab Results   Component Value Date    HGBA1C 7.8 (A) 02/19/2024       Lipid Profile:   Lab Results   Component Value Date    CHOL 163 11/22/2016    CHOL 172 09/18/2015     Lab Results   Component Value Date    HDL 47 02/19/2024    HDL 34 (L) 03/15/2023    HDL 42 01/24/2022     Lab Results   Component Value Date    LDLCALC 74 02/19/2024    LDLCALC 57 03/15/2023    LDLCALC 72 01/24/2022     Lab Results   Component Value Date    TRIG 175 (H) 02/19/2024    TRIG 169 (H) 03/15/2023    TRIG 144 01/24/2022       The 10-year ASCVD risk score (Crystal DK, et al., 2019) is: 40.4%    Values used to calculate the score:      Age: 73 years      Sex: Male      Is Non- : No      Diabetic: Yes      Tobacco smoker: Yes      Systolic Blood Pressure: 126 mmHg      Is BP treated: Yes      HDL Cholesterol: 47 mg/dL      Total  Cholesterol: 156 mg/dL    1. Palpitations  AMB event recorder    TSH, 3rd generation with Free T4 reflex      2. Coronary artery disease due to lipid rich plaque        3. Essential hypertension  losartan (COZAAR) 25 mg tablet      4. Mixed hyperlipidemia        5. Carotid artery stenosis without cerebral infarction, bilateral        6. S/P Left carotid endarterectomy 1/13/20        7. Type 2 diabetes mellitus with hyperglycemia, without long-term current use of insulin (HCC)            Imaging: I have personally reviewed pertinent reports.        Recommend aggressive risk factor modification and therapeutic lifestyle changes.  Low-salt, low-calorie, low-fat, low-cholesterol diet with regular exercise and to optimize weight.    Discussed concepts of atherosclerosis, including signs and symptoms of cardiac disease.    Medications reviewed and possible side effects discussed.  Previous studies were reviewed.    Safety measures were reviewed.  All questions and concerns addressed.  Patient was advised to report any problems requiring medical attention.    Follow-up with PCP and appropriate specialist and lab work as discussed.    Return for follow up visit as scheduled or earlier, if needed.  Thank you for allowing me to participate in the care and evaluation of your patient.  Should you have any questions, please feel free to contact me.    Leo Zacarias PA-C  3/18/2024,4:48 PM

## 2024-04-14 DIAGNOSIS — E78.2 MIXED HYPERLIPIDEMIA: ICD-10-CM

## 2024-04-15 ENCOUNTER — CLINICAL SUPPORT (OUTPATIENT)
Dept: CARDIOLOGY CLINIC | Facility: CLINIC | Age: 74
End: 2024-04-15
Payer: COMMERCIAL

## 2024-04-15 DIAGNOSIS — R00.2 PALPITATIONS: ICD-10-CM

## 2024-04-15 PROCEDURE — 93248 EXT ECG>7D<15D REV&INTERPJ: CPT | Performed by: INTERNAL MEDICINE

## 2024-04-16 RX ORDER — ATORVASTATIN CALCIUM 20 MG/1
20 TABLET, FILM COATED ORAL
Qty: 90 TABLET | Refills: 1 | Status: SHIPPED | OUTPATIENT
Start: 2024-04-16

## 2024-04-17 ENCOUNTER — TELEPHONE (OUTPATIENT)
Age: 74
End: 2024-04-17

## 2024-04-17 NOTE — TELEPHONE ENCOUNTER
.Caller: Erickson Jimenez    Doctor: Leo Diop    Reason for call: Return call    Call back#: [phone]    Patient stated he was returning a call from Leo most likely regarding Zio results.  He would like a call back tomorrow, 4/18/2024

## 2024-04-18 ENCOUNTER — TELEPHONE (OUTPATIENT)
Dept: NON INVASIVE DIAGNOSTICS | Facility: HOSPITAL | Age: 74
End: 2024-04-18

## 2024-04-18 DIAGNOSIS — R94.31 ABNORMAL ELECTROCARDIOGRAM (ECG) (EKG): ICD-10-CM

## 2024-04-18 DIAGNOSIS — I10 ESSENTIAL HYPERTENSION: ICD-10-CM

## 2024-04-18 DIAGNOSIS — I50.21 CONGESTIVE HEART FAILURE, NYHA CLASS 1, ACUTE, SYSTOLIC (HCC): ICD-10-CM

## 2024-04-18 DIAGNOSIS — I47.20 VENTRICULAR TACHYCARDIA (HCC): Primary | ICD-10-CM

## 2024-04-18 RX ORDER — AMLODIPINE BESYLATE 5 MG/1
5 TABLET ORAL DAILY
Start: 2024-04-18

## 2024-04-18 RX ORDER — CARVEDILOL 25 MG/1
25 TABLET ORAL 2 TIMES DAILY WITH MEALS
Start: 2024-04-18

## 2024-04-18 NOTE — TELEPHONE ENCOUNTER
Called patient to discuss results of event monitor.  Given episodes of NSVT and SVT, recommend increasing carvedilol to 25 mg daily.  Will decrease amlodipine to 5 mg daily to avoid soft blood pressures.  Given NSVT, will also plan for further ischemic evaluation with repeat pharmacological MPI stress test.  Patient advised to notify our office for any new/worsening symptoms.

## 2024-04-22 ENCOUNTER — HOSPITAL ENCOUNTER (OUTPATIENT)
Dept: VASCULAR ULTRASOUND | Facility: HOSPITAL | Age: 74
Discharge: HOME | End: 2024-04-22
Payer: COMMERCIAL

## 2024-04-22 DIAGNOSIS — I65.23 CAROTID ARTERY STENOSIS WITHOUT CEREBRAL INFARCTION, BILATERAL: ICD-10-CM

## 2024-04-22 PROCEDURE — 93880 EXTRACRANIAL BILAT STUDY: CPT

## 2024-04-23 PROCEDURE — 93880 EXTRACRANIAL BILAT STUDY: CPT | Performed by: SURGERY

## 2024-05-13 ENCOUNTER — HOSPITAL ENCOUNTER (OUTPATIENT)
Dept: NON INVASIVE DIAGNOSTICS | Facility: CLINIC | Age: 74
Discharge: HOME | End: 2024-05-13
Payer: COMMERCIAL

## 2024-05-13 VITALS
SYSTOLIC BLOOD PRESSURE: 156 MMHG | HEART RATE: 67 BPM | HEIGHT: 72 IN | BODY MASS INDEX: 27.5 KG/M2 | OXYGEN SATURATION: 96 % | WEIGHT: 203 LBS | DIASTOLIC BLOOD PRESSURE: 94 MMHG

## 2024-05-13 DIAGNOSIS — R94.31 ABNORMAL ELECTROCARDIOGRAM (ECG) (EKG): ICD-10-CM

## 2024-05-13 DIAGNOSIS — I47.20 VENTRICULAR TACHYCARDIA (HCC): ICD-10-CM

## 2024-05-13 LAB
NUC STRESS DIASTOLIC VOLUME INDEX: 115 ML/M2
NUC STRESS EJECTION FRACTION: 33 %
NUC STRESS SYSTOLIC VOLUME INDEX: 78 ML/M2
RATE PRESSURE PRODUCT: NORMAL
SL CV REST NUCLEAR ISOTOPE DOSE: 10.39 MCI
SL CV STRESS NUCLEAR ISOTOPE DOSE: 32.1 MCI
SL CV STRESS RECOVERY BP: NORMAL MMHG
SL CV STRESS RECOVERY HR: 77 BPM
SL CV STRESS RECOVERY O2 SAT: 96 %
STRESS ANGINA INDEX: 0
STRESS BASELINE BP: NORMAL MMHG
STRESS BASELINE HR: 67 BPM
STRESS O2 SAT REST: 96 %
STRESS PEAK HR: 106 BPM
STRESS POST O2 SAT PEAK: 97 %
STRESS POST PEAK BP: 154 MMHG
STRESS/REST PERFUSION RATIO: 1.02

## 2024-05-13 PROCEDURE — 93017 CV STRESS TEST TRACING ONLY: CPT

## 2024-05-13 PROCEDURE — 78452 HT MUSCLE IMAGE SPECT MULT: CPT

## 2024-05-13 PROCEDURE — A9502 TC99M TETROFOSMIN: HCPCS

## 2024-05-13 PROCEDURE — 93016 CV STRESS TEST SUPVJ ONLY: CPT | Performed by: INTERNAL MEDICINE

## 2024-05-13 PROCEDURE — 93018 CV STRESS TEST I&R ONLY: CPT | Performed by: INTERNAL MEDICINE

## 2024-05-13 RX ORDER — REGADENOSON 0.08 MG/ML
0.4 INJECTION, SOLUTION INTRAVENOUS ONCE
Status: COMPLETED | OUTPATIENT
Start: 2024-05-13 | End: 2024-05-13

## 2024-05-13 RX ADMIN — REGADENOSON 0.4 MG: 0.08 INJECTION, SOLUTION INTRAVENOUS at 08:56

## 2024-05-14 ENCOUNTER — TELEPHONE (OUTPATIENT)
Dept: CARDIOLOGY CLINIC | Facility: CLINIC | Age: 74
End: 2024-05-14

## 2024-05-14 LAB
CHEST PAIN STATEMENT: NORMAL
MAX DIASTOLIC BP: 94 MMHG
MAX PREDICTED HEART RATE: 149 BPM
PROTOCOL NAME: NORMAL
REASON FOR TERMINATION: NORMAL
STRESS POST EXERCISE DUR MIN: 3 MIN
STRESS POST EXERCISE DUR SEC: 0 SEC
STRESS POST PEAK HR: 106 BPM
STRESS POST PEAK SYSTOLIC BP: 156 MMHG
TARGET HR FORMULA: NORMAL
TEST INDICATION: NORMAL

## 2024-05-14 NOTE — TELEPHONE ENCOUNTER
----- Message from Leo Zacarias PA-C sent at 5/13/2024  5:28 PM EDT -----  Please call patient to advise pharmacological MPI stress test does not suggest evidence of new significant coronary artery blockages.  These results can be discussed in more detail at next office visit, thank you!

## 2024-05-16 ENCOUNTER — RA CDI HCC (OUTPATIENT)
Dept: OTHER | Facility: HOSPITAL | Age: 74
End: 2024-05-16

## 2024-05-16 NOTE — PROGRESS NOTES
HCC coding opportunities          Chart Reviewed number of suggestions sent to Provider: 2     Patients Insurance   J43.2 and I11.0  Medicare Insurance: [employer]vantage

## 2024-05-20 ENCOUNTER — OFFICE VISIT (OUTPATIENT)
Dept: FAMILY MEDICINE CLINIC | Facility: CLINIC | Age: 74
End: 2024-05-20
Payer: COMMERCIAL

## 2024-05-20 ENCOUNTER — APPOINTMENT (OUTPATIENT)
Dept: RADIOLOGY | Facility: CLINIC | Age: 74
End: 2024-05-20
Payer: COMMERCIAL

## 2024-05-20 VITALS
WEIGHT: 208 LBS | HEIGHT: 72 IN | SYSTOLIC BLOOD PRESSURE: 138 MMHG | BODY MASS INDEX: 28.17 KG/M2 | TEMPERATURE: 97.8 F | HEART RATE: 85 BPM | DIASTOLIC BLOOD PRESSURE: 84 MMHG | OXYGEN SATURATION: 96 %

## 2024-05-20 DIAGNOSIS — L98.9 SKIN LESION: ICD-10-CM

## 2024-05-20 DIAGNOSIS — R29.898 WEAKNESS OF BOTH LOWER EXTREMITIES: ICD-10-CM

## 2024-05-20 DIAGNOSIS — D69.6 PLATELETS DECREASED (HCC): ICD-10-CM

## 2024-05-20 DIAGNOSIS — M54.50 LOW BACK PAIN RADIATING TO LEFT LOWER EXTREMITY: ICD-10-CM

## 2024-05-20 DIAGNOSIS — M79.605 LOW BACK PAIN RADIATING TO LEFT LOWER EXTREMITY: ICD-10-CM

## 2024-05-20 DIAGNOSIS — R09.89 POOR CIRCULATION OF EXTREMITY: ICD-10-CM

## 2024-05-20 DIAGNOSIS — E11.65 TYPE 2 DIABETES MELLITUS WITH HYPERGLYCEMIA, WITHOUT LONG-TERM CURRENT USE OF INSULIN (HCC): Primary | ICD-10-CM

## 2024-05-20 LAB — SL AMB POCT HEMOGLOBIN AIC: 7.8 (ref ?–6.5)

## 2024-05-20 PROCEDURE — G0439 PPPS, SUBSEQ VISIT: HCPCS | Performed by: FAMILY MEDICINE

## 2024-05-20 PROCEDURE — 99214 OFFICE O/P EST MOD 30 MIN: CPT | Performed by: FAMILY MEDICINE

## 2024-05-20 PROCEDURE — 72220 X-RAY EXAM SACRUM TAILBONE: CPT

## 2024-05-20 PROCEDURE — 83036 HEMOGLOBIN GLYCOSYLATED A1C: CPT | Performed by: FAMILY MEDICINE

## 2024-05-20 PROCEDURE — 72100 X-RAY EXAM L-S SPINE 2/3 VWS: CPT

## 2024-05-20 NOTE — PATIENT INSTRUCTIONS
Medicare Preventive Visit Patient Instructions  Thank you for completing your Welcome to Medicare Visit or Medicare Annual Wellness Visit today. Your next wellness visit will be due in one year (5/21/2025).  The screening/preventive services that you may require over the next 5-10 years are detailed below. Some tests may not apply to you based off risk factors and/or age. Screening tests ordered at today's visit but not completed yet may show as past due. Also, please note that scanned in results may not display below.  Preventive Screenings:  Service Recommendations Previous Testing/Comments   Colorectal Cancer Screening  Colonoscopy    Fecal Occult Blood Test (FOBT)/Fecal Immunochemical Test (FIT)  Fecal DNA/Cologuard Test  Flexible Sigmoidoscopy Age: 45-75 years old   Colonoscopy: every 10 years (May be performed more frequently if at higher risk)  OR  FOBT/FIT: every 1 year  OR  Cologuard: every 3 years  OR  Sigmoidoscopy: every 5 years  Screening may be recommended earlier than age 45 if at higher risk for colorectal cancer. Also, an individualized decision between you and your healthcare provider will decide whether screening between the ages of 76-85 would be appropriate. Colonoscopy: 08/29/2023  FOBT/FIT: Not on file  Cologuard: Not on file  Sigmoidoscopy: Not on file    Screening Current     Prostate Cancer Screening Individualized decision between patient and health care provider in men between ages of 55-69   Medicare will cover every 12 months beginning on the day after your 50th birthday PSA: 0.35 ng/mL     History Prostate Cancer     Hepatitis C Screening Once for adults born between 1945 and 1965  More frequently in patients at high risk for Hepatitis C Hep C Antibody: 06/06/2019    Screening Current   Diabetes Screening 1-2 times per year if you're at risk for diabetes or have pre-diabetes Fasting glucose: 206 mg/dL (2/19/2024)  A1C: 7.8 (2/19/2024)  Screening Not Indicated  History Diabetes    Cholesterol Screening Once every 5 years if you don't have a lipid disorder. May order more often based on risk factors. Lipid panel: 02/19/2024  Screening Not Indicated  History Lipid Disorder      Other Preventive Screenings Covered by Medicare:  Abdominal Aortic Aneurysm (AAA) Screening: covered once if your at risk. You're considered to be at risk if you have a family history of AAA or a male between the age of 65-75 who smoking at least 100 cigarettes in your lifetime.  Lung Cancer Screening: covers low dose CT scan once per year if you meet all of the following conditions: (1) Age 55-77; (2) No signs or symptoms of lung cancer; (3) Current smoker or have quit smoking within the last 15 years; (4) You have a tobacco smoking history of at least 20 pack years (packs per day x number of years you smoked); (5) You get a written order from a healthcare provider.  Glaucoma Screening: covered annually if you're considered high risk: (1) You have diabetes OR (2) Family history of glaucoma OR (3)  aged 50 and older OR (4)  American aged 65 and older  Osteoporosis Screening: covered every 2 years if you meet one of the following conditions: (1) Have a vertebral abnormality; (2) On glucocorticoid therapy for more than 3 months; (3) Have primary hyperparathyroidism; (4) On osteoporosis medications and need to assess response to drug therapy.  HIV Screening: covered annually if you're between the age of 15-65. Also covered annually if you are younger than 15 and older than 65 with risk factors for HIV infection. For pregnant patients, it is covered up to 3 times per pregnancy.    Immunizations:  Immunization Recommendations   Influenza Vaccine Annual influenza vaccination during flu season is recommended for all persons aged >= 6 months who do not have contraindications   Pneumococcal Vaccine   * Pneumococcal conjugate vaccine = PCV13 (Prevnar 13), PCV15 (Vaxneuvance), PCV20 (Prevnar 20)  *  Pneumococcal polysaccharide vaccine = PPSV23 (Pneumovax) Adults 19-63 yo with certain risk factors or if 65+ yo  If never received any pneumonia vaccine: recommend Prevnar 20 (PCV20)  Give PCV20 if previously received 1 dose of PCV13 or PPSV23   Hepatitis B Vaccine 3 dose series if at intermediate or high risk (ex: diabetes, end stage renal disease, liver disease)   Respiratory syncytial virus (RSV) Vaccine - COVERED BY MEDICARE PART D  * RSVPreF3 (Arexvy) CDC recommends that adults 60 years of age and older may receive a single dose of RSV vaccine using shared clinical decision-making (SCDM)   Tetanus (Td) Vaccine - COST NOT COVERED BY MEDICARE PART B Following completion of primary series, a booster dose should be given every 10 years to maintain immunity against tetanus. Td may also be given as tetanus wound prophylaxis.   Tdap Vaccine - COST NOT COVERED BY MEDICARE PART B Recommended at least once for all adults. For pregnant patients, recommended with each pregnancy.   Shingles Vaccine (Shingrix) - COST NOT COVERED BY MEDICARE PART B  2 shot series recommended in those 19 years and older who have or will have weakened immune systems or those 50 years and older     Health Maintenance Due:      Topic Date Due   • Colorectal Cancer Screening  08/29/2028   • Hepatitis C Screening  Completed     Immunizations Due:      Topic Date Due   • COVID-19 Vaccine (6 - 2023-24 season) 12/10/2023     Advance Directives   What are advance directives?  Advance directives are legal documents that state your wishes and plans for medical care. These plans are made ahead of time in case you lose your ability to make decisions for yourself. Advance directives can apply to any medical decision, such as the treatments you want, and if you want to donate organs.   What are the types of advance directives?  There are many types of advance directives, and each state has rules about how to use them. You may choose a combination of any of  the following:  Living will:  This is a written record of the treatment you want. You can also choose which treatments you do not want, which to limit, and which to stop at a certain time. This includes surgery, medicine, IV fluid, and tube feedings.   Durable power of  for healthcare (DPAHC):  This is a written record that states who you want to make healthcare choices for you when you are unable to make them for yourself. This person, called a proxy, is usually a family member or a friend. You may choose more than 1 proxy.  Do not resuscitate (DNR) order:  A DNR order is used in case your heart stops beating or you stop breathing. It is a request not to have certain forms of treatment, such as CPR. A DNR order may be included in other types of advance directives.  Medical directive:  This covers the care that you want if you are in a coma, near death, or unable to make decisions for yourself. You can list the treatments you want for each condition. Treatment may include pain medicine, surgery, blood transfusions, dialysis, IV or tube feedings, and a ventilator (breathing machine).  Values history:  This document has questions about your views, beliefs, and how you feel and think about life. This information can help others choose the care that you would choose.  Why are advance directives important?  An advance directive helps you control your care. Although spoken wishes may be used, it is better to have your wishes written down. Spoken wishes can be misunderstood, or not followed. Treatments may be given even if you do not want them. An advance directive may make it easier for your family to make difficult choices about your care.   Fall Prevention    Fall prevention  includes ways to make your home and other areas safer. It also includes ways you can move more carefully to prevent a fall. Health conditions that cause changes in your blood pressure, vision, or muscle strength and coordination may  increase your risk for falls. Medicines may also increase your risk for falls if they make you dizzy, weak, or sleepy.   Fall prevention tips:   Stand or sit up slowly.    Use assistive devices as directed.    Wear shoes that fit well and have soles that .    Wear a personal alarm.    Stay active.    Manage your medical conditions.    Home Safety Tips:  Add items to prevent falls in the bathroom.    Keep paths clear.    Install bright lights in your home.    Keep items you use often on shelves within reach.    Paint or place reflective tape on the edges of your stairs.    Cigarette Smoking and Your Health   Risks to your health if you smoke:  Nicotine and other chemicals found in tobacco damage every cell in your body. Even if you are a light smoker, you have an increased risk for cancer, heart disease, and lung disease. If you are pregnant or have diabetes, smoking increases your risk for complications.   Benefits to your health if you stop smoking:   You decrease respiratory symptoms such as coughing, wheezing, and shortness of breath.   You reduce your risk for cancers of the lung, mouth, throat, kidney, bladder, pancreas, stomach, and cervix. If you already have cancer, you increase the benefits of chemotherapy. You also reduce your risk for cancer returning or a second cancer from developing.   You reduce your risk for heart disease, blood clots, heart attack, and stroke.   You reduce your risk for lung infections, and diseases such as pneumonia, asthma, chronic bronchitis, and emphysema.  Your circulation improves. More oxygen can be delivered to your body. If you have diabetes, you lower your risk for complications, such as kidney, artery, and eye diseases. You also lower your risk for nerve damage. Nerve damage can lead to amputations, poor vision, and blindness.  You improve your body's ability to heal and to fight infections.  For more information and support to stop smoking:   Smokefree.gov  Phone: 1-  880 - 732-6819  Web Address: www.smokefree.gov  How to Quit Using Smokeless Tobacco   Why it is important to stop using smokeless tobacco:  Smokeless tobacco comes in many forms. Examples include chew, snuff, dip, dissolvable tobacco, and snus. All smokeless tobacco products contain nicotine and may contain as much nicotine as 3 cigarettes. You may be physically dependent on nicotine. You may also be emotionally addicted to it. The cravings can be strong, but it is important to quit using smokeless tobacco. You will improve your health and decrease your cancer, stroke, and heart attack risk. Mouth sores and tooth problems will also improve when you quit. You can benefit from quitting no matter how long you have used smokeless tobacco.   Prepare to stop using smokeless tobacco:  Nicotine is a highly addictive drug. Withdrawal symptoms can happen when you stop and make it hard to quit. The following can help keep you on track:  Set a quit date.    Tell friends, family, and coworkers that you plan to quit.    Remove all smokeless tobacco products from your home, car, and workplace.    Manage weight gain after you quit:  Nicotine can affect your metabolism. You may gain a few pounds after you quit. The following can help you control your weight:  Eat healthy foods.    Drink water before, during, and between meals.    Exercise as directed.      Weight Management   Why it is important to manage your weight:  Being overweight increases your risk of health conditions such as heart disease, high blood pressure, type 2 diabetes, and certain types of cancer. It can also increase your risk for osteoarthritis, sleep apnea, and other respiratory problems. Aim for a slow, steady weight loss. Even a small amount of weight loss can lower your risk of health problems.  How to lose weight safely:  A safe and healthy way to lose weight is to eat fewer calories and get regular exercise. You can lose up about 1 pound a week by decreasing  the number of calories you eat by 500 calories each day.   Healthy meal plan for weight management:  A healthy meal plan includes a variety of foods, contains fewer calories, and helps you stay healthy. A healthy meal plan includes the following:  Eat whole-grain foods more often.  A healthy meal plan should contain fiber. Fiber is the part of grains, fruits, and vegetables that is not broken down by your body. Whole-grain foods are healthy and provide extra fiber in your diet. Some examples of whole-grain foods are whole-wheat breads and pastas, oatmeal, brown rice, and bulgur.  Eat a variety of vegetables every day.  Include dark, leafy greens such as spinach, kale, kriss greens, and mustard greens. Eat yellow and orange vegetables such as carrots, sweet potatoes, and winter squash.   Eat a variety of fruits every day.  Choose fresh or canned fruit (canned in its own juice or light syrup) instead of juice. Fruit juice has very little or no fiber.  Eat low-fat dairy foods.  Drink fat-free (skim) milk or 1% milk. Eat fat-free yogurt and low-fat cottage cheese. Try low-fat cheeses such as mozzarella and other reduced-fat cheeses.  Choose meat and other protein foods that are low in fat.  Choose beans or other legumes such as split peas or lentils. Choose fish, skinless poultry (chicken or turkey), or lean cuts of red meat (beef or pork). Before you cook meat or poultry, cut off any visible fat.   Use less fat and oil.  Try baking foods instead of frying them. Add less fat, such as margarine, sour cream, regular salad dressing and mayonnaise to foods. Eat fewer high-fat foods. Some examples of high-fat foods include french fries, doughnuts, ice cream, and cakes.  Eat fewer sweets.  Limit foods and drinks that are high in sugar. This includes candy, cookies, regular soda, and sweetened drinks.  Exercise:  Exercise at least 30 minutes per day on most days of the week. Some examples of exercise include walking, biking,  dancing, and swimming. You can also fit in more physical activity by taking the stairs instead of the elevator or parking farther away from stores. Ask your healthcare provider about the best exercise plan for you.      © Copyright [employer] 2018 Information is for End User's use only and may not be sold, redistributed or otherwise used for commercial purposes. All illustrations and images included in CareNotes® are the copyrighted property of [employer] Inc[employer] or [employer]

## 2024-05-20 NOTE — PROGRESS NOTES
"Ambulatory Visit  Name: Erickson Jimenez      : 1950      MRN: [id number]  Encounter Provider: Efrain Rhodes MD  Encounter Date: 2024   Encounter department: [facility]    Assessment & Plan   1. Type 2 diabetes mellitus with hyperglycemia, without long-term current use of insulin (HCC)  -     POCT hemoglobin A1c- 7.8  Elevated  After discussing with patient advise to stop Januvia will start Jardiance  -     Empagliflozin (JARDIANCE) 10 MG TABS tablet; Take 1 tablet (10 mg total) by mouth daily  -     Albumin / creatinine urine ratio; Future; Expected date: 2024  -     Comprehensive metabolic panel; Future; Expected date: 2024  -     Hemoglobin A1C; Future; Expected date: 2024    2. Platelets decreased ([facility])  -     CBC and differential; Future    3. Poor circulation of extremity  -     VAS sheri single level; Future; Expected date: 2024    4. Skin lesion  -     Ambulatory Referral to Dermatology; Future    5. Low back pain radiating to left lower extremity  -     XR spine lumbar 2 or 3 views injury; Future; Expected date: 2024  -     XR sacrum and coccyx; Future; Expected date: 2024  -     Ambulatory Referral to Physical Therapy; Future    6. Weakness of both lower extremities  Recommend to start imaging of the back  Consider EMG    F/U in 3 months       Preventive health issues were discussed with patient, and age appropriate screening tests were ordered as noted in patient's After Visit Summary. Personalized health advice and appropriate referrals for health education or preventive services given if needed, as noted in patient's After Visit Summary.    History of Present Illness     Patient is here to follow up for Type 2 DM. He denies any symptoms related to his diabetes taking his medications daily no side effects has been eating a lot cupcakes recently.  Also has B/L lower ext weakness, feels like he has \"no energy\" in his legs.  Has low platelets " however no bleeding episodes.  Also has low back pain radiation down his left leg.  Has multiple skin lesions.       Patient Care Team:  Efrain Rhodes MD as PCP - General (Family Medicine)  W Wil Muñiz MD as Endoscopist  Jodee Guerra MD (Radiation Oncology)  Laya Tee MA as Care Coordinator (Oncology)    Review of Systems   Constitutional:  Negative for chills and fever.   HENT:  Negative for ear pain and sore throat.    Eyes:  Negative for pain and visual disturbance.   Respiratory:  Negative for cough and shortness of breath.    Cardiovascular:  Negative for chest pain and palpitations.   Gastrointestinal:  Negative for abdominal pain and vomiting.   Genitourinary:  Negative for dysuria and hematuria.   Musculoskeletal:  Negative for arthralgias and back pain.   Skin:  Negative for color change and rash.   Neurological:  Positive for weakness. Negative for seizures and syncope.   All other systems reviewed and are negative.    Medical History Reviewed by provider this encounter:  Tobacco  Allergies  Meds  Problems  Med Hx  Surg Hx  Fam Hx       Annual Wellness Visit Questionnaire       Health Risk Assessment:   Patient rates overall health as fair. Patient feels that their physical health rating is same. Patient is satisfied with their life. Eyesight was rated as same. Hearing was rated as same. Patient feels that their emotional and mental health rating is same. Patients states they are never, rarely angry. Patient states they are often unusually tired/fatigued. Pain experienced in the last 7 days has been some. Patient's pain rating has been 4/10. Patient states that he has experienced no weight loss or gain in last 6 months.     Depression Screening:   PHQ-2 Score: 1      Fall Risk Screening:   In the past year, patient has experienced: history of falling in past year    Number of falls: 1  Injured during fall?: Yes      Home Safety:  Patient has trouble with stairs inside or outside of their  home. Patient has no working smoke alarms and has no working carbon monoxide detector. Home safety hazards include: none.     Nutrition:   Current diet is Diabetic.     Medications:   Patient is not currently taking any over-the-counter supplements. Patient is able to manage medications.     Activities of Daily Living (ADLs)/Instrumental Activities of Daily Living (IADLs):   Walk and transfer into and out of bed and chair?: Yes  Dress and groom yourself?: Yes    Bathe or shower yourself?: Yes    Feed yourself? Yes  Do your laundry/housekeeping?: Yes  Manage your money, pay your bills and track your expenses?: Yes  Make your own meals?: Yes    Do your own shopping?: Yes    Previous Hospitalizations:   Any hospitalizations or ED visits within the last 12 months?: Yes    How many hospitalizations have you had in the last year?: 3-4    PREVENTIVE SCREENINGS      Cardiovascular Screening:    General: History Lipid Disorder and Screening Current      Diabetes Screening:     General: History Diabetes and Screening Current      Colorectal Cancer Screening:     General: Screening Current      Prostate Cancer Screening:    General: History Prostate Cancer and Screening Current      Osteoporosis Screening:    General: Screening Not Indicated      Abdominal Aortic Aneurysm (AAA) Screening:    Risk factors include: age between 65-76 yo and tobacco use        Lung Cancer Screening:     General: Screening Not Indicated      Hepatitis C Screening:    General: Screening Current    Screening, Brief Intervention, and Referral to Treatment (SBIRT)    Screening  Typical number of drinks in a day: 0    Single Item Drug Screening:  How often have you used an illegal drug (including marijuana) or a prescription medication for non-medical reasons in the past year? never    Single Item Drug Screen Score: 0  Interpretation: Negative screen for possible drug use disorder    Social Determinants of Health     Financial Resource Strain: Low Risk   (5/9/2023)    Overall Financial Resource Strain (CARDIA)    • Difficulty of Paying Living Expenses: Not very hard   Food Insecurity: Patient Declined (5/20/2024)    Hunger Vital Sign    • Worried About Running Out of Food in the Last Year: Patient declined    • Ran Out of Food in the Last Year: Patient declined   Transportation Needs: No Transportation Needs (5/20/2024)    PRAPARE - Transportation    • Lack of Transportation (Medical): No    • Lack of Transportation (Non-Medical): No   Housing Stability: Low Risk  (5/20/2024)    Housing Stability Vital Sign    • Unable to Pay for Housing in the Last Year: No    • Number of Times Moved in the Last Year: 1    • Homeless in the Last Year: No   Utilities: Not At Risk (5/20/2024)    [facility] Utilities    • Threatened with loss of utilities: No     No results found.    Objective     /84 (BP Location: Right arm, Patient Position: Sitting)   Pulse 85   Temp 97.8 °F (36.6 °C) (Temporal)   Ht 6' (1.829 m)   Wt 94.3 kg (208 lb)   SpO2 96%   BMI 28.21 kg/m²     Physical Exam  Constitutional:       General: He is not in acute distress.     Appearance: He is well-developed. He is not diaphoretic.   Eyes:      General: No scleral icterus.     Pupils: Pupils are equal, round, and reactive to light.   Cardiovascular:      Rate and Rhythm: Normal rate and regular rhythm.      Pulses: no weak pulses.           Dorsalis pedis pulses are 2+ on the right side and 2+ on the left side.        Posterior tibial pulses are 2+ on the right side and 2+ on the left side.      Heart sounds: Normal heart sounds. No murmur heard.  Pulmonary:      Effort: Pulmonary effort is normal. No respiratory distress.      Breath sounds: Normal breath sounds. No wheezing.   Abdominal:      General: Bowel sounds are normal. There is no distension.      Palpations: Abdomen is soft.      Tenderness: There is no abdominal tenderness.   Feet:      Right foot:      Skin integrity: No ulcer, skin breakdown,  erythema, warmth, callus or dry skin.      Left foot:      Skin integrity: No ulcer, skin breakdown, erythema, warmth, callus or dry skin.   Skin:     General: Skin is warm and dry.      Findings: No rash.   Neurological:      Mental Status: He is alert and oriented to person, place, and time.       Administrative Statements         Diabetic Foot Exam    Patient's shoes and socks removed.    Right Foot/Ankle   Right Foot Inspection  Skin Exam: skin normal and skin intact. No dry skin, no warmth, no callus, no erythema, no maceration, no abnormal color, no pre-ulcer, no ulcer and no callus.     Toe Exam: ROM and strength within normal limits.     Sensory   Vibration: diminished  Proprioception: diminished  Monofilament testing: diminished    Vascular  The right DP pulse is 2+. The right PT pulse is 2+.     Left Foot/Ankle  Left Foot Inspection  Skin Exam: skin normal and skin intact. No dry skin, no warmth, no erythema, no maceration, normal color, no pre-ulcer, no ulcer and no callus.     Toe Exam: ROM and strength within normal limits.     Sensory   Vibration: diminished  Proprioception: diminished  Monofilament testing: diminished    Vascular  The left DP pulse is 2+. The left PT pulse is 2+.     Assign Risk Category  No deformity present  Loss of protective sensation  No weak pulses  Risk: 1

## 2024-05-28 ENCOUNTER — EVALUATION (OUTPATIENT)
Dept: PHYSICAL THERAPY | Facility: MEDICAL CENTER | Age: 74
End: 2024-05-28
Payer: COMMERCIAL

## 2024-05-28 DIAGNOSIS — M54.50 LOW BACK PAIN RADIATING TO LEFT LOWER EXTREMITY: Primary | ICD-10-CM

## 2024-05-28 DIAGNOSIS — M79.605 LOW BACK PAIN RADIATING TO LEFT LOWER EXTREMITY: Primary | ICD-10-CM

## 2024-05-28 PROCEDURE — 97110 THERAPEUTIC EXERCISES: CPT

## 2024-05-28 PROCEDURE — 97161 PT EVAL LOW COMPLEX 20 MIN: CPT

## 2024-05-28 NOTE — PROGRESS NOTES
PT Evaluation     Today's date: 2024  Patient name: Erickson Jimenez  : 1950  MRN: [id number]  Referring provider: Efrain Rhodes MD  Dx:   Encounter Diagnosis     ICD-10-CM    1. Low back pain radiating to left lower extremity  M54.50 Ambulatory Referral to Physical Therapy    M79.605         Eval/Re-Eval POC Expires Auth #/ Referral # Total Visits Start Date Expiration Date Extension Info Visits Limitation      [employer]                                                            1 2 3 4 5 6           7 8 9 10 11 12           13 14 15 16 17 18           19 20 21 22 23 24           25 26 27 28 29 30             Start Time: 935  Stop Time: 1015  Total time in clinic (min): 40 minutes    Assessment  Impairments: abnormal muscle firing, abnormal muscle tone, abnormal or restricted ROM, activity intolerance, impaired physical strength, lacks appropriate home exercise program, pain with function, poor posture  and poor body mechanics    Assessment details: Erickson Jimenez is a 73 y.o. male who presents to PT with a referral from Dr. Rhodes for a medical diagnosis of Low back pain radiating to left lower extremity  (primary encounter diagnosis). Patient presents to PT with limitations in ROM, strength, functional mobility, and postural stability secondary to pain, decreased muscular endurance, and decreased neuromuscular control. Patient with (+) long sit test. Patient demonstrated decreased thoracic/lumbar spine  ROM and decreased hip strength. Patient's key impairments include: decreased ROM, decreased strength, decreased endurance, pain with functional activities, and poor body mechanics. These deficits are limiting the patient's ability to lift/carry, push, pull, bend over, sit for prolonged periods of time, stand for prolonged periods of time, squat, and navigate stairs, recreational activities, and ADLs and decrease patient's quality of life. Patient will benefit from physical therapy in order to  address the deficits contributing to functional limitations and facilitate return to prior level of functioning. Patient was provided a home exercise program and demonstrated an understanding of exercises. Patient was advised to stop performing home exercise program if symptoms increase or new complaints developed. Verbal understanding demonstrated regarding home exercise program instructions. Patient was educated on and agreeable to plan of care    Understanding of Dx/Px/POC: good     Prognosis: good    Goals  STG (2-4 Weeks)  Patient will have an increase in global hip strength to a 4/5 MMT to promote increased stability in 4 weeks.  Patient will have a decrease in pain at worst by 2 points on the NPRS to improve quality of life in 4 weeks.  Patient will be efficient and compliant with comprehensive HEP in 4 weeks.    LTG (4-8 Weeks)  Patient will have a FOTO of anticipated or greater by discharge  Patient will be able to walk for 10 minutes without pain to improve physical fitness by discharge.  Patient will be able to stand for 10 minutes without pain to improve quality of life by discharge  Patient will be able to navigate a flight of stairs without pain to improve mobility at home and within the community by discharge.  Patient will be able to sit for 10 minutes without pain to improve quality of life.    Plan  Patient would benefit from: skilled physical therapy  Planned modality interventions: TENS, thermotherapy: hydrocollator packs, cryotherapy, electrical stimulation/Russian stimulation, traction and unattended electrical stimulation    Planned therapy interventions: abdominal trunk stabilization, IASTM, joint mobilization, activity modification, kinesiology taping, ADL training, manual therapy, massage, Dhillon taping, balance, balance/weight bearing training, motor coordination training, behavior modification, muscle pump exercises, body mechanics training, nerve gliding, neuromuscular re-education,  breathing training, patient education, postural training, coordination, self care, transfer training, therapeutic training, therapeutic exercise, therapeutic activities, stretching, strengthening, fine motor coordination training, flexibility, functional ROM exercises, gait training, graded activity, graded exercise, graded motor, home exercise program, IADL retraining and work reintegration    Frequency: 2x week  Duration in weeks: 12  Plan of Care beginning date: 2024  Plan of Care expiration date: 2024  Treatment plan discussed with: patient      Subjective Evaluation    History of Present Illness  Onset date: 3-4 months ago.  Mechanism of injury: Patient reports to PT with a CC of LBP that started 3-4 months ago with insidious onset. He stated that he thought it was a hip problem. He stated that 50 years ago he had an issue with his spine where he couldn't straighten out his leg, but eventually it improved. He stated that he was taking a trip to [place] and was walking and had some pain. Patient currently retired. Patient rated their current pain 2/10, at its best 0/10, and at its worst 9/10 on the NPRS. Patient described the pain as sharp and a dull ache and stated that it lasts less than 15 minutes. Patient stated that rest makes it better, and walking, lifting, carrying, pushing, pulling, and stairs makes it worse. Patient reported that the pain is worse depending on usage and denies waking them at night. Patient denies changes to  bowel and bladder, chest pain, night pain, or fever. Patient reports no prior treatment or surgeries for CC. Patient's goals of PT are to decrease pain, improve ROM, increase strength, and walk   Quality of life: good    Patient Goals  Patient goals for therapy: decreased pain, increased motion and increased strength    Pain  Current pain ratin  At best pain ratin  At worst pain ratin  Quality: sharp and dull ache  Relieving factors: rest and  relaxation  Aggravating factors: walking, stair climbing and lifting  Progression: no change        Objective     Concurrent Complaints  Negative for night pain, disturbed sleep, bladder dysfunction, bowel dysfunction and saddle (S4) numbness    Palpation   Left   No palpable tenderness to the erector spinae and lumbar paraspinals.   Tenderness of the gluteus medius.     Right   No palpable tenderness to the erector spinae, gluteus medius and lumbar paraspinals.     Tenderness     Left Hip   No tenderness in the PSIS.     Right Hip   No tenderness in the PSIS.     Active Range of Motion   Cervical/Thoracic Spine       Thoracic    Flexion:  Restriction level: moderate  Extension:  Restriction level: minimal  Left lateral flexion:  WFL  Right lateral flexion:  WFL  Left rotation:  WFL  Right rotation:  WFL    Lumbar   Flexion:  Restriction level: moderate  Extension:  Restriction level: minimal  Left lateral flexion:  WFL  Right lateral flexion:  WFL  Left rotation:  WFL  Right rotation:  WFL    Strength/Myotome Testing     Left Hip   Planes of Motion   Flexion: 4  Extension: 4  Abduction: 5  Adduction: 5  External rotation: 4+  Internal rotation: 5    Right Hip   Planes of Motion   Flexion: 4  Extension: 4-  Abduction: 5  Adduction: 5  External rotation: 4+  Internal rotation: 5    Left Knee   Flexion: 4+  Extension: 5    Right Knee   Flexion: 4+  Extension: 5    Tests     Left Hip   Positive long sit.       HEP Demonstrated and Performed:  Access Code: [email or web address]  URL: [email or web address]  Date: 05/28/2024  Prepared by: Anival Strauss    Exercises  - Prone Quadriceps Stretch with Strap  - 2 x daily - 7 x weekly - 1 sets - 3 reps - 30s hold  - Supine Quadriceps Stretch with Strap on Table  - 2 x daily - 7 x weekly - 1 sets - 3 reps - 30s hold  - Supine Hamstring Stretch with Strap  - 2 x daily - 7 x weekly - 1 sets - 3 reps - 30s hold  - Supine Bridge  - 2 x daily - 7 x weekly - 2 sets - 10 reps - 3s  hold       Precautions: standard precautions,   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    Heart disease     Hyperlipidemia     Hypertension     Kidney stone     Osteoarthritis     Prostate cancer (HCC)     Vascular disorder          PT 1:1 entire time   Manuals             PROM BL Hips             Jt Mobes             STM/Massage Gun                          Neuro Re-Ed             TA Activation             TA Marches             TA Ball Pressdowns             PPT             Lower Trunk Rotations             Bridges             Adduction             Clamshells                                                    Ther Ex             Bike             Hamstring Stretch             Quadriceps Stretch             Golden Stretch             Leg Press                                                                              Ther Activity                                       Gait Training                                       Modalities

## 2024-05-30 DIAGNOSIS — E11.65 TYPE 2 DIABETES MELLITUS WITH HYPERGLYCEMIA, WITHOUT LONG-TERM CURRENT USE OF INSULIN (HCC): ICD-10-CM

## 2024-06-01 DIAGNOSIS — I10 ESSENTIAL HYPERTENSION: ICD-10-CM

## 2024-06-02 RX ORDER — AMLODIPINE BESYLATE 10 MG/1
10 TABLET ORAL DAILY
Qty: 90 TABLET | Refills: 1 | Status: SHIPPED | OUTPATIENT
Start: 2024-06-02

## 2024-06-03 ENCOUNTER — RADIATION ONCOLOGY FOLLOW-UP (OUTPATIENT)
Dept: RADIATION ONCOLOGY | Facility: CLINIC | Age: 74
End: 2024-06-03
Attending: RADIOLOGY
Payer: COMMERCIAL

## 2024-06-03 VITALS
OXYGEN SATURATION: 96 % | DIASTOLIC BLOOD PRESSURE: 66 MMHG | TEMPERATURE: 97.4 F | SYSTOLIC BLOOD PRESSURE: 122 MMHG | WEIGHT: 202 LBS | HEART RATE: 77 BPM | BODY MASS INDEX: 27.4 KG/M2 | RESPIRATION RATE: 16 BRPM

## 2024-06-03 DIAGNOSIS — C61 PROSTATE CANCER (HCC): Primary | ICD-10-CM

## 2024-06-03 PROCEDURE — G2211 COMPLEX E/M VISIT ADD ON: HCPCS | Performed by: RADIOLOGY

## 2024-06-03 PROCEDURE — 99213 OFFICE O/P EST LOW 20 MIN: CPT | Performed by: RADIOLOGY

## 2024-06-03 NOTE — PROGRESS NOTES
Follow-up - Radiation Oncology   Erickson Jimenez 1950 73 y.o. male [id number]      History of Present Illness   Cancer Staging   Prostate cancer (HCC)  Staging form: Prostate, AJCC 8th Edition  - Clinical stage from 5/16/2023: Stage IIC (cT1c, cN0, cM0, PSA: 9.6, Grade Group: 4) - Signed by Jodee Guerra MD on 6/26/2023  Stage prefix: Initial diagnosis  Prostate specific antigen (PSA) range: Less than 10  [place] Varina primary pattern: 4  Anna secondary pattern: 4  Anna score: 8  Histologic grading system: 5 grade system      Erickson Jimenez is a 73 y.o. with multiple comorbidities but excellent performance status diagnosed with clinical stage [id number] prostatic adenocarcinoma, [place] Varina score 8 (4+4) with a pretreatment PSA of 9.6 ng/mL. >50% cores demonstrated disease and all associated with PNI.  He underwent partial brachytherapy implant followed by pelvic radiation which he completed on 11/21/23. He was last seen 12/27/23 and presents today for follow up.         2/26/24 Urology, [facility]  High risk prostate cancer  - clinical stage [id number] prostatic adenocarcinoma, [place] Varina score 8 (4+4)  - Pretreatment PSA 9.4  - S/p brachytherapy at [facility] with Dr. Lenz completed on 9/5/23   - Firmagon 240 mg on 8/8/23  - Lupron 45 mg given today (recommendations for 18 months total duration per [facility])  - S/p EBRT completed on 11/21/23   - Most recent PSA from 2/19/24 was 0.35  - Continue evening primrose for hot flashes. Offered Megace which he declines at this time.    Follow up in 24 weeks for final Lupron and with PSA prior            PSA   Latest Ref Rng 0.00 - 4.00 ng/mL   9/2/2022 8.5 (H)    3/3/2023 9.6 (H)    2/19/2024 0.35          The patient continues to experience urinary associated with mild incontinence.  He does not routinely wear pads, but will when traveling. This is largely stable.  He notes continued frequency that is stable.  He has slow stream, but empties fully.  Nocturia 1-2x.  Using tamsulosin once a  day.  He denies dysuria or hematuria or edilma urinary incontinence.  He has occasional bouts of diarrhea, but denies significant urgency or incontinence.  He denies rectal bleeding.       He complains of continued hot flashes, but these have declined recently.  He notes weight gain, fatigue, and mood instability.  He denies gynecomastia or pain or tenderness of chest wall.  He has received two 6 month Lupron injections and is discontinuing ADT.  He has refused 3rd injection.    Upcomin/15/24 Urology, [facility]        Historical Information   Oncology History   Prostate cancer (HCC)   2023 Biopsy    TRANSPERINEAL MRI FUSION BIOPSY PROSTATE     A. Prostate, REGION OF INTEREST:  - Prostatic adenocarcinoma, [place] grade 3 + 4 = 7 (60% pattern 3 and 40% pattern 4, Grade group 2), involving four of four prostatic cores and 90% of the total biopsy tissue.  - Perineural invasion is present.     Note: The fifth core is skeletal muscle only.     B. Prostate, RIGHT BASE:  - Prostatic adenocarcinoma, [place] grade 4+4 = 8 (Grade group 4), involving two of two cores and 60% of the tissue.  - Perineural invasion is present.     Note: Small portion of pattern 5 can't be ruled out due to procedure artifact.     C. Prostate, RIGHT POSTERIOR MEDIAL:  - Prostatic adenocarcinoma, Anna grade 4+3 = 7 (50% pattern 4 and 50% pattern 3, Grade group 3), involving two of two cores and 50% of the tissue.  - Perineural invasion is present.     D. Prostate, LEFT BASE:  - Benign prostate glands and stroma.     E. Prostate, LEFT POSTERIOR MEDIAL:  - Prostatic adenocarcinoma, Anna grade 4+4 = 8 (Grade group 4), involving one of two cores. 20% of involved core  and 10% of the tissue.  - Perineural invasion is present.     F. Prostate, LEFT POSTERIOR LATERAL:  - Benign prostate glands and stroma.     G. Prostate, LEFT ANTERIOR LATERAL:  - Benign prostate glands and stroma.     H. Prostate, LEFT ANTERIOR MEDIAL:  - Benign prostate glands  and stroma.     I. Prostate, RIGHT POSTERIOR LATERAL:  - Prostatic adenocarcinoma, Anna grade 3 + 4 = 7 (60% pattern 3 and 40% pattern 4, Grade group 2), involving one of two cores, 90% of involved core and 60% of the tissue.  - Perineural invasion is present.     J. Prostate, RIGHT ANTERIOR LATERAL:  - Prostatic adenocarcinoma, Anna grade 4+3 = 7 (70% pattern 4 and 30% pattern 3, Grade group 3), involving two of two cores and 70% of the tissue.  - Perineural invasion is present.     K. Prostate, RIGHT ANTERIOR MEDIAL:  - Benign prostate glands and stroma.     Intradepartmental consultation is in agreement.        5/16/2023 -  Cancer Staged    Staging form: Prostate, AJCC 8th Edition  - Clinical stage from 5/16/2023: Stage IIC (cT1c, cN0, cM0, PSA: 9.6, Grade Group: 4) - Signed by Jodee Guerra MD on 6/26/2023  Stage prefix: Initial diagnosis  Prostate specific antigen (PSA) range: Less than 10  Anna primary pattern: 4  Anna secondary pattern: 4  Ramer score: 8  Histologic grading system: 5 grade system       6/14/2023 Initial Diagnosis    Prostate cancer (HCC)     8/8/2023 -  Hormone Therapy    Firmagon 240 mg     9/8/2023 -  Hormone Therapy    Lupron 45 mg     10/18/2023 - 11/21/2023 Radiation    Treatments:  Course: C1  Plan ID Energy Fractions Dose per Fraction (cGy) Dose Correction (cGy) Total Dose Delivered (cGy) Elapsed Days   Whole Pelvis 10X 25 / 25 180 0 4,500 34    Treatment Dates:  10/18/2023 - 11/21/2023.      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    Heart disease     Hyperlipidemia     Hypertension     Kidney stone     Osteoarthritis     Prostate cancer (HCC)     Vascular disorder      Past Surgical History:   Procedure Laterality Date    ABDOMINAL SURGERY      CARDIAC CATHETERIZATION N/A 3/21/2022     Procedure: Cardiac catheterization;  Surgeon: Stephy Horner MD;  Location: [facility];  Service: Cardiology    CARDIAC CATHETERIZATION N/A 3/21/2022    Procedure: Cardiac Coronary Angiogram;  Surgeon: Stephy Horner MD;  Location: [facility];  Service: Cardiology    COLONOSCOPY      FL RETROGRADE PYELOGRAM  3/23/2023    INGUINAL HERNIA REPAIR Left     LAPAROSCOPIC COLON RESECTION      UT ARTHROPLASTY GLENOHUMERAL JOINT TOTAL SHOULDER Right 11/17/2020    Procedure: ARTHROPLASTY SHOULDER REVERSE with biceps tendonesis;  Surgeon: Robby Marquez MD;  Location: [facility];  Service: Orthopedics    UT BX PROSTATE STRTCTC SATURATION SAMPLING IMG GID N/A 5/16/2023    Procedure: TRANSPERINEAL MRI FUSION BIOPSY PROSTATE;  Surgeon: Quan Velasco MD;  Location: [facility];  Service: Urology    UT COLONOSCOPY FLX DX W/COLLJ SPEC WHEN PFRMD N/A 11/3/2017    Procedure: COLONOSCOPY;  Surgeon: SUZAN Muñiz MD;  Location: [facility];  Service: Colorectal    UT CYSTO/URETERO W/LITHOTRIPSY &INDWELL STENT INSRT Left 3/23/2023    Procedure: CYSTOSCOPY URETEROSCOPY WITH LITHOTRIPSY HOLMIUM LASER, RETROGRADE PYELOGRAM AND INSERTION STENT URETERAL, STONE BASKET;  Surgeon: Michelet Johns MD;  Location: [facility];  Service: Urology    UT TEAEC W/PATCH GRF CAROTID VERTB SUBCLAV NECK INC Left 1/13/2020    Procedure: ENDARTERECTOMY ARTERY CAROTID;  Surgeon: Amado Teague MD;  Location: [facility];  Service: Vascular       Social History   Social History     Substance and Sexual Activity   Alcohol Use Yes    Alcohol/week: 3.0 standard drinks of alcohol    Types: 3 Cans of beer per week    Comment: Socially     Social History     Substance and Sexual Activity   Drug Use Never     Social History     Tobacco Use   Smoking Status Some Days    Types: Cigars    Passive exposure: Past   Smokeless Tobacco Current         Meds/Allergies     Current Outpatient Medications:     aspirin 81 mg chewable tablet, Chew 1  tablet (81 mg total) daily, Disp: 90 tablet, Rfl: 3    atorvastatin (LIPITOR) 20 mg tablet, Take 1 tablet (20 mg total) by mouth daily at bedtime, Disp: 90 tablet, Rfl: 1    Calcium Carb-Cholecalciferol (calcium carbonate-vitamin D) 500 mg-5 mcg tablet, Take 1 tablet by mouth 2 (two) times a day with meals, Disp: 180 tablet, Rfl: 3    carvedilol (COREG) 25 mg tablet, Take 1 tablet (25 mg total) by mouth 2 (two) times a day with meals, Disp: , Rfl:     Continuous Blood Gluc  (Dexcom G7 ) ANITA, Use 1 Device 3 (three) times a day, Disp: 1 each, Rfl: 0    Continuous Blood Gluc Transmit (Dexcom G6 Transmitter) MISC, 1 TRANSMITTED EVERY 3 MONTHS FOR CONTINUOUS GLUCOSE MONITORING, Disp: 1 each, Rfl: 0    Empagliflozin (JARDIANCE) 10 MG TABS tablet, Take 1 tablet (10 mg total) by mouth daily, Disp: 30 tablet, Rfl: 5    glucose blood test strip, Use as instructed, Disp: 100 each, Rfl: 2    losartan (COZAAR) 25 mg tablet, Take 1 tablet (25 mg total) by mouth daily, Disp: , Rfl:     metFORMIN (GLUCOPHAGE) 1000 MG tablet, Take 1 tablet (1,000 mg total) by mouth 2 (two) times a day with meals, Disp: 180 tablet, Rfl: 3    sildenafil (VIAGRA) 25 MG tablet, TAKE 1 TABLET BY MOUTH  DAILY AS NEEDED FOR  ERECTILE DYSFUNCTION (Patient taking differently: Take 25 mg by mouth as needed), Disp: 10 tablet, Rfl: 0    tamsulosin (FLOMAX) 0.4 mg, Take 2 capsules (0.8 mg total) by mouth daily with dinner, Disp: 180 capsule, Rfl: 3    amLODIPine (NORVASC) 10 mg tablet, TAKE 1 TABLET BY MOUTH DAILY, Disp: 90 tablet, Rfl: 1    amLODIPine (NORVASC) 5 mg tablet, Take 1 tablet (5 mg total) by mouth daily, Disp: , Rfl:   Allergies   Allergen Reactions    Other Hives     Soft shell crabs          Review of Systems   Constitutional:  Positive for diaphoresis (hot flashes due to ADT) and fatigue.   HENT: Negative.     Eyes: Negative.    Respiratory: Negative.     Cardiovascular: Negative.    Gastrointestinal:  Positive for diarrhea.    Genitourinary:  Positive for frequency and urgency.        Occasional urge incontinence improving  Nocturia x 2   Musculoskeletal:  Positive for back pain (left and left hip).   Skin: Negative.    Allergic/Immunologic: Positive for food allergies.   Neurological:  Positive for dizziness, light-headedness and headaches.   Hematological: Negative.    Psychiatric/Behavioral:  Positive for sleep disturbance.      IPSS Questionnaire (AUA-7):  Over the past month…     1)  How often have you had a sensation of not emptying your bladder completely after you finish urinating?  0 - Not at all   2)  How often have you had to urinate again less than two hours after you finished urinating? 3 - About half the time   3)  How often have you found you stopped and started again several times when you urinated?  0 - Not at all   4) How difficult have you found it to postpone urination?  3 - About half the time   5) How often have you had a weak urinary stream?  5 - Almost always   6) How often have you had to push or strain to begin urination?  0 - Not at all   7) How many times did you most typically get up to urinate from the time you went to bed until the time you got up in the morning?  2 - 2 times   Total Score:  13          OBJECTIVE:   /66   Pulse 77   Temp (!) 97.4 °F (36.3 °C)   Resp 16   Wt 91.6 kg (202 lb)   SpO2 96%   BMI 27.40 kg/m²   Karnofsky: 80 - Normal activity with effort; some signs or symptoms of disease    Physical Exam  Vitals and nursing note reviewed.   Constitutional:       General: He is not in acute distress.     Appearance: He is well-developed.   Cardiovascular:      Rate and Rhythm: Normal rate and regular rhythm.   Pulmonary:      Breath sounds: No wheezing, rhonchi or rales.   Abdominal:      Palpations: Abdomen is soft.      Tenderness: There is no abdominal tenderness. There is no right CVA tenderness or left CVA tenderness.      Comments: No inguinal LAD   Musculoskeletal:      Right  "lower leg: No edema.      Left lower leg: No edema.      Comments: No spinal tenderness to percussion   Lymphadenopathy:      Cervical: No cervical adenopathy.      Upper Body:      Right upper body: No supraclavicular adenopathy.      Left upper body: No supraclavicular adenopathy.   Neurological:      Mental Status: He is alert and oriented to person, place, and time.      Gait: Gait normal.            Assessment/Plan:  Erickson Jimenez is a 73 y.o.  man with a history of high volume, high risk prostate cancer status post trimodality therapy with ADT, EBRT and brachy boost.  He has completed 1 year of ADT and is declining further injections.  I feel this is reasonable.      He is currently clinically URSULA.    He has chronic  symptoms managed with tamsulosin and lifestyle modification.  We discussed that he can have continued improvement up to 1 year post treatment.  Continue symptomatic management in interim.  Discussed strategies when traveling.      PSA scheduled with Urology follow-up in August 2024.  Follow-up in 6 months.    Jodee Guerra MD  6/3/2024,8:50 AM    Portions of the record may have been created with voice recognition software.  Occasional wrong word or \"sound a like\" substitutions may have occurred due to the inherent limitations of voice recognition software.  Read the chart carefully and recognize, using context, where substitutions have occurred.        "

## 2024-06-03 NOTE — PROGRESS NOTES
Erickson Jimenez 1950 is a 73 y.o. male with multiple comorbidities but excellent performance status diagnosed with clinical stage [id number] prostatic adenocarcinoma, Anna score 8 (4+4) with a pretreatment PSA of 9.6 ng/mL. >50% cores demonstrated disease and all associated with PNI.  He underwent partial brachytherapy implant followed by pelvic radiation which he completed on 23. He was last seen 23 and presents today for follow up.       24 Cliff Leiva  High risk prostate cancer  - clinical stage [id number] prostatic adenocarcinoma, Anna score 8 (4+4)  - Pretreatment PSA 9.4  - S/p brachytherapy at [facility] with Dr. Lenz completed on 23   - Firmagon 240 mg on 23  - Lupron 45 mg given today (recommendations for 18 months total duration per [facility])  - S/p EBRT completed on 23   - Most recent PSA from 24 was 0.35  - Continue evening primrose for hot flashes. Offered Megace which he declines at this time.    Follow up in 24 weeks for final Lupron and with PSA prior         PSA   Latest Ref Rng 0.00 - 4.00 ng/mL   2022 8.5 (H)    3/3/2023 9.6 (H)    2024 0.35         Upcomin/15/24 Cliff Leiva    Follow up visit     Oncology History   Prostate cancer (HCC)   2023 Biopsy    TRANSPERINEAL MRI FUSION BIOPSY PROSTATE     A. Prostate, REGION OF INTEREST:  - Prostatic adenocarcinoma, Anna grade 3 + 4 = 7 (60% pattern 3 and 40% pattern 4, Grade group 2), involving four of four prostatic cores and 90% of the total biopsy tissue.  - Perineural invasion is present.     Note: The fifth core is skeletal muscle only.     B. Prostate, RIGHT BASE:  - Prostatic adenocarcinoma, Anna grade 4+4 = 8 (Grade group 4), involving two of two cores and 60% of the tissue.  - Perineural invasion is present.     Note: Small portion of pattern 5 can't be ruled out due to procedure artifact.     C. Prostate, RIGHT POSTERIOR MEDIAL:  - Prostatic adenocarcinoma, [place] grade  4+3 = 7 (50% pattern 4 and 50% pattern 3, Grade group 3), involving two of two cores and 50% of the tissue.  - Perineural invasion is present.     D. Prostate, LEFT BASE:  - Benign prostate glands and stroma.     E. Prostate, LEFT POSTERIOR MEDIAL:  - Prostatic adenocarcinoma, [place] grade 4+4 = 8 (Grade group 4), involving one of two cores. 20% of involved core  and 10% of the tissue.  - Perineural invasion is present.     F. Prostate, LEFT POSTERIOR LATERAL:  - Benign prostate glands and stroma.     G. Prostate, LEFT ANTERIOR LATERAL:  - Benign prostate glands and stroma.     H. Prostate, LEFT ANTERIOR MEDIAL:  - Benign prostate glands and stroma.     I. Prostate, RIGHT POSTERIOR LATERAL:  - Prostatic adenocarcinoma, Anna grade 3 + 4 = 7 (60% pattern 3 and 40% pattern 4, Grade group 2), involving one of two cores, 90% of involved core and 60% of the tissue.  - Perineural invasion is present.     J. Prostate, RIGHT ANTERIOR LATERAL:  - Prostatic adenocarcinoma, [place] grade 4+3 = 7 (70% pattern 4 and 30% pattern 3, Grade group 3), involving two of two cores and 70% of the tissue.  - Perineural invasion is present.     K. Prostate, RIGHT ANTERIOR MEDIAL:  - Benign prostate glands and stroma.     Intradepartmental consultation is in agreement.        5/16/2023 -  Cancer Staged    Staging form: Prostate, AJCC 8th Edition  - Clinical stage from 5/16/2023: Stage IIC (cT1c, cN0, cM0, PSA: 9.6, Grade Group: 4) - Signed by Jodee Guerra MD on 6/26/2023  Stage prefix: Initial diagnosis  Prostate specific antigen (PSA) range: Less than 10  Anna primary pattern: 4  [place] secondary pattern: 4  Anna score: 8  Histologic grading system: 5 grade system       6/14/2023 Initial Diagnosis    Prostate cancer (HCC)     8/8/2023 -  Hormone Therapy    Firmagon 240 mg     9/8/2023 -  Hormone Therapy    Lupron 45 mg     10/18/2023 - 11/21/2023 Radiation    Treatments:  Course: C1  Plan ID Energy Fractions Dose per Fraction (cGy)  Dose Correction (cGy) Total Dose Delivered (cGy) Elapsed Days   Whole Pelvis 10X 25 / 25 180 0 4,500 34    Treatment Dates:  10/18/2023 - 11/21/2023.      2/26/2024 -  Hormone Therapy    Lupron 45 mg         Review of Systems:  Review of Systems   Constitutional:  Positive for diaphoresis (hot flashes due to ADT) and fatigue.   HENT: Negative.     Eyes: Negative.    Respiratory: Negative.     Cardiovascular: Negative.    Gastrointestinal:  Positive for diarrhea.   Genitourinary:  Positive for frequency and urgency.        Occasional urge incontinence improving  Nocturia x 2   Musculoskeletal:  Positive for back pain (left and left hip).   Skin: Negative.    Allergic/Immunologic: Positive for food allergies.   Neurological:  Positive for dizziness, light-headedness and headaches.   Hematological: Negative.    Psychiatric/Behavioral:  Positive for sleep disturbance.        Clinical Trial: no    IPSS Questionnaire (AUA-7):  Over the past month…    1)  How often have you had a sensation of not emptying your bladder completely after you finish urinating?  0 - Not at all   2)  How often have you had to urinate again less than two hours after you finished urinating? 3 - About half the time   3)  How often have you found you stopped and started again several times when you urinated?  0 - Not at all   4) How difficult have you found it to postpone urination?  3 - About half the time   5) How often have you had a weak urinary stream?  5 - Almost always   6) How often have you had to push or strain to begin urination?  0 - Not at all   7) How many times did you most typically get up to urinate from the time you went to bed until the time you got up in the morning?  2 - 2 times   Total Score:  13           Health Maintenance   Topic Date Due    Zoster Vaccine (1 of 2) Never done    RSV Vaccine Age 60+ Years (1 - 1-dose 60+ series) Never done    Falls: Plan of Care  Never done    BMI: Followup Plan  10/04/2023    COVID-19  Vaccine (6 - 2023-24 season) 12/10/2023    PT PLAN OF CARE  06/27/2024    HEMOGLOBIN A1C  11/20/2024    Kidney Health Evaluation: Albumin/Creatinine Ratio  02/19/2025    Kidney Health Evaluation: GFR  02/28/2025    Medicare Annual Wellness Visit (AWV)  05/20/2025    BMI: Adult  05/20/2025    Diabetic Foot Exam  05/20/2025    Fall Risk  05/28/2025    Depression Screening  06/03/2025    DM Eye Exam  11/06/2025    Colorectal Cancer Screening  08/29/2028    Hepatitis C Screening  Completed    Pneumococcal Vaccine: 65+ Years  Completed    Influenza Vaccine  Completed    RSV Vaccine age 0-20 Months  Aged Out    HIB Vaccine  Aged Out    IPV Vaccine  Aged Out    Hepatitis A Vaccine  Aged Out    Meningococcal ACWY Vaccine  Aged Out    HPV Vaccine  Aged Out     Patient Active Problem List   Diagnosis    Essential hypertension    Mixed hyperlipidemia    Ventral hernia without obstruction or gangrene    Coronary artery disease due to lipid rich plaque    Carotid artery stenosis without cerebral infarction, bilateral    Cerebral aneurysm    S/P Left carotid endarterectomy 1/13/20    Familial hypercholesterolemia    BMI 27.0-27.9,adult    Chronic right shoulder pain    Elevated PSA    Status post reverse total arthroplasty of right shoulder    Erectile dysfunction of non-organic origin    Anemia    Left carpal tunnel syndrome    Benign paroxysmal positional vertigo    Facial skin lesion    Lump in the testicle    Prostate cancer (HCC)    Androgen deprivation therapy    Type 2 diabetes mellitus with hyperglycemia, without long-term current use of insulin (HCC)    Platelets decreased (HCC)    Low back pain radiating to left lower extremity    Weakness of both lower extremities     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    Heart disease     Hyperlipidemia     Hypertension     Kidney stone      Osteoarthritis     Prostate cancer (HCC)     Vascular disorder      Past Surgical History:   Procedure Laterality Date    ABDOMINAL SURGERY      CARDIAC CATHETERIZATION N/A 3/21/2022    Procedure: Cardiac catheterization;  Surgeon: Stephy Horner MD;  Location: [facility];  Service: Cardiology    CARDIAC CATHETERIZATION N/A 3/21/2022    Procedure: Cardiac Coronary Angiogram;  Surgeon: Stephy Horner MD;  Location: [facility];  Service: Cardiology    COLONOSCOPY      FL RETROGRADE PYELOGRAM  3/23/2023    INGUINAL HERNIA REPAIR Left     LAPAROSCOPIC COLON RESECTION      WV ARTHROPLASTY GLENOHUMERAL JOINT TOTAL SHOULDER Right 11/17/2020    Procedure: ARTHROPLASTY SHOULDER REVERSE with biceps tendonesis;  Surgeon: Robby Marquez MD;  Location: [facility];  Service: Orthopedics    WV BX PROSTATE STRTCTC SATURATION SAMPLING IMG GID N/A 5/16/2023    Procedure: TRANSPERINEAL MRI FUSION BIOPSY PROSTATE;  Surgeon: Quan Velasco MD;  Location: [facility];  Service: Urology    WV COLONOSCOPY FLX DX W/COLLJ SPEC WHEN PFRMD N/A 11/3/2017    Procedure: COLONOSCOPY;  Surgeon: SUZAN Muñiz MD;  Location: [facility];  Service: Colorectal    WV CYSTO/URETERO W/LITHOTRIPSY &INDWELL STENT INSRT Left 3/23/2023    Procedure: CYSTOSCOPY URETEROSCOPY WITH LITHOTRIPSY HOLMIUM LASER, RETROGRADE PYELOGRAM AND INSERTION STENT URETERAL, STONE BASKET;  Surgeon: Michelet Johns MD;  Location: [facility];  Service: Urology    WV TEAEC W/PATCH GRF CAROTID VERTB SUBCLAV NECK INC Left 1/13/2020    Procedure: ENDARTERECTOMY ARTERY CAROTID;  Surgeon: Amado Teague MD;  Location[facility];  Service: Vascular     Family History   Problem Relation Age of Onset    Colon cancer Father     No Known Problems Mother     Colon cancer Paternal Grandfather     Colon cancer Family     Diabetes Half-Sister     Obesity Half-Sister      Social History     Socioeconomic History    Marital status: /Civil Union     Spouse  name: Not on file    Number of children: Not on file    Years of education: Not on file    Highest education level: Not on file   Occupational History    Not on file   Tobacco Use    Smoking status: Some Days     Types: Cigars     Passive exposure: Past    Smokeless tobacco: Current   Vaping Use    Vaping status: Never Used   Substance and Sexual Activity    Alcohol use: Yes     Alcohol/week: 3.0 standard drinks of alcohol     Types: 3 Cans of beer per week     Comment: Socially    Drug use: Never    Sexual activity: Not Currently   Other Topics Concern    Not on file   Social History Narrative    Caffeine use    Uses safety equipment: seatbelts     Social Determinants of Health     Financial Resource Strain: Low Risk  (5/9/2023)    Overall Financial Resource Strain (CARDIA)     Difficulty of Paying Living Expenses: Not very hard   Food Insecurity: Patient Declined (5/20/2024)    Hunger Vital Sign     Worried About Running Out of Food in the Last Year: Patient declined     Ran Out of Food in the Last Year: Patient declined   Transportation Needs: No Transportation Needs (5/20/2024)    PRAPARE - Transportation     Lack of Transportation (Medical): No     Lack of Transportation (Non-Medical): No   Physical Activity: Not on file   Stress: Not on file   Social Connections: Not on file   Intimate Partner Violence: Not on file   Housing Stability: Low Risk  (5/20/2024)    Housing Stability Vital Sign     Unable to Pay for Housing in the Last Year: No     Number of Times Moved in the Last Year: 1     Homeless in the Last Year: No       Current Outpatient Medications:     aspirin 81 mg chewable tablet, Chew 1 tablet (81 mg total) daily, Disp: 90 tablet, Rfl: 3    atorvastatin (LIPITOR) 20 mg tablet, Take 1 tablet (20 mg total) by mouth daily at bedtime, Disp: 90 tablet, Rfl: 1    Calcium Carb-Cholecalciferol (calcium carbonate-vitamin D) 500 mg-5 mcg tablet, Take 1 tablet by mouth 2 (two) times a day with meals, Disp: 180  tablet, Rfl: 3    carvedilol (COREG) 25 mg tablet, Take 1 tablet (25 mg total) by mouth 2 (two) times a day with meals, Disp: , Rfl:     Continuous Blood Gluc  (Dexcom G7 ) ANITA, Use 1 Device 3 (three) times a day, Disp: 1 each, Rfl: 0    Continuous Blood Gluc Transmit (Dexcom G6 Transmitter) MISC, 1 TRANSMITTED EVERY 3 MONTHS FOR CONTINUOUS GLUCOSE MONITORING, Disp: 1 each, Rfl: 0    Empagliflozin (JARDIANCE) 10 MG TABS tablet, Take 1 tablet (10 mg total) by mouth daily, Disp: 30 tablet, Rfl: 5    glucose blood test strip, Use as instructed, Disp: 100 each, Rfl: 2    losartan (COZAAR) 25 mg tablet, Take 1 tablet (25 mg total) by mouth daily, Disp: , Rfl:     metFORMIN (GLUCOPHAGE) 1000 MG tablet, Take 1 tablet (1,000 mg total) by mouth 2 (two) times a day with meals, Disp: 180 tablet, Rfl: 3    sildenafil (VIAGRA) 25 MG tablet, TAKE 1 TABLET BY MOUTH  DAILY AS NEEDED FOR  ERECTILE DYSFUNCTION (Patient taking differently: Take 25 mg by mouth as needed), Disp: 10 tablet, Rfl: 0    tamsulosin (FLOMAX) 0.4 mg, Take 2 capsules (0.8 mg total) by mouth daily with dinner, Disp: 180 capsule, Rfl: 3    amLODIPine (NORVASC) 10 mg tablet, TAKE 1 TABLET BY MOUTH DAILY, Disp: 90 tablet, Rfl: 1    amLODIPine (NORVASC) 5 mg tablet, Take 1 tablet (5 mg total) by mouth daily, Disp: , Rfl:   Allergies   Allergen Reactions    Other Hives     Soft shell crabs      Vitals:    06/03/24 0700   BP: 122/66   Pulse: 77   Resp: 16   Temp: (!) 97.4 °F (36.3 °C)   SpO2: 96%   Weight: 91.6 kg (202 lb)

## 2024-06-04 ENCOUNTER — OFFICE VISIT (OUTPATIENT)
Dept: PHYSICAL THERAPY | Facility: MEDICAL CENTER | Age: 74
End: 2024-06-04
Payer: COMMERCIAL

## 2024-06-04 ENCOUNTER — TELEMEDICINE (OUTPATIENT)
Dept: VASCULAR SURGERY | Facility: CLINIC | Age: 74
End: 2024-06-04
Payer: COMMERCIAL

## 2024-06-04 DIAGNOSIS — M54.50 LOW BACK PAIN RADIATING TO LEFT LOWER EXTREMITY: Primary | ICD-10-CM

## 2024-06-04 DIAGNOSIS — E78.2 MIXED HYPERLIPIDEMIA: ICD-10-CM

## 2024-06-04 DIAGNOSIS — I10 ESSENTIAL HYPERTENSION: ICD-10-CM

## 2024-06-04 DIAGNOSIS — I65.23 CAROTID ARTERY STENOSIS WITHOUT CEREBRAL INFARCTION, BILATERAL: Primary | ICD-10-CM

## 2024-06-04 DIAGNOSIS — M79.605 LOW BACK PAIN RADIATING TO LEFT LOWER EXTREMITY: Primary | ICD-10-CM

## 2024-06-04 DIAGNOSIS — Z98.890 S/P CAROTID ENDARTERECTOMY: ICD-10-CM

## 2024-06-04 DIAGNOSIS — E11.65 TYPE 2 DIABETES MELLITUS WITH HYPERGLYCEMIA, WITHOUT LONG-TERM CURRENT USE OF INSULIN (HCC): ICD-10-CM

## 2024-06-04 PROCEDURE — 97112 NEUROMUSCULAR REEDUCATION: CPT

## 2024-06-04 PROCEDURE — 99442 PR PHYS/QHP TELEPHONE EVALUATION 11-20 MIN: CPT | Performed by: PHYSICIAN ASSISTANT

## 2024-06-04 PROCEDURE — 97110 THERAPEUTIC EXERCISES: CPT

## 2024-06-04 NOTE — PATIENT INSTRUCTIONS
Symptoms of stroke:  - Unable to speak or understand speech  - Unable to move one side of the body (arm or leg)  - Visual changes/ temporary loss of vision in one eye or both  - Call 911 for any symptoms of stroke      Carotid artery stenosis  -Bilateral carotid artery stenosis by duplex surveillance and monitoring by velocity criteria.  -Review of recent CV duplex shows R ICA stenosis with moderate stenosis and open L carotid endart  -Maintain good blood pressure, cholesterol and diabetes control  -Continue with aspirin 81 daily and atorvastatin 20 mg daily  -Reviewed symptoms of stroke for which he should call 911  -Check CV duplex in 6 moths with in-person office visit      Cerebral aneurysm  -3x5 mm R MCA bifurcation aneurysm  -Followed by neurosurgery  -CTA head (last CTA head 12/8/2022)  -Orders [facility] from other provider for CTA head to be scheduled through central scheduling      Claudication  -Short-distance bilateral leg fatigue on top of musculoskeletal low back pain  -Baseline DOUG ordered by PCP; patient encouraged to schedule test  -You can call the vascular office after the DOUG and we can review it together  -Patient education regarding PAD was provided  -We discussed the benefits of a regular and progressive walking program  -Continue with aspirin and atorvastatin 20  -Consider formal CHERIE, if indicated   -We will follow-up on this problem at next office visit            Carotid duplex 4/22/24  [facility] REPORT  CLINICAL:  Indications:  6 month surveillance of carotid artery disease.  Patient is asymptomatic at  this time.  Operative History:  2020-01-13 Left CEA  Risk Factors  The patient has history of HTN, Diabetes (Yes), Hyperlipidemia and previous  smoking (quit <1yr ago).  Clinical  Right Pressure:  126/ mm Hg, Left Pressure:  126/ mm Hg.     FINDINGS:     Right        Impression  PSV  EDV (cm/s)  Direction of Flow  Ratio    Dist. ICA                 63          29                       0.92    Mid. ICA                  63          17                      0.92    Prox. ICA    50 - 69%    215         105                      3.13    Dist CCA                  59          25                              Mid CCA                   69          25                      0.78    Prox CCA                  88          29                              Ext Carotid  moderate    251          25                      3.65    Vertebral                 46          20                              Prox Vert                 46          20  Antegrade                   Subclavian                90           0                              Innominate               108           9                                 Left         Impression     PSV  EDV (cm/s)  Direction of Flow  Ratio    Dist. ICA                    67          31                      1.10    Mid. ICA                     44          22                      0.72    Prox. ICA    Widely Patent   52          22                      0.86    Dist CCA                     66          12                              Mid CCA                      61          12                      0.72    Prox CCA                     84          14                              Ext Carotid                  90          16                      1.48    Prox Vert                    65          20  Antegrade                   Subclavian                  194           0                                      CONCLUSION:     Impression  RIGHT:  There is 50-69% stenosis noted in the internal carotid artery. Plaque is  heterogenous and irregular.  There is a moderate stenosis of the proximal external carotid  artery.  Vertebral artery flow is antegrade. There is no significant subclavian artery  disease.     LEFT:  Widely patent internal carotid artery and endarterectomy site.  Vertebral artery flow is antegrade. There is no significant subclavian artery  disease.     Compared to previous study on   10/16/2023  , there is  no interval change.  Recommend repeat testing in 6 months as per protocol unless otherwise  indicated.

## 2024-06-04 NOTE — PROGRESS NOTES
Virtual Brief Visit    This Visit is being completed by telephone. The Patient is located at Home and in the following state in which I hold an active license [place]    The patient was identified by name and date of birth. Erickson Jimenez was informed that this is a telemedicine visit and that the visit is being conducted through Telephone.  My office door was closed. No one else was in the room.  He acknowledged consent and understanding of privacy and security of the video platform. The patient has agreed to participate and understands they can discontinue the visit at any time. Patient is aware this is a billable service.       Assessment/Plan:    Carotid artery stenosis  S/p L CEA '20  -No new symptoms of TIA/stroke    Imaging:  -CV duplex 4/22/24: R ICA 50-69% stenosis (215/105, 3.13); L ICA/endart is patent (52/22)  -CV duplex 3/30/23: R ICA 50-69% stenosis (181/72, ratio 2.46); L ICA/endart is patent (46/22)    -CV duplex   9/1/22: R ICA 50-69% stenosis (185/71, ratio 2.48): L ICA/endart is patent (109/23, ratio 1.72)    -CTA  head 12/8/22: No acute intracranial abnormality.    Unchanged 0.5 cm aneurysm in right MCA bifurcation projecting inferolaterally.    Unchanged diffusely small and irregular caliber of left ICA throughout the visualized distal cervical and intracranial segments.  Moderate chronic microangiopathy.    -CTA neck and brain 12/4/20: No acute IC abn. R distal CCA and ICA 50% stenosis.    No interval change in the greater then 90% stenosis at the origin of the left ICA with a diffusely small but stable cervical and intracranial ICA.     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.    Plan:  -Asymptomatic, bilateral carotid artery disease s/p patent L ICA/endart and moderate 50-69% right carotid artery stenosis by doppler criteria.   -Will continue to monitor bilateral carotid artery stenosis by duplex surveillance and monitoring by velocity criteria.  -Review of recent CV duplex shows R ICA  stenosis with  cm/s and  cm/s with ratio 3.13 and patent L ICA/endart with low, stable velocities.  -Doppler appears overall stable c/w prior - will have to monitor R EDV  -Maintain good blood pressure, cholesterol and diabetes control  -Reviewed symptoms of stroke for which he should call 911  -Last in-person OV 3/2020  -Reviewed dopplers and CTA 2020 with Dr. 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 mm R MCA bifurcation aneurysm  -Followed by neurology  -Orders from other provider for CTA head but not yet done  -Reminded patient to follow up with up with NS for this problem      Claudication  -Short-distance bilateral leg fatigue on top of musculoskeletal LBP in PT  -Baseline DOUG ordered by PCP; patient encouraged to schedule test  -Patient advised that he can call vascular office after the DOUG and we can review  -Patient education regarding PAD was provided  -We discussed the benefits of a regular and progressive walking program  -Continue with aspirin and atorvastatin 20  -Consider formal CHERIE, if indicated   -We will follow-up on this problem at next office visit      Problem List Items Addressed This Visit       Essential hypertension    Mixed hyperlipidemia    Carotid artery stenosis without cerebral infarction, bilateral - Primary    S/P Left carotid endarterectomy 1/13/20    Type 2 diabetes mellitus with hyperglycemia, without long-term current use of insulin ([facility])       Mr. Erickson Jimenez 73 yoM hypertension, hyperlipidemia, diabetes, vertigo, anemia, CAD, cerebral aneurysm, bilateral carotid artery stenosis s/p left carotid endarterectomy which required redo distal endpoint anastomosis 1/13/2020 by Dr. Teague.     6/4/24: Patient returns for annual vascular follow-up.  Of note, his last 3 visits have been televisit. He has been recommended for in person office visit since not seen since 3/2020.    Mr. Jimenez reports that he is doing reasonably  well.  He has no new neurological symptoms. No symptoms of TIA/stroke.  No amaurosis fugax, vision changes, dysarthria lateral numbness or weakness.  We reviewed the symptoms of stroke which he should call 911.    We reviewed his carotid artery duplex study as well as recent testing. Patient education regarding carotid artery disease and stroke was provided.    He underwent prostate cancer treatment last winter.  He is now on Lupron and feels this is somewhat keeping him homebound.  He also has low back pain which he is attributed to musculoskeletal etiology for which he is undergoing physical therapy.  He feels this is helping the back.      However, he also complains of bilateral leg fatigue and weakness and asks if he can be seen for possible PAD.  He had already discussed his symptoms with primary care who ordered baseline DOUG which is not yet completed. I encouraged him to complete the DOUG and we can review.     Currently, he is working on the low back pain and physical therapy.  Once he is improved from the standpoint, we discussed the benefits of regular and progressive walking program.  He is agreeable and motivated to walk.  He has no high risk features such as foot pain, foot neuropathy or wounds.      He is also encouraged to come in for office visit after his next examination.    He is also seen by neurosurgery for cerebral aneurysm and reminded that he will be due for CTA of the head in December.    A1c 7.8  LDL 78      Carotid duplex 4/22/24  [facility] REPORT  CLINICAL:  Indications:  6 month surveillance of carotid artery disease.  Patient is asymptomatic at  this time.  Operative History:  2020-01-13 Left CEA  Risk Factors  The patient has history of HTN, Diabetes (Yes), Hyperlipidemia and previous  smoking (quit <1yr ago).  Clinical  Right Pressure:  126/ mm Hg, Left Pressure:  126/ mm Hg.     FINDINGS:     Right        Impression  PSV  EDV (cm/s)  Direction of Flow  Ratio    Dist. ICA                  63          29                      0.92    Mid. ICA                  63          17                      0.92    Prox. ICA    50 - 69%    215         105                      3.13    Dist CCA                  59          25                              Mid CCA                   69          25                      0.78    Prox CCA                  88          29                              Ext Carotid  moderate    251          25                      3.65    Vertebral                 46          20                              Prox Vert                 46          20  Antegrade                   Subclavian                90           0                              Innominate               108           9                                 Left         Impression     PSV  EDV (cm/s)  Direction of Flow  Ratio    Dist. ICA                    67          31                      1.10    Mid. ICA                     44          22                      0.72    Prox. ICA    Widely Patent   52          22                      0.86    Dist CCA                     66          12                              Mid CCA                      61          12                      0.72    Prox CCA                     84          14                              Ext Carotid                  90          16                      1.48    Prox Vert                    65          20  Antegrade                   Subclavian                  194           0                                      CONCLUSION:     Impression  RIGHT:  There is 50-69% stenosis noted in the internal carotid artery. Plaque is  heterogenous and irregular.  There is a moderate stenosis of the proximal external carotid  artery.  Vertebral artery flow is antegrade. There is no significant subclavian artery  disease.     LEFT:  Widely patent internal carotid artery and endarterectomy site.  Vertebral artery flow is antegrade. There is no significant subclavian  artery  disease.     Compared to previous study on  10/16/2023  , there is  no interval change.  Recommend repeat testing in 6 months as per protocol unless otherwise  indicated.         Recent Visits  No visits were found meeting these conditions.  Showing recent visits within past 7 days and meeting all other requirements  Today's Visits  Date Type Provider Dept   06/04/24 Telemedicine Gabby Claudio PA-C [facility]   Showing today's visits and meeting all other requirements  Future Appointments  No visits were found meeting these conditions.  Showing future appointments within next 150 days and meeting all other requirements         Visit Time  Total Visit Duration: 20

## 2024-06-04 NOTE — PROGRESS NOTES
Daily Note     Today's date: 2024  Patient name: Erickson Jimenez  : 1950  MRN: [id number]  Referring provider: Efrain Rhodes MD  Dx:   Encounter Diagnosis     ICD-10-CM    1. Low back pain radiating to left lower extremity  M54.50     M79.605         Eval/Re-Eval POC Expires Auth #/ Referral # Total Visits Start Date Expiration Date Extension Info Visits Limitation      [employer]                                                            1 2 3 4 5 6          7 8 9 10 11 12           13 14 15 16 17 18           19 20 21 22 23 24           25 26 27 28 29 30             Start Time: 1230  Stop Time: 1315  Total time in clinic (min): 45 minutes    Subjective: Patient stated that he is feeling okay today, no pain coming in. He stated that he sometimes gets some hip pain. He stated that he tried to do the exercises lying down but was very dizzy.      Objective: See treatment diary below      Assessment: Tolerated treatment well. Patient tested with vinnie hallpike today, with no symptoms. Patient able to complete all exercises without increase in pain. Patient noted tightness with piriformis stretch and IT band stretch, given update HEP for home. Patient demonstrated fatigue post treatment, exhibited good technique with therapeutic exercises, and would benefit from continued PT      Plan: Continue per plan of care.      Precautions: standard precautions,   Past Medical History:   Diagnosis Date    CHF (congestive heart failure) (HCC)     Diabetes mellitus (HCC)     Diverticulitis     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HCC) 2018    Heart disease     Hyperlipidemia     Hypertension     Kidney stone     Osteoarthritis     Prostate cancer (HCC)     Vascular disorder          PT 1:1 entire time   Manuals             PROM BL Hips             Jt Radha             STM/Massage Gun             Corrected Rotated Innominate             Neuro Re-Ed             TA Activation             MARKEL JEAN BAPTISTE  "Ball Pressdowns             PPT 15x 3\" hold            Lower Trunk Rotations 15x            Bridges 20x            Adduction 20x 5\" hold            Clamshells 20x BTB                                                   Ther Ex             Bike 5'            Hamstring Stretch 30\" 3x ea            IT Band Stretch 30\" 3x ea            Piriformis Stretch 30\" 3x ea            Quadriceps Stretch 30\" 3x ea supine            Golden Stretch             Leg Press                                                                              Ther Activity                                       Gait Training                                       Modalities                                            "

## 2024-06-06 NOTE — ED NOTES
PT SEEN, EVALUATED AND D/C BY PROVIDER PRIOR TO RN ASSESSMENT.      Mariana Trevino, RN  02/29/24 [id number]    
DISPLAY PLAN FREE TEXT

## 2024-06-11 ENCOUNTER — OFFICE VISIT (OUTPATIENT)
Dept: PHYSICAL THERAPY | Facility: MEDICAL CENTER | Age: 74
End: 2024-06-11
Payer: COMMERCIAL

## 2024-06-11 DIAGNOSIS — M79.605 LOW BACK PAIN RADIATING TO LEFT LOWER EXTREMITY: Primary | ICD-10-CM

## 2024-06-11 DIAGNOSIS — M54.50 LOW BACK PAIN RADIATING TO LEFT LOWER EXTREMITY: Primary | ICD-10-CM

## 2024-06-11 PROCEDURE — 97110 THERAPEUTIC EXERCISES: CPT

## 2024-06-11 PROCEDURE — 97112 NEUROMUSCULAR REEDUCATION: CPT

## 2024-06-13 ENCOUNTER — OFFICE VISIT (OUTPATIENT)
Dept: PHYSICAL THERAPY | Facility: MEDICAL CENTER | Age: 74
End: 2024-06-13
Payer: COMMERCIAL

## 2024-06-13 DIAGNOSIS — M54.50 LOW BACK PAIN RADIATING TO LEFT LOWER EXTREMITY: Primary | ICD-10-CM

## 2024-06-13 DIAGNOSIS — M79.605 LOW BACK PAIN RADIATING TO LEFT LOWER EXTREMITY: Primary | ICD-10-CM

## 2024-06-13 PROCEDURE — 97140 MANUAL THERAPY 1/> REGIONS: CPT

## 2024-06-13 PROCEDURE — 97110 THERAPEUTIC EXERCISES: CPT

## 2024-06-13 PROCEDURE — 97112 NEUROMUSCULAR REEDUCATION: CPT

## 2024-06-13 NOTE — PROGRESS NOTES
"Daily Note     Today's date: 2024  Patient name: Erickson Jimenez  : 1950  MRN: [id number]  Referring provider: Efrain Rhodes MD  Dx:   Encounter Diagnosis     ICD-10-CM    1. Low back pain radiating to left lower extremity  M54.50     M79.605         Eval/Re-Eval POC Expires Auth #/ Referral # Total Visits Start Date Expiration Date Extension Info Visits Limitation      [employer]                                                            1 2 3 4 5 6         7 8 9 10 11 12           13 14 15 16 17 18           19 20 21 22 23 24           25 26 27 28 29 30             Start Time: 930  Stop Time: 1015  Total time in clinic (min): 45 minutes    Subjective: Patient stated that he felt okay after last visit. He stated that he still has pain in his back/hip after walking for awhile.       Objective: See treatment diary below      Assessment: Tolerated treatment well. Patient able to complete bike without increase in pain today.  Patient given side stepping and monster walks with resistance to improve hip musculature and stability, tolerated well. Patient demonstrated fatigue post treatment, exhibited good technique with therapeutic exercises, and would benefit from continued PT      Plan: Continue per plan of care.      Precautions: standard precautions,   Past Medical History:   Diagnosis Date    CHF (congestive heart failure) (HCC)     Diabetes mellitus (HCC)     Diverticulitis     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HCC) 2018    Heart disease     Hyperlipidemia     Hypertension     Kidney stone     Osteoarthritis     Prostate cancer (HCC)     Vascular disorder          PT 1:1 entire time   Manuals           PROM BL Hips             Jt Mobes             STM/Massage Gun   IB BL Hip flexors          Corrected Rotated Innominate   IB          Neuro Re-Ed             TA Activation             TA Marches             TA Ball Pressdowns             PPT 15x 3\" hold 20x 3\" hold 20x 3\" hold " "         Lower Trunk Rotations 15x 15x 15x          Bridges 20x 20x 20x          Adduction 20x 5\" hold 20x 5\" hold 20x 5\" hold          Clamshells 20x BTB 20x ea BTB           Ball Rollouts  10x           Side Stepping   5 laps at mirror GTB          Monster Walks   5 laps at mirror GTB                                    Ther Ex             Bike 5' 5' 5'          Hamstring Stretch 30\" 3x ea 30\" 3x ea 30\" 3x ea          IT Band Stretch 30\" 3x ea 30\" 3x ea 30\" 3x ea          Piriformis Stretch 30\" 3x ea 30\" 3x ea 30\" 3x          Quadriceps Stretch 30\" 3x ea supine 30\" 3x ea supine 30\" 3x ea supine          Golden Stretch             Leg Press                                                                              Ther Activity                                       Gait Training                                       Modalities                                            "

## 2024-06-18 ENCOUNTER — OFFICE VISIT (OUTPATIENT)
Dept: PHYSICAL THERAPY | Facility: MEDICAL CENTER | Age: 74
End: 2024-06-18
Payer: COMMERCIAL

## 2024-06-18 DIAGNOSIS — M54.50 LOW BACK PAIN RADIATING TO LEFT LOWER EXTREMITY: Primary | ICD-10-CM

## 2024-06-18 DIAGNOSIS — M79.605 LOW BACK PAIN RADIATING TO LEFT LOWER EXTREMITY: Primary | ICD-10-CM

## 2024-06-18 PROCEDURE — 97110 THERAPEUTIC EXERCISES: CPT

## 2024-06-18 PROCEDURE — 97112 NEUROMUSCULAR REEDUCATION: CPT

## 2024-06-18 PROCEDURE — 97140 MANUAL THERAPY 1/> REGIONS: CPT

## 2024-06-18 NOTE — PROGRESS NOTES
"Daily Note     Today's date: 2024  Patient name: Erickson Jimenez  : 1950  MRN: [id number]  Referring provider: Efrain Rhodes MD  Dx:   Encounter Diagnosis     ICD-10-CM    1. Low back pain radiating to left lower extremity  M54.50     M79.605         Eval/Re-Eval POC Expires Auth #/ Referral # Total Visits Start Date Expiration Date Extension Info Visits Limitation      [employer]                                                            1 2 3 4 5 6       7 8 9 10 11 12           13 14 15 16 17 18           19 20 21 22 23 24           25 26 27 28 29 30             Start Time: 931  Stop Time: 101  Total time in clinic (min): 43 minutes    Subjective: Patient stated that he is not too bad today. He stated that he still has some difficulty with walking longer distances.      Objective: See treatment diary below      Assessment: Tolerated treatment well. STM added to paraspinals and QL today, tolerated well. Patient with some soreness following side stepping and monster walks. Patient demonstrated fatigue post treatment, exhibited good technique with therapeutic exercises, and would benefit from continued PT      Plan: Continue per plan of care.      Precautions: standard precautions,   Past Medical History:   Diagnosis Date    CHF (congestive heart failure) (HCC)     Diabetes mellitus (HCC)     Diverticulitis     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HCC) 2018    Heart disease     Hyperlipidemia     Hypertension     Kidney stone     Osteoarthritis     Prostate cancer (HCC)     Vascular disorder          PT 1:1 9335-1086  Manuals          PROM BL Hips             Jt Mobes             STM/Massage Gun   IB BL Hip flexors IB QL, paraspinals         Corrected Rotated Innominate   IB          Neuro Re-Ed             TA Activation             TA Marches             TA Ball Pressdowns             PPT 15x 3\" hold 20x 3\" hold 20x 3\" hold          Lower Trunk Rotations 15x " "15x 15x 15x         Bridges 20x 20x 20x 20x         Adduction 20x 5\" hold 20x 5\" hold 20x 5\" hold 20x 5\" hold         Clamshells 20x BTB 20x ea BTB           Ball Rollouts  10x           Side Stepping   5 laps at mirror GTB 5 laps 20' BTB         Monster Walks   5 laps at mirror GTB 5 laps 20' BTB                                   Ther Ex             Bike 5' 5' 5' 5'         Hamstring Stretch 30\" 3x ea 30\" 3x ea 30\" 3x ea 30\" 3x ea         IT Band Stretch 30\" 3x ea 30\" 3x ea 30\" 3x ea 30\" 3x ea         Piriformis Stretch 30\" 3x ea 30\" 3x ea 30\" 3x 30\" 3x         Quadriceps Stretch 30\" 3x ea supine 30\" 3x ea supine 30\" 3x ea supine 30\" 3x prone         Golden Stretch             Leg Press                                                                              Ther Activity                                       Gait Training                                       Modalities                                            "

## 2024-06-20 ENCOUNTER — OFFICE VISIT (OUTPATIENT)
Dept: PHYSICAL THERAPY | Facility: MEDICAL CENTER | Age: 74
End: 2024-06-20
Payer: COMMERCIAL

## 2024-06-20 DIAGNOSIS — M79.605 LOW BACK PAIN RADIATING TO LEFT LOWER EXTREMITY: Primary | ICD-10-CM

## 2024-06-20 DIAGNOSIS — M54.50 LOW BACK PAIN RADIATING TO LEFT LOWER EXTREMITY: Primary | ICD-10-CM

## 2024-06-20 PROCEDURE — 97110 THERAPEUTIC EXERCISES: CPT

## 2024-06-20 PROCEDURE — 97140 MANUAL THERAPY 1/> REGIONS: CPT

## 2024-06-20 PROCEDURE — 97112 NEUROMUSCULAR REEDUCATION: CPT

## 2024-06-20 NOTE — PROGRESS NOTES
Daily Note     Today's date: 2024  Patient name: Erickson Jimenez  : 1950  MRN: [id number]  Referring provider: Efrain Rhodes MD  Dx:   Encounter Diagnosis     ICD-10-CM    1. Low back pain radiating to left lower extremity  M54.50     M79.605         Eval/Re-Eval POC Expires Auth #/ Referral # Total Visits Start Date Expiration Date Extension Info Visits Limitation      [employer]                                                            1 2 3 4 5 6      7 8 9 10 11 12           13 14 15 16 17 18           19 20 21 22 23 24           25 26 27 28 29 30             Start Time: 933  Stop Time: 101  Total time in clinic (min): 42 minutes    Subjective: Patient stated that he is not too bad today. He stated that he still has some difficulty with walking longer distances.      Objective: See treatment diary below      Assessment: Tolerated treatment well.Patient able to complete all exercises without increase in pain. Patient with some tenderness over QL, glute med, and paraspinals, STM used to address, noted improvements following. Patient demonstrated fatigue post treatment, exhibited good technique with therapeutic exercises, and would benefit from continued PT      Plan: Continue per plan of care.      Precautions: standard precautions,   Past Medical History:   Diagnosis Date    CHF (congestive heart failure) (HCC)     Diabetes mellitus (HCC)     Diverticulitis     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HCC) 2018    Heart disease     Hyperlipidemia     Hypertension     Kidney stone     Osteoarthritis     Prostate cancer (HCC)     Vascular disorder          PT 1:1 entire time  Manuals         PROM BL Hips             Jt Mobes             STM/Massage Gun   IB BL Hip flexors IB QL, paraspinals IB QL, paraspinals        Corrected Rotated Innominate   IB          Neuro Re-Ed             TA Activation             TA Marches             TA Ball Pressdowns        "      PPT 15x 3\" hold 20x 3\" hold 20x 3\" hold          Lower Trunk Rotations 15x 15x 15x 15x 15x        Bridges 20x 20x 20x 20x         Adduction 20x 5\" hold 20x 5\" hold 20x 5\" hold 20x 5\" hold 20x 5\" hold        Clamshells 20x BTB 20x ea BTB           Ball Rollouts  10x           Side Stepping   5 laps at mirror GTB 5 laps 20' BTB 5 laps 20' BTB        Monster Walks   5 laps at mirror GTB 5 laps 20' BTB 5 laps 20' BTB                                  Ther Ex             Bike 5' 5' 5' 5' 5'        Hamstring Stretch 30\" 3x ea 30\" 3x ea 30\" 3x ea 30\" 3x ea 30\" 3x ea        IT Band Stretch 30\" 3x ea 30\" 3x ea 30\" 3x ea 30\" 3x ea 30\" 3x ea        Piriformis Stretch 30\" 3x ea 30\" 3x ea 30\" 3x 30\" 3x 30\" 3x ea        Quadriceps Stretch 30\" 3x ea supine 30\" 3x ea supine 30\" 3x ea supine 30\" 3x prone 30\" 3x ea prone        Golden Stretch             Leg Press                                                                              Ther Activity                                       Gait Training                                       Modalities                                            "

## 2024-06-21 DIAGNOSIS — I10 ESSENTIAL HYPERTENSION: ICD-10-CM

## 2024-06-22 RX ORDER — LOSARTAN POTASSIUM 100 MG/1
100 TABLET ORAL EVERY MORNING
Qty: 90 TABLET | Refills: 1 | Status: SHIPPED | OUTPATIENT
Start: 2024-06-22

## 2024-06-25 ENCOUNTER — APPOINTMENT (OUTPATIENT)
Dept: PHYSICAL THERAPY | Facility: MEDICAL CENTER | Age: 74
End: 2024-06-25
Payer: COMMERCIAL

## 2024-06-27 ENCOUNTER — APPOINTMENT (OUTPATIENT)
Dept: PHYSICAL THERAPY | Facility: MEDICAL CENTER | Age: 74
End: 2024-06-27
Payer: COMMERCIAL

## 2024-07-06 NOTE — PROGRESS NOTES
Patient received ADT on 9/8/23, and completed brachytherapy at [facility]. Message was sent to RAD ONC team to make them aware. Patient will complete whole pelvis RT with Adonna Oppenheim.
Universal Safety Interventions

## 2024-08-05 ENCOUNTER — TELEPHONE (OUTPATIENT)
Dept: UROLOGY | Facility: CLINIC | Age: 74
End: 2024-08-05

## 2024-08-05 NOTE — TELEPHONE ENCOUNTER
Spoke with pt providing a friendly reminder to please get their PSA done prior to their visit. Informed they can get it done at any [facility] lab. Pt also stated the would no longer take the Lupron shot.

## 2024-08-08 ENCOUNTER — APPOINTMENT (OUTPATIENT)
Dept: LAB | Facility: HOSPITAL | Age: 74
End: 2024-08-08
Payer: COMMERCIAL

## 2024-08-08 ENCOUNTER — RA CDI HCC (OUTPATIENT)
Dept: OTHER | Facility: HOSPITAL | Age: 74
End: 2024-08-08

## 2024-08-08 DIAGNOSIS — C61 PROSTATE CANCER (HCC): ICD-10-CM

## 2024-08-08 DIAGNOSIS — D69.6 PLATELETS DECREASED (HCC): ICD-10-CM

## 2024-08-08 DIAGNOSIS — E11.65 TYPE 2 DIABETES MELLITUS WITH HYPERGLYCEMIA, WITHOUT LONG-TERM CURRENT USE OF INSULIN (HCC): ICD-10-CM

## 2024-08-08 LAB
ALBUMIN SERPL BCG-MCNC: 4.2 G/DL (ref 3.5–5)
ALP SERPL-CCNC: 54 U/L (ref 34–104)
ALT SERPL W P-5'-P-CCNC: 15 U/L (ref 7–52)
ANION GAP SERPL CALCULATED.3IONS-SCNC: 7 MMOL/L (ref 4–13)
AST SERPL W P-5'-P-CCNC: 14 U/L (ref 13–39)
BASOPHILS # BLD AUTO: 0.04 THOUSANDS/ÂΜL (ref 0–0.1)
BASOPHILS NFR BLD AUTO: 1 % (ref 0–1)
BILIRUB SERPL-MCNC: 0.62 MG/DL (ref 0.2–1)
BUN SERPL-MCNC: 17 MG/DL (ref 5–25)
CALCIUM SERPL-MCNC: 9.4 MG/DL (ref 8.4–10.2)
CHLORIDE SERPL-SCNC: 104 MMOL/L (ref 96–108)
CO2 SERPL-SCNC: 27 MMOL/L (ref 21–32)
CREAT SERPL-MCNC: 0.75 MG/DL (ref 0.6–1.3)
CREAT UR-MCNC: 64.7 MG/DL
EOSINOPHIL # BLD AUTO: 0.23 THOUSAND/ÂΜL (ref 0–0.61)
EOSINOPHIL NFR BLD AUTO: 6 % (ref 0–6)
ERYTHROCYTE [DISTWIDTH] IN BLOOD BY AUTOMATED COUNT: 13.3 % (ref 11.6–15.1)
EST. AVERAGE GLUCOSE BLD GHB EST-MCNC: 189 MG/DL
GFR SERPL CREATININE-BSD FRML MDRD: 91 ML/MIN/1.73SQ M
GLUCOSE SERPL-MCNC: 122 MG/DL (ref 65–140)
HBA1C MFR BLD: 8.2 %
HCT VFR BLD AUTO: 40.5 % (ref 36.5–49.3)
HGB BLD-MCNC: 13.2 G/DL (ref 12–17)
IMM GRANULOCYTES # BLD AUTO: 0.01 THOUSAND/UL (ref 0–0.2)
IMM GRANULOCYTES NFR BLD AUTO: 0 % (ref 0–2)
LYMPHOCYTES # BLD AUTO: 0.65 THOUSANDS/ÂΜL (ref 0.6–4.47)
LYMPHOCYTES NFR BLD AUTO: 17 % (ref 14–44)
MCH RBC QN AUTO: 32.5 PG (ref 26.8–34.3)
MCHC RBC AUTO-ENTMCNC: 32.6 G/DL (ref 31.4–37.4)
MCV RBC AUTO: 100 FL (ref 82–98)
MICROALBUMIN UR-MCNC: 34.8 MG/L
MICROALBUMIN/CREAT 24H UR: 54 MG/G CREATININE (ref 0–30)
MONOCYTES # BLD AUTO: 0.53 THOUSAND/ÂΜL (ref 0.17–1.22)
MONOCYTES NFR BLD AUTO: 14 % (ref 4–12)
NEUTROPHILS # BLD AUTO: 2.47 THOUSANDS/ÂΜL (ref 1.85–7.62)
NEUTS SEG NFR BLD AUTO: 62 % (ref 43–75)
NRBC BLD AUTO-RTO: 0 /100 WBCS
PLATELET # BLD AUTO: 224 THOUSANDS/UL (ref 149–390)
PMV BLD AUTO: 9.6 FL (ref 8.9–12.7)
POTASSIUM SERPL-SCNC: 4.1 MMOL/L (ref 3.5–5.3)
PROT SERPL-MCNC: 7.7 G/DL (ref 6.4–8.4)
PSA SERPL-MCNC: 0.3 NG/ML (ref 0–4)
RBC # BLD AUTO: 4.06 MILLION/UL (ref 3.88–5.62)
SODIUM SERPL-SCNC: 138 MMOL/L (ref 135–147)
TSH SERPL DL<=0.05 MIU/L-ACNC: 0.86 UIU/ML (ref 0.45–4.5)
WBC # BLD AUTO: 3.93 THOUSAND/UL (ref 4.31–10.16)

## 2024-08-08 PROCEDURE — 83036 HEMOGLOBIN GLYCOSYLATED A1C: CPT

## 2024-08-08 PROCEDURE — 85025 COMPLETE CBC W/AUTO DIFF WBC: CPT

## 2024-08-08 PROCEDURE — 80053 COMPREHEN METABOLIC PANEL: CPT

## 2024-08-08 PROCEDURE — 82570 ASSAY OF URINE CREATININE: CPT

## 2024-08-08 PROCEDURE — 84153 ASSAY OF PSA TOTAL: CPT

## 2024-08-08 PROCEDURE — 36415 COLL VENOUS BLD VENIPUNCTURE: CPT

## 2024-08-08 PROCEDURE — 82043 UR ALBUMIN QUANTITATIVE: CPT

## 2024-08-12 ENCOUNTER — TELEPHONE (OUTPATIENT)
Dept: FAMILY MEDICINE CLINIC | Facility: CLINIC | Age: 74
End: 2024-08-12

## 2024-08-12 NOTE — TELEPHONE ENCOUNTER
----- Message from Efrain Rhodes MD sent at 8/11/2024  4:07 PM EDT -----  Will discuss his diabetes at his upcoming appt.

## 2024-08-14 RX ORDER — CARVEDILOL 12.5 MG/1
TABLET ORAL
COMMUNITY
Start: 2024-07-04

## 2024-08-15 ENCOUNTER — OFFICE VISIT (OUTPATIENT)
Dept: UROLOGY | Facility: CLINIC | Age: 74
End: 2024-08-15
Payer: COMMERCIAL

## 2024-08-15 VITALS
HEART RATE: 76 BPM | SYSTOLIC BLOOD PRESSURE: 125 MMHG | BODY MASS INDEX: 26.28 KG/M2 | TEMPERATURE: 97.6 F | OXYGEN SATURATION: 97 % | DIASTOLIC BLOOD PRESSURE: 79 MMHG | HEIGHT: 72 IN | WEIGHT: 194 LBS

## 2024-08-15 DIAGNOSIS — R39.15 URINARY URGENCY: ICD-10-CM

## 2024-08-15 DIAGNOSIS — C61 PROSTATE CANCER (HCC): Primary | ICD-10-CM

## 2024-08-15 LAB — POST-VOID RESIDUAL VOLUME, ML POC: 79 ML

## 2024-08-15 PROCEDURE — 51798 US URINE CAPACITY MEASURE: CPT | Performed by: PHYSICIAN ASSISTANT

## 2024-08-15 PROCEDURE — 99214 OFFICE O/P EST MOD 30 MIN: CPT | Performed by: PHYSICIAN ASSISTANT

## 2024-08-15 RX ORDER — TOLTERODINE 2 MG/1
2 CAPSULE, EXTENDED RELEASE ORAL DAILY
Qty: 30 CAPSULE | Refills: 2 | Status: SHIPPED | OUTPATIENT
Start: 2024-08-15

## 2024-08-20 ENCOUNTER — OFFICE VISIT (OUTPATIENT)
Dept: FAMILY MEDICINE CLINIC | Facility: CLINIC | Age: 74
End: 2024-08-20
Payer: COMMERCIAL

## 2024-08-20 VITALS
TEMPERATURE: 97.6 F | HEIGHT: 72 IN | OXYGEN SATURATION: 97 % | SYSTOLIC BLOOD PRESSURE: 140 MMHG | DIASTOLIC BLOOD PRESSURE: 80 MMHG | HEART RATE: 75 BPM | BODY MASS INDEX: 27.09 KG/M2 | WEIGHT: 200 LBS

## 2024-08-20 DIAGNOSIS — E11.65 TYPE 2 DIABETES MELLITUS WITH HYPERGLYCEMIA, WITHOUT LONG-TERM CURRENT USE OF INSULIN (HCC): Primary | ICD-10-CM

## 2024-08-20 DIAGNOSIS — F17.210 CIGARETTE NICOTINE DEPENDENCE WITHOUT COMPLICATION: ICD-10-CM

## 2024-08-20 DIAGNOSIS — Z00.00 MEDICARE ANNUAL WELLNESS VISIT, SUBSEQUENT: ICD-10-CM

## 2024-08-20 PROBLEM — R97.20 ELEVATED PSA: Status: RESOLVED | Noted: 2020-08-12 | Resolved: 2024-08-20

## 2024-08-20 PROCEDURE — G0439 PPPS, SUBSEQ VISIT: HCPCS | Performed by: FAMILY MEDICINE

## 2024-08-20 PROCEDURE — 99214 OFFICE O/P EST MOD 30 MIN: CPT | Performed by: FAMILY MEDICINE

## 2024-08-20 NOTE — PROGRESS NOTES
Ambulatory Visit  Name: Erickson Jimenez      : 1950      MRN: [id number]  Encounter Provider: Efrain Rhodes MD  Encounter Date: 2024   Encounter department: [facility]    Assessment & Plan   1. Type 2 diabetes mellitus with hyperglycemia, without long-term current use of insulin ([facility])  HgbA1C- 8.2  Elevated  After discussing with him will recommend increasing jardiance to 25 mg once daily  -     Empagliflozin 25 MG TABS; Take 1 tablet (25 mg total) by mouth daily    2. Cigarette nicotine dependence without complication  -     CT lung screening program; Future; Expected date: 2024  3. Medicare annual wellness visit, subsequent  See Medicare wellness note    F/U in 3-6 months     Preventive health issues were discussed with patient, and age appropriate screening tests were ordered as noted in patient's After Visit Summary. Personalized health advice and appropriate referrals for health education or preventive services given if needed, as noted in patient's After Visit Summary.    History of Present Illness     Patient is here to follow up for Type 2 DM. He says that his numbers have been elevated at home and it does not matter what he eats.  Also he is a former smoker and quit 2 years ago.    Diabetes  Pertinent negatives for hypoglycemia include no dizziness or headaches. Pertinent negatives for diabetes include no chest pain and no fatigue.      Patient Care Team:  Efrain Rhodes MD as PCP - General (Family Medicine)  W Wil Muñiz MD as Endoscopist  Jodee Guerra MD (Radiation Oncology)  Laya Tee MA as Care Coordinator (Oncology)    Review of Systems   Constitutional:  Negative for activity change, appetite change, fatigue and fever.   HENT:  Negative for congestion and ear discharge.    Respiratory:  Negative for cough and shortness of breath.    Cardiovascular:  Negative for chest pain and palpitations.   Gastrointestinal:  Negative for diarrhea and nausea.    Musculoskeletal:  Negative for arthralgias and back pain.   Skin:  Negative for color change and rash.   Neurological:  Negative for dizziness and headaches.   Psychiatric/Behavioral:  Negative for agitation and behavioral problems.      Medical History Reviewed by provider this encounter:  Tobacco  Allergies  Meds  Problems  Med Hx  Surg Hx  Fam Hx       Annual Wellness Visit Questionnaire   Erickson is here for his Subsequent Wellness visit.     Health Risk Assessment:   Patient rates overall health as fair. Patient feels that their physical health rating is slightly worse. Patient is very satisfied with their life. Eyesight was rated as same. Hearing was rated as same. Patient feels that their emotional and mental health rating is same. Patients states they are never, rarely angry. Patient states they are sometimes unusually tired/fatigued. Pain experienced in the last 7 days has been none. Patient states that he has experienced no weight loss or gain in last 6 months.     Depression Screening:   PHQ-2 Score: 0      Fall Risk Screening:   In the past year, patient has experienced: no history of falling in past year      Home Safety:  Patient does not have trouble with stairs inside or outside of their home. Patient has working smoke alarms and has no working carbon monoxide detector. Home safety hazards include: none.     Nutrition:   Current diet is Diabetic.     Medications:   Patient is not currently taking any over-the-counter supplements. Patient is able to manage medications.     Activities of Daily Living (ADLs)/Instrumental Activities of Daily Living (IADLs):   Walk and transfer into and out of bed and chair?: Yes  Dress and groom yourself?: Yes    Bathe or shower yourself?: Yes    Feed yourself? Yes  Do your laundry/housekeeping?: Yes  Manage your money, pay your bills and track your expenses?: Yes  Make your own meals?: Yes    Do your own shopping?: Yes    Previous Hospitalizations:   Any  hospitalizations or ED visits within the last 12 months?: Yes    How many hospitalizations have you had in the last year?: 1-2    Hospitalization Comments: Palpitations    Advance Care Planning:   Living will: No    Durable POA for healthcare: No    Advanced directive: No      PREVENTIVE SCREENINGS      Cardiovascular Screening:    General: History Lipid Disorder and Screening Current      Diabetes Screening:     General: History Diabetes and Screening Current      Colorectal Cancer Screening:     General: Screening Current      Prostate Cancer Screening:    General: Screening Current      Osteoporosis Screening:    General: Screening Not Indicated      Abdominal Aortic Aneurysm (AAA) Screening:    Risk factors include: age between 65-76 yo and tobacco use        Lung Cancer Screening:     General: Screening Not Indicated      Hepatitis C Screening:    General: Screening Current    Screening, Brief Intervention, and Referral to Treatment (SBIRT)    Screening  Typical number of drinks in a day: 0    Single Item Drug Screening:  How often have you used an illegal drug (including marijuana) or a prescription medication for non-medical reasons in the past year? never    Single Item Drug Screen Score: 0  Interpretation: Negative screen for possible drug use disorder    Social Determinants of Health     Financial Resource Strain: Low Risk  (5/9/2023)    Overall Financial Resource Strain (CARDIA)    • Difficulty of Paying Living Expenses: Not very hard   Food Insecurity: No Food Insecurity (8/20/2024)    Hunger Vital Sign    • Worried About Running Out of Food in the Last Year: Never true    • Ran Out of Food in the Last Year: Never true   Transportation Needs: No Transportation Needs (8/20/2024)    PRAPARE - Transportation    • Lack of Transportation (Medical): No    • Lack of Transportation (Non-Medical): No   Housing Stability: Low Risk  (8/20/2024)    Housing Stability Vital Sign    • Unable to Pay for Housing in the  Last Year: No    • Number of Times Moved in the Last Year: 1    • Homeless in the Last Year: No   Utilities: Not At Risk (8/20/2024)    [facility] Utilities    • Threatened with loss of utilities: No     No results found.    Objective     /80 (BP Location: Left arm, Patient Position: Sitting, Cuff Size: Large)   Pulse 75   Temp 97.6 °F (36.4 °C)   Ht 6' (1.829 m)   Wt 90.7 kg (200 lb)   SpO2 97%   BMI 27.12 kg/m²     Physical Exam  Vitals and nursing note reviewed.   Constitutional:       General: He is not in acute distress.     Appearance: He is well-developed.   HENT:      Head: Normocephalic and atraumatic.   Eyes:      Conjunctiva/sclera: Conjunctivae normal.   Cardiovascular:      Rate and Rhythm: Normal rate and regular rhythm.      Heart sounds: No murmur heard.  Pulmonary:      Effort: Pulmonary effort is normal. No respiratory distress.      Breath sounds: Normal breath sounds.   Abdominal:      Palpations: Abdomen is soft.      Tenderness: There is no abdominal tenderness.   Musculoskeletal:         General: No swelling.      Cervical back: Neck supple.   Skin:     General: Skin is warm and dry.      Capillary Refill: Capillary refill takes less than 2 seconds.   Neurological:      Mental Status: He is alert.   Psychiatric:         Mood and Affect: Mood normal.       Administrative Statements   I have spent a total time of 20 minutes in caring for this patient on the day of the visit/encounter including Instructions for management.

## 2024-08-20 NOTE — PATIENT INSTRUCTIONS
Medicare Preventive Visit Patient Instructions  Thank you for completing your Welcome to Medicare Visit or Medicare Annual Wellness Visit today. Your next wellness visit will be due in one year (8/21/2025).  The screening/preventive services that you may require over the next 5-10 years are detailed below. Some tests may not apply to you based off risk factors and/or age. Screening tests ordered at today's visit but not completed yet may show as past due. Also, please note that scanned in results may not display below.  Preventive Screenings:  Service Recommendations Previous Testing/Comments   Colorectal Cancer Screening  Colonoscopy    Fecal Occult Blood Test (FOBT)/Fecal Immunochemical Test (FIT)  Fecal DNA/Cologuard Test  Flexible Sigmoidoscopy Age: 45-75 years old   Colonoscopy: every 10 years (May be performed more frequently if at higher risk)  OR  FOBT/FIT: every 1 year  OR  Cologuard: every 3 years  OR  Sigmoidoscopy: every 5 years  Screening may be recommended earlier than age 45 if at higher risk for colorectal cancer. Also, an individualized decision between you and your healthcare provider will decide whether screening between the ages of 76-85 would be appropriate. Colonoscopy: 08/29/2023  FOBT/FIT: Not on file  Cologuard: Not on file  Sigmoidoscopy: Not on file    Screening Current     Prostate Cancer Screening Individualized decision between patient and health care provider in men between ages of 55-69   Medicare will cover every 12 months beginning on the day after your 50th birthday PSA: 0.299 ng/mL     Screening Current     Hepatitis C Screening Once for adults born between 1945 and 1965  More frequently in patients at high risk for Hepatitis C Hep C Antibody: 06/06/2019    Screening Current   Diabetes Screening 1-2 times per year if you're at risk for diabetes or have pre-diabetes Fasting glucose: 206 mg/dL (2/19/2024)  A1C: 8.2 % (8/8/2024)  History Diabetes  Screening Current   Cholesterol  Screening Once every 5 years if you don't have a lipid disorder. May order more often based on risk factors. Lipid panel: 02/19/2024  History Lipid Disorder  Screening Current      Other Preventive Screenings Covered by Medicare:  Abdominal Aortic Aneurysm (AAA) Screening: covered once if your at risk. You're considered to be at risk if you have a family history of AAA or a male between the age of 65-75 who smoking at least 100 cigarettes in your lifetime.  Lung Cancer Screening: covers low dose CT scan once per year if you meet all of the following conditions: (1) Age 55-77; (2) No signs or symptoms of lung cancer; (3) Current smoker or have quit smoking within the last 15 years; (4) You have a tobacco smoking history of at least 20 pack years (packs per day x number of years you smoked); (5) You get a written order from a healthcare provider.  Glaucoma Screening: covered annually if you're considered high risk: (1) You have diabetes OR (2) Family history of glaucoma OR (3)  aged 50 and older OR (4)  American aged 65 and older  Osteoporosis Screening: covered every 2 years if you meet one of the following conditions: (1) Have a vertebral abnormality; (2) On glucocorticoid therapy for more than 3 months; (3) Have primary hyperparathyroidism; (4) On osteoporosis medications and need to assess response to drug therapy.  HIV Screening: covered annually if you're between the age of 15-65. Also covered annually if you are younger than 15 and older than 65 with risk factors for HIV infection. For pregnant patients, it is covered up to 3 times per pregnancy.    Immunizations:  Immunization Recommendations   Influenza Vaccine Annual influenza vaccination during flu season is recommended for all persons aged >= 6 months who do not have contraindications   Pneumococcal Vaccine   * Pneumococcal conjugate vaccine = PCV13 (Prevnar 13), PCV15 (Vaxneuvance), PCV20 (Prevnar 20)  * Pneumococcal polysaccharide  vaccine = PPSV23 (Pneumovax) Adults 19-65 yo with certain risk factors or if 65+ yo  If never received any pneumonia vaccine: recommend Prevnar 20 (PCV20)  Give PCV20 if previously received 1 dose of PCV13 or PPSV23   Hepatitis B Vaccine 3 dose series if at intermediate or high risk (ex: diabetes, end stage renal disease, liver disease)   Respiratory syncytial virus (RSV) Vaccine - COVERED BY MEDICARE PART D  * RSVPreF3 (Arexvy) CDC recommends that adults 60 years of age and older may receive a single dose of RSV vaccine using shared clinical decision-making (SCDM)   Tetanus (Td) Vaccine - COST NOT COVERED BY MEDICARE PART B Following completion of primary series, a booster dose should be given every 10 years to maintain immunity against tetanus. Td may also be given as tetanus wound prophylaxis.   Tdap Vaccine - COST NOT COVERED BY MEDICARE PART B Recommended at least once for all adults. For pregnant patients, recommended with each pregnancy.   Shingles Vaccine (Shingrix) - COST NOT COVERED BY MEDICARE PART B  2 shot series recommended in those 19 years and older who have or will have weakened immune systems or those 50 years and older     Health Maintenance Due:      Topic Date Due   • Colorectal Cancer Screening  08/29/2028   • Hepatitis C Screening  Completed     Immunizations Due:      Topic Date Due   • COVID-19 Vaccine (6 - 2023-24 season) 12/10/2023   • Influenza Vaccine (1) 09/01/2024     Advance Directives   What are advance directives?  Advance directives are legal documents that state your wishes and plans for medical care. These plans are made ahead of time in case you lose your ability to make decisions for yourself. Advance directives can apply to any medical decision, such as the treatments you want, and if you want to donate organs.   What are the types of advance directives?  There are many types of advance directives, and each state has rules about how to use them. You may choose a combination of  any of the following:  Living will:  This is a written record of the treatment you want. You can also choose which treatments you do not want, which to limit, and which to stop at a certain time. This includes surgery, medicine, IV fluid, and tube feedings.   Durable power of  for healthcare (DPAHC):  This is a written record that states who you want to make healthcare choices for you when you are unable to make them for yourself. This person, called a proxy, is usually a family member or a friend. You may choose more than 1 proxy.  Do not resuscitate (DNR) order:  A DNR order is used in case your heart stops beating or you stop breathing. It is a request not to have certain forms of treatment, such as CPR. A DNR order may be included in other types of advance directives.  Medical directive:  This covers the care that you want if you are in a coma, near death, or unable to make decisions for yourself. You can list the treatments you want for each condition. Treatment may include pain medicine, surgery, blood transfusions, dialysis, IV or tube feedings, and a ventilator (breathing machine).  Values history:  This document has questions about your views, beliefs, and how you feel and think about life. This information can help others choose the care that you would choose.  Why are advance directives important?  An advance directive helps you control your care. Although spoken wishes may be used, it is better to have your wishes written down. Spoken wishes can be misunderstood, or not followed. Treatments may be given even if you do not want them. An advance directive may make it easier for your family to make difficult choices about your care.   Cigarette Smoking and Your Health   Risks to your health if you smoke:  Nicotine and other chemicals found in tobacco damage every cell in your body. Even if you are a light smoker, you have an increased risk for cancer, heart disease, and lung disease. If you are  pregnant or have diabetes, smoking increases your risk for complications.   Benefits to your health if you stop smoking:   You decrease respiratory symptoms such as coughing, wheezing, and shortness of breath.   You reduce your risk for cancers of the lung, mouth, throat, kidney, bladder, pancreas, stomach, and cervix. If you already have cancer, you increase the benefits of chemotherapy. You also reduce your risk for cancer returning or a second cancer from developing.   You reduce your risk for heart disease, blood clots, heart attack, and stroke.   You reduce your risk for lung infections, and diseases such as pneumonia, asthma, chronic bronchitis, and emphysema.  Your circulation improves. More oxygen can be delivered to your body. If you have diabetes, you lower your risk for complications, such as kidney, artery, and eye diseases. You also lower your risk for nerve damage. Nerve damage can lead to amputations, poor vision, and blindness.  You improve your body's ability to heal and to fight infections.  For more information and support to stop smoking:   [employer]  Phone: 5- 491 - 973-6887  Web Address: www.[employer]  How to Quit Using Smokeless Tobacco   Why it is important to stop using smokeless tobacco:  Smokeless tobacco comes in many forms. Examples include chew, snuff, dip, dissolvable tobacco, and snus. All smokeless tobacco products contain nicotine and may contain as much nicotine as 3 cigarettes. You may be physically dependent on nicotine. You may also be emotionally addicted to it. The cravings can be strong, but it is important to quit using smokeless tobacco. You will improve your health and decrease your cancer, stroke, and heart attack risk. Mouth sores and tooth problems will also improve when you quit. You can benefit from quitting no matter how long you have used smokeless tobacco.   Prepare to stop using smokeless tobacco:  Nicotine is a highly addictive drug. Withdrawal symptoms  can happen when you stop and make it hard to quit. The following can help keep you on track:  Set a quit date.    Tell friends, family, and coworkers that you plan to quit.    Remove all smokeless tobacco products from your home, car, and workplace.    Manage weight gain after you quit:  Nicotine can affect your metabolism. You may gain a few pounds after you quit. The following can help you control your weight:  Eat healthy foods.    Drink water before, during, and between meals.    Exercise as directed.      Weight Management   Why it is important to manage your weight:  Being overweight increases your risk of health conditions such as heart disease, high blood pressure, type 2 diabetes, and certain types of cancer. It can also increase your risk for osteoarthritis, sleep apnea, and other respiratory problems. Aim for a slow, steady weight loss. Even a small amount of weight loss can lower your risk of health problems.  How to lose weight safely:  A safe and healthy way to lose weight is to eat fewer calories and get regular exercise. You can lose up about 1 pound a week by decreasing the number of calories you eat by 500 calories each day.   Healthy meal plan for weight management:  A healthy meal plan includes a variety of foods, contains fewer calories, and helps you stay healthy. A healthy meal plan includes the following:  Eat whole-grain foods more often.  A healthy meal plan should contain fiber. Fiber is the part of grains, fruits, and vegetables that is not broken down by your body. Whole-grain foods are healthy and provide extra fiber in your diet. Some examples of whole-grain foods are whole-wheat breads and pastas, oatmeal, brown rice, and bulgur.  Eat a variety of vegetables every day.  Include dark, leafy greens such as spinach, kale, kriss greens, and mustard greens. Eat yellow and orange vegetables such as carrots, sweet potatoes, and winter squash.   Eat a variety of fruits every day.  Choose  fresh or canned fruit (canned in its own juice or light syrup) instead of juice. Fruit juice has very little or no fiber.  Eat low-fat dairy foods.  Drink fat-free (skim) milk or 1% milk. Eat fat-free yogurt and low-fat cottage cheese. Try low-fat cheeses such as mozzarella and other reduced-fat cheeses.  Choose meat and other protein foods that are low in fat.  Choose beans or other legumes such as split peas or lentils. Choose fish, skinless poultry (chicken or turkey), or lean cuts of red meat (beef or pork). Before you cook meat or poultry, cut off any visible fat.   Use less fat and oil.  Try baking foods instead of frying them. Add less fat, such as margarine, sour cream, regular salad dressing and mayonnaise to foods. Eat fewer high-fat foods. Some examples of high-fat foods include french fries, doughnuts, ice cream, and cakes.  Eat fewer sweets.  Limit foods and drinks that are high in sugar. This includes candy, cookies, regular soda, and sweetened drinks.  Exercise:  Exercise at least 30 minutes per day on most days of the week. Some examples of exercise include walking, biking, dancing, and swimming. You can also fit in more physical activity by taking the stairs instead of the elevator or parking farther away from stores. Ask your healthcare provider about the best exercise plan for you.      © Copyright [employer] 2018 Information is for End User's use only and may not be sold, redistributed or otherwise used for commercial purposes. All illustrations and images included in CareNotes® are the copyrighted property of [employer] or [employer]

## 2024-08-26 LAB
LEFT EYE DIABETIC RETINOPATHY: NORMAL
RIGHT EYE DIABETIC RETINOPATHY: NORMAL

## 2024-09-10 DIAGNOSIS — R39.15 URINARY URGENCY: ICD-10-CM

## 2024-09-10 RX ORDER — TOLTERODINE 2 MG/1
2 CAPSULE, EXTENDED RELEASE ORAL DAILY
Qty: 90 CAPSULE | Refills: 1 | Status: SHIPPED | OUTPATIENT
Start: 2024-09-10

## 2024-09-19 PROBLEM — Z00.00 MEDICARE ANNUAL WELLNESS VISIT, SUBSEQUENT: Status: RESOLVED | Noted: 2022-01-26 | Resolved: 2024-09-19

## 2024-10-15 ENCOUNTER — OFFICE VISIT (OUTPATIENT)
Dept: CARDIOLOGY CLINIC | Facility: CLINIC | Age: 74
End: 2024-10-15
Payer: COMMERCIAL

## 2024-10-15 VITALS
SYSTOLIC BLOOD PRESSURE: 122 MMHG | WEIGHT: 199 LBS | BODY MASS INDEX: 26.95 KG/M2 | RESPIRATION RATE: 16 BRPM | HEIGHT: 72 IN | HEART RATE: 83 BPM | DIASTOLIC BLOOD PRESSURE: 78 MMHG | OXYGEN SATURATION: 97 %

## 2024-10-15 DIAGNOSIS — I10 ESSENTIAL HYPERTENSION: ICD-10-CM

## 2024-10-15 DIAGNOSIS — E78.2 MIXED HYPERLIPIDEMIA: ICD-10-CM

## 2024-10-15 DIAGNOSIS — R00.2 PALPITATIONS: Primary | ICD-10-CM

## 2024-10-15 DIAGNOSIS — I25.10 NONOBSTRUCTIVE ATHEROSCLEROSIS OF CORONARY ARTERY: ICD-10-CM

## 2024-10-15 DIAGNOSIS — I42.8 NONISCHEMIC CARDIOMYOPATHY (HCC): ICD-10-CM

## 2024-10-15 DIAGNOSIS — Z98.890 S/P CAROTID ENDARTERECTOMY: ICD-10-CM

## 2024-10-15 PROCEDURE — 99214 OFFICE O/P EST MOD 30 MIN: CPT

## 2024-10-15 NOTE — PROGRESS NOTES
[facility]   Office Visit    Erickson Jimenez 74 y.o. male MRN: [id number]    10/15/24      Assessment & Plan  Palpitations  Now resolved.  Event monitor revealed 8 episodes of NSVT and 6 episodes of SVT with occasional SVE/VE.  Subsequent pharmacological MPI stress test was negative for evidence of stress-induced myocardial ischemia.  Continue Coreg.  Advised to notify our office for recurrent or any new/worsening symptoms.  Nonobstructive atherosclerosis of coronary artery  Denies chest pain/anginal equivalent.  Continue ASA, Lipitor, Coreg, and amlodipine.  NICM with EF recovery  Continue Coreg and losartan.  Essential hypertension  BP is well-controlled today.  Unfortunately, patient does not know what doses of antihypertensives he is currently taking.  He plans to check when he gets home and will provide our office with update.  Encourage ambulatory monitoring and low-sodium diet.  Advised to notify our office for abnormal BP readings.  Mixed hyperlipidemia  Continue Lipitor, dietary control, and periodic surveillance.  S/P Left carotid endarterectomy 1/13/20  Follows with Vascular surgery.  Recommend periodic surveillance.        Interval history: Erickson Jimenez is a very pleasant 74 y.o. year old male with history of nonobstructive coronary atherosclerosis, NICM with EF recovery, NSVT, PSVT, hypertension, hyperlipidemia, T2DM, and carotid stenosis s/p L CEA who presents for office visit.  During previous visit with cardiology patient was ordered an event monitor which revealed NSVT and PSVT.  Carvedilol was increased to 25 mg bid at that time and patient was ordered subsequent pharmacological MPI stress test which was negative for evidence induced myocardial ischemia.  Patient has reportedly been well overall from a cardiac standpoint since that time.  He has been making adjustments to his antihypertensive regimen intermittently.  Unfortunately, he does not know what doses of losartan, amlodipine, or  carvedilol he is currently taking.  However, states he will check when he gets home and update our office.  Advised to notify our office for abnormal BP readings prior to making medication changes.  Denies reoccurrence of palpitations or any additional complaints at this time.  Family history is significant for maternal grandmother and uncle with CAD/MIs.  History of former tobacco use noted, smoked 1 PPD for approximately 50 years.  Previously followed with Dr. Barrera Means as primary cardiologist.  Patient has upcoming appointment in January 2025 to establish with Dr. Marshall moving forward.      Review of Systems:  Review of Systems   Constitutional:  Negative for chills and fever.   HENT:  Negative for ear pain and sore throat.    Eyes:  Negative for pain and visual disturbance.   Respiratory:  Negative for cough and shortness of breath.    Cardiovascular:  Negative for chest pain, palpitations and leg swelling.   Gastrointestinal:  Negative for abdominal pain and vomiting.   Genitourinary:  Negative for dysuria and hematuria.   Musculoskeletal:  Negative for arthralgias and back pain.   Skin:  Negative for color change and rash.   Neurological:  Negative for seizures and syncope.   All other systems reviewed and are negative.      PHYSICAL EXAM:  Vitals:   Vitals:    10/15/24 1531   BP: 122/78   BP Location: Left arm   Patient Position: Sitting   Cuff Size: Large   Pulse: 83   Resp: 16   SpO2: 97%   Weight: 90.3 kg (199 lb)   Height: 6' (1.829 m)        Physical Exam:  GEN: Alert and oriented x 3, in no acute distress.  Well appearing and well nourished.   HEENT: Sclera anicteric, conjunctivae pink, mucous membranes moist. Oropharynx clear.   NECK: Supple, no carotid bruits, no significant JVD. Trachea midline, no thyromegaly.   HEART: Regular rhythm, normal S1 and S2, no murmurs, clicks, gallops or rubs. PMI nondisplaced, no thrills.   LUNGS: Clear to auscultation bilaterally; no wheezes, rales, or  rhonchi. No increased work of breathing or signs of respiratory distress.   ABDOMEN: Soft, nontender, nondistended, normoactive bowel sounds.   EXTREMITIES: Skin warm and well perfused, no clubbing or cyanosis, no edema.  NEURO: No focal findings. Normal speech. Mood and affect normal.   SKIN: Normal without suspicious lesions on exposed skin.    Follow up: 3 months or sooner as needed    Allergies   Allergen Reactions    Other Hives     Soft shell crabs          Current Outpatient Medications:     amLODIPine (NORVASC) 10 mg tablet, TAKE 1 TABLET BY MOUTH DAILY, Disp: 90 tablet, Rfl: 1    amLODIPine (NORVASC) 5 mg tablet, Take 1 tablet (5 mg total) by mouth daily (Patient not taking: Reported on 8/15/2024), Disp: , Rfl:     aspirin 81 mg chewable tablet, Chew 1 tablet (81 mg total) daily, Disp: 90 tablet, Rfl: 3    atorvastatin (LIPITOR) 20 mg tablet, Take 1 tablet (20 mg total) by mouth daily at bedtime (Patient not taking: Reported on 8/15/2024), Disp: 90 tablet, Rfl: 1    Calcium Carb-Cholecalciferol (calcium carbonate-vitamin D) 500 mg-5 mcg tablet, Take 1 tablet by mouth 2 (two) times a day with meals, Disp: 180 tablet, Rfl: 3    carvedilol (COREG) 12.5 mg tablet, , Disp: , Rfl:     carvedilol (COREG) 25 mg tablet, Take 1 tablet (25 mg total) by mouth 2 (two) times a day with meals (Patient not taking: Reported on 8/15/2024), Disp: , Rfl:     Continuous Blood Gluc  (Dexcom G7 ) ANITA, Use 1 Device 3 (three) times a day, Disp: 1 each, Rfl: 0    Continuous Blood Gluc Transmit (Dexcom G6 Transmitter) MISC, 1 TRANSMITTED EVERY 3 MONTHS FOR CONTINUOUS GLUCOSE MONITORING, Disp: 1 each, Rfl: 0    Empagliflozin 25 MG TABS, Take 1 tablet (25 mg total) by mouth daily, Disp: 30 tablet, Rfl: 2    glucose blood test strip, Use as instructed, Disp: 100 each, Rfl: 2    losartan (COZAAR) 100 MG tablet, TAKE 1 TABLET BY MOUTH IN THE  MORNING (Patient not taking: Reported on 8/15/2024), Disp: 90 tablet, Rfl: 1     losartan (COZAAR) 25 mg tablet, Take 1 tablet (25 mg total) by mouth daily (Patient not taking: Reported on 8/15/2024), Disp: , Rfl:     metFORMIN (GLUCOPHAGE) 1000 MG tablet, Take 1 tablet (1,000 mg total) by mouth 2 (two) times a day with meals, Disp: 180 tablet, Rfl: 3    sildenafil (VIAGRA) 25 MG tablet, TAKE 1 TABLET BY MOUTH  DAILY AS NEEDED FOR  ERECTILE DYSFUNCTION (Patient taking differently: Take 25 mg by mouth as needed), Disp: 10 tablet, Rfl: 0    tamsulosin (FLOMAX) 0.4 mg, Take 2 capsules (0.8 mg total) by mouth daily with dinner, Disp: 180 capsule, Rfl: 3    tolterodine (DETROL LA) 2 mg 24 hr capsule, take 1 capsule by mouth once daily, Disp: 90 capsule, Rfl: 1    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    Heart disease     Hyperlipidemia     Hypertension     Kidney stone     Osteoarthritis     Prostate cancer (HCC)     Vascular disorder        Family History   Problem Relation Age of Onset    Colon cancer Father     No Known Problems Mother     Colon cancer Paternal Grandfather     Colon cancer Family     Diabetes Half-Sister     Obesity Half-Sister        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    Heart disease     Hyperlipidemia     Hypertension     Kidney stone     Osteoarthritis     Prostate cancer (HCC)     Vascular disorder        Past Surgical History:   Procedure Laterality Date    ABDOMINAL SURGERY      CARDIAC CATHETERIZATION N/A 3/21/2022    Procedure: Cardiac catheterization;  Surgeon: Stephy Horner MD;  Location: [facility];  Service: Cardiology    CARDIAC CATHETERIZATION N/A 3/21/2022    Procedure: Cardiac Coronary Angiogram;  Surgeon: Stephy Horner MD;  Location: [facility];  Service:  Cardiology    COLONOSCOPY      FL RETROGRADE PYELOGRAM  3/23/2023    INGUINAL HERNIA REPAIR Left     LAPAROSCOPIC COLON RESECTION      NM ARTHROPLASTY GLENOHUMERAL JOINT TOTAL SHOULDER Right 11/17/2020    Procedure: ARTHROPLASTY SHOULDER REVERSE with biceps tendonesis;  Surgeon: Robby Marquez MD;  Location: [facility];  Service: Orthopedics    NM BX PROSTATE STRTCTC SATURATION SAMPLING IMG GID N/A 5/16/2023    Procedure: TRANSPERINEAL MRI FUSION BIOPSY PROSTATE;  Surgeon: Quan Velasco MD;  Location: [facility];  Service: Urology    NM COLONOSCOPY FLX DX W/COLLJ SPEC WHEN PFRMD N/A 11/3/2017    Procedure: COLONOSCOPY;  Surgeon: SUZAN Muñiz MD;  Location: [facility];  Service: Colorectal    NM CYSTO/URETERO W/LITHOTRIPSY &INDWELL STENT INSRT Left 3/23/2023    Procedure: CYSTOSCOPY URETEROSCOPY WITH LITHOTRIPSY HOLMIUM LASER, RETROGRADE PYELOGRAM AND INSERTION STENT URETERAL, STONE BASKET;  Surgeon: Michelet Johns MD;  Location: [facility];  Service: Urology    NM TEAEC W/PATCH GRF CAROTID VERTB SUBCLAV NECK INC Left 1/13/2020    Procedure: ENDARTERECTOMY ARTERY CAROTID;  Surgeon: Amado Teague MD;  Location: [facility];  Service: Vascular       Social History     Socioeconomic History    Marital status: /Civil Union     Spouse name: Not on file    Number of children: Not on file    Years of education: Not on file    Highest education level: Not on file   Occupational History    Not on file   Tobacco Use    Smoking status: Some Days     Types: Cigars     Passive exposure: Past    Smokeless tobacco: Current   Vaping Use    Vaping status: Never Used   Substance and Sexual Activity    Alcohol use: Yes     Alcohol/week: 3.0 standard drinks of alcohol     Types: 3 Cans of beer per week     Comment: Socially    Drug use: Yes     Comment: gummies    Sexual activity: Not Currently   Other Topics Concern    Not on file   Social History Narrative    Caffeine use    Uses safety equipment: seatbelts      Social Determinants of Health     Financial Resource Strain: Low Risk  (5/9/2023)    Overall Financial Resource Strain (CARDIA)     Difficulty of Paying Living Expenses: Not very hard   Food Insecurity: No Food Insecurity (8/20/2024)    Hunger Vital Sign     Worried About Running Out of Food in the Last Year: Never true     Ran Out of Food in the Last Year: Never true   Transportation Needs: No Transportation Needs (8/20/2024)    PRAPARE - Transportation     Lack of Transportation (Medical): No     Lack of Transportation (Non-Medical): No   Physical Activity: Not on file   Stress: Not on file   Social Connections: Not on file   Intimate Partner Violence: Not on file   Housing Stability: Low Risk  (8/20/2024)    Housing Stability Vital Sign     Unable to Pay for Housing in the Last Year: No     Number of Times Moved in the Last Year: 1     Homeless in the Last Year: No       LABORATORY RESULTS:    Lab Results   Component Value Date    WBC 3.93 (L) 08/08/2024    HGB 13.2 08/08/2024    HCT 40.5 08/08/2024     (H) 08/08/2024     08/08/2024     Lab Results   Component Value Date    GLUCOSE 112 01/13/2020    CALCIUM 9.4 08/08/2024     11/22/2016    K 4.1 08/08/2024    CO2 27 08/08/2024     08/08/2024    BUN 17 08/08/2024    CREATININE 0.75 08/08/2024     Lab Results   Component Value Date    HGBA1C 8.2 (H) 08/08/2024       Lipid Profile:   Lab Results   Component Value Date    CHOL 163 11/22/2016    CHOL 172 09/18/2015     Lab Results   Component Value Date    HDL 47 02/19/2024    HDL 34 (L) 03/15/2023    HDL 42 01/24/2022     Lab Results   Component Value Date    LDLCALC 74 02/19/2024    LDLCALC 57 03/15/2023    LDLCALC 72 01/24/2022     Lab Results   Component Value Date    TRIG 175 (H) 02/19/2024    TRIG 169 (H) 03/15/2023    TRIG 144 01/24/2022       The 10-year ASCVD risk score (Crystal DE SANTIAGO, et al., 2019) is: 40.3%    Values used to calculate the score:      Age: 74 years      Sex: Male       Is Non- : No      Diabetic: Yes      Tobacco smoker: Yes      Systolic Blood Pressure: 122 mmHg      Is BP treated: Yes      HDL Cholesterol: 47 mg/dL      Total Cholesterol: 156 mg/dL    1. Palpitations        2. Nonobstructive atherosclerosis of coronary artery        3. NICM with EF recovery        4. Essential hypertension        5. Mixed hyperlipidemia        6. S/P Left carotid endarterectomy 1/13/20            Imaging: I have reviewed all pertinent imaging studies.      Recommend aggressive risk factor modification and therapeutic lifestyle changes.  Low-salt, low-calorie, low-fat, low-cholesterol diet with regular exercise and to optimize weight.    Discussed concepts of atherosclerosis, including signs and symptoms of cardiac disease.    Medications reviewed and possible side effects discussed.  Previous studies were reviewed.    Safety measures were reviewed.  All questions and concerns addressed.  Patient was advised to report any problems requiring medical attention.    Follow-up with PCP and appropriate specialist and lab work as discussed.    Return for follow up visit as scheduled or earlier, if needed.  Thank you for allowing me to participate in the care and evaluation of your patient.  Should you have any questions, please feel free to contact me.    Leo Zacarias PA-C  10/15/2024,4:04 PM

## 2024-10-15 NOTE — ASSESSMENT & PLAN NOTE
BP is well-controlled today.  Unfortunately, patient does not know what doses of antihypertensives he is currently taking.  He plans to check when he gets home and will provide our office with update.  Encourage ambulatory monitoring and low-sodium diet.  Advised to notify our office for abnormal BP readings.

## 2024-10-24 ENCOUNTER — HOSPITAL ENCOUNTER (OUTPATIENT)
Dept: VASCULAR ULTRASOUND | Facility: HOSPITAL | Age: 74
Discharge: HOME | End: 2024-10-24
Payer: COMMERCIAL

## 2024-10-24 DIAGNOSIS — I65.23 CAROTID ARTERY STENOSIS WITHOUT CEREBRAL INFARCTION, BILATERAL: ICD-10-CM

## 2024-10-24 DIAGNOSIS — Z98.890 S/P CAROTID ENDARTERECTOMY: ICD-10-CM

## 2024-10-24 PROCEDURE — 93880 EXTRACRANIAL BILAT STUDY: CPT | Performed by: SURGERY

## 2024-10-24 PROCEDURE — 93880 EXTRACRANIAL BILAT STUDY: CPT

## 2024-10-26 DIAGNOSIS — E11.65 TYPE 2 DIABETES MELLITUS WITH HYPERGLYCEMIA, WITHOUT LONG-TERM CURRENT USE OF INSULIN (HCC): ICD-10-CM

## 2024-10-27 RX ORDER — EMPAGLIFLOZIN 25 MG/1
25 TABLET, FILM COATED ORAL DAILY
Qty: 90 TABLET | Refills: 1 | Status: SHIPPED | OUTPATIENT
Start: 2024-10-27

## 2024-11-03 DIAGNOSIS — E11.9 TYPE 2 DIABETES MELLITUS WITHOUT COMPLICATION, WITHOUT LONG-TERM CURRENT USE OF INSULIN (HCC): ICD-10-CM

## 2024-11-03 DIAGNOSIS — I10 ESSENTIAL HYPERTENSION: ICD-10-CM

## 2024-11-04 RX ORDER — AMLODIPINE BESYLATE 10 MG/1
10 TABLET ORAL DAILY
Qty: 90 TABLET | Refills: 1 | Status: SHIPPED | OUTPATIENT
Start: 2024-11-04

## 2024-11-12 DIAGNOSIS — E11.9 TYPE 2 DIABETES MELLITUS WITHOUT COMPLICATION, WITHOUT LONG-TERM CURRENT USE OF INSULIN (HCC): ICD-10-CM

## 2024-11-19 ENCOUNTER — RA CDI HCC (OUTPATIENT)
Dept: OTHER | Facility: HOSPITAL | Age: 74
End: 2024-11-19

## 2024-12-02 ENCOUNTER — TELEPHONE (OUTPATIENT)
Age: 74
End: 2024-12-02

## 2024-12-02 DIAGNOSIS — I67.1 CEREBRAL ANEURYSM: Primary | ICD-10-CM

## 2024-12-02 DIAGNOSIS — Z01.818 PRE-PROCEDURAL EXAMINATION: ICD-10-CM

## 2024-12-03 ENCOUNTER — APPOINTMENT (OUTPATIENT)
Dept: LAB | Facility: HOSPITAL | Age: 74
End: 2024-12-03
Payer: COMMERCIAL

## 2024-12-03 ENCOUNTER — OFFICE VISIT (OUTPATIENT)
Dept: RADIATION ONCOLOGY | Facility: CLINIC | Age: 74
End: 2024-12-03
Attending: RADIOLOGY
Payer: COMMERCIAL

## 2024-12-03 ENCOUNTER — APPOINTMENT (OUTPATIENT)
Dept: LAB | Facility: CLINIC | Age: 74
End: 2024-12-03
Payer: COMMERCIAL

## 2024-12-03 VITALS
HEART RATE: 67 BPM | SYSTOLIC BLOOD PRESSURE: 120 MMHG | OXYGEN SATURATION: 100 % | BODY MASS INDEX: 27.4 KG/M2 | TEMPERATURE: 97 F | DIASTOLIC BLOOD PRESSURE: 80 MMHG | WEIGHT: 202 LBS | RESPIRATION RATE: 12 BRPM

## 2024-12-03 DIAGNOSIS — Z01.818 PRE-PROCEDURAL EXAMINATION: ICD-10-CM

## 2024-12-03 DIAGNOSIS — R30.0 DYSURIA: ICD-10-CM

## 2024-12-03 DIAGNOSIS — N30.90 CYSTITIS: ICD-10-CM

## 2024-12-03 DIAGNOSIS — C61 PROSTATE CANCER (HCC): ICD-10-CM

## 2024-12-03 DIAGNOSIS — R30.0 DYSURIA: Primary | ICD-10-CM

## 2024-12-03 LAB
BACTERIA UR QL AUTO: NORMAL /HPF
BILIRUB UR QL STRIP: NEGATIVE
BUN SERPL-MCNC: 23 MG/DL (ref 5–25)
CLARITY UR: CLEAR
COLOR UR: ABNORMAL
CREAT SERPL-MCNC: 0.71 MG/DL (ref 0.6–1.3)
GFR SERPL CREATININE-BSD FRML MDRD: 93 ML/MIN/1.73SQ M
GLUCOSE UR STRIP-MCNC: ABNORMAL MG/DL
HGB UR QL STRIP.AUTO: NEGATIVE
KETONES UR STRIP-MCNC: NEGATIVE MG/DL
LEUKOCYTE ESTERASE UR QL STRIP: ABNORMAL
NITRITE UR QL STRIP: NEGATIVE
NON-SQ EPI CELLS URNS QL MICRO: NORMAL /HPF
PH UR STRIP.AUTO: 6 [PH]
PROT UR STRIP-MCNC: ABNORMAL MG/DL
RBC #/AREA URNS AUTO: NORMAL /HPF
SP GR UR STRIP.AUTO: 1.03 (ref 1–1.03)
UROBILINOGEN UR STRIP-ACNC: <2 MG/DL
WBC #/AREA URNS AUTO: NORMAL /HPF

## 2024-12-03 PROCEDURE — G2211 COMPLEX E/M VISIT ADD ON: HCPCS | Performed by: RADIOLOGY

## 2024-12-03 PROCEDURE — 99214 OFFICE O/P EST MOD 30 MIN: CPT | Performed by: RADIOLOGY

## 2024-12-03 PROCEDURE — 81001 URINALYSIS AUTO W/SCOPE: CPT

## 2024-12-03 PROCEDURE — 99211 OFF/OP EST MAY X REQ PHY/QHP: CPT | Performed by: RADIOLOGY

## 2024-12-03 NOTE — PROGRESS NOTES
Erickson Jimenez 1950 is a 74 y.o. male with multiple comorbidities but excellent performance status diagnosed with clinical stage [id number] prostatic adenocarcinoma, Anna score 8 (4+4) with a pretreatment PSA of 9.6 ng/mL. >50% cores demonstrated disease and all associated with PNI.  He underwent partial brachytherapy implant followed by pelvic radiation which he completed on 23. He was last seen 6/3/24 and presents today for follow up.       8/15/24 Cliff Leiva  High risk prostate cancer  - clinical stage [id number] prostatic adenocarcinoma, [place] score 8 (4+4)  - Pretreatment PSA 9.4  - S/p brachytherapy at [facility] with Dr. Lenz completed on 23   - Firmagon 240 mg on 23  - One Lupron 45 mg given on 23. Recommendations for 18 months total duration per [facility]. He is declining any further injections due to side effects.   - S/p EBRT completed on 23   - Most recent PSA from 24 was 0.299. Will monitor with repeat PSA in 6 months.   2. Left ureteral stone s/p left URS with LL on 3/23/23  - US kidney bladder from 24 - Nonobstructing bilateral intrarenal calculi. Mild right and moderate left pyelectasis. Normal bilateral ureteral jets.  Multiple simple bilateral renal cysts.  - Asymptomatic  3. BPH with LUTS  - Mostly complaining or irritative and storage symptoms  - Continue Flomax, will drop done to once daily and add in Detrol 2 mg daily   - Bladder scan 79 mL        PSA   Latest Ref Rng 0.000 - 4.000 ng/mL   3/3/2023 9.6 (H)    2024 0.35    2024 0.299         Upcomin24 Cliff Leiva    Follow up visit     Oncology History Overview Note   with multiple comorbidities but excellent performance status diagnosed with clinical stage [id number] prostatic adenocarcinoma, Wat[place]et score 8 (4+4) with a pretreatment PSA of 9.6 ng/mL. >50% cores demonstrated disease and all associated with PNI.  He underwent partial brachytherapy implant followed by pelvic radiation  which he completed on 23. He was last seen 6/3/24 and presents today for follow up.       8/15/24 [facility]  High risk prostate cancer  - clinical stage [id number] prostatic adenocarcinoma, Weirton score 8 (4+4)  - Pretreatment PSA 9.4  - S/p brachytherapy at [facility] with Dr. Lenz completed on 23   - Firmagon 240 mg on 23  - One Lupron 45 mg given on 23. Recommendations for 18 months total duration per [facility]. He is declining any further injections due to side effects.   - S/p EBRT completed on 23   - Most recent PSA from 24 was 0.299. Will monitor with repeat PSA in 6 months.   2. Left ureteral stone s/p left URS with LL on 3/23/23  - US kidney bladder from 24 - Nonobstructing bilateral intrarenal calculi. Mild right and moderate left pyelectasis. Normal bilateral ureteral jets.  Multiple simple bilateral renal cysts.  - Asymptomatic  3. BPH with LUTS  - Mostly complaining or irritative and storage symptoms  - Continue Flomax, will drop done to once daily and add in Detrol 2 mg daily   - Bladder scan 79 mL        PSA   Latest Ref Rng 0.000 - 4.000 ng/mL   3/3/2023 9.6 (H)    2024 0.35    2024 0.299         Upcomin24 [facility] nielson     Prostate cancer (HCC)   2023 Biopsy    TRANSPERINEAL MRI FUSION BIOPSY PROSTATE     A. Prostate, REGION OF INTEREST:  - Prostatic adenocarcinoma, Weirton grade 3 + 4 = 7 (60% pattern 3 and 40% pattern 4, Grade group 2), involving four of four prostatic cores and 90% of the total biopsy tissue.  - Perineural invasion is present.     Note: The fifth core is skeletal muscle only.     B. Prostate, RIGHT BASE:  - Prostatic adenocarcinoma, Weirton grade 4+4 = 8 (Grade group 4), involving two of two cores and 60% of the tissue.  - Perineural invasion is present.     Note: Small portion of pattern 5 can't be ruled out due to procedure artifact.     C. Prostate, RIGHT POSTERIOR MEDIAL:  - Prostatic adenocarcinoma, Weirton grade 4+3 =  7 (50% pattern 4 and 50% pattern 3, Grade group 3), involving two of two cores and 50% of the tissue.  - Perineural invasion is present.     D. Prostate, LEFT BASE:  - Benign prostate glands and stroma.     E. Prostate, LEFT POSTERIOR MEDIAL:  - Prostatic adenocarcinoma, Anna grade 4+4 = 8 (Grade group 4), involving one of two cores. 20% of involved core  and 10% of the tissue.  - Perineural invasion is present.     F. Prostate, LEFT POSTERIOR LATERAL:  - Benign prostate glands and stroma.     G. Prostate, LEFT ANTERIOR LATERAL:  - Benign prostate glands and stroma.     H. Prostate, LEFT ANTERIOR MEDIAL:  - Benign prostate glands and stroma.     I. Prostate, RIGHT POSTERIOR LATERAL:  - Prostatic adenocarcinoma, Tyner grade 3 + 4 = 7 (60% pattern 3 and 40% pattern 4, Grade group 2), involving one of two cores, 90% of involved core and 60% of the tissue.  - Perineural invasion is present.     J. Prostate, RIGHT ANTERIOR LATERAL:  - Prostatic adenocarcinoma, Anna grade 4+3 = 7 (70% pattern 4 and 30% pattern 3, Grade group 3), involving two of two cores and 70% of the tissue.  - Perineural invasion is present.     K. Prostate, RIGHT ANTERIOR MEDIAL:  - Benign prostate glands and stroma.     Intradepartmental consultation is in agreement.        5/16/2023 -  Cancer Staged    Staging form: Prostate, AJCC 8th Edition  - Clinical stage from 5/16/2023: Stage IIC (cT1c, cN0, cM0, PSA: 9.6, Grade Group: 4) - Signed by Jodee Guerra MD on 6/26/2023  Stage prefix: Initial diagnosis  Prostate specific antigen (PSA) range: Less than 10  Tyner primary pattern: 4  Anna secondary pattern: 4  Tyner score: 8  Histologic grading system: 5 grade system       6/14/2023 Initial Diagnosis    Prostate cancer (HCC)     8/8/2023 -  Hormone Therapy    Firmagon 240 mg     9/8/2023 -  Hormone Therapy    Lupron 45 mg     10/18/2023 - 11/21/2023 Radiation    Treatments:  Course: C1  Plan ID Energy Fractions Dose per Fraction (cGy) Dose  Correction (cGy) Total Dose Delivered (cGy) Elapsed Days   Whole Pelvis 10X 25 / 25 180 0 4,500 34    Treatment Dates:  10/18/2023 - 11/21/2023.      2/26/2024 -  Hormone Therapy    Lupron 45 mg         Review of Systems:  Review of Systems   Constitutional:  Positive for fatigue.   HENT: Negative.     Eyes: Negative.    Respiratory:  Positive for shortness of breath (has CHF. Mild sob).    Gastrointestinal:  Positive for diarrhea (50% of time).   Genitourinary:  Positive for dysuria (70% of time has burning), frequency and urgency.        Urology added Detrol. Not much relief.    Musculoskeletal: Negative.    Skin: Negative.    Allergic/Immunologic: Negative.    Neurological: Negative.    Hematological: Negative.    Psychiatric/Behavioral: Negative.         Clinical Trial: no    IPSS Questionnaire (AUA-7):  Over the past month…    1)  How often have you had a sensation of not emptying your bladder completely after you finish urinating?  0 - Not at all   2)  How often have you had to urinate again less than two hours after you finished urinating? 5 - Almost always   3)  How often have you found you stopped and started again several times when you urinated?  1 - Less than 1 time in 5   4) How difficult have you found it to postpone urination?  5 - Almost always   5) How often have you had a weak urinary stream?  3 - About half the time   6) How often have you had to push or strain to begin urination?  0 - Not at all   7) How many times did you most typically get up to urinate from the time you went to bed until the time you got up in the morning?  2 - 2 times   Total Score:  16       Teaching    Health Maintenance   Topic Date Due    Hepatitis A Vaccine (1 of 2 - Risk 2-dose series) Never done    RSV Vaccine Age 60+ Years (1 - Risk 60-74 years 1-dose series) Never done    Falls: Plan of Care  Never done    BMI: Followup Plan  10/04/2023    Zoster Vaccine (2 of 2) 10/24/2024    HEMOGLOBIN A1C  02/08/2025    COVID-19  Vaccine (7 - 2024-25 season) 12/27/2024    Diabetic Foot Exam  05/20/2025    Kidney Health Evaluation: GFR  08/08/2025    Kidney Health Evaluation: Albumin/Creatinine Ratio  08/08/2025    Fall Risk  08/20/2025    Depression Screening  08/20/2025    Medicare Annual Wellness Visit (AWV)  08/20/2025    BMI: Adult  10/15/2025    DM Eye Exam  08/26/2026    Colorectal Cancer Screening  08/29/2028    Hepatitis C Screening  Completed    Pneumococcal Vaccine: 65+ Years  Completed    Influenza Vaccine  Completed    RSV Vaccine age 0-20 Months  Aged Out    HIB Vaccine  Aged Out    IPV Vaccine  Aged Out    Meningococcal ACWY Vaccine  Aged Out    HPV Vaccine  Aged Out     Patient Active Problem List   Diagnosis    Essential hypertension    Mixed hyperlipidemia    Ventral hernia without obstruction or gangrene    Nonobstructive atherosclerosis of coronary artery    Carotid artery stenosis without cerebral infarction, bilateral    Cerebral aneurysm    S/P Left carotid endarterectomy 1/13/20    Familial hypercholesterolemia    BMI 27.0-27.9,adult    Chronic right shoulder pain    Status post reverse total arthroplasty of right shoulder    Erectile dysfunction of non-organic origin    Anemia    Left carpal tunnel syndrome    Benign paroxysmal positional vertigo    Facial skin lesion    Lump in the testicle    Prostate cancer (HCC)    Androgen deprivation therapy    Type 2 diabetes mellitus with hyperglycemia, without long-term current use of insulin (HCC)    Platelets decreased (HCC)    Low back pain radiating to left lower extremity    Weakness of both lower extremities    Cigarette nicotine dependence without complication    NICM with EF recovery     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    Heart disease     Hyperlipidemia     Hypertension     Kidney stone     Osteoarthritis     Prostate  cancer (HCC)     Vascular disorder      Past Surgical History:   Procedure Laterality Date    ABDOMINAL SURGERY      CARDIAC CATHETERIZATION N/A 3/21/2022    Procedure: Cardiac catheterization;  Surgeon: Stephy Horner MD;  Location: [facility];  Service: Cardiology    CARDIAC CATHETERIZATION N/A 3/21/2022    Procedure: Cardiac Coronary Angiogram;  Surgeon: Stephy Horner MD;  Location: [facility];  Service: Cardiology    COLONOSCOPY      FL RETROGRADE PYELOGRAM  3/23/2023    INGUINAL HERNIA REPAIR Left     LAPAROSCOPIC COLON RESECTION      WY ARTHROPLASTY GLENOHUMERAL JOINT TOTAL SHOULDER Right 11/17/2020    Procedure: ARTHROPLASTY SHOULDER REVERSE with biceps tendonesis;  Surgeon: Robby Marquez MD;  Location: [facility];  Service: Orthopedics    WY BX PROSTATE STRTCTC SATURATION SAMPLING IMG GID N/A 5/16/2023    Procedure: TRANSPERINEAL MRI FUSION BIOPSY PROSTATE;  Surgeon: Quan Velasco MD;  Location: [facility];  Service: Urology    WY COLONOSCOPY FLX DX W/COLLJ SPEC WHEN PFRMD N/A 11/3/2017    Procedure: COLONOSCOPY;  Surgeon: SUZAN Muñiz MD;  Location: [facility];  Service: Colorectal    WY CYSTO/URETERO W/LITHOTRIPSY &INDWELL STENT INSRT Left 3/23/2023    Procedure: CYSTOSCOPY URETEROSCOPY WITH LITHOTRIPSY HOLMIUM LASER, RETROGRADE PYELOGRAM AND INSERTION STENT URETERAL, STONE BASKET;  Surgeon: Michelet Johns MD;  Location: [facility];  Service: Urology    WY TEAEC W/PATCH GRF CAROTID VERTB SUBCLAV NECK INC Left 1/13/2020    Procedure: ENDARTERECTOMY ARTERY CAROTID;  Surgeon: Amado Teague MD;  Location: [facility];  Service: Vascular     Family History   Problem Relation Age of Onset    Colon cancer Father     No Known Problems Mother     Colon cancer Paternal Grandfather     Colon cancer Family     Diabetes Half-Sister     Obesity Half-Sister      Social History     Socioeconomic History    Marital status: /Civil Union     Spouse name: Not on file    Number of  children: Not on file    Years of education: Not on file    Highest education level: Not on file   Occupational History    Not on file   Tobacco Use    Smoking status: Some Days     Types: Cigars     Passive exposure: Past    Smokeless tobacco: Current   Vaping Use    Vaping status: Never Used   Substance and Sexual Activity    Alcohol use: Yes     Alcohol/week: 3.0 standard drinks of alcohol     Types: 3 Cans of beer per week     Comment: Socially    Drug use: Yes     Comment: gummies    Sexual activity: Not Currently   Other Topics Concern    Not on file   Social History Narrative    Caffeine use    Uses safety equipment: seatbelts     Social Drivers of Health     Financial Resource Strain: Low Risk  (5/9/2023)    Overall Financial Resource Strain (CARDIA)     Difficulty of Paying Living Expenses: Not very hard   Food Insecurity: No Food Insecurity (8/20/2024)    Nursing - Inadequate Food Risk Classification     Worried About Running Out of Food in the Last Year: Never true     Ran Out of Food in the Last Year: Never true     Ran Out of Food in the Last Year: Not on file   Transportation Needs: No Transportation Needs (8/20/2024)    PRAPARE - Transportation     Lack of Transportation (Medical): No     Lack of Transportation (Non-Medical): No   Physical Activity: Not on file   Stress: Not on file   Social Connections: Not on file   Intimate Partner Violence: Not on file   Housing Stability: Low Risk  (8/20/2024)    Housing Stability Vital Sign     Unable to Pay for Housing in the Last Year: No     Number of Times Moved in the Last Year: 1     Homeless in the Last Year: No       Current Outpatient Medications:     amLODIPine (NORVASC) 10 mg tablet, TAKE 1 TABLET BY MOUTH DAILY, Disp: 90 tablet, Rfl: 1    aspirin 81 mg chewable tablet, Chew 1 tablet (81 mg total) daily, Disp: 90 tablet, Rfl: 3    Continuous Blood Gluc  (Dexcom G7 ) ANITA, Use 1 Device 3 (three) times a day, Disp: 1 each, Rfl: 0     Continuous Blood Gluc Transmit (Dexcom G6 Transmitter) MISC, 1 TRANSMITTED EVERY 3 MONTHS FOR CONTINUOUS GLUCOSE MONITORING, Disp: 1 each, Rfl: 0    Empagliflozin (Jardiance) 25 MG TABS, TAKE 1 TABLET BY MOUTH DAILY, Disp: 90 tablet, Rfl: 1    glucose blood test strip, Use as instructed, Disp: 100 each, Rfl: 5    losartan (COZAAR) 25 mg tablet, Take 1 tablet (25 mg total) by mouth daily, Disp: , Rfl:     metFORMIN (GLUCOPHAGE) 1000 MG tablet, TAKE 1 TABLET BY MOUTH TWICE  DAILY WITH MEALS, Disp: 180 tablet, Rfl: 1    tamsulosin (FLOMAX) 0.4 mg, Take 2 capsules (0.8 mg total) by mouth daily with dinner, Disp: 180 capsule, Rfl: 3    tolterodine (DETROL LA) 2 mg 24 hr capsule, take 1 capsule by mouth once daily, Disp: 90 capsule, Rfl: 1    amLODIPine (NORVASC) 5 mg tablet, Take 1 tablet (5 mg total) by mouth daily (Patient not taking: Reported on 8/15/2024), Disp: , Rfl:     atorvastatin (LIPITOR) 20 mg tablet, Take 1 tablet (20 mg total) by mouth daily at bedtime (Patient not taking: Reported on 8/15/2024), Disp: 90 tablet, Rfl: 1    Calcium Carb-Cholecalciferol (calcium carbonate-vitamin D) 500 mg-5 mcg tablet, Take 1 tablet by mouth 2 (two) times a day with meals, Disp: 180 tablet, Rfl: 3    carvedilol (COREG) 12.5 mg tablet, , Disp: , Rfl:     carvedilol (COREG) 25 mg tablet, Take 1 tablet (25 mg total) by mouth 2 (two) times a day with meals (Patient not taking: Reported on 8/15/2024), Disp: , Rfl:     losartan (COZAAR) 100 MG tablet, TAKE 1 TABLET BY MOUTH IN THE  MORNING (Patient not taking: Reported on 8/15/2024), Disp: 90 tablet, Rfl: 1    sildenafil (VIAGRA) 25 MG tablet, TAKE 1 TABLET BY MOUTH  DAILY AS NEEDED FOR  ERECTILE DYSFUNCTION (Patient taking differently: Take 25 mg by mouth as needed), Disp: 10 tablet, Rfl: 0  Allergies   Allergen Reactions    Other Hives     Soft shell crabs      Vitals:    12/03/24 0929   BP: 120/80   Pulse: 67   Resp: 12   Temp: (!) 97 °F (36.1 °C)   SpO2: 100%   Weight: 91.6 kg  (202 lb)      Pain Score: 0-No pain

## 2024-12-03 NOTE — LETTER
December 5, 2024     Farida Matos PA-C  [address] [place] [id number]    Patient: Erickson Jimenez   YOB: 1950   Date of Visit: 12/3/2024       Dear Dr. Matos:    Thank you for referring Erickson Jimenez to me for evaluation. Below are my notes for this consultation.    If you have questions, please do not hesitate to call me. I look forward to following your patient along with you.         Sincerely,        Jodee Guerra MD        CC: SONYA Liriano MD  12/5/2024  1:54 PM  Sign when Signing Visit  Follow-up - Radiation Oncology   Erickson Jimenez 1950 74 y.o. male [id number]      History of Present Illness   Cancer Staging   Prostate cancer (HCC)  Staging form: Prostate, AJCC 8th Edition  - Clinical stage from 5/16/2023: Stage IIC (cT1c, cN0, cM0, PSA: 9.6, Grade Group: 4) - Signed by Jodee Guerra MD on 6/26/2023  Stage prefix: Initial diagnosis  Prostate specific antigen (PSA) range: Less than 10  Clarkson primary pattern: 4  Clarkson secondary pattern: 4  Clarkson score: 8  Histologic grading system: 5 grade system      Erickson Jimenez is a 74 y.o.man with multiple comorbidities but excellent performance status diagnosed with clinical stage [id number] prostatic adenocarcinoma, Anna score 8 (4+4) with a pretreatment PSA of 9.6 ng/mL. >50% cores demonstrated disease and all associated with PNI.  He underwent partial brachytherapy implant followed by pelvic radiation which he completed on 11/21/23. He completed 1 year of ADT.  He was last seen 6/3/24 and presents today for follow up.      8/15/24 Urology, [facility]  High risk prostate cancer  - clinical stage [id number] prostatic adenocarcinoma, Clarkson score 8 (4+4)  - Pretreatment PSA 9.4  - S/p brachytherapy at [facility] with Dr. Lenz completed on 9/5/23   - Firmagon 240 mg on 8/8/23  - One Lupron 45 mg given on 9/8/23. Recommendations for 18 months total duration per [facility]. He is declining any further injections due to side  effects.   - S/p EBRT completed on 23   - Most recent PSA from 24 was 0.299. Will monitor with repeat PSA in 6 months.   2. Left ureteral stone s/p left URS with LL on 3/23/23  - US kidney bladder from 24 - Nonobstructing bilateral intrarenal calculi. Mild right and moderate left pyelectasis. Normal bilateral ureteral jets.  Multiple simple bilateral renal cysts.  - Asymptomatic  3. BPH with LUTS  - Mostly complaining or irritative and storage symptoms  - Continue Flomax, will drop done to once daily and add in Detrol 2 mg daily   - Bladder scan 79 mL       PSA   Latest Ref Rng 0.000 - 4.000 ng/mL   3/3/2023 9.6 (H)    2024 0.35    2024 0.299       The patient continues to experience significant urinary frequency and urgency.  He has some urinary urge incontinence and uses pads when he is not at home for protection, but has minimal actual leakage events.  He notes overall his symptoms are worse despite medication with tamsulosin and Detrol.  IPSS 16, increased from 13 from 2024.  He notes several months of mild dysuria that is unchanged.  He denies hematuria.  He denies bladder spasms or CVA discomfort.  He continues to experience occasional bouts of diarrhea and some urgency.  He uses Imodium as needed.  He denies bleeding.       [facility]        Historical Information   Oncology History   Prostate cancer (HCC)   2023 Biopsy    TRANSPERINEAL MRI FUSION BIOPSY PROSTATE     A. Prostate, REGION OF INTEREST:  - Prostatic adenocarcinoma, [place] grade 3 + 4 = 7 (60% pattern 3 and 40% pattern 4, Grade group 2), involving four of four prostatic cores and 90% of the total biopsy tissue.  - Perineural invasion is present.     Note: The fifth core is skeletal muscle only.     B. Prostate, RIGHT BASE:  - Prostatic adenocarcinoma, Anna grade 4+4 = 8 (Grade group 4), involving two of two cores and 60% of the tissue.  - Perineural invasion is present.     Note: Small  portion of pattern 5 can't be ruled out due to procedure artifact.     C. Prostate, RIGHT POSTERIOR MEDIAL:  - Prostatic adenocarcinoma, Anna grade 4+3 = 7 (50% pattern 4 and 50% pattern 3, Grade group 3), involving two of two cores and 50% of the tissue.  - Perineural invasion is present.     D. Prostate, LEFT BASE:  - Benign prostate glands and stroma.     E. Prostate, LEFT POSTERIOR MEDIAL:  - Prostatic adenocarcinoma, Anna grade 4+4 = 8 (Grade group 4), involving one of two cores. 20% of involved core  and 10% of the tissue.  - Perineural invasion is present.     F. Prostate, LEFT POSTERIOR LATERAL:  - Benign prostate glands and stroma.     G. Prostate, LEFT ANTERIOR LATERAL:  - Benign prostate glands and stroma.     H. Prostate, LEFT ANTERIOR MEDIAL:  - Benign prostate glands and stroma.     I. Prostate, RIGHT POSTERIOR LATERAL:  - Prostatic adenocarcinoma, Anna grade 3 + 4 = 7 (60% pattern 3 and 40% pattern 4, Grade group 2), involving one of two cores, 90% of involved core and 60% of the tissue.  - Perineural invasion is present.     J. Prostate, RIGHT ANTERIOR LATERAL:  - Prostatic adenocarcinoma, [place] grade 4+3 = 7 (70% pattern 4 and 30% pattern 3, Grade group 3), involving two of two cores and 70% of the tissue.  - Perineural invasion is present.     K. Prostate, RIGHT ANTERIOR MEDIAL:  - Benign prostate glands and stroma.     Intradepartmental consultation is in agreement.        5/16/2023 -  Cancer Staged    Staging form: Prostate, AJCC 8th Edition  - Clinical stage from 5/16/2023: Stage IIC (cT1c, cN0, cM0, PSA: 9.6, Grade Group: 4) - Signed by Jodee Guerra MD on 6/26/2023  Stage prefix: Initial diagnosis  Prostate specific antigen (PSA) range: Less than 10  Anna primary pattern: 4  Anna secondary pattern: 4  [place] score: 8  Histologic grading system: 5 grade system       6/14/2023 Initial Diagnosis    Prostate cancer (HCC)     8/8/2023 -  Hormone Therapy    Firmagon 240 mg      9/8/2023 -  Hormone Therapy    Lupron 45 mg     10/18/2023 - 11/21/2023 Radiation    Treatments:  Course: C1  Plan ID Energy Fractions Dose per Fraction (cGy) Dose Correction (cGy) Total Dose Delivered (cGy) Elapsed Days   Whole Pelvis 10X 25 / 25 180 0 4,500 34    Treatment Dates:  10/18/2023 - 11/21/2023.      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   • Heart disease    • Hyperlipidemia    • Hypertension    • Kidney stone    • Osteoarthritis    • Prostate cancer (HCC)    • Vascular disorder      Past Surgical History:   Procedure Laterality Date   • ABDOMINAL SURGERY     • CARDIAC CATHETERIZATION N/A 3/21/2022    Procedure: Cardiac catheterization;  Surgeon: Stephy Horner MD;  Location: [facility];  Service: Cardiology   • CARDIAC CATHETERIZATION N/A 3/21/2022    Procedure: Cardiac Coronary Angiogram;  Surgeon: Stephy Horner MD;  Location: [facility];  Service: Cardiology   • COLONOSCOPY     • FL RETROGRADE PYELOGRAM  3/23/2023   • INGUINAL HERNIA REPAIR Left    • LAPAROSCOPIC COLON RESECTION     • IA ARTHROPLASTY GLENOHUMERAL JOINT TOTAL SHOULDER Right 11/17/2020    Procedure: ARTHROPLASTY SHOULDER REVERSE with biceps tendonesis;  Surgeon: Robby Marquez MD;  Location: [facility];  Service: Orthopedics   • IA BX PROSTATE STRTCTC SATURATION SAMPLING IMG GID N/A 5/16/2023    Procedure: TRANSPERINEAL MRI FUSION BIOPSY PROSTATE;  Surgeon: Quan Velasco MD;  Location: [facility];  Service: Urology   • IA COLONOSCOPY FLX DX W/COLLJ SPEC WHEN PFRMD N/A 11/3/2017    Procedure: COLONOSCOPY;  Surgeon: SUZAN Muñiz MD;  Location: [facility];  Service: Colorectal   • IA CYSTO/URETERO W/LITHOTRIPSY &INDWELL STENT INSRT Left 3/23/2023    Procedure: CYSTOSCOPY URETEROSCOPY WITH LITHOTRIPSY HOLMIUM  LASER, RETROGRADE PYELOGRAM AND INSERTION STENT URETERAL, STONE BASKET;  Surgeon: Michelet Johns MD;  Location: [facility];  Service: Urology   • LA TEAEC W/PATCH GRF CAROTID VERTB SUBCLAV NECK INC Left 1/13/2020    Procedure: ENDARTERECTOMY ARTERY CAROTID;  Surgeon: Amado Teague MD;  Location: [facility];  Service: Vascular       Social History   Social History     Substance and Sexual Activity   Alcohol Use Yes   • Alcohol/week: 3.0 standard drinks of alcohol   • Types: 3 Cans of beer per week    Comment: Socially     Social History     Substance and Sexual Activity   Drug Use Yes    Comment: gummies     Social History     Tobacco Use   Smoking Status Some Days   • Types: Cigars   • Passive exposure: Past   Smokeless Tobacco Current         Meds/Allergies     Current Outpatient Medications:   •  amLODIPine (NORVASC) 10 mg tablet, TAKE 1 TABLET BY MOUTH DAILY, Disp: 90 tablet, Rfl: 1  •  aspirin 81 mg chewable tablet, Chew 1 tablet (81 mg total) daily, Disp: 90 tablet, Rfl: 3  •  Continuous Blood Gluc  (Dexcom G7 ) ANITA, Use 1 Device 3 (three) times a day, Disp: 1 each, Rfl: 0  •  Continuous Blood Gluc Transmit (Dexcom G6 Transmitter) MISC, 1 TRANSMITTED EVERY 3 MONTHS FOR CONTINUOUS GLUCOSE MONITORING, Disp: 1 each, Rfl: 0  •  Empagliflozin (Jardiance) 25 MG TABS, TAKE 1 TABLET BY MOUTH DAILY, Disp: 90 tablet, Rfl: 1  •  glucose blood test strip, Use as instructed, Disp: 100 each, Rfl: 5  •  losartan (COZAAR) 25 mg tablet, Take 1 tablet (25 mg total) by mouth daily, Disp: , Rfl:   •  metFORMIN (GLUCOPHAGE) 1000 MG tablet, TAKE 1 TABLET BY MOUTH TWICE  DAILY WITH MEALS, Disp: 180 tablet, Rfl: 1  •  tamsulosin (FLOMAX) 0.4 mg, Take 2 capsules (0.8 mg total) by mouth daily with dinner, Disp: 180 capsule, Rfl: 3  •  tolterodine (DETROL LA) 2 mg 24 hr capsule, take 1 capsule by mouth once daily, Disp: 90 capsule, Rfl: 1  •  amLODIPine (NORVASC) 5 mg tablet, Take 1 tablet (5 mg total) by mouth  daily (Patient not taking: Reported on 8/15/2024), Disp: , Rfl:   •  atorvastatin (LIPITOR) 20 mg tablet, Take 1 tablet (20 mg total) by mouth daily at bedtime (Patient not taking: Reported on 8/15/2024), Disp: 90 tablet, Rfl: 1  •  Calcium Carb-Cholecalciferol (calcium carbonate-vitamin D) 500 mg-5 mcg tablet, Take 1 tablet by mouth 2 (two) times a day with meals, Disp: 180 tablet, Rfl: 3  •  carvedilol (COREG) 12.5 mg tablet, , Disp: , Rfl:   •  carvedilol (COREG) 25 mg tablet, Take 1 tablet (25 mg total) by mouth 2 (two) times a day with meals (Patient not taking: Reported on 8/15/2024), Disp: , Rfl:   •  losartan (COZAAR) 100 MG tablet, TAKE 1 TABLET BY MOUTH IN THE  MORNING (Patient not taking: Reported on 8/15/2024), Disp: 90 tablet, Rfl: 1  •  sildenafil (VIAGRA) 25 MG tablet, TAKE 1 TABLET BY MOUTH  DAILY AS NEEDED FOR  ERECTILE DYSFUNCTION (Patient taking differently: Take 25 mg by mouth as needed), Disp: 10 tablet, Rfl: 0  Allergies   Allergen Reactions   • Other Hives     Soft shell crabs          Review of Systems   Constitutional:  Positive for fatigue.   HENT: Negative.     Eyes: Negative.    Respiratory:  Positive for shortness of breath (has CHF. Mild sob).    Gastrointestinal:  Positive for diarrhea (50% of time).   Genitourinary:  Positive for dysuria (70% of time has burning), frequency and urgency.        Urology added Detrol. Not much relief.    Musculoskeletal: Negative.    Skin: Negative.    Allergic/Immunologic: Negative.    Neurological: Negative.    Hematological: Negative.    Psychiatric/Behavioral: Negative.           OBJECTIVE:   /80   Pulse 67   Temp (!) 97 °F (36.1 °C)   Resp 12   Wt 91.6 kg (202 lb)   SpO2 100%   BMI 27.40 kg/m²   Karnofsky: 80 - Normal activity with effort; some signs or symptoms of disease    Physical Exam  Vitals and nursing note reviewed.   Constitutional:       General: He is not in acute distress.     Appearance: He is well-developed.    Cardiovascular:      Rate and Rhythm: Normal rate.   Pulmonary:      Breath sounds: No wheezing or rhonchi.   Abdominal:      General: There is no distension.      Palpations: Abdomen is soft.      Tenderness: There is no abdominal tenderness.   Musculoskeletal:      Right lower leg: No edema.      Left lower leg: No edema.      Comments: No CVA or spinal tenderness to percussion   Lymphadenopathy:      Cervical: No cervical adenopathy.      Upper Body:      Right upper body: No supraclavicular adenopathy.      Left upper body: No supraclavicular adenopathy.   Neurological:      Mental Status: He is alert and oriented to person, place, and time.      Gait: Gait normal.          Assessment/Plan:  Erickson Jimenez is a 74 y.o.  man with a history of high volume, high risk prostate cancer status post trimodality therapy with ADT, EBRT and brachy boost. Radiation completed 11/21/23.  He has completed 1 year of ADT and is declining further injections.       Prostate Cancer  -Currently clinically URSULA.  -PSA stable at 0.299ng/mL (8/8/24),    -Repeat PSA with follow-up with Urology scheduled February 2025.  -Follow-up 6 months    LUTS  -Chronic urinary frequency and urgency that has somewhat worsened last 6 months.  He has developed some dysuria, but not progressed further.  Check UA with reflex to culture to evaluate inflammation v infection.  If infection, then will treat with antibiotics and may explain persistent dysuria.  This seems unlikely, however, given chronicity.  I am concerned about chronic radiation cystitis.  Given slow worsening of symptoms despite medical management now 1 year post radiation I discussed possibility of radiation injury to bladder and rectum.  We discussed continued medical management and I offered referral for consideration for HBO.  I explained that this can be used for chronic radiation cystitis and may help reduce symptoms.  Response rates are variable in data available, but it generally  "shows benefit particularly with bleeding symptoms.  He was interested and wished to undergo consultation.  -continue medical management  -UA reflex to cx follow-up results and call patient when available  -HBO therapy consultation as above.    Jodee Guerra MD  12/3/2024,9:49 AM    Portions of the record may have been created with voice recognition software.  Occasional wrong word or \"sound a like\" substitutions may have occurred due to the inherent limitations of voice recognition software.  Read the chart carefully and recognize, using context, where substitutions have occurred.           "

## 2024-12-03 NOTE — PROGRESS NOTES
Follow-up - Radiation Oncology   Erickson Jimenez 1950 74 y.o. male [id number]      History of Present Illness   Cancer Staging   Prostate cancer (HCC)  Staging form: Prostate, AJCC 8th Edition  - Clinical stage from 5/16/2023: Stage IIC (cT1c, cN0, cM0, PSA: 9.6, Grade Group: 4) - Signed by Jodee Guerra MD on 6/26/2023  Stage prefix: Initial diagnosis  Prostate specific antigen (PSA) range: Less than 10  [place] primary pattern: 4  Anna secondary pattern: 4  Anna score: 8  Histologic grading system: 5 grade system      Erickson Jimenez is a 74 y.o.man with multiple comorbidities but excellent performance status diagnosed with clinical stage [id number] prostatic adenocarcinoma, Anna score 8 (4+4) with a pretreatment PSA of 9.6 ng/mL. >50% cores demonstrated disease and all associated with PNI.  He underwent partial brachytherapy implant followed by pelvic radiation which he completed on 11/21/23. He completed 1 year of ADT.  He was last seen 6/3/24 and presents today for follow up.      8/15/24 Urology, [facility]  High risk prostate cancer  - clinical stage [id number] prostatic adenocarcinoma, Anna score 8 (4+4)  - Pretreatment PSA 9.4  - S/p brachytherapy at [facility] with Dr. Lenz completed on 9/5/23   - Firmagon 240 mg on 8/8/23  - One Lupron 45 mg given on 9/8/23. Recommendations for 18 months total duration per [facility]. He is declining any further injections due to side effects.   - S/p EBRT completed on 11/21/23   - Most recent PSA from 8/8/24 was 0.299. Will monitor with repeat PSA in 6 months.   2. Left ureteral stone s/p left URS with LL on 3/23/23  - US kidney bladder from 1/6/24 - Nonobstructing bilateral intrarenal calculi. Mild right and moderate left pyelectasis. Normal bilateral ureteral jets.  Multiple simple bilateral renal cysts.  - Asymptomatic  3. BPH with LUTS  - Mostly complaining or irritative and storage symptoms  - Continue Flomax, will drop done to once daily and add in Detrol 2 mg daily    - Bladder scan 79 mL       PSA   Latest Ref Rng 0.000 - 4.000 ng/mL   3/3/2023 9.6 (H)    2024 0.35    2024 0.299       The patient continues to experience significant urinary frequency and urgency.  He has some urinary urge incontinence and uses pads when he is not at home for protection, but has minimal actual leakage events.  He notes overall his symptoms are worse despite medication with tamsulosin and Detrol.  IPSS 16, increased from 13 from 2024.  He notes several months of mild dysuria that is unchanged.  He denies hematuria.  He denies bladder spasms or CVA discomfort.  He continues to experience occasional bouts of diarrhea and some urgency.  He uses Imodium as needed.  He denies bleeding.       [facility]        Historical Information   Oncology History   Prostate cancer (HCC)   2023 Biopsy    TRANSPERINEAL MRI FUSION BIOPSY PROSTATE     A. Prostate, REGION OF INTEREST:  - Prostatic adenocarcinoma, [place] grade 3 + 4 = 7 (60% pattern 3 and 40% pattern 4, Grade group 2), involving four of four prostatic cores and 90% of the total biopsy tissue.  - Perineural invasion is present.     Note: The fifth core is skeletal muscle only.     B. Prostate, RIGHT BASE:  - Prostatic adenocarcinoma, [place] grade 4+4 = 8 (Grade group 4), involving two of two cores and 60% of the tissue.  - Perineural invasion is present.     Note: Small portion of pattern 5 can't be ruled out due to procedure artifact.     C. Prostate, RIGHT POSTERIOR MEDIAL:  - Prostatic adenocarcinoma, Anna grade 4+3 = 7 (50% pattern 4 and 50% pattern 3, Grade group 3), involving two of two cores and 50% of the tissue.  - Perineural invasion is present.     D. Prostate, LEFT BASE:  - Benign prostate glands and stroma.     E. Prostate, LEFT POSTERIOR MEDIAL:  - Prostatic adenocarcinoma, [place] grade 4+4 = 8 (Grade group 4), involving one of two cores. 20% of involved core  and 10% of the tissue.  -  Perineural invasion is present.     F. Prostate, LEFT POSTERIOR LATERAL:  - Benign prostate glands and stroma.     G. Prostate, LEFT ANTERIOR LATERAL:  - Benign prostate glands and stroma.     H. Prostate, LEFT ANTERIOR MEDIAL:  - Benign prostate glands and stroma.     I. Prostate, RIGHT POSTERIOR LATERAL:  - Prostatic adenocarcinoma, Anna grade 3 + 4 = 7 (60% pattern 3 and 40% pattern 4, Grade group 2), involving one of two cores, 90% of involved core and 60% of the tissue.  - Perineural invasion is present.     J. Prostate, RIGHT ANTERIOR LATERAL:  - Prostatic adenocarcinoma, [place] grade 4+3 = 7 (70% pattern 4 and 30% pattern 3, Grade group 3), involving two of two cores and 70% of the tissue.  - Perineural invasion is present.     K. Prostate, RIGHT ANTERIOR MEDIAL:  - Benign prostate glands and stroma.     Intradepartmental consultation is in agreement.        5/16/2023 -  Cancer Staged    Staging form: Prostate, AJCC 8th Edition  - Clinical stage from 5/16/2023: Stage IIC (cT1c, cN0, cM0, PSA: 9.6, Grade Group: 4) - Signed by Jodee Guerra MD on 6/26/2023  Stage prefix: Initial diagnosis  Prostate specific antigen (PSA) range: Less than 10  [place] primary pattern: 4  Anna secondary pattern: 4  [place] score: 8  Histologic grading system: 5 grade system       6/14/2023 Initial Diagnosis    Prostate cancer (HCC)     8/8/2023 -  Hormone Therapy    Firmagon 240 mg     9/8/2023 -  Hormone Therapy    Lupron 45 mg     10/18/2023 - 11/21/2023 Radiation    Treatments:  Course: C1  Plan ID Energy Fractions Dose per Fraction (cGy) Dose Correction (cGy) Total Dose Delivered (cGy) Elapsed Days   Whole Pelvis 10X 25 / 25 180 0 4,500 34    Treatment Dates:  10/18/2023 - 11/21/2023.      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     Heart disease     Hyperlipidemia     Hypertension     Kidney stone     Osteoarthritis     Prostate cancer (HCC)     Vascular disorder      Past Surgical History:   Procedure Laterality Date    ABDOMINAL SURGERY      CARDIAC CATHETERIZATION N/A 3/21/2022    Procedure: Cardiac catheterization;  Surgeon: Stephy Horner MD;  Location: [facility];  Service: Cardiology    CARDIAC CATHETERIZATION N/A 3/21/2022    Procedure: Cardiac Coronary Angiogram;  Surgeon: Stephy Horner MD;  Location: [facility];  Service: Cardiology    COLONOSCOPY      FL RETROGRADE PYELOGRAM  3/23/2023    INGUINAL HERNIA REPAIR Left     LAPAROSCOPIC COLON RESECTION      WA ARTHROPLASTY GLENOHUMERAL JOINT TOTAL SHOULDER Right 11/17/2020    Procedure: ARTHROPLASTY SHOULDER REVERSE with biceps tendonesis;  Surgeon: Robby Marquez MD;  Location: [facility];  Service: Orthopedics    WA BX PROSTATE STRTCTC SATURATION SAMPLING IMG GID N/A 5/16/2023    Procedure: TRANSPERINEAL MRI FUSION BIOPSY PROSTATE;  Surgeon: Quan Velasco MD;  Location: [facility];  Service: Urology    WA COLONOSCOPY FLX DX W/COLLJ SPEC WHEN PFRMD N/A 11/3/2017    Procedure: COLONOSCOPY;  Surgeon: SUZAN Muñiz MD;  Location: [facility];  Service: Colorectal    WA CYSTO/URETERO W/LITHOTRIPSY &INDWELL STENT INSRT Left 3/23/2023    Procedure: CYSTOSCOPY URETEROSCOPY WITH LITHOTRIPSY HOLMIUM LASER, RETROGRADE PYELOGRAM AND INSERTION STENT URETERAL, STONE BASKET;  Surgeon: Michelet Johns MD;  Location: [facility];  Service: Urology    WA TEAEC W/PATCH GRF CAROTID VERTB SUBCLAV NECK INC Left 1/13/2020    Procedure: ENDARTERECTOMY ARTERY CAROTID;  Surgeon: Amado Teague MD;  Location: [facility];  Service: Vascular       Social History   Social History     Substance and Sexual Activity   Alcohol Use Yes    Alcohol/week: 3.0 standard drinks of alcohol    Types: 3 Cans of beer per week    Comment: Socially     Social History     Substance and Sexual  Activity   Drug Use Yes    Comment: gummies     Social History     Tobacco Use   Smoking Status Some Days    Types: Cigars    Passive exposure: Past   Smokeless Tobacco Current         Meds/Allergies     Current Outpatient Medications:     amLODIPine (NORVASC) 10 mg tablet, TAKE 1 TABLET BY MOUTH DAILY, Disp: 90 tablet, Rfl: 1    aspirin 81 mg chewable tablet, Chew 1 tablet (81 mg total) daily, Disp: 90 tablet, Rfl: 3    Continuous Blood Gluc  (Dexcom G7 ) ANITA, Use 1 Device 3 (three) times a day, Disp: 1 each, Rfl: 0    Continuous Blood Gluc Transmit (Dexcom G6 Transmitter) MISC, 1 TRANSMITTED EVERY 3 MONTHS FOR CONTINUOUS GLUCOSE MONITORING, Disp: 1 each, Rfl: 0    Empagliflozin (Jardiance) 25 MG TABS, TAKE 1 TABLET BY MOUTH DAILY, Disp: 90 tablet, Rfl: 1    glucose blood test strip, Use as instructed, Disp: 100 each, Rfl: 5    losartan (COZAAR) 25 mg tablet, Take 1 tablet (25 mg total) by mouth daily, Disp: , Rfl:     metFORMIN (GLUCOPHAGE) 1000 MG tablet, TAKE 1 TABLET BY MOUTH TWICE  DAILY WITH MEALS, Disp: 180 tablet, Rfl: 1    tamsulosin (FLOMAX) 0.4 mg, Take 2 capsules (0.8 mg total) by mouth daily with dinner, Disp: 180 capsule, Rfl: 3    tolterodine (DETROL LA) 2 mg 24 hr capsule, take 1 capsule by mouth once daily, Disp: 90 capsule, Rfl: 1    amLODIPine (NORVASC) 5 mg tablet, Take 1 tablet (5 mg total) by mouth daily (Patient not taking: Reported on 8/15/2024), Disp: , Rfl:     atorvastatin (LIPITOR) 20 mg tablet, Take 1 tablet (20 mg total) by mouth daily at bedtime (Patient not taking: Reported on 8/15/2024), Disp: 90 tablet, Rfl: 1    Calcium Carb-Cholecalciferol (calcium carbonate-vitamin D) 500 mg-5 mcg tablet, Take 1 tablet by mouth 2 (two) times a day with meals, Disp: 180 tablet, Rfl: 3    carvedilol (COREG) 12.5 mg tablet, , Disp: , Rfl:     carvedilol (COREG) 25 mg tablet, Take 1 tablet (25 mg total) by mouth 2 (two) times a day with meals (Patient not taking: Reported on  8/15/2024), Disp: , Rfl:     losartan (COZAAR) 100 MG tablet, TAKE 1 TABLET BY MOUTH IN THE  MORNING (Patient not taking: Reported on 8/15/2024), Disp: 90 tablet, Rfl: 1    sildenafil (VIAGRA) 25 MG tablet, TAKE 1 TABLET BY MOUTH  DAILY AS NEEDED FOR  ERECTILE DYSFUNCTION (Patient taking differently: Take 25 mg by mouth as needed), Disp: 10 tablet, Rfl: 0  Allergies   Allergen Reactions    Other Hives     Soft shell crabs          Review of Systems   Constitutional:  Positive for fatigue.   HENT: Negative.     Eyes: Negative.    Respiratory:  Positive for shortness of breath (has CHF. Mild sob).    Gastrointestinal:  Positive for diarrhea (50% of time).   Genitourinary:  Positive for dysuria (70% of time has burning), frequency and urgency.        Urology added Detrol. Not much relief.    Musculoskeletal: Negative.    Skin: Negative.    Allergic/Immunologic: Negative.    Neurological: Negative.    Hematological: Negative.    Psychiatric/Behavioral: Negative.           OBJECTIVE:   /80   Pulse 67   Temp (!) 97 °F (36.1 °C)   Resp 12   Wt 91.6 kg (202 lb)   SpO2 100%   BMI 27.40 kg/m²   [place]: 80 - Normal activity with effort; some signs or symptoms of disease    Physical Exam  Vitals and nursing note reviewed.   Constitutional:       General: He is not in acute distress.     Appearance: He is well-developed.   Cardiovascular:      Rate and Rhythm: Normal rate.   Pulmonary:      Breath sounds: No wheezing or rhonchi.   Abdominal:      General: There is no distension.      Palpations: Abdomen is soft.      Tenderness: There is no abdominal tenderness.   Musculoskeletal:      Right lower leg: No edema.      Left lower leg: No edema.      Comments: No CVA or spinal tenderness to percussion   Lymphadenopathy:      Cervical: No cervical adenopathy.      Upper Body:      Right upper body: No supraclavicular adenopathy.      Left upper body: No supraclavicular adenopathy.   Neurological:      Mental Status:  "He is alert and oriented to person, place, and time.      Gait: Gait normal.          Assessment/Plan:  Erickson Jimenez is a 74 y.o.  man with a history of high volume, high risk prostate cancer status post trimodality therapy with ADT, EBRT and brachy boost. Radiation completed 11/21/23.  He has completed 1 year of ADT and is declining further injections.       Prostate Cancer  -Currently clinically URSLUA.  -PSA stable at 0.299ng/mL (8/8/24),    -Repeat PSA with follow-up with Urology scheduled February 2025.  -Follow-up 6 months    LUTS  -Chronic urinary frequency and urgency that has somewhat worsened last 6 months.  He has developed some dysuria, but not progressed further.  Check UA with reflex to culture to evaluate inflammation v infection.  If infection, then will treat with antibiotics and may explain persistent dysuria.  This seems unlikely, however, given chronicity.  I am concerned about chronic radiation cystitis.  Given slow worsening of symptoms despite medical management now 1 year post radiation I discussed possibility of radiation injury to bladder and rectum.  We discussed continued medical management and I offered referral for consideration for HBO.  I explained that this can be used for chronic radiation cystitis and may help reduce symptoms.  Response rates are variable in data available, but it generally shows benefit particularly with bleeding symptoms.  He was interested and wished to undergo consultation.  -continue medical management  -UA reflex to cx follow-up results and call patient when available  -HBO therapy consultation as above.    Jodee Guerra MD  12/3/2024,9:49 AM    Portions of the record may have been created with voice recognition software.  Occasional wrong word or \"sound a like\" substitutions may have occurred due to the inherent limitations of voice recognition software.  Read the chart carefully and recognize, using context, where substitutions have occurred.        "

## 2024-12-05 ENCOUNTER — TELEPHONE (OUTPATIENT)
Dept: RADIATION ONCOLOGY | Facility: CLINIC | Age: 74
End: 2024-12-05

## 2024-12-05 NOTE — TELEPHONE ENCOUNTER
Message from Dr. Guerra:  MD Aileen Alarcon, RN  Erickson UA returned and he is spilling tons of glucose.  He takes metformin so I believe is a diabetic not controlled.  This may be contributing to his frequency and urgency and his issues with diarrhea.    Please call him and let him know that before HBO therapy, he should see PCP or whoever is managing his DM and have his blood sugar controlled.  If symptoms improve significantly, he may not need this right now.  It did not look like infection to me, but hopefully culture was sent to make sure.  If you can tell, then let me know.      Called Erickson. Made him aware of above and instructed him to follow up with PCP. He verbalized understanding.

## 2024-12-12 ENCOUNTER — OFFICE VISIT (OUTPATIENT)
Dept: WOUND CARE | Facility: HOSPITAL | Age: 74
End: 2024-12-12
Payer: COMMERCIAL

## 2024-12-12 VITALS
DIASTOLIC BLOOD PRESSURE: 70 MMHG | RESPIRATION RATE: 16 BRPM | BODY MASS INDEX: 26.01 KG/M2 | TEMPERATURE: 96.3 F | SYSTOLIC BLOOD PRESSURE: 114 MMHG | HEIGHT: 72 IN | HEART RATE: 72 BPM | WEIGHT: 192 LBS

## 2024-12-12 DIAGNOSIS — N30.40 IRRADIATION CYSTITIS WITHOUT HEMATURIA: Primary | ICD-10-CM

## 2024-12-12 DIAGNOSIS — E11.65 TYPE 2 DIABETES MELLITUS WITH HYPERGLYCEMIA, WITHOUT LONG-TERM CURRENT USE OF INSULIN (HCC): ICD-10-CM

## 2024-12-12 PROCEDURE — 99213 OFFICE O/P EST LOW 20 MIN: CPT | Performed by: FAMILY MEDICINE

## 2024-12-12 PROCEDURE — 99204 OFFICE O/P NEW MOD 45 MIN: CPT | Performed by: FAMILY MEDICINE

## 2024-12-12 NOTE — PROGRESS NOTES
Patient ID: Erickson Jimenez is a 74 y.o. male Date of Birth 1950       Chief Complaint   Patient presents with   • HBO Consult     Radiation cystitis. Pt has c/o burning and frequency with urination. The patient states that he is planning to visit family out of the country in March, therefore he may need to delay treatment until after he returns.        Allergies:  Other    Diagnosis:      Diagnosis ICD-10-CM Associated Orders   1. 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   Pending cardiac clearance, patient would be an excellent candidate for hyperbaric oxygen therapy  Discussed with patient the goal of HBOT is to resolve or minimize urinary symptoms other symptoms detailed below related to radiation cystitis to reduce future hospitalizations and improve quality of life   Will obtain EKG prior to initiation of hyperbaric oxygen treatments in addition to requesting cardiac clearance considering stress echo w/ EF of 33%. Pt scheduled for chest CT for lung ca screening monitoring considering smoking hx tomorrow 12/13/20242. Will f/u result for further lung eval in place of CXR.   The patient has quit cigarette smoking 2+ years ago, occasionally continues to smoke cigars.  Discussed recommendation for complete smoking cessation even cigars.  Discussed how this relates to vasoconstriction and its effect on hyperbaric treatments.  As stated above complete cessation is recommended, he should not he smoke in the days he is undergoing hyperbaric treatments, particularly immediately prior to treatment.  He expressed understanding.  Formal consent obtained.  Patient verbalized understanding of plan and all questions have been answered.  Will plan to begin hyperbaric  "treatments pending testing results at 2.0/2.5 TANG x 40 treatments.  Of note patient does have upcoming travel plans and does not know if he is able to initiate hyperbarics right away, treatment may be delayed until May 2025.  Will continue to follow-up.   T2DM:  A1C results reviewed with the patient today. 8.2 Aug '24. Patient with history of DM will require glucose monitoring pre and post dives.   Will follow-up pending cardiac clearance and timeframe for initiation    Portions of the record may have been created with voice recognition software. Occasional wrong word or \"sound alike\" substitutions may have occurred due to the inherent limitations of voice recognition software. Read the chart carefully and recognize, using context, where substitutions have occurred.    Subjective:   Mr. Jimenez is a 75 y/o M w/ a pmhx including but not limited to high-volume/high risk prostate CA s/p Tri modality therapy (w/ ADT, EBRT and brachi boost) that is clinically URSULA, type 2 diabetes (A1c 8.2 aug 2024), hypertension CAD, smoking history 50 yr smoking hx 1-2 PPD (quit 2 years ago) - smokes a cigar one every 2 days, NICM, s/p left carotid endarterectomy January 2020, who presents today for hyperbaric oxygen therapy consult ordered by rad/onc for Irradiation Cystitis. He is now 1 year post radiation therapy that occurred from 10/18/2023 - 11/21/2023.    Per chart review of his last Rad/Onc visit w/ Dr. Guerra on 12/3/2024:  \"I am concerned about chronic radiation cystitis.  Given slow worsening of symptoms despite medical management now 1 year post radiation I discussed possibility of radiation injury to bladder and rectum.  We discussed continued medical management and I offered referral for consideration for HBO.  I explained that this can be used for chronic radiation cystitis and may help reduce symptoms.\"  Patient expressed interest in HBO therapy and consult was ordered.       The following portions of the patient's history " were reviewed and updated as appropriate:   Patient Active Problem List   Diagnosis   • Essential hypertension   • Mixed hyperlipidemia   • Ventral hernia without obstruction or gangrene   • Nonobstructive atherosclerosis of coronary artery   • Carotid artery stenosis without cerebral infarction, bilateral   • Cerebral aneurysm   • S/P Left carotid endarterectomy 1/13/20   • Familial hypercholesterolemia   • BMI 27.0-27.9,adult   • Chronic right shoulder pain   • Status post reverse total arthroplasty of right shoulder   • Erectile dysfunction of non-organic origin   • Anemia   • Left carpal tunnel syndrome   • Benign paroxysmal positional vertigo   • Facial skin lesion   • Lump in the testicle   • Prostate cancer (HCC)   • Androgen deprivation therapy   • Type 2 diabetes mellitus with hyperglycemia, without long-term current use of insulin (HCC)   • Platelets decreased (HCC)   • Low back pain radiating to left lower extremity   • Weakness of both lower extremities   • Cigarette nicotine dependence without complication   • NICM with EF recovery     Past Medical History:   Diagnosis Date   • CHF (congestive heart failure) (HCC)    • Diabetes mellitus (HCC)    • Diverticulitis    • 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HCC) 11/07/2018   • Heart disease    • Hyperlipidemia    • Hypertension    • Kidney stone    • Osteoarthritis    • Prostate cancer (HCC)    • Vascular disorder      Past Surgical History:   Procedure Laterality Date   • ABDOMINAL SURGERY     • CARDIAC CATHETERIZATION N/A 3/21/2022    Procedure: Cardiac catheterization;  Surgeon: Stephy Horner MD;  Location: [facility];  Service: Cardiology   • CARDIAC CATHETERIZATION N/A 3/21/2022    Procedure: Cardiac Coronary Angiogram;  Surgeon: Stephy Horner MD;  Location: [facility];  Service: Cardiology   • COLONOSCOPY     • FL RETROGRADE PYELOGRAM  3/23/2023   • INGUINAL HERNIA REPAIR Left    • LAPAROSCOPIC COLON RESECTION     • ND ARTHROPLASTY  GLENOHUMERAL JOINT TOTAL SHOULDER Right 11/17/2020    Procedure: ARTHROPLASTY SHOULDER REVERSE with biceps tendonesis;  Surgeon: Robby Marquez MD;  Location: [facility];  Service: Orthopedics   • RI BX PROSTATE STRTCTC SATURATION SAMPLING IMG GID N/A 5/16/2023    Procedure: TRANSPERINEAL MRI FUSION BIOPSY PROSTATE;  Surgeon: uQan Velasco MD;  Location: [facility];  Service: Urology   • RI COLONOSCOPY FLX DX W/COLLJ SPEC WHEN PFRMD N/A 11/3/2017    Procedure: COLONOSCOPY;  Surgeon: SUZAN Muñiz MD;  Location: [facility];  Service: Colorectal   • RI CYSTO/URETERO W/LITHOTRIPSY &INDWELL STENT INSRT Left 3/23/2023    Procedure: CYSTOSCOPY URETEROSCOPY WITH LITHOTRIPSY HOLMIUM LASER, RETROGRADE PYELOGRAM AND INSERTION STENT URETERAL, STONE BASKET;  Surgeon: Michelet Johns MD;  Location: [facility];  Service: Urology   • RI TEAEC W/PATCH GRF CAROTID VERTB SUBCLAV NECK INC Left 1/13/2020    Procedure: ENDARTERECTOMY ARTERY CAROTID;  Surgeon: Amado Teague MD;  [facility]: [facility];  Service: Vascular     Family History   Problem Relation Age of Onset   • Colon cancer Father    • No Known Problems Mother    • Colon cancer Paternal Grandfather    • Colon cancer Family    • Diabetes Half-Sister    • Obesity Half-Sister       Social History     Socioeconomic History   • Marital status: /Civil Union     Spouse name: Not on file   • Number of children: Not on file   • Years of education: Not on file   • Highest education level: Not on file   Occupational History   • Not on file   Tobacco Use   • Smoking status: Some Days     Types: Cigars     Passive exposure: Past   • Smokeless tobacco: Current   Vaping Use   • Vaping status: Never Used   Substance and Sexual Activity   • Alcohol use: Yes     Alcohol/week: 3.0 standard drinks of alcohol     Types: 3 Cans of beer per week     Comment: Socially   • Drug use: Yes     Comment: gummies   • Sexual activity: Not Currently   Other Topics Concern   • Not on  file   Social History Narrative    Caffeine use    Uses safety equipment: seatbelts     Social Drivers of Health     Financial Resource Strain: Low Risk  (5/9/2023)    Overall Financial Resource Strain (CARDIA)    • Difficulty of Paying Living Expenses: Not very hard   Food Insecurity: No Food Insecurity (8/20/2024)    Nursing - Inadequate Food Risk Classification    • Worried About Running Out of Food in the Last Year: Never true    • Ran Out of Food in the Last Year: Never true    • Ran Out of Food in the Last Year: Not on file   Transportation Needs: No Transportation Needs (8/20/2024)    PRAPARE - Transportation    • Lack of Transportation (Medical): No    • Lack of Transportation (Non-Medical): No   Physical Activity: Not on file   Stress: Not on file   Social Connections: Not on file   Intimate Partner Violence: Not on file   Housing Stability: Low Risk  (8/20/2024)    Housing Stability Vital Sign    • Unable to Pay for Housing in the Last Year: No    • Number of Times Moved in the Last Year: 1    • Homeless in the Last Year: No        Current Outpatient Medications:   •  amLODIPine (NORVASC) 10 mg tablet, TAKE 1 TABLET BY MOUTH DAILY, Disp: 90 tablet, Rfl: 1  •  amLODIPine (NORVASC) 5 mg tablet, Take 1 tablet (5 mg total) by mouth daily (Patient not taking: Reported on 8/15/2024), Disp: , Rfl:   •  aspirin 81 mg chewable tablet, Chew 1 tablet (81 mg total) daily, Disp: 90 tablet, Rfl: 3  •  atorvastatin (LIPITOR) 20 mg tablet, Take 1 tablet (20 mg total) by mouth daily at bedtime (Patient not taking: Reported on 8/15/2024), Disp: 90 tablet, Rfl: 1  •  Calcium Carb-Cholecalciferol (calcium carbonate-vitamin D) 500 mg-5 mcg tablet, Take 1 tablet by mouth 2 (two) times a day with meals, Disp: 180 tablet, Rfl: 3  •  carvedilol (COREG) 12.5 mg tablet, , Disp: , Rfl:   •  carvedilol (COREG) 25 mg tablet, Take 1 tablet (25 mg total) by mouth 2 (two) times a day with meals (Patient not taking: Reported on 8/15/2024),  Disp: , Rfl:   •  Continuous Blood Gluc  (Dexcom G7 ) ANITA, Use 1 Device 3 (three) times a day, Disp: 1 each, Rfl: 0  •  Continuous Blood Gluc Transmit (Dexcom G6 Transmitter) MISC, 1 TRANSMITTED EVERY 3 MONTHS FOR CONTINUOUS GLUCOSE MONITORING, Disp: 1 each, Rfl: 0  •  Empagliflozin (Jardiance) 25 MG TABS, TAKE 1 TABLET BY MOUTH DAILY, Disp: 90 tablet, Rfl: 1  •  glucose blood test strip, Use as instructed, Disp: 100 each, Rfl: 5  •  losartan (COZAAR) 100 MG tablet, TAKE 1 TABLET BY MOUTH IN THE  MORNING (Patient not taking: Reported on 8/15/2024), Disp: 90 tablet, Rfl: 1  •  losartan (COZAAR) 25 mg tablet, Take 1 tablet (25 mg total) by mouth daily, Disp: , Rfl:   •  metFORMIN (GLUCOPHAGE) 1000 MG tablet, TAKE 1 TABLET BY MOUTH TWICE  DAILY WITH MEALS, Disp: 180 tablet, Rfl: 1  •  sildenafil (VIAGRA) 25 MG tablet, TAKE 1 TABLET BY MOUTH  DAILY AS NEEDED FOR  ERECTILE DYSFUNCTION (Patient taking differently: Take 25 mg by mouth as needed), Disp: 10 tablet, Rfl: 0  •  tamsulosin (FLOMAX) 0.4 mg, Take 2 capsules (0.8 mg total) by mouth daily with dinner, Disp: 180 capsule, Rfl: 3  •  tolterodine (DETROL LA) 2 mg 24 hr capsule, take 1 capsule by mouth once daily, Disp: 90 capsule, Rfl: 1    Review of Systems   Constitutional:  Negative for chills and fever.   HENT:  Negative for congestion, ear pain, postnasal drip, rhinorrhea, sinus pressure and sinus pain.    Eyes:         Requires reading glasses on occasion    Respiratory:  Negative for chest tightness and shortness of breath.    Cardiovascular:  Negative for chest pain and palpitations.   Gastrointestinal:  Positive for diarrhea.   Genitourinary:  Positive for dysuria, frequency and urgency.   Allergic/Immunologic: Negative for environmental allergies.   Neurological:  Negative for seizures.   Psychiatric/Behavioral:  The patient is not nervous/anxious.        Objective:  /70   Pulse 72   Temp (!) 96.3 °F (35.7 °C)   Resp 16   Ht 6'  "(1.829 m)   Wt 87.1 kg (192 lb)   BMI 26.04 kg/m²   Pain Score: 0-No pain     Physical Exam  Vitals reviewed.   Constitutional:       General: He is not in acute distress.     Appearance: He is not toxic-appearing.   HENT:      Head: Normocephalic and atraumatic.      Right Ear: Tympanic membrane, ear canal and external ear normal. There is no impacted cerumen.      Left Ear: Tympanic membrane, ear canal and external ear normal. There is no impacted cerumen.   Cardiovascular:      Rate and Rhythm: Normal rate and regular rhythm.      Heart sounds: Normal heart sounds.   Pulmonary:      Effort: Pulmonary effort is normal. No respiratory distress.      Breath sounds: Normal breath sounds. No wheezing or rales.   Neurological:      Mental Status: He is alert and oriented to person, place, and time.   Psychiatric:         Mood and Affect: Mood normal.         Behavior: Behavior normal.             Lab Results   Component Value Date    HGBA1C 8.2 (H) 08/08/2024     HBO Qualification & Assessment     Erickson Jimenez presents today for a consultation for HBO treatment.    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     Per last visit w/ Rad/Onc 12/3/2024  \"The patient continues to experience significant urinary frequency and urgency. He has some urinary urge incontinence and uses pads when he is not at home for protection, but has minimal actual leakage events. He notes overall his symptoms are worse despite medication with tamsulosin and Detrol. IPSS 16, increased from 13 from June 2024. He notes several months of mild dysuria that is unchanged. He " "denies hematuria. He denies bladder spasms or CVA discomfort. He continues to experience occasional bouts of diarrhea and some urgency. He uses Imodium as needed. He denies bleeding.\"    Since the patient has radiation soft tissue necrosis as evidenced by above documentation HBO is medically necessary    Review of the PMH, joann patient had cancer and received radiation. HBO is being used as an adjunct to conventional Rx. Based on the information included herein, it is my determination that the patient has a medical necessity for HBOT and meets the conditions for the adjunctive treatment to a comprehensive plan.  Yes    Brief history of co-morbidities that may affect HBO treatment:    Previously treated with: No chemotherapy or medications which are contraindications to HBO Hormone therapy and Radiation only. No chemo.     Patient reports s/s of No acute infections    Eyes    Patient has  No history of Myopia, Cataracts or Optic Nerve.  Reports he does wear reading glasses but has no formal diagnoses of myopia or cataracts.  He thinks he is starting with cataracts but again no formal diagnoses.    Ears    Patient has a history of No ear abnormalities or conditions    Ears assessed for tympanic membrane or middle ear abnormalities Yes    Patient instructed on how to clear ears and demonstrate proper technique: Yes    Right ear baseline TEEDS: Grade 0    Left ear baseline TEEDS: Grade 0    ENT consult for Myringotomy tube insertion due to No consult is needed    Cerumen removal performed:  N/A. ears clear of cerumen    Neurological    Patient has a history of seizures: No    Cardiovascular    Patient has a history of: Cardiac arrhythmia and Hypertension ; coronary atherosclerosis, NCIM w/ EF recovery, HLD, T2DM, cerebral aneurysm 0.5cm R MCA    EKG ordered: No  Echocardiogram ordered: No    Stress Echo May 2024 -   \"•  Stress ECG: No ST deviation is noted. The ECG was not diagnostic due to pharmacological " "(vasodilator) stress.  •  Stress Function: Left ventricular function post-stress is abnormal. Global function is moderately reduced. There was a single regional abnormality during stress. Stress ejection fraction is 33%.  •  Stress Combined Conclusion: The ECG and SPECT imaging portions of the stress study are concordant with no evidence of stress induced myocardial ischemia.  •  Perfusion: There is a left ventricular perfusion defect that is large in size with moderate reduction in uptake present in the entire inferior, basal-mid inferolateral, basal-mid inferoseptal segments that is fixed.\"    Cardiovascular consult ordered: Yes -stuttering patient wishes to delay initiation of HBO due to upcoming travel plans, as patient already has appointment scheduled with cardiology 1/15/2024, for the hyperbaric clearance will reach out to cardiology prior to this patient's appointment to see if this can be assessed during appointment.  Should this require an additional appointment, we will place consult.    Smoking  Social History     Tobacco Use   Smoking Status Some Days   • Types: Cigars   • Passive exposure: Past   Smokeless Tobacco Current       Respiratory    Patient has a history of pneumothorax: No    Patient has a history of: No respiratory conditions requiring further work-up prior to HBO    Lungs clear bilaterally: Yes    Chest x-ray ordered: No Has CT lung scheduled for 12/13/2024 for his regular lung cancer screening.  Will follow-up on this result for further eval of lungs considering smoking hx.     Psychiatric    Patient has confinement anxiety: No    Patient education and consent    Informed Consent:  Erickson Jimenez has no contraindications to hyperbaric oxygen therapy. We have discussed the possible risks and complications of hyperbaric oxygen therapy.  These risks include, but are not limited to, fire, barotrauma of the ears, sinuses, and lungs to include air embolism, CNS oxygen toxicity resulting in " seizure, cataracts, myopia and exacerbation of the congestive heart failure.  Patient understands these risks along with the potential benefits and agrees to undergo hyperbaric oxygen therapy.  I discussed with and educated the patient about the HBO procedure, smoking/drug/alcohol policy, and items not allowed in the hyperbaric chamber.  No    Patient has been oriented to the HBO chamber. Clearing techniques have been reviewed.    Written consent for HBO obtained: Yes    A trained emergency response team and ICU is available in this facility to assist with complications if required: Yes    HBO treatment goal    Erickson Jimenez is an excellent candidate for hyperbaric oxygen treatment as adjunctive therapy for the treatment of irradiation cystitis without hematuria. Radiation causes an obliterative endarteritis, subsequent hypoxic/hypovascular tissue and secondary fibrosis. Hyperbaric oxygen has been shown to be an effective treatment in radiation tissue injury by stimulating angiogenesis and inducing neovacularization in the hypoxic irradiated tissue. Serial hyperbaric oxygen treatments improve local host immune response and enhance the clearance of infection by improving the leukocyte oxidative killing of aerobic bacteria and the direct bactericidal activity against many anaerobic bacteria. Hyperbaric oxygen also stimulates tissue growth through fibroblast proliferation, collagen synthesis, stimulation of the release of vascular endothelial growth factors (VEGF), induction of the receptor appearance of platelet derived growth factors (PDGF) and angiogenesis.      Angiogenesis and other goals stated below    Goal of HBOT is to resolve or minimize urinary symptoms/hematuria related to radiation cystitis to reduce future hospitalizations and improve quality of life

## 2024-12-12 NOTE — PATIENT INSTRUCTIONS
The telephone number to the [facility] (HBO) room is [phone]; the technician is Renato.  As discussed the treatments are Monday - Friday for approximately 3 hours each day.   The treatments are for 6-8 weeks.   Please read the HBO Patient Information paperwork you were given today.   Remember to eat a balanced meal prior to treatment and avoid carbonated beverages. Blood glucose must be at least 120 to have treatment.  No deodorant, hair products, fragrances, lotion, fresh nail polish prior to treatment. Please leave jewelry at home.   If you think of any questions please call Renato at the number above.    Please have EKG and chest xray completed at [facility], no appointment is necessary.   Keep your January Cardiologist appt. Dr. Hernández will send them a message requesting  HBO clearance.   At this time, due to your schedule we will not plan on beginning treatment until May 2025 so long as all testing and clearance is obtained.   If there have not been significant medical changes we may be able to plan on a brief re-consult the day you begin treatment.

## 2024-12-13 ENCOUNTER — HOSPITAL ENCOUNTER (OUTPATIENT)
Dept: CT IMAGING | Facility: HOSPITAL | Age: 74
Discharge: HOME | End: 2024-12-13
Attending: FAMILY MEDICINE

## 2024-12-13 DIAGNOSIS — F17.210 CIGARETTE NICOTINE DEPENDENCE WITHOUT COMPLICATION: ICD-10-CM

## 2024-12-17 ENCOUNTER — OFFICE VISIT (OUTPATIENT)
Dept: VASCULAR SURGERY | Facility: CLINIC | Age: 74
End: 2024-12-17
Payer: COMMERCIAL

## 2024-12-17 VITALS
BODY MASS INDEX: 26.01 KG/M2 | DIASTOLIC BLOOD PRESSURE: 74 MMHG | HEIGHT: 72 IN | WEIGHT: 192 LBS | SYSTOLIC BLOOD PRESSURE: 122 MMHG | HEART RATE: 77 BPM

## 2024-12-17 DIAGNOSIS — I65.23 CAROTID ARTERY STENOSIS WITHOUT CEREBRAL INFARCTION, BILATERAL: Primary | ICD-10-CM

## 2024-12-17 DIAGNOSIS — F17.210 CIGARETTE NICOTINE DEPENDENCE WITHOUT COMPLICATION: ICD-10-CM

## 2024-12-17 DIAGNOSIS — I83.893 VARICOSE VEINS OF BOTH LEGS WITH EDEMA: ICD-10-CM

## 2024-12-17 DIAGNOSIS — I73.9 CLAUDICATION (HCC): ICD-10-CM

## 2024-12-17 DIAGNOSIS — Z98.890 S/P CAROTID ENDARTERECTOMY: ICD-10-CM

## 2024-12-17 DIAGNOSIS — E11.65 TYPE 2 DIABETES MELLITUS WITH HYPERGLYCEMIA, WITHOUT LONG-TERM CURRENT USE OF INSULIN (HCC): ICD-10-CM

## 2024-12-17 PROCEDURE — 99214 OFFICE O/P EST MOD 30 MIN: CPT | Performed by: PHYSICIAN ASSISTANT

## 2024-12-17 NOTE — ASSESSMENT & PLAN NOTE
-Quit smoking cigarettes about 2 years ago, but still smokes a cigar occasionally  -We discussed the importance of complete smoking cessation

## 2024-12-17 NOTE — ASSESSMENT & PLAN NOTE
-Widely patent left carotid endarterectomy site  -Management as above under carotid artery stenosis  Orders:    CTA head and neck w wo contrast; Future

## 2024-12-17 NOTE — PROGRESS NOTES
Name: Erickson Jimenez      : 1950      MRN: [id number]  Encounter Provider: Gabby Claudio PA-C  Encounter Date: 2024   Encounter department: [facility] [place]  :  Assessment & Plan  Carotid artery stenosis without cerebral infarction, bilateral  S/p L CEA   -No new symptoms of TIA/stroke     -CV duplex 10/24/24: VERA 70-99% (241/109, 4.59), mod ECA; L ICA/endart patent (/)  -CV duplex 24:  R ICA 50-69% stenosis (215/105, 3.13); L ICA/endart is patent (52/)  -CV duplex 3/30/23:  R ICA 50-69% stenosis (181/72, ratio 2.46); L ICA/endart is patent (46/)    -CV duplex   22:  R ICA 50-69% stenosis (185/71, ratio 2.48): L ICA/endart is patent (109/, ratio 1.72)     -CTA  head 22: No acute intracranial abnormality.    Unchanged 0.5 cm aneurysm in right MCA bifurcation projecting inferolaterally.    Unchanged diffusely small and irregular caliber of left ICA throughout the visualized distal cervical and intracranial segments.  Moderate chronic microangiopathy.     -CTA neck and brain 20: No acute IC abn. R distal CCA and ICA 50% stenosis.    No interval change in the greater then 90% stenosis at the origin of the left ICA with a diffusely small but stable cervical and intracranial ICA.     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.     BUN/creat 0.71, eGFR 93    Plan:  -Asymptomatic, bilateral carotid artery stenosis with right ICA stenosis 70-99% and status post patent left ICA/endart site   -Reviewed carotid Dopplers which showed right have shown progressively increasing right ICA velocities now falling in the 70-99% stenosis range with  cm/s and  cm/s with a ratio of 4.59.  -Recommend CTA head and neck to confirm degree of stenosis and make additional recommendations  -Discussed pathophysiology and treatment of atherosclerotic disease of the carotids  -Discussed rationale for medical therapy  -Continue with aspirin and atorvastatin  20  -Due to progression of disease, could consider intensified statin  -Maintain good blood pressure, cholesterol and diabetes control  -Reviewed symptoms of stroke for which he should call 911  -Follow-up after CTA head and neck  Orders:    CTA head and neck w wo contrast; Future    S/P Left carotid endarterectomy 1/13/20  -Widely patent left carotid endarterectomy site  -Management as above under carotid artery stenosis  Orders:    CTA head and neck w wo contrast; Future    Type 2 diabetes mellitus with hyperglycemia, without long-term current use of insulin (HCC)  -Maintain good blood pressure, cholesterol and diabetes control per primary care  Lab Results   Component Value Date    HGBA1C 8.2 (H) 08/08/2024          Cigarette nicotine dependence without complication  -Quit smoking cigarettes about 2 years ago, but still smokes a cigar occasionally  -We discussed the importance of complete smoking cessation       Claudication (HCC)  -Legs tired and weak at varying distances, but often at a block  -No foot pain or tissue loss  -Exam: 2+ femoral pulses;  R DP trace, L DP 1+    Plan:  -Will check ABIs with waveforms  -Discussed the benefits of a regular progressive walking program  -Walking at least 30 minutes daily  -Continue with aspirin and atorvastatin 20  -Will check formal lower extremity arterial duplex, if needed  -Will follow-up on this problem at next office visit  Orders:    VAS DOUG and waveform analysis, multiple levels; Future    Varicose veins of both legs with edema  -Varicose veins and chronic stasis changes  -Unclear if the stasis changes are related to cardiomyopathy or venous insufficiency  -Given tired legs, trial of compression stockings  Orders:    Compression Stocking         Cerebral aneurysm  -3x5 mm R MCA bifurcation aneurysm  -Followed by neurology          History of Present Illness   CC: Patient presents to review CV s/p L CEA '20. Denies s/s CVA. Patient reports fatigue w/ BLE. Distances  varies. Current smoker.    HPI  Erickson Jimenez is a 74 y.o. male hypertension, hyperlipidemia, diabetes, vertigo, anemia, CAD, cerebral aneurysm, bilateral carotid artery stenosis s/p left carotid endarterectomy which required redo distal endpoint anastomosis 1/13/2020 by Dr. Teague.     He has been obtaining regular carotid dopplers. His last in-person OV was 3/2020.    12/17/24:  Mr. Jimenez presents for vascular follow up.  No symptoms of TIA/stroke.  He denies amaurosis fugax, vision changes, dysarthria unilateral numbness or weakness.  We reviewed the symptoms for which he should call 911.    We reviewed his carotid Doppler studies which have shown progressive worsening of right ICA stenosis.  Doppler suggest 70 to 99% carotid artery stenosis with end-diastolic velocity of 109.  We will verify degree of stenosis with CTA head and neck.  He was already getting a CTA of the head next week to evaluate his aneurysm, so we will add the neck portion for evaluation of the carotids.    The patient reports that his legs get very weak and tired at varying distances.  Generally he can walk a few blocks, but sometimes only 1 block before the legs get tired.  He went to [place] with his wife and he had to walk a lot.  The extra walking seem to help the back pain that he had. He did stop every few blocks.  No typical calf tightness.  He has no foot pain or tissue loss.  He does not exercise regularly since he finds walking to be boring.  We discussed the importance of daily walking.  Will check ABIs for completeness.    He also has varicose veins and evidence of chronic stasis changes which may be due to venous insufficiency possibly causing some of his claudication or may be due to underlying cardiomyopathy.  His pulse exam is pretty good with 2+ femoral pulses and 1+ DP pulses so recommend trial of compression stockings.    He was treated for prostate cancer last year.     Stress test in May EF 33% with fixed defect that is  large in size with moderate reduction in uptake present in the entire inferior, basal-mid inferolateral, basal-mid inferoseptal segments     LDL 74    CV duplex 10/24/24  [facility]  CLINICAL:  Indications:  6 month surveillance of carotid artery disease.  Patient is asymptomatic at  this time.  Operative History:  2020-01-13 Left CEA  Risk Factors  The patient has history of HTN, Diabetes (Yes), Hyperlipidemia and previous  smoking (quit <1yr ago).  Clinical  Right Pressure:  122/ mm Hg, Left Pressure:  122/ mm Hg.     FINDINGS:     Right        Impression  PSV  EDV (cm/s)  Direction of Flow  Ratio    Dist. ICA                 64          27                      1.22    Mid. ICA                 117          41                      2.23    Prox. ICA    70 - 99%    241         109                      4.59    Dist CCA                  54          21                              Mid CCA                   53          18                      0.50    Prox CCA                 106          29                      0.87    Ext Carotid  moderate    218          23                      4.15    Prox Vert                 57          19  Antegrade                   Subclavian                87           0                              Innominate               122          18                                 Left         Impression     PSV  EDV (cm/s)  Direction of Flow    Dist. ICA                    36          15                       Mid. ICA                     48          15                       Prox. ICA    Widely Patent   68          25                       Dist CCA                     47          12                       Mid CCA                      44          12                       Prox CCA                     58           9                       Ext Carotid                 100          21                       Prox Vert                    56          22  Antegrade            Subclavian                   132           3                                CONCLUSION:  Impression  RIGHT:  There is 70-99% stenosis noted in the internal carotid artery. Plaque is  heterogenous and irregular.  There is a moderate stenosis of the proximal external carotid artery.  Vertebral artery flow is antegrade. There is no significant subclavian artery  disease.     LEFT:  Widely patent internal carotid artery and endarterectomy site.  Vertebral artery flow is antegrade. There is no significant subclavian artery  disease.     Compared to previous study on 4/22/24 , there is an increase in disease in the  right internal carotid artery and new stenosis of the right proximal external  carotid artery.  Recommend repeat testing in 6 months as per protocol unless otherwise  indicated.    Review of Systems   Constitutional: Negative.    HENT: Negative.     Eyes: Negative.    Respiratory: Negative.     Cardiovascular: Negative.    Gastrointestinal: Negative.    Endocrine: Negative.    Genitourinary: Negative.    Musculoskeletal: Negative.    Skin: Negative.    Allergic/Immunologic: Negative.    Neurological: Negative.    Hematological: Negative.    Psychiatric/Behavioral: Negative.            Objective   /74 (BP Location: Left arm, Patient Position: Sitting, Cuff Size: Standard)   Pulse 77   Ht 6' (1.829 m)   Wt 87.1 kg (192 lb)   BMI 26.04 kg/m²      Physical Exam  Vitals and nursing note reviewed.   Constitutional:       Appearance: He is well-developed.   HENT:      Head: Normocephalic and atraumatic.   Eyes:      Pupils: Pupils are equal, round, and reactive to light.   Neck:      Thyroid: No thyromegaly.      Vascular: No carotid bruit or JVD.      Trachea: Trachea normal.   Cardiovascular:      Rate and Rhythm: Normal rate and regular rhythm.      Pulses:           Carotid pulses are 2+ on the right side and 2+ on the left side.       Radial pulses are 2+ on the right side and 2+ on the left side.        Femoral pulses are 2+ on  the right side and 2+ on the left side.       Dorsalis pedis pulses are 1+ on the right side and 1+ on the left side.      Heart sounds: Normal heart sounds, S1 normal and S2 normal. No murmur heard.     No friction rub. No gallop.      Comments:   Chronic stasis changes in the lower legs. No significant LE edema. Dry skin. Thickened nails, onychomycosis.   Pulmonary:      Effort: Pulmonary effort is normal. No accessory muscle usage or respiratory distress.      Breath sounds: Normal breath sounds. No wheezing or rales.   Abdominal:      General: Bowel sounds are normal. There is no distension.      Palpations: Abdomen is soft.      Tenderness: There is no abdominal tenderness.   Musculoskeletal:         General: No deformity. Normal range of motion.      Cervical back: Neck supple.   Skin:     General: Skin is warm and dry.      Findings: No lesion or rash.      Nails: There is no clubbing.   Neurological:      Mental Status: He is alert and oriented to person, place, and time.      Comments: Grossly normal    Psychiatric:         Behavior: Behavior is cooperative.         I have reviewed and made appropriate changes to the review of systems input by the medical assistant.    Vitals:    12/17/24 0821   BP: 122/74   BP Location: Left arm   Patient Position: Sitting   Cuff Size: Standard   Pulse: 77   Weight: 87.1 kg (192 lb)   Height: 6' (1.829 m)       Patient Active Problem List   Diagnosis    Essential hypertension    Mixed hyperlipidemia    Ventral hernia without obstruction or gangrene    Nonobstructive atherosclerosis of coronary artery    Carotid artery stenosis without cerebral infarction, bilateral    Cerebral aneurysm    S/P Left carotid endarterectomy 1/13/20    Familial hypercholesterolemia    BMI 27.0-27.9,adult    Chronic right shoulder pain    Status post reverse total arthroplasty of right shoulder    Erectile dysfunction of non-organic origin    Anemia    Left carpal tunnel syndrome    Benign  paroxysmal positional vertigo    Facial skin lesion    Lump in the testicle    Prostate cancer (HCC)    Androgen deprivation therapy    Type 2 diabetes mellitus with hyperglycemia, without long-term current use of insulin (HCC)    Platelets decreased (HCC)    Low back pain radiating to left lower extremity    Weakness of both lower extremities    Cigarette nicotine dependence without complication    NICM with EF recovery    Varicose veins of both legs with edema       Past Surgical History:   Procedure Laterality Date    ABDOMINAL SURGERY      CARDIAC CATHETERIZATION N/A 3/21/2022    Procedure: Cardiac catheterization;  Surgeon: Stephy Horner MD;  Location: [facility];  Service: Cardiology    CARDIAC CATHETERIZATION N/A 3/21/2022    Procedure: Cardiac Coronary Angiogram;  Surgeon: Stephy Horner MD;  Location: [facility];  Service: Cardiology    COLONOSCOPY      FL RETROGRADE PYELOGRAM  3/23/2023    INGUINAL HERNIA REPAIR Left     LAPAROSCOPIC COLON RESECTION      UT ARTHROPLASTY GLENOHUMERAL JOINT TOTAL SHOULDER Right 11/17/2020    Procedure: ARTHROPLASTY SHOULDER REVERSE with biceps tendonesis;  Surgeon: Robby Marqeuz MD;  Location: [facility];  Service: Orthopedics    UT BX PROSTATE STRTCTC SATURATION SAMPLING IMG GID N/A 5/16/2023    Procedure: TRANSPERINEAL MRI FUSION BIOPSY PROSTATE;  Surgeon: Quan Velasco MD;  Location: [facility];  Service: Urology    UT COLONOSCOPY FLX DX W/COLLJ SPEC WHEN PFRMD N/A 11/3/2017    Procedure: COLONOSCOPY;  Surgeon: SUZAN Muñiz MD;  Location: [facility];  Service: Colorectal    UT CYSTO/URETERO W/LITHOTRIPSY &INDWELL STENT INSRT Left 3/23/2023    Procedure: CYSTOSCOPY URETEROSCOPY WITH LITHOTRIPSY HOLMIUM LASER, RETROGRADE PYELOGRAM AND INSERTION STENT URETERAL, STONE BASKET;  Surgeon: Michelet Johns MD;  Location: [facility];  Service: Urology    UT TEAEC W/PATCH GRF CAROTID VERTB SUBCLAV NECK INC Left 1/13/2020    Procedure:  ENDARTERECTOMY ARTERY CAROTID;  Surgeon: Amado Teague MD;  Location: [facility];  Service: Vascular       Family History   Problem Relation Age of Onset    Colon cancer Father     No Known Problems Mother     Colon cancer Paternal Grandfather     Colon cancer Family     Diabetes Half-Sister     Obesity Half-Sister        Social History     Socioeconomic History    Marital status: /Civil Union     Spouse name: Not on file    Number of children: Not on file    Years of education: Not on file    Highest education level: Not on file   Occupational History    Not on file   Tobacco Use    Smoking status: Some Days     Types: Cigars     Passive exposure: Past    Smokeless tobacco: Current   Vaping Use    Vaping status: Never Used   Substance and Sexual Activity    Alcohol use: Yes     Alcohol/week: 3.0 standard drinks of alcohol     Types: 3 Cans of beer per week     Comment: Socially    Drug use: Yes     Comment: gummies    Sexual activity: Not Currently   Other Topics Concern    Not on file   Social History Narrative    Caffeine use    Uses safety equipment: seatbelts     Social Drivers of Health     Financial Resource Strain: Low Risk  (5/9/2023)    Overall Financial Resource Strain (CARDIA)     Difficulty of Paying Living Expenses: Not very hard   Food Insecurity: No Food Insecurity (8/20/2024)    Nursing - Inadequate Food Risk Classification     Worried About Running Out of Food in the Last Year: Never true     Ran Out of Food in the Last Year: Never true     Ran Out of Food in the Last Year: Not on file   Transportation Needs: No Transportation Needs (8/20/2024)    PRAPARE - Transportation     Lack of Transportation (Medical): No     Lack of Transportation (Non-Medical): No   Physical Activity: Not on file   Stress: Not on file   Social Connections: Not on file   Intimate Partner Violence: Not on file   Housing Stability: Low Risk  (8/20/2024)    Housing Stability Vital Sign     Unable to Pay for Housing  in the Last Year: No     Number of Times Moved in the Last Year: 1     Homeless in the Last Year: No       Allergies   Allergen Reactions    Other Hives     Soft shell crabs          Current Outpatient Medications:     amLODIPine (NORVASC) 10 mg tablet, TAKE 1 TABLET BY MOUTH DAILY, Disp: 90 tablet, Rfl: 1    aspirin 81 mg chewable tablet, Chew 1 tablet (81 mg total) daily, Disp: 90 tablet, Rfl: 3    atorvastatin (LIPITOR) 20 mg tablet, Take 1 tablet (20 mg total) by mouth daily at bedtime, Disp: 90 tablet, Rfl: 1    carvedilol (COREG) 12.5 mg tablet, , Disp: , Rfl:     Continuous Blood Gluc  (Dexcom G7 ) ANITA, Use 1 Device 3 (three) times a day, Disp: 1 each, Rfl: 0    Continuous Blood Gluc Transmit (Dexcom G6 Transmitter) MISC, 1 TRANSMITTED EVERY 3 MONTHS FOR CONTINUOUS GLUCOSE MONITORING, Disp: 1 each, Rfl: 0    Empagliflozin (Jardiance) 25 MG TABS, TAKE 1 TABLET BY MOUTH DAILY, Disp: 90 tablet, Rfl: 1    glucose blood test strip, Use as instructed, Disp: 100 each, Rfl: 5    losartan (COZAAR) 25 mg tablet, Take 1 tablet (25 mg total) by mouth daily, Disp: , Rfl:     metFORMIN (GLUCOPHAGE) 1000 MG tablet, TAKE 1 TABLET BY MOUTH TWICE  DAILY WITH MEALS, Disp: 180 tablet, Rfl: 1    sildenafil (VIAGRA) 25 MG tablet, TAKE 1 TABLET BY MOUTH  DAILY AS NEEDED FOR  ERECTILE DYSFUNCTION, Disp: 10 tablet, Rfl: 0    tamsulosin (FLOMAX) 0.4 mg, Take 2 capsules (0.8 mg total) by mouth daily with dinner, Disp: 180 capsule, Rfl: 3    tolterodine (DETROL LA) 2 mg 24 hr capsule, take 1 capsule by mouth once daily, Disp: 90 capsule, Rfl: 1    Calcium Carb-Cholecalciferol (calcium carbonate-vitamin D) 500 mg-5 mcg tablet, Take 1 tablet by mouth 2 (two) times a day with meals, Disp: 180 tablet, Rfl: 3

## 2024-12-17 NOTE — ASSESSMENT & PLAN NOTE
S/p L CEA '20  -No new symptoms of TIA/stroke     -CV duplex 10/24/24: VERA 70-99% (241/109, 4.59), mod ECA; L ICA/endart patent (68/25)  -CV duplex 4/22/24:  R ICA 50-69% stenosis (215/105, 3.13); L ICA/endart is patent (52/22)  -CV duplex 3/30/23:  R ICA 50-69% stenosis (181/72, ratio 2.46); L ICA/endart is patent (46/22)    -CV duplex   9/1/22:  R ICA 50-69% stenosis (185/71, ratio 2.48): L ICA/endart is patent (109/23, ratio 1.72)     -CTA  head 12/8/22: No acute intracranial abnormality.    Unchanged 0.5 cm aneurysm in right MCA bifurcation projecting inferolaterally.    Unchanged diffusely small and irregular caliber of left ICA throughout the visualized distal cervical and intracranial segments.  Moderate chronic microangiopathy.     -CTA neck and brain 12/4/20: No acute IC abn. R distal CCA and ICA 50% stenosis.    No interval change in the greater then 90% stenosis at the origin of the left ICA with a diffusely small but stable cervical and intracranial ICA.     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.     BUN/creat 0.71, eGFR 93    Plan:  -Asymptomatic, bilateral carotid artery stenosis with right ICA stenosis 70-99% and status post patent left ICA/endart site   -Reviewed carotid Dopplers which showed right have shown progressively increasing right ICA velocities now falling in the 70-99% stenosis range with  cm/s and  cm/s with a ratio of 4.59.  -Recommend CTA head and neck to confirm degree of stenosis and make additional recommendations  -Discussed pathophysiology and treatment of atherosclerotic disease of the carotids  -Discussed rationale for medical therapy  -Continue with aspirin and atorvastatin 20  -Due to progression of disease, could consider intensified statin  -Maintain good blood pressure, cholesterol and diabetes control  -Reviewed symptoms of stroke for which he should call 911  -Follow-up after CTA head and neck  Orders:    CTA head and neck w wo contrast; Future

## 2024-12-17 NOTE — PATIENT INSTRUCTIONS
Symptoms of stroke:  - Unable to speak or understand speech  - Unable to move one side of the body (arm or leg)  - Visual changes  - Call 911 for any symptoms of stroke      Claudication, leg pain with walking    -Will check ABIs with waveforms  -Discussed the benefits of a regular progressive walking program  -Walking at least 30 minutes daily  -Continue with aspirin and atorvastatin 20  -Will check formal lower extremity arterial duplex, if needed  -Will follow-up on this problem at next office visit      Varicose veins with venous insufficiency  -Recommend trial of compression stockings to be worn daily and removed at night

## 2024-12-17 NOTE — ASSESSMENT & PLAN NOTE
-Maintain good blood pressure, cholesterol and diabetes control per primary care  Lab Results   Component Value Date    HGBA1C 8.2 (H) 08/08/2024

## 2024-12-17 NOTE — ASSESSMENT & PLAN NOTE
-Varicose veins and chronic stasis changes  -Unclear if the stasis changes are related to cardiomyopathy or venous insufficiency  -Given tired legs, trial of compression stockings  Orders:    Compression Stocking

## 2024-12-20 ENCOUNTER — VBI (OUTPATIENT)
Dept: ADMINISTRATIVE | Facility: OTHER | Age: 74
End: 2024-12-20

## 2024-12-21 NOTE — TELEPHONE ENCOUNTER
12/20/24 8:38 PM     Chart reviewed for Hemoglobin A1c was/were submitted to the patient's insurance.     Olamide Sen   [facility]

## 2024-12-27 ENCOUNTER — RESULTS FOLLOW-UP (OUTPATIENT)
Dept: VASCULAR SURGERY | Facility: CLINIC | Age: 74
End: 2024-12-27

## 2024-12-27 ENCOUNTER — HOSPITAL ENCOUNTER (OUTPATIENT)
Dept: CT IMAGING | Facility: HOSPITAL | Age: 74
End: 2024-12-27
Payer: COMMERCIAL

## 2024-12-27 DIAGNOSIS — Z98.890 S/P CAROTID ENDARTERECTOMY: ICD-10-CM

## 2024-12-27 DIAGNOSIS — I65.23 CAROTID ARTERY STENOSIS WITHOUT CEREBRAL INFARCTION, BILATERAL: ICD-10-CM

## 2024-12-27 PROCEDURE — 70496 CT ANGIOGRAPHY HEAD: CPT

## 2024-12-27 PROCEDURE — 70498 CT ANGIOGRAPHY NECK: CPT

## 2024-12-27 RX ADMIN — IOHEXOL 100 ML: 350 INJECTION, SOLUTION INTRAVENOUS at 07:37

## 2024-12-29 DIAGNOSIS — C61 PROSTATE CANCER (HCC): ICD-10-CM

## 2024-12-31 RX ORDER — TAMSULOSIN HYDROCHLORIDE 0.4 MG/1
0.8 CAPSULE ORAL
Qty: 180 CAPSULE | Refills: 1 | Status: SHIPPED | OUTPATIENT
Start: 2024-12-31

## 2025-01-06 NOTE — ASSESSMENT & PLAN NOTE
Returns for 2 year follow up of a 5mm right MCA bifurcation aneurysm.  Found on work up of carotid stenosis in 2019, s/p left CEA 1/2020.   On 81mg ASA. No new neurological complaints  Exam: non-focal.    Imaging:  CTA head w/wo, 12/8/22: No acute intracranial abnormality. Unchanged 0.5 cm aneurysm in right MCA bifurcation projecting inferolaterally. Unchanged diffusely small and irregular caliber of left ICA throughout the visualized distal cervical and intracranial segments. Moderate chronic microangiopathy:No change. 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.Stable diffusely small cervical and intracranial left internal carotid artery.    Plan:  Reviewed imaging with patient and Dr. Barr. His aneurysm is stable on imaging but he has progression in right carotid stenosis.  The right carotid is his dominant artery in the anterior circulation as he has had a previous left CEA and residual diminutive caliber artery distal to the endarterectomy site.   Recommend diagnostic angiogram for further evaluation.  Risks/benefits discussed and informed consent obtained by Dr. Barr.   Continue ASA, statin.  OR  to be in touch regarding date for procedure, instructions, etc  Follow up after the angiogram with Dr. Barr to discuss further recommendations/treatment options either with neurosurgery vs vascular surgery.   Call sooner with any questions or concerns.     Orders:    IR cerebral angiography; Future

## 2025-01-07 ENCOUNTER — OFFICE VISIT (OUTPATIENT)
Dept: FAMILY MEDICINE CLINIC | Facility: CLINIC | Age: 75
End: 2025-01-07
Payer: COMMERCIAL

## 2025-01-07 VITALS
HEIGHT: 72 IN | DIASTOLIC BLOOD PRESSURE: 70 MMHG | OXYGEN SATURATION: 97 % | BODY MASS INDEX: 28.39 KG/M2 | HEART RATE: 79 BPM | WEIGHT: 209.6 LBS | SYSTOLIC BLOOD PRESSURE: 124 MMHG | TEMPERATURE: 97.9 F

## 2025-01-07 DIAGNOSIS — I42.8 NONISCHEMIC CARDIOMYOPATHY (HCC): ICD-10-CM

## 2025-01-07 DIAGNOSIS — J43.9 PULMONARY EMPHYSEMA, UNSPECIFIED EMPHYSEMA TYPE (HCC): ICD-10-CM

## 2025-01-07 DIAGNOSIS — I50.9 CHRONIC HEART FAILURE, UNSPECIFIED HEART FAILURE TYPE (HCC): ICD-10-CM

## 2025-01-07 DIAGNOSIS — D69.6 PLATELETS DECREASED (HCC): ICD-10-CM

## 2025-01-07 DIAGNOSIS — E11.65 TYPE 2 DIABETES MELLITUS WITH HYPERGLYCEMIA, WITHOUT LONG-TERM CURRENT USE OF INSULIN (HCC): Primary | ICD-10-CM

## 2025-01-07 DIAGNOSIS — I47.20 VENTRICULAR TACHYCARDIA (HCC): ICD-10-CM

## 2025-01-07 DIAGNOSIS — C61 PROSTATE CANCER (HCC): ICD-10-CM

## 2025-01-07 DIAGNOSIS — E78.01 FAMILIAL HYPERCHOLESTEROLEMIA: ICD-10-CM

## 2025-01-07 PROBLEM — N50.89 LUMP IN THE TESTICLE: Status: RESOLVED | Noted: 2023-05-09 | Resolved: 2025-01-07

## 2025-01-07 LAB — SL AMB POCT HEMOGLOBIN AIC: 8.3 (ref ?–6.5)

## 2025-01-07 PROCEDURE — 83036 HEMOGLOBIN GLYCOSYLATED A1C: CPT | Performed by: FAMILY MEDICINE

## 2025-01-07 PROCEDURE — 99214 OFFICE O/P EST MOD 30 MIN: CPT | Performed by: FAMILY MEDICINE

## 2025-01-07 NOTE — ASSESSMENT & PLAN NOTE
Lab Results   Component Value Date    HGBA1C 8.3 (A) 01/07/2025       Orders:  •  POCT hemoglobin A1c-8.3  He would like to work on his diet for now and continue jardiance  •  Comprehensive metabolic panel; Future  •  Albumin / creatinine urine ratio; Future

## 2025-01-07 NOTE — PROGRESS NOTES
Name: Erickson Jimenez      : 1950      MRN: [id number]  Encounter Provider: Efrain Rhodes MD  Encounter Date: 2025   Encounter department: [facility]  :  Assessment & Plan  Type 2 diabetes mellitus with hyperglycemia, without long-term current use of insulin (HCC)    Lab Results   Component Value Date    HGBA1C 8.3 (A) 2025       Orders:  •  POCT hemoglobin A1c-8.3  He would like to work on his diet for now and continue jardiance  •  Comprehensive metabolic panel; Future  •  Albumin / creatinine urine ratio; Future    Chronic heart failure, unspecified heart failure type (HCC)  Wt Readings from Last 3 Encounters:   25 95.1 kg (209 lb 9.6 oz)   24 87.1 kg (192 lb)   24 87.1 kg (192 lb)     No symptoms related to this.         Ventricular tachycardia (HCC)  Continue coreg       Nonischemic cardiomyopathy (HCC)  Continue medical therapy       Platelets decreased (HCC)    Orders:  •  CBC and differential; Future    Prostate cancer (HCC)  F/U with Urology       Familial hypercholesterolemia    Orders:  •  Lipid panel; Future    Pulmonary emphysema, unspecified emphysema type (HCC)    Orders:  •  Complete PFT with post bronchodilator; Future    Follow up in 6 months       History of Present Illness     Patient is here for follow up. Has Type 2 DM he denies any symptoms related to this. He is trying to follow a low carb diet also taking jardiance.  In addition, has CHF denies any SOB or leg swelling.  Has a Hx of tachycardia denies any palpitations.  Has low platelets denies any bleeding episodes.  Has a hx of prostate cancer dos follow up with Urology.  Was found to have emphysema per most recent imaging.      Review of Systems   Constitutional:  Negative for activity change, appetite change, fatigue and fever.   HENT:  Negative for congestion and ear discharge.    Respiratory:  Negative for cough and shortness of breath.    Cardiovascular:  Negative for chest pain  and palpitations.   Gastrointestinal:  Negative for diarrhea and nausea.   Musculoskeletal:  Negative for arthralgias and back pain.   Skin:  Negative for color change and rash.   Neurological:  Negative for dizziness and headaches.   Psychiatric/Behavioral:  Negative for agitation and behavioral problems.        Objective   /70 (BP Location: Left arm, Patient Position: Sitting)   Pulse 79   Temp 97.9 °F (36.6 °C) (Temporal)   Ht 6' (1.829 m)   Wt 95.1 kg (209 lb 9.6 oz)   SpO2 97%   BMI 28.43 kg/m²      Physical Exam  Constitutional:       General: He is not in acute distress.     Appearance: He is well-developed. He is not diaphoretic.   Eyes:      General: No scleral icterus.     Pupils: Pupils are equal, round, and reactive to light.   Cardiovascular:      Rate and Rhythm: Normal rate and regular rhythm.      Heart sounds: Normal heart sounds. No murmur heard.  Pulmonary:      Effort: Pulmonary effort is normal. No respiratory distress.      Breath sounds: Normal breath sounds. No wheezing.   Abdominal:      General: Bowel sounds are normal. There is no distension.      Palpations: Abdomen is soft.      Tenderness: There is no abdominal tenderness.   Skin:     General: Skin is warm and dry.      Findings: No rash.   Neurological:      Mental Status: He is alert and oriented to person, place, and time.

## 2025-01-07 NOTE — ASSESSMENT & PLAN NOTE
Wt Readings from Last 3 Encounters:   01/07/25 95.1 kg (209 lb 9.6 oz)   12/17/24 87.1 kg (192 lb)   12/12/24 87.1 kg (192 lb)     No symptoms related to this.

## 2025-01-08 ENCOUNTER — OFFICE VISIT (OUTPATIENT)
Dept: NEUROSURGERY | Facility: CLINIC | Age: 75
End: 2025-01-08
Payer: COMMERCIAL

## 2025-01-08 VITALS
RESPIRATION RATE: 18 BRPM | BODY MASS INDEX: 26.28 KG/M2 | TEMPERATURE: 97.6 F | DIASTOLIC BLOOD PRESSURE: 82 MMHG | OXYGEN SATURATION: 96 % | HEART RATE: 85 BPM | HEIGHT: 72 IN | WEIGHT: 194 LBS | SYSTOLIC BLOOD PRESSURE: 126 MMHG

## 2025-01-08 DIAGNOSIS — I67.1 CEREBRAL ANEURYSM: Primary | ICD-10-CM

## 2025-01-08 DIAGNOSIS — Z79.01 LONG TERM (CURRENT) USE OF ANTICOAGULANTS: ICD-10-CM

## 2025-01-08 DIAGNOSIS — Z01.812 PRE-PROCEDURAL LABORATORY EXAMINATION: ICD-10-CM

## 2025-01-08 PROCEDURE — 99215 OFFICE O/P EST HI 40 MIN: CPT | Performed by: NEUROLOGICAL SURGERY

## 2025-01-08 RX ORDER — SODIUM CHLORIDE 9 MG/ML
75 INJECTION, SOLUTION INTRAVENOUS CONTINUOUS
OUTPATIENT
Start: 2025-01-08

## 2025-01-08 NOTE — PROGRESS NOTES
Name: Erickson Jimenez      : 1950      MRN: [id number]  Encounter Provider: Isac Barr MD  Encounter Date: 2025   Encounter department: [facility]  :  Assessment & Plan  Cerebral aneurysm  Returns for 2 year follow up of a 5mm right MCA bifurcation aneurysm.  Found on work up of carotid stenosis in , s/p left CEA 2020.   On 81mg ASA. No new neurological complaints  Exam: non-focal.    Imaging:  CTA head w/wo, 22: No acute intracranial abnormality. Unchanged 0.5 cm aneurysm in right MCA bifurcation projecting inferolaterally. Unchanged diffusely small and irregular caliber of left ICA throughout the visualized distal cervical and intracranial segments. Moderate chronic microangiopathy:No change. 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.Stable diffusely small cervical and intracranial left internal carotid artery.    Plan:  Reviewed imaging with patient and Dr. Barr. His aneurysm is stable on imaging but he has progression in right carotid stenosis.  The right carotid is his dominant artery in the anterior circulation as he has had a previous left CEA and residual diminutive caliber artery distal to the endarterectomy site.   Recommend diagnostic angiogram for further evaluation.  Risks/benefits discussed and informed consent obtained by Dr. Barr.   Continue ASA, statin.  OR  to be in touch regarding date for procedure, instructions, etc  Follow up after the angiogram with Dr. Barr to discuss further recommendations/treatment options either with neurosurgery vs vascular surgery.   Call sooner with any questions or concerns.     Orders:    IR cerebral angiography; Future        History of Present Illness   74 year old gentleman seen for 2 year follow up of a 5mm right MCA bifurcation aneurysm found on work up of carotid stenosis in . Ultimately underwent left CEA 2020, currently on ASA 81mg. He is doing well. Denies headaches, blurry  vision, new weakness or numbness/tingling. No recent falls. Quit smoking about 2 years ago but will have an occasional cigar. He denies any family history of aneurysm or sudden death. CTA also suggests worsening of his right ICA stenosis.       Review of Systems   Constitutional:  Positive for fatigue.   HENT:  Negative for tinnitus.    Eyes:  Negative for visual disturbance.   Respiratory: Negative.     Cardiovascular: Negative.    Gastrointestinal:  Negative for nausea and vomiting.   Endocrine: Negative.    Musculoskeletal:  Negative for gait problem.   Skin: Negative.    Allergic/Immunologic: Negative.    Neurological:  Negative for dizziness, speech difficulty, weakness, numbness and headaches.   Hematological: Negative.    Psychiatric/Behavioral:  Negative for confusion.     I have personally reviewed the MA's review of systems and made changes as necessary.    Past Medical History   Past Medical History:   Diagnosis Date    CAD (coronary artery disease)     CHF (congestive heart failure) (HCC)     Diabetes mellitus (HCC)     Diverticulitis     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HCC) 11/07/2018    Heart disease     Hyperlipidemia     Hypertension     Kidney stone     Nonobstructive atherosclerosis of coronary artery     Osteoarthritis     Prostate cancer (HCC)     Vascular disorder      Past Surgical History:   Procedure Laterality Date    ABDOMINAL SURGERY      CARDIAC CATHETERIZATION N/A 3/21/2022    Procedure: Cardiac catheterization;  Surgeon: Stephy Horner MD;  [facility];  Service: Cardiology    CARDIAC CATHETERIZATION N/A 3/21/2022    Procedure: Cardiac Coronary Angiogram;  Surgeon: Stephy Horner MD;  [facility];  Service: Cardiology    COLONOSCOPY      FL RETROGRADE PYELOGRAM  3/23/2023    INGUINAL HERNIA REPAIR Left     LAPAROSCOPIC COLON RESECTION      LA ARTHROPLASTY GLENOHUMERAL JOINT TOTAL SHOULDER Right 11/17/2020    Procedure: ARTHROPLASTY SHOULDER  REVERSE with biceps tendonesis;  Surgeon: Robby Marquez MD;  [facility];  Service: Orthopedics    NJ BX PROSTATE STRTCTC SATURATION SAMPLING IMG GID N/A 5/16/2023    Procedure: TRANSPERINEAL MRI FUSION BIOPSY PROSTATE;  Surgeon: Quan Velasco MD;  [facility];  Service: Urology    NJ COLONOSCOPY FLX DX W/COLLJ SPEC WHEN PFRMD N/A 11/3/2017    Procedure: COLONOSCOPY;  Surgeon: SUZAN Muñiz MD;  [facility];  Service: Colorectal    NJ CYSTO/URETERO W/LITHOTRIPSY &INDWELL STENT INSRT Left 3/23/2023    Procedure: CYSTOSCOPY URETEROSCOPY WITH LITHOTRIPSY HOLMIUM LASER, RETROGRADE PYELOGRAM AND INSERTION STENT URETERAL, STONE BASKET;  Surgeon: Michelet Johns MD;  [facility];  Service: Urology    NJ TEAEC W/PATCH GRF CAROTID VERTB SUBCLAV NECK INC Left 1/13/2020    Procedure: ENDARTERECTOMY ARTERY CAROTID;  Surgeon: Amado Teague MD;  [facility];  Service: Vascular     Family History   Problem Relation Age of Onset    Colon cancer Father     No Known Problems Mother     Colon cancer Paternal Grandfather     Colon cancer Family     Diabetes Half-Sister     Obesity Half-Sister       reports that he has been smoking cigars. He has been exposed to tobacco smoke. He uses smokeless tobacco. He reports current alcohol use of about 3.0 standard drinks of alcohol per week. He reports current drug use.  Current Outpatient Medications on File Prior to Visit   Medication Sig Dispense Refill    amLODIPine (NORVASC) 10 mg tablet TAKE 1 TABLET BY MOUTH DAILY 90 tablet 1    aspirin 81 mg chewable tablet Chew 1 tablet (81 mg total) daily 90 tablet 3    atorvastatin (LIPITOR) 20 mg tablet Take 1 tablet (20 mg total) by mouth daily at bedtime 90 tablet 1    carvedilol (COREG) 12.5 mg tablet       Continuous Blood Gluc  (Dexcom G7 ) ANITA Use 1 Device 3 (three) times a day 1 each 0    Continuous Blood Gluc Transmit (Dexcom G6 Transmitter) MISC 1 TRANSMITTED EVERY  3 MONTHS FOR CONTINUOUS GLUCOSE MONITORING 1 each 0    Empagliflozin (Jardiance) 25 MG TABS TAKE 1 TABLET BY MOUTH DAILY 90 tablet 1    glucose blood test strip Use as instructed 100 each 5    losartan (COZAAR) 25 mg tablet Take 1 tablet (25 mg total) by mouth daily      metFORMIN (GLUCOPHAGE) 1000 MG tablet TAKE 1 TABLET BY MOUTH TWICE  DAILY WITH MEALS 180 tablet 1    sildenafil (VIAGRA) 25 MG tablet TAKE 1 TABLET BY MOUTH  DAILY AS NEEDED FOR  ERECTILE DYSFUNCTION 10 tablet 0    tamsulosin (FLOMAX) 0.4 mg TAKE 2 CAPSULES BY MOUTH DAILY  WITH DINNER 180 capsule 1    tolterodine (DETROL LA) 2 mg 24 hr capsule take 1 capsule by mouth once daily 90 capsule 1    Calcium Carb-Cholecalciferol (calcium carbonate-vitamin D) 500 mg-5 mcg tablet Take 1 tablet by mouth 2 (two) times a day with meals 180 tablet 3     No current facility-administered medications on file prior to visit.     Allergies   Allergen Reactions    Other Hives     Soft shell crabs       Social History     Tobacco Use    Smoking status: Some Days     Types: Cigars     Passive exposure: Past    Smokeless tobacco: Current   Vaping Use    Vaping status: Never Used   Substance and Sexual Activity    Alcohol use: Yes     Alcohol/week: 3.0 standard drinks of alcohol     Types: 3 Cans of beer per week     Comment: Socially    Drug use: Yes     Comment: gummies    Sexual activity: Not Currently        Objective   /82 (BP Location: Left arm, Patient Position: Sitting, Cuff Size: Adult)   Pulse 85   Temp 97.6 °F (36.4 °C) (Temporal)   Resp 18   Ht 6' (1.829 m)   Wt 88 kg (194 lb)   SpO2 96%   BMI 26.31 kg/m²     Physical Exam  Vitals reviewed.   Constitutional:       General: He is awake.      Appearance: Normal appearance.   HENT:      Head: Normocephalic and atraumatic.   Eyes:      Extraocular Movements: Extraocular movements intact.      Conjunctiva/sclera: Conjunctivae normal.      Pupils: Pupils are equal, round, and reactive to light.    Cardiovascular:      Rate and Rhythm: Normal rate.      Comments: 2+ radial pulses  Pulmonary:      Effort: Pulmonary effort is normal.   Skin:     General: Skin is warm and dry.   Neurological:      Mental Status: He is alert.      Deep Tendon Reflexes:      Reflex Scores:       Bicep reflexes are 2+ on the right side and 2+ on the left side.       Brachioradialis reflexes are 2+ on the right side and 2+ on the left side.       Patellar reflexes are 2+ on the right side and 2+ on the left side.  Psychiatric:         Attention and Perception: Attention and perception normal.         Mood and Affect: Mood and affect normal.         Speech: Speech normal.         Behavior: Behavior normal. Behavior is cooperative.         Thought Content: Thought content normal.         Cognition and Memory: Cognition and memory normal.         Judgment: Judgment normal.       Neurological Exam  Mental Status  Awake and alert. Memory is normal. Recent and remote memory are intact. Speech is normal. Able to name objects. Follows two-step commands. Able to perform serial calculations. Fund of knowledge is appropriate for level of education.    Cranial Nerves  CN III, IV, VI: Extraocular movements intact bilaterally. Pupils equal round and reactive to light bilaterally.  CN V:  Right: Facial sensation is normal.  Left: Facial sensation is normal on the left.  CN VII: Full and symmetric facial movement.  CN VIII: Hearing is normal.  CN XI:  Right: Trapezius strength is normal.  Left: Trapezius strength is normal.  CN XII: Tongue midline without atrophy or fasciculations.    Motor  Normal muscle bulk throughout. Normal muscle tone. No pronator drift.                                             Right                     Left  Finger flexion                         5                          5  Thumb flexion                        5                          5  Hip flexion                              5                           5  Plantarflexion                         5                          5  Dorsiflexion                            5                          5                                             Right                     Left  Deltoid                                   5                          5   Biceps                                   5                          5   Triceps                                  5                          5    Sensory  Light touch is normal in upper and lower extremities.     Reflexes                                            Right                      Left  Brachioradialis                    2+                         2+  Biceps                                 2+                         2+  Patellar                                2+                         2+    Right pathological reflexes: Yazmin's absent.  Left pathological reflexes: Yazmin's absent.    Coordination  Right: Finger-to-nose normal.Left: Finger-to-nose normal.    Gait  Casual gait is normal including stance, stride, and arm swing.

## 2025-01-15 ENCOUNTER — OFFICE VISIT (OUTPATIENT)
Dept: CARDIOLOGY CLINIC | Facility: CLINIC | Age: 75
End: 2025-01-15
Payer: COMMERCIAL

## 2025-01-15 VITALS
HEART RATE: 73 BPM | HEIGHT: 72 IN | WEIGHT: 201 LBS | DIASTOLIC BLOOD PRESSURE: 74 MMHG | BODY MASS INDEX: 27.22 KG/M2 | SYSTOLIC BLOOD PRESSURE: 108 MMHG

## 2025-01-15 DIAGNOSIS — I65.23 CAROTID ARTERY STENOSIS WITHOUT CEREBRAL INFARCTION, BILATERAL: ICD-10-CM

## 2025-01-15 DIAGNOSIS — I10 ESSENTIAL HYPERTENSION: ICD-10-CM

## 2025-01-15 DIAGNOSIS — I42.8 NONISCHEMIC CARDIOMYOPATHY (HCC): Primary | ICD-10-CM

## 2025-01-15 DIAGNOSIS — E78.2 MIXED HYPERLIPIDEMIA: ICD-10-CM

## 2025-01-15 DIAGNOSIS — I25.10 CORONARY ARTERY DISEASE INVOLVING NATIVE CORONARY ARTERY OF NATIVE HEART WITHOUT ANGINA PECTORIS: ICD-10-CM

## 2025-01-15 PROCEDURE — 99214 OFFICE O/P EST MOD 30 MIN: CPT | Performed by: INTERNAL MEDICINE

## 2025-01-15 RX ORDER — ATORVASTATIN CALCIUM 80 MG/1
80 TABLET, FILM COATED ORAL
Qty: 90 TABLET | Refills: 5 | Status: SHIPPED | OUTPATIENT
Start: 2025-01-15

## 2025-01-15 RX ORDER — AMLODIPINE BESYLATE 5 MG/1
5 TABLET ORAL DAILY
Qty: 90 TABLET | Refills: 5 | Status: SHIPPED | OUTPATIENT
Start: 2025-01-15 | End: 2025-01-15 | Stop reason: DRUGHIGH

## 2025-01-15 RX ORDER — LOSARTAN POTASSIUM 25 MG/1
25 TABLET ORAL DAILY
Qty: 90 TABLET | Refills: 5 | Status: SHIPPED | OUTPATIENT
Start: 2025-01-15

## 2025-01-15 NOTE — ASSESSMENT & PLAN NOTE
Too well-controlled.  Will hold amlodipine for now and if needed increase losartan dose if there is a rebound.  Patient will keep track of this at home and let me know.

## 2025-01-15 NOTE — PROGRESS NOTES
Patient ID: Erickson Jimenez is a 74 y.o. male.        Plan:      Assessment & Plan  Essential hypertension  Too well-controlled.  Will hold amlodipine for now and if needed increase losartan dose if there is a rebound.  Patient will keep track of this at home and let me know.  Mixed hyperlipidemia  Given extensive vascular disease and going to increase the statin dose to high intensity.  Nonischemic cardiomyopathy (HCC)  Surprisingly most recent nuclear study showed reversion of ejection fraction.  No associated symptoms.  Going to check an echocardiogram to be sure that is truly the case.  Coronary artery disease involving native coronary artery of native heart without angina pectoris  Heart catheterization 2022 with no high-grade stenoses.  No current symptoms.  Carotid artery stenosis without cerebral infarction, bilateral  Prior left carotid endarterectomy.  Right carotid stenting is being considered for the near term.      Follow up Plan/Other summary comments:  Return in about 1 year (around 1/15/2026).    HPI:   Patient is seen today to transfer care.  He was seeing one of my associates who have moved out of the area.  There is a prior history of nonischemic cardiomyopathy.  This was found by nuclear study and then follow-up angiography did not reveal significant high-grade stenosis.  For details see below.  More recent nuclear study however again suggested moderate cardiomyopathy.  There are no recent symptoms of CAD.  Patient's blood pressure has been on the low side of late with some resultant lightheadedness with arising.  No syncope or near syncope.          Most recent or relevant cardiac/vascular testing:    Myoview 5/13/2024: LVEF 33%.  Moderate global dysfunction.  Fixed inferior defect.  Similar to 2/10/2022.  TTE 6/2/2022: Normal LV function.  Coronary angiography 3/21/2022: 60% diagonal stenosis.  Otherwise no significant blockages.  Past Surgical History:   Procedure Laterality Date    ABDOMINAL  SURGERY      CARDIAC CATHETERIZATION N/A 3/21/2022    Procedure: Cardiac catheterization;  Surgeon: Stephy Horner MD;  [facility];  Service: Cardiology    CARDIAC CATHETERIZATION N/A 3/21/2022    Procedure: Cardiac Coronary Angiogram;  Surgeon: Stephy Horner MD;  [facility];  Service: Cardiology    COLONOSCOPY      FL RETROGRADE PYELOGRAM  3/23/2023    INGUINAL HERNIA REPAIR Left     LAPAROSCOPIC COLON RESECTION      ME ARTHROPLASTY GLENOHUMERAL JOINT TOTAL SHOULDER Right 11/17/2020    Procedure: ARTHROPLASTY SHOULDER REVERSE with biceps tendonesis;  Surgeon: Robby Marquez MD;  [facility];  Service: Orthopedics    ME BX PROSTATE STRTCTC SATURATION SAMPLING IMG GID N/A 5/16/2023    Procedure: TRANSPERINEAL MRI FUSION BIOPSY PROSTATE;  Surgeon: Quan Velasco MD;  [facility];  Service: Urology    ME COLONOSCOPY FLX DX W/COLLJ SPEC WHEN PFRMD N/A 11/3/2017    Procedure: COLONOSCOPY;  Surgeon: SUZAN Muñiz MD;  [facility];  Service: Colorectal    ME CYSTO/URETERO W/LITHOTRIPSY &INDWELL STENT INSRT Left 3/23/2023    Procedure: CYSTOSCOPY URETEROSCOPY WITH LITHOTRIPSY HOLMIUM LASER, RETROGRADE PYELOGRAM AND INSERTION STENT URETERAL, STONE BASKET;  Surgeon: Michelet Johns MD;  [facility];  Service: Urology    ME TEAEC W/PATCH GRF CAROTID VERTB SUBCLAV NECK INC Left 1/13/2020    Procedure: ENDARTERECTOMY ARTERY CAROTID;  Surgeon: Amado Teague MD;  [facility];  Service: Vascular       Lipid Profile: Reviewed      Review of Systems   10  point ROS  was otherwise non pertinent or negative except as per HPI or as below.   Gait: Normal.        Objective:     /74   Pulse 73   Ht 6' (1.829 m)   Wt 91.2 kg (201 lb)   BMI 27.26 kg/m²     PHYSICAL EXAM:    General:  Normal appearance in no distress.  Eyes:  Anicteric.  Oral mucosa:  Moist.  Neck:  No JVD. Carotid upstrokes are brisk without bruits.  No  masses.  Left carotid endarterectomy well-healed.  Chest:  Clear to auscultation.  Cardiac:  No palpable PMI.  Normal S1 and S2.  No murmur gallop or rub.  Abdomen:  Soft and nontender. No palpable organomegaly or aortic enlargement.  Extremities:  No peripheral edema.  Musculoskeletal:  Symmetric.   Vascular:  Femoral pulses are brisk without bruits.  Popliteal pulses are diminished.  Pedal pulses are intact.  Neuro:  Grossly symmetric.  Psych:  Alert and oriented x3.      Meds reviewed.    Past Medical History:   Diagnosis Date    CAD (coronary artery disease)     CHF (congestive heart failure) (HCC)     Diabetes mellitus (HCC)     Diverticulitis     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HCC) 11/07/2018    Heart disease     Hyperlipidemia     Hypertension     Kidney stone     Nonobstructive atherosclerosis of coronary artery     Osteoarthritis     Prostate cancer (HCC)     Vascular disorder            Social History     Tobacco Use   Smoking Status Some Days    Types: Cigars    Passive exposure: Past   Smokeless Tobacco Current

## 2025-01-15 NOTE — ASSESSMENT & PLAN NOTE
Surprisingly most recent nuclear study showed reversion of ejection fraction.  No associated symptoms.  Going to check an echocardiogram to be sure that is truly the case.

## 2025-01-15 NOTE — PATIENT INSTRUCTIONS
Take your blood pressure perhaps twice a week or so.  For now stop amlodipine.  If the top number your blood pressure is routinely over 135 let me know and we will increase the losartan to 50 mg daily instead of restarting amlodipine.  Increase atorvastatin to 80 mg daily.  It says take at night but probably does not matter.

## 2025-01-21 ENCOUNTER — HOSPITAL ENCOUNTER (OUTPATIENT)
Dept: VASCULAR ULTRASOUND | Facility: HOSPITAL | Age: 75
Discharge: HOME | End: 2025-01-21
Payer: COMMERCIAL

## 2025-01-21 DIAGNOSIS — I73.9 CLAUDICATION (HCC): ICD-10-CM

## 2025-01-21 PROCEDURE — 93923 UPR/LXTR ART STDY 3+ LVLS: CPT | Performed by: SURGERY

## 2025-01-21 PROCEDURE — 93923 UPR/LXTR ART STDY 3+ LVLS: CPT

## 2025-01-23 ENCOUNTER — TELEPHONE (OUTPATIENT)
Dept: RADIOLOGY | Facility: HOSPITAL | Age: 75
End: 2025-01-23

## 2025-01-23 NOTE — PRE-PROCEDURE INSTRUCTIONS
Pre-procedure Instructions for Interventional Radiology  [address] [address]  INTERVENTIONAL RADIOLOGY [phone]    You are scheduled for a/an Cerebral Angiogram.    On Friday 1/31/25.    Your tentative arrival time is 7:30 AM.  [facility] will notify you the day before your procedure with the exact arrival time and the location to arrive.    To prepare for your procedure:  Please arrange for someone to drive you home after the procedure and stay with you until the next morning if you are instructed to do so.  This is typically for patients receiving some type of sedative or anesthetic for the procedure.  DO NOT EAT OR DRINK ANYTHING after midnight on the evening before your procedure including candy & gum.  ONLY SIPS OF WATER with your medications are allowed on the morning of your procedure.  TAKE ALL OF YOUR REGULAR MEDICATIONS THE MORNING OF YOUR PROCEDURE with sips of water!  We may call you to stop some of your blood sugar, blood pressure and blood thinning medications depending on the procedure.  Please take all of these medications unless we instruct you to stop them.  If you have an allergy to x-ray dye, please contact Interventional Radiology for an x-ray dye preparation which usually consists of an oral steroid and Benadryl.    The day of your procedure:  Bring a list of the medications you take at home.  Bring medications you take for breathing problems (such as inhalers), medications for chest pain, or both.  Bring a case for your glasses or contacts.  Bring your insurance card and a form of photo ID.  Please leave all valuables such as credit cards and jewelry at home.  Report to the admitting office to the left of the registration desk in the main lobby at the [facility], Entrance B.  You will then be directed to the Short Stay Center.  While your procedure is being performed, your family may wait in the Radiology Waiting Room on the 1st floor in  Radiology.  if they need to leave, they may provide a number to be called following the procedure.   Be prepared to stay overnight just in case. Sometimes procedures will indicate the need for further observation or treatment.   If you are scheduled for a follow-up visit with the Interventional Radiologist after your procedure, you will be called with a date and time.    Special Instructions (Medications to stop taking before your procedure etc.):  Jardiance last dose 1/26/25 and restart 2/1/25.Losartan hold AM 1/31/25;metformin hold 1/31/25 and 2/1/25.OP labs to be drawn Monday 1/27/25.

## 2025-01-27 ENCOUNTER — HOSPITAL ENCOUNTER (OUTPATIENT)
Dept: NON INVASIVE DIAGNOSTICS | Facility: MEDICAL CENTER | Age: 75
Discharge: HOME | End: 2025-01-27
Payer: COMMERCIAL

## 2025-01-27 ENCOUNTER — APPOINTMENT (OUTPATIENT)
Dept: LAB | Facility: MEDICAL CENTER | Age: 75
End: 2025-01-27
Payer: COMMERCIAL

## 2025-01-27 VITALS
BODY MASS INDEX: 27.22 KG/M2 | WEIGHT: 201 LBS | HEART RATE: 73 BPM | SYSTOLIC BLOOD PRESSURE: 108 MMHG | DIASTOLIC BLOOD PRESSURE: 74 MMHG | HEIGHT: 72 IN

## 2025-01-27 DIAGNOSIS — E11.65 TYPE 2 DIABETES MELLITUS WITH HYPERGLYCEMIA, WITHOUT LONG-TERM CURRENT USE OF INSULIN (HCC): ICD-10-CM

## 2025-01-27 DIAGNOSIS — I42.8 NONISCHEMIC CARDIOMYOPATHY (HCC): ICD-10-CM

## 2025-01-27 DIAGNOSIS — Z79.01 LONG TERM (CURRENT) USE OF ANTICOAGULANTS: ICD-10-CM

## 2025-01-27 DIAGNOSIS — I67.1 CEREBRAL ANEURYSM: ICD-10-CM

## 2025-01-27 DIAGNOSIS — D69.6 PLATELETS DECREASED (HCC): ICD-10-CM

## 2025-01-27 DIAGNOSIS — C61 PROSTATE CANCER (HCC): ICD-10-CM

## 2025-01-27 DIAGNOSIS — E78.01 FAMILIAL HYPERCHOLESTEROLEMIA: ICD-10-CM

## 2025-01-27 DIAGNOSIS — Z01.812 PRE-PROCEDURAL LABORATORY EXAMINATION: ICD-10-CM

## 2025-01-27 LAB
ALBUMIN SERPL BCG-MCNC: 4.5 G/DL (ref 3.5–5)
ALP SERPL-CCNC: 52 U/L (ref 34–104)
ALT SERPL W P-5'-P-CCNC: 19 U/L (ref 7–52)
ANION GAP SERPL CALCULATED.3IONS-SCNC: 11 MMOL/L (ref 4–13)
AORTIC VALVE MEAN VELOCITY: 9.5 M/S
AST SERPL W P-5'-P-CCNC: 17 U/L (ref 13–39)
AV AREA BY CONTINUOUS VTI: 1.4 CM2
AV MEAN PRESS GRAD SYS DOP V1V2: 4 MMHG
AV PEAK GRADIENT: 8 MMHG
AV VMAX SYS DOP: 1.42 M/S
BASOPHILS # BLD AUTO: 0.05 THOUSANDS/ΜL (ref 0–0.1)
BASOPHILS NFR BLD AUTO: 2 % (ref 0–1)
BILIRUB SERPL-MCNC: 0.77 MG/DL (ref 0.2–1)
BSA FOR ECHO PROCEDURE: 2.13 M2
BUN SERPL-MCNC: 15 MG/DL (ref 5–25)
CALCIUM SERPL-MCNC: 10 MG/DL (ref 8.4–10.2)
CHLORIDE SERPL-SCNC: 100 MMOL/L (ref 96–108)
CHOLEST SERPL-MCNC: 250 MG/DL (ref ?–200)
CO2 SERPL-SCNC: 27 MMOL/L (ref 21–32)
CREAT SERPL-MCNC: 0.78 MG/DL (ref 0.6–1.3)
CREAT UR-MCNC: 45.2 MG/DL
DOP CALC AO VTI: 34.33 CM
DOP CALC LVOT CARDIAC INDEX: 1.28 L/MIN/M2
DOP CALC LVOT CARDIAC OUTPUT: 2.74 L/MIN
DOP CALC LVOT STROKE INDEX: 23.4 ML/M2
E WAVE DECELERATION TIME: 260 MS
E/A RATIO: 0.77
EOSINOPHIL # BLD AUTO: 0.17 THOUSAND/ΜL (ref 0–0.61)
EOSINOPHIL NFR BLD AUTO: 5 % (ref 0–6)
ERYTHROCYTE [DISTWIDTH] IN BLOOD BY AUTOMATED COUNT: 13.3 % (ref 11.6–15.1)
FRACTIONAL SHORTENING: 21 % (ref 28–44)
GFR SERPL CREATININE-BSD FRML MDRD: 90 ML/MIN/1.73SQ M
GLUCOSE P FAST SERPL-MCNC: 121 MG/DL (ref 65–99)
HCT VFR BLD AUTO: 48.1 % (ref 36.5–49.3)
HDLC SERPL-MCNC: 45 MG/DL
HGB BLD-MCNC: 15.5 G/DL (ref 12–17)
IMM GRANULOCYTES # BLD AUTO: 0.01 THOUSAND/UL (ref 0–0.2)
IMM GRANULOCYTES NFR BLD AUTO: 0 % (ref 0–2)
INTERVENTRICULAR SEPTUM IN DIASTOLE (PARASTERNAL SHORT AXIS VIEW): 1.3 CM
INTERVENTRICULAR SEPTUM: 1.3 CM (ref 0.6–1.1)
LAAS-AP2: 27.1 CM2
LAAS-AP4: 26.2 CM2
LEFT ATRIUM SIZE: 4.8 CM
LEFT ATRIUM VOLUME (MOD BIPLANE): 97 ML
LEFT ATRIUM VOLUME INDEX (MOD BIPLANE): 45.3 ML/M2
LEFT INTERNAL DIMENSION IN SYSTOLE: 4.6 CM (ref 2.1–4)
LEFT VENTRICLE DIASTOLIC VOLUME (MOD BIPLANE): 159 ML
LEFT VENTRICLE DIASTOLIC VOLUME INDEX (MOD BIPLANE): 74.6 ML/M2
LEFT VENTRICLE SYSTOLIC VOLUME (MOD BIPLANE): 85 ML
LEFT VENTRICLE SYSTOLIC VOLUME INDEX (MOD BIPLANE): 39.9 ML/M2
LEFT VENTRICULAR INTERNAL DIMENSION IN DIASTOLE: 5.8 CM (ref 3.5–6)
LEFT VENTRICULAR POSTERIOR WALL IN END DIASTOLE: 1 CM
LEFT VENTRICULAR STROKE VOLUME: 72 ML
LV EF BIPLANE MOD: 46 %
LVSV (TEICH): 72 ML
LYMPHOCYTES # BLD AUTO: 0.72 THOUSANDS/ΜL (ref 0.6–4.47)
LYMPHOCYTES NFR BLD AUTO: 23 % (ref 14–44)
MCH RBC QN AUTO: 33.2 PG (ref 26.8–34.3)
MCHC RBC AUTO-ENTMCNC: 32.2 G/DL (ref 31.4–37.4)
MCV RBC AUTO: 103 FL (ref 82–98)
MICROALBUMIN UR-MCNC: 18.6 MG/L
MICROALBUMIN/CREAT 24H UR: 41 MG/G CREATININE (ref 0–30)
MONOCYTES # BLD AUTO: 0.39 THOUSAND/ΜL (ref 0.17–1.22)
MONOCYTES NFR BLD AUTO: 12 % (ref 4–12)
MV E'TISSUE VEL-LAT: 11 CM/S
MV E'TISSUE VEL-SEP: 7 CM/S
MV PEAK A VEL: 0.77 M/S
MV PEAK E VEL: 59 CM/S
MV STENOSIS PRESSURE HALF TIME: 75 MS
MV VALVE AREA P 1/2 METHOD: 2.93
NEUTROPHILS # BLD AUTO: 1.81 THOUSANDS/ΜL (ref 1.85–7.62)
NEUTS SEG NFR BLD AUTO: 58 % (ref 43–75)
NONHDLC SERPL-MCNC: 205 MG/DL
NRBC BLD AUTO-RTO: 0 /100 WBCS
PLATELET # BLD AUTO: 176 THOUSANDS/UL (ref 149–390)
PMV BLD AUTO: 10.4 FL (ref 8.9–12.7)
POTASSIUM SERPL-SCNC: 4.1 MMOL/L (ref 3.5–5.3)
PROT SERPL-MCNC: 7.8 G/DL (ref 6.4–8.4)
PSA SERPL-MCNC: 0.39 NG/ML (ref 0–4)
RBC # BLD AUTO: 4.67 MILLION/UL (ref 3.88–5.62)
RIGHT ATRIUM AREA SYSTOLE A4C: 20.1 CM2
RIGHT VENTRICLE ID DIMENSION: 4.1 CM
SL CV LEFT ATRIUM LENGTH A2C: 5.8 CM
SL CV LV EF: 49
SL CV PED ECHO LEFT VENTRICLE DIASTOLIC VOLUME (MOD BIPLANE) 2D: 171 ML
SL CV PED ECHO LEFT VENTRICLE SYSTOLIC VOLUME (MOD BIPLANE) 2D: 99 ML
SODIUM SERPL-SCNC: 138 MMOL/L (ref 135–147)
TR MAX PG: 23 MMHG
TR PEAK VELOCITY: 2.4 M/S
TRICUSPID ANNULAR PLANE SYSTOLIC EXCURSION: 2.6 CM
TRICUSPID VALVE PEAK REGURGITATION VELOCITY: 2.39 M/S
TRIGL SERPL-MCNC: 439 MG/DL (ref ?–150)
WBC # BLD AUTO: 3.15 THOUSAND/UL (ref 4.31–10.16)

## 2025-01-27 PROCEDURE — 80061 LIPID PANEL: CPT

## 2025-01-27 PROCEDURE — 93306 TTE W/DOPPLER COMPLETE: CPT

## 2025-01-27 PROCEDURE — 85025 COMPLETE CBC W/AUTO DIFF WBC: CPT

## 2025-01-27 PROCEDURE — 36415 COLL VENOUS BLD VENIPUNCTURE: CPT

## 2025-01-27 PROCEDURE — 82570 ASSAY OF URINE CREATININE: CPT

## 2025-01-27 PROCEDURE — 82043 UR ALBUMIN QUANTITATIVE: CPT

## 2025-01-27 PROCEDURE — 93306 TTE W/DOPPLER COMPLETE: CPT | Performed by: INTERNAL MEDICINE

## 2025-01-27 PROCEDURE — 80053 COMPREHEN METABOLIC PANEL: CPT

## 2025-01-27 PROCEDURE — 85610 PROTHROMBIN TIME: CPT

## 2025-01-27 PROCEDURE — 84153 ASSAY OF PSA TOTAL: CPT

## 2025-01-27 PROCEDURE — 85730 THROMBOPLASTIN TIME PARTIAL: CPT

## 2025-01-28 LAB
APTT PPP: 30 SECONDS (ref 23–34)
INR PPP: 0.95 (ref 0.85–1.19)
PROTHROMBIN TIME: 13 SECONDS (ref 12.3–15)

## 2025-01-31 ENCOUNTER — HOSPITAL ENCOUNTER (OUTPATIENT)
Dept: RADIOLOGY | Facility: HOSPITAL | Age: 75
Discharge: HOME | End: 2025-01-31
Attending: NEUROLOGICAL SURGERY
Payer: COMMERCIAL

## 2025-01-31 ENCOUNTER — ANESTHESIA EVENT (OUTPATIENT)
Dept: RADIOLOGY | Facility: HOSPITAL | Age: 75
End: 2025-01-31
Payer: COMMERCIAL

## 2025-01-31 ENCOUNTER — ANESTHESIA (OUTPATIENT)
Dept: RADIOLOGY | Facility: HOSPITAL | Age: 75
End: 2025-01-31
Payer: COMMERCIAL

## 2025-01-31 VITALS
DIASTOLIC BLOOD PRESSURE: 84 MMHG | OXYGEN SATURATION: 90 % | TEMPERATURE: 97.8 F | SYSTOLIC BLOOD PRESSURE: 142 MMHG | RESPIRATION RATE: 16 BRPM | HEIGHT: 72 IN | BODY MASS INDEX: 26.01 KG/M2 | HEART RATE: 69 BPM | WEIGHT: 192 LBS

## 2025-01-31 DIAGNOSIS — I67.1 CEREBRAL ANEURYSM: ICD-10-CM

## 2025-01-31 PROCEDURE — 36224 PLACE CATH CAROTD ART: CPT | Performed by: NEUROLOGICAL SURGERY

## 2025-01-31 PROCEDURE — 76937 US GUIDE VASCULAR ACCESS: CPT | Performed by: NEUROLOGICAL SURGERY

## 2025-01-31 PROCEDURE — 36226 PLACE CATH VERTEBRAL ART: CPT | Performed by: NEUROLOGICAL SURGERY

## 2025-01-31 PROCEDURE — 36226 PLACE CATH VERTEBRAL ART: CPT

## 2025-01-31 PROCEDURE — 36223 PLACE CATH CAROTID/INOM ART: CPT

## 2025-01-31 PROCEDURE — 76377 3D RENDER W/INTRP POSTPROCES: CPT | Performed by: NEUROLOGICAL SURGERY

## 2025-01-31 PROCEDURE — C1887 CATHETER, GUIDING: HCPCS

## 2025-01-31 PROCEDURE — 76937 US GUIDE VASCULAR ACCESS: CPT

## 2025-01-31 PROCEDURE — C1769 GUIDE WIRE: HCPCS

## 2025-01-31 PROCEDURE — 36224 PLACE CATH CAROTD ART: CPT

## 2025-01-31 PROCEDURE — C1894 INTRO/SHEATH, NON-LASER: HCPCS

## 2025-01-31 RX ORDER — IODIXANOL 320 MG/ML
350 INJECTION, SOLUTION INTRAVASCULAR
Status: COMPLETED | OUTPATIENT
Start: 2025-01-31 | End: 2025-01-31

## 2025-01-31 RX ORDER — LIDOCAINE HYDROCHLORIDE 10 MG/ML
INJECTION, SOLUTION EPIDURAL; INFILTRATION; INTRACAUDAL; PERINEURAL AS NEEDED
Status: DISCONTINUED | OUTPATIENT
Start: 2025-01-31 | End: 2025-01-31

## 2025-01-31 RX ORDER — NITROGLYCERIN 20 MG/100ML
INJECTION INTRAVENOUS AS NEEDED
Status: COMPLETED | OUTPATIENT
Start: 2025-01-31 | End: 2025-01-31

## 2025-01-31 RX ORDER — SODIUM CHLORIDE 9 MG/ML
75 INJECTION, SOLUTION INTRAVENOUS CONTINUOUS
Status: DISCONTINUED | OUTPATIENT
Start: 2025-01-31 | End: 2025-02-01 | Stop reason: HOSPADM

## 2025-01-31 RX ORDER — PROPOFOL 10 MG/ML
INJECTION, EMULSION INTRAVENOUS AS NEEDED
Status: DISCONTINUED | OUTPATIENT
Start: 2025-01-31 | End: 2025-01-31

## 2025-01-31 RX ORDER — LIDOCAINE HYDROCHLORIDE 10 MG/ML
INJECTION, SOLUTION EPIDURAL; INFILTRATION; INTRACAUDAL; PERINEURAL AS NEEDED
Status: COMPLETED | OUTPATIENT
Start: 2025-01-31 | End: 2025-01-31

## 2025-01-31 RX ORDER — OXYCODONE AND ACETAMINOPHEN 5; 325 MG/1; MG/1
1 TABLET ORAL EVERY 6 HOURS PRN
Status: DISCONTINUED | OUTPATIENT
Start: 2025-01-31 | End: 2025-02-01 | Stop reason: HOSPADM

## 2025-01-31 RX ORDER — EPHEDRINE SULFATE 50 MG/ML
INJECTION INTRAVENOUS AS NEEDED
Status: DISCONTINUED | OUTPATIENT
Start: 2025-01-31 | End: 2025-01-31

## 2025-01-31 RX ORDER — SODIUM CHLORIDE 9 MG/ML
75 INJECTION, SOLUTION INTRAVENOUS CONTINUOUS
Status: CANCELLED | OUTPATIENT
Start: 2025-01-31

## 2025-01-31 RX ORDER — VERAPAMIL HYDROCHLORIDE 2.5 MG/ML
INJECTION, SOLUTION INTRAVENOUS AS NEEDED
Status: COMPLETED | OUTPATIENT
Start: 2025-01-31 | End: 2025-01-31

## 2025-01-31 RX ORDER — HEPARIN SODIUM 1000 [USP'U]/ML
INJECTION, SOLUTION INTRAVENOUS; SUBCUTANEOUS AS NEEDED
Status: COMPLETED | OUTPATIENT
Start: 2025-01-31 | End: 2025-01-31

## 2025-01-31 RX ADMIN — SODIUM CHLORIDE 75 ML/HR: 0.9 INJECTION, SOLUTION INTRAVENOUS at 07:49

## 2025-01-31 RX ADMIN — LIDOCAINE HYDROCHLORIDE 3 ML: 10 INJECTION, SOLUTION EPIDURAL; INFILTRATION; INTRACAUDAL; PERINEURAL at 09:21

## 2025-01-31 RX ADMIN — PHENYLEPHRINE HYDROCHLORIDE 100 MCG: 10 INJECTION INTRAVENOUS at 09:23

## 2025-01-31 RX ADMIN — VERAPAMIL HYDROCHLORIDE 5 MG: 2.5 INJECTION INTRAVENOUS at 09:23

## 2025-01-31 RX ADMIN — HEPARIN SODIUM 4000 UNITS: 1000 INJECTION INTRAVENOUS; SUBCUTANEOUS at 09:23

## 2025-01-31 RX ADMIN — IODIXANOL 125 ML: 320 INJECTION, SOLUTION INTRAVASCULAR at 10:02

## 2025-01-31 RX ADMIN — Medication 400 MCG: at 09:23

## 2025-01-31 RX ADMIN — EPHEDRINE SULFATE 10 MG: 50 INJECTION, SOLUTION INTRAVENOUS at 09:27

## 2025-01-31 RX ADMIN — LIDOCAINE HYDROCHLORIDE 50 MG: 10 INJECTION, SOLUTION EPIDURAL; INFILTRATION; INTRACAUDAL; PERINEURAL at 09:11

## 2025-01-31 RX ADMIN — EPHEDRINE SULFATE 10 MG: 50 INJECTION, SOLUTION INTRAVENOUS at 09:42

## 2025-01-31 RX ADMIN — PHENYLEPHRINE HYDROCHLORIDE 100 MCG: 10 INJECTION INTRAVENOUS at 09:25

## 2025-01-31 RX ADMIN — PROPOFOL 50 MCG/KG/MIN: 10 INJECTION, EMULSION INTRAVENOUS at 09:13

## 2025-01-31 RX ADMIN — PHENYLEPHRINE HYDROCHLORIDE 100 MCG: 10 INJECTION INTRAVENOUS at 09:21

## 2025-01-31 RX ADMIN — LIDOCAINE HYDROCHLORIDE 1 ML: 10 INJECTION, SOLUTION EPIDURAL; INFILTRATION; INTRACAUDAL; PERINEURAL at 09:23

## 2025-01-31 RX ADMIN — PROPOFOL 50 MG: 10 INJECTION, EMULSION INTRAVENOUS at 09:12

## 2025-01-31 RX ADMIN — Medication 600 MCG: at 09:21

## 2025-01-31 NOTE — ANESTHESIA PREPROCEDURE EVALUATION
Procedure:  IR CEREBRAL ANGIOGRAPHY    Relevant Problems   ANESTHESIA (within normal limits)   (-) History of anesthesia complications      CARDIO   (+) Carotid artery stenosis without cerebral infarction, bilateral   (+) Cerebral aneurysm   (+) Coronary artery disease involving native coronary artery of native heart without angina pectoris   (+) Essential hypertension   (+) Familial hypercholesterolemia   (+) Mixed hyperlipidemia   (+) Varicose veins of both legs with edema   (+) Ventricular tachycardia (HCC)      ENDO   (+) Type 2 diabetes mellitus with hyperglycemia, without long-term current use of insulin (HCC)      GI/HEPATIC (within normal limits)      /RENAL   (+) Prostate cancer (HCC)      HEMATOLOGY   (+) Anemia      MUSCULOSKELETAL   (+) Low back pain radiating to left lower extremity      NEURO/PSYCH   (+) Chronic right shoulder pain   (+) Weakness of both lower extremities      PULMONARY   (+) Pulmonary emphysema (HCC)        Physical Exam    Airway    Mallampati score: I  TM Distance: >3 FB  Neck ROM: full     Dental    lower dentures and upper dentures    Cardiovascular  Rhythm: regular, Rate: normal, Cardiovascular exam normal    Pulmonary  Pulmonary exam normal Breath sounds clear to auscultation    Other Findings        Anesthesia Plan  ASA Score- 3     Anesthesia Type- IV sedation with anesthesia with ASA Monitors.         Additional Monitors:     Airway Plan:            Plan Factors-Exercise tolerance (METS): >4 METS.    Chart reviewed. EKG reviewed.  Existing labs reviewed. Patient summary reviewed.    Patient is a current smoker (cigar).  Patient instructed to abstain from smoking on day of procedure. Patient did not smoke on day of surgery.            Induction- intravenous.    Postoperative Plan-     Perioperative Resuscitation Plan - Level 1 - Full Code.       Informed Consent- Anesthetic plan and risks discussed with patient and spouse.  I personally reviewed this patient with the CRNA.  Discussed and agreed on the Anesthesia Plan with the CRNA..      NPO Status:  Vitals Value Taken Time   Date of last liquid 01/30/25 01/31/25 0734   Time of last liquid 2230 01/31/25 0734   Date of last solid 01/30/25 01/31/25 0734   Time of last solid 2230 01/31/25 0734     Echo (1/27/25):    Left Ventricle: Left ventricular cavity size is at the upper limit normal. Wall thickness is normal. The left ventricular ejection fraction is 45-49%. Systolic function is mildly reduced. There is mild global hypokinesis. Diastolic function is mildly abnormal, consistent with grade I (abnormal) relaxation.    Right Ventricle: Right ventricular cavity size is normal. Systolic function is normal.    Mitral Valve: There is mild annular calcification. There is mild regurgitation.    Tricuspid Valve: There is mild regurgitation.    NPO and allergies verified.    Plan:  IV sedation/MAC    Benefits and risks of sedation were discussed with the patient including possibility of recall under sedation and the potential for conversion to general anesthesia if necessary.  All questions were answered.  Anesthesia consent was obtained from the patient.

## 2025-01-31 NOTE — ANESTHESIA POSTPROCEDURE EVALUATION
Post-Op Assessment Note    CV Status:  Stable    Pain management: adequate       Mental Status:  Alert and awake   Hydration Status:  Euvolemic   PONV Controlled:  Controlled   Airway Patency:  Patent     Post Op Vitals Reviewed: Yes    No anethesia notable event occurred.    Staff: CRNA           Last Filed PACU Vitals:  Vitals Value Taken Time   Temp     Pulse 67    /77    Resp 15    SpO2 100

## 2025-01-31 NOTE — OP NOTE
OPERATIVE REPORT  PATIENT NAME: Erickson Jimenez     :  1950  MRN: [id number]   Pt Location: Interventional radiology    SURGERY DATE: 25     Preop Diagnosis:  1.  Prior left carotid endarterectomy  2.  Asymptomatic right carotid stenosis  3.  5 x 3 mm right MCA aneurysm    Postop Diagnosis  1.  Prior left carotid endarterectomy  2.  Asymptomatic right carotid stenosis  3.  5 x 3 mm right MCA aneurysm    Procedure:  Use of ultrasound  Right radial arteriogram   Right Common Carotid Arteriogram  3D rotational arteriogram of the right common carotid artery and intracranial circulation with postprocessed images reviewed at a separate workstation  3D rotational arteriogram of the right cervical common carotid artery with postprocessing images reviewed at a separate workstation  Left Common Carotid Arteriogram  Right Vertebral Artery Arteriogram  Left Vertebral Artery Arteriogram    Surgeon:   Isac Barr MD    Specimen(s):  None    Estimated Blood Loss:   None    Drains:  None    Anesthesia Type:   Monitored Anesthesia Care     Complications:  None    Operative Indications:  Erickson Jimenez  is a very pleasant 74 y.o. male who previously underwent left carotid endarterectomy and was noted to have progressive right carotid stenosis and right MCA aneurysm.  After discussing the risks and benefits of a diagnostic cerebral arteriogram including bleeding, stroke, groin hematoma, and death the patient elected to proceed.     Procedure Details:  After obtaining written informed consent, the patient was brought into the operating room and moved to the OR table in supine fashion. The right radial artery was prepped and draped in the usual sterile fashion. Monitored anesthesia care was induced.  A surgical time-out was performed.  3 cc mixture of lidocaine and 400 mcg of nitroglycerin was injected immediately above the right radial artery.  Ultrasound guidance under real-time visualization was used to guide the micro  puncture needle into the right radial artery. Next, a 5 [place]i tapered sheath was then placed.  Next and infusion of 300 mcg of nitroglycerin, 4000 units heparin, and 5 mg of verapamil were slowly injected intra-arterially through the right radial sheath.  Radial artery arteriogram then performed. Next a 5 [place]i Hartley glide catheter was advanced and reshaped.    The catheter was then advanced into the aortic arch and advanced into the left vertebral artery. Transfascial lateral and oblique images of the left vertebral artery intracranial circulation were obtained.     The catheter was then withdrawn into the aortic arch and advanced into the left common carotid artery. AP lateral and oblique images of the left cervical carotid were obtained.     AP lateral and oblique images of the left intracranial circulation was obtained.    The right common carotid artery was then catheterized. AP lateral and oblique images of the right cervical carotid were obtained.     AP lateral oblique images of the right intracranial carotid circulation was obtained.    3D rotational arteriogram of the intracranial right carotid circulation was obtained with postprocessed images reviewed at a separate workstation.    3D rotational arteriogram of the extracranial right carotid artery circulation was obtained with postprocessed images reviewed at a separate workstation.    Right vertebral artery was catheterized.  Trans facial and lateral and oblique images of the right vertebral artery circulation were then obtained.    The catheter was then withdrawn from the body and a TR compression band was placed.  There was appropriate hemostasis.   The patient was then awoken from monitored anesthesia care and found to be in baseline neurologic condition. All sponge and needle counts were correct.        INTERPRETATION OF ANGIOGRAPHIC FINDINGS:   1.  The Left vertebral artery is tortuous in the cervical segment.  Intracranially there is antegrade  flow without reflux down the contralateral vertebral artery.  The posterior circulation is well-visualized without any evidence of aneurysm or AVM.  Capillary and venous phases are unremarkable.      2. The Left common carotid circulation reveals antegrade flow into the internal and external carotid arteries.  Just distal to the bifurcation at the area of endarterectomy there is significant narrowing of the artery.  This narrowed artery continues throughout the cervical course in the intracranial segment.  There is intracranial flow despite significant atherosclerotic disease in the petrous and cavernous segments.  There is flow into the intracranial circulation that is rapidly washed out by contralateral circulation.  There is no evidence of aneurysm or vascular malformation.    3. The Right common carotid circulation reveals antegrade flow into the internal and external carotid arteries.  There is between 60 and 70% focal angiographic stenosis at the internal carotid artery origin just distal to the bifurcation.  There is some calcified plaque here.  There appears to be a regular distal vessel.  Intracranially there is antegrade flow into the middle cerebral and anterior cerebral artery.  There is a 5 x 3 mm MCA bifurcation aneurysm.  There is reflux across the anterior communicating artery which rapidly fills the contralateral MARIO and MCA territory.  Carefully and venous phases are otherwise unremarkable.    4.  3D rotational arteriogram of the intracranial right common carotid artery demonstrates a 3 x 3 x 5 mm, kidney bean shaped, MCA aneurysm.  It is eccentric to one of the M twos.  It would be amenable to endovascular therapy if necessary either with stent assisted or balloon assisted coiling.    5.  3D rotational arteriogram of the extracranial right carotid artery demonstrates an asymmetric and focally narrowed internal carotid artery just distal to the bifurcation.  Once again measures between 60 and 70%  stenotic.    6. The Right vertebral intracranial circulation reveals retrograde reflux down the contralateral vertebral artery. Both PICAs are well visualized. Antegrade flow is present intoall the posterior circulation branches. There are no AVMs or aneurysms. The capillary and venous phases are unremarkable.     Impression:  1.  Patent left internal carotid artery that is severely narrowed throughout its extracranial and intracranial course.  Fills the MCA territory with rapid washout.    2.  60 to 70% carotid stenosis of right ICA bifurcation with a predominantly focal area of narrowing.  Partially calcified plaque.    3.  5 x 3 x 3 mm eccentric MCA bifurcation aneurysm    Patient Disposition:  APU    SIGNATURE: Isac Barr MD  DATE: 01/31/25   TIME: 10:28 AM

## 2025-01-31 NOTE — DISCHARGE INSTRUCTIONS
Today, you underwent a diagnostic cerebral angiogram under the care of Dr. Barr for evaluation of cerebral aneurysm and carotid stenosis.  ?  The following instructions will help you care for yourself, or be cared for upon your return home today. These are guidelines for your care right after your surgery only.   ?  Notify Your Doctor or Nurse if you have any of the following:  ?  SYMPTOMS OF WOUND INFECTION--   Increased pain in or around the incision   Swelling around the incision  Any drainage from the incision  Incision separates or opens up  Warmth in the tissues around the incision  Redness or tenderness on the skin near the incision   Fever (temperature greater than 101 degrees F)   ?  NEUROLOGICAL CHANGES--  Change in alertness  Increased sleepiness   Nausea and vomiting   New onset of numbness or weakness in arms or legs   New problems with your bowels or bladder  New or worse problems with balance or walking  Seizures, new or worsening  ?  UNRELIEVED HEADACHE PAIN--  New or increased pain unrelieved with pain medications   Pain associated with nausea and vomiting   Pain associated with other symptoms  ?  QUESTIONS OR PROBLEMS--  Any questions or problems that you are unsure about  Wound Care:  Keep Incision Clean and Dry   You may shower daily, but do not soak incision. Pat dry after showering.   No tub baths, soaking, swimming for 1 week after angiogram.   You do not need to cover the incision. Mild to moderate bruising and tenderness to the site is expected and may last up to 1-2 weeks after your procedure.   ?  A closure device was placed at the catheter insertion site. This is MRI compatible. Remove the dressing 24 hours after your procedure.   If your groin site is bleeding, apply firm pressure for 10 minutes. Reinforce dressing rather than removing and checking frequently. If continues to bleed through the dressing after 1 hour, contact your neurosurgeon's office.   Anticipatory Education:  ?  PAIN  MED W/ Acetaminophen (Tylenol)  --IF a prescription for pain medicine has been sent home with you:  --Narcotic pain medication may cause constipation. Be sure to take stool softeners or laxatives while you are on narcotic pain medication.   --Do not drive after taking prescription pain medicine.   ?  If this medicine is too strong, or no longer necessary, or we did NOT recommend/prescribe oral narcotics, you may take:   - Tylenol Extra-strength/Acetaminophen, 2 tablets every 4-6 hours as needed for mild pain. DO NOT TAKE MORE THAN 4000MG PER DAY from combined sources. NOTE: Remember to eat when taking pain medicines in order to avoid nausea. Watch for constipation. Eat plenty of fruits, vegetables, juices, and drink 6-8 glasses of water each day.   Constipation: Stay active and drink at least 6-8 cups of fluid each day to prevent constipation. If you need a laxative or stool softener follow the package directions or consult with your local pharmacists if you have questions.  ?  After anesthesia, rest for 24 hours. Do not drive, drink alcohol beverages or make any important decisions during this time. General anesthesia may cause sore throat, jaw discomfort or muscle aches. These symptoms can last for one or two days.  Activity: Please follow these instructions:  Advance your activity as you can tolerate. You may do light house work; nothing strenuous   You may walk all you want. You may go up and down the steps. Use the railing for support  Do not do excessive bending, straining or heavy lifting for 48 hours after your procedure  Do not drive or return to work until you are instructed   It is normal for your energy level and sleep patterns to change after surgery.   Get extra sleep at night and take naps during the day to help you feel less tired.   Take rest periods during the day.   Complete recovery may take several weeks.  ?  You may resume driving after 24-48 hours recovery.   You may return to work after 48  hours of recovery.   ?  Diet:  Your doctor has recommended that you follow these diet instructions at home. Refer to the patient education materials you received during your hospital stay. If you would like more nutrition counseling, ask your doctor about making an appointment with an outpatient dietitian.  Resume your home diet  ?  Medications:  Please resume your home medications as instructed.   ?  Home Supplies and Equipment:  none  Additional Contacts:  ?  CONTACTS FOR NEUROSURGERY: You may call your neurosurgeon’s office if you have questions between 8:30 am and 4:30 pm. You may request to speak to the nurse practitioner who is available Monday through Friday.   ?  For off hours or the weekend you may call your neurosurgeon's office to leave a message.

## 2025-01-31 NOTE — H&P
Patient seen and examined independently.  Clinic note from 1/8/25 remains current.  Patient is not on any new medications and has not had any new medical problems.  Patient remains on asa.  /82 (BP Location: Right arm)   Pulse 75   Temp 97.9 °F (36.6 °C) (Oral)   Resp 16   Ht 6' (1.829 m)   Wt 87.1 kg (192 lb)   SpO2 93%   BMI 26.04 kg/m²  On exam, patient is neurologically intact.  Heart rate is regular.  Breath sounds are clear.  Plan to proceed with diagnostic cerebral arteriogram to better delineate R ICA Stenosis and r mca aneurysm.

## 2025-01-31 NOTE — ANESTHESIA POSTPROCEDURE EVALUATION
Post-Op Assessment Note    CV Status:  Stable    Pain management: adequate       Mental Status:  Alert and awake   Hydration Status:  Euvolemic   PONV Controlled:  Controlled   Airway Patency:  Patent  Two or more mitigation strategies used for obstructive sleep apnea   Post Op Vitals Reviewed: Yes    No anethesia notable event occurred.    Staff: Anesthesiologist, CRNA           Last Filed PACU Vitals:  Vitals Value Taken Time   Temp     Pulse     BP     Resp     SpO2

## 2025-01-31 NOTE — SEDATION DOCUMENTATION
IR diagnostic cerebral angiogram performed by Dr. Barr. Anesthesia present throughout case. Patient tolerated procedure well. Bedrest start time: 1000  
no shortness of breath

## 2025-02-11 ENCOUNTER — TELEPHONE (OUTPATIENT)
Dept: NEUROSURGERY | Facility: CLINIC | Age: 75
End: 2025-02-11

## 2025-02-12 ENCOUNTER — OFFICE VISIT (OUTPATIENT)
Dept: NEUROSURGERY | Facility: CLINIC | Age: 75
End: 2025-02-12
Payer: COMMERCIAL

## 2025-02-12 VITALS
HEIGHT: 72 IN | OXYGEN SATURATION: 97 % | TEMPERATURE: 97.3 F | DIASTOLIC BLOOD PRESSURE: 86 MMHG | WEIGHT: 192 LBS | BODY MASS INDEX: 26.01 KG/M2 | SYSTOLIC BLOOD PRESSURE: 126 MMHG | RESPIRATION RATE: 14 BRPM | HEART RATE: 71 BPM

## 2025-02-12 DIAGNOSIS — I67.1 CEREBRAL ANEURYSM: ICD-10-CM

## 2025-02-12 DIAGNOSIS — I65.23 CAROTID ARTERY STENOSIS WITHOUT CEREBRAL INFARCTION, BILATERAL: Primary | ICD-10-CM

## 2025-02-12 DIAGNOSIS — Z98.890 S/P CAROTID ENDARTERECTOMY: ICD-10-CM

## 2025-02-12 DIAGNOSIS — Z79.01 LONG TERM (CURRENT) USE OF ANTICOAGULANTS: ICD-10-CM

## 2025-02-12 DIAGNOSIS — Z01.812 PRE-OPERATIVE LABORATORY EXAMINATION: ICD-10-CM

## 2025-02-12 PROCEDURE — 99215 OFFICE O/P EST HI 40 MIN: CPT | Performed by: NEUROLOGICAL SURGERY

## 2025-02-12 RX ORDER — SODIUM CHLORIDE 9 MG/ML
75 INJECTION, SOLUTION INTRAVENOUS CONTINUOUS
OUTPATIENT
Start: 2025-02-12

## 2025-02-12 RX ORDER — CLOPIDOGREL BISULFATE 75 MG/1
75 TABLET ORAL DAILY
Qty: 90 TABLET | Refills: 0 | Status: SHIPPED | OUTPATIENT
Start: 2025-02-12

## 2025-02-12 RX ORDER — ASPIRIN 325 MG
325 TABLET ORAL DAILY
Qty: 90 TABLET | Refills: 3 | Status: SHIPPED | OUTPATIENT
Start: 2025-02-12

## 2025-02-12 NOTE — ASSESSMENT & PLAN NOTE
4.  See above  Orders:    P2Y12 Platelet inhibitor; Future    aspirin 325 mg tablet; Take 1 tablet (325 mg total) by mouth daily Begin 7 days prior to procedure    clopidogrel (PLAVIX) 75 mg tablet; Take 1 tablet (75 mg total) by mouth daily Begin 7 days prior to procedure    IR cerebral angiography / intervention; Future

## 2025-02-12 NOTE — PROGRESS NOTES
Name: Erickson Jimenez      : 1950      MRN: [id number]  Encounter Provider: Isac Barr MD  Encounter Date: 2025   Encounter department: [facility]  :  Assessment & Plan  Carotid artery stenosis without cerebral infarction, bilateral  1.  Status post revascularization of left carotid 2020.  Significant narrowing and stenosis throughout his cervical course as well as petrous and intracranial segments.  There is washout from the contralateral circulation.  Despite what appeared to be functional occlusion on CTA there is still antegrade flow on his arteriogram.    2.  Asymptomatic carotid stenosis measuring greater than 70% on arteriography  Given his degree of stenosis on the right and significant stenosis on the left I have recommended elective angioplasty and stenting of his right carotid.  We discussed the risks and benefits associated with this including vascular injury, stroke, paralysis, seizure, bleeding and death.  He has elected to proceed and signed the consent today.  He understands the importance of dual antiplatelet therapy.  He will start aspirin and Plavix 7 days prior to the procedure with a P2 Y12 prior.      Orders:    P2Y12 Platelet inhibitor; Future    aspirin 325 mg tablet; Take 1 tablet (325 mg total) by mouth daily Begin 7 days prior to procedure    clopidogrel (PLAVIX) 75 mg tablet; Take 1 tablet (75 mg total) by mouth daily Begin 7 days prior to procedure    IR cerebral angiography / intervention; Future    Cerebral aneurysm  3.  Bilobed 5 x 3 mm right MCA aneurysm.  This appears largely stable noninvasive imaging compared to 2019.  He would not be interested in craniotomy and clipping nor do I believe that this would be in his benefit.  However once his carotid is revascularized we can consider whether endovascular treatment would be an option.  We did discuss the theoretical risk that angioplasty and stenting of his carotid could  increase his risk of aneurysm rupture, however I think this is more theoretical than practical.    Orders:    P2Y12 Platelet inhibitor; Future    aspirin 325 mg tablet; Take 1 tablet (325 mg total) by mouth daily Begin 7 days prior to procedure    clopidogrel (PLAVIX) 75 mg tablet; Take 1 tablet (75 mg total) by mouth daily Begin 7 days prior to procedure    IR cerebral angiography / intervention; Future    S/P Left carotid endarterectomy 1/13/20  4.  See above  Orders:    P2Y12 Platelet inhibitor; Future    aspirin 325 mg tablet; Take 1 tablet (325 mg total) by mouth daily Begin 7 days prior to procedure    clopidogrel (PLAVIX) 75 mg tablet; Take 1 tablet (75 mg total) by mouth daily Begin 7 days prior to procedure    IR cerebral angiography / intervention; Future        History of Present Illness   HPI  This is a very pleasant 74-year-old gentleman who I initially met in December 2019 for a 5 x 3 mm incidental right MCA aneurysm.  At that time he had significant left carotid stenosis and underwent carotid endarterectomy with eversion and had been following with me for his aneurysm.  At his last follow-up there was concern of progressive right carotid stenosis as well.  This in combination of his aneurysm as well as contralateral significant stenosis we elected to proceed with arteriography.  The goal of this was to better understand the architecture of his left carotid revascularization, and his right carotid stenosis, and the angio architecture of his right MCA aneurysm.    He is now 2 weeks status post arteriography without any complaints.  He tolerated this well.    His past medical history is significant for hypertension, diabetes, hyperlipidemia, heart failure.  He has had colon resection and hernia repair in the past.    He is not allergic to any medications.    He is  with 2 children in their mid to late 40s.  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  Minimal tobacco use.  No illicit alcohol  or drug use.    No family history history of aneurysm or stroke.    Review of Systems   Constitutional:  Negative for fatigue.   HENT:  Negative for tinnitus.    Eyes:  Negative for visual disturbance.   Gastrointestinal: Negative.    Genitourinary:  Positive for enuresis.   Musculoskeletal:  Positive for gait problem (occ unsteady).   Neurological:  Negative for dizziness, tremors, seizures, syncope, speech difficulty, weakness, numbness and headaches.   Hematological:  Bruises/bleeds easily (asa81).   Psychiatric/Behavioral:  Positive for sleep disturbance. Negative for confusion and decreased concentration.     I have personally reviewed the MA's review of systems and made changes as necessary.       Objective   /86 (BP Location: Right arm, Patient Position: Sitting, Cuff Size: Standard)   Pulse 71   Temp (!) 97.3 °F (36.3 °C) (Temporal)   Resp 14   Ht 6' (1.829 m)   Wt 87.1 kg (192 lb)   SpO2 97%   BMI 26.04 kg/m²     Physical Exam  Neurological Exam  He is well appearing.  Affect is appropriate. His BMI is Body mass index is 26.04 kg/m².. He is awake alert and oriented.  Hearing and vision are grossly intact.   His pupils are equal round reactive to light.  His extraocular movements are intact.  His face is symmetric.  Tongue is midline.  Facial sensation is intact and symmetric throughout.  Shoulder shrug is 5/5.  There is no drift or dysmetria.    He has full strength in his bilateral upper and lower extremities.  He has normal muscle tone muscle bulk.  His biceps reflexes and patellar reflexes are 2+ and symmetric.  Yazmin sign negative bilaterally.  Sensation intact to light touch and pinprick throughout.  His gait is normal.    His heart rate is regular.  Normal respiratory effort.  Abdomen nondistended.  Radial pulses 2+.     We reviewed his arteriogram in detail.  Significant left-sided stenosis throughout his cervical carotid as well as the petrous and intracranial segment that is rapidly  washed out from his contralateral circulation.  Significant right cervical carotid stenosis.  5 x 3 mm MCA aneurysm which appears largely stable on noninvasive imaging compared to 2019.    Administrative Statements   I have spent a total time of 45 minutes in caring for this patient on the day of the visit/encounter including Diagnostic results, Prognosis, Risks and benefits of tx options, Instructions for management, Patient and family education, Importance of tx compliance, Risk factor reductions, Impressions, Counseling / Coordination of care, Documenting in the medical record, Reviewing / ordering tests, medicine, procedures  , and Obtaining or reviewing history  .

## 2025-02-12 NOTE — ASSESSMENT & PLAN NOTE
1.  Status post revascularization of left carotid January 2020.  Significant narrowing and stenosis throughout his cervical course as well as petrous and intracranial segments.  There is washout from the contralateral circulation.  Despite what appeared to be functional occlusion on CTA there is still antegrade flow on his arteriogram.    2.  Asymptomatic carotid stenosis measuring greater than 70% on arteriography  Given his degree of stenosis on the right and significant stenosis on the left I have recommended elective angioplasty and stenting of his right carotid.  We discussed the risks and benefits associated with this including vascular injury, stroke, paralysis, seizure, bleeding and death.  He has elected to proceed and signed the consent today.  He understands the importance of dual antiplatelet therapy.  He will start aspirin and Plavix 7 days prior to the procedure with a P2 Y12 prior.      Orders:    P2Y12 Platelet inhibitor; Future    aspirin 325 mg tablet; Take 1 tablet (325 mg total) by mouth daily Begin 7 days prior to procedure    clopidogrel (PLAVIX) 75 mg tablet; Take 1 tablet (75 mg total) by mouth daily Begin 7 days prior to procedure    IR cerebral angiography / intervention; Future

## 2025-02-12 NOTE — ASSESSMENT & PLAN NOTE
3.  Bilobed 5 x 3 mm right MCA aneurysm.  This appears largely stable noninvasive imaging compared to 2019.  He would not be interested in craniotomy and clipping nor do I believe that this would be in his benefit.  However once his carotid is revascularized we can consider whether endovascular treatment would be an option.  We did discuss the theoretical risk that angioplasty and stenting of his carotid could increase his risk of aneurysm rupture, however I think this is more theoretical than practical.    Orders:    P2Y12 Platelet inhibitor; Future    aspirin 325 mg tablet; Take 1 tablet (325 mg total) by mouth daily Begin 7 days prior to procedure    clopidogrel (PLAVIX) 75 mg tablet; Take 1 tablet (75 mg total) by mouth daily Begin 7 days prior to procedure    IR cerebral angiography / intervention; Future

## 2025-03-28 NOTE — TELEPHONE ENCOUNTER
----- Message from Christina Dickerson PA-C sent at 12/21/2022 11:59 AM EST -----  Patient has a crab allergy  He will be getting a CTA head in 2 years  It doesn't look like he got pre-treatment for iodine allergy prior to this CTA  Could you please see if he has been pre-treated in the past or if it needs to be treated before his next scan?     Thank you!  Melvin
Sona Harvey to determine if he has ever had a reaction to contrast in the past  Left a detailed message requesting a call back 
Declines

## 2025-07-22 NOTE — TELEPHONE ENCOUNTER
Pt is due for a 2 year f/u with Dr. Barr as a SNPX. Pt needs CTA head before appt.     Can you please place order for imaging and call pt to let him know to call CS?     Thank you.   
FAMILY HISTORY:  Father  Still living? Unknown  Family history of coronary artery disease, Age at diagnosis: Age Unknown

## (undated) DEVICE — CHLORAPREP HI-LITE 26ML ORANGE

## (undated) DEVICE — SUT MONOCRYL 3-0 SH 27 IN Y416H

## (undated) DEVICE — SUT 2 ORTHOCORD 36 IN W/O NEEDLES

## (undated) DEVICE — DRILL BIT 3.2MM

## (undated) DEVICE — SPONGE PVP SCRUB WING STERILE

## (undated) DEVICE — SUT SILK 3-0 18 IN A184H

## (undated) DEVICE — GLOVE SRG BIOGEL ECLIPSE 7

## (undated) DEVICE — NEEDLE 25G X 1 1/2

## (undated) DEVICE — HEAVY DUTY TABLE COVER: Brand: CONVERTORS

## (undated) DEVICE — PREP PAD BNS: Brand: CONVERTORS

## (undated) DEVICE — SPONGE 4 X 4 XRAY 16 PLY STRL LF RFD

## (undated) DEVICE — SPECIMEN CONTAINER STERILE PEEL PACK

## (undated) DEVICE — DRESSING MEPILEX AG BORDER 4 X 8 IN

## (undated) DEVICE — SUPPLY FEE STD

## (undated) DEVICE — BAG DECANTER

## (undated) DEVICE — 3M™ IOBAN™ 2 ANTIMICROBIAL INCISE DRAPE 6650EZ: Brand: IOBAN™ 2

## (undated) DEVICE — INTENDED FOR TISSUE SEPARATION, AND OTHER PROCEDURES THAT REQUIRE A SHARP SURGICAL BLADE TO PUNCTURE OR CUT.: Brand: BARD-PARKER ® CARBON RIB-BACK BLADES

## (undated) DEVICE — STERILE SURGICAL LUBRICANT,  TUBE: Brand: SURGILUBE

## (undated) DEVICE — 40601 PROLONGED POSITIONING SYSTEM: Brand: 40601 PROLONGED POSITIONING SYSTEM

## (undated) DEVICE — SUT MONOCRYL 4-0 PS-2 18 IN Y496G

## (undated) DEVICE — DRAPE EQUIPMENT RF WAND

## (undated) DEVICE — RADIFOCUS OPTITORQUE ANGIOGRAPHIC CATHETER: Brand: OPTITORQUE

## (undated) DEVICE — GUIDEWIRE STRGHT TIP 0.035 IN  SOLO PLUS

## (undated) DEVICE — CATH GUIDE LAUNCHER 6FR EBU 3.5

## (undated) DEVICE — ROSEBUD DISSECTORS: Brand: DEROYAL

## (undated) DEVICE — SYRINGE 10ML LL

## (undated) DEVICE — GUIDEWIRE STRGHT TIP 0.038 IN SOLO PLUS

## (undated) DEVICE — FOGARTY - HYDRAGRIP SURGICAL - CLAMP INSERTS: Brand: FOGARTY SOFTJAW

## (undated) DEVICE — PETRI DISH STERILE

## (undated) DEVICE — SUT PROLENE 6-0 C-1/C-1 30 IN 8706H

## (undated) DEVICE — GLOVE SRG BIOGEL 7.5

## (undated) DEVICE — THYROID SHEET: Brand: CONVERTORS

## (undated) DEVICE — TR BAND RADIAL ARTERY COMPRESSION DEVICE: Brand: TR BAND

## (undated) DEVICE — BASKET SPECIMEN RETRIVAL 1.9FR 120CM

## (undated) DEVICE — TELFA NON-ADHERENT ABSORBENT DRESSING: Brand: TELFA

## (undated) DEVICE — GUIDE PIN 2.5 X 220MM AEQUALIS PERFORM PLUS

## (undated) DEVICE — 3M™ IOBAN™ 2 ANTIMICROBIAL INCISE DRAPE 6640EZ: Brand: IOBAN™ 2

## (undated) DEVICE — DGW .035 FC J3MM 260CM TEF: Brand: EMERALD

## (undated) DEVICE — GUIDEWIRE ANGLED TIP 0.035 IN SOLO PLUS

## (undated) DEVICE — SUT VICRYL PLUS 0 CTB-1 27 IN VCPB260H

## (undated) DEVICE — 3M™ TRANSPORE™ WHITE SURGICAL TAPE 1534-1, 1 INCH X 10 YARD (2,5CM X 9,1M), 12 ROLLS/CARTON 10 CARTONS/CASE: Brand: 3M™ TRANSPORE™

## (undated) DEVICE — FIBER LASER HOLMIUM 272MICRON HOL1020F

## (undated) DEVICE — SYSTEM TRANSPERINEAL ACCESS PRECISIONPOINT

## (undated) DEVICE — SCD SEQUENTIAL COMPRESSION COMFORT SLEEVE MEDIUM KNEE LENGTH: Brand: KENDALL SCD

## (undated) DEVICE — ADHESIVE SKIN HIGH VISCOSITY EXOFIN 1ML

## (undated) DEVICE — SUT SILK 2-0 SH 30 IN K833H

## (undated) DEVICE — 1820 FOAM BLOCK NEEDLE COUNTER: Brand: DEVON

## (undated) DEVICE — CHLORHEXIDINE 4PCT 4 OZ

## (undated) DEVICE — PAD GROUNDING ADULT

## (undated) DEVICE — INTENDED FOR TISSUE SEPARATION, AND OTHER PROCEDURES THAT REQUIRE A SHARP SURGICAL BLADE TO PUNCTURE OR CUT.: Brand: BARD-PARKER SAFETY BLADES SIZE 10, STERILE

## (undated) DEVICE — BETHL CAROTID ENDARTERECTOMY: Brand: CARDINAL HEALTH

## (undated) DEVICE — CATH STRAIGHT RED RUBBER 20 FR

## (undated) DEVICE — PENCIL ELECTROSURG E-Z CLEAN -0035H

## (undated) DEVICE — DRAPE PROBE NEO-PROBE/ULTRASOUND

## (undated) DEVICE — PROXIMATE PLUS MD MULTI-DIRECTIONAL RELEASE SKIN STAPLERS CONTAINS 35 STAINLESS STEEL STAPLES APPROXIMATE CLOSED DIMENSIONS: 6.9MM X 3.9MM WIDE: Brand: PROXIMATE

## (undated) DEVICE — PACK MAJOR ORTHO W/SPLITS PBDS

## (undated) DEVICE — THE SIMPULSE SOLO SYSTEM WITH ULTREX RETRACTABLE SPLASH SHIELD TIP: Brand: SIMPULSE SOLO

## (undated) DEVICE — LIGACLIP MCA MULTIPLE CLIP APPLIERS, 20 MEDIUM CLIPS: Brand: LIGACLIP

## (undated) DEVICE — DRAPE SURGIKIT SADDLE BAG

## (undated) DEVICE — GLOVE INDICATOR PI UNDERGLOVE SZ 7.5 BLUE

## (undated) DEVICE — CAPIT ASCEND FLEX REVERSE W PERFORM

## (undated) DEVICE — 3000CC GUARDIAN II: Brand: GUARDIAN

## (undated) DEVICE — KIT STABILIZATION SHOULDER MARCO

## (undated) DEVICE — CAROTID ARTERY SHUNT KIT,RADIOPAQUE LINE, STRAIGHT: Brand: ARGYLE

## (undated) DEVICE — SURGICEL FIBRILLAR 1 X 2

## (undated) DEVICE — INVIEW CLEAR LEGGINGS: Brand: CONVERTORS

## (undated) DEVICE — SUT SILK 2-0 18 IN A185H

## (undated) DEVICE — MONITORING SPINAL IMPULSE CASE FEE

## (undated) DEVICE — HOOD: Brand: FLYTE, SURGICOOL

## (undated) DEVICE — CATH URETERAL 5FR X 70 CM FLEX TIP POLYUR BARD

## (undated) DEVICE — INTENDED FOR TISSUE SEPARATION, AND OTHER PROCEDURES THAT REQUIRE A SHARP SURGICAL BLADE TO PUNCTURE OR CUT.: Brand: BARD-PARKER SAFETY BLADES SIZE 15, STERILE

## (undated) DEVICE — UROCATCH BAG

## (undated) DEVICE — GUIDEWIRE WHOLEY HI TORQUE INTERM MOD J.035 260CM

## (undated) DEVICE — GLOVE INDICATOR PI UNDERGLOVE SZ 8 BLUE

## (undated) DEVICE — NEEDLE SPINAL18G X 3.5 IN QUINCKE

## (undated) DEVICE — 1200CC GUARDIAN II: Brand: GUARDIAN

## (undated) DEVICE — SHEATH URETERAL ACCESS 12/14FR 45CM PROXIS

## (undated) DEVICE — GLOVE SRG BIOGEL ECLIPSE 7.5

## (undated) DEVICE — SUT PROLENE 7-0 BV175-6 24 IN M8737

## (undated) DEVICE — SYRINGE 1ML SLIP TIP

## (undated) DEVICE — UTILITY MARKER,BLACK WITH LABELS: Brand: DEVON

## (undated) DEVICE — GLIDESHEATH SLENDER STAINLESS STEEL KIT: Brand: GLIDESHEATH SLENDER

## (undated) DEVICE — SUT PROLENE 6-0 BV130 30 IN 8709H

## (undated) DEVICE — MAX-CORE® DISPOSABLE CORE BIOPSY INSTRUMENT, 18G X 20CM: Brand: MAX-CORE

## (undated) DEVICE — PROBE ULTRASOUND URONAV TRIPLANE

## (undated) DEVICE — INTENT TO BE USED WITH SUTURE MATERIAL FOR TISSUE CLOSURE: Brand: RICHARD-ALLAN®  NEEDLE 1/2 CIRCLE REVERSE CUTTING

## (undated) DEVICE — SUT 2 ORTHOCORD MO7

## (undated) DEVICE — ULTRASOUND GEL STERILE FOIL PK

## (undated) DEVICE — SUT VICRYL PLUS 2-0 CTB-1 27 IN VCPB259H

## (undated) DEVICE — 3M™ TEGADERM™ TRANSPARENT FILM DRESSING FRAME STYLE, 1628, 6 IN X 8 IN (15 CM X 20 CM), 10/CT 8CT/CASE: Brand: 3M™ TEGADERM™

## (undated) DEVICE — DUAL CUT SAGITTAL BLADE

## (undated) DEVICE — SUT POLYESTER TAPE D-G 8618-00

## (undated) DEVICE — IMPERVIOUS STOCKINETTE: Brand: DEROYAL

## (undated) DEVICE — FORMALIN PRE-FILLED 10 PCT PROSTATE BIOPSY

## (undated) DEVICE — PACK TUR